# Patient Record
Sex: FEMALE | Race: WHITE | URBAN - METROPOLITAN AREA
[De-identification: names, ages, dates, MRNs, and addresses within clinical notes are randomized per-mention and may not be internally consistent; named-entity substitution may affect disease eponyms.]

---

## 2019-10-12 ENCOUNTER — INPATIENT (INPATIENT)
Facility: HOSPITAL | Age: 84
LOS: 6 days | Discharge: HOME HEALTH | End: 2019-10-19
Attending: INTERNAL MEDICINE | Admitting: INTERNAL MEDICINE
Payer: MEDICARE

## 2019-10-12 VITALS
OXYGEN SATURATION: 94 % | DIASTOLIC BLOOD PRESSURE: 75 MMHG | SYSTOLIC BLOOD PRESSURE: 148 MMHG | TEMPERATURE: 99 F | RESPIRATION RATE: 17 BRPM | HEART RATE: 93 BPM

## 2019-10-12 DIAGNOSIS — Z90.710 ACQUIRED ABSENCE OF BOTH CERVIX AND UTERUS: Chronic | ICD-10-CM

## 2019-10-12 LAB
ALBUMIN SERPL ELPH-MCNC: 3.4 G/DL — LOW (ref 3.5–5.2)
ALP SERPL-CCNC: 83 U/L — SIGNIFICANT CHANGE UP (ref 30–115)
ALT FLD-CCNC: 9 U/L — SIGNIFICANT CHANGE UP (ref 0–41)
ANION GAP SERPL CALC-SCNC: 13 MMOL/L — SIGNIFICANT CHANGE UP (ref 7–14)
AST SERPL-CCNC: 14 U/L — SIGNIFICANT CHANGE UP (ref 0–41)
BILIRUB SERPL-MCNC: 0.4 MG/DL — SIGNIFICANT CHANGE UP (ref 0.2–1.2)
BUN SERPL-MCNC: 24 MG/DL — HIGH (ref 10–20)
CALCIUM SERPL-MCNC: 8.8 MG/DL — SIGNIFICANT CHANGE UP (ref 8.5–10.1)
CHLORIDE SERPL-SCNC: 103 MMOL/L — SIGNIFICANT CHANGE UP (ref 98–110)
CO2 SERPL-SCNC: 25 MMOL/L — SIGNIFICANT CHANGE UP (ref 17–32)
CREAT SERPL-MCNC: 1 MG/DL — SIGNIFICANT CHANGE UP (ref 0.7–1.5)
GLUCOSE SERPL-MCNC: 115 MG/DL — HIGH (ref 70–99)
HCT VFR BLD CALC: 38.6 % — SIGNIFICANT CHANGE UP (ref 37–47)
HGB BLD-MCNC: 12.6 G/DL — SIGNIFICANT CHANGE UP (ref 12–16)
MCHC RBC-ENTMCNC: 29.9 PG — SIGNIFICANT CHANGE UP (ref 27–31)
MCHC RBC-ENTMCNC: 32.6 G/DL — SIGNIFICANT CHANGE UP (ref 32–37)
MCV RBC AUTO: 91.7 FL — SIGNIFICANT CHANGE UP (ref 81–99)
NRBC # BLD: 0 /100 WBCS — SIGNIFICANT CHANGE UP (ref 0–0)
NT-PROBNP SERPL-SCNC: 1651 PG/ML — HIGH (ref 0–300)
PLATELET # BLD AUTO: 187 K/UL — SIGNIFICANT CHANGE UP (ref 130–400)
POTASSIUM SERPL-MCNC: 3.3 MMOL/L — LOW (ref 3.5–5)
POTASSIUM SERPL-SCNC: 3.3 MMOL/L — LOW (ref 3.5–5)
PROT SERPL-MCNC: 5.8 G/DL — LOW (ref 6–8)
RBC # BLD: 4.21 M/UL — SIGNIFICANT CHANGE UP (ref 4.2–5.4)
RBC # FLD: 13.2 % — SIGNIFICANT CHANGE UP (ref 11.5–14.5)
SODIUM SERPL-SCNC: 141 MMOL/L — SIGNIFICANT CHANGE UP (ref 135–146)
TROPONIN T SERPL-MCNC: 0.05 NG/ML — CRITICAL HIGH
WBC # BLD: 14.78 K/UL — HIGH (ref 4.8–10.8)
WBC # FLD AUTO: 14.78 K/UL — HIGH (ref 4.8–10.8)

## 2019-10-12 PROCEDURE — 93010 ELECTROCARDIOGRAM REPORT: CPT

## 2019-10-12 PROCEDURE — 71045 X-RAY EXAM CHEST 1 VIEW: CPT | Mod: 26

## 2019-10-12 PROCEDURE — 99285 EMERGENCY DEPT VISIT HI MDM: CPT

## 2019-10-12 PROCEDURE — 70450 CT HEAD/BRAIN W/O DYE: CPT | Mod: 26

## 2019-10-12 RX ORDER — MORPHINE SULFATE 50 MG/1
4 CAPSULE, EXTENDED RELEASE ORAL ONCE
Refills: 0 | Status: DISCONTINUED | OUTPATIENT
Start: 2019-10-12 | End: 2019-10-12

## 2019-10-12 RX ORDER — PRAMIPEXOLE DIHYDROCHLORIDE 0.12 MG/1
0.5 TABLET ORAL THREE TIMES A DAY
Refills: 0 | Status: DISCONTINUED | OUTPATIENT
Start: 2019-10-12 | End: 2019-10-19

## 2019-10-12 RX ORDER — CHLORHEXIDINE GLUCONATE 213 G/1000ML
1 SOLUTION TOPICAL
Refills: 0 | Status: DISCONTINUED | OUTPATIENT
Start: 2019-10-12 | End: 2019-10-19

## 2019-10-12 RX ORDER — POTASSIUM CHLORIDE 20 MEQ
40 PACKET (EA) ORAL ONCE
Refills: 0 | Status: COMPLETED | OUTPATIENT
Start: 2019-10-12 | End: 2019-10-12

## 2019-10-12 RX ORDER — SODIUM CHLORIDE 9 MG/ML
1000 INJECTION, SOLUTION INTRAVENOUS
Refills: 0 | Status: DISCONTINUED | OUTPATIENT
Start: 2019-10-12 | End: 2019-10-12

## 2019-10-12 RX ORDER — MORPHINE SULFATE 50 MG/1
1 CAPSULE, EXTENDED RELEASE ORAL ONCE
Refills: 0 | Status: DISCONTINUED | OUTPATIENT
Start: 2019-10-12 | End: 2019-10-12

## 2019-10-12 RX ORDER — SODIUM CHLORIDE 9 MG/ML
1000 INJECTION, SOLUTION INTRAVENOUS
Refills: 0 | Status: DISCONTINUED | OUTPATIENT
Start: 2019-10-12 | End: 2019-10-13

## 2019-10-12 RX ORDER — ENOXAPARIN SODIUM 100 MG/ML
40 INJECTION SUBCUTANEOUS DAILY
Refills: 0 | Status: DISCONTINUED | OUTPATIENT
Start: 2019-10-12 | End: 2019-10-16

## 2019-10-12 RX ORDER — ATORVASTATIN CALCIUM 80 MG/1
20 TABLET, FILM COATED ORAL AT BEDTIME
Refills: 0 | Status: DISCONTINUED | OUTPATIENT
Start: 2019-10-12 | End: 2019-10-18

## 2019-10-12 RX ORDER — DILTIAZEM HCL 120 MG
360 CAPSULE, EXT RELEASE 24 HR ORAL DAILY
Refills: 0 | Status: DISCONTINUED | OUTPATIENT
Start: 2019-10-12 | End: 2019-10-19

## 2019-10-12 RX ADMIN — MORPHINE SULFATE 4 MILLIGRAM(S): 50 CAPSULE, EXTENDED RELEASE ORAL at 19:50

## 2019-10-12 RX ADMIN — MORPHINE SULFATE 1 MILLIGRAM(S): 50 CAPSULE, EXTENDED RELEASE ORAL at 23:45

## 2019-10-12 RX ADMIN — Medication 40 MILLIEQUIVALENT(S): at 20:21

## 2019-10-12 RX ADMIN — MORPHINE SULFATE 4 MILLIGRAM(S): 50 CAPSULE, EXTENDED RELEASE ORAL at 20:21

## 2019-10-12 NOTE — ED PROVIDER NOTE - CLINICAL SUMMARY MEDICAL DECISION MAKING FREE TEXT BOX
88yo F history of HTN DL RA Parkinsons presenting with syncopal episode today- per pt and family, she was on the toilet bowl, then syncopized. No trauma, head injury. Pt currently c/o generalized weakness, dysuria, worsening arthralgias, nausea, per pt, sx since Thurs. No fevers/chills, chest pain, shortness of breath, hematuria, flank pain, numbness/focal weakness. labs ekg imaging reviewed. will admit

## 2019-10-12 NOTE — ED PROVIDER NOTE - ATTENDING CONTRIBUTION TO CARE
88yo F history of HTN DL RA Parkinsons presenting with syncopal episode today- per pt and family, she was on the toilet bowl, then syncopized. No trauma, head injury. Pt currently c/o generalized weakness, dysuria, worsening arthralgias, nausea, per pt, sx since Thurs. Also c/o L eye blurry vision x2d. No fevers/chills, chest pain, shortness of breath, hematuria, flank pain, numbness/focal weakness   Constitutional:  Non toxic.   Eyes: PERRLA. Extraocular movements intact, no entrapment. Conjunctiva normal. visual acuity as above  ENT: No nasal discharge. Moist mucus membranes.  Neck: Supple, non tender, full range of motion.  CV: RRR no murmurs, rubs, or gallops. +S1S2.   Pulm: Clear to auscultation bilaterally. Normal work of breathing.  Abd: soft NT ND +BS.   Ext: Warm and well perfused x4, moving all extremities, no edema.   Psy: Cooperative, appropriate.   Skin: Warm, dry, no rash  Neuro: CN2-12 grossly intact no sensory or motor deficits throughout, no drift

## 2019-10-12 NOTE — H&P ADULT - ASSESSMENT
This is a 78 year old female with a significant PMhx of Parkinson's disease and RA who presented to the ED after a witnessed episode of syncope, associated with recent generalized weakness, dark urine, and N/V. She also complained of L-eye blurriness x2 days and headaches over the last few weeks.     Syncope; Witnessed  - Most likely related to dehydration and Parkinson's disease   - Patient received IVF in the ED; VS SNL  - Will monitor BP off of HCTZ for now  - Follow up orthostatics; Would change HCTZ for another anti-HTN medication  - Doubt seizure without report of post-ictal state expressed  - Elevated troponin at time of presentation; trending  - Murmur on auscultation; BNP elevated; Follow up TTE  - Follow up CTA Head and Neck given recent headaches and blurry vision  - Will consult surgery pending work up results    Elevated troponin and BNP   - Work up as above mentioned    Acute blurry vision; L-eye blurriness  - Follow up ESR  - Follow up CTA head and neck  - Will hold on steroids for now    Hx of Parkinson's Disease: Continue home medications  Hx of RA: Paint control  Hx of HTN and HLD: Hold HCTZ and continue Statin    Electrolyte Imbalances:   Hypokalemia; repletion in ED; monitor      GI ppx:                                   [X] Not indicated    DVT ppx:  [X] Heparin 5000mg SubQ    Fluids:   [X] PO    Activity:  [X] Increase as Tolerated     DISPO:  Patient to be discharged when condition(s) optimized.  [X] Home    CODE STATUS  [X] FULL This is a 78 year old female with a significant PMhx of Parkinson's disease and RA who presented to the ED after a witnessed episode of syncope, associated with recent generalized weakness, dark urine, and N/V. She also complained of L-eye blurriness x2 days and headaches over the last few weeks.     Syncope; Witnessed  - Most likely related to dehydration and Parkinson's disease   - Will start IVF for 12 hours; Reassess in AM  - Will monitor BP off of HCTZ for now  - Follow up orthostatics; Would change HCTZ for another anti-HTN medication  - Doubt seizure without report of post-ictal state expressed  - Elevated troponin at time of presentation; trending  - Murmur on auscultation; BNP elevated; Follow up TTE  - Leukocytosis; Follow up UA and Ucx given report of dark urine; Follow up CK  - Will consult surgery pending work up results    Elevated troponin and BNP   - Work up as above mentioned    Acute blurry vision; L-eye blurriness  - Follow up ESR  - Consider CTA Head and Neck if elevated ESR and symptoamtic    Hx of Parkinson's Disease: Continue home medications  Hx of RA: Paint control  Hx of HTN and HLD: Hold HCTZ and continue Statin    Electrolyte Imbalances:   Hypokalemia; repletion in ED; monitor      GI ppx:                                   [X] Not indicated    DVT ppx:  [X] Heparin 5000mg SubQ    Fluids:   [X] PO    Activity:  [X] Increase as Tolerated     DISPO:  Patient to be discharged when condition(s) optimized.  [X] Home    CODE STATUS  [X] FULL This is a 78 year old female with a significant PMhx of Parkinson's disease and RA who presented to the ED after a witnessed episode of syncope, associated with recent generalized weakness, dark urine, and N/V. She also complained of L-eye blurriness x2 days and headaches over the last few weeks.     Syncope; Witnessed  - Most likely related to dehydration and Parkinson's disease  - Doubt seizure without report of post-ictal state expressed  - CT Head with moderate microvascular changes and old lacunar infarcts; No acute pathology  - Will monitor BP off of HCTZ for now  - Follow up orthostatics; Would change HCTZ for another anti-HTN medication  - Will start IVF for 12 hours; Reassess in AM  - Elevated troponin at time of presentation; ?Demand (no CP); Trending  - Murmur on auscultation; BNP elevated; Follow up TTE  - Leukocytosis; Follow up UA and Ucx given report of dark urine; Follow up CK    Elevated troponin and BNP   - Work up as above mentioned    Acute blurry vision; L-eye blurriness  - Follow up ESR and CK  - Consider CTA Head and Neck if elevated ESR and symptoamtic    Hx of Parkinson's Disease: Continue home medications  Hx of RA: Paint control  Hx of HTN and HLD: Hold HCTZ and continue Statin    Electrolyte Imbalances:   Hypokalemia; repletion in ED; monitor      GI ppx:                                   [X] Not indicated    DVT ppx:  [X] Heparin 5000mg SubQ    Fluids:   [X] PO    Activity:  [X] Increase as Tolerated     DISPO:  Patient to be discharged when condition(s) optimized.  [X] Home    CODE STATUS  [X] FULL This is a 78 year old female with a significant PMhx of Parkinson's disease and RA who presented to the ED after a witnessed episode of syncope, associated with recent generalized weakness, dark urine, and N/V. She also complained of L-eye blurriness x2 days and headaches over the last few weeks.     Syncope; Witnessed  - Most likely related to dehydration and Parkinson's disease  - Doubt seizure without report of post-ictal state expressed  - CT Head with moderate microvascular changes and old lacunar infarcts; No acute pathology  - Will monitor BP off of HCTZ for now  - Follow up orthostatics; Would change HCTZ for another anti-HTN medication  - Will start IVF for 12 hours; Reassess in AM  - Elevated troponin at time of presentation; ?Demand (no CP); Trending  - Murmur on auscultation; BNP elevated; Follow up TTE  - Leukocytosis; Follow up UA and Ucx given report of dark urine; Follow up CK    Elevated troponin and BNP   - Work up as above mentioned    Acute blurry vision; L-eye blurriness  - Follow up ESR and CK  - Consider CTA Head and Neck if elevated ESR and symptomatic    Hx of Parkinson's Disease: Continue home medications  Hx of RA: Paint control  Hx of HTN and HLD: Hold HCTZ and continue Statin    Electrolyte Imbalances:   Hypokalemia; repletion in ED; monitor      GI ppx:                                   [X] Not indicated    DVT ppx:  [X] Heparin 5000mg SubQ    Fluids:   [X] PO    Activity:  [X] Increase as Tolerated     DISPO:  Patient to be discharged when condition(s) optimized.  [X] Home    CODE STATUS  [X] FULL This is a 78 year old female with a significant PMhx of Parkinson's disease and RA who presented to the ED after a witnessed episode of syncope, associated with recent generalized weakness, dark urine, and N/V. She also complained of L-eye blurriness x2 days and headaches over the last few weeks.     Syncope; Witnessed  - Most likely related to dehydration and Parkinson's disease  - Doubt seizure without report of post-ictal state expressed  - CT Head with moderate microvascular changes and old lacunar infarcts; No acute pathology  - Will monitor BP off of HCTZ for now  - Follow up orthostatics; Would change HCTZ for another anti-HTN medication  - Will start IVF for 12 hours; Reassess in AM  - Elevated troponin at time of presentation; ?Demand (no CP); Trending  - Murmur on auscultation; BNP elevated; Follow up TTE  - Leukocytosis; Follow up UA and Ucx given report of dark urine; Follow up CK    Elevated troponin and BNP   - Work up as above mentioned    Acute blurry vision; L-eye blurriness  - Follow up ESR and CK    Hx of Parkinson's Disease: Continue home medications  Hx of RA: Paint control  Hx of HTN and HLD: Hold HCTZ and continue Statin    Electrolyte Imbalances:   Hypokalemia; repletion in ED; monitor      GI ppx:                                   [X] Not indicated    DVT ppx:  [X] Heparin 5000mg SubQ    Fluids:   [X] PO    Activity:  [X] Increase as Tolerated     DISPO:  Patient to be discharged when condition(s) optimized.  [X] Home    CODE STATUS  [X] FULL This is a 78 year old female with a significant PMhx of Parkinson's disease and RA who presented to the ED after a witnessed episode of syncope, associated with recent generalized weakness, dark urine, and N/V. She also complained of L-eye blurriness x2 days and headaches over the last few weeks.     Syncope; Witnessed  - Most likely related to dehydration and Parkinson's disease  - Doubt seizure without report of post-ictal state expressed  - CT Head with moderate microvascular changes and old lacunar infarcts; No acute pathology  - Will monitor BP off of HCTZ for now  - Follow up orthostatics; Would change HCTZ for another anti-HTN medication  - Will start IVF for 12 hours; Reassess in AM  - Elevated troponin at time of presentation; ?Demand, CKD (no CP); Trending  - Murmur on auscultation; BNP elevated; Follow up TTE  - Leukocytosis; Follow up UA and Ucx given report of dark urine; Follow up CK    Elevated troponin and BNP   - Work up as above mentioned    Acute blurry vision; L-eye blurriness  - Follow up ESR and CK    Hx of Parkinson's Disease: Continue home medications  Hx of RA: Paint control  Hx of HTN and HLD: Hold HCTZ and continue Statin    Electrolyte Imbalances:   Hypokalemia; repletion in ED; monitor      GI ppx:                                   [X] Not indicated    DVT ppx:  [X] Heparin 5000mg SubQ    Fluids:   [X] PO    Activity:  [X] Increase as Tolerated     DISPO:  Patient to be discharged when condition(s) optimized.  [X] Home    CODE STATUS  [X] FULL

## 2019-10-12 NOTE — ED ADULT TRIAGE NOTE - CHIEF COMPLAINT QUOTE
"I feel very weak since Thursday." Family reports pt extremely weak, having dark colored urine, leg pain, and nausea.

## 2019-10-12 NOTE — ED PROVIDER NOTE - CARE PLAN
Principal Discharge DX:	Syncope, unspecified syncope type  Secondary Diagnosis:	Syncope  Secondary Diagnosis:	NSTEMI (non-ST elevated myocardial infarction)

## 2019-10-12 NOTE — H&P ADULT - NSHPSOCIALHISTORY_GEN_ALL_CORE
Lives at home with daughter  Walks with a can at baseline  Denied history of EtOH and illicit drug use  Denied history of smoking

## 2019-10-12 NOTE — H&P ADULT - NSICDXPASTMEDICALHX_GEN_ALL_CORE_FT
PAST MEDICAL HISTORY:  HTN (hypertension)     Hyperlipidemia     Parkinson disease     Rheumatoid arthritis

## 2019-10-12 NOTE — ED PROVIDER NOTE - OBJECTIVE STATEMENT
87F with pmh of Parkinson's, HTN, HLD, and RA presents with weakness and L hip pain with radiation to bilateral knee pain as is typical of 87F with pmh of Parkinson's, HTN, HLD, and RA presents with weakness, nausea and L hip pain with radiation to bilateral knee pain as is typical of her RA. Daughter notes that earlier today PT was urinating when she noticed that she had lost consciousness for less than a minute. PT denies prodromal sx, n/v/d, abbd pain, CP, SOB. PT also noting blurry vision in L eye. No loss of balance, slurring of speech, numbness or weakness.

## 2019-10-12 NOTE — ED PROVIDER NOTE - PROGRESS NOTE DETAILS
Bedside ultrasound negative for detachment or hemorrhage. EKG without STEMI. PT to be admitted for elevated cardiac enzymes and further evaluation.

## 2019-10-12 NOTE — ED ADULT NURSE NOTE - OBJECTIVE STATEMENT
pt biba from home after feeling weak since thursday. having lower back pain that radiates down both legs. denies trauma but has arthritis. yesterday had 1 episode of dark urine today it is back to normal. denies increase in fluid intake. had a 1 second syncopal episode as per daughter the other day.

## 2019-10-12 NOTE — H&P ADULT - NSHPLABSRESULTS_GEN_ALL_CORE
:  LAB RESULTS:                        12.6   14.78 )-----------( 187      ( 12 Oct 2019 19:12 )             38.6     141  |  103  |  24<H>  ----------------------------<  115<H>  3.3<L>   |  25  |  1.0    Ca    8.8         TPro  5.8<L>  /  Alb  3.4<L>  /  TBili  0.4  /  DBili  x   /  AST  14  /  ALT  9   /  AlkPhos  83  10-12    Troponin T, Serum: 0.05 ng/mL <HH> (10-12-19 @ 19:12)    MICROBIOLOGY: NTR    RADIOLOGY:  CT Head No Cont (10.12.19 @ 20:19)     IMPRESSION:   No CT evidence of acute intracranial pathology.   Moderate chronic microvascular ischemic changes and chronic lacunar infarcts as above.    ALLERGIES:  No Known Allergies    HOME MEDICATIONS:  atorvastatin 20 mg oral tablet: 1 tab(s) orally once a day (12 Oct 2019 18:49)  carbidopa-levodopa:  (12 Oct 2019 18:49)  celecoxib 200 mg oral capsule: 1 cap(s) orally 2 times a day (12 Oct 2019 18:49)  dilTIAZem 360 mg/24 hours oral capsule, extended release: 1 cap(s) orally once a day (12 Oct 2019 18:49)  hydroCHLOROthiazide 12.5 mg oral tablet: 1 tab(s) orally once a day (12 Oct 2019 18:49)  pramipexole 0.5 mg oral tablet: 1 tab(s) orally 3 times a day (12 Oct 2019 18:49)    =========================================================== :  LAB RESULTS:                        12.6   14.78 )-----------( 187      ( 12 Oct 2019 19:12 )             38.6     141  |  103  |  24<H>  ----------------------------<  115<H>  3.3<L>   |  25  |  1.0    Ca    8.8         TPro  5.8<L>  /  Alb  3.4<L>  /  TBili  0.4  /  DBili  x   /  AST  14  /  ALT  9   /  AlkPhos  83  10-12    Troponin T, Serum: 0.05 ng/mL <HH> (10-12-19 @ 19:12)    MICROBIOLOGY: NTR    RADIOLOGY:  CT Head No Cont (10.12.19 @ 20:19)     < from: 12 Lead ECG (10.12.19 @ 19:32) >    Normal sinus rhythm    < end of copied text >        IMPRESSION:   No CT evidence of acute intracranial pathology.   Moderate chronic microvascular ischemic changes and chronic lacunar infarcts as above.    ALLERGIES:  No Known Allergies    HOME MEDICATIONS:  atorvastatin 20 mg oral tablet: 1 tab(s) orally once a day (12 Oct 2019 18:49)  carbidopa-levodopa:  (12 Oct 2019 18:49)  celecoxib 200 mg oral capsule: 1 cap(s) orally 2 times a day (12 Oct 2019 18:49)  dilTIAZem 360 mg/24 hours oral capsule, extended release: 1 cap(s) orally once a day (12 Oct 2019 18:49)  hydroCHLOROthiazide 12.5 mg oral tablet: 1 tab(s) orally once a day (12 Oct 2019 18:49)  pramipexole 0.5 mg oral tablet: 1 tab(s) orally 3 times a day (12 Oct 2019 18:49)    ===========================================================

## 2019-10-12 NOTE — ED ADULT NURSE NOTE - NSIMPLEMENTINTERV_GEN_ALL_ED
Implemented All Fall with Harm Risk Interventions:  [place] to call system. Call bell, personal items and telephone within reach. Instruct patient to call for assistance. Room bathroom lighting operational. Non-slip footwear when patient is off stretcher. Physically safe environment: no spills, clutter or unnecessary equipment. Stretcher in lowest position, wheels locked, appropriate side rails in place. Provide visual cue, wrist band, yellow gown, etc. Monitor gait and stability. Monitor for mental status changes and reorient to person, place, and time. Review medications for side effects contributing to fall risk. Reinforce activity limits and safety measures with patient and family. Provide visual clues: red socks.

## 2019-10-12 NOTE — H&P ADULT - NSHPPHYSICALEXAM_GEN_ALL_CORE
DAILY PROGRESS NOTE  ===========================================================    Patient Information:  VITAL SIGNS: Last 24 Hours  T(F): 98.6 (12 Oct 2019 18:08), Max: 98.6 (12 Oct 2019 18:08)  HR: 86 (12 Oct 2019 20:30) (86 - 93)  BP: 153/70 (12 Oct 2019 20:30) (148/75 - 153/70)  RR: 20 (12 Oct 2019 20:30) (17 - 20)  SpO2: 95% (12 Oct 2019 20:30) (94% - 95%)    PHYSICAL EXAM:  GENERAL:   Awake, alert; NAD.  HEENT:  Head NC/AT; Conjunctivae pink, Sclera anicteric; Oral mucosa moist.  CARDIO:   Regular rate; Regular rhythm; S1 & S2.  RESP:   No rales or rhonchi appreciated.  GI:   Soft; NT/ND; BS; No guarding; No rebound tenderness.  EXT:   No edema in UE and LE.  NEURO:   PERRL.  SKIN:   Intact. DAILY PROGRESS NOTE  ===========================================================    Patient Information:  VITAL SIGNS: Last 24 Hours  T(F): 98.6 (12 Oct 2019 18:08), Max: 98.6 (12 Oct 2019 18:08)  HR: 86 (12 Oct 2019 20:30) (86 - 93)  BP: 153/70 (12 Oct 2019 20:30) (148/75 - 153/70)  RR: 20 (12 Oct 2019 20:30) (17 - 20)  SpO2: 95% (12 Oct 2019 20:30) (94% - 95%)    PHYSICAL EXAM:  GENERAL:   Awake, alert; Mild distress, worsened with movement.  HEENT:  Head NC/AT; Conjunctivae injected BL; Sclera anicteric; Oral mucosa moist.  CARDIO:   Regular rate; IRREGULAR rhythm; S1 & S2; Murmur.  RESP:   No rales or rhonchi appreciated; Good entry BL.  GI:   Soft; NT/ND; BS; No guarding; No rebound tenderness.  EXT:   No edema in LE; No rigidity in UE with moderate rigidity in LE; Resting hand tremors; No pain at neck or hips on palpation; Pain with flexion and extension of knees BL.  NEURO:   Oriented x3; Sluggish L-pupil reflex, CN otherwise grossly intact.   SKIN:   Intact. DAILY PROGRESS NOTE  ===========================================================    Patient Information:  VITAL SIGNS: Last 24 Hours  T(F): 98.6 (12 Oct 2019 18:08), Max: 98.6 (12 Oct 2019 18:08)  HR: 86 (12 Oct 2019 20:30) (86 - 93)  BP: 153/70 (12 Oct 2019 20:30) (148/75 - 153/70)  RR: 20 (12 Oct 2019 20:30) (17 - 20)  SpO2: 95% (12 Oct 2019 20:30) (94% - 95%)    PHYSICAL EXAM:  GENERAL:   Awake, alert; Mild distress, worsened with movement.  HEENT:  Head NC/AT; Conjunctivae injected BL; Sclera anicteric; Oral mucosa moist.  CARDIO:   Regular rate; IRREGULAR rhythm; S1 & S2; Murmur.  RESP:   No rales or rhonchi appreciated; Good entry BL.  GI:   Soft; NT/ND; BS; No guarding; No rebound tenderness.  EXT:   No edema in LE; No rigidity in UE with moderate rigidity in LE; Resting hand tremors; No pain at neck or hips on palpation; Pain with flexion and extension of knees BL.  NEURO:   Oriented x3; ?Sluggish L-pupil reflex (difficult to see as patient kept closing eye), CN otherwise grossly intact.   SKIN:   Intact.

## 2019-10-12 NOTE — ED PROVIDER NOTE - PHYSICAL EXAMINATION
CONSTITUTIONAL: Well-developed; well-nourished; in no acute distress.   SKIN: warm, dry  HEAD: Normocephalic; atraumatic.  EYES: PERRL, EOMI, no conjunctival erythema. 20/70 on the L, 20/40 R  ENT: No nasal discharge; airway clear.  NECK: Supple; non tender.  CARD: S1, S2 normal; no murmurs, gallops, or rubs. Regular rate and rhythm.   RESP: No wheezes, rales or rhonchi.  ABD: soft ntnd  EXT: Normal ROM.  No clubbing, cyanosis or edema.   LYMPH: No acute cervical adenopathy.  NEURO: Alert, oriented, grossly unremarkable  PSYCH: Cooperative, appropriate.

## 2019-10-12 NOTE — H&P ADULT - HISTORY OF PRESENT ILLNESS
This is a 78 year old female with a significant PMhx of Parkinson's disease and RA who presented to the ED after a witnessed episode of syncope. As per the daughter, the patient was attempting to urinate when she noticed the patient had lost consciousness. She was able to arouse the patient after a few seconds without any report of post-ictal state; the patient does not remember the event. The patient has also been experience generalized weakness, dark colored urine, BL LE (thigh pain), and nausea/vomiting (single bilious episode) 2 days prior to presentation. She also mentions acute development of left eye blurriness that is constant over past two days, and has been experiencing intermittent, sharp, occipital/neck headaches for the last few weeks.    ROS was negative for fever, CP, palpitations, SOB, abdominal pain, diarrhea, constipation.

## 2019-10-12 NOTE — H&P ADULT - ATTENDING COMMENTS
patient seen and examined , agree with pgy 3 assesment and plan except as indicated above,   GEN Lying in no acute distress  HEENT Pupils equal and reactive to light and accommodationSupple Neck  PULM Clear to auscultation bilaterally  CV s1s2   GI + bowel sounds nontnender  EXT no cyanosis or edema  PSYCH awake alert and oriented x 3  INTEG No Lesions  NEURO VILLAR  #Syncopal episode   with persistent tropnemia , cardio consult , continue to trend with ck and ckmb  #Hypokalemia resolved  #CKD 2   #Moderate tricuspid regurgitation.    PT   Progress Note Handoff    Pending:    Family discussion: patient is of sound mind and verbalizes understanding to plan of care, daughter at bedside also  agrees to plan of care    Disposition: Home___ patient seen and examined , agree with pgy 3 assesment and plan except as indicated above,   GEN Lying in no acute distress  HEENT Pupils equal and reactive to light and accommodationSupple Neck  PULM Clear to auscultation bilaterally  CV s1s2   GI + bowel sounds nontnender  EXT no cyanosis or edema  PSYCH awake alert and oriented x 3  INTEG No Lesions  NEURO VILLAR  #Syncopal episode with positive ua  with persistent tropnemia , cardio consult , continue to trend with ck and ckmb, start cefepime  #Hypokalemia resolved  #CKD 2   #Moderate tricuspid regurgitation.    PT   Progress Note Handoff    Pending:    Family discussion: patient is of sound mind and verbalizes understanding to plan of care, daughter at bedside also  agrees to plan of care    Disposition: Home___ patient seen and examined , agree with pgy 3 assesment and plan except as indicated above,   GEN Lying in no acute distress  HEENT Pupils equal and reactive to light and accommodationSupple Neck  PULM Clear to auscultation bilaterally  CV s1s2   GI + bowel sounds nontnender  EXT no cyanosis or edema  PSYCH awake alert and oriented x 3  INTEG No Lesions  NEURO VILLAR  #Syncopal episode with positive ua -> suspicion for uti?  with persistent tropnemia , cardio consult , continue to trend with ck and ckmb, start cefepime, awaiting cultures  #Hypokalemia resolved  #CKD 2   #Moderate tricuspid regurgitation.    PT   Progress Note Handoff    Pending:    Family discussion: patient is of sound mind and verbalizes understanding to plan of care, daughter at bedside also  agrees to plan of care    Disposition: Home___

## 2019-10-13 LAB
ANION GAP SERPL CALC-SCNC: 14 MMOL/L — SIGNIFICANT CHANGE UP (ref 7–14)
APPEARANCE UR: ABNORMAL
BACTERIA # UR AUTO: ABNORMAL
BASOPHILS # BLD AUTO: 0.03 K/UL — SIGNIFICANT CHANGE UP (ref 0–0.2)
BASOPHILS NFR BLD AUTO: 0.2 % — SIGNIFICANT CHANGE UP (ref 0–1)
BILIRUB UR-MCNC: NEGATIVE — SIGNIFICANT CHANGE UP
BUN SERPL-MCNC: 19 MG/DL — SIGNIFICANT CHANGE UP (ref 10–20)
CALCIUM SERPL-MCNC: 8.7 MG/DL — SIGNIFICANT CHANGE UP (ref 8.5–10.1)
CHLORIDE SERPL-SCNC: 105 MMOL/L — SIGNIFICANT CHANGE UP (ref 98–110)
CK MB CFR SERPL CALC: 3.7 NG/ML — SIGNIFICANT CHANGE UP (ref 0.6–6.3)
CK SERPL-CCNC: 81 U/L — SIGNIFICANT CHANGE UP (ref 0–225)
CO2 SERPL-SCNC: 24 MMOL/L — SIGNIFICANT CHANGE UP (ref 17–32)
COLOR SPEC: YELLOW — SIGNIFICANT CHANGE UP
CREAT SERPL-MCNC: 0.8 MG/DL — SIGNIFICANT CHANGE UP (ref 0.7–1.5)
DIFF PNL FLD: ABNORMAL
EOSINOPHIL # BLD AUTO: 0.03 K/UL — SIGNIFICANT CHANGE UP (ref 0–0.7)
EOSINOPHIL NFR BLD AUTO: 0.2 % — SIGNIFICANT CHANGE UP (ref 0–8)
EPI CELLS # UR: 1 /HPF — SIGNIFICANT CHANGE UP (ref 0–5)
ERYTHROCYTE [SEDIMENTATION RATE] IN BLOOD: 106 MM/HR — HIGH (ref 0–20)
GLUCOSE SERPL-MCNC: 106 MG/DL — HIGH (ref 70–99)
GLUCOSE UR QL: NEGATIVE — SIGNIFICANT CHANGE UP
HCT VFR BLD CALC: 37.4 % — SIGNIFICANT CHANGE UP (ref 37–47)
HGB BLD-MCNC: 12.2 G/DL — SIGNIFICANT CHANGE UP (ref 12–16)
HYALINE CASTS # UR AUTO: 4 /LPF — SIGNIFICANT CHANGE UP (ref 0–7)
IMM GRANULOCYTES NFR BLD AUTO: 1 % — HIGH (ref 0.1–0.3)
KETONES UR-MCNC: SIGNIFICANT CHANGE UP
LACTATE SERPL-SCNC: 0.8 MMOL/L — SIGNIFICANT CHANGE UP (ref 0.5–2.2)
LEUKOCYTE ESTERASE UR-ACNC: ABNORMAL
LYMPHOCYTES # BLD AUTO: 0.63 K/UL — LOW (ref 1.2–3.4)
LYMPHOCYTES # BLD AUTO: 4.8 % — LOW (ref 20.5–51.1)
MAGNESIUM SERPL-MCNC: 1.9 MG/DL — SIGNIFICANT CHANGE UP (ref 1.8–2.4)
MCHC RBC-ENTMCNC: 30 PG — SIGNIFICANT CHANGE UP (ref 27–31)
MCHC RBC-ENTMCNC: 32.6 G/DL — SIGNIFICANT CHANGE UP (ref 32–37)
MCV RBC AUTO: 92.1 FL — SIGNIFICANT CHANGE UP (ref 81–99)
MONOCYTES # BLD AUTO: 1.06 K/UL — HIGH (ref 0.1–0.6)
MONOCYTES NFR BLD AUTO: 8 % — SIGNIFICANT CHANGE UP (ref 1.7–9.3)
NEUTROPHILS # BLD AUTO: 11.33 K/UL — HIGH (ref 1.4–6.5)
NEUTROPHILS NFR BLD AUTO: 85.8 % — HIGH (ref 42.2–75.2)
NITRITE UR-MCNC: NEGATIVE — SIGNIFICANT CHANGE UP
NRBC # BLD: 0 /100 WBCS — SIGNIFICANT CHANGE UP (ref 0–0)
PH UR: 8 — SIGNIFICANT CHANGE UP (ref 5–8)
PHOSPHATE SERPL-MCNC: 2.5 MG/DL — SIGNIFICANT CHANGE UP (ref 2.1–4.9)
PLATELET # BLD AUTO: 188 K/UL — SIGNIFICANT CHANGE UP (ref 130–400)
POTASSIUM SERPL-MCNC: 3.9 MMOL/L — SIGNIFICANT CHANGE UP (ref 3.5–5)
POTASSIUM SERPL-SCNC: 3.9 MMOL/L — SIGNIFICANT CHANGE UP (ref 3.5–5)
PROT UR-MCNC: ABNORMAL
RBC # BLD: 4.06 M/UL — LOW (ref 4.2–5.4)
RBC # FLD: 13.3 % — SIGNIFICANT CHANGE UP (ref 11.5–14.5)
RBC CASTS # UR COMP ASSIST: 5 /HPF — HIGH (ref 0–4)
SODIUM SERPL-SCNC: 143 MMOL/L — SIGNIFICANT CHANGE UP (ref 135–146)
SP GR SPEC: 1.02 — SIGNIFICANT CHANGE UP (ref 1.01–1.02)
TROPONIN T SERPL-MCNC: 0.06 NG/ML — CRITICAL HIGH
TROPONIN T SERPL-MCNC: 0.1 NG/ML — CRITICAL HIGH
UROBILINOGEN FLD QL: SIGNIFICANT CHANGE UP
WBC # BLD: 13.21 K/UL — HIGH (ref 4.8–10.8)
WBC # FLD AUTO: 13.21 K/UL — HIGH (ref 4.8–10.8)
WBC UR QL: 77 /HPF — HIGH (ref 0–5)

## 2019-10-13 PROCEDURE — 99223 1ST HOSP IP/OBS HIGH 75: CPT

## 2019-10-13 PROCEDURE — 93306 TTE W/DOPPLER COMPLETE: CPT | Mod: 26

## 2019-10-13 PROCEDURE — 93010 ELECTROCARDIOGRAM REPORT: CPT

## 2019-10-13 RX ORDER — CEFTRIAXONE 500 MG/1
1000 INJECTION, POWDER, FOR SOLUTION INTRAMUSCULAR; INTRAVENOUS EVERY 24 HOURS
Refills: 0 | Status: DISCONTINUED | OUTPATIENT
Start: 2019-10-13 | End: 2019-10-17

## 2019-10-13 RX ORDER — PANTOPRAZOLE SODIUM 20 MG/1
40 TABLET, DELAYED RELEASE ORAL
Refills: 0 | Status: DISCONTINUED | OUTPATIENT
Start: 2019-10-13 | End: 2019-10-19

## 2019-10-13 RX ORDER — CARBIDOPA AND LEVODOPA 25; 100 MG/1; MG/1
1 TABLET ORAL
Refills: 0 | Status: DISCONTINUED | OUTPATIENT
Start: 2019-10-13 | End: 2019-10-19

## 2019-10-13 RX ORDER — MORPHINE SULFATE 50 MG/1
2 CAPSULE, EXTENDED RELEASE ORAL ONCE
Refills: 0 | Status: DISCONTINUED | OUTPATIENT
Start: 2019-10-13 | End: 2019-10-13

## 2019-10-13 RX ORDER — INFLUENZA VIRUS VACCINE 15; 15; 15; 15 UG/.5ML; UG/.5ML; UG/.5ML; UG/.5ML
0.5 SUSPENSION INTRAMUSCULAR ONCE
Refills: 0 | Status: DISCONTINUED | OUTPATIENT
Start: 2019-10-13 | End: 2019-10-19

## 2019-10-13 RX ORDER — ACETAMINOPHEN 500 MG
650 TABLET ORAL EVERY 6 HOURS
Refills: 0 | Status: DISCONTINUED | OUTPATIENT
Start: 2019-10-13 | End: 2019-10-19

## 2019-10-13 RX ADMIN — ENOXAPARIN SODIUM 40 MILLIGRAM(S): 100 INJECTION SUBCUTANEOUS at 12:24

## 2019-10-13 RX ADMIN — SODIUM CHLORIDE 60 MILLILITER(S): 9 INJECTION, SOLUTION INTRAVENOUS at 02:48

## 2019-10-13 RX ADMIN — CEFTRIAXONE 100 MILLIGRAM(S): 500 INJECTION, POWDER, FOR SOLUTION INTRAMUSCULAR; INTRAVENOUS at 13:14

## 2019-10-13 RX ADMIN — CARBIDOPA AND LEVODOPA 1 TABLET(S): 25; 100 TABLET ORAL at 12:24

## 2019-10-13 RX ADMIN — Medication 360 MILLIGRAM(S): at 05:42

## 2019-10-13 RX ADMIN — CARBIDOPA AND LEVODOPA 1 TABLET(S): 25; 100 TABLET ORAL at 05:42

## 2019-10-13 RX ADMIN — CARBIDOPA AND LEVODOPA 1 TABLET(S): 25; 100 TABLET ORAL at 16:30

## 2019-10-13 RX ADMIN — PRAMIPEXOLE DIHYDROCHLORIDE 0.5 MILLIGRAM(S): 0.12 TABLET ORAL at 13:14

## 2019-10-13 RX ADMIN — MORPHINE SULFATE 2 MILLIGRAM(S): 50 CAPSULE, EXTENDED RELEASE ORAL at 02:48

## 2019-10-13 RX ADMIN — PRAMIPEXOLE DIHYDROCHLORIDE 0.5 MILLIGRAM(S): 0.12 TABLET ORAL at 05:42

## 2019-10-13 RX ADMIN — CARBIDOPA AND LEVODOPA 1 TABLET(S): 25; 100 TABLET ORAL at 21:36

## 2019-10-13 RX ADMIN — PRAMIPEXOLE DIHYDROCHLORIDE 0.5 MILLIGRAM(S): 0.12 TABLET ORAL at 21:36

## 2019-10-13 RX ADMIN — ATORVASTATIN CALCIUM 20 MILLIGRAM(S): 80 TABLET, FILM COATED ORAL at 21:36

## 2019-10-13 RX ADMIN — Medication 100 MILLIGRAM(S): at 18:10

## 2019-10-13 NOTE — CONSULT NOTE ADULT - SUBJECTIVE AND OBJECTIVE BOX
HPI:  This is a 78 year old female with a significant PMhx of Parkinson's disease and RA who presented to the ED after a witnessed episode of syncope. As per the daughter, the patient was attempting to urinate when she noticed the patient had lost consciousness. She was able to arouse the patient after a few seconds without any report of post-ictal state; the patient does not remember the event. The patient has also been experience generalized weakness, dark colored urine, BL LE (thigh pain), and nausea/vomiting (single bilious episode) 2 days prior to presentation. She also mentions acute development of left eye blurriness that is constant over past two days with headaches located in the occipital region. CTH failed to reveal acute intracranial pathology.     Tests:  < from: CT Head No Cont (10.12.19 @ 20:19) >    IMPRESSION:     No CT evidence of acute intracranial pathology.     Moderate chronic microvascular ischemic changes and chronic lacunar   infarcts as above.    Medications:  atorvastatin 20 milliGRAM(s) Oral at bedtime  carbidopa/levodopa CR 50/200 1 Tablet(s) Oral <User Schedule>  cefTRIAXone   IVPB 1000 milliGRAM(s) IV Intermittent every 24 hours  chlorhexidine 4% Liquid 1 Application(s) Topical <User Schedule>  diltiazem    milliGRAM(s) Oral daily  enoxaparin Injectable 40 milliGRAM(s) SubCutaneous daily  influenza   Vaccine 0.5 milliLiter(s) IntraMuscular once  methylPREDNISolone sodium succinate IVPB 500 milliGRAM(s) IV Intermittent daily  pramipexole 0.5 milliGRAM(s) Oral three times a day    Vitals  T(F): 98.1 (10-13-19 @ 15:57), Max: 100 (10-12-19 @ 23:54)  HR: 87 (10-13-19 @ 15:57) (86 - 93)  BP: 151/70 (10-13-19 @ 15:57) (148/75 - 155/64)  RR: 18 (10-13-19 @ 15:57) (17 - 20)  SpO2: 97% (10-13-19 @ 15:57) (93% - 97%)                        12.2   13.21 )-----------( 188      ( 13 Oct 2019 05:51 )             37.4     10-13    143  |  105  |  19  ----------------------------<  106<H>  3.9   |  24  |  0.8    Ca    8.7      13 Oct 2019 05:51  Phos  2.5     10-13  Mg     1.9     10-13    TPro  5.8<L>  /  Alb  3.4<L>  /  TBili  0.4  /  DBili  x   /  AST  14  /  ALT  9   /  AlkPhos  83  10-12      Urinalysis Basic - ( 13 Oct 2019 00:25 )    Color: Yellow / Appearance: Slightly Turbid / S.016 / pH: x  Gluc: x / Ketone: Trace  / Bili: Negative / Urobili: <2 mg/dL   Blood: x / Protein: 30 mg/dL / Nitrite: Negative   Leuk Esterase: Large / RBC: 5 /HPF / WBC 77 /HPF   Sq Epi: x / Non Sq Epi: 1 /HPF / Bacteria: Many    Sedimentation Rate, Erythrocyte: 106 mm/Hr (10.. @ 23:33)      LIVER FUNCTIONS - ( 12 Oct 2019 19:12 )  Alb: 3.4 g/dL / Pro: 5.8 g/dL / ALK PHOS: 83 U/L / ALT: 9 U/L / AST: 14 U/L / GGT: x             Neuro Exam:  Awake alert oriented to self place time.  PEARLA EOMI no gaze no visual deficit.  No temporal or orbital tenderness.  Bilateral temporal pulses present.   No aphasia no dysarthria, no facial palsy.  No drift of bilateral upper and lower extremities.  No ataxia of upper or lower extremities.   No sensory deficit.  No neglect or inattention.  NIHSS:0  Impression:  This is a 78 year old female with a significant PMhx of Parkinson's disease and RA who presented to the ED after a witnessed episode of syncope. As per the daughter, the patient was on the toilet when the patient lost consciousness. Episode lasted a few seconds. She also mentions acute development of left eye blurriness that is constant over past two days with headaches located in the occipital region. CTH failed to reveal acute intracranial pathology. Episode less likely due to epileptiform etiology and more likely orthostatic.  may be due to RA and without temporal tenderness, loss of pulse and transient blurriness temporal arteritis is on the differential diagnosis but less likely.      Suggestions:  Orthostatic blood pressures.   Fall precaution.   Avoid dehydration, hypotension and hypovolemia.     Continue steroids.   Can get vascular surgery on board for possible temporal artery biopsy.   Discussed with Dr. Doss.     Serafin Montano NP  [id number] HPI:  This is a 78 year old female with a significant PMhx of Parkinson's disease and RA who presented to the ED after a witnessed episode of syncope. As per the daughter, the patient was attempting to urinate when she noticed the patient had lost consciousness. She was able to arouse the patient after a few seconds without any report of post-ictal state; the patient does not remember the event. The patient has also been experience generalized weakness, dark colored urine, BL LE (thigh pain), and nausea/vomiting (single bilious episode) 2 days prior to presentation. She also mentions acute development of left eye blurriness that is constant over past two days with headaches located in the occipital region. CTH failed to reveal acute intracranial pathology.     Tests:  < from: CT Head No Cont (10.12.19 @ 20:19) >    IMPRESSION:     No CT evidence of acute intracranial pathology.     Moderate chronic microvascular ischemic changes and chronic lacunar   infarcts as above.    Medications:  atorvastatin 20 milliGRAM(s) Oral at bedtime  carbidopa/levodopa CR 50/200 1 Tablet(s) Oral <User Schedule>  cefTRIAXone   IVPB 1000 milliGRAM(s) IV Intermittent every 24 hours  chlorhexidine 4% Liquid 1 Application(s) Topical <User Schedule>  diltiazem    milliGRAM(s) Oral daily  enoxaparin Injectable 40 milliGRAM(s) SubCutaneous daily  influenza   Vaccine 0.5 milliLiter(s) IntraMuscular once  methylPREDNISolone sodium succinate IVPB 500 milliGRAM(s) IV Intermittent daily  pramipexole 0.5 milliGRAM(s) Oral three times a day    Vitals  T(F): 98.1 (10-13-19 @ 15:57), Max: 100 (10-12-19 @ 23:54)  HR: 87 (10-13-19 @ 15:57) (86 - 93)  BP: 151/70 (10-13-19 @ 15:57) (148/75 - 155/64)  RR: 18 (10-13-19 @ 15:57) (17 - 20)  SpO2: 97% (10-13-19 @ 15:57) (93% - 97%)                        12.2   13.21 )-----------( 188      ( 13 Oct 2019 05:51 )             37.4     10-13    143  |  105  |  19  ----------------------------<  106<H>  3.9   |  24  |  0.8    Ca    8.7      13 Oct 2019 05:51  Phos  2.5     10-13  Mg     1.9     10-13    TPro  5.8<L>  /  Alb  3.4<L>  /  TBili  0.4  /  DBili  x   /  AST  14  /  ALT  9   /  AlkPhos  83  10-12      Urinalysis Basic - ( 13 Oct 2019 00:25 )    Color: Yellow / Appearance: Slightly Turbid / S.016 / pH: x  Gluc: x / Ketone: Trace  / Bili: Negative / Urobili: <2 mg/dL   Blood: x / Protein: 30 mg/dL / Nitrite: Negative   Leuk Esterase: Large / RBC: 5 /HPF / WBC 77 /HPF   Sq Epi: x / Non Sq Epi: 1 /HPF / Bacteria: Many    Sedimentation Rate, Erythrocyte: 106 mm/Hr (10.. @ 23:33)      LIVER FUNCTIONS - ( 12 Oct 2019 19:12 )  Alb: 3.4 g/dL / Pro: 5.8 g/dL / ALK PHOS: 83 U/L / ALT: 9 U/L / AST: 14 U/L / GGT: x             Neuro Exam:  Awake alert oriented to self place time.  PEARLA EOMI no gaze no visual deficit.  No temporal or orbital tenderness.  Bilateral temporal pulses present.   No aphasia no dysarthria, no facial palsy.  No drift of bilateral upper and lower extremities.  No ataxia of upper or lower extremities.   No sensory deficit.  No neglect or inattention.  NIHSS:0  Impression:  This is a 78 year old female with a significant PMhx of Parkinson's disease and RA who presented to the ED after a witnessed episode of syncope. As per the daughter, the patient was on the toilet when the patient lost consciousness. Episode lasted a few seconds. She also mentions acute development of left eye blurriness that is constant over past two days with headaches located in the occipital region. CTH failed to reveal acute intracranial pathology. Episode less likely due to epileptiform etiology and more likely orthostatic.  may be due to RA.  Patient without temporal tenderness or loss of temporal pulse. However due to her age and transient blurry vision, temporal arteritis is on the differential diagnosis.      Suggestions:  Orthostatic blood pressures.   Fall precaution.   Avoid dehydration, hypotension and hypovolemia.     Continue steroids.   Vascular surgery for possible temporal artery biopsy.   Discussed with Dr. Doss.     Serafin Montano NP  [phone]

## 2019-10-13 NOTE — PROGRESS NOTE ADULT - ASSESSMENT
JON GARCÍA 87y Female  MRN#: [id number]   CODE STATUS: FULL      SUBJECTIVE  Patient is a 87y old Female who presents with a chief complaint of Syncope; Left eye blurriness (12 Oct 2019 22:10)    Currently admitted to medicine with the primary diagnosis of     Today is hospital day 1d, and this morning she is laying in bed comfortably and reports no overnight events.   Denies chest pain, shortness of breath, nausea, vomiting or changes in bowel habits.   has no complaints today.     OBJECTIVE  PAST MEDICAL & SURGICAL HISTORY  Hyperlipidemia  Rheumatoid arthritis  HTN (hypertension)  Parkinson disease  History of hysterectomy    ALLERGIES:  No Known Allergies    HOME MEDICATIONS:  Home Medications:  atorvastatin 20 mg oral tablet: 1 tab(s) orally once a day (12 Oct 2019 18:49)  carbidopa-levodopa:  (12 Oct 2019 18:49)  celecoxib 200 mg oral capsule: 1 cap(s) orally 2 times a day (12 Oct 2019 18:49)  dilTIAZem 360 mg/24 hours oral capsule, extended release: 1 cap(s) orally once a day (12 Oct 2019 18:49)  hydroCHLOROthiazide 12.5 mg oral tablet: 1 tab(s) orally once a day (12 Oct 2019 18:49)  pramipexole 0.5 mg oral tablet: 1 tab(s) orally 3 times a day (12 Oct 2019 18:49)    MEDICATIONS:  STANDING MEDICATIONS  atorvastatin 20 milliGRAM(s) Oral at bedtime  carbidopa/levodopa CR 50/200 1 Tablet(s) Oral <User Schedule>  chlorhexidine 4% Liquid 1 Application(s) Topical <User Schedule>  diltiazem    milliGRAM(s) Oral daily  enoxaparin Injectable 40 milliGRAM(s) SubCutaneous daily  lactated ringers. 1000 milliLiter(s) (60 mL/Hr) IV Continuous <Continuous>  pramipexole 0.5 milliGRAM(s) Oral three times a day    PRN MEDICATIONS      VITAL SIGNS: Last 24 Hours  T(C): 37.5 (13 Oct 2019 07:36), Max: 37.8 (12 Oct 2019 23:54)  T(F): 99.5 (13 Oct 2019 07:36), Max: 100 (12 Oct 2019 23:54)  HR: 86 (13 Oct 2019 07:36) (86 - 93)  BP: 155/64 (13 Oct 2019 07:36) (148/75 - 155/64)  BP(mean): --  RR: 18 (13 Oct 2019 07:36) (17 - 20)  SpO2: 95% (13 Oct 2019 07:36) (93% - 95%)    LABS:                        12.2   13.21 )-----------( 188      ( 13 Oct 2019 05:51 )             37.4     10    141  |  103  |  24<H>  ----------------------------<  115<H>  3.3<L>   |  25  |  1.0    Ca    8.8      12 Oct 2019 19:12    TPro  5.8<L>  /  Alb  3.4<L>  /  TBili  0.4  /  DBili  x   /  AST  14  /  ALT  9   /  AlkPhos  83  10-12      Urinalysis Basic - ( 13 Oct 2019 00:25 )    Color: Yellow / Appearance: Slightly Turbid / S.016 / pH: x  Gluc: x / Ketone: Trace  / Bili: Negative / Urobili: <2 mg/dL   Blood: x / Protein: 30 mg/dL / Nitrite: Negative   Leuk Esterase: Large / RBC: 5 /HPF / WBC 77 /HPF   Sq Epi: x / Non Sq Epi: 1 /HPF / Bacteria: Many        Troponin T, Serum: 0.06 ng/mL <HH> (10-13-19 @ 00:40)  Lactate, Blood: 0.8 mmol/L (10-13-19 @ 00:40)  Creatine Kinase, Serum: 81 U/L (10-12-19 @ 23:45)  Sedimentation Rate, Erythrocyte: 106 mm/Hr <H> (10-12-19 @ 23:33)  Troponin T, Serum: 0.05 ng/mL <HH> (10-12-19 @ 19:12)    CARDIAC MARKERS ( 13 Oct 2019 00:40 )  x     / 0.06 ng/mL / x     / x     / x      CARDIAC MARKERS ( 12 Oct 2019 23:45 )  x     / x     / 81 U/L / x     / x      CARDIAC MARKERS ( 12 Oct 2019 19:12 )  x     / 0.05 ng/mL / x     / x     / x            RADIOLOGY:          ECHO:      PHYSICAL EXAM:    GENERAL: NAD, well-developed, AAOx3  HEENT:  Atraumatic, Normocephalic. EOMI, PERRLA, conjunctiva and sclera clear, No JVD  PULMONARY: Clear to auscultation bilaterally; No wheeze  CARDIOVASCULAR: Regular rate and rhythm; No murmurs, rubs, or gallops  GASTROINTESTINAL: Soft, Nontender, Nondistended; Bowel sounds present  MUSCULOSKELETAL:  2+ Peripheral Pulses, No clubbing, cyanosis, or edema  NEUROLOGY: non-focal  SKIN: No rashes or lesions    ASSESSMENT & PLAN      Diet, DASH/TLC:   Sodium & Cholesterol Restricted (10-12-19 @ 23:46) JON GARCÍA 87y Female  MRN#: [id number]   CODE STATUS: FULL      SUBJECTIVE  Patient is a 87y old Female who presents with a chief complaint of Syncope; Left eye blurriness (12 Oct 2019 22:10)    Currently admitted to medicine with the primary diagnosis of Syncope.     Today is hospital day 1d, and this morning she is laying in bed comfortably and reports no overnight events.   Denies chest pain, shortness of breath, nausea, vomiting or changes in bowel habits.   He has no complaints today.     OBJECTIVE  PAST MEDICAL & SURGICAL HISTORY  Hyperlipidemia  Rheumatoid arthritis  HTN (hypertension)  Parkinson disease  History of hysterectomy    ALLERGIES:  No Known Allergies    HOME MEDICATIONS:  Home Medications:  atorvastatin 20 mg oral tablet: 1 tab(s) orally once a day (12 Oct 2019 18:49)  carbidopa-levodopa:  (12 Oct 2019 18:49)  celecoxib 200 mg oral capsule: 1 cap(s) orally 2 times a day (12 Oct 2019 18:49)  dilTIAZem 360 mg/24 hours oral capsule, extended release: 1 cap(s) orally once a day (12 Oct 2019 18:49)  hydroCHLOROthiazide 12.5 mg oral tablet: 1 tab(s) orally once a day (12 Oct 2019 18:49)  pramipexole 0.5 mg oral tablet: 1 tab(s) orally 3 times a day (12 Oct 2019 18:49)    MEDICATIONS:  STANDING MEDICATIONS  atorvastatin 20 milliGRAM(s) Oral at bedtime  carbidopa/levodopa CR 50/200 1 Tablet(s) Oral <User Schedule>  chlorhexidine 4% Liquid 1 Application(s) Topical <User Schedule>  diltiazem    milliGRAM(s) Oral daily  enoxaparin Injectable 40 milliGRAM(s) SubCutaneous daily  lactated ringers. 1000 milliLiter(s) (60 mL/Hr) IV Continuous <Continuous>  pramipexole 0.5 milliGRAM(s) Oral three times a day    PRN MEDICATIONS      VITAL SIGNS: Last 24 Hours  T(C): 37.5 (13 Oct 2019 07:36), Max: 37.8 (12 Oct 2019 23:54)  T(F): 99.5 (13 Oct 2019 07:36), Max: 100 (12 Oct 2019 23:54)  HR: 86 (13 Oct 2019 07:36) (86 - 93)  BP: 155/64 (13 Oct 2019 07:36) (148/75 - 155/64)  BP(mean): --  RR: 18 (13 Oct 2019 07:36) (17 - 20)  SpO2: 95% (13 Oct 2019 07:36) (93% - 95%)    LABS:                        12.2   13.21 )-----------( 188      ( 13 Oct 2019 05:51 )             37.4     10    141  |  103  |  24<H>  ----------------------------<  115<H>  3.3<L>   |  25  |  1.0    Ca    8.8      12 Oct 2019 19:12    TPro  5.8<L>  /  Alb  3.4<L>  /  TBili  0.4  /  DBili  x   /  AST  14  /  ALT  9   /  AlkPhos  83  10-12      Urinalysis Basic - ( 13 Oct 2019 00:25 )    Color: Yellow / Appearance: Slightly Turbid / S.016 / pH: x  Gluc: x / Ketone: Trace  / Bili: Negative / Urobili: <2 mg/dL   Blood: x / Protein: 30 mg/dL / Nitrite: Negative   Leuk Esterase: Large / RBC: 5 /HPF / WBC 77 /HPF   Sq Epi: x / Non Sq Epi: 1 /HPF / Bacteria: Many    Troponin T, Serum: 0.06 ng/mL <HH> (10-13-19 @ 00:40)  Lactate, Blood: 0.8 mmol/L (10-13-19 @ 00:40)  Creatine Kinase, Serum: 81 U/L (10-12-19 @ 23:45)  Sedimentation Rate, Erythrocyte: 106 mm/Hr <H> (10-12-19 @ 23:33)  Troponin T, Serum: 0.05 ng/mL <HH> (10-12-19 @ 19:12)    CARDIAC MARKERS ( 13 Oct 2019 00:40 )  x     / 0.06 ng/mL / x     / x     / x      CARDIAC MARKERS ( 12 Oct 2019 23:45 )  x     / x     / 81 U/L / x     / x      CARDIAC MARKERS ( 12 Oct 2019 19:12 )  x     / 0.05 ng/mL / x     / x     / x        RADIOLOGY:    No new imaging     ECHO:    No new echo     PHYSICAL EXAM:    GENERAL: NAD, well-developed, AAOx3  HEENT:  Atraumatic, Normocephalic. EOMI, PERRLA, conjunctiva and sclera clear, No JVD  PULMONARY: Clear to auscultation bilaterally; No wheeze  CARDIOVASCULAR: irregular rate and rhythm; murmurs, rubs, or gallops  GASTROINTESTINAL: Soft, Nontender, Nondistended; Bowel sounds present  MUSCULOSKELETAL:  2+ Peripheral Pulses, No clubbing, cyanosis, No edema in LE; No rigidity in UE with moderate rigidity in LE; Resting hand tremors; No pain at neck or hips on palpation; Pain with flexion and extension of knees BL.  NEUROLOGY: non-focal, ?Sluggish L-pupil reflex (difficult to see as patient kept closing eye)  SKIN: No rashes or lesions    ASSESSMENT & PLAN  78 year old female with a significant PMhx of Parkinson's disease and RA who presented to the ED after a witnessed episode of syncope, associated with recent generalized weakness, dark urine, and N/V. She also complained of L-eye blurriness x2 days and headaches over the last few weeks.     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; No acute pathology  - Will monitor BP off of HCTZ for now  - Check orthostatics  - Will start IVF for 12 hours; Reassess in AM  - Elevated troponin at time of presentation; ?Demand, CKD (no CP); Trending  - Murmur on auscultation; BNP elevated; Follow up TTE    Elevated troponin and BNP   - Work up as above mentioned    Acute blurry vision; L-eye blurriness  - Follow up ESR and CK    Hx of Parkinson's Disease: Continue home medications  Hx of RA: Paint control  Hx of HTN and HLD: Hold HCTZ and continue Statin    Electrolyte Imbalances:   Hypokalemia; repletion in ED; monitor      GI ppx:                                   [X] Not indicated    DVT ppx:  [X] Heparin 5000mg SubQ    Fluids:   [X] PO    Activity: Increase as Tolerated     DISPO:  Patient to be discharged when condition(s) optimized.  [X] Home    CODE STATUS  [X] FULL       Diet, DASH/TLC:   Sodium & Cholesterol Restricted (10-12-19 @ 23:46) JON GARCÍA 87y Female  MRN#: [id number]   CODE STATUS: FULL      SUBJECTIVE  Patient is a 87y old Female who presents with a chief complaint of Syncope; Left eye blurriness (12 Oct 2019 22:10)    Currently admitted to medicine with the primary diagnosis of Syncope.     Today is hospital day 1d, and this morning she is laying in bed comfortably and reports no overnight events.   Denies chest pain, shortness of breath, nausea, vomiting or changes in bowel habits.   He has no complaints today.     OBJECTIVE  PAST MEDICAL & SURGICAL HISTORY  Hyperlipidemia  Rheumatoid arthritis  HTN (hypertension)  Parkinson disease  History of hysterectomy    ALLERGIES:  No Known Allergies    HOME MEDICATIONS:  Home Medications:  atorvastatin 20 mg oral tablet: 1 tab(s) orally once a day (12 Oct 2019 18:49)  carbidopa-levodopa:  (12 Oct 2019 18:49)  celecoxib 200 mg oral capsule: 1 cap(s) orally 2 times a day (12 Oct 2019 18:49)  dilTIAZem 360 mg/24 hours oral capsule, extended release: 1 cap(s) orally once a day (12 Oct 2019 18:49)  hydroCHLOROthiazide 12.5 mg oral tablet: 1 tab(s) orally once a day (12 Oct 2019 18:49)  pramipexole 0.5 mg oral tablet: 1 tab(s) orally 3 times a day (12 Oct 2019 18:49)    MEDICATIONS:  STANDING MEDICATIONS  atorvastatin 20 milliGRAM(s) Oral at bedtime  carbidopa/levodopa CR 50/200 1 Tablet(s) Oral <User Schedule>  chlorhexidine 4% Liquid 1 Application(s) Topical <User Schedule>  diltiazem    milliGRAM(s) Oral daily  enoxaparin Injectable 40 milliGRAM(s) SubCutaneous daily  lactated ringers. 1000 milliLiter(s) (60 mL/Hr) IV Continuous <Continuous>  pramipexole 0.5 milliGRAM(s) Oral three times a day    PRN MEDICATIONS      VITAL SIGNS: Last 24 Hours  T(C): 37.5 (13 Oct 2019 07:36), Max: 37.8 (12 Oct 2019 23:54)  T(F): 99.5 (13 Oct 2019 07:36), Max: 100 (12 Oct 2019 23:54)  HR: 86 (13 Oct 2019 07:36) (86 - 93)  BP: 155/64 (13 Oct 2019 07:36) (148/75 - 155/64)  BP(mean): --  RR: 18 (13 Oct 2019 07:36) (17 - 20)  SpO2: 95% (13 Oct 2019 07:36) (93% - 95%)    LABS:                        12.2   13.21 )-----------( 188      ( 13 Oct 2019 05:51 )             37.4     10    141  |  103  |  24<H>  ----------------------------<  115<H>  3.3<L>   |  25  |  1.0    Ca    8.8      12 Oct 2019 19:12    TPro  5.8<L>  /  Alb  3.4<L>  /  TBili  0.4  /  DBili  x   /  AST  14  /  ALT  9   /  AlkPhos  83  10-12      Urinalysis Basic - ( 13 Oct 2019 00:25 )    Color: Yellow / Appearance: Slightly Turbid / S.016 / pH: x  Gluc: x / Ketone: Trace  / Bili: Negative / Urobili: <2 mg/dL   Blood: x / Protein: 30 mg/dL / Nitrite: Negative   Leuk Esterase: Large / RBC: 5 /HPF / WBC 77 /HPF   Sq Epi: x / Non Sq Epi: 1 /HPF / Bacteria: Many    Troponin T, Serum: 0.06 ng/mL <HH> (10-13-19 @ 00:40)  Lactate, Blood: 0.8 mmol/L (10-13-19 @ 00:40)  Creatine Kinase, Serum: 81 U/L (10-12-19 @ 23:45)  Sedimentation Rate, Erythrocyte: 106 mm/Hr <H> (10-12-19 @ 23:33)  Troponin T, Serum: 0.05 ng/mL <HH> (10-12-19 @ 19:12)    CARDIAC MARKERS ( 13 Oct 2019 00:40 )  x     / 0.06 ng/mL / x     / x     / x      CARDIAC MARKERS ( 12 Oct 2019 23:45 )  x     / x     / 81 U/L / x     / x      CARDIAC MARKERS ( 12 Oct 2019 19:12 )  x     / 0.05 ng/mL / x     / x     / x        RADIOLOGY:    No new imaging     ECHO:    No new echo     PHYSICAL EXAM:    GENERAL: NAD, well-developed, AAOx3  HEENT:  Atraumatic, Normocephalic. EOMI, PERRLA, conjunctiva and sclera clear, No JVD  PULMONARY: Clear to auscultation bilaterally; No wheeze  CARDIOVASCULAR: Regular rate and rhythm; murmurs, rubs, or gallops  GASTROINTESTINAL: Soft, Nontender, Nondistended; Bowel sounds present  MUSCULOSKELETAL:  2+ Peripheral Pulses, No clubbing, cyanosis, No edema in LE; No rigidity in UE with moderate rigidity in LE; Resting hand tremors; No pain at neck or hips on palpation; Pain with flexion and extension of knees BL.  NEUROLOGY: non-focal, ?Sluggish L-pupil reflex (difficult to see as patient kept closing eye)  SKIN: No rashes or lesions    ASSESSMENT & PLAN  78 year old female with a significant PMhx of Parkinson's disease and RA who presented to the ED after a witnessed episode of syncope, associated with recent generalized weakness, dark urine, and N/V. She also complained of L-eye blurriness x2 days and headaches over the last few weeks.     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; No acute pathology  - Will monitor BP off of HCTZ for now  - Check orthostatics  - s/p IVF for 8 hours, will check volume status     #) Elevated troponin and BNP  - No chest pain, likely elevated in setting of CKD  - trend troponin   - Will check echo     #) Acute blurry vision; L-eye blurriness  - ESR elevated, CK wnl   - Optho consult     #) Hx of Parkinson's Disease  -Continue home medications    #) Hx of RA   -Pain control    #) Hx of HTN and HLD  -Holding HCTZ, continue Statin        GI ppx:Not indicated    DVT ppx: Heparin 5000mg SubQ    Diet, DASH/TLC:   Sodium & Cholesterol Restricted (10-12-19 @ 23:46)      Activity: Increase as Tolerated     DISPO:  Patient to be discharged when condition(s) optimized.    CODE STATUS  [X] FULL JON GARCÍA 87y Female  MRN#: [id number]   CODE STATUS: FULL      SUBJECTIVE  Patient is a 87y old Female who presents with a chief complaint of Syncope; Left eye blurriness (12 Oct 2019 22:10)    Currently admitted to medicine with the primary diagnosis of Syncope.     Today is hospital day 1d, and this morning she is laying in bed comfortably and reports no overnight events.   She denies chest pain, shortness of breath, nausea, vomiting or changes in bowel habits.   She  has no complaints today and says that her vison loss has resolved.      OBJECTIVE  PAST MEDICAL & SURGICAL HISTORY  Hyperlipidemia  Rheumatoid arthritis  HTN (hypertension)  Parkinson disease  History of hysterectomy    ALLERGIES:  No Known Allergies    HOME MEDICATIONS:  Home Medications:  atorvastatin 20 mg oral tablet: 1 tab(s) orally once a day (12 Oct 2019 18:49)  carbidopa-levodopa:  (12 Oct 2019 18:49)  celecoxib 200 mg oral capsule: 1 cap(s) orally 2 times a day (12 Oct 2019 18:49)  dilTIAZem 360 mg/24 hours oral capsule, extended release: 1 cap(s) orally once a day (12 Oct 2019 18:49)  hydroCHLOROthiazide 12.5 mg oral tablet: 1 tab(s) orally once a day (12 Oct 2019 18:49)  pramipexole 0.5 mg oral tablet: 1 tab(s) orally 3 times a day (12 Oct 2019 18:49)    MEDICATIONS:  STANDING MEDICATIONS  atorvastatin 20 milliGRAM(s) Oral at bedtime  carbidopa/levodopa CR 50/200 1 Tablet(s) Oral <User Schedule>  chlorhexidine 4% Liquid 1 Application(s) Topical <User Schedule>  diltiazem    milliGRAM(s) Oral daily  enoxaparin Injectable 40 milliGRAM(s) SubCutaneous daily  lactated ringers. 1000 milliLiter(s) (60 mL/Hr) IV Continuous <Continuous>  pramipexole 0.5 milliGRAM(s) Oral three times a day    PRN MEDICATIONS      VITAL SIGNS: Last 24 Hours  T(C): 37.5 (13 Oct 2019 07:36), Max: 37.8 (12 Oct 2019 23:54)  T(F): 99.5 (13 Oct 2019 07:36), Max: 100 (12 Oct 2019 23:54)  HR: 86 (13 Oct 2019 07:36) (86 - 93)  BP: 155/64 (13 Oct 2019 07:36) (148/75 - 155/64)  BP(mean): --  RR: 18 (13 Oct 2019 07:36) (17 - 20)  SpO2: 95% (13 Oct 2019 07:36) (93% - 95%)    LABS:                        12.2   13.21 )-----------( 188      ( 13 Oct 2019 05:51 )             37.4     10    141  |  103  |  24<H>  ----------------------------<  115<H>  3.3<L>   |  25  |  1.0    Ca    8.8      12 Oct 2019 19:12    TPro  5.8<L>  /  Alb  3.4<L>  /  TBili  0.4  /  DBili  x   /  AST  14  /  ALT  9   /  AlkPhos  83  10-12      Urinalysis Basic - ( 13 Oct 2019 00:25 )    Color: Yellow / Appearance: Slightly Turbid / S.016 / pH: x  Gluc: x / Ketone: Trace  / Bili: Negative / Urobili: <2 mg/dL   Blood: x / Protein: 30 mg/dL / Nitrite: Negative   Leuk Esterase: Large / RBC: 5 /HPF / WBC 77 /HPF   Sq Epi: x / Non Sq Epi: 1 /HPF / Bacteria: Many    Troponin T, Serum: 0.06 ng/mL <HH> (10-13-19 @ 00:40)  Lactate, Blood: 0.8 mmol/L (10-13-19 @ 00:40)  Creatine Kinase, Serum: 81 U/L (10-12-19 @ 23:45)  Sedimentation Rate, Erythrocyte: 106 mm/Hr <H> (10-12-19 @ 23:33)  Troponin T, Serum: 0.05 ng/mL <HH> (10-12-19 @ 19:12)    CARDIAC MARKERS ( 13 Oct 2019 00:40 )  x     / 0.06 ng/mL / x     / x     / x      CARDIAC MARKERS ( 12 Oct 2019 23:45 )  x     / x     / 81 U/L / x     / x      CARDIAC MARKERS ( 12 Oct 2019 19:12 )  x     / 0.05 ng/mL / x     / x     / x        RADIOLOGY:    No new imaging     ECHO:    No new echo     PHYSICAL EXAM:    GENERAL: NAD, well-developed, AAOx3  HEENT:  Atraumatic, Normocephalic. EOMI, PERRLA, conjunctiva and sclera clear, No JVD  PULMONARY: Clear to auscultation bilaterally; No wheeze  CARDIOVASCULAR: Regular rate and rhythm; murmurs, rubs, or gallops  GASTROINTESTINAL: Soft, Nontender, Nondistended; Bowel sounds present  MUSCULOSKELETAL:  2+ Peripheral Pulses, No clubbing, cyanosis, No edema in LE; No rigidity in UE with moderate rigidity in LE; Resting hand tremors; No pain at neck or hips on palpation; Pain with flexion and extension of knees BL.  NEUROLOGY: non-focal, ?Sluggish L-pupil reflex (difficult to see as patient kept closing eye)  SKIN: No rashes or lesions    ASSESSMENT & PLAN  78 year old female with a significant PMhx of Parkinson's disease and RA who presented to the ED after a witnessed episode of syncope, associated with recent generalized weakness, dark urine, and N/V. She also complained of L-eye blurriness x2 days and headaches over the last few weeks.     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; No acute pathology  - Will monitor BP off of HCTZ for now  - Check orthostatics  - s/p IVF for 8 hours, will check volume status   -For patients with threatened or established visual loss at presentation: methylprednisolone 500 to 1000 mg intravenous daily, for three days    #) Elevated troponin and BNP  - No chest pain, likely elevated in setting of CKD  - trend troponin   - Will check echo     #) Acute blurry vision; L-eye blurriness  - ESR elevated, CK wnl   - Optho consult     #) Hx of Parkinson's Disease  -Continue home medications    #) Hx of RA   -Pain control    #) Hx of HTN and HLD  -Holding HCTZ, continue Statin        GI ppx:Not indicated    DVT ppx: Heparin 5000mg SubQ    Diet, DASH/TLC:   Sodium & Cholesterol Restricted (10-12-19 @ 23:46)      Activity: Increase as Tolerated     DISPO:  Patient to be discharged when condition(s) optimized.    CODE STATUS  [X] FULL JON GARCÍA 87y Female  MRN#: [id number]   CODE STATUS: FULL      SUBJECTIVE  Patient is a 87y old Female who presents with a chief complaint of Syncope; Left eye blurriness (12 Oct 2019 22:10)    Currently admitted to medicine with the primary diagnosis of Syncope.     Today is hospital day 1d, and this morning she is laying in bed comfortably and reports no overnight events.   She denies chest pain, shortness of breath, nausea, vomiting or changes in bowel habits.   She  has no complaints today and says that her vison loss has resolved.      OBJECTIVE  PAST MEDICAL & SURGICAL HISTORY  Hyperlipidemia  Rheumatoid arthritis  HTN (hypertension)  Parkinson disease  History of hysterectomy    ALLERGIES:  No Known Allergies    HOME MEDICATIONS:  Home Medications:  atorvastatin 20 mg oral tablet: 1 tab(s) orally once a day (12 Oct 2019 18:49)  carbidopa-levodopa:  (12 Oct 2019 18:49)  celecoxib 200 mg oral capsule: 1 cap(s) orally 2 times a day (12 Oct 2019 18:49)  dilTIAZem 360 mg/24 hours oral capsule, extended release: 1 cap(s) orally once a day (12 Oct 2019 18:49)  hydroCHLOROthiazide 12.5 mg oral tablet: 1 tab(s) orally once a day (12 Oct 2019 18:49)  pramipexole 0.5 mg oral tablet: 1 tab(s) orally 3 times a day (12 Oct 2019 18:49)    MEDICATIONS:  STANDING MEDICATIONS  atorvastatin 20 milliGRAM(s) Oral at bedtime  carbidopa/levodopa CR 50/200 1 Tablet(s) Oral <User Schedule>  chlorhexidine 4% Liquid 1 Application(s) Topical <User Schedule>  diltiazem    milliGRAM(s) Oral daily  enoxaparin Injectable 40 milliGRAM(s) SubCutaneous daily  lactated ringers. 1000 milliLiter(s) (60 mL/Hr) IV Continuous <Continuous>  pramipexole 0.5 milliGRAM(s) Oral three times a day    PRN MEDICATIONS      VITAL SIGNS: Last 24 Hours  T(C): 37.5 (13 Oct 2019 07:36), Max: 37.8 (12 Oct 2019 23:54)  T(F): 99.5 (13 Oct 2019 07:36), Max: 100 (12 Oct 2019 23:54)  HR: 86 (13 Oct 2019 07:36) (86 - 93)  BP: 155/64 (13 Oct 2019 07:36) (148/75 - 155/64)  BP(mean): --  RR: 18 (13 Oct 2019 07:36) (17 - 20)  SpO2: 95% (13 Oct 2019 07:36) (93% - 95%)    LABS:                        12.2   13.21 )-----------( 188      ( 13 Oct 2019 05:51 )             37.4     10    141  |  103  |  24<H>  ----------------------------<  115<H>  3.3<L>   |  25  |  1.0    Ca    8.8      12 Oct 2019 19:12    TPro  5.8<L>  /  Alb  3.4<L>  /  TBili  0.4  /  DBili  x   /  AST  14  /  ALT  9   /  AlkPhos  83  10-12      Urinalysis Basic - ( 13 Oct 2019 00:25 )    Color: Yellow / Appearance: Slightly Turbid / S.016 / pH: x  Gluc: x / Ketone: Trace  / Bili: Negative / Urobili: <2 mg/dL   Blood: x / Protein: 30 mg/dL / Nitrite: Negative   Leuk Esterase: Large / RBC: 5 /HPF / WBC 77 /HPF   Sq Epi: x / Non Sq Epi: 1 /HPF / Bacteria: Many    Troponin T, Serum: 0.06 ng/mL <HH> (10-13-19 @ 00:40)  Lactate, Blood: 0.8 mmol/L (10-13-19 @ 00:40)  Creatine Kinase, Serum: 81 U/L (10-12-19 @ 23:45)  Sedimentation Rate, Erythrocyte: 106 mm/Hr <H> (10-12-19 @ 23:33)  Troponin T, Serum: 0.05 ng/mL <HH> (10-12-19 @ 19:12)    CARDIAC MARKERS ( 13 Oct 2019 00:40 )  x     / 0.06 ng/mL / x     / x     / x      CARDIAC MARKERS ( 12 Oct 2019 23:45 )  x     / x     / 81 U/L / x     / x      CARDIAC MARKERS ( 12 Oct 2019 19:12 )  x     / 0.05 ng/mL / x     / x     / x        RADIOLOGY:    No new imaging     ECHO:    No new echo     PHYSICAL EXAM:    GENERAL: NAD, well-developed, AAOx3  HEENT:  Atraumatic, Normocephalic. EOMI, PERRLA, conjunctiva and sclera clear, No JVD  PULMONARY: Clear to auscultation bilaterally; No wheeze  CARDIOVASCULAR: Regular rate and rhythm; murmurs, rubs, or gallops  GASTROINTESTINAL: Soft, Nontender, Nondistended; Bowel sounds present  MUSCULOSKELETAL:  2+ Peripheral Pulses, No clubbing, cyanosis, No edema in LE; No rigidity in UE with moderate rigidity in LE; Resting hand tremors; No pain at neck or hips on palpation; Pain with flexion and extension of knees BL.  NEUROLOGY: non-focal, ?Sluggish L-pupil reflex (difficult to see as patient kept closing eye)  SKIN: No rashes or lesions    ASSESSMENT & PLAN  78 year old female with a significant PMhx of Parkinson's disease and RA who presented to the ED after a witnessed episode of syncope, associated with recent generalized weakness, dark urine, and N/V. She also complained of L-eye blurriness x2 days and headaches over the last few weeks.     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; No acute pathology  - Will monitor BP off of HCTZ for now  - Check orthostatics  - s/p IVF for 8 hours, will check volume status       #) Elevated troponin and BNP  - No chest pain, likely elevated in setting of CKD  - trend troponin   - Will check echo     #) Acute blurry vision; L-eye blurriness  -Possible Temporal arteritis, ESR >100, transient vision loss  - ESR elevated, CK wnl   - Optho consult, Neuro consult  -For patients with threatened or established visual loss at presentation: methylprednisolone 500 to 1000 mg intravenous daily, for three days is recommended by UTD, will start 500 mg for now    #) Hx of Parkinson's Disease  -Continue home medications    #) Hx of RA   -Pain control    #) Hx of HTN and HLD  -Holding HCTZ, continue Statin    GI ppx:Not indicated    DVT ppx: Heparin 5000mg SubQ    Diet, DASH/TLC:   Sodium & Cholesterol Restricted (10-12-19 @ 23:46)      Activity: Increase as Tolerated     DISPO:  Patient to be discharged when condition(s) optimized.    CODE STATUS  [X] FULL JON GARCÍA 87y Female  MRN#: [id number]   CODE STATUS: FULL      SUBJECTIVE  Patient is a 87y old Female who presents with a chief complaint of Syncope; Left eye blurriness (12 Oct 2019 22:10)    Currently admitted to medicine with the primary diagnosis of Syncope.     Today is hospital day 1d, and this morning she is laying in bed comfortably and reports no overnight events.   She denies chest pain, shortness of breath, nausea, vomiting or changes in bowel habits.   She  has no complaints today and says that her vison loss has resolved.      OBJECTIVE  PAST MEDICAL & SURGICAL HISTORY  Hyperlipidemia  Rheumatoid arthritis  HTN (hypertension)  Parkinson disease  History of hysterectomy    ALLERGIES:  No Known Allergies    HOME MEDICATIONS:  Home Medications:  atorvastatin 20 mg oral tablet: 1 tab(s) orally once a day (12 Oct 2019 18:49)  carbidopa-levodopa:  (12 Oct 2019 18:49)  celecoxib 200 mg oral capsule: 1 cap(s) orally 2 times a day (12 Oct 2019 18:49)  dilTIAZem 360 mg/24 hours oral capsule, extended release: 1 cap(s) orally once a day (12 Oct 2019 18:49)  hydroCHLOROthiazide 12.5 mg oral tablet: 1 tab(s) orally once a day (12 Oct 2019 18:49)  pramipexole 0.5 mg oral tablet: 1 tab(s) orally 3 times a day (12 Oct 2019 18:49)    MEDICATIONS:  STANDING MEDICATIONS  atorvastatin 20 milliGRAM(s) Oral at bedtime  carbidopa/levodopa CR 50/200 1 Tablet(s) Oral <User Schedule>  chlorhexidine 4% Liquid 1 Application(s) Topical <User Schedule>  diltiazem    milliGRAM(s) Oral daily  enoxaparin Injectable 40 milliGRAM(s) SubCutaneous daily  lactated ringers. 1000 milliLiter(s) (60 mL/Hr) IV Continuous <Continuous>  pramipexole 0.5 milliGRAM(s) Oral three times a day    PRN MEDICATIONS      VITAL SIGNS: Last 24 Hours  T(C): 37.5 (13 Oct 2019 07:36), Max: 37.8 (12 Oct 2019 23:54)  T(F): 99.5 (13 Oct 2019 07:36), Max: 100 (12 Oct 2019 23:54)  HR: 86 (13 Oct 2019 07:36) (86 - 93)  BP: 155/64 (13 Oct 2019 07:36) (148/75 - 155/64)  BP(mean): --  RR: 18 (13 Oct 2019 07:36) (17 - 20)  SpO2: 95% (13 Oct 2019 07:36) (93% - 95%)    LABS:                        12.2   13.21 )-----------( 188      ( 13 Oct 2019 05:51 )             37.4     10    141  |  103  |  24<H>  ----------------------------<  115<H>  3.3<L>   |  25  |  1.0    Ca    8.8      12 Oct 2019 19:12    TPro  5.8<L>  /  Alb  3.4<L>  /  TBili  0.4  /  DBili  x   /  AST  14  /  ALT  9   /  AlkPhos  83  10-12      Urinalysis Basic - ( 13 Oct 2019 00:25 )    Color: Yellow / Appearance: Slightly Turbid / S.016 / pH: x  Gluc: x / Ketone: Trace  / Bili: Negative / Urobili: <2 mg/dL   Blood: x / Protein: 30 mg/dL / Nitrite: Negative   Leuk Esterase: Large / RBC: 5 /HPF / WBC 77 /HPF   Sq Epi: x / Non Sq Epi: 1 /HPF / Bacteria: Many    Troponin T, Serum: 0.06 ng/mL <HH> (10-13-19 @ 00:40)  Lactate, Blood: 0.8 mmol/L (10-13-19 @ 00:40)  Creatine Kinase, Serum: 81 U/L (10-12-19 @ 23:45)  Sedimentation Rate, Erythrocyte: 106 mm/Hr <H> (10-12-19 @ 23:33)  Troponin T, Serum: 0.05 ng/mL <HH> (10-12-19 @ 19:12)    CARDIAC MARKERS ( 13 Oct 2019 00:40 )  x     / 0.06 ng/mL / x     / x     / x      CARDIAC MARKERS ( 12 Oct 2019 23:45 )  x     / x     / 81 U/L / x     / x      CARDIAC MARKERS ( 12 Oct 2019 19:12 )  x     / 0.05 ng/mL / x     / x     / x        RADIOLOGY:    No new imaging     ECHO:    No new echo     PHYSICAL EXAM:    GENERAL: NAD, well-developed, AAOx3  HEENT:  Atraumatic, Normocephalic. EOMI, PERRLA, conjunctiva and sclera clear, No JVD  PULMONARY: Clear to auscultation bilaterally; No wheeze  CARDIOVASCULAR: Regular rate and rhythm; murmurs, rubs, or gallops  GASTROINTESTINAL: Soft, Nontender, Nondistended; Bowel sounds present  MUSCULOSKELETAL:  2+ Peripheral Pulses, No clubbing, cyanosis, No edema in LE; No rigidity in UE with moderate rigidity in LE; Resting hand tremors; No pain at neck or hips on palpation; Pain with flexion and extension of knees BL.  NEUROLOGY: non-focal, ?Sluggish L-pupil reflex (difficult to see as patient kept closing eye)  SKIN: No rashes or lesions    ASSESSMENT & PLAN  78 year old female with a significant PMhx of Parkinson's disease and RA who presented to the ED after a witnessed episode of syncope, associated with recent generalized weakness, dark urine, and N/V. She also complained of L-eye blurriness x2 days and headaches over the last few weeks.     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; No acute pathology  - Will monitor BP off of HCTZ for now  - Check orthostatics  - s/p IVF for 8 hours, will check volume status     #) Acute Cystitis  -Positive U/A, Urinary Frequency, likely cause of Syncope  -Culture sent, will start Rocephin       #) Elevated troponin and BNP  - No chest pain, likely elevated in setting of CKD  - trend troponin   - Will check echo     #) Acute blurry vision; L-eye blurriness  -Possible Temporal arteritis, ESR >100, transient vision loss  - ESR elevated, CK wnl   - Optho consult, Neuro consult  -For patients with threatened or established visual loss at presentation: methylprednisolone 500 to 1000 mg intravenous daily, for three days is recommended by UTD, will start 500 mg for now    #) Hx of Parkinson's Disease  -Continue home medications    #) Hx of RA   -Pain control    #) Hx of HTN and HLD  -Holding HCTZ, continue Statin    GI ppx:Not indicated    DVT ppx: Heparin 5000mg SubQ    Diet, DASH/TLC:   Sodium & Cholesterol Restricted (10-12-19 @ 23:46)      Activity: Increase as Tolerated     DISPO:  Patient to be discharged when condition(s) optimized.    CODE STATUS  [X] FULL

## 2019-10-14 DIAGNOSIS — R70.0 ELEVATED ERYTHROCYTE SEDIMENTATION RATE: ICD-10-CM

## 2019-10-14 DIAGNOSIS — R93.1 ABNORMAL FINDINGS ON DIAGNOSTIC IMAGING OF HEART AND CORONARY CIRCULATION: ICD-10-CM

## 2019-10-14 DIAGNOSIS — H53.9 UNSPECIFIED VISUAL DISTURBANCE: ICD-10-CM

## 2019-10-14 LAB
ALBUMIN SERPL ELPH-MCNC: 3.1 G/DL — LOW (ref 3.5–5.2)
ALP SERPL-CCNC: 81 U/L — SIGNIFICANT CHANGE UP (ref 30–115)
ALT FLD-CCNC: <5 U/L — SIGNIFICANT CHANGE UP (ref 0–41)
ANION GAP SERPL CALC-SCNC: 14 MMOL/L — SIGNIFICANT CHANGE UP (ref 7–14)
AST SERPL-CCNC: 12 U/L — SIGNIFICANT CHANGE UP (ref 0–41)
BASOPHILS # BLD AUTO: 0.03 K/UL — SIGNIFICANT CHANGE UP (ref 0–0.2)
BASOPHILS NFR BLD AUTO: 0.3 % — SIGNIFICANT CHANGE UP (ref 0–1)
BILIRUB SERPL-MCNC: 0.3 MG/DL — SIGNIFICANT CHANGE UP (ref 0.2–1.2)
BUN SERPL-MCNC: 19 MG/DL — SIGNIFICANT CHANGE UP (ref 10–20)
CALCIUM SERPL-MCNC: 8.5 MG/DL — SIGNIFICANT CHANGE UP (ref 8.5–10.1)
CHLORIDE SERPL-SCNC: 106 MMOL/L — SIGNIFICANT CHANGE UP (ref 98–110)
CK SERPL-CCNC: 54 U/L — SIGNIFICANT CHANGE UP (ref 0–225)
CO2 SERPL-SCNC: 22 MMOL/L — SIGNIFICANT CHANGE UP (ref 17–32)
CREAT SERPL-MCNC: 0.8 MG/DL — SIGNIFICANT CHANGE UP (ref 0.7–1.5)
CULTURE RESULTS: SIGNIFICANT CHANGE UP
EOSINOPHIL # BLD AUTO: 0 K/UL — SIGNIFICANT CHANGE UP (ref 0–0.7)
EOSINOPHIL NFR BLD AUTO: 0 % — SIGNIFICANT CHANGE UP (ref 0–8)
GLUCOSE BLDC GLUCOMTR-MCNC: 113 MG/DL — HIGH (ref 70–99)
GLUCOSE SERPL-MCNC: 173 MG/DL — HIGH (ref 70–99)
HCT VFR BLD CALC: 36 % — LOW (ref 37–47)
HGB BLD-MCNC: 11.9 G/DL — LOW (ref 12–16)
IMM GRANULOCYTES NFR BLD AUTO: 1.6 % — HIGH (ref 0.1–0.3)
LYMPHOCYTES # BLD AUTO: 0.53 K/UL — LOW (ref 1.2–3.4)
LYMPHOCYTES # BLD AUTO: 5.1 % — LOW (ref 20.5–51.1)
MCHC RBC-ENTMCNC: 30.1 PG — SIGNIFICANT CHANGE UP (ref 27–31)
MCHC RBC-ENTMCNC: 33.1 G/DL — SIGNIFICANT CHANGE UP (ref 32–37)
MCV RBC AUTO: 90.9 FL — SIGNIFICANT CHANGE UP (ref 81–99)
MONOCYTES # BLD AUTO: 0.31 K/UL — SIGNIFICANT CHANGE UP (ref 0.1–0.6)
MONOCYTES NFR BLD AUTO: 3 % — SIGNIFICANT CHANGE UP (ref 1.7–9.3)
NEUTROPHILS # BLD AUTO: 9.38 K/UL — HIGH (ref 1.4–6.5)
NEUTROPHILS NFR BLD AUTO: 90 % — HIGH (ref 42.2–75.2)
NRBC # BLD: 0 /100 WBCS — SIGNIFICANT CHANGE UP (ref 0–0)
PLATELET # BLD AUTO: 178 K/UL — SIGNIFICANT CHANGE UP (ref 130–400)
POTASSIUM SERPL-MCNC: 4 MMOL/L — SIGNIFICANT CHANGE UP (ref 3.5–5)
POTASSIUM SERPL-SCNC: 4 MMOL/L — SIGNIFICANT CHANGE UP (ref 3.5–5)
PROT SERPL-MCNC: 5.2 G/DL — LOW (ref 6–8)
RBC # BLD: 3.96 M/UL — LOW (ref 4.2–5.4)
RBC # FLD: 13.1 % — SIGNIFICANT CHANGE UP (ref 11.5–14.5)
SODIUM SERPL-SCNC: 142 MMOL/L — SIGNIFICANT CHANGE UP (ref 135–146)
SPECIMEN SOURCE: SIGNIFICANT CHANGE UP
TROPONIN T SERPL-MCNC: 0.07 NG/ML — CRITICAL HIGH
WBC # BLD: 10.42 K/UL — SIGNIFICANT CHANGE UP (ref 4.8–10.8)
WBC # FLD AUTO: 10.42 K/UL — SIGNIFICANT CHANGE UP (ref 4.8–10.8)

## 2019-10-14 PROCEDURE — 99233 SBSQ HOSP IP/OBS HIGH 50: CPT

## 2019-10-14 PROCEDURE — 99221 1ST HOSP IP/OBS SF/LOW 40: CPT

## 2019-10-14 RX ADMIN — ENOXAPARIN SODIUM 40 MILLIGRAM(S): 100 INJECTION SUBCUTANEOUS at 11:50

## 2019-10-14 RX ADMIN — Medication 360 MILLIGRAM(S): at 06:38

## 2019-10-14 RX ADMIN — CARBIDOPA AND LEVODOPA 1 TABLET(S): 25; 100 TABLET ORAL at 06:39

## 2019-10-14 RX ADMIN — CARBIDOPA AND LEVODOPA 1 TABLET(S): 25; 100 TABLET ORAL at 18:27

## 2019-10-14 RX ADMIN — PRAMIPEXOLE DIHYDROCHLORIDE 0.5 MILLIGRAM(S): 0.12 TABLET ORAL at 22:07

## 2019-10-14 RX ADMIN — CARBIDOPA AND LEVODOPA 1 TABLET(S): 25; 100 TABLET ORAL at 11:49

## 2019-10-14 RX ADMIN — PANTOPRAZOLE SODIUM 40 MILLIGRAM(S): 20 TABLET, DELAYED RELEASE ORAL at 06:38

## 2019-10-14 RX ADMIN — PRAMIPEXOLE DIHYDROCHLORIDE 0.5 MILLIGRAM(S): 0.12 TABLET ORAL at 06:38

## 2019-10-14 RX ADMIN — PRAMIPEXOLE DIHYDROCHLORIDE 0.5 MILLIGRAM(S): 0.12 TABLET ORAL at 15:40

## 2019-10-14 RX ADMIN — CEFTRIAXONE 100 MILLIGRAM(S): 500 INJECTION, POWDER, FOR SOLUTION INTRAMUSCULAR; INTRAVENOUS at 18:28

## 2019-10-14 RX ADMIN — Medication 650 MILLIGRAM(S): at 02:05

## 2019-10-14 RX ADMIN — ATORVASTATIN CALCIUM 20 MILLIGRAM(S): 80 TABLET, FILM COATED ORAL at 22:07

## 2019-10-14 RX ADMIN — Medication 650 MILLIGRAM(S): at 06:27

## 2019-10-14 RX ADMIN — Medication 100 MILLIGRAM(S): at 06:38

## 2019-10-14 RX ADMIN — CARBIDOPA AND LEVODOPA 1 TABLET(S): 25; 100 TABLET ORAL at 22:07

## 2019-10-14 NOTE — CONSULT NOTE ADULT - SUBJECTIVE AND OBJECTIVE BOX
JON MALDONADOJER  [id number]  Female  87y  HPI:  This is a 78 year old female with a significant PMhx of Parkinson's disease and RA who presented to the ED after a witnessed episode of syncope. As per the daughter, the patient was attempting to urinate when she noticed the patient had lost consciousness. She was able to arouse the patient after a few seconds without any report of post-ictal state; the patient does not remember the event. The patient has also been experience generalized weakness, dark colored urine, BL LE (thigh pain), and nausea/vomiting (single bilious episode) 2 days prior to presentation. She also mentions acute development of left eye blurriness that is constant over past two days, and has been experiencing intermittent, sharp, occipital/neck headaches for the last few weeks.    ROS was negative for fever, CP, palpitations, SOB, abdominal pain, diarrhea, constipation. (12 Oct 2019 22:10)    PAST MEDICAL & SURGICAL HISTORY:  Hyperlipidemia  Rheumatoid arthritis  HTN (hypertension)  Parkinson disease  History of hysterectomy    FAMILY HISTORY:  No pertinent family history in first degree relatives    MEDICATIONS  (STANDING):  atorvastatin 20 milliGRAM(s) Oral at bedtime  carbidopa/levodopa CR 50/200 1 Tablet(s) Oral <User Schedule>  cefTRIAXone   IVPB 1000 milliGRAM(s) IV Intermittent every 24 hours  chlorhexidine 4% Liquid 1 Application(s) Topical <User Schedule>  diltiazem    milliGRAM(s) Oral daily  enoxaparin Injectable 40 milliGRAM(s) SubCutaneous daily  influenza   Vaccine 0.5 milliLiter(s) IntraMuscular once  methylPREDNISolone sodium succinate IVPB 500 milliGRAM(s) IV Intermittent daily  pantoprazole    Tablet 40 milliGRAM(s) Oral before breakfast  pramipexole 0.5 milliGRAM(s) Oral three times a day    Home Medications:  atorvastatin 20 mg oral tablet: 1 tab(s) orally once a day (12 Oct 2019 18:49)  carbidopa-levodopa:  (12 Oct 2019 18:49)  celecoxib 200 mg oral capsule: 1 cap(s) orally 2 times a day (12 Oct 2019 18:49)  dilTIAZem 360 mg/24 hours oral capsule, extended release: 1 cap(s) orally once a day (12 Oct 2019 18:49)  hydroCHLOROthiazide 12.5 mg oral tablet: 1 tab(s) orally once a day (12 Oct 2019 18:49)  pramipexole 0.5 mg oral tablet: 1 tab(s) orally 3 times a day (12 Oct 2019 18:49)      MEDICATIONS  (PRN):  acetaminophen   Tablet .. 650 milliGRAM(s) Oral every 6 hours PRN Mild Pain (1 - 3)    Allergies    No Known Allergies    Intolerances      PHYSICAL EXAM:      Constitutional:    Eyes:    ENMT:    Neck:    Breasts:    Back:    Respiratory:    Cardiovascular:    Gastrointestinal:    Genitourinary:    Rectal:    Extremities:    Vascular:    Neurological:    Skin:    Lymph Nodes:    Musculoskeletal:    Psychiatric:        CBC Full  -  ( 14 Oct 2019 05:05 )  WBC Count : 10.42 K/uL  RBC Count : 3.96 M/uL  Hemoglobin : 11.9 g/dL  Hematocrit : 36.0 %  Platelet Count - Automated : 178 K/uL  Mean Cell Volume : 90.9 fL  Mean Cell Hemoglobin : 30.1 pg  Mean Cell Hemoglobin Concentration : 33.1 g/dL  Auto Neutrophil # : 9.38 K/uL  Auto Lymphocyte # : 0.53 K/uL  Auto Monocyte # : 0.31 K/uL  Auto Eosinophil # : 0.00 K/uL  Auto Basophil # : 0.03 K/uL  Auto Neutrophil % : 90.0 %  Auto Lymphocyte % : 5.1 %  Auto Monocyte % : 3.0 %  Auto Eosinophil % : 0.0 %  Auto Basophil % : 0.3 %    LIVER FUNCTIONS - ( 14 Oct 2019 05:05 )  Alb: 3.1 g/dL / Pro: 5.2 g/dL / ALK PHOS: 81 U/L / ALT: <5 U/L / AST: 12 U/L / GGT: x           10-14    142  |  106  |  19  ----------------------------<  173<H>  4.0   |  22  |  0.8    Ca    8.5      14 Oct 2019 05:05  Phos  2.5     10-13  Mg     1.9     10-13    TPro  5.2<L>  /  Alb  3.1<L>  /  TBili  0.3  /  DBili  x   /  AST  12  /  ALT  <5  /  AlkPhos  81  10-14 JON MALDONADOJER  [id number]  Female  87y  HPI:  This is a 78 year old female with a significant PMhx of Parkinson's disease and RA who presented to the ED after a witnessed episode of syncope. As per the daughter, the patient was attempting to urinate when she noticed the patient had lost consciousness. She was able to arouse the patient after a few seconds without any report of post-ictal state; the patient does not remember the event. The patient has also been experience generalized weakness, dark colored urine, BL LE (thigh pain), and nausea/vomiting (single bilious episode) 2 days prior to presentation. She also mentions acute development of left eye blurriness that is constant over past two days, and has been experiencing intermittent, sharp, occipital/neck headaches for the last few weeks.    ROS was negative for fever, CP, palpitations, SOB, abdominal pain, diarrhea, constipation. (12 Oct 2019 22:10)    PAST MEDICAL & SURGICAL HISTORY:  Hyperlipidemia  Rheumatoid arthritis  HTN (hypertension)  Parkinson disease  History of hysterectomy    FAMILY HISTORY:  No pertinent family history in first degree relatives    MEDICATIONS  (STANDING):  atorvastatin 20 milliGRAM(s) Oral at bedtime  carbidopa/levodopa CR 50/200 1 Tablet(s) Oral <User Schedule>  cefTRIAXone   IVPB 1000 milliGRAM(s) IV Intermittent every 24 hours  chlorhexidine 4% Liquid 1 Application(s) Topical <User Schedule>  diltiazem    milliGRAM(s) Oral daily  enoxaparin Injectable 40 milliGRAM(s) SubCutaneous daily  influenza   Vaccine 0.5 milliLiter(s) IntraMuscular once  methylPREDNISolone sodium succinate IVPB 500 milliGRAM(s) IV Intermittent daily  pantoprazole    Tablet 40 milliGRAM(s) Oral before breakfast  pramipexole 0.5 milliGRAM(s) Oral three times a day    Home Medications:  atorvastatin 20 mg oral tablet: 1 tab(s) orally once a day (12 Oct 2019 18:49)  carbidopa-levodopa:  (12 Oct 2019 18:49)  celecoxib 200 mg oral capsule: 1 cap(s) orally 2 times a day (12 Oct 2019 18:49)  dilTIAZem 360 mg/24 hours oral capsule, extended release: 1 cap(s) orally once a day (12 Oct 2019 18:49)  hydroCHLOROthiazide 12.5 mg oral tablet: 1 tab(s) orally once a day (12 Oct 2019 18:49)  pramipexole 0.5 mg oral tablet: 1 tab(s) orally 3 times a day (12 Oct 2019 18:49)      MEDICATIONS  (PRN):  acetaminophen   Tablet .. 650 milliGRAM(s) Oral every 6 hours PRN Mild Pain (1 - 3)    Allergies    No Known Allergies    Intolerances      PHYSICAL EXAM:      Constitutional:Has resting tremors more on lower ext    Eyes: pERLAA, no temporal tenderness    ENMT: no facial asymmtery    Respiratory: b/l clear    Cardiovascular:Regular rate and rhythm, no murmur    Gastrointestinal: soft non tender    Extremities: no edema      Neurological: AO x 2, non focal          CBC Full  -  ( 14 Oct 2019 05:05 )  WBC Count : 10.42 K/uL  RBC Count : 3.96 M/uL  Hemoglobin : 11.9 g/dL  Hematocrit : 36.0 %  Platelet Count - Automated : 178 K/uL  Mean Cell Volume : 90.9 fL  Mean Cell Hemoglobin : 30.1 pg  Mean Cell Hemoglobin Concentration : 33.1 g/dL  Auto Neutrophil # : 9.38 K/uL  Auto Lymphocyte # : 0.53 K/uL  Auto Monocyte # : 0.31 K/uL  Auto Eosinophil # : 0.00 K/uL  Auto Basophil # : 0.03 K/uL  Auto Neutrophil % : 90.0 %  Auto Lymphocyte % : 5.1 %  Auto Monocyte % : 3.0 %  Auto Eosinophil % : 0.0 %  Auto Basophil % : 0.3 %    LIVER FUNCTIONS - ( 14 Oct 2019 05:05 )  Alb: 3.1 g/dL / Pro: 5.2 g/dL / ALK PHOS: 81 U/L / ALT: <5 U/L / AST: 12 U/L / GGT: x           10-14    142  |  106  |  19  ----------------------------<  173<H>  4.0   |  22  |  0.8    Ca    8.5      14 Oct 2019 05:05  Phos  2.5     10-13  Mg     1.9     10-13    TPro  5.2<L>  /  Alb  3.1<L>  /  TBili  0.3  /  DBili  x   /  AST  12  /  ALT  <5  /  AlkPhos  81  10-14    < from: Transthoracic Echocardiogram (10.13.19 @ 09:46) >  . Normal global left ventricular systolic function.   2. LV Ejection Fraction by Sparks's Method with a biplane EF of 74 %.   3. Normal left ventricular internal cavity size.   4. Normal right atrial size.   5. There is no evidence of pericardial effusion.   6. Mild mitral valve regurgitation.   7. Mild thickening and calcification of the anterior and posterior   mitral valve leaflets.   8. Echodensity noted on PMVL atrial aspect which is suggestive of a   valvular vegetation.   9. Moderate tricuspid regurgitation.    < end of copied text >

## 2019-10-14 NOTE — PROGRESS NOTE ADULT - ASSESSMENT
This is a 78 year old female with a significant PMhx of Parkinson's disease and RA who presented to the ED after a witnessed episode of syncope. She also mentions acute development of left eye blurriness that is constant over past two days, and has been experiencing intermittent, sharp, occipital/neck headaches for the last few weeks.      1.	Syncope.   2.	Blurred vision & intermittent occipital HA  3.	Parkinson's disease  4.	RA         PLAN:    ·	Tele for 24 hrs  ·	Orthostatics negative  ·	Syncope could be due to Sinemet  ·	ECHO reviewed. EF 74%. Thickening of the MV leaflets. Echodensity on PMVL atrial aspect  ·	Cardiology eval for possible vegetations.   ·	Pt has elevate ESR, blurred vision and temporal HA. Will R/O GCA.   ·	Vascular eval for temporal artery biopsy  ·	Opthalmology eval  ·	Rheumatology eval noted. Recommended to cont IV Solumedrol today and switch to Prednisone 60 mg po daily from tomorrow. Agreed with vascular eval for temporal artery biopsy This is a 78 year old female with a significant PMhx of Parkinson's disease and RA who presented to the ED after a witnessed episode of syncope. She also mentions acute development of left eye blurriness that is constant over past two days, and has been experiencing intermittent, sharp, occipital/neck headaches for the last few weeks.      1.	Syncope.   2.	Blurred vision & intermittent occipital HA  3.	Parkinson's disease  4.	Rt. Paratracheal opacity  5.	UTI  6.	RA         PLAN:    ·	Tele for 24 hrs. No events on tele so far  ·	Orthostatics negative  ·	Syncope could be vasovagal vs due to Sinemet  ·	ECHO reviewed. EF 74%. Thickening of the MV leaflets. Echodensity on PMVL atrial aspect  ·	Cardiology eval for possible vegetations.   ·	Pt has elevate ESR, blurred vision and temporal HA. Will R/O GCA.   ·	Vascular eval for temporal artery biopsy  ·	Opthalmology eval  ·	Rheumatology eval noted. Recommended to cont IV Solumedrol until tomorrow and then switch to Prednisone 60 mg po daily from 10/16. Agreed with vascular eval for temporal artery biopsy  ·	CT chest NC for Rt. Paratracheal opacity  ·	Cont IV Rocephin. Repeat urine cxs as the first sample was contaminated.   ·	Blood cxs  ·	Plan of care D/W the daughters on bedside.

## 2019-10-14 NOTE — PROGRESS NOTE ADULT - SUBJECTIVE AND OBJECTIVE BOX
*This is a Preliminary Note and will be edited*  SUBJECTIVE:    Patient is a 87y old Female who presents with a chief complaint of Syncope; Left eye blurriness (14 Oct 2019 14:30)   PMH**  Currently admitted to medicine with the primary diagnosis of Syncope, unspecified syncope type     Today is hospital day 2d. This morning she is resting comfortably in bed and reports no new issues or overnight events. Patient complains of increased leg pain from her Hips to her knees. Suspects its a RA flare-up. Patient normally ambulates with a cane but indicate trouble lifting her leg.     Pt. denies Chest pain, headache, dizziness, SOB, eye pain, blurry vision, neck pain, abd pain. Patient claims general leg pain from hip down to the knees.     Pt. is urinating and stooling appropriately.    PAST MEDICAL & SURGICAL HISTORY    Patient is a 86 y/o female with a PMHX of parkinson's and RA. Presented to the ED after her daughter witnessed an episode of syncope while she was using the bathroom. She was aroused after a few seconds, patient denies any memory of the event. During last week, patient admits to one incident of brown colored urine. On a weekly basis she would have some head and neck pain that lasts for about an hour. She also mentioned left eye blurriness in H and P but in the AM patients currently denies eye blurriness, headache or head/neck pain. Patient denies any cardiac history of chest pain or palpitations     Hyperlipidemia  Rheumatoid arthritis  HTN (hypertension)  Parkinson disease  History of hysterectomy    SOCIAL HISTORY:  Negative for smoking/alcohol/drug use.     ALLERGIES:  No Known Allergies    MEDICATIONS:  STANDING MEDICATIONS  acetaminophen   Tablet .. 650 milliGRAM(s) Oral every 6 hours PRN Mild Pain (1 - 3)  atorvastatin 20 milliGRAM(s) Oral at bedtime  carbidopa/levodopa CR 50/200 1 Tablet(s) Oral <User Schedule>  cefTRIAXone   IVPB 1000 milliGRAM(s) IV Intermittent every 24 hours  chlorhexidine 4% Liquid 1 Application(s) Topical <User Schedule>  diltiazem    milliGRAM(s) Oral daily  enoxaparin Injectable 40 milliGRAM(s) SubCutaneous daily  influenza   Vaccine 0.5 milliLiter(s) IntraMuscular once  methylPREDNISolone sodium succinate IVPB 500 milliGRAM(s) IV Intermittent daily  pantoprazole    Tablet 40 milliGRAM(s) Oral before breakfast  pramipexole 0.5 milliGRAM(s) Oral three times a day    PRN MEDICATIONS  acetaminophen   Tablet .. 650 milliGRAM(s) Oral every 6 hours PRN    VITALS:   T(F): 96.4  HR: 84 (67 - 111)  BP: 133/63 (133/63 - 179/81)  RR: 18 (18 - 18)  SpO2: 100% (97% - 100%)    PHYSICAL EXAM:    GENERAL: NAD, well-developed - able to converse in bed   HEENT:  Atraumatic, Normocephalic. conjunctiva and sclera clear,   PULMONARY: Clear to auscultation bilaterally; No wheeze  CARDIOVASCULAR: Regular rate and rhythm; murmurs, rubs, or gallops  GASTROINTESTINAL: Soft, Nontender, Nondistended; Bowel sounds present  MUSCULOSKELETAL:  2+ Peripheral Pulses, No clubbing, cyanosis, No edema in LE; Resting hand tremors; No pain at neck or hips on palpation; Pain with flexion and extension of knees BL.  NEUROLOGY: AAOx3  SKIN: No rashes or lesions      LABS:                        11.9   10.42 )-----------( 178      ( 14 Oct 2019 05:05 )             36.0     10-14    142  |  106  |  19  ----------------------------<  173<H>  4.0   |  22  |  0.8    Ca    8.5      14 Oct 2019 05:05  Phos  2.5     10-13  Mg     1.9     10-13    TPro  5.2<L>  /  Alb  3.1<L>  /  TBili  0.3  /  DBili  x   /  AST  12  /  ALT  <5  /  AlkPhos  81  10-14      Urinalysis Basic - ( 13 Oct 2019 00:25 )    Color: Yellow / Appearance: Slightly Turbid / S.016 / pH: x  Gluc: x / Ketone: Trace  / Bili: Negative / Urobili: <2 mg/dL   Blood: x / Protein: 30 mg/dL / Nitrite: Negative   Leuk Esterase: Large / RBC: 5 /HPF / WBC 77 /HPF   Sq Epi: x / Non Sq Epi: 1 /HPF / Bacteria: Many        Troponin T, Serum: 0.07 ng/mL <HH> (10-14-19 @ 05:05)  Creatine Kinase, Serum: 54 U/L (10-14-19 @ 05:05)  Troponin T, Serum: 0.10 ng/mL <HH> (10-13-19 @ 22:06)      Culture - Urine (collected 13 Oct 2019 03:00)  Source: .Urine None  Final Report (14 Oct 2019 07:44):    >=3 organisms. Probable collection contamination.      CARDIAC MARKERS ( 14 Oct 2019 05:05 )  x     / 0.07 ng/mL / 54 U/L / x     / x      CARDIAC MARKERS ( 13 Oct 2019 22:06 )  x     / 0.10 ng/mL / x     / x     / x      CARDIAC MARKERS ( 13 Oct 2019 12:30 )  x     / x     / x     / x     / 3.7 ng/mL  CARDIAC MARKERS ( 13 Oct 2019 11:20 )  x     / 0.06 ng/mL / x     / x     / x      CARDIAC MARKERS ( 13 Oct 2019 05:51 )  x     / 0.06 ng/mL / x     / x     / x      CARDIAC MARKERS ( 13 Oct 2019 00:40 )  x     / 0.06 ng/mL / x     / x     / x      CARDIAC MARKERS ( 12 Oct 2019 23:45 )  x     / x     / 81 U/L / x     / x      CARDIAC MARKERS ( 12 Oct 2019 19:12 )  x     / 0.05 ng/mL / x     / x     / x          Troponin T, Serum: 0.07 ng/mL (10-14-19 @ 05:05)  Troponin T, Serum: 0.10 ng/mL (10-13-19 @ 22:06)  Troponin T, Serum: 0.06 ng/mL (10-13-19 @ 11:20)  Troponin T, Serum: 0.06 ng/mL (10-13-19 @ 05:51)  Troponin T, Serum: 0.06 ng/mL (10-13-19 @ 00:40)  Troponin T, Serum: 0.05 ng/mL (10-12-19 @ 19:12)  Serum Pro-Brain Natriuretic Peptide: 1651 pg/mL (10-12-19 @ 19:12)      RADIOLOGY:  < from: Xray Chest 1 View-PORTABLE IMMEDIATE (10.12.19 @ 20:27) >  EXAM:  XR CHEST PORTABLE IMMED 1V            PROCEDURE DATE:  10/12/2019            INTERPRETATION:  Clinical History / Reason for exam: Cardiac workup    Comparison : Chest radiograph 2016    Technique/Positioning: Frontal view of the chest    Findings:    Cardiac/mediastinum/hilum: New prominence of the right paratracheal   region. CT chest is recommended for further evaluation..    Lung parenchyma/Pleura: Increased reticular opacities which may be seen   with pulmonary venous congestion.There is a small right pleural effusion.    Skeleton/soft tissues: Stable    Impression:      Increased reticular opacities which may be seen with pulmonary venous   congestion. Small right pleural effusion.    New prominence of the right paratracheal region. PA/lateral or CT chest   is recommended for further evaluation      FREDERICK YBARRA M.D., ATTENDING RADIOLOGIST  This document has been electronically signed. Oct 13 2019  4:10PM    < end of copied text >      < from: CT Head No Cont (10.12.19 @ 20:19) >  EXAM:  CT BRAIN            PROCEDURE DATE:  10/12/2019            INTERPRETATION:  CLINICAL HISTORY / REASON FOR EXAM: Vision changes.    TECHNIQUE: Multiple axial CT images of the head were obtained from the   base of the skull to the vertex without the administration of IV   contrast. Sagittal and coronal reformats were submitted.    COMPARISON: CT head without contrast from 5/3/2011.      FINDINGS:    Enlargement of the ventricles and cortical sulci, consistent with   parenchymal volume lossmost prominently within the occipital lobes.    There is no evidence of acute intracranial hemorrhage, mass effect or   midline shift.    Gray-white differentiation is maintained. There are patchy subcortical   white matter hypodensities consistent with moderate chronic microvascular   ischemic changes.    Small round hypodensities are noted in the bilateral basal ganglia   consistent with lacunar infarcts.    The bones are intact. Mastoid air cells are well aerated bilaterally. The   visualized portions of the paranasal sinuses are unremarkable.    Beam hardening artifact is noted overlying the brain stem and posterior   fossa which is inherent to CT in this location.      IMPRESSION:     No CT evidence of acute intracranial pathology.     Moderate chronic microvascular ischemic changes and chronic lacunar   infarcts as above.      WHITNEY TRAVIS M.D., RESIDENT RADIOLOGIST  This document has been electronically signed.  PADMINI ROMAN M.D., ATTENDING RADIOLOGIST  This document has beenelectronically signed. Oct 12 2019  8:35PM    < end of copied text >    ECHO:     < from: Transthoracic Echocardiogram (10.13.19 @ 09:46) >  EXAM:  2-D ECHO (TTE) COMPLETE        PROCEDURE DATE:  10/13/2019      INTERPRETATION:  REPORT:    TRANSTHORACIC ECHOCARDIOGRAM REPORT         Patient Name:   JON GARCÍA Accession #: [id number]  Medical Rec #:  [id number]     Height:      64.0 in 162.6 cm  YOB: 1932    Weight:      130.0 lb 58.97 kg  Patient Age:    87 years     BSA:         1.63 m²  Patient Gender: F            BP:          155/70 mmHg       Date of Exam:        10/13/2019 9:46:04 AM  Referring Physician: [id number] FLORESITA VILLALBA  Sonographer:         Jonna Gifford  Reading Physician:   Serafin Loco M.D.    Procedure:   2D Echo/Doppler/Color Doppler Complete.  Indications: R55 - Syncope and Collapse  Diagnosis:   Syncope and collapse - R55         Summary:   1. Normal global left ventricular systolic function.   2. LV Ejection Fraction by Sparks's Method with a biplane EF of 74 %.   3. Normal left ventricular internal cavity size.   4. Normal right atrial size.   5. There is no evidence of pericardial effusion.   6. Mild mitral valve regurgitation.   7. Mild thickening and calcification of the anterior and posterior   mitral valve leaflets.   8. Echodensity noted on PMVL atrial aspect which is suggestive of a   valvular vegetation.   9. Moderate tricuspid regurgitation.    PHYSICIAN INTERPRETATION:  Left Ventricle: The left ventricular internal cavity size is normal. Left   ventricular wall thickness is normal. Global LV systolic function was   normal.  Right Ventricle: The right ventricular size isnormal. RV systolic   function is normal.  Left Atrium: Normal left atrial size.  Right Atrium: Normal right atrial size.  Pericardium: There is no evidence of pericardial effusion.  Mitral Valve: Mild thickening and calcification of the anterior and  posterior mitral valve leaflets. Mild mitral valve regurgitation is seen.   Echodensity noted on PMVL atrial aspect which is suggestive of a valvular   vegetation.  Tricuspid Valve: The tricuspid valve is normal in structure. Moderate   tricuspid regurgitation is visualized.  Aortic Valve: The aortic valve is trileaflet. No evidence of aortic   stenosis. Aortic valve thickening with normal leaflet opening. No   evidence of aortic valve regurgitation is seen.  Pulmonic Valve: The pulmonic valve is thickened with good excursion.   Trace pulmonic valve regurgitation.  Aorta: Aortic root measured at Sinus of Valsalva is normal.  Venous: The inferior vena cava is normal.       2D AND M-MODE MEASUREMENTS (normal ranges within parentheses):  Left                Normal   Aorta/Left             Normal  Ventricle:                     Atrium:  IVSd (2D):  0.83 cm  (0.7-1.1) AoV Cusp       1.68  (1.5-2.6)  LVPWd (2D): 0.79 cm  (0.7-1.1) Separation:     cm  LVIDd (2D): 4.75 cm  (3.4-5.7) Left Atrium    3.29  (1.9-4.0)  LVIDs (2D): 2.44 cm            (Mmode):        cm  LV FS (2D):  48.6 %   (>25%)   LA Volume      37.2  Relative      0.33    (<0.42)  Index         ml/m²  Wall                           Right  Thickness                      Ventricle:                                 RVd (2D):      3.08 cm    SPECTRAL DOPPLER ANALYSIS:  LV DIASTOLIC FUNCTION:  MV Peak E: 1.34 m/s Decel Time: 245 msec  MV Peak A: 1.06 m/s  E/A Ratio: 1.27    Aortic Valve:  AoV VMax:    2.02 m/s  AoV Area, Vmax: 1.96 cm² Vmax Indx: 1.20 cm²/m²  AoV VTI:     0.41 m    AoV Area, VTI:  2.00 cm² VTI Indx:  1.23 cm²/m²  AoV Pk Grad: 16.3 mmHg  AoV Mn Grad: 8.4 mmHg    LVOT Vmax: 1.51 m/s  LVOT VTI:  0.31 m  LVOT Diam: 1.82 cm    Mitral Valve:  MV P1/2 Time: 70.94 msec  MV Area, PHT: 3.10 cm²    Tricuspid Valve and PA/RV Systolic Pressure: TR Max Velocity: 3.13 m/s RA   Pressure:  RVSP/PASP: 41.3 mmHg    Pulmonic Valve:  PV Max Velocity: 1.34 m/s PV Max P.2 mmHg PV Mean PG:       [id number] Serafin Loco M.D., Electronically signed on 10/13/2019 at   2:40:34 PM              *** Final ***    < end of copied text >    Hospital Summary:     Patient arrived to the ED with cc of syncope, with associated symptoms of weakness, intermittent headaches, blurry vision, dark urine within the last few weeks. To rule to ACS, Chest X-ray, orthostatics, TTE and EKG was done, test were negative for ACS event. TTE showed PMVL vegetation, cardiology will need to be consulted. CT head was negative and did not show any acute intracranial pathology. Possible temporal arteritis is being ruled out due to increased ESR, transient vision loss, intermittent headaches. Consulted Neurology and Rheumatology, per their recommendation, follow up with Vascular surgery for temporal artery biopsy. 500 mg methylprednisolone IV was started as a prophylaxis. Urinary analysis was sent and came back positive for bacteria, Rocephin was started, patient is asymptomatic.  Continue home medication for Parkinson and RA.

## 2019-10-14 NOTE — PROGRESS NOTE ADULT - SUBJECTIVE AND OBJECTIVE BOX
JON GARCÍA 87y Female  MRN#: [id number]   CODE STATUS: FULL      SUBJECTIVE  Patient is a 87y old Female who presents with a chief complaint of Syncope; Left eye blurriness (14 Oct 2019 14:30)    Currently admitted to medicine with the primary diagnosis of Syncope    Today is hospital day 2d, and this morning she is laying in bed comfortably and reports no overnight events.   Denies chest pain, shortness of breath, nausea, vomiting or changes in bowel habits. He has no complaints today.       OBJECTIVE  PAST MEDICAL & SURGICAL HISTORY  Hyperlipidemia  Rheumatoid arthritis  HTN (hypertension)  Parkinson disease  History of hysterectomy    ALLERGIES:  No Known Allergies    HOME MEDICATIONS:  Home Medications:  atorvastatin 20 mg oral tablet: 1 tab(s) orally once a day (12 Oct 2019 18:49)  carbidopa-levodopa:  (12 Oct 2019 18:49)  celecoxib 200 mg oral capsule: 1 cap(s) orally 2 times a day (12 Oct 2019 18:49)  dilTIAZem 360 mg/24 hours oral capsule, extended release: 1 cap(s) orally once a day (12 Oct 2019 18:49)  hydroCHLOROthiazide 12.5 mg oral tablet: 1 tab(s) orally once a day (12 Oct 2019 18:49)  pramipexole 0.5 mg oral tablet: 1 tab(s) orally 3 times a day (12 Oct 2019 18:49)    MEDICATIONS:  STANDING MEDICATIONS  atorvastatin 20 milliGRAM(s) Oral at bedtime  carbidopa/levodopa CR 50/200 1 Tablet(s) Oral <User Schedule>  cefTRIAXone   IVPB 1000 milliGRAM(s) IV Intermittent every 24 hours  chlorhexidine 4% Liquid 1 Application(s) Topical <User Schedule>  diltiazem    milliGRAM(s) Oral daily  enoxaparin Injectable 40 milliGRAM(s) SubCutaneous daily  influenza   Vaccine 0.5 milliLiter(s) IntraMuscular once  methylPREDNISolone sodium succinate IVPB 500 milliGRAM(s) IV Intermittent daily  pantoprazole    Tablet 40 milliGRAM(s) Oral before breakfast  pramipexole 0.5 milliGRAM(s) Oral three times a day    PRN MEDICATIONS  acetaminophen   Tablet .. 650 milliGRAM(s) Oral every 6 hours PRN      VITAL SIGNS: Last 24 Hours  T(C): 35.8 (14 Oct 2019 14:39), Max: 38.2 (13 Oct 2019 20:10)  T(F): 96.4 (14 Oct 2019 14:39), Max: 100.7 (13 Oct 2019 20:10)  HR: 84 (14 Oct 2019 14:39) (67 - 111)  BP: 133/63 (14 Oct 2019 14:39) (133/63 - 179/81)  RR: 18 (14 Oct 2019 14:39) (18 - 18)  SpO2: 100% (13 Oct 2019 17:00) (100% - 100%)    LABS:                        11.9   10.42 )-----------( 178      ( 14 Oct 2019 05:05 )             36.0     1014    142  |  106  |  19  ----------------------------<  173<H>  4.0   |  22  |  0.8    Ca    8.5      14 Oct 2019 05:05  Phos  2.5     10-13  Mg     1.9     10-13    TPro  5.2<L>  /  Alb  3.1<L>  /  TBili  0.3  /  DBili  x   /  AST  12  /  ALT  <5  /  AlkPhos  81  10-14      Urinalysis Basic - ( 13 Oct 2019 00:25 )    Color: Yellow / Appearance: Slightly Turbid / S.016 / pH: x  Gluc: x / Ketone: Trace  / Bili: Negative / Urobili: <2 mg/dL   Blood: x / Protein: 30 mg/dL / Nitrite: Negative   Leuk Esterase: Large / RBC: 5 /HPF / WBC 77 /HPF   Sq Epi: x / Non Sq Epi: 1 /HPF / Bacteria: Many    Troponin T, Serum: 0.07 ng/mL <HH> (10-14-19 @ 05:05)  Creatine Kinase, Serum: 54 U/L (10-14-19 @ 05:05)  Troponin T, Serum: 0.10 ng/mL <HH> (10-13-19 @ 22:06)    CARDIAC MARKERS ( 14 Oct 2019 05:05 )  x     / 0.07 ng/mL / 54 U/L / x     / x      CARDIAC MARKERS ( 13 Oct 2019 22:06 )  x     / 0.10 ng/mL / x     / x     / x      CARDIAC MARKERS ( 13 Oct 2019 12:30 )  x     / x     / x     / x     / 3.7 ng/mL  CARDIAC MARKERS ( 13 Oct 2019 11:20 )  x     / 0.06 ng/mL / x     / x     / x      CARDIAC MARKERS ( 13 Oct 2019 05:51 )  x     / 0.06 ng/mL / x     / x     / x      CARDIAC MARKERS ( 13 Oct 2019 00:40 )  x     / 0.06 ng/mL / x     / x     / x      CARDIAC MARKERS ( 12 Oct 2019 23:45 )  x     / x     / 81 U/L / x     / x      CARDIAC MARKERS ( 12 Oct 2019 19:12 )  x     / 0.05 ng/mL / x     / x     / x            Culture - Urine (collected 13 Oct 2019 03:00)  Source: .Urine None  Final Report (14 Oct 2019 07:44):    >=3 organisms. Probable collection contamination.      RADIOLOGY:  Xray Chest 1 View-PORTABLE IMMEDIATE (10.12.19 @ 20:27)   Increased reticular opacities which may be seen with pulmonary venous   congestion. Small right pleural effusion.    New prominence of the right paratracheal region. PA/lateral or CT chest   is recommended for further evaluation    CT Head No Cont (10.12.19 @ 20:19)   No CT evidence of acute intracranial pathology.     Moderate chronic microvascular ischemic changes and chronic lacunar   infarcts as above.    ECHO:  Transthoracic Echocardiogram:    EXAM:  2-D ECHO (TTE) COMPLETE        PROCEDURE DATE:  10/13/2019      INTERPRETATION:  REPORT:    TRANSTHORACIC ECHOCARDIOGRAM REPORT    Patient Name:   JON GARCÍA Accession #: [id number]  Medical Rec #:  [id number]     Height:      64.0 in 162.6 cm  YOB: 1932    Weight:      130.0 lb 58.97 kg  Patient Age:    87 years     BSA:         1.63 m²  Patient Gender: F            BP:          155/70 mmHg       Date of Exam:        10/13/2019 9:46:04 AM  Referring Physician: [id number] FLORESITA VILLALBA  Sonographer:         Jonna Gifford  Reading Physician:   Kb Liz M.D.    Procedure:   2D Echo/Doppler/Color Doppler Complete.  Indications: R55 - Syncope and Collapse  Diagnosis:   Syncope and collapse - R55         Summary:   1. Normal global left ventricular systolic function.   2. LV Ejection Fraction by Sparks's Method with a biplane EF of 74 %.   3. Normal left ventricular internal cavity size.   4. Normal right atrial size.   5. There is no evidence of pericardial effusion.   6. Mild mitral valve regurgitation.   7. Mild thickening and calcification of the anterior and posterior   mitral valve leaflets.   8. Echodensity noted on PMVL atrial aspect which is suggestive of a   valvular vegetation.   9. Moderate tricuspid regurgitation.    PHYSICIAN INTERPRETATION:  Left Ventricle: The left ventricular internal cavity size is normal. Left   ventricular wall thickness is normal. Global LV systolic function was   normal.  Right Ventricle: The right ventricular size isnormal. RV systolic   function is normal.  Left Atrium: Normal left atrial size.  Right Atrium: Normal right atrial size.  Pericardium: There is no evidence of pericardial effusion.  Mitral Valve: Mild thickening and calcification of the anterior and  posterior mitral valve leaflets. Mild mitral valve regurgitation is seen.   Echodensity noted on PMVL atrial aspect which is suggestive of a valvular   vegetation.  Tricuspid Valve: The tricuspid valve is normal in structure. Moderate   tricuspid regurgitation is visualized.  Aortic Valve: The aortic valve is trileaflet. No evidence of aortic   stenosis. Aortic valve thickening with normal leaflet opening. No   evidence of aortic valve regurgitation is seen.  Pulmonic Valve: The pulmonic valve is thickened with good excursion.   Trace pulmonic valve regurgitation.  Aorta: Aortic root measured at Sinus of Valsalva is normal.  Venous: The inferior vena cava is normal.       2D AND M-MODE MEASUREMENTS (normal ranges within parentheses):  Left                Normal   Aorta/Left             Normal  Ventricle:                     Atrium:  IVSd (2D):  0.83 cm  (0.7-1.1) AoV Cusp       1.68  (1.5-2.6)  LVPWd (2D): 0.79 cm  (0.7-1.1) Separation:     cm  LVIDd (2D): 4.75 cm  (3.4-5.7) Left Atrium    3.29  (1.9-4.0)  LVIDs (2D): 2.44 cm            (Mmode):        cm  LV FS (2D):  48.6 %   (>25%)   LA Volume      37.2  Relative      0.33    (<0.42)  Index         ml/m²  Wall                           Right  Thickness                      Ventricle:     RVd (2D):      3.08 cm    SPECTRAL DOPPLER ANALYSIS:  LV DIASTOLIC FUNCTION:  MV Peak E: 1.34 m/s Decel Time: 245 msec  MV Peak A: 1.06 m/s  E/A Ratio: 1.27    Aortic Valve:  AoV VMax:    2.02 m/s  AoV Area, Vmax: 1.96 cm² Vmax Indx: 1.20 cm²/m²  AoV VTI:     0.41 m    AoV Area, VTI:  2.00 cm² VTI Indx:  1.23 cm²/m²  AoV Pk Grad: 16.3 mmHg  AoV Mn Grad: 8.4 mmHg    LVOT Vmax: 1.51 m/s  LVOT VTI:  0.31 m  LVOT Diam: 1.82 cm    Mitral Valve:  MV P1/2 Time: 70.94 msec  MV Area, PHT: 3.10 cm²    Tricuspid Valve and PA/RV Systolic Pressure: TR Max Velocity: 3.13 m/s RA   Pressure:  RVSP/PASP: 41.3 mmHg    Pulmonic Valve:  PV Max Velocity: 1.34 m/s PV Max P.2 mmHg PV Mean PG:       [id number] Kb Liz M.D., Electronically signed on 10/13/2019 at   2:40:34 PM     *** Final ***  KB LIZ MD  This document has been electronically signed. Oct 13 2019  9:46AM(10-13-19 @ 09:46)      PHYSICAL EXAM:    GENERAL: NAD, well-developed, AAOx3  HEENT:  B/L cataract + Atraumatic, Normocephalic. EOMI, PERRLA, conjunctiva and sclera clear, No JVD  PULMONARY: Clear to auscultation bilaterally; No wheeze  CARDIOVASCULAR: Regular rate and rhythm; No murmurs, rubs, or gallops  GASTROINTESTINAL: Soft, Nontender, Nondistended; Bowel sounds present  MUSCULOSKELETAL: edema B/l LLE+  2+ Peripheral Pulses, No clubbing, cyanosis  NEUROLOGY: non-focal  SKIN: No rashes or lesions    ASSESSMENT & PLAN  78 year old female with a significant PMhx of Parkinson's disease and RA who presented to the ED after a witnessed episode of syncope, associated with recent generalized weakness, dark urine, and N/V. She also complained of L-eye blurriness x2 days and headaches over the last few weeks.     #) Syncope likely vasvagal vs autonomic instability in Parkinson's disease vs medication induced vs dehydration vs cystitis/pyelonephritis,   - EKG wnl, unlikely cardiogenic,  Doubt seizure without report of post-ictal state expressed  - CT Head with moderate microvascular changes and old lacunar infarcts; No acute pathology  - Will monitor BP off of HCTZ for now  - Orthostatics x1 negative  -CXR from 10/12    #) Acute blurry vision; B/L eyes  -Possible Temporal arteritis, ESR >100, transient vision loss  - No jaw claudication or scalp tenderness. No fevers, unintentional weight loss.  - ESR elevated (likely inflammatory vs malignancy vs infectious , CK wnl   -Completed solumedrol 500 mg IV x 2 - typically would use 1g daily x 3 for ? GCA with vision involvement, would complete pulse steroids tomorrow with 1g dosing followed by Prednisone 60 mg daily.  -Vasc Sx consult pending  - Optho consult, Neuro consult -pending  -CRP pending    # Acute Cystitis  -Positive U/A, Urinary Frequency, likely cause of Syncope  -Culture sent, will start Rocephin   -continue Rocephin  -repeat UA and cultures sent  -Blood cx pending    # New prominence of the right paratracheal region on CXR with right sided pleural effusion with increased reticular opacities  -ordered CT chest no contrast  -Quantiferon Tb test ordered as per family's request    # Elevated troponin and BNP  - No chest pain  - trend troponin   - ECHO from 10/13 shows LVEF of 73%, with vegetataion on PMVL   -cardio consult pending  -BLood cx pending    # Hx of Parkinson's Disease  -Continue home medications    #) Hx of RA  --Not on DMARDS   -c/o knee pain likely OA   -Anti CCP, RA factor ordered for completebess as suggested by rheum      #) Hx of HTN and HLD  -Holding HCTZ, continue Statin    GI ppx:Not indicated    DVT ppx: Heparin 5000mg SubQ    Diet, DASH/TLC:   Sodium & Cholesterol Restricted   Activity: Increase as Tolerated     DISPO:  Patient to be discharged when condition(s) optimized.    CODE STATUS  [X] FULL     Pending: Cardio, CT chest no contrast, anti CCP Ab, RA factor, Blood Cx

## 2019-10-14 NOTE — PROGRESS NOTE ADULT - ASSESSMENT
78 year old female with a significant PMhx of Parkinson's disease and RA who presented to the ED after a witnessed episode of syncope, associated with recent generalized weakness, dark urine, and N/V. She also complained of L-eye blurriness x2 days and headaches over the last few weeks.     #) Syncope   - Possibly dehydration, vasovagal autonomic instability in Parkinson's disease  - EKG wnl, unlikely cardiogenic,  Doubt seizure without report of post-ictal state expressed  - CT Head with moderate microvascular changes and old lacunar infarcts; No acute pathology  - Will monitor BP off of HCTZ for now  - Orthostatics 10/13 - Neg   - Check volume status     #) Acute blurry vision; L-eye blurriness  -Possible Temporal arteritis, ESR >100, transient vision loss  - ESR elevated- 106,   - Optho consult, - FU  - Neuro consult  - Rheumatology Consult   - Follow Up with Vascular Surgery for Temporal artery biopsy   - C/W 500mg methylprednisolone 3 days     #) Elevated troponin and BNP  - No chest pain, likely elevated in setting of CKD   - Chest X-ray - Increased reticular opacities which may be seen with pulmonary venous   congestion. Small right pleural effusion. New prominence of the right paratracheal region. PA/lateral or CT chest is recommended for further evaluation   - Trend troponin .07<.1<.06<.06, BNP - 1651   - TTE Negative for systolic and diastolic dysfunction. LVEF -74%   	- Positive for echodensity in the PMVL atrial aspect - Potential valvular vegetation   - FU with cardiology for MV vegetation     #) Acute Cystitis - Stable   -Positive U/A, Urinary Frequency    -Culture sent, will start Rocephin 7 days 1000mg     #) Hx of Parkinson's Disease - Stable   -Continue home medications    #) Hx of RA   -Pain control  - Follow CRP, CCP     #) Hx of HTN and HLD  -Holding HCTZ, continue Statin    GI ppx:Not indicated    DVT ppx: Heparin 5000mg SubQ    Diet, DASH/TLC:   Sodium & Cholesterol Restricted (10-12-19 @ 23:46)      Activity: Increase as Tolerated     DISPO:  Patient to be discharged when condition(s) optimized.    CODE STATUS  [X] FULL

## 2019-10-14 NOTE — CONSULT NOTE ADULT - ASSESSMENT
This is a 78 year old female with a significant PMhx of Parkinson's disease and RA who presented to the ED after a witnessed episode of syncope. This is a 78 year old female with a significant PMhx of Parkinson's disease and RA who presented to the ED after a witnessed episode of syncope, blurriness for 1 day.    #Syncope likely vasovagal  # Blurry vision  # Elevated     No pain or tenderness on the temporal region  Mentions she had blurry vision on the left eye for 1 day prior to presentation, since this AM has b/l blurred vision  On day 2 of pulse prednisone  Would get vascular to rule out GCA      WILL DISCUSS WITH ATTENDING This is a 78 year old female with a significant PMhx of Parkinson's disease and RA who presented to the ED after a witnessed episode of syncope, blurriness for 1 day.    #Syncope likely vasovagal  # Blurry vision  # Elevated     No pain or tenderness on the temporal region  Mentions she had blurry vision on the left eye for 1 day prior to presentation, since this AM has b/l blurred vision  Although she would be atypical presentation for GCA, would still rule out,  get ophthalmology consult, would get vascular surgery for biopsy  Continue with pulse steroid   Please get CRP, CCP (history of RA, not treated currently)  Work up for mitral valve vegetation

## 2019-10-14 NOTE — PROGRESS NOTE ADULT - SUBJECTIVE AND OBJECTIVE BOX
JON GARCÍA  87y Female    CHIEF COMPLAINT:    Patient is a 87y old  Female who presents with a chief complaint of Syncope; Left eye blurriness (14 Oct 2019 16:11)      INTERVAL HPI/OVERNIGHT EVENTS:    Patient seen and examined.    ROS: All other systems are negative.    Vital Signs:    T(F): 96.4 (10-14-19 @ 14:39), Max: 100.7 (10-13-19 @ 20:10)  HR: 84 (10-14-19 @ 14:39) (67 - 111)  BP: 133/63 (10-14-19 @ 14:39) (133/63 - 179/81)  RR: 18 (10-14-19 @ 14:39) (18 - 18)  SpO2: 100% (10-13-19 @ 17:00) (100% - 100%)  I&O's Summary    13 Oct 2019 07:01  -  14 Oct 2019 07:00  --------------------------------------------------------  IN: 0 mL / OUT: 400 mL / NET: -400 mL    14 Oct 2019 07:01  -  14 Oct 2019 16:32  --------------------------------------------------------  IN: 750 mL / OUT: 50 mL / NET: 700 mL      Daily Height in cm: 162.56 (13 Oct 2019 20:10)    Daily   CAPILLARY BLOOD GLUCOSE      POCT Blood Glucose.: 113 mg/dL (13 Oct 2019 16:54)      PHYSICAL EXAM:    GENERAL:  NAD  SKIN: No rashes or lesions  HENT: Atrumatic. Normocephalic. PERRL. Moist membranes.  NECK: Supple, No JVD. No lymphadenopathy.  PULMONARY: CTA B/L. No wheezing. No rales  CVS: Normal S1, S2. Rate and Rythm are regular. No murmurs.  ABDOMEN/GI: Soft, Nontender, Nondistended; BS present  EXTREMITIES: Peripheral pulses intact. No edema B/L LE.  NEUROLOGIC:  No motor or sensory deficit.  PSYCH: Alert & oriented x 3    Consultant(s) Notes Reviewed:  [x ] YES  [ ] NO  Care Discussed with Consultants/Other Providers [ x] YES  [ ] NO    EKG reviewed  Telemetry reviewed    LABS:                        11.9   10.42 )-----------( 178      ( 14 Oct 2019 05:05 )             36.0     10-14    142  |  106  |  19  ----------------------------<  173<H>  4.0   |  22  |  0.8    Ca    8.5      14 Oct 2019 05:05  Phos  2.5     10-13  Mg     1.9     10-13    TPro  5.2<L>  /  Alb  3.1<L>  /  TBili  0.3  /  DBili  x   /  AST  12  /  ALT  <5  /  AlkPhos  81  10-14      Serum Pro-Brain Natriuretic Peptide: 1651 pg/mL (10-12-19 @ 19:12)    Trop 0.07, CKMB --, CK 54, 10-14-19 @ 05:05  Trop 0.10, CKMB --, CK --, 10-13-19 @ 22:06  Trop --, CKMB 3.7, CK --, 10-13-19 @ 12:30  Trop 0.06, CKMB --, CK --, 10-13-19 @ 11:20  Trop 0.06, CKMB --, CK --, 10-13-19 @ 05:51  Trop 0.06, CKMB --, CK --, 10-13-19 @ 00:40  Trop --, CKMB --, CK 81, 10-12-19 @ 23:45  Trop 0.05, CKMB --, CK --, 10-12-19 @ 19:12      Culture - Urine (collected 13 Oct 2019 03:00)  Source: .Urine None  Final Report (14 Oct 2019 07:44):    >=3 organisms. Probable collection contamination.        RADIOLOGY & ADDITIONAL TESTS:    < from: Transthoracic Echocardiogram (10.13.19 @ 09:46) >    Summary:   1. Normal global left ventricular systolic function.   2. LV Ejection Fraction by Sparks's Method with a biplane EF of 74 %.   3. Normal left ventricular internal cavity size.   4. Normal right atrial size.   5. There is no evidence of pericardial effusion.   6. Mild mitral valve regurgitation.   7. Mild thickening and calcification of the anterior and posterior   mitral valve leaflets.   8. Echodensity noted on PMVL atrial aspect which is suggestive of a   valvular vegetation.   9. Moderate tricuspid regurgitation.    < end of copied text >    Imaging or report Personally Reviewed:  [ ] YES  [ ] NO    Medications:  Standing  atorvastatin 20 milliGRAM(s) Oral at bedtime  carbidopa/levodopa CR 50/200 1 Tablet(s) Oral <User Schedule>  cefTRIAXone   IVPB 1000 milliGRAM(s) IV Intermittent every 24 hours  chlorhexidine 4% Liquid 1 Application(s) Topical <User Schedule>  diltiazem    milliGRAM(s) Oral daily  enoxaparin Injectable 40 milliGRAM(s) SubCutaneous daily  influenza   Vaccine 0.5 milliLiter(s) IntraMuscular once  methylPREDNISolone sodium succinate IVPB 500 milliGRAM(s) IV Intermittent daily  pantoprazole    Tablet 40 milliGRAM(s) Oral before breakfast  pramipexole 0.5 milliGRAM(s) Oral three times a day    PRN Meds  acetaminophen   Tablet .. 650 milliGRAM(s) Oral every 6 hours PRN      Case discussed with resident    Care discussed with pt/family JON GARCÍA  87y Female    CHIEF COMPLAINT:    Patient is a 87y old  Female who presents with a chief complaint of Syncope; Left eye blurriness (14 Oct 2019 16:11)      INTERVAL HPI/OVERNIGHT EVENTS:    Patient seen and examined. Today she C/O blurred vision from both eyes. Running low grade fever. No cough or sob. Denies burning micturition    ROS: All other systems are negative.    Vital Signs:    T(F): 96.4 (10-14-19 @ 14:39), Max: 100.7 (10-13-19 @ 20:10)  HR: 84 (10-14-19 @ 14:39) (67 - 111)  BP: 133/63 (10-14-19 @ 14:39) (133/63 - 179/81)  RR: 18 (10-14-19 @ 14:39) (18 - 18)  SpO2: 100% (10-13-19 @ 17:00) (100% - 100%)  I&O's Summary    13 Oct 2019 07:01  -  14 Oct 2019 07:00  --------------------------------------------------------  IN: 0 mL / OUT: 400 mL / NET: -400 mL    14 Oct 2019 07:01  -  14 Oct 2019 16:32  --------------------------------------------------------  IN: 750 mL / OUT: 50 mL / NET: 700 mL      Daily Height in cm: 162.56 (13 Oct 2019 20:10)    Daily   CAPILLARY BLOOD GLUCOSE      POCT Blood Glucose.: 113 mg/dL (13 Oct 2019 16:54)      PHYSICAL EXAM:    GENERAL:  NAD  SKIN: No rashes or lesions  HENT: Atraumatic Normocephalic. PERRL. Moist membranes.  NECK: Supple, No JVD. No lymphadenopathy.  PULMONARY: CTA B/L. No wheezing. No rales  CVS: Normal S1, S2. Rate and Rhythm are regular. No murmurs.  ABDOMEN/GI: Soft, Nontender, Nondistended; BS present  EXTREMITIES: Peripheral pulses intact. No edema B/L LE.  NEUROLOGIC:  No motor or sensory deficit.  PSYCH: Alert & oriented x 3    Consultant(s) Notes Reviewed:  [x ] YES  [ ] NO  Care Discussed with Consultants/Other Providers [ x] YES  [ ] NO    EKG reviewed  Telemetry reviewed    LABS:                        11.9   10.42 )-----------( 178      ( 14 Oct 2019 05:05 )             36.0     10-14    142  |  106  |  19  ----------------------------<  173<H>  4.0   |  22  |  0.8    Ca    8.5      14 Oct 2019 05:05  Phos  2.5     10-13  Mg     1.9     10-13    TPro  5.2<L>  /  Alb  3.1<L>  /  TBili  0.3  /  DBili  x   /  AST  12  /  ALT  <5  /  AlkPhos  81  10-14      Serum Pro-Brain Natriuretic Peptide: 1651 pg/mL (10-12-19 @ 19:12)    Trop 0.07, CKMB --, CK 54, 10-14-19 @ 05:05  Trop 0.10, CKMB --, CK --, 10-13-19 @ 22:06  Trop --, CKMB 3.7, CK --, 10-13-19 @ 12:30  Trop 0.06, CKMB --, CK --, 10-13-19 @ 11:20  Trop 0.06, CKMB --, CK --, 10-13-19 @ 05:51  Trop 0.06, CKMB --, CK --, 10-13-19 @ 00:40  Trop --, CKMB --, CK 81, 10-12-19 @ 23:45  Trop 0.05, CKMB --, CK --, 10-12-19 @ 19:12      Culture - Urine (collected 13 Oct 2019 03:00)  Source: .Urine None  Final Report (14 Oct 2019 07:44):    >=3 organisms. Probable collection contamination.        RADIOLOGY & ADDITIONAL TESTS:    < from: Transthoracic Echocardiogram (10.13.19 @ 09:46) >    Summary:   1. Normal global left ventricular systolic function.   2. LV Ejection Fraction by Sparks's Method with a biplane EF of 74 %.   3. Normal left ventricular internal cavity size.   4. Normal right atrial size.   5. There is no evidence of pericardial effusion.   6. Mild mitral valve regurgitation.   7. Mild thickening and calcification of the anterior and posterior   mitral valve leaflets.   8. Echodensity noted on PMVL atrial aspect which is suggestive of a   valvular vegetation.   9. Moderate tricuspid regurgitation.    < end of copied text >  < from: Xray Chest 1 View-PORTABLE IMMEDIATE (10.12.19 @ 20:27) >    Impression:      Increased reticular opacities which may be seen with pulmonary venous   congestion. Small right pleural effusion.    New prominence of the right paratracheal region. PA/lateral or CT chest   is recommended for further evaluation    < end of copied text >    Imaging or report Personally Reviewed:  [ ] YES  [ ] NO    Medications:  Standing  atorvastatin 20 milliGRAM(s) Oral at bedtime  carbidopa/levodopa CR 50/200 1 Tablet(s) Oral <User Schedule>  cefTRIAXone   IVPB 1000 milliGRAM(s) IV Intermittent every 24 hours  chlorhexidine 4% Liquid 1 Application(s) Topical <User Schedule>  diltiazem    milliGRAM(s) Oral daily  enoxaparin Injectable 40 milliGRAM(s) SubCutaneous daily  influenza   Vaccine 0.5 milliLiter(s) IntraMuscular once  methylPREDNISolone sodium succinate IVPB 500 milliGRAM(s) IV Intermittent daily  pantoprazole    Tablet 40 milliGRAM(s) Oral before breakfast  pramipexole 0.5 milliGRAM(s) Oral three times a day    PRN Meds  acetaminophen   Tablet .. 650 milliGRAM(s) Oral every 6 hours PRN      Case discussed with resident    Care discussed with pt/family

## 2019-10-15 LAB
ALBUMIN SERPL ELPH-MCNC: 3.1 G/DL — LOW (ref 3.5–5.2)
ALP SERPL-CCNC: 79 U/L — SIGNIFICANT CHANGE UP (ref 30–115)
ALT FLD-CCNC: <5 U/L — SIGNIFICANT CHANGE UP (ref 0–41)
ANION GAP SERPL CALC-SCNC: 14 MMOL/L — SIGNIFICANT CHANGE UP (ref 7–14)
APTT BLD: 27.1 SEC — SIGNIFICANT CHANGE UP (ref 27–39.2)
AST SERPL-CCNC: 10 U/L — SIGNIFICANT CHANGE UP (ref 0–41)
BASOPHILS # BLD AUTO: 0.05 K/UL — SIGNIFICANT CHANGE UP (ref 0–0.2)
BASOPHILS NFR BLD AUTO: 0.4 % — SIGNIFICANT CHANGE UP (ref 0–1)
BILIRUB SERPL-MCNC: 0.2 MG/DL — SIGNIFICANT CHANGE UP (ref 0.2–1.2)
BUN SERPL-MCNC: 35 MG/DL — HIGH (ref 10–20)
CALCIUM SERPL-MCNC: 8.6 MG/DL — SIGNIFICANT CHANGE UP (ref 8.5–10.1)
CHLORIDE SERPL-SCNC: 105 MMOL/L — SIGNIFICANT CHANGE UP (ref 98–110)
CO2 SERPL-SCNC: 22 MMOL/L — SIGNIFICANT CHANGE UP (ref 17–32)
CREAT SERPL-MCNC: 1 MG/DL — SIGNIFICANT CHANGE UP (ref 0.7–1.5)
CRP SERPL-MCNC: 10.68 MG/DL — HIGH (ref 0–0.4)
EOSINOPHIL # BLD AUTO: 0 K/UL — SIGNIFICANT CHANGE UP (ref 0–0.7)
EOSINOPHIL NFR BLD AUTO: 0 % — SIGNIFICANT CHANGE UP (ref 0–8)
ERYTHROCYTE [SEDIMENTATION RATE] IN BLOOD: 63 MM/HR — HIGH (ref 0–20)
GLUCOSE SERPL-MCNC: 180 MG/DL — HIGH (ref 70–99)
HCT VFR BLD CALC: 35.3 % — LOW (ref 37–47)
HGB BLD-MCNC: 11.7 G/DL — LOW (ref 12–16)
IMM GRANULOCYTES NFR BLD AUTO: 4.2 % — HIGH (ref 0.1–0.3)
LYMPHOCYTES # BLD AUTO: 0.63 K/UL — LOW (ref 1.2–3.4)
LYMPHOCYTES # BLD AUTO: 4.8 % — LOW (ref 20.5–51.1)
MAGNESIUM SERPL-MCNC: 2.1 MG/DL — SIGNIFICANT CHANGE UP (ref 1.8–2.4)
MCHC RBC-ENTMCNC: 29.7 PG — SIGNIFICANT CHANGE UP (ref 27–31)
MCHC RBC-ENTMCNC: 33.1 G/DL — SIGNIFICANT CHANGE UP (ref 32–37)
MCV RBC AUTO: 89.6 FL — SIGNIFICANT CHANGE UP (ref 81–99)
MONOCYTES # BLD AUTO: 0.72 K/UL — HIGH (ref 0.1–0.6)
MONOCYTES NFR BLD AUTO: 5.5 % — SIGNIFICANT CHANGE UP (ref 1.7–9.3)
NEUTROPHILS # BLD AUTO: 11.21 K/UL — HIGH (ref 1.4–6.5)
NEUTROPHILS NFR BLD AUTO: 85.1 % — HIGH (ref 42.2–75.2)
NRBC # BLD: 0 /100 WBCS — SIGNIFICANT CHANGE UP (ref 0–0)
PLATELET # BLD AUTO: 229 K/UL — SIGNIFICANT CHANGE UP (ref 130–400)
POTASSIUM SERPL-MCNC: 4.2 MMOL/L — SIGNIFICANT CHANGE UP (ref 3.5–5)
POTASSIUM SERPL-SCNC: 4.2 MMOL/L — SIGNIFICANT CHANGE UP (ref 3.5–5)
PROT SERPL-MCNC: 5.4 G/DL — LOW (ref 6–8)
RBC # BLD: 3.94 M/UL — LOW (ref 4.2–5.4)
RBC # FLD: 13.2 % — SIGNIFICANT CHANGE UP (ref 11.5–14.5)
RHEUMATOID FACT SERPL-ACNC: 13 IU/ML — SIGNIFICANT CHANGE UP (ref 0–13)
SODIUM SERPL-SCNC: 141 MMOL/L — SIGNIFICANT CHANGE UP (ref 135–146)
TROPONIN T SERPL-MCNC: 0.06 NG/ML — CRITICAL HIGH
WBC # BLD: 13.16 K/UL — HIGH (ref 4.8–10.8)
WBC # FLD AUTO: 13.16 K/UL — HIGH (ref 4.8–10.8)

## 2019-10-15 PROCEDURE — 71250 CT THORAX DX C-: CPT | Mod: 26

## 2019-10-15 PROCEDURE — 99233 SBSQ HOSP IP/OBS HIGH 50: CPT

## 2019-10-15 PROCEDURE — 99222 1ST HOSP IP/OBS MODERATE 55: CPT

## 2019-10-15 PROCEDURE — 93880 EXTRACRANIAL BILAT STUDY: CPT | Mod: 26

## 2019-10-15 RX ORDER — HEPARIN SODIUM 5000 [USP'U]/ML
1100 INJECTION INTRAVENOUS; SUBCUTANEOUS
Qty: 25000 | Refills: 0 | Status: DISCONTINUED | OUTPATIENT
Start: 2019-10-15 | End: 2019-10-16

## 2019-10-15 RX ADMIN — HEPARIN SODIUM 13 UNIT(S)/HR: 5000 INJECTION INTRAVENOUS; SUBCUTANEOUS at 18:58

## 2019-10-15 RX ADMIN — Medication 650 MILLIGRAM(S): at 22:03

## 2019-10-15 RX ADMIN — HEPARIN SODIUM 11 UNIT(S)/HR: 5000 INJECTION INTRAVENOUS; SUBCUTANEOUS at 14:01

## 2019-10-15 RX ADMIN — Medication 100 MILLIGRAM(S): at 06:34

## 2019-10-15 RX ADMIN — CARBIDOPA AND LEVODOPA 1 TABLET(S): 25; 100 TABLET ORAL at 12:22

## 2019-10-15 RX ADMIN — ENOXAPARIN SODIUM 40 MILLIGRAM(S): 100 INJECTION SUBCUTANEOUS at 12:22

## 2019-10-15 RX ADMIN — PRAMIPEXOLE DIHYDROCHLORIDE 0.5 MILLIGRAM(S): 0.12 TABLET ORAL at 22:05

## 2019-10-15 RX ADMIN — CARBIDOPA AND LEVODOPA 1 TABLET(S): 25; 100 TABLET ORAL at 22:05

## 2019-10-15 RX ADMIN — CARBIDOPA AND LEVODOPA 1 TABLET(S): 25; 100 TABLET ORAL at 17:07

## 2019-10-15 RX ADMIN — Medication 360 MILLIGRAM(S): at 06:33

## 2019-10-15 RX ADMIN — Medication 650 MILLIGRAM(S): at 22:48

## 2019-10-15 RX ADMIN — PRAMIPEXOLE DIHYDROCHLORIDE 0.5 MILLIGRAM(S): 0.12 TABLET ORAL at 13:02

## 2019-10-15 RX ADMIN — PRAMIPEXOLE DIHYDROCHLORIDE 0.5 MILLIGRAM(S): 0.12 TABLET ORAL at 06:33

## 2019-10-15 RX ADMIN — CHLORHEXIDINE GLUCONATE 1 APPLICATION(S): 213 SOLUTION TOPICAL at 06:33

## 2019-10-15 RX ADMIN — CARBIDOPA AND LEVODOPA 1 TABLET(S): 25; 100 TABLET ORAL at 06:33

## 2019-10-15 RX ADMIN — CEFTRIAXONE 100 MILLIGRAM(S): 500 INJECTION, POWDER, FOR SOLUTION INTRAMUSCULAR; INTRAVENOUS at 13:01

## 2019-10-15 RX ADMIN — ATORVASTATIN CALCIUM 20 MILLIGRAM(S): 80 TABLET, FILM COATED ORAL at 22:05

## 2019-10-15 RX ADMIN — PANTOPRAZOLE SODIUM 40 MILLIGRAM(S): 20 TABLET, DELAYED RELEASE ORAL at 06:34

## 2019-10-15 NOTE — PROGRESS NOTE ADULT - SUBJECTIVE AND OBJECTIVE BOX
JON GARCÍA 87y Female  MRN#: [id number]   CODE STATUS: FULL      SUBJECTIVE  Patient is a 87y old Female who presents with a chief complaint of Syncope; Left eye blurriness (15 Oct 2019 11:15)    Currently admitted to medicine with the primary diagnosis of     Today is hospital day 3d,   OVERNIGHT EVENTS:  This morning she is laying in bed comfortably .   Denies chest pain, shortness of breath, abdoimal pain, nausea, vomiting or changes in bowel habits.   has no complaints today.     Urinating ans stooling appropriately.    Present Today:           Jacques Catheter (x)No/ ()Yes?   Indication:             Central Line (x)No/ ()Yes?   Indication:          IV Fluids (x)No/ ()Yes? Type:  Rate:  Indication:    OBJECTIVE  PAST MEDICAL & SURGICAL HISTORY  Hyperlipidemia  Rheumatoid arthritis  HTN (hypertension)  Parkinson disease  History of hysterectomy    ALLERGIES:  No Known Allergies    HOME MEDICATIONS:  Home Medications:  atorvastatin 20 mg oral tablet: 1 tab(s) orally once a day (12 Oct 2019 18:49)  carbidopa-levodopa:  (12 Oct 2019 18:49)  celecoxib 200 mg oral capsule: 1 cap(s) orally 2 times a day (12 Oct 2019 18:49)  dilTIAZem 360 mg/24 hours oral capsule, extended release: 1 cap(s) orally once a day (12 Oct 2019 18:49)  hydroCHLOROthiazide 12.5 mg oral tablet: 1 tab(s) orally once a day (12 Oct 2019 18:49)  pramipexole 0.5 mg oral tablet: 1 tab(s) orally 3 times a day (12 Oct 2019 18:49)    MEDICATIONS:  STANDING MEDICATIONS  atorvastatin 20 milliGRAM(s) Oral at bedtime  carbidopa/levodopa CR 50/200 1 Tablet(s) Oral <User Schedule>  cefTRIAXone   IVPB 1000 milliGRAM(s) IV Intermittent every 24 hours  chlorhexidine 4% Liquid 1 Application(s) Topical <User Schedule>  diltiazem    milliGRAM(s) Oral daily  enoxaparin Injectable 40 milliGRAM(s) SubCutaneous daily  heparin  Infusion 1100 Unit(s)/Hr (11 mL/Hr) IV Continuous <Continuous>  influenza   Vaccine 0.5 milliLiter(s) IntraMuscular once  pantoprazole    Tablet 40 milliGRAM(s) Oral before breakfast  pramipexole 0.5 milliGRAM(s) Oral three times a day    PRN MEDICATIONS  acetaminophen   Tablet .. 650 milliGRAM(s) Oral every 6 hours PRN      VITAL SIGNS: Last 24 Hours  T(C): 36.3 (15 Oct 2019 04:35), Max: 36.5 (14 Oct 2019 21:34)  T(F): 97.4 (15 Oct 2019 04:35), Max: 97.7 (14 Oct 2019 21:34)  HR: 67 (15 Oct 2019 04:35) (67 - 84)  BP: 124/67 (15 Oct 2019 04:35) (124/67 - 143/65)  BP(mean): --  RR: 18 (15 Oct 2019 04:35) (18 - 18)  SpO2: --    LABS:                        11.7   13.16 )-----------( 229      ( 15 Oct 2019 05:08 )             35.3     10-15    141  |  105  |  35<H>  ----------------------------<  180<H>  4.2   |  22  |  1.0    Ca    8.6      15 Oct 2019 05:08  Mg     2.1     10-15    TPro  5.4<L>  /  Alb  3.1<L>  /  TBili  0.2  /  DBili  x   /  AST  10  /  ALT  <5  /  AlkPhos  79  10-15          Sedimentation Rate, Erythrocyte: 63 mm/Hr <H> (10-15-19 @ 05:08)  Troponin T, Serum: 0.06 ng/mL <HH> (10-14-19 @ 22:28)    CARDIAC MARKERS ( 14 Oct 2019 22:28 )  x     / 0.06 ng/mL / x     / x     / x      CARDIAC MARKERS ( 14 Oct 2019 05:05 )  x     / 0.07 ng/mL / 54 U/L / x     / x      CARDIAC MARKERS ( 13 Oct 2019 22:06 )  x     / 0.10 ng/mL / x     / x     / x            Culture - Urine (collected 13 Oct 2019 03:00)  Source: .Urine None  Final Report (14 Oct 2019 07:44):    >=3 organisms. Probable collection contamination.      RADIOLOGY:  < from: CT Chest No Cont (10.15.19 @ 12:49) >  Enlarged nodular left lobe of thyroid gland with associated deviation of   trachea to the right, accounting for the  right paratracheal density on   x-ray.      No suspicious mediastinal adenopathy or pulmonary nodules..  2 mm solid nodule, right lower lobe (4/141).    No endobronchial obstruction, mass.     Bilateral small pleural effusions with adjacent lower lobe atelectasis.     < end of copied text >          ECHO:  Transthoracic Echocardiogram:    EXAM:  2-D ECHO (TTE) COMPLETE        PROCEDURE DATE:  10/13/2019      INTERPRETATION:  REPORT:    TRANSTHORACIC ECHOCARDIOGRAM REPORT    Patient Name:   JON GARCÍA Accession #: [id number]  Medical Rec #:  [id number]     Height:      64.0 in 162.6 cm  YOB: 1932    Weight:      130.0 lb 58.97 kg  Patient Age:    87 years     BSA:         1.63 m²  Patient Gender: F            BP:          155/70 mmHg       Date of Exam:        10/13/2019 9:46:04 AM  Referring Physician: [id number] FLORESITA VILLALBA  Sonographer:         Jonna Gifford  Reading Physician:   Kb Liz M.D.    Procedure:   2D Echo/Doppler/Color Doppler Complete.  Indications: R55 - Syncope and Collapse  Diagnosis:   Syncope and collapse - R55         Summary:   1. Normal global left ventricular systolic function.   2. LV Ejection Fraction by Sparks's Method with a biplane EF of 74 %.   3. Normal left ventricular internal cavity size.   4. Normal right atrial size.   5. There is no evidence of pericardial effusion.   6. Mild mitral valve regurgitation.   7. Mild thickening and calcification of the anterior and posterior   mitral valve leaflets.   8. Echodensity noted on PMVL atrial aspect which is suggestive of a   valvular vegetation.   9. Moderate tricuspid regurgitation.    PHYSICIAN INTERPRETATION:  Left Ventricle: The left ventricular internal cavity size is normal. Left   ventricular wall thickness is normal. Global LV systolic function was   normal.  Right Ventricle: The right ventricular size isnormal. RV systolic   function is normal.  Left Atrium: Normal left atrial size.  Right Atrium: Normal right atrial size.  Pericardium: There is no evidence of pericardial effusion.  Mitral Valve: Mild thickening and calcification of the anterior and  posterior mitral valve leaflets. Mild mitral valve regurgitation is seen.   Echodensity noted on PMVL atrial aspect which is suggestive of a valvular   vegetation.  Tricuspid Valve: The tricuspid valve is normal in structure. Moderate   tricuspid regurgitation is visualized.  Aortic Valve: The aortic valve is trileaflet. No evidence of aortic   stenosis. Aortic valve thickening with normal leaflet opening. No   evidence of aortic valve regurgitation is seen.  Pulmonic Valve: The pulmonic valve is thickened with good excursion.   Trace pulmonic valve regurgitation.  Aorta: Aortic root measured at Sinus of Valsalva is normal.  Venous: The inferior vena cava is normal.       2D AND M-MODE MEASUREMENTS (normal ranges within parentheses):  Left                Normal   Aorta/Left             Normal  Ventricle:                     Atrium:  IVSd (2D):  0.83 cm  (0.7-1.1) AoV Cusp       1.68  (1.5-2.6)  LVPWd (2D): 0.79 cm  (0.7-1.1) Separation:     cm  LVIDd (2D): 4.75 cm  (3.4-5.7) Left Atrium    3.29  (1.9-4.0)  LVIDs (2D): 2.44 cm            (Mmode):        cm  LV FS (2D):  48.6 %   (>25%)   LA Volume      37.2  Relative      0.33    (<0.42)  Index         ml/m²  Wall                           Right  Thickness                      Ventricle:                                 RVd (2D):      3.08 cm    SPECTRAL DOPPLER ANALYSIS:  LV DIASTOLIC FUNCTION:  MV Peak E: 1.34 m/s Decel Time: 245 msec  MV Peak A: 1.06 m/s  E/A Ratio: 1.27    Aortic Valve:  AoV VMax:    2.02 m/s  AoV Area, Vmax: 1.96 cm² Vmax Indx: 1.20 cm²/m²  AoV VTI:     0.41 m    AoV Area, VTI:  2.00 cm² VTI Indx:  1.23 cm²/m²  AoV Pk Grad: 16.3 mmHg  AoV Mn Grad: 8.4 mmHg    LVOT Vmax: 1.51 m/s  LVOT VTI:  0.31 m  LVOT Diam: 1.82 cm    Mitral Valve:  MV P1/2 Time: 70.94 msec  MV Area, PHT: 3.10 cm²    Tricuspid Valve and PA/RV Systolic Pressure: TR Max Velocity: 3.13 m/s RA   Pressure:  RVSP/PASP: 41.3 mmHg    Pulmonic Valve:  PV Max Velocity: 1.34 m/s PV Max P.2 mmHg PV Mean PG:       [id number] Kb Liz M.D., Electronically signed on 10/13/2019 at   2:40:34 PM   *** Final ***  KB LIZ MD  This document has been electronically signed. Oct 13 2019  9:46AM(10-13-19 @ 09:46)      PHYSICAL EXAM:    GENERAL: NAD, well-developed, AAOx3  HEENT:  Atraumatic, Normocephalic. EOMI, PERRLA, conjunctiva and sclera clear, No JVD  PULMONARY: Clear to auscultation bilaterally; No wheeze  CARDIOVASCULAR: Regular rate and rhythm; No murmurs, rubs, or gallops  GASTROINTESTINAL: Soft, Nontender, Nondistended; Bowel sounds present  MUSCULOSKELETAL:  2+ Peripheral Pulses, No clubbing, cyanosis, or edema  NEUROLOGY: non-focal  SKIN: No rashes or lesions    ASSESSMENT & PLAN    PT/REHAB   ACTIVITY:  DVT PPX:  GI PPX:  DIET:  CODE:  DIsposition:  Pending: JON GARCÍA 87y Female  MRN#: [id number]   CODE STATUS: FULL      SUBJECTIVE  Patient is a 87y old Female who presents with a chief complaint of Syncope; Left eye blurriness (15 Oct 2019 11:15)    Currently admitted to medicine with the primary diagnosis of     Today is hospital day 3d,   OVERNIGHT EVENTS: no overnight events  This morning she is laying in bed comfortably  and complains blurring in both eyes , it clears sometimes but blurred vision most of the time.   Denies chest pain, shortness of breath, abdominal pain, nausea, vomiting or changes in bowel habits.   Urinating ans stooling appropriately.      OBJECTIVE  PAST MEDICAL & SURGICAL HISTORY  Hyperlipidemia  Rheumatoid arthritis  HTN (hypertension)  Parkinson disease  History of hysterectomy    ALLERGIES:  No Known Allergies    HOME MEDICATIONS:  Home Medications:  atorvastatin 20 mg oral tablet: 1 tab(s) orally once a day (12 Oct 2019 18:49)  carbidopa-levodopa:  (12 Oct 2019 18:49)  celecoxib 200 mg oral capsule: 1 cap(s) orally 2 times a day (12 Oct 2019 18:49)  dilTIAZem 360 mg/24 hours oral capsule, extended release: 1 cap(s) orally once a day (12 Oct 2019 18:49)  hydroCHLOROthiazide 12.5 mg oral tablet: 1 tab(s) orally once a day (12 Oct 2019 18:49)  pramipexole 0.5 mg oral tablet: 1 tab(s) orally 3 times a day (12 Oct 2019 18:49)    MEDICATIONS:  STANDING MEDICATIONS  atorvastatin 20 milliGRAM(s) Oral at bedtime  carbidopa/levodopa CR 50/200 1 Tablet(s) Oral <User Schedule>  cefTRIAXone   IVPB 1000 milliGRAM(s) IV Intermittent every 24 hours  chlorhexidine 4% Liquid 1 Application(s) Topical <User Schedule>  diltiazem    milliGRAM(s) Oral daily  enoxaparin Injectable 40 milliGRAM(s) SubCutaneous daily  heparin  Infusion 1100 Unit(s)/Hr (11 mL/Hr) IV Continuous <Continuous>  influenza   Vaccine 0.5 milliLiter(s) IntraMuscular once  pantoprazole    Tablet 40 milliGRAM(s) Oral before breakfast  pramipexole 0.5 milliGRAM(s) Oral three times a day    PRN MEDICATIONS  acetaminophen   Tablet .. 650 milliGRAM(s) Oral every 6 hours PRN      VITAL SIGNS: Last 24 Hours  T(C): 36.3 (15 Oct 2019 04:35), Max: 36.5 (14 Oct 2019 21:34)  T(F): 97.4 (15 Oct 2019 04:35), Max: 97.7 (14 Oct 2019 21:34)  HR: 67 (15 Oct 2019 04:35) (67 - 84)  BP: 124/67 (15 Oct 2019 04:35) (124/67 - 143/65)  RR: 18 (15 Oct 2019 04:35) (18 - 18)    LABS:                        11.7   13.16 )-----------( 229      ( 15 Oct 2019 05:08 )             35.3     10-15    141  |  105  |  35<H>  ----------------------------<  180<H>  4.2   |  22  |  1.0    Ca    8.6      15 Oct 2019 05:08  Mg     2.1     10-15    TPro  5.4<L>  /  Alb  3.1<L>  /  TBili  0.2  /  DBili  x   /  AST  10  /  ALT  <5  /  AlkPhos  79  10-15          Sedimentation Rate, Erythrocyte: 63 mm/Hr <H> (10-15-19 @ 05:08)  Troponin T, Serum: 0.06 ng/mL <HH> (10-14-19 @ 22:28)    CARDIAC MARKERS ( 14 Oct 2019 22:28 )  x     / 0.06 ng/mL / x     / x     / x      CARDIAC MARKERS ( 14 Oct 2019 05:05 )  x     / 0.07 ng/mL / 54 U/L / x     / x      CARDIAC MARKERS ( 13 Oct 2019 22:06 )  x     / 0.10 ng/mL / x     / x     / x        Culture - Urine (collected 13 Oct 2019 03:00)  Source: .Urine None  Final Report (14 Oct 2019 07:44):    >=3 organisms. Probable collection contamination.      RADIOLOGY:  < from: CT Chest No Cont (10.15.19 @ 12:49) >  Enlarged nodular left lobe of thyroid gland with associated deviation of   trachea to the right, accounting for the  right paratracheal density on   x-ray.      No suspicious mediastinal adenopathy or pulmonary nodules..  2 mm solid nodule, right lower lobe (4/141).    No endobronchial obstruction, mass.     Bilateral small pleural effusions with adjacent lower lobe atelectasis.     ECHO:  Transthoracic Echocardiogram:    EXAM:  2-D ECHO (TTE) COMPLETE      PROCEDURE DATE:  10/13/2019      INTERPRETATION:  REPORT:    TRANSTHORACIC ECHOCARDIOGRAM REPORT    Patient Name:   JON GARCÍA Accession #: [id number]  Medical Rec #:  [id number]     Height:      64.0 in 162.6 cm  YOB: 1932    Weight:      130.0 lb 58.97 kg  Patient Age:    87 years     BSA:         1.63 m²  Patient Gender: F            BP:          155/70 mmHg       Date of Exam:        10/13/2019 9:46:04 AM  Referring Physician: [id number] FLORESITA VILLALBA  Sonographer:         Jonna Gifford  Reading Physician:   Kb Liz M.D.    Procedure:   2D Echo/Doppler/Color Doppler Complete.  Indications: R55 - Syncope and Collapse  Diagnosis:   Syncope and collapse - R55         Summary:   1. Normal global left ventricular systolic function.   2. LV Ejection Fraction by Sparks's Method with a biplane EF of 74 %.   3. Normal left ventricular internal cavity size.   4. Normal right atrial size.   5. There is no evidence of pericardial effusion.   6. Mild mitral valve regurgitation.   7. Mild thickening and calcification of the anterior and posterior   mitral valve leaflets.   8. Echodensity noted on PMVL atrial aspect which is suggestive of a   valvular vegetation.   9. Moderate tricuspid regurgitation.    PHYSICIAN INTERPRETATION:  Left Ventricle: The left ventricular internal cavity size is normal. Left   ventricular wall thickness is normal. Global LV systolic function was   normal.  Right Ventricle: The right ventricular size isnormal. RV systolic   function is normal.  Left Atrium: Normal left atrial size.  Right Atrium: Normal right atrial size.  Pericardium: There is no evidence of pericardial effusion.  Mitral Valve: Mild thickening and calcification of the anterior and  posterior mitral valve leaflets. Mild mitral valve regurgitation is seen.   Echodensity noted on PMVL atrial aspect which is suggestive of a valvular   vegetation.  Tricuspid Valve: The tricuspid valve is normal in structure. Moderate   tricuspid regurgitation is visualized.  Aortic Valve: The aortic valve is trileaflet. No evidence of aortic   stenosis. Aortic valve thickening with normal leaflet opening. No   evidence of aortic valve regurgitation is seen.  Pulmonic Valve: The pulmonic valve is thickened with good excursion.   Trace pulmonic valve regurgitation.  Aorta: Aortic root measured at Sinus of Valsalva is normal.  Venous: The inferior vena cava is normal.       2D AND M-MODE MEASUREMENTS (normal ranges within parentheses):  Left                Normal   Aorta/Left             Normal  Ventricle:                     Atrium:  IVSd (2D):  0.83 cm  (0.7-1.1) AoV Cusp       1.68  (1.5-2.6)  LVPWd (2D): 0.79 cm  (0.7-1.1) Separation:     cm  LVIDd (2D): 4.75 cm  (3.4-5.7) Left Atrium    3.29  (1.9-4.0)  LVIDs (2D): 2.44 cm            (Mmode):        cm  LV FS (2D):  48.6 %   (>25%)   LA Volume      37.2  Relative      0.33    (<0.42)  Index         ml/m²  Wall                           Right  Thickness                      Ventricle:                                 RVd (2D):      3.08 cm    SPECTRAL DOPPLER ANALYSIS:  LV DIASTOLIC FUNCTION:  MV Peak E: 1.34 m/s Decel Time: 245 msec  MV Peak A: 1.06 m/s  E/A Ratio: 1.27    Aortic Valve:  AoV VMax:    2.02 m/s  AoV Area, Vmax: 1.96 cm² Vmax Indx: 1.20 cm²/m²  AoV VTI:     0.41 m    AoV Area, VTI:  2.00 cm² VTI Indx:  1.23 cm²/m²  AoV Pk Grad: 16.3 mmHg  AoV Mn Grad: 8.4 mmHg    LVOT Vmax: 1.51 m/s  LVOT VTI:  0.31 m  LVOT Diam: 1.82 cm    Mitral Valve:  MV P1/2 Time: 70.94 msec  MV Area, PHT: 3.10 cm²    Tricuspid Valve and PA/RV Systolic Pressure: TR Max Velocity: 3.13 m/s RA   Pressure:  RVSP/PASP: 41.3 mmHg    Pulmonic Valve:  PV Max Velocity: 1.34 m/s PV Max P.2 mmHg PV Mean PG:       [id number] Kb Liz M.D., Electronically signed on 10/13/2019 at   2:40:34 PM   *** Final ***  KB LIZ MD  This document has been electronically signed. Oct 13 2019  9:46AM(10-13-19 @ 09:46)      PHYSICAL EXAM:    GENERAL: NAD, well-developed, AAOx3  HEENT:  B/L cataract + Atraumatic, Normocephalic. EOMI, PERRLA, conjunctiva and sclera clear, No JVD  PULMONARY: Clear to auscultation bilaterally; No wheeze  CARDIOVASCULAR: Regular rate and rhythm; No murmurs, rubs, or gallops  GASTROINTESTINAL: Soft, Nontender, Nondistended; Bowel sounds present  MUSCULOSKELETAL: edema B/l LLE+  2+ Peripheral Pulses, No clubbing, cyanosis  NEUROLOGY: non-focal  SKIN: No rashes or lesions    ASSESSMENT & PLAN  78 year old female with a significant PMhx of Parkinson's disease and RA who presented to the ED after a witnessed episode of syncope, associated with recent generalized weakness, dark urine, and N/V. She also complained of L-eye blurriness x2 days and headaches over the last few weeks.     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 No fevers, unintentional weight loss.  - ESR elevated (likely inflammatory vs malignancy vs infectious , CK wnl   -Completed solumedrol 500 mg IV x 2 - typically would use 1g daily x 3 for ? GCA with vision involvement, would complete pulse steroids tomorrow with 1g dosing followed by Prednisone 60 mg daily.  -Vasc Sx consult pending  - Optho consult, Neuro consult -pending  -CRP pending    # Acute Cystitis  -Positive U/A, Urinary Frequency, likely cause of Syncope  -Culture sent, will start Rocephin   -continue Rocephin  -repeat UA and cultures sent  -Blood cx pending    # New prominence of the right paratracheal region on CXR with right sided pleural effusion with increased reticular opacities  -ordered CT chest no contrast  -Quantiferon Tb test ordered as per family's request    # Elevated troponin and BNP  - No chest pain  - trend troponin   - ECHO from 10/13 shows LVEF of 73%, with vegetataion on PMVL   -cardio consult pending  -BLood cx pending    # Hx of Parkinson's Disease  -Continue home medications    # Hx of RA  --Not on DMARDS   -c/o knee pain likely OA   -Anti CCP, RA factor ordered for completebess as suggested by rheum      #) Hx of HTN and HLD  -Holding HCTZ, continue Statin    GI ppx:Not indicated    DVT ppx: Heparin 5000mg SubQ    Diet, DASH/TLC:   Sodium & Cholesterol Restricted   Activity: Increase as Tolerated     DISPO:  Patient to be discharged when condition(s) optimized.    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   Denies chest pain, shortness of breath, abdominal pain, nausea, vomiting or changes in bowel habits.   Urinating ans stooling appropriately.      OBJECTIVE  PAST MEDICAL & SURGICAL HISTORY  Hyperlipidemia  Rheumatoid arthritis  HTN (hypertension)  Parkinson disease  History of hysterectomy    ALLERGIES:  No Known Allergies    HOME MEDICATIONS:  Home Medications:  atorvastatin 20 mg oral tablet: 1 tab(s) orally once a day (12 Oct 2019 18:49)  carbidopa-levodopa:  (12 Oct 2019 18:49)  celecoxib 200 mg oral capsule: 1 cap(s) orally 2 times a day (12 Oct 2019 18:49)  dilTIAZem 360 mg/24 hours oral capsule, extended release: 1 cap(s) orally once a day (12 Oct 2019 18:49)  hydroCHLOROthiazide 12.5 mg oral tablet: 1 tab(s) orally once a day (12 Oct 2019 18:49)  pramipexole 0.5 mg oral tablet: 1 tab(s) orally 3 times a day (12 Oct 2019 18:49)    MEDICATIONS:  STANDING MEDICATIONS  atorvastatin 20 milliGRAM(s) Oral at bedtime  carbidopa/levodopa CR 50/200 1 Tablet(s) Oral <User Schedule>  cefTRIAXone   IVPB 1000 milliGRAM(s) IV Intermittent every 24 hours  chlorhexidine 4% Liquid 1 Application(s) Topical <User Schedule>  diltiazem    milliGRAM(s) Oral daily  enoxaparin Injectable 40 milliGRAM(s) SubCutaneous daily  heparin  Infusion 1100 Unit(s)/Hr (11 mL/Hr) IV Continuous <Continuous>  influenza   Vaccine 0.5 milliLiter(s) IntraMuscular once  pantoprazole    Tablet 40 milliGRAM(s) Oral before breakfast  pramipexole 0.5 milliGRAM(s) Oral three times a day    PRN MEDICATIONS  acetaminophen   Tablet .. 650 milliGRAM(s) Oral every 6 hours PRN      VITAL SIGNS: Last 24 Hours  T(C): 36.3 (15 Oct 2019 04:35), Max: 36.5 (14 Oct 2019 21:34)  T(F): 97.4 (15 Oct 2019 04:35), Max: 97.7 (14 Oct 2019 21:34)  HR: 67 (15 Oct 2019 04:35) (67 - 84)  BP: 124/67 (15 Oct 2019 04:35) (124/67 - 143/65)  RR: 18 (15 Oct 2019 04:35) (18 - 18)    LABS:                        11.7   13.16 )-----------( 229      ( 15 Oct 2019 05:08 )             35.3     10-15    141  |  105  |  35<H>  ----------------------------<  180<H>  4.2   |  22  |  1.0    Ca    8.6      15 Oct 2019 05:08  Mg     2.1     10-15    TPro  5.4<L>  /  Alb  3.1<L>  /  TBili  0.2  /  DBili  x   /  AST  10  /  ALT  <5  /  AlkPhos  79  10-15      Sedimentation Rate, Erythrocyte: 63 mm/Hr <H> (10-15-19 @ 05:08)  Troponin T, Serum: 0.06 ng/mL <HH> (10-14-19 @ 22:28)    CARDIAC MARKERS ( 14 Oct 2019 22:28 )  x     / 0.06 ng/mL / x     / x     / x      CARDIAC MARKERS ( 14 Oct 2019 05:05 )  x     / 0.07 ng/mL / 54 U/L / x     / x      CARDIAC MARKERS ( 13 Oct 2019 22:06 )  x     / 0.10 ng/mL / x     / x     / x        Culture - Urine (collected 13 Oct 2019 03:00)  Source: .Urine None  Final Report (14 Oct 2019 07:44):    >=3 organisms. Probable collection contamination.  RADIOLOGY:  CT Chest No Cont (10.15.19 @ 12:49)   Enlarged nodular left lobe of thyroid gland with associated deviation of   trachea to the right, accounting for the  right paratracheal density on   x-ray.    No suspicious mediastinal adenopathy or pulmonary nodules..  2 mm solid nodule, right lower lobe (4/141).    No endobronchial obstruction, mass.     Bilateral small pleural effusions with adjacent lower lobe atelectasis.     ECHO:  Transthoracic Echocardiogram:    EXAM:  2-D ECHO (TTE) COMPLETE      PROCEDURE DATE:  10/13/2019      INTERPRETATION:  REPORT:    TRANSTHORACIC ECHOCARDIOGRAM REPORT    Patient Name:   JON GARCÍA Accession #: [id number]  Medical Rec #:  [id number]     Height:      64.0 in 162.6 cm  YOB: 1932    Weight:      130.0 lb 58.97 kg  Patient Age:    87 years     BSA:         1.63 m²  Patient Gender: F            BP:          155/70 mmHg       Date of Exam:        10/13/2019 9:46:04 AM  Referring Physician: [id number] FLORESITA VILLALBA  Sonographer:         Jonna Gifford  Reading Physician:   Kb Liz M.D.    Procedure:   2D Echo/Doppler/Color Doppler Complete.  Indications: R55 - Syncope and Collapse  Diagnosis:   Syncope and collapse - R55    Summary:   1. Normal global left ventricular systolic function.   2. LV Ejection Fraction by Sparks's Method with a biplane EF of 74 %.   3. Normal left ventricular internal cavity size.   4. Normal right atrial size.   5. There is no evidence of pericardial effusion.   6. Mild mitral valve regurgitation.   7. Mild thickening and calcification of the anterior and posterior   mitral valve leaflets.   8. Echodensity noted on PMVL atrial aspect which is suggestive of a   valvular vegetation.   9. Moderate tricuspid regurgitation.    PHYSICIAN INTERPRETATION:  Left Ventricle: The left ventricular internal cavity size is normal. Left   ventricular wall thickness is normal. Global LV systolic function was   normal.  Right Ventricle: The right ventricular size isnormal. RV systolic   function is normal.  Left Atrium: Normal left atrial size.  Right Atrium: Normal right atrial size.  Pericardium: There is no evidence of pericardial effusion.  Mitral Valve: Mild thickening and calcification of the anterior and  posterior mitral valve leaflets. Mild mitral valve regurgitation is seen.   Echodensity noted on PMVL atrial aspect which is suggestive of a valvular   vegetation.  Tricuspid Valve: The tricuspid valve is normal in structure. Moderate   tricuspid regurgitation is visualized.  Aortic Valve: The aortic valve is trileaflet. No evidence of aortic   stenosis. Aortic valve thickening with normal leaflet opening. No   evidence of aortic valve regurgitation is seen.  Pulmonic Valve: The pulmonic valve is thickened with good excursion.   Trace pulmonic valve regurgitation.  Aorta: Aortic root measured at Sinus of Valsalva is normal.  Venous: The inferior vena cava is normal.       2D AND M-MODE MEASUREMENTS (normal ranges within parentheses):  Left                Normal   Aorta/Left             Normal  Ventricle:                     Atrium:  IVSd (2D):  0.83 cm  (0.7-1.1) AoV Cusp       1.68  (1.5-2.6)  LVPWd (2D): 0.79 cm  (0.7-1.1) Separation:     cm  LVIDd (2D): 4.75 cm  (3.4-5.7) Left Atrium    3.29  (1.9-4.0)  LVIDs (2D): 2.44 cm            (Mmode):        cm  LV FS (2D):  48.6 %   (>25%)   LA Volume      37.2  Relative      0.33    (<0.42)  Index         ml/m²  Wall                           Right  Thickness   Ventricle:  RVd (2D):      3.08 cm    SPECTRAL DOPPLER ANALYSIS:  LV DIASTOLIC FUNCTION:  MV Peak E: 1.34 m/s Decel Time: 245 msec  MV Peak A: 1.06 m/s  E/A Ratio: 1.27    Aortic Valve:  AoV VMax:    2.02 m/s  AoV Area, Vmax: 1.96 cm² Vmax Indx: 1.20 cm²/m²  AoV VTI:     0.41 m    AoV Area, VTI:  2.00 cm² VTI Indx:  1.23 cm²/m²  AoV Pk Grad: 16.3 mmHg  AoV Mn Grad: 8.4 mmHg    LVOT Vmax: 1.51 m/s  LVOT VTI:  0.31 m  LVOT Diam: 1.82 cm    Mitral Valve:  MV P1/2 Time: 70.94 msec  MV Area, PHT: 3.10 cm²    Tricuspid Valve and PA/RV Systolic Pressure: TR Max Velocity: 3.13 m/s RA   Pressure:  RVSP/PASP: 41.3 mmHg    Pulmonic Valve:  PV Max Velocity: 1.34 m/s PV Max P.2 mmHg PV Mean PG:       [id number] Kb Liz M.D., Electronically signed on 10/13/2019 at   2:40:34 PM   *** Final ***  KB LIZ MD  This document has been electronically signed. Oct 13 2019  9:46AM(10-13-19 @ 09:46)      PHYSICAL EXAM:    GENERAL: NAD, well-developed, AAOx3  HEENT:  B/L cataract + Atraumatic, Normocephalic. EOMI, PERRLA, conjunctiva and sclera clear, No JVD  PULMONARY: Clear to auscultation bilaterally; No wheeze  CARDIOVASCULAR: Regular rate and rhythm; No murmurs, rubs, or gallops  GASTROINTESTINAL: Soft, Nontender, Nondistended; Bowel sounds present  MUSCULOSKELETAL: edema B/l LLE+  2+ Peripheral Pulses, No clubbing, cyanosis  NEUROLOGY: non-focal  SKIN: No rashes or lesions    ASSESSMENT & PLAN  78 year old female with a significant PMhx of Parkinson's disease and RA who presented to the ED after a witnessed episode of syncope, associated with recent generalized weakness, dark urine, and N/V. She also complained of L-eye blurriness x2 days and headaches over the last few weeks.     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 Suspect potential source of emboli from vegetation along the mitral valve.   -Possible Temporal arteritis, ESR >100, transient vision loss  - No jaw claudication or scalp tenderness. No fevers, unintentional weight loss.  - ESR elevated (likely inflammatory vs malignancy vs infectious , CK wnl   -R/o GCA completed 3 doses of pulse steroids(500mg x2 and 1000mg x1)  -Started on heparin drip 1100 unit, Follow 4pm , 11 30 pm PTT, am  -CRP pending    # Acute Cystitis  -symptomatic, UA WBC 13.16, UCX >3 org  -continue Rocephin (day 3)  -repeat UA and cultures sent  -Blood cx pending    # New prominence of the right paratracheal region on CXR with right sided pleural effusion with increased reticular opacities  -CT chest no contrast shows enlarged left lobe of thyroid , no suspicion of malignancy  -Follow am thyroid profile  -Quantiferon Tb test ordered as per family's request    # Vegetation on PMVL on ECHO  - No chest pain  - ECHO from 10/13 shows LVEF of 73%, with vegetation on PMVL   -cardio consult pending for a possible TTE  -BLood cx pending    # Hx of Parkinson's Disease  -Continue home medications    # Hx of RA  --Not on DMARDS   -c/o knee pain likely OA   -Anti CCP, RA factor ordered for completeness as suggested by rheum      #) Hx of HTN and HLD  -Holding HCTZ, continue Statin    GI ppx: Not indicated    DVT ppx: Heparin drip    Diet, DASH/TLC:   Sodium & Cholesterol Restricted   Activity: Increase as Tolerated     DISPO:  Patient to be discharged when condition(s) optimized.    CODE STATUS  [X] FULL     Pending: Cardio, thyroid profile , anti CCP Ab, RA factor, Blood Cx

## 2019-10-15 NOTE — CONSULT NOTE ADULT - SUBJECTIVE AND OBJECTIVE BOX
HPI:  This is a 78 year old female with a significant PMhx of Parkinson's disease and RA who presented to the ED after a witnessed episode of syncope. As per the daughter, the patient was attempting to urinate when she noticed the patient had lost consciousness. She was able to arouse the patient after a few seconds without any report of post-ictal state; the patient does not remember the event. The patient has also been experience generalized weakness, dark colored urine, BL LE (thigh pain), and nausea/vomiting (single bilious episode) 2 days prior to presentation. She also mentions acute development of left eye blurriness that is constant over past two days, and has been experiencing intermittent, sharp, occipital/neck headaches for the last few weeks.        PAST MEDICAL & SURGICAL HISTORY  Hyperlipidemia  Rheumatoid arthritis  HTN (hypertension)  Parkinson disease  History of hysterectomy      FAMILY HISTORY:  FAMILY HISTORY:  No pertinent family history in first degree relatives      SOCIAL HISTORY:  [-]smoker  [-]Alcohol  [-]Drug    ALLERGIES:  No Known Allergies      MEDICATIONS:  MEDICATIONS  (STANDING):  atorvastatin 20 milliGRAM(s) Oral at bedtime  carbidopa/levodopa CR 50/200 1 Tablet(s) Oral <User Schedule>  cefTRIAXone   IVPB 1000 milliGRAM(s) IV Intermittent every 24 hours  chlorhexidine 4% Liquid 1 Application(s) Topical <User Schedule>  diltiazem    milliGRAM(s) Oral daily  enoxaparin Injectable 40 milliGRAM(s) SubCutaneous daily  heparin  Infusion 1100 Unit(s)/Hr (13 mL/Hr) IV Continuous <Continuous>  influenza   Vaccine 0.5 milliLiter(s) IntraMuscular once  pantoprazole    Tablet 40 milliGRAM(s) Oral before breakfast  pramipexole 0.5 milliGRAM(s) Oral three times a day    MEDICATIONS  (PRN):  acetaminophen   Tablet .. 650 milliGRAM(s) Oral every 6 hours PRN Mild Pain (1 - 3)      HOME MEDICATIONS:  Home Medications:  atorvastatin 20 mg oral tablet: 1 tab(s) orally once a day (12 Oct 2019 18:49)  carbidopa-levodopa:  (12 Oct 2019 18:49)  celecoxib 200 mg oral capsule: 1 cap(s) orally 2 times a day (12 Oct 2019 18:49)  dilTIAZem 360 mg/24 hours oral capsule, extended release: 1 cap(s) orally once a day (12 Oct 2019 18:49)  hydroCHLOROthiazide 12.5 mg oral tablet: 1 tab(s) orally once a day (12 Oct 2019 18:49)  pramipexole 0.5 mg oral tablet: 1 tab(s) orally 3 times a day (12 Oct 2019 18:49)      VITALS:   T(F): 96.3 (10-15 @ 14:54), Max: 100.7 (10-13 @ 20:10)  HR: 84 (10-15 @ 14:54) (67 - 111)  BP: 130/77 (10-15 @ 14:54) (124/67 - 179/81)  BP(mean): --  RR: 18 (10-15 @ 14:54) (17 - 20)  SpO2: 100% (10-13 @ 17:00) (93% - 100%)    I&O's Summary    14 Oct 2019 07:01  -  15 Oct 2019 07:00  --------------------------------------------------------  IN: 750 mL / OUT: 250 mL / NET: 500 mL        REVIEW OF SYSTEMS:  CONSTITUTIONAL: No weakness, fevers or chills  EYES:see HPI  ENT: No vertigo or throat pain   NECK: No pain or stiffness  RESPIRATORY: No cough, wheezing, hemoptysis; No shortness of breath  CARDIOVASCULAR: No chest pain or palpitations  GASTROINTESTINAL: No abdominal or epigastric pain. No nausea, vomiting, or hematemesis; No diarrhea or constipation. No melena or hematochezia.  GENITOURINARY: No dysuria, frequency or hematuria  NEUROLOGICAL: No numbness or weakness  SKIN: No itching     PHYSICAL EXAM:  NEURO: patient is awake , alert and oriented  GEN: Not in acute distress  NECK: no thyroid enlargement, no JVD  LUNGS: Clear to auscultation bilaterally   CARDIOVASCULAR: S1/S2 present, RRR , no murmurs   ABD: Soft, non-tender, non-distended, +BS  EXT: No PAULO    LABS:                        11.7   13.16 )-----------( 229      ( 15 Oct 2019 05:08 )             35.3     10-15    141  |  105  |  35<H>  ----------------------------<  180<H>  4.2   |  22  |  1.0    Ca    8.6      15 Oct 2019 05:08  Mg     2.1     10-15    TPro  5.4<L>  /  Alb  3.1<L>  /  TBili  0.2  /  DBili  x   /  AST  10  /  ALT  <5  /  AlkPhos  79  10-15    PTT - ( 15 Oct 2019 16:00 )  PTT:27.1 sec  Sedimentation Rate, Erythrocyte: 63 mm/Hr <H> (10-15-19 @ 05:08)  Troponin T, Serum: 0.06 ng/mL <HH> (10-14-19 @ 22:28)    CARDIAC MARKERS ( 14 Oct 2019 22:28 )  x     / 0.06 ng/mL / x     / x     / x      CARDIAC MARKERS ( 14 Oct 2019 05:05 )  x     / 0.07 ng/mL / 54 U/L / x     / x      CARDIAC MARKERS ( 13 Oct 2019 22:06 )  x     / 0.10 ng/mL / x     / x     / x            RADIOLOGY:  -CXR: < from: CT Chest No Cont (10.15.19 @ 12:49) >    Enlarged nodular left lobe of thyroid gland with associated deviation of   trachea to the right, accounting for the  right paratracheal density on   x-ray.      No suspicious mediastinal adenopathy or pulmonary nodules..  2 mm solid nodule, right lower lobe (4/141).    No endobronchial obstruction, mass.     Bilateral small pleural effusions with adjacent lower lobe atelectasis.     < end of copied text >    -TTE: < from: Transthoracic Echocardiogram (10.13.19 @ 09:46) >   1. Normal global left ventricular systolic function.   2. LV Ejection Fraction by Sparks's Method with a biplane EF of 74 %.   3. Normal left ventricular internal cavity size.   4. Normal right atrial size.   5. There is no evidence of pericardial effusion.   6. Mild mitral valve regurgitation.   7. Mild thickening and calcification of the anterior and posterior   mitral valve leaflets.   8. Echodensity noted on PMVL atrial aspect which is suggestive of a   valvular vegetation.   9. Moderate tricuspid regurgitation.    < end of copied text >      ECG:  < from: 12 Lead ECG (10.13.19 @ 10:57) >  Diagnosis Line Sinus rhythm with Premature atrial complexes  Otherwise normal ECG    < end of copied text > HPI:  This is a 78 year old female with a significant PMhx of Parkinson's disease and RA who presented to the ED after a witnessed episode of syncope. As per the daughter, the patient was attempting to urinate when she noticed the patient had lost consciousness. She was able to arouse the patient after a few seconds without any report of post-ictal state; the patient does not remember the event. The patient has also been experience generalized weakness, dark colored urine, BL LE (thigh pain), and nausea/vomiting (single bilious episode) 2 days prior to presentation. She also mentions acute development of left eye blurriness that is constant over past two days, and has been experiencing intermittent, sharp, occipital/neck headaches for the last few weeks.        PAST MEDICAL & SURGICAL HISTORY  Hyperlipidemia  Rheumatoid arthritis  HTN (hypertension)  Parkinson disease  History of hysterectomy      FAMILY HISTORY:  FAMILY HISTORY:  No pertinent family history in first degree relatives      SOCIAL HISTORY:  [-]smoker  [-]Alcohol  [-]Drug    ALLERGIES:  No Known Allergies      MEDICATIONS:  MEDICATIONS  (STANDING):  atorvastatin 20 milliGRAM(s) Oral at bedtime  carbidopa/levodopa CR 50/200 1 Tablet(s) Oral <User Schedule>  cefTRIAXone   IVPB 1000 milliGRAM(s) IV Intermittent every 24 hours  chlorhexidine 4% Liquid 1 Application(s) Topical <User Schedule>  diltiazem    milliGRAM(s) Oral daily  enoxaparin Injectable 40 milliGRAM(s) SubCutaneous daily  heparin  Infusion 1100 Unit(s)/Hr (13 mL/Hr) IV Continuous <Continuous>  influenza   Vaccine 0.5 milliLiter(s) IntraMuscular once  pantoprazole    Tablet 40 milliGRAM(s) Oral before breakfast  pramipexole 0.5 milliGRAM(s) Oral three times a day    MEDICATIONS  (PRN):  acetaminophen   Tablet .. 650 milliGRAM(s) Oral every 6 hours PRN Mild Pain (1 - 3)      HOME MEDICATIONS:  Home Medications:  atorvastatin 20 mg oral tablet: 1 tab(s) orally once a day (12 Oct 2019 18:49)  carbidopa-levodopa:  (12 Oct 2019 18:49)  celecoxib 200 mg oral capsule: 1 cap(s) orally 2 times a day (12 Oct 2019 18:49)  dilTIAZem 360 mg/24 hours oral capsule, extended release: 1 cap(s) orally once a day (12 Oct 2019 18:49)  hydroCHLOROthiazide 12.5 mg oral tablet: 1 tab(s) orally once a day (12 Oct 2019 18:49)  pramipexole 0.5 mg oral tablet: 1 tab(s) orally 3 times a day (12 Oct 2019 18:49)      VITALS:   T(F): 96.3 (10-15 @ 14:54), Max: 100.7 (10-13 @ 20:10)  HR: 84 (10-15 @ 14:54) (67 - 111)  BP: 130/77 (10-15 @ 14:54) (124/67 - 179/81)  BP(mean): --  RR: 18 (10-15 @ 14:54) (17 - 20)  SpO2: 100% (10-13 @ 17:00) (93% - 100%)    I&O's Summary    14 Oct 2019 07:01  -  15 Oct 2019 07:00  --------------------------------------------------------  IN: 750 mL / OUT: 250 mL / NET: 500 mL        REVIEW OF SYSTEMS:  CONSTITUTIONAL: No weakness, fevers or chills  EYES:see HPI  ENT: No vertigo or throat pain   NECK: No pain or stiffness  RESPIRATORY: No cough, wheezing, hemoptysis; No shortness of breath  CARDIOVASCULAR: No chest pain or palpitations  GASTROINTESTINAL: No abdominal or epigastric pain. No nausea, vomiting, or hematemesis; No diarrhea or constipation. No melena or hematochezia.  GENITOURINARY: No dysuria, frequency or hematuria  NEUROLOGICAL: No numbness or weakness  SKIN: No itching     PHYSICAL EXAM:  NEURO: patient is awake , alert and oriented  GEN: Not in acute distress  NECK: no thyroid enlargement, no JVD  LUNGS: Clear to auscultation bilaterally   CARDIOVASCULAR: S1/S2 present, RRR , no murmurs   ABD: Soft, non-tender, non-distended, +BS  EXT: No PAULO    LABS:                        11.7   13.16 )-----------( 229      ( 15 Oct 2019 05:08 )             35.3     10-15    141  |  105  |  35<H>  ----------------------------<  180<H>  4.2   |  22  |  1.0    Ca    8.6      15 Oct 2019 05:08  Mg     2.1     10-15    TPro  5.4<L>  /  Alb  3.1<L>  /  TBili  0.2  /  DBili  x   /  AST  10  /  ALT  <5  /  AlkPhos  79  10-15    PTT - ( 15 Oct 2019 16:00 )  PTT:27.1 sec  Sedimentation Rate, Erythrocyte: 63 mm/Hr <H> (10-15-19 @ 05:08)  Troponin T, Serum: 0.06 ng/mL <HH> (10-14-19 @ 22:28)    CARDIAC MARKERS ( 14 Oct 2019 22:28 )  x     / 0.06 ng/mL / x     / x     / x      CARDIAC MARKERS ( 14 Oct 2019 05:05 )  x     / 0.07 ng/mL / 54 U/L / x     / x      CARDIAC MARKERS ( 13 Oct 2019 22:06 )  x     / 0.10 ng/mL / x     / x     / x            RADIOLOGY:  -CXR: < from: CT Chest No Cont (10.15.19 @ 12:49) >    Enlarged nodular left lobe of thyroid gland with associated deviation of   trachea to the right, accounting for the  right paratracheal density on   x-ray.      No suspicious mediastinal adenopathy or pulmonary nodules..  2 mm solid nodule, right lower lobe (4/141).    No endobronchial obstruction, mass.     Bilateral small pleural effusions with adjacent lower lobe atelectasis.     < end of copied text >    -TTE: < from: Transthoracic Echocardiogram (10.13.19 @ 09:46) >   1. Normal global left ventricular systolic function.   2. LV Ejection Fraction by Sparks's Method with a biplane EF of 74 %.   3. Normal left ventricular internal cavity size.   4. Normal right atrial size.   5. There is no evidence of pericardial effusion.   6. Mild mitral valve regurgitation.   7. Mild thickening and calcification of the anterior and posterior   mitral valve leaflets.   8. Echodensity noted on PMVL atrial aspect which is suggestive of a   valvular vegetation.   9. Moderate tricuspid regurgitation.    < end of copied text >    < from: CT Head No Cont (10.12.19 @ 20:19) >  No CT evidence of acute intracranial pathology.     Moderate chronic microvascular ischemic changes and chronic lacunar   infarcts as above.    < end of copied text >    ECG:  < from: 12 Lead ECG (10.13.19 @ 10:57) >  Diagnosis Line Sinus rhythm with Premature atrial complexes  Otherwise normal ECG    < end of copied text >

## 2019-10-15 NOTE — CONSULT NOTE ADULT - ATTENDING COMMENTS
Patient examined on 10/15/19 and has severe vision loss in left eye.  She can see motion only, no colors, no individual fingers.  Fundoscopic exam shows flame hemorrhage and decreased blood flow in retinal vessels.    Suspect vascular occlusive event however uncertain whether this is from temporal arteritis (yet to be established) or from ischemic optic neuropathy.    Recommend temporal artery biopsy within 1 week, continue steroids for now.    Antiplatelet therapy and high intensity statin tx to lower risk for stroke or other occlusive events.
agree
This is a 78 year old female with a significant PMhx of Parkinson's disease and RA who presented to the ED after a witnessed episode of syncope as well as acute vision blurring OS. Found to have elevated ESR >100 without prior for comparison. Reports vision blurring OS occurred for one day, was improved briefly yesterday but then returned with additional blurred symptoms OD. Has intermittent occipital headaches without change in quality. No jaw claudication or scalp tenderness. No fevers, unintentional weight loss. Past hx ? RA seen in the remote past by rheumatologist, however not on DMARDs for many years and primarily complains of knee pain with use.     Acute OS>OD vision change  Longstanding intermittent occipital headaches  Elevated ESR  Syncope   MV vegetation on TTE    Completed solumedrol 500 mg IV x 2 - typically would use 1g daily x 3 for ? GCA with vision involvement, would complete pulse steroids tomorrow with 1g dosing followed by Prednisone 60 mg daily. Agree with ophthalmology evaluation. Agree with vascular surgery evaluation (would want ophthalmology evaluation 1st before committing to TA bx). Get CRP with AM labs. Pt does not have updated malignancy screenings at all - it's unclear if her isolated elevated ESR is 2/2 inflammatory, malignancy, infectious process. No fevers but did have + leukocytosis at presentation (which has resolved), BCx are ordered in the setting of MV vegetation finding. Cardiology consult pending for likely JOI evaluation. Low suspicion for RA, suspect OA by exam and hx rather than untreated RA. May collect RF/CCP for completeness. Will follow.

## 2019-10-15 NOTE — CONSULT NOTE ADULT - ASSESSMENT
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    RECOMMENDATIONS  - obtain another bcx with next blood draw  - 2 blood cultures sent after started on ABX  - Today is D3 of ceftriaxone , ideal would be to obtain bcx off abx, however pt with symptomatic uti  - JOI   - Ruling out GCA    Spectra [id number]

## 2019-10-15 NOTE — CONSULT NOTE ADULT - ASSESSMENT
This is a 78 year old female with a significant PMhx of Parkinson's disease and RA who presented to the ED after a witnessed episode of syncope.  pt was also complaining of left eye blindness . cardiology called for + MV vegation on 2d echo.    Syncope : likely vasovagal  Left eye blindness : seen by ophto L: + central retinal artery occlusion , and multiple emboli around the arcade of left eye  possible vegatation on MV : + echodensity on PMVL on atrial aspect of the valve                                         pt has no stigmata of IE ( no fever , chills ,risk factor for IE)                                           r/o Non bacterial thrombotic endocarditis ( NBTE) in the setting of RA , elevated ESR ??  mild elevated trop : stable , 0.07 , no evidence of ACS , ECG normal Sinus with no acute ischemic changes                                  ? could be related to embolic events    recommendation  pt needs JOI , keep NPO after midnight  check 2 sets of blood Cx from 2 different site  rhum evaluation This is a 78 year old female with a significant PMhx of Parkinson's disease and RA who presented to the ED after a witnessed episode of syncope.  pt was also complaining of left eye blindness . cardiology called for + MV vegation on 2d echo.    impression:  -Syncope : likely vasovagal  -Left eye blindness : seen by ophto L: + central retinal artery occlusion , and multiple emboli around the arcade of left eye  -possible vegetation on MV : + echodensity on PMVL on atrial aspect of the valve                                         pt has no stigmata of IE ( no fever , chills ,risk factor for IE)                                           r/o Non bacterial thrombotic endocarditis ( NBTE) in the setting of RA , elevated ESR ??  -mild elevated trop : stable , 0.07 , no evidence of ACS , ECG normal Sinus with no acute ischemic changes                                  ? could be related to embolic events  -possible GCA : rhum on board , pt is on prednisone  -UTI on IV AB    recommendation  pt needs JOI for further evaluation of PMVL vegetation , keep NPO after midnight  check 2 sets of blood Cx from 2 differents site  consider Start AC given the possibility of NBTE , and the risk embolic events reccurence  will discuss with attending

## 2019-10-15 NOTE — PROGRESS NOTE ADULT - ASSESSMENT
This is a 78 year old female with a significant PMhx of Parkinson's disease and RA who presented to the ED after a witnessed episode of syncope. She also mentions acute development of left eye blurriness that is constant over past two days, and has been experiencing intermittent, sharp, occipital/neck headaches for the last few weeks.      1.	Syncope.   2.	Blurred vision & intermittent occipital HA  3.	Parkinson's disease  4.	Rt. Paratracheal opacity  5.	UTI  6.	RA         PLAN:    ·	No events on tele so far  ·	Orthostatics negative  ·	Syncope could be vasovagal vs due to Sinemet  ·	ECHO reviewed. EF 74%. Thickening of the MV leaflets. Echodensity on PMVL atrial aspect  ·	Cardiology eval for possible vegetations.   ·	Pt has elevate ESR, blurred vision and temporal HA. Will R/O GCA.   ·	Vascular eval for temporal artery biopsy  ·	Opthalmology eval  ·	Rheumatology eval noted. Recommended to cont IV Solumedrol today and then switch to Prednisone 60 mg po daily from 10/16. Vascular eval for temporal artery biopsy  ·	CT chest NC for Rt. Paratracheal opacity  ·	Cont IV Rocephin. Repeat urine cxs as the first sample was contaminated.   ·	Blood cxs  ·	Plan of care D/W the daughters on bedside. This is a 78 year old female with a significant PMhx of Parkinson's disease and RA who presented to the ED after a witnessed episode of syncope. She also mentions acute development of left eye blurriness that is constant over past two days, and has been experiencing intermittent, sharp, occipital/neck headaches for the last few weeks.      1.	Syncope.   2.	Blurred vision & intermittent occipital HA  3.	Parkinson's disease  4.	Rt. Paratracheal opacity  5.	UTI  6.	RA         PLAN:    ·	No events on tele so far  ·	Orthostatics negative  ·	Syncope could be vasovagal vs due to Sinemet  ·	ECHO reviewed. EF 74%. Thickening of the MV leaflets. Echodensity on PMVL atrial aspect  ·	Cardiology eval for possible vegetations.   ·	Pt has elevate ESR, blurred vision and temporal HA. Will R/O GCA.   ·	Vascular eval for temporal artery biopsy  ·	Opthalmology eval  ·	Rheumatology eval noted. Recommended to cont IV Solumedrol today and then switch to Prednisone 60 mg po daily from 10/16. Vascular eval for temporal artery biopsy  ·	CT chest NC for Rt. Paratracheal opacity  ·	Cont IV Rocephin. Repeat urine cxs as the first sample was contaminated.   ·	Blood cxs  ·	Plan of care D/W the daughters on bedside.       ADDENDUM:    ·	Opthalmology eval noted. Pt has Lt eye central retinal artery occulusion and also hypo/hyperpigmented areas in Rt eye. Likely throwing emboli from vegetations. Started her on heparin and cardiology consult for JOI. Will also order CD. Plan of care was D/W the daughter.

## 2019-10-15 NOTE — CONSULT NOTE ADULT - SUBJECTIVE AND OBJECTIVE BOX
JON GARCÍA  87y, Female  Allergy: No Known Allergies      CHIEF COMPLAINT: Syncope; Left eye blurriness (15 Oct 2019 13:44)      HPI:  This is a 78 year old female with a significant PMhx of Parkinson's disease and RA who presented to the ED after a witnessed episode of syncope. As per the daughter, the patient was attempting to urinate when she noticed the patient had lost consciousness. She was able to arouse the patient after a few seconds without any report of post-ictal state; the patient does not remember the event. The patient has also been experience generalized weakness, dark colored urine, BL LE (thigh pain), and nausea/vomiting (single bilious episode) 2 days prior to presentation. She also mentions acute development of left eye blurriness that is constant over past two days, and has been experiencing intermittent, sharp, occipital/neck headaches for the last few weeks.    ROS was negative for fever, CP, palpitations, SOB, abdominal pain, diarrhea, constipation. (12 Oct 2019 22:10)    INFECTIOUS DISEASE HISTORY:  Pt denies any fever, chills, nightsweats. Denies any dental work or tooth pain. Denies hx IVDU or prior bacteremia.     PAST MEDICAL & SURGICAL HISTORY:  Hyperlipidemia  Rheumatoid arthritis  HTN (hypertension)  Parkinson disease  History of hysterectomy      FAMILY HISTORY  No pertinent family history in first degree relatives      SOCIAL HISTORY    Social History (marital status, living situation, occupation, tobacco use, alcohol and drug use, and sexual history): Lives at home with daughter  	Walks with a can at baseline  	Denied history of EtOH and illicit drug use  Denied history of smoking    ROS  General: Denies rigors, nightsweats  HEENT: as noted above   CV: Denies CP, palpitations  PULM: Denies SOB, wheezing  GI: Denies hematemesis, hematochezia, melena  : Denies discharge, hematuria  MSK: Denies arthralgias, myalgias  SKIN: Denies rash, lesions  NEURO: Denies paresthesias, weakness  PSYCH: Denies depression, anxiety    VITALS:  T(F): 97.4, Max: 97.7 (10-14-19 @ 21:34)  HR: 67  BP: 124/67  RR: 18Vital Signs Last 24 Hrs  T(C): 36.3 (15 Oct 2019 04:35), Max: 36.5 (14 Oct 2019 21:34)  T(F): 97.4 (15 Oct 2019 04:35), Max: 97.7 (14 Oct 2019 21:34)  HR: 67 (15 Oct 2019 04:35) (67 - 84)  BP: 124/67 (15 Oct 2019 04:35) (124/67 - 143/65)  BP(mean): --  RR: 18 (15 Oct 2019 04:35) (18 - 18)  SpO2: --    PHYSICAL EXAM:  Gen: Elderly F NAD, resting in bed  HEENT: Normocephalic, atraumatic  Neck: supple, no lymphadenopathy  CV: Regular rate & regular rhythm no murmur  Lungs: decreased BS at bases, no fremitus  Abdomen: Soft, BS present  Ext: Warm, well perfused, no stigmata of IE  Neuro: non focal, awake, tremor  Skin: no rash, no erythema  Lines: no phlebitis    TESTS & MEASUREMENTS:                        11.7   13.16 )-----------( 229      ( 15 Oct 2019 05:08 )             35.3     10-15    141  |  105  |  35<H>  ----------------------------<  180<H>  4.2   |  22  |  1.0    Ca    8.6      15 Oct 2019 05:08  Mg     2.1     10-15    TPro  5.4<L>  /  Alb  3.1<L>  /  TBili  0.2  /  DBili  x   /  AST  10  /  ALT  <5  /  AlkPhos  79  10-15    eGFR if Non African American: 51 mL/min/1.73M2 (10-15-19 @ 05:08)  eGFR if : 59 mL/min/1.73M2 (10-15-19 @ 05:08)    LIVER FUNCTIONS - ( 15 Oct 2019 05:08 )  Alb: 3.1 g/dL / Pro: 5.4 g/dL / ALK PHOS: 79 U/L / ALT: <5 U/L / AST: 10 U/L / GGT: x               Culture - Urine (collected 10-13-19 @ 03:00)  Source: .Urine None  Final Report (10-14-19 @ 07:44):    >=3 organisms. Probable collection contamination.        Lactate, Blood: 0.8 mmol/L (10-13-19 @ 00:40)      INFECTIOUS DISEASES TESTING      RADIOLOGY & ADDITIONAL TESTS:  I have personally reviewed the last Chest xray  CXR      CT  CT Chest No Cont:   EXAM:  CT CHEST            PROCEDURE DATE:  10/15/2019            INTERPRETATION:  Reason for Exam:  Right paratracheal opacity on x-ray.  CT of the chest was performed from the thoracic inlet to the level of the   adrenal glands without contrast injection. Coronal and sagittal images   have been submitted.    Comparison: Chest x-ray-10/12/2019. No prior thoracic CT scans    Findings:     Tubes/Lines: None    Lungs, Pleura, and Airways: No endobronchial obstruction, mass. Bilateral   small pleural effusions with adjacent lower lobe atelectasis. No   bronchiectasis or honeycombing.    Pulmonary nodules: No suspicious pulmonary nodules.    2 mm solid nodule, right lower lobe (4/141).  Multiple punctate calcified granulomata.    Mediastinum/Lymph Nodes:Enlarged nodular left lobe of thyroid gland with   associated deviation of trachea to the right accounting for the apparent   right paratracheal density on x-ray.    No adenopathy.    Heart/Great Vessels: No pericardial effusions. Great vessels are normal   in caliber. Coronary artery, mitral annular and aortic calcifications are   present.    Abdomen: Nonobstructing 3 x 2 mm calculus, left kidney (4/213)    Bones and soft tissues: Stable degenerative changes. No suspicious   osseous lesions.    IMPRESSION:    Enlarged nodular left lobe of thyroid gland with associated deviation of   trachea to the right, accounting for the  right paratracheal density on   x-ray.      No suspicious mediastinal adenopathy or pulmonary nodules..  2 mm solid nodule, right lower lobe (4/141).    No endobronchial obstruction, mass.     Bilateral small pleural effusions with adjacent lower lobe atelectasis.                     RUDDY CORNEJO M.D., ATTENDING RADIOLOGIST  This document has been electronically signed. Oct 15 2019  1:29PM             (10-15-19 @ 12:49)      CARDIOLOGY TESTING  12 Lead ECG:   Ventricular Rate 85 BPM    Atrial Rate 85 BPM    P-R Interval 158 ms    QRS Duration 88 ms    Q-T Interval 372 ms    QTC Calculation(Bezet) 442 ms    P Axis 63 degrees    R Axis 22 degrees    T Axis 23 degrees    Diagnosis Line Sinus rhythm with Premature atrial complexes  Otherwise normal ECG    Confirmed by KB LIZ MD ([id number]) on 10/13/2019 3:55:39 PM (10-13-19 @ 10:57)  Transthoracic Echocardiogram:    EXAM:  2-D ECHO (TTE) COMPLETE        PROCEDURE DATE:  10/13/2019      INTERPRETATION:  REPORT:    TRANSTHORACIC ECHOCARDIOGRAM REPORT         Patient Name:   JON GARCÍA Accession #: [id number]  Medical Rec #:  [id number]     Height:      64.0 in 162.6 cm  YOB: 1932    Weight:      130.0 lb 58.97 kg  Patient Age:    87 years     BSA:         1.63 m²  Patient Gender: F            BP:          155/70 mmHg       Date of Exam:        10/13/2019 9:46:04 AM  Referring Physician: [id number] FLORESITA VILLALBA  Sonographer:         Jonna Gifford  Reading Physician:   Kb Liz M.D.    Procedure:   2D Echo/Doppler/Color Doppler Complete.  Indications: R55 - Syncope and Collapse  Diagnosis:   Syncope and collapse - R55         Summary:   1. Normal global left ventricular systolic function.   2. LV Ejection Fraction by Sparks's Method with a biplane EF of 74 %.   3. Normal left ventricular internal cavity size.   4. Normal right atrial size.   5. There is no evidence of pericardial effusion.   6. Mild mitral valve regurgitation.   7. Mild thickening and calcification of the anterior and posterior   mitral valve leaflets.   8. Echodensity noted on PMVL atrial aspect which is suggestive of a   valvular vegetation.   9. Moderate tricuspid regurgitation.    PHYSICIAN INTERPRETATION:  Left Ventricle: The left ventricular internal cavity size is normal. Left   ventricular wall thickness is normal. Global LV systolic function was   normal.  Right Ventricle: The right ventricular size isnormal. RV systolic   function is normal.  Left Atrium: Normal left atrial size.  Right Atrium: Normal right atrial size.  Pericardium: There is no evidence of pericardial effusion.  Mitral Valve: Mild thickening and calcification of the anterior and  posterior mitral valve leaflets. Mild mitral valve regurgitation is seen.   Echodensity noted on PMVL atrial aspect which is suggestive of a valvular   vegetation.  Tricuspid Valve: The tricuspid valve is normal in structure. Moderate   tricuspid regurgitation is visualized.  Aortic Valve: The aortic valve is trileaflet. No evidence of aortic   stenosis. Aortic valve thickening with normal leaflet opening. No   evidence of aortic valve regurgitation is seen.  Pulmonic Valve: The pulmonic valve is thickened with good excursion.   Trace pulmonic valve regurgitation.  Aorta: Aortic root measured at Sinus of Valsalva is normal.  Venous: The inferior vena cava is normal.       2D AND M-MODE MEASUREMENTS (normal ranges within parentheses):  Left                Normal   Aorta/Left             Normal  Ventricle:                     Atrium:  IVSd (2D):  0.83 cm  (0.7-1.1) AoV Cusp       1.68  (1.5-2.6)  LVPWd (2D): 0.79 cm  (0.7-1.1) Separation:     cm  LVIDd (2D): 4.75 cm  (3.4-5.7) Left Atrium    3.29  (1.9-4.0)  LVIDs (2D): 2.44 cm            (Mmode):        cm  LV FS (2D):  48.6 %   (>25%)   LA Volume      37.2  Relative      0.33    (<0.42)  Index         ml/m²  Wall                           Right  Thickness                      Ventricle:                                 RVd (2D):      3.08 cm    SPECTRAL DOPPLER ANALYSIS:  LV DIASTOLIC FUNCTION:  MV Peak E: 1.34 m/s Decel Time: 245 msec  MV Peak A: 1.06 m/s  E/A Ratio: 1.27    Aortic Valve:  AoV VMax:    2.02 m/s  AoV Area, Vmax: 1.96 cm² Vmax Indx: 1.20 cm²/m²  AoV VTI:     0.41 m    AoV Area, VTI:  2.00 cm² VTI Indx:  1.23 cm²/m²  AoV Pk Grad: 16.3 mmHg  AoV Mn Grad: 8.4 mmHg    LVOT Vmax: 1.51 m/s  LVOT VTI:  0.31 m  LVOT Diam: 1.82 cm    Mitral Valve:  MV P1/2 Time: 70.94 msec  MV Area, PHT: 3.10 cm²    Tricuspid Valve and PA/RV Systolic Pressure: TR Max Velocity: 3.13 m/s RA   Pressure:  RVSP/PASP: 41.3 mmHg    Pulmonic Valve:  PV Max Velocity: 1.34 m/s PV Max P.2 mmHg PV Mean PG:       [id number] Kb Liz M.D., Electronically signed on 10/13/2019 at   2:40:34 PM              *** Final ***                    KB LIZ MD  This document has been electronically signed. Oct 13 2019  9:46AM             (10-13-19 @ 09:46)      MEDICATIONS  atorvastatin 20  carbidopa/levodopa CR 50/200 1  cefTRIAXone   IVPB 1000  chlorhexidine 4% Liquid 1  diltiazem     enoxaparin Injectable 40  heparin  Infusion 1100  influenza   Vaccine 0.5  pantoprazole    Tablet 40  pramipexole 0.5      ANTIBIOTICS:  cefTRIAXone   IVPB 1000 milliGRAM(s) IV Intermittent every 24 hours      ALLERGIES:  No Known Allergies

## 2019-10-15 NOTE — CONSULT NOTE ADULT - ASSESSMENT
87y old Female with history of RA and Parkinson disease who was admitted for a syncopal episodes. Patient was noted to have decreased vision in the left eye x 3 days with elevated ESR of 106. Ophthalmology was consulted regarding concern for GCA. 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   History:   PMH: RA, HTN and parkinson disease   POH: denies   Gtt: denies   Meds: reviewed per EMR   FH: denies any family history of glaucoma or blindness   Exam   DVasc: 20/70 phni OD, CF OS   Pupil: round and reactive OD, + APD OS   Color plate: 12/12 OD   EOM: smooth, full, painless   IOP: 15/15   Slit lamp exam:   L/L/A: wnl OU   C/S: white and quiet OU   K: SPK OD> OS   AC: deep and quiet OU   Iris: round and reactive OU, no neovascularization OU   Lens: 2-3+ NS OU   Dilated fundus exam:   OD:   Vitreous: clear   Mac: flat   C/D: 0.3 s/p   Vessels: normal contour   Periphery: no holes/hemes or tears, multiple areas of hypopigmentation/hyperpigmentation area superior temporally   OS:  Vitreous: clear   Mac: flat   C/D: pale disc, heme superior nasally, focal area of preserved retina temporally with ciliary blood vessels supplies   Vessels: multiple emboli along the arcades with occlusion of the central retinal arteries   Periphery: no holes/hemes or tears, multiple areas of hypopigmentation/hyperpigmentation area superior temporally   FA 10/15/2019: poor perfusion of central retinal artery with preserved ciliary blood vessels   ESR: 106   Echo    1. Normal global left ventricular systolic function.   2. LV Ejection Fraction by Sparks's Method with a biplane EF of 74 %.   3. Normal left ventricular internal cavity size.   4. Normal right atrial size.   5. There is no evidence of pericardial effusion.   6. Mild mitral valve regurgitation.   7. Mild thickening and calcification of the anterior and posterior   mitral valve leaflets.   8.  Echodensity noted on PMVL atrial aspect which is suggestive of a   valvular vegetation.  9. Moderate tricuspid regurgitation.      Assessment and Plan:   1. 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 Suspect potential source of emboli from vegetation along the mitral valve.    -- Recommend further work up including carotid artery ultrasound, JOI and cardiac work up  -- Recommend anticoagulation therapy per primary team   -- Does not suspect Giant cell arteritis as the cause of vision.  -- Please have patient return to the eye clinic in 1 month for repeat FA of the right eye and monitor for neovascularization of the left eye   2. ? Elschnig spots OD   -- Focal area of hypo/hyperpigmentation superior temporally   -- Repeat FA on the right eye in 1 month     Bo Flores, PGY-2   Seen with Dr. Quintanilla     Please page with any questions 87y old Female with history of RA and Parkinson disease who was admitted for a syncopal episodes. Patient was noted to have decreased vision in the left eye x 3 days with elevated ESR of 106. Ophthalmology was consulted regarding concern for GCA. 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   History:   PMH: RA, HTN and parkinson disease   POH: denies   Gtt: denies   Meds: reviewed per EMR   FH: denies any family history of glaucoma or blindness   Exam   DVasc: 20/70 phni OD, CF OS   Pupil: round and reactive OD, + APD OS   Color plate: 12/12 OD   EOM: smooth, full, painless   IOP: 15/15   Slit lamp exam:   L/L/A: wnl OU   C/S: white and quiet OU   K: SPK OD> OS   AC: deep and quiet OU   Iris: round and reactive OU, no neovascularization OU   Lens: 2-3+ NS OU   Dilated fundus exam:   OD:   Vitreous: clear   Mac: flat   C/D: 0.3 s/p   Vessels: normal contour   Periphery: no holes/hemes or tears, multiple areas of hypopigmentation/hyperpigmentation area superior temporally   OS:  Vitreous: clear   Mac: flat   C/D: pale disc, heme superior nasally, focal area of preserved retina temporally with ciliary blood vessels supplies   Vessels: multiple emboli along the arcades with occlusion of the central retinal arteries   Periphery: no holes/hemes or tears, pale retina    FA 10/15/2019: poor perfusion of central retinal artery with preserved ciliary blood vessels     Labs:  ESR: 106   Echo    1. Normal global left ventricular systolic function.   2. LV Ejection Fraction by Sparks's Method with a biplane EF of 74 %.   3. Normal left ventricular internal cavity size.   4. Normal right atrial size.   5. There is no evidence of pericardial effusion.   6. Mild mitral valve regurgitation.   7. Mild thickening and calcification of the anterior and posterior   mitral valve leaflets.   8.  Echodensity noted on PMVL atrial aspect which is suggestive of a   valvular vegetation.  9. Moderate tricuspid regurgitation.      Assessment and Plan:   1. 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 Suspect potential source of emboli from vegetation along the mitral valve.    -- Recommend further work up including carotid artery ultrasound, JOI and cardiac work up  -- Recommend anticoagulation therapy per primary team   -- Does not suspect Giant cell arteritis as the cause of vision.  -- Please have patient return to the eye clinic in 1 month for repeat FA of the right eye and monitor for neovascularization of the left eye   2. ? Elschnig spots OD   -- Focal area of hypo/hyperpigmentation superior temporally   -- Repeat FA on the right eye in 1 month     Bo Flores, PGY-2   Seen with Dr. Quintanilla     Please page with any questions

## 2019-10-15 NOTE — PROGRESS NOTE ADULT - SUBJECTIVE AND OBJECTIVE BOX
*This is a Preliminary Note and will be edited*  SUBJECTIVE:    Patient is a 87y old Female who presents with a chief complaint of Syncope; Left eye blurriness (15 Oct 2019 13:48)   PMH**    Currently admitted to medicine with the primary diagnosis of Syncope, unspecified syncope type     Today is hospital day 3d. This morning she is resting comfortably in bed and reports no new  issues or overnight events. Patient continues to complain of bilateral leg pain.      Pt. denies HA, Eye blurriness, SOB, CP, or Abd Pain.   Pt. is urinating and stooling appropriately.     PAST MEDICAL & SURGICAL HISTORY  Hyperlipidemia  Rheumatoid arthritis  HTN (hypertension)  Parkinson disease  History of hysterectomy    SOCIAL HISTORY:  Negative for smoking/alcohol/drug use.     ALLERGIES:  No Known Allergies    MEDICATIONS:  STANDING MEDICATIONS  acetaminophen   Tablet .. 650 milliGRAM(s) Oral every 6 hours PRN Mild Pain (1 - 3)  atorvastatin 20 milliGRAM(s) Oral at bedtime  carbidopa/levodopa CR 50/200 1 Tablet(s) Oral <User Schedule>  cefTRIAXone   IVPB 1000 milliGRAM(s) IV Intermittent every 24 hours  chlorhexidine 4% Liquid 1 Application(s) Topical <User Schedule>  diltiazem    milliGRAM(s) Oral daily  enoxaparin Injectable 40 milliGRAM(s) SubCutaneous daily  heparin  Infusion 1100 Unit(s)/Hr (11 mL/Hr) IV Continuous <Continuous>  influenza   Vaccine 0.5 milliLiter(s) IntraMuscular once  pantoprazole    Tablet 40 milliGRAM(s) Oral before breakfast  pramipexole 0.5 milliGRAM(s) Oral three times a day    PRN MEDICATIONS  acetaminophen   Tablet .. 650 milliGRAM(s) Oral every 6 hours PRN    VITALS:   T(F): 97.4  HR: 67 (67 - 84)  BP: 124/67 (124/67 - 143/65)  RR: 18 (18 - 18)  SpO2: --    LABS:                        11.7   13.16 )-----------( 229      ( 15 Oct 2019 05:08 )             35.3     10-15    141  |  105  |  35<H>  ----------------------------<  180<H>  4.2   |  22  |  1.0    Ca    8.6      15 Oct 2019 05:08  Mg     2.1     10-15    TPro  5.4<L>  /  Alb  3.1<L>  /  TBili  0.2  /  DBili  x   /  AST  10  /  ALT  <5  /  AlkPhos  79  10-15          Sedimentation Rate, Erythrocyte: 63 mm/Hr <H> (10-15-19 @ 05:08)  Troponin T, Serum: 0.06 ng/mL <HH> (10-14-19 @ 22:28)      Culture - Urine (collected 13 Oct 2019 03:00)  Source: .Urine None  Final Report (14 Oct 2019 07:44):    >=3 organisms. Probable collection contamination.      CARDIAC MARKERS ( 14 Oct 2019 22:28 )  x     / 0.06 ng/mL / x     / x     / x      CARDIAC MARKERS ( 14 Oct 2019 05:05 )  x     / 0.07 ng/mL / 54 U/L / x     / x      CARDIAC MARKERS ( 13 Oct 2019 22:06 )  x     / 0.10 ng/mL / x     / x     / x          Troponin T, Serum: 0.06 ng/mL (10-14-19 @ 22:28)  Troponin T, Serum: 0.07 ng/mL (10-14-19 @ 05:05)  Troponin T, Serum: 0.10 ng/mL (10-13-19 @ 22:06)  Troponin T, Serum: 0.06 ng/mL (10-13-19 @ 11:20)  Troponin T, Serum: 0.06 ng/mL (10-13-19 @ 05:51)  Troponin T, Serum: 0.06 ng/mL (10-13-19 @ 00:40)  Troponin T, Serum: 0.05 ng/mL (10-12-19 @ 19:12)  Serum Pro-Brain Natriuretic Peptide: 1651 pg/mL (10-12-19 @ 19:12)      RADIOLOGY:        Xray Chest 1 View-PORTABLE IMMEDIATE (10.12.19 @ 20:27)   Increased reticular opacities which may be seen with pulmonary venous   congestion. Small right pleural effusion.    New prominence of the right paratracheal region. PA/lateral or CT chest   is recommended for further evaluation    CT Head No Cont (10.12.19 @ 20:19)   No CT evidence of acute intracranial pathology.     Moderate chronic microvascular ischemic changes and chronic lacunar   infarcts as above.    ECHO:  Transthoracic Echocardiogram:    EXAM:  2-D ECHO (TTE) COMPLETE        PROCEDURE DATE:  10/13/2019      INTERPRETATION:  REPORT:    TRANSTHORACIC ECHOCARDIOGRAM REPORT    Patient Name:   JON GARCÍA Accession #: [id number]  Medical Rec #:  [id number]     Height:      64.0 in 162.6 cm  YOB: 1932    Weight:      130.0 lb 58.97 kg  Patient Age:    87 years     BSA:         1.63 m²  Patient Gender: F            BP:          155/70 mmHg       Date of Exam:        10/13/2019 9:46:04 AM  Referring Physician: [id number] FLORESITA VILLALBA  Sonographer:         Jonna Gifford  Reading Physician:   Kb Liz M.D.    Procedure:   2D Echo/Doppler/Color Doppler Complete.  Indications: R55 - Syncope and Collapse  Diagnosis:   Syncope and collapse - R55         Summary:   1. Normal global left ventricular systolic function.   2. LV Ejection Fraction by Sparks's Method with a biplane EF of 74 %.   3. Normal left ventricular internal cavity size.   4. Normal right atrial size.   5. There is no evidence of pericardial effusion.   6. Mild mitral valve regurgitation.   7. Mild thickening and calcification of the anterior and posterior   mitral valve leaflets.   8. Echodensity noted on PMVL atrial aspect which is suggestive of a   valvular vegetation.   9. Moderate tricuspid regurgitation.    PHYSICIAN INTERPRETATION:  Left Ventricle: The left ventricular internal cavity size is normal. Left   ventricular wall thickness is normal. Global LV systolic function was   normal.  Right Ventricle: The right ventricular size isnormal. RV systolic   function is normal.  Left Atrium: Normal left atrial size.  Right Atrium: Normal right atrial size.  Pericardium: There is no evidence of pericardial effusion.  Mitral Valve: Mild thickening and calcification of the anterior and  posterior mitral valve leaflets. Mild mitral valve regurgitation is seen.   Echodensity noted on PMVL atrial aspect which is suggestive of a valvular   vegetation.  Tricuspid Valve: The tricuspid valve is normal in structure. Moderate   tricuspid regurgitation is visualized.  Aortic Valve: The aortic valve is trileaflet. No evidence of aortic   stenosis. Aortic valve thickening with normal leaflet opening. No   evidence of aortic valve regurgitation is seen.  Pulmonic Valve: The pulmonic valve is thickened with good excursion.   Trace pulmonic valve regurgitation.  Aorta: Aortic root measured at Sinus of Valsalva is normal.  Venous: The inferior vena cava is normal.       2D AND M-MODE MEASUREMENTS (normal ranges within parentheses):  Left                Normal   Aorta/Left             Normal  Ventricle:                     Atrium:  IVSd (2D):  0.83 cm  (0.7-1.1) AoV Cusp       1.68  (1.5-2.6)  LVPWd (2D): 0.79 cm  (0.7-1.1) Separation:     cm  LVIDd (2D): 4.75 cm  (3.4-5.7) Left Atrium    3.29  (1.9-4.0)  LVIDs (2D): 2.44 cm            (Mmode):        cm  LV FS (2D):  48.6 %   (>25%)   LA Volume      37.2  Relative      0.33    (<0.42)  Index         ml/m²  Wall                           Right  Thickness                      Ventricle:     RVd (2D):      3.08 cm    SPECTRAL DOPPLER ANALYSIS:  LV DIASTOLIC FUNCTION:  MV Peak E: 1.34 m/s Decel Time: 245 msec  MV Peak A: 1.06 m/s  E/A Ratio: 1.27    Aortic Valve:  AoV VMax:    2.02 m/s  AoV Area, Vmax: 1.96 cm² Vmax Indx: 1.20 cm²/m²  AoV VTI:     0.41 m    AoV Area, VTI:  2.00 cm² VTI Indx:  1.23 cm²/m²  AoV Pk Grad: 16.3 mmHg  AoV Mn Grad: 8.4 mmHg    LVOT Vmax: 1.51 m/s  LVOT VTI:  0.31 m  LVOT Diam: 1.82 cm    Mitral Valve:  MV P1/2 Time: 70.94 msec  MV Area, PHT: 3.10 cm²    Tricuspid Valve and PA/RV Systolic Pressure: TR Max Velocity: 3.13 m/s RA   Pressure:  RVSP/PASP: 41.3 mmHg    Pulmonic Valve:  PV Max Velocity: 1.34 m/s PV Max P.2 mmHg PV Mean PG:       [id number] Kb Liz M.D., Electronically signed on 10/13/2019 at   2:40:34 PM     *** Final ***  KB LIZ MD  This document has been electronically signed. Oct 13 2019  9:46AM(10-13-19 @ 09:46)    < from: CT Chest No Cont (10.15.19 @ 12:49) >  EXAM:  CT CHEST            PROCEDURE DATE:  10/15/2019            INTERPRETATION:  Reason for Exam:  Right paratracheal opacity on x-ray.  CT of the chest was performed from the thoracic inlet to the level of the   adrenal glands without contrast injection. Coronal and sagittal images   have been submitted.    Comparison: Chest x-ray-10/12/2019. No prior thoracic CT scans    Findings:     Tubes/Lines: None    Lungs, Pleura, and Airways: No endobronchial obstruction, mass. Bilateral   small pleural effusions with adjacent lower lobe atelectasis. No   bronchiectasis or honeycombing.    Pulmonary nodules: No suspicious pulmonary nodules.    2 mm solid nodule, right lower lobe (4/141).  Multiple punctate calcified granulomata.    Mediastinum/Lymph Nodes:Enlarged nodular left lobe of thyroid gland with   associated deviation of trachea to the right accounting for the apparent   right paratracheal density on x-ray.    No adenopathy.    Heart/Great Vessels: No pericardial effusions. Great vessels are normal   in caliber. Coronary artery, mitral annular and aortic calcifications are   present.    Abdomen: Nonobstructing 3 x 2 mm calculus, left kidney (4/213)    Bones and soft tissues: Stable degenerative changes. No suspicious   osseous lesions.    IMPRESSION:    Enlarged nodular left lobe of thyroid gland with associated deviation of   trachea to the right, accounting for the  right paratracheal density on   x-ray.      No suspicious mediastinal adenopathy or pulmonary nodules..  2 mm solid nodule, right lower lobe (4/141).    No endobronchial obstruction, mass.     Bilateral small pleural effusions with adjacent lower lobe atelectasis.                     RUDDY CORNEJO M.D., ATTENDING RADIOLOGIST  This document has been electronically signed. Oct 15 2019  1:29PM    < end of copied text >    PHYSICAL EXAM:  GEN: In no acute distress. Pt. is awake in bed able to have a conversation.  HEENT: B/L cararact, atraumatic normocephalic, conjuntiva and sclera clear   SKIN: No Rashes/ Lesions. Bruising seen in the right upper extremity    LUNGS: CTABL, Symmetrical inspiration, no increased work of breathing/Wheezing   HEART: +S1,S2, RRR, No murmurs, Rubs, Gallops   ABD: Bowel Sounds Present, Soft, non tender, non distended, no guarding, no rebound.   EXT: 2+ peripheral Pulses, no clubbing, no cyanosis, no Edema.   NEURO: AAOX3. No focal deficits.

## 2019-10-15 NOTE — PROGRESS NOTE ADULT - SUBJECTIVE AND OBJECTIVE BOX
JON GARCÍA  87y Female    CHIEF COMPLAINT:    Patient is a 87y old  Female who presents with a chief complaint of Syncope; Left eye blurriness (14 Oct 2019 16:31)      INTERVAL HPI/OVERNIGHT EVENTS:    Patient seen and examined. Complains that she has blurred vision. No HA. No palpitations.     ROS: All other systems are negative.    Vital Signs:    T(F): 97.4 (10-15-19 @ 04:35), Max: 97.7 (10-14-19 @ 21:34)  HR: 67 (10-15-19 @ 04:35) (67 - 84)  BP: 124/67 (10-15-19 @ 04:35) (124/67 - 143/65)  RR: 18 (10-15-19 @ 04:35) (18 - 18)  SpO2: --  I&O's Summary    14 Oct 2019 07:01  -  15 Oct 2019 07:00  --------------------------------------------------------  IN: 750 mL / OUT: 250 mL / NET: 500 mL      Daily     Daily   CAPILLARY BLOOD GLUCOSE          PHYSICAL EXAM:    GENERAL:  NAD  SKIN: No rashes or lesions  HENNT: Atraumatic Normocephalic. PERRL. Moist membranes. B/L cataract.   NECK: Supple, No JVD. No lymphadenopathy.  PULMONARY: CTA B/L. No wheezing. No rales  CVS: Normal S1, S2. Rate and Rhythm are regular. No murmurs.  ABDOMEN/GI: Soft, Nontender, Nondistended; BS present  EXTREMITIES: Peripheral pulses intact. No edema B/L LE.  NEUROLOGIC:  No motor or sensory deficit.  PSYCH: Alert & oriented x 3    Consultant(s) Notes Reviewed:  [x ] YES  [ ] NO  Care Discussed with Consultants/Other Providers [ x] YES  [ ] NO    EKG reviewed  Telemetry reviewed    LABS:                        11.7   13.16 )-----------( 229      ( 15 Oct 2019 05:08 )             35.3     10-15    141  |  105  |  35<H>  ----------------------------<  180<H>  4.2   |  22  |  1.0    Ca    8.6      15 Oct 2019 05:08  Mg     2.1     10-15    TPro  5.4<L>  /  Alb  3.1<L>  /  TBili  0.2  /  DBili  x   /  AST  10  /  ALT  <5  /  AlkPhos  79  10-15      Serum Pro-Brain Natriuretic Peptide: 1651 pg/mL (10-12-19 @ 19:12)    Trop 0.06, CKMB --, CK --, 10-14-19 @ 22:28  Trop 0.07, CKMB --, CK 54, 10-14-19 @ 05:05  Trop 0.10, CKMB --, CK --, 10-13-19 @ 22:06  Trop --, CKMB 3.7, CK --, 10-13-19 @ 12:30  Trop 0.06, CKMB --, CK --, 10-13-19 @ 11:20  Trop 0.06, CKMB --, CK --, 10-13-19 @ 05:51  Trop 0.06, CKMB --, CK --, 10-13-19 @ 00:40  Trop --, CKMB --, CK 81, 10-12-19 @ 23:45  Trop 0.05, CKMB --, CK --, 10-12-19 @ 19:12      Culture - Urine (collected 13 Oct 2019 03:00)  Source: .Urine None  Final Report (14 Oct 2019 07:44):    >=3 organisms. Probable collection contamination.        RADIOLOGY & ADDITIONAL TESTS:      Imaging or report Personally Reviewed:  [ ] YES  [ ] NO    Medications:  Standing  atorvastatin 20 milliGRAM(s) Oral at bedtime  carbidopa/levodopa CR 50/200 1 Tablet(s) Oral <User Schedule>  cefTRIAXone   IVPB 1000 milliGRAM(s) IV Intermittent every 24 hours  chlorhexidine 4% Liquid 1 Application(s) Topical <User Schedule>  diltiazem    milliGRAM(s) Oral daily  enoxaparin Injectable 40 milliGRAM(s) SubCutaneous daily  influenza   Vaccine 0.5 milliLiter(s) IntraMuscular once  pantoprazole    Tablet 40 milliGRAM(s) Oral before breakfast  pramipexole 0.5 milliGRAM(s) Oral three times a day    PRN Meds  acetaminophen   Tablet .. 650 milliGRAM(s) Oral every 6 hours PRN      Case discussed with resident    Care discussed with pt/family

## 2019-10-15 NOTE — PROGRESS NOTE ADULT - ASSESSMENT
78 year old female with a significant PMhx of Parkinson's disease and RA who presented to the ED after a witnessed episode of syncope, associated with recent generalized weakness, dark urine, and N/V. She also complained of L-eye blurriness x2 days and headaches over the last few weeks.     #) Syncope likely vasvagal vs autonomic instability in Parkinson's disease vs medication induced vs dehydration vs cystitis/pyelonephritis,   - EKG wnl, unlikely cardiogenic,  Doubt seizure without report of post-ictal state expressed  - CT Head with moderate microvascular changes and old lacunar infarcts; No acute pathology  - Will monitor BP off of HCTZ for now  - Orthostatics x1 negative  - CXR from 10/12    #) Acute blurry vision; B/L eyes  - Possible Temporal arteritis, ESR >100, transient vision loss  - No jaw claudication or scalp tenderness. No fevers, unintentional weight loss.  - ESR elevated (likely inflammatory vs malignancy vs infectious , CK wnl   - Completed solumedrol 500 mg IV x 2   - 1000mg IV x1 for day 3  - Per Optho - Emboli along arcades of the left eye - Recommend - Carotid artery ultrasound, JOI and Cardiac workup. Follow up with eye clinic in 1 month for a repeat FA of the right eye and monitor for neovascularization of left eye.   - Per ID - Obtain another BCS and JOI  - Vasc Sx consult pending  - Neuro consult -pending  - CRP pending    # Acute Cystitis  -Positive U/A, Urinary Frequency, likely cause of Syncope  -Culture sent, will start Rocephin   -continue Rocephin  -repeat UA and cultures sent  -Blood cx pending    # New prominence of the right paratracheal region on CXR with right sided pleural effusion with increased reticular opacities  -Ordered CT chest no contrast - Enlarged nodular left lobe of thyroid gland with associated deviation of trachea to the right, accounting for the  right paratracheal density on x-ray.  -Quantiferon Tb test ordered as per family's request    # Elevated troponin and BNP  - No chest pain  - trend troponin   - ECHO from 10/13 shows LVEF of 73%, with vegetataion on PMVL   -cardio consult pending  -BLood cx pending    # Hx of Parkinson's Disease  -Continue home medications    #) Hx of RA  --Not on DMARDS   -c/o knee pain likely OA   -Anti CCP, RA factor ordered for completeness as suggested by rheum      #) Hx of HTN and HLD  -Holding HCTZ, continue Statin    GI ppx: Not indicated    DVT ppx: Heparin 5000mg SubQ    Diet, DASH/TLC:   Sodium & Cholesterol Restricted   Activity: Increase as Tolerated     DISPO:  Patient to be discharged when condition(s) optimized.    CODE STATUS  [X] FULL     Pending: Cardio, anti CCP Ab, RA factor, Blood Cx 78 year old female with a significant PMhx of Parkinson's disease and RA who presented to the ED after a witnessed episode of syncope, associated with recent generalized weakness, dark urine, and N/V. She also complained of L-eye blurriness x2 days and headaches over the last few weeks.     #) Syncope likely vasvagal vs autonomic instability in Parkinson's disease vs medication induced vs dehydration vs cystitis/pyelonephritis,   - EKG wnl, unlikely cardiogenic,  Doubt seizure without report of post-ictal state expressed  - CT Head with moderate microvascular changes and old lacunar infarcts; No acute pathology  - Will monitor BP off of HCTZ for now  - Orthostatics x1 negative  - CXR from 10/12    #) Acute blurry vision; B/L eyes  - Opto ruled out Temporal arteritis, ESR >100 (nonspecific), transient vision loss  - No jaw claudication or scalp tenderness. No fevers, unintentional weight loss.  - Completed solumedrol 500 mg IV x 2   - 1000mg IV x1 for day 3  - D/C Steroids   - Per Optho - Emboli along arcades of the left eye - Recommend - Carotid artery ultrasound, JOI and Cardiac workup. Follow up with eye clinic in 1 month for a repeat FA of the right eye and monitor for neovascularization of left eye.   - Per ID - Obtain another BCS and JOI  - Vasc Sx consult pending  - Neuro consult -pending  - CRP pending    # Acute Cystitis  -Positive U/A, Urinary Frequency, likely cause of Syncope  -Culture sent, will start Rocephin   -Continue Rocephin  -Repeat UA and cultures sent  -Blood cx pending    # New prominence of the right paratracheal region on CXR with right sided pleural effusion with increased reticular opacities  -CT chest no contrast - Enlarged nodular left lobe of thyroid gland with associated deviation of trachea to the right, accounting for the  right paratracheal density on x-ray.  - QuantiFeron Tb test ordered as per family's request    # Elevated troponin and BNP  - No chest pain  - trend troponin   - ECHO from 10/13 shows LVEF of 73%, with vegetation on PMVL   -cardio consult pending  -Blood cx pending    # Hx of Parkinson's Disease  -Continue home medications    #) Hx of RA  --Not on DMARDS   -c/o knee pain likely OA   -Anti CCP, RA factor ordered for completeness as suggested by rheum      #) Hx of HTN and HLD  -Holding HCTZ, continue Statin    GI ppx: Not indicated    DVT ppx: Heparin 5000mg SubQ    Diet, DASH/TLC:   Sodium & Cholesterol Restricted   Activity: Increase as Tolerated     DISPO:  Patient to be discharged when condition(s) optimized.    CODE STATUS  [X] FULL     Pending: Cardio, anti CCP Ab, RA factor, Blood Cx, JOI

## 2019-10-16 LAB
ACE SERPL-CCNC: 28 U/L — SIGNIFICANT CHANGE UP (ref 14–82)
ALBUMIN SERPL ELPH-MCNC: 3.2 G/DL — LOW (ref 3.5–5.2)
ALP SERPL-CCNC: 73 U/L — SIGNIFICANT CHANGE UP (ref 30–115)
ALT FLD-CCNC: <5 U/L — SIGNIFICANT CHANGE UP (ref 0–41)
ANION GAP SERPL CALC-SCNC: 16 MMOL/L — HIGH (ref 7–14)
APTT BLD: 26.2 SEC — LOW (ref 27–39.2)
APTT BLD: 29.6 SEC — SIGNIFICANT CHANGE UP (ref 27–39.2)
AST SERPL-CCNC: 10 U/L — SIGNIFICANT CHANGE UP (ref 0–41)
BASOPHILS # BLD AUTO: 0.03 K/UL — SIGNIFICANT CHANGE UP (ref 0–0.2)
BASOPHILS NFR BLD AUTO: 0.3 % — SIGNIFICANT CHANGE UP (ref 0–1)
BILIRUB SERPL-MCNC: 0.2 MG/DL — SIGNIFICANT CHANGE UP (ref 0.2–1.2)
BUN SERPL-MCNC: 37 MG/DL — HIGH (ref 10–20)
CALCIUM SERPL-MCNC: 8.4 MG/DL — LOW (ref 8.5–10.1)
CCP IGG SERPL-ACNC: <8 UNITS — SIGNIFICANT CHANGE UP (ref 0–19)
CHLORIDE SERPL-SCNC: 101 MMOL/L — SIGNIFICANT CHANGE UP (ref 98–110)
CO2 SERPL-SCNC: 22 MMOL/L — SIGNIFICANT CHANGE UP (ref 17–32)
CREAT SERPL-MCNC: 1 MG/DL — SIGNIFICANT CHANGE UP (ref 0.7–1.5)
EOSINOPHIL # BLD AUTO: 0 K/UL — SIGNIFICANT CHANGE UP (ref 0–0.7)
EOSINOPHIL NFR BLD AUTO: 0 % — SIGNIFICANT CHANGE UP (ref 0–8)
GLUCOSE SERPL-MCNC: 162 MG/DL — HIGH (ref 70–99)
HCT VFR BLD CALC: 36.3 % — LOW (ref 37–47)
HGB BLD-MCNC: 12.1 G/DL — SIGNIFICANT CHANGE UP (ref 12–16)
IMM GRANULOCYTES NFR BLD AUTO: 9.1 % — HIGH (ref 0.1–0.3)
LYMPHOCYTES # BLD AUTO: 0.61 K/UL — LOW (ref 1.2–3.4)
LYMPHOCYTES # BLD AUTO: 6.4 % — LOW (ref 20.5–51.1)
MAGNESIUM SERPL-MCNC: 2.2 MG/DL — SIGNIFICANT CHANGE UP (ref 1.8–2.4)
MCHC RBC-ENTMCNC: 30 PG — SIGNIFICANT CHANGE UP (ref 27–31)
MCHC RBC-ENTMCNC: 33.3 G/DL — SIGNIFICANT CHANGE UP (ref 32–37)
MCV RBC AUTO: 90.1 FL — SIGNIFICANT CHANGE UP (ref 81–99)
MONOCYTES # BLD AUTO: 0.41 K/UL — SIGNIFICANT CHANGE UP (ref 0.1–0.6)
MONOCYTES NFR BLD AUTO: 4.3 % — SIGNIFICANT CHANGE UP (ref 1.7–9.3)
NEUTROPHILS # BLD AUTO: 7.64 K/UL — HIGH (ref 1.4–6.5)
NEUTROPHILS NFR BLD AUTO: 79.9 % — HIGH (ref 42.2–75.2)
NRBC # BLD: 0 /100 WBCS — SIGNIFICANT CHANGE UP (ref 0–0)
PLATELET # BLD AUTO: 269 K/UL — SIGNIFICANT CHANGE UP (ref 130–400)
POTASSIUM SERPL-MCNC: 4.5 MMOL/L — SIGNIFICANT CHANGE UP (ref 3.5–5)
POTASSIUM SERPL-SCNC: 4.5 MMOL/L — SIGNIFICANT CHANGE UP (ref 3.5–5)
PROT SERPL-MCNC: 5.6 G/DL — LOW (ref 6–8)
RBC # BLD: 4.03 M/UL — LOW (ref 4.2–5.4)
RBC # FLD: 13.3 % — SIGNIFICANT CHANGE UP (ref 11.5–14.5)
RF+CCP IGG SER-IMP: NEGATIVE — SIGNIFICANT CHANGE UP
SODIUM SERPL-SCNC: 139 MMOL/L — SIGNIFICANT CHANGE UP (ref 135–146)
T3 SERPL-MCNC: 37 NG/DL — LOW (ref 80–200)
T4 AB SER-ACNC: 5.5 UG/DL — SIGNIFICANT CHANGE UP (ref 4.6–12)
TSH SERPL-MCNC: 0.24 UIU/ML — LOW (ref 0.27–4.2)
WBC # BLD: 9.56 K/UL — SIGNIFICANT CHANGE UP (ref 4.8–10.8)
WBC # FLD AUTO: 9.56 K/UL — SIGNIFICANT CHANGE UP (ref 4.8–10.8)

## 2019-10-16 PROCEDURE — 99233 SBSQ HOSP IP/OBS HIGH 50: CPT

## 2019-10-16 RX ORDER — HEPARIN SODIUM 5000 [USP'U]/ML
1700 INJECTION INTRAVENOUS; SUBCUTANEOUS
Qty: 25000 | Refills: 0 | Status: DISCONTINUED | OUTPATIENT
Start: 2019-10-16 | End: 2019-10-17

## 2019-10-16 RX ORDER — HEPARIN SODIUM 5000 [USP'U]/ML
1500 INJECTION INTRAVENOUS; SUBCUTANEOUS
Qty: 25000 | Refills: 0 | Status: DISCONTINUED | OUTPATIENT
Start: 2019-10-16 | End: 2019-10-16

## 2019-10-16 RX ADMIN — CEFTRIAXONE 100 MILLIGRAM(S): 500 INJECTION, POWDER, FOR SOLUTION INTRAMUSCULAR; INTRAVENOUS at 12:14

## 2019-10-16 RX ADMIN — Medication 360 MILLIGRAM(S): at 06:37

## 2019-10-16 RX ADMIN — HEPARIN SODIUM 17 UNIT(S)/HR: 5000 INJECTION INTRAVENOUS; SUBCUTANEOUS at 07:50

## 2019-10-16 RX ADMIN — PRAMIPEXOLE DIHYDROCHLORIDE 0.5 MILLIGRAM(S): 0.12 TABLET ORAL at 06:36

## 2019-10-16 RX ADMIN — CHLORHEXIDINE GLUCONATE 1 APPLICATION(S): 213 SOLUTION TOPICAL at 06:37

## 2019-10-16 RX ADMIN — PRAMIPEXOLE DIHYDROCHLORIDE 0.5 MILLIGRAM(S): 0.12 TABLET ORAL at 13:14

## 2019-10-16 RX ADMIN — PANTOPRAZOLE SODIUM 40 MILLIGRAM(S): 20 TABLET, DELAYED RELEASE ORAL at 06:37

## 2019-10-16 RX ADMIN — CARBIDOPA AND LEVODOPA 1 TABLET(S): 25; 100 TABLET ORAL at 12:14

## 2019-10-16 RX ADMIN — CARBIDOPA AND LEVODOPA 1 TABLET(S): 25; 100 TABLET ORAL at 06:36

## 2019-10-16 RX ADMIN — PRAMIPEXOLE DIHYDROCHLORIDE 0.5 MILLIGRAM(S): 0.12 TABLET ORAL at 21:36

## 2019-10-16 RX ADMIN — ATORVASTATIN CALCIUM 20 MILLIGRAM(S): 80 TABLET, FILM COATED ORAL at 21:36

## 2019-10-16 RX ADMIN — CARBIDOPA AND LEVODOPA 1 TABLET(S): 25; 100 TABLET ORAL at 21:36

## 2019-10-16 NOTE — PRE-ANESTHESIA EVALUATION ADULT - NSANTHOSAYNRD_GEN_A_CORE
No. BONILLA screening performed.  STOP BANG Legend: 0-2 = LOW Risk; 3-4 = INTERMEDIATE Risk; 5-8 = HIGH Risk

## 2019-10-16 NOTE — PROGRESS NOTE ADULT - SUBJECTIVE AND OBJECTIVE BOX
JON GARCÍA 87y Female  MRN#: [id number]   CODE STATUS: FULL      SUBJECTIVE  Patient is a 87y old Female who presents with a chief complaint of Syncope; Left eye blurriness (16 Oct 2019 08:41)      Today is hospital day 4d,   OVERNIGHT EVENTS: none  This morning she is laying in bed comfortably and she is scheduled for JOI today .   Denies chest pain, shortness of breath, abdominal pain, nausea, vomiting or changes in bowel habits.  She has no complaints today.     Urinating and stooling appropriately.    Present Today:           Jacques Catheter (x)No/ ()Yes?            Central Line (x)No/ ()Yes?             IV Fluids (x)No/ ()Yes?     OBJECTIVE  PAST MEDICAL & SURGICAL HISTORY  Hyperlipidemia  Rheumatoid arthritis  HTN (hypertension)  Parkinson disease  History of hysterectomy    ALLERGIES:  No Known Allergies    HOME MEDICATIONS:  Home Medications:  atorvastatin 20 mg oral tablet: 1 tab(s) orally once a day (12 Oct 2019 18:49)  carbidopa-levodopa:  (12 Oct 2019 18:49)  celecoxib 200 mg oral capsule: 1 cap(s) orally 2 times a day (12 Oct 2019 18:49)  dilTIAZem 360 mg/24 hours oral capsule, extended release: 1 cap(s) orally once a day (12 Oct 2019 18:49)  hydroCHLOROthiazide 12.5 mg oral tablet: 1 tab(s) orally once a day (12 Oct 2019 18:49)  pramipexole 0.5 mg oral tablet: 1 tab(s) orally 3 times a day (12 Oct 2019 18:49)    MEDICATIONS:  STANDING MEDICATIONS  atorvastatin 20 milliGRAM(s) Oral at bedtime  carbidopa/levodopa CR 50/200 1 Tablet(s) Oral <User Schedule>  cefTRIAXone   IVPB 1000 milliGRAM(s) IV Intermittent every 24 hours  chlorhexidine 4% Liquid 1 Application(s) Topical <User Schedule>  diltiazem    milliGRAM(s) Oral daily  heparin  Infusion 1700 Unit(s)/Hr (17 mL/Hr) IV Continuous <Continuous>  influenza   Vaccine 0.5 milliLiter(s) IntraMuscular once  pantoprazole    Tablet 40 milliGRAM(s) Oral before breakfast  pramipexole 0.5 milliGRAM(s) Oral three times a day    PRN MEDICATIONS  acetaminophen   Tablet .. 650 milliGRAM(s) Oral every 6 hours PRN      VITAL SIGNS: Last 24 Hours  T(C): 36.6 (16 Oct 2019 06:03), Max: 36.6 (16 Oct 2019 06:03)  T(F): 97.9 (16 Oct 2019 06:03), Max: 97.9 (16 Oct 2019 06:03)  HR: 74 (16 Oct 2019 06:03) (74 - 84)  BP: 155/70 (16 Oct 2019 06:03) (130/77 - 155/70)  RR: 18 (16 Oct 2019 06:03) (18 - 18)      LABS:                        12.1   9.56  )-----------( 269      ( 16 Oct 2019 05:14 )             36.3     10-16    139  |  101  |  37<H>  ----------------------------<  162<H>  4.5   |  22  |  1.0    Ca    8.4<L>      16 Oct 2019 05:14  Mg     2.2     10-16    TPro  5.6<L>  /  Alb  3.2<L>  /  TBili  0.2  /  DBili  x   /  AST  10  /  ALT  <5  /  AlkPhos  73  10-16    PTT - ( 16 Oct 2019 05:14 )  PTT:26.2 sec    CARDIAC MARKERS ( 14 Oct 2019 22:28 )  x     / 0.06 ng/mL / x     / x     / x        Culture - Blood (collected 14 Oct 2019 17:26)  Source: .Blood None  Preliminary Report (16 Oct 2019 04:01):    No growth to date.      RADIOLOGY:    VA Duplex Carotid, Bilat (10.15.19 @ 13:56)   20-39% stenosis of the right internal carotid artery.  20-39% stenosis of the left internal carotid artery.          ECHO:  Transthoracic Echocardiogram:    EXAM:  2-D ECHO (TTE) COMPLETE        PROCEDURE DATE:  10/13/2019      INTERPRETATION:  REPORT:    TRANSTHORACIC ECHOCARDIOGRAM REPORT      Patient Name:   JON GARCÍA Accession #: [id number]  Medical Rec #:  [id number]     Height:      64.0 in 162.6 cm  YOB: 1932    Weight:      130.0 lb 58.97 kg  Patient Age:    87 years     BSA:         1.63 m²  Patient Gender: F            BP:          155/70 mmHg       Date of Exam:        10/13/2019 9:46:04 AM  Referring Physician: [id number] VILLALBA  Sonographer:         Jonna Gifford  Reading Physician:   Kb Liz M.D.    Procedure:   2D Echo/Doppler/Color Doppler Complete.  Indications: R55 - Syncope and Collapse  Diagnosis:   Syncope and collapse - R55      Summary:   1. Normal global left ventricular systolic function.   2. LV Ejection Fraction by Sparks's Method with a biplane EF of 74 %.   3. Normal left ventricular internal cavity size.   4. Normal right atrial size.   5. There is no evidence of pericardial effusion.   6. Mild mitral valve regurgitation.   7. Mild thickening and calcification of the anterior and posterior   mitral valve leaflets.   8. Echodensity noted on PMVL atrial aspect which is suggestive of a   valvular vegetation.   9. Moderate tricuspid regurgitation.    PHYSICIAN INTERPRETATION:  Left Ventricle: The left ventricular internal cavity size is normal. Left   ventricular wall thickness is normal. Global LV systolic function was   normal.  Right Ventricle: The right ventricular size isnormal. RV systolic   function is normal.  Left Atrium: Normal left atrial size.  Right Atrium: Normal right atrial size.  Pericardium: There is no evidence of pericardial effusion.  Mitral Valve: Mild thickening and calcification of the anterior and  posterior mitral valve leaflets. Mild mitral valve regurgitation is seen.   Echodensity noted on PMVL atrial aspect which is suggestive of a valvular   vegetation.  Tricuspid Valve: The tricuspid valve is normal in structure. Moderate   tricuspid regurgitation is visualized.  Aortic Valve: The aortic valve is trileaflet. No evidence of aortic   stenosis. Aortic valve thickening with normal leaflet opening. No   evidence of aortic valve regurgitation is seen.  Pulmonic Valve: The pulmonic valve is thickened with good excursion.   Trace pulmonic valve regurgitation.  Aorta: Aortic root measured at Sinus of Valsalva is normal.  Venous: The inferior vena cava is normal.       2D AND M-MODE MEASUREMENTS (normal ranges within parentheses):  Left                Normal   Aorta/Left             Normal  Ventricle:                     Atrium:  IVSd (2D):  0.83 cm  (0.7-1.1) AoV Cusp       1.68  (1.5-2.6)  LVPWd (2D): 0.79 cm  (0.7-1.1) Separation:     cm  LVIDd (2D): 4.75 cm  (3.4-5.7) Left Atrium    3.29  (1.9-4.0)  LVIDs (2D): 2.44 cm            (Mmode):        cm  LV FS (2D):  48.6 %   (>25%)   LA Volume      37.2  Relative      0.33    (<0.42)  Index         ml/m²  Wall                           Right  Thickness                      Ventricle:   RVd (2D):      3.08 cm    SPECTRAL DOPPLER ANALYSIS:  LV DIASTOLIC FUNCTION:  MV Peak E: 1.34 m/s Decel Time: 245 msec  MV Peak A: 1.06 m/s  E/A Ratio: 1.27    Aortic Valve:  AoV VMax:    2.02 m/s  AoV Area, Vmax: 1.96 cm² Vmax Indx: 1.20 cm²/m²  AoV VTI:     0.41 m    AoV Area, VTI:  2.00 cm² VTI Indx:  1.23 cm²/m²  AoV Pk Grad: 16.3 mmHg  AoV Mn Grad: 8.4 mmHg    LVOT Vmax: 1.51 m/s  LVOT VTI:  0.31 m  LVOT Diam: 1.82 cm    Mitral Valve:  MV P1/2 Time: 70.94 msec  MV Area, PHT: 3.10 cm²    Tricuspid Valve and PA/RV Systolic Pressure: TR Max Velocity: 3.13 m/s RA   Pressure:  RVSP/PASP: 41.3 mmHg    Pulmonic Valve:  PV Max Velocity: 1.34 m/s PV Max P.2 mmHg PV Mean PG:       [id number] Kb Liz M.D., Electronically signed on 10/13/2019 at   2:40:34 PM     *** Final ***  KB LIZ MD  This document has been electronically signed. Oct 13 2019  9:46AM   (10-13-19 @ 09:46)      PHYSICAL EXAM:    GENERAL: NAD, well-developed, AAOx3  HEENT:  Atraumatic, Normocephalic. EOMI, PERRLA, conjunctiva and sclera clear, No JVD  PULMONARY: Clear to auscultation bilaterally; No wheeze  CARDIOVASCULAR: Regular rate and rhythm; No murmurs, rubs, or gallops  GASTROINTESTINAL: Soft, Nontender, Nondistended; Bowel sounds present  MUSCULOSKELETAL:  2+ Peripheral Pulses, No clubbing, cyanosis, or edema  NEUROLOGY: non-focal  SKIN: No rashes or lesions    ASSESSMENT & PLAN    78 year old female with a significant PMhx of Parkinson's disease and RA who presented to the ED after a witnessed episode of syncope, associated with recent generalized weakness, dark urine, and N/V. She also complained of L-eye blurriness x2 days and headaches over the last few weeks.     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 Suspect potential source of emboli from vegetation along the mitral valve.   - No jaw claudication or scalp tenderness. No fevers, unintentional weight loss.  - ESR elevated (likely inflammatory vs malignancy vs infectious , CK wnl   -R/o GCA completed 3 doses of pulse steroids(500mg x2 and 1000mg x1) as per rheum   -Started on heparin drip 1700 units  -CRP 10    # Acute Cystitis  -symptomatic, UA WBC 13.16, UCX >3 org  -continue Rocephin (day 4) last day tomorrow as per ID  -repeat UA and cultures sent  -Blood cx pending    # New prominence of the right paratracheal region on CXR with right sided pleural effusion with increased reticular opacities  -CT chest no contrast shows enlarged left lobe of thyroid , no suspicion of malignancy  -Follow am thyroid profile  -Quantiferon Tb test ordered as per family's request    # Vegetation on PMVL on ECHO  - No chest pain  - ECHO from 10/13 shows LVEF of 73%, with vegetation on PMVL   -cardio consult pending for a possible TTE  -BLood cx pending    # Hx of Parkinson's Disease  -Continue home medications    # Hx of RA  -Not on DMARDS   -c/o knee pain likely OA   -Anti CCP ab pending, RA factor normal ordered for completeness as suggested by rheum  -Outpt rheum follow up      #) Hx of HTN and HLD  -Holding HCTZ, continue Statin    GI ppx: Not indicated    DVT ppx: Heparin drip    Diet, DASH/TLC:   Sodium & Cholesterol Restricted   Activity: Increase as Tolerated     DISPO:  Patient to be discharged when condition(s) optimized.    CODE STATUS  [X] FULL     Pending: Cardio, thyroid profile , anti CCP Ab,r, Blood Cx

## 2019-10-16 NOTE — PROGRESS NOTE ADULT - ASSESSMENT
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    RECOMMENDATIONS  - obtain another bcx with next blood draw  - 2 blood cultures sent after started on ABX, so far NG  - Today is D4 of ceftriaxone , ideal would be to obtain bcx off abx, however pt with symptomatic uti,. d/c tomorrow to complete 5 days  - JOI   - Ruling out GCA    Spectra [id number]

## 2019-10-16 NOTE — PROGRESS NOTE ADULT - ATTENDING COMMENTS
The patient was NOT assessed in person today - she was off the floor when attempted to evaluate x 2 (at JOI). This visit will not be associated with charge. This is a 78 year old female with a significant PMhx of Parkinson's disease and OA who presented to the ED after a witnessed episode of syncope as well as acute vision blurring OS. Found to have elevated ESR >100 without prior for comparison.     CRAO OS with multiple emboli seen along the arcades of the L eye  Longstanding intermittent occipital headaches, unchanged in quality  Elevated ESR/CRP  Syncope   MV vegetation on TTE    Agree with d/c of steroids given clear findings by ophthalmology of CRAO OS with multiple emboli seen along arcades of L eye. Very much appreciate their assistance. Workup for embolic process pending. Pt currently at JOI to further characterize MV echodensity. Pt does not have updated malignancy screenings at all - it's unclear if her elevated inflammatory markers may be related to malignancy vs infection vs inflammatory processes, however findings by eye exam not c/w GCA given active evidence for emboli. May screen APLS (LAC, B2GP, ACL ab), but low suspicion. No fevers but did have + leukocytosis at presentation (which has resolved), BCx are ordered in the setting of MV vegetation finding. OA by exam and hx, RF/CCP neg collected for completeness given ? hx RA. Will follow along peripherally. The patient was NOT assessed in person today - she was off the floor when attempted to evaluate x 2 (at JOI). This visit will not be associated with charge. This is a 78 year old female with a significant PMhx of Parkinson's disease and OA who presented to the ED after a witnessed episode of syncope as well as acute vision blurring OS. Found to have elevated ESR >100 without prior for comparison.     CRAO OS with multiple emboli seen along the arcades of the L eye  Longstanding intermittent occipital headaches, unchanged in quality  Elevated ESR/CRP  Syncope   MV vegetation on TTE    Agree with d/c of steroids given clear findings by ophthalmology of CRAO OS with multiple emboli seen along arcades of L eye. Very much appreciate their assistance. Workup for embolic process pending. Pt currently at JOI to further characterize MV echodensity. Pt does not have updated malignancy screenings at all - it's unclear if her elevated inflammatory markers may be related to malignancy vs infection vs inflammatory processes, however findings by eye exam not c/w GCA given active evidence for emboli. May screen APLS (B2GP, ACL ab - pt on heparin gtt unclear to screen LAC with accuracy), but low suspicion. No fevers but did have + leukocytosis at presentation (which has resolved), BCx are ordered in the setting of MV vegetation finding. OA by exam and hx, RF/CCP neg collected for completeness given ? hx RA. Will follow along peripherally.

## 2019-10-16 NOTE — CHART NOTE - NSCHARTNOTEFT_GEN_A_CORE
POST OPERATIVE PROCEDURAL DOCUMENTATION    PRE-OP DIAGNOSIS: Rule out cardioembolic source of CVA    POST-OP DIAGNOSIS: Mass noted on posterior leaflets of mitral valve    PROCEDURE: Transesophageal echocardiogram    Primary Physician: Dr. Farris  Assistant: Valentina Bales     ANESTHESIA TYPE  [  ] General Anesthesia  [ x ] Conscious Sedation  [  ] Local/Regional    CONDITION  [  ] Critical  [  ] Serious  [x] Fair  [  ] Good    SPECIMENS REMOVED (IF APPLICABLE): N/A    IMPLANTS (IF APPLICABLE): None    ESTIMATED BLOOD LOSS: None    COMPLICATIONS: None      FINDINGS:    After risks and benefits of procedures were explained, informed consent was obtained and placed in chart. The JOI probe was passed into the esophagus without difficulty.  Transesophageal and transgastric images were obtained.  The JOI probe was removed without difficulty and examined.  There was no evidence for bleeding.  The patient tolerated the procedure well without any immediate JOI-related complications.      Preliminary Findings:  LA and RA: Normal size.  CHILO: Left atrial appendage was clear of clot but spontaneous contrast seen in CHILO.  LV: LVEF was estimated at 55-65%  MV: No evidence for MR, No evidence for MS. Pedunculated mass noted on the posterior leaflets of mitral valve.  AV: No evidence for AI, no evidence for AS.   TV: Mild tricuspid regurgitation.  IAS: Intact IAS. No PFO. NO R-> L shunt seen with injection of agitated contrast saline.    There was mild, non-mobile atheroma seen in the thoracic aorta.     DIAGNOSIS/IMPRESSION: Mass noted on posterior leaflets of mitral valve    PLAN OF CARE:  [x] Continue anticoagulation.  [x] Follow up blood cultures.  [x] Return to inpatient bed when stable and awake. POST OPERATIVE PROCEDURAL DOCUMENTATION    PRE-OP DIAGNOSIS: Rule out cardioembolic source of CVA, density noted on PMVL on 2D Echo     POST-OP DIAGNOSIS:  Solid density noted on posterior leaflets of mitral valve. Mild-moderate MR. Mild TR.    PROCEDURE: Transesophageal echocardiogram    Primary Physician: Dr. Farris  Assistant: Valentina Bales     ANESTHESIA TYPE  [  ] General Anesthesia  [ x ] Conscious Sedation  [  ] Local/Regional    CONDITION  [  ] Critical  [  ] Serious  [x] Fair  [  ] Good    SPECIMENS REMOVED (IF APPLICABLE): N/A    IMPLANTS (IF APPLICABLE): None    ESTIMATED BLOOD LOSS: None    COMPLICATIONS: None      FINDINGS:    After risks and benefits of procedures were explained, informed consent was obtained and placed in chart. The JOI probe was passed into the esophagus without difficulty.  Transesophageal and transgastric images were obtained.  The JOI probe was removed without difficulty and examined.  There was no evidence for bleeding.  The patient tolerated the procedure well without any immediate JOI-related complications.      Preliminary Findings:  LA and RA: Normal size.  CHILO: Left atrial appendage was clear of clot or no spontaneous contrast seen. Normal functioning of CHILO.  LV: LVEF was estimated at 55-65%  RV: Normal RV.  MV: Mildly sclerotic mitral valve with thickening of both leaflets. Mild-moderate MR, No evidence for MS. Sessile dense, solid, partly calcified density attached to the base and body of posterior leaflets (P2, P3 and base) of mitral valve with mobile density protruding to left atrium suggestive of organized calcified chronic vegetation most likely. Alternately organized calcified thrombus. There was no abscess or filling defect or regurgitation through the leaflets.  AV: Mild sclerotic AV. No evidence for AI, no evidence for AS.   TV: Mild tricuspid regurgitation.  IAS: Intact IAS. No PFO. NO R-> L shunt seen with injection of agitated contrast saline.    There was extensive calcified atheroma seen in the ascending and descending aorta.     DIAGNOSIS/IMPRESSION:  Solid density noted on posterior leaflets of mitral valve. Mild-moderate MR. Mild TR.    PLAN OF CARE:  [x] Continue anticoagulation.  [x] Follow up blood cultures.  [x] Return to inpatient bed when stable and awake.

## 2019-10-16 NOTE — PROGRESS NOTE ADULT - SUBJECTIVE AND OBJECTIVE BOX
JON GARCÍA  [id number]  Female  87y  HPI:  This is a 78 year old female with a significant PMhx of Parkinson's disease and RA who presented to the ED after a witnessed episode of syncope as well as acute vision blurring OS. Found to have elevated ESR >100 without prior for comparison.     Interim events: TTE with MV echodensity. Completing JOI when attempted to evaluate (not in room), but discussed with patient's daughter. Ophthalmology appreciated CRAO with evidence of emboli seen. Pt without new symptoms. OS vision unchanged per daughter.     PAST MEDICAL & SURGICAL HISTORY:  Hyperlipidemia  Rheumatoid arthritis  HTN (hypertension)  Parkinson disease  History of hysterectomy    FAMILY HISTORY:  No pertinent family history in first degree relatives    MEDICATIONS  (STANDING):  atorvastatin 20 milliGRAM(s) Oral at bedtime  carbidopa/levodopa CR 50/200 1 Tablet(s) Oral <User Schedule>  cefTRIAXone   IVPB 1000 milliGRAM(s) IV Intermittent every 24 hours  chlorhexidine 4% Liquid 1 Application(s) Topical <User Schedule>  diltiazem    milliGRAM(s) Oral daily  enoxaparin Injectable 40 milliGRAM(s) SubCutaneous daily  influenza   Vaccine 0.5 milliLiter(s) IntraMuscular once  methylPREDNISolone sodium succinate IVPB 500 milliGRAM(s) IV Intermittent daily  pantoprazole    Tablet 40 milliGRAM(s) Oral before breakfast  pramipexole 0.5 milliGRAM(s) Oral three times a day    Home Medications:  atorvastatin 20 mg oral tablet: 1 tab(s) orally once a day (12 Oct 2019 18:49)  carbidopa-levodopa:  (12 Oct 2019 18:49)  celecoxib 200 mg oral capsule: 1 cap(s) orally 2 times a day (12 Oct 2019 18:49)  dilTIAZem 360 mg/24 hours oral capsule, extended release: 1 cap(s) orally once a day (12 Oct 2019 18:49)  hydroCHLOROthiazide 12.5 mg oral tablet: 1 tab(s) orally once a day (12 Oct 2019 18:49)  pramipexole 0.5 mg oral tablet: 1 tab(s) orally 3 times a day (12 Oct 2019 18:49)      MEDICATIONS  (PRN):  acetaminophen   Tablet .. 650 milliGRAM(s) Oral every 6 hours PRN Mild Pain (1 - 3)    Allergies  No Known Allergies  Intolerances      PHYSICAL EXAM:  Pt NOT assessed - out of room at time of evaluation.     CBC Full  -  ( 14 Oct 2019 05:05 )  WBC Count : 10.42 K/uL  RBC Count : 3.96 M/uL  Hemoglobin : 11.9 g/dL  Hematocrit : 36.0 %  Platelet Count - Automated : 178 K/uL  Mean Cell Volume : 90.9 fL  Mean Cell Hemoglobin : 30.1 pg  Mean Cell Hemoglobin Concentration : 33.1 g/dL  Auto Neutrophil # : 9.38 K/uL  Auto Lymphocyte # : 0.53 K/uL  Auto Monocyte # : 0.31 K/uL  Auto Eosinophil # : 0.00 K/uL  Auto Basophil # : 0.03 K/uL  Auto Neutrophil % : 90.0 %  Auto Lymphocyte % : 5.1 %  Auto Monocyte % : 3.0 %  Auto Eosinophil % : 0.0 %  Auto Basophil % : 0.3 %    LIVER FUNCTIONS - ( 14 Oct 2019 05:05 )  Alb: 3.1 g/dL / Pro: 5.2 g/dL / ALK PHOS: 81 U/L / ALT: <5 U/L / AST: 12 U/L / GGT: x           10-14    142  |  106  |  19  ----------------------------<  173<H>  4.0   |  22  |  0.8    Ca    8.5      14 Oct 2019 05:05  Phos  2.5     10-13  Mg     1.9     10-13    TPro  5.2<L>  /  Alb  3.1<L>  /  TBili  0.3  /  DBili  x   /  AST  12  /  ALT  <5  /  AlkPhos  81  10-14    < from: Transthoracic Echocardiogram (10.13.19 @ 09:46) >  . Normal global left ventricular systolic function.   2. LV Ejection Fraction by Sparks's Method with a biplane EF of 74 %.   3. Normal left ventricular internal cavity size.   4. Normal right atrial size.   5. There is no evidence of pericardial effusion.   6. Mild mitral valve regurgitation.   7. Mild thickening and calcification of the anterior and posterior   mitral valve leaflets.   8. Echodensity noted on PMVL atrial aspect which is suggestive of a   valvular vegetation.   9. Moderate tricuspid regurgitation.    < end of copied text >

## 2019-10-16 NOTE — PROGRESS NOTE ADULT - SUBJECTIVE AND OBJECTIVE BOX
JOELJERJON  87y, Female  Allergy: No Known Allergies      CHIEF COMPLAINT: Syncope; Left eye blurriness (15 Oct 2019 20:46)      INTERVAL EVENTS/HPI  - No acute events overnight  - ucx >3 orgs  - BCX NG  - T(F): , Max: 97.9 (10-16-19 @ 06:03)  - Denies any worsening symptoms  - Tolerating medication  - WBC Count: 9.56 (10-16-19 @ 05:14) <--WBC Count: 13.16 (10-15-19 @ 05:08)      ROS  General: Denies rigors, nightsweats  HEENT: Denies headache, rhinorrhea, sore throat, eye pain  CV: Denies CP, palpitations  PULM: Denies SOB, wheezing  GI: Denies hematemesis, hematochezia, melena  : Denies discharge, hematuria  MSK: Denies arthralgias, myalgias  SKIN: Denies rash, lesions  NEURO: Denies paresthesias, weakness  PSYCH: Denies depression, anxiety    VITALS:  T(F): 97.9, Max: 97.9 (10-16-19 @ 06:03)  HR: 74  BP: 155/70  RR: 18Vital Signs Last 24 Hrs  T(C): 36.6 (16 Oct 2019 06:03), Max: 36.6 (16 Oct 2019 06:03)  T(F): 97.9 (16 Oct 2019 06:03), Max: 97.9 (16 Oct 2019 06:03)  HR: 74 (16 Oct 2019 06:03) (74 - 84)  BP: 155/70 (16 Oct 2019 06:03) (130/77 - 155/70)  BP(mean): --  RR: 18 (16 Oct 2019 06:03) (18 - 18)  SpO2: --    PHYSICAL EXAM:  Gen: Elderly F NAD, resting in bed  HEENT: Normocephalic, atraumatic  Neck: supple, no lymphadenopathy  CV: Regular rate & regular rhythm no murmur  Lungs: decreased BS at bases, no fremitus  Abdomen: Soft, BS present  Ext: Warm, well perfused, no stigmata of IE  Neuro: non focal, awake, tremor  Skin: no rash, no erythema  Lines: no phlebitis      FH: Non-contributory  Social Hx: Non-contributory    TESTS & MEASUREMENTS:                        12.1   9.56  )-----------( 269      ( 16 Oct 2019 05:14 )             36.3     10-16    139  |  101  |  37<H>  ----------------------------<  162<H>  4.5   |  22  |  1.0    Ca    8.4<L>      16 Oct 2019 05:14  Mg     2.2     10-16    TPro  5.6<L>  /  Alb  3.2<L>  /  TBili  0.2  /  DBili  x   /  AST  10  /  ALT  <5  /  AlkPhos  73  10-16    eGFR if Non African American: 51 mL/min/1.73M2 (10-16-19 @ 05:14)  eGFR if : 59 mL/min/1.73M2 (10-16-19 @ 05:14)    LIVER FUNCTIONS - ( 16 Oct 2019 05:14 )  Alb: 3.2 g/dL / Pro: 5.6 g/dL / ALK PHOS: 73 U/L / ALT: <5 U/L / AST: 10 U/L / GGT: x               Culture - Blood (collected 10-14-19 @ 17:26)  Source: .Blood None  Preliminary Report (10-16-19 @ 04:01):    No growth to date.    Culture - Urine (collected 10-13-19 @ 03:00)  Source: .Urine None  Final Report (10-14-19 @ 07:44):    >=3 organisms. Probable collection contamination.        Lactate, Blood: 0.8 mmol/L (10-13-19 @ 00:40)      INFECTIOUS DISEASES TESTING      RADIOLOGY & ADDITIONAL TESTS:  I have personally reviewed the last Chest xray  CXR      CT  CT Chest No Cont:   EXAM:  CT CHEST            PROCEDURE DATE:  10/15/2019            INTERPRETATION:  Reason for Exam:  Right paratracheal opacity on x-ray.  CT of the chest was performed from the thoracic inlet to the level of the   adrenal glands without contrast injection. Coronal and sagittal images   have been submitted.    Comparison: Chest x-ray-10/12/2019. No prior thoracic CT scans    Findings:     Tubes/Lines: None    Lungs, Pleura, and Airways: No endobronchial obstruction, mass. Bilateral   small pleural effusions with adjacent lower lobe atelectasis. No   bronchiectasis or honeycombing.    Pulmonary nodules: No suspicious pulmonary nodules.    2 mm solid nodule, right lower lobe (4/141).  Multiple punctate calcified granulomata.    Mediastinum/Lymph Nodes:Enlarged nodular left lobe of thyroid gland with   associated deviation of trachea to the right accounting for the apparent   right paratracheal density on x-ray.    No adenopathy.    Heart/Great Vessels: No pericardial effusions. Great vessels are normal   in caliber. Coronary artery, mitral annular and aortic calcifications are   present.    Abdomen: Nonobstructing 3 x 2 mm calculus, left kidney (4/213)    Bones and soft tissues: Stable degenerative changes. No suspicious   osseous lesions.    IMPRESSION:    Enlarged nodular left lobe of thyroid gland with associated deviation of   trachea to the right, accounting for the  right paratracheal density on   x-ray.      No suspicious mediastinal adenopathy or pulmonary nodules..  2 mm solid nodule, right lower lobe (4/141).    No endobronchial obstruction, mass.     Bilateral small pleural effusions with adjacent lower lobe atelectasis.                     RUDDY CORNEJO M.D., ATTENDING RADIOLOGIST  This document has been electronically signed. Oct 15 2019  1:29PM             (10-15-19 @ 12:49)      CARDIOLOGY TESTING  12 Lead ECG:   Ventricular Rate 85 BPM    Atrial Rate 85 BPM    P-R Interval 158 ms    QRS Duration 88 ms    Q-T Interval 372 ms    QTC Calculation(Bezet) 442 ms    P Axis 63 degrees    R Axis 22 degrees    T Axis 23 degrees    Diagnosis Line Sinus rhythm with Premature atrial complexes  Otherwise normal ECG    Confirmed by KB LIZ MD ([id number]) on 10/13/2019 3:55:39 PM (10-13-19 @ 10:57)  Transthoracic Echocardiogram:    EXAM:  2-D ECHO (TTE) COMPLETE        PROCEDURE DATE:  10/13/2019      INTERPRETATION:  REPORT:    TRANSTHORACIC ECHOCARDIOGRAM REPORT         Patient Name:   JON GARCÍA Accession #: [id number]  Medical Rec #:  [id number]     Height:      64.0 in 162.6 cm  YOB: 1932    Weight:      130.0 lb 58.97 kg  Patient Age:    87 years     BSA:         1.63 m²  Patient Gender: F            BP:          155/70 mmHg       Date of Exam:        10/13/2019 9:46:04 AM  Referring Physician: [id number] FLORESITA VILLALBA  Sonographer:         Jonna Gifford  Reading Physician:   Kb Liz M.D.    Procedure:   2D Echo/Doppler/Color Doppler Complete.  Indications: R55 - Syncope and Collapse  Diagnosis:   Syncope and collapse - R55         Summary:   1. Normal global left ventricular systolic function.   2. LV Ejection Fraction by Sparks's Method with a biplane EF of 74 %.   3. Normal left ventricular internal cavity size.   4. Normal right atrial size.   5. There is no evidence of pericardial effusion.   6. Mild mitral valve regurgitation.   7. Mild thickening and calcification of the anterior and posterior   mitral valve leaflets.   8. Echodensity noted on PMVL atrial aspect which is suggestive of a   valvular vegetation.   9. Moderate tricuspid regurgitation.    PHYSICIAN INTERPRETATION:  Left Ventricle: The left ventricular internal cavity size is normal. Left   ventricular wall thickness is normal. Global LV systolic function was   normal.  Right Ventricle: The right ventricular size isnormal. RV systolic   function is normal.  Left Atrium: Normal left atrial size.  Right Atrium: Normal right atrial size.  Pericardium: There is no evidence of pericardial effusion.  Mitral Valve: Mild thickening and calcification of the anterior and  posterior mitral valve leaflets. Mild mitral valve regurgitation is seen.   Echodensity noted on PMVL atrial aspect which is suggestive of a valvular   vegetation.  Tricuspid Valve: The tricuspid valve is normal in structure. Moderate   tricuspid regurgitation is visualized.  Aortic Valve: The aortic valve is trileaflet. No evidence of aortic   stenosis. Aortic valve thickening with normal leaflet opening. No   evidence of aortic valve regurgitation is seen.  Pulmonic Valve: The pulmonic valve is thickened with good excursion.   Trace pulmonic valve regurgitation.  Aorta: Aortic root measured at Sinus of Valsalva is normal.  Venous: The inferior vena cava is normal.       2D AND M-MODE MEASUREMENTS (normal ranges within parentheses):  Left                Normal   Aorta/Left             Normal  Ventricle:                     Atrium:  IVSd (2D):  0.83 cm  (0.7-1.1) AoV Cusp       1.68  (1.5-2.6)  LVPWd (2D): 0.79 cm  (0.7-1.1) Separation:     cm  LVIDd (2D): 4.75 cm  (3.4-5.7) Left Atrium    3.29  (1.9-4.0)  LVIDs (2D): 2.44 cm            (Mmode):        cm  LV FS (2D):  48.6 %   (>25%)   LA Volume      37.2  Relative      0.33    (<0.42)  Index         ml/m²  Wall                           Right  Thickness                      Ventricle:                                 RVd (2D):      3.08 cm    SPECTRAL DOPPLER ANALYSIS:  LV DIASTOLIC FUNCTION:  MV Peak E: 1.34 m/s Decel Time: 245 msec  MV Peak A: 1.06 m/s  E/A Ratio: 1.27    Aortic Valve:  AoV VMax:    2.02 m/s  AoV Area, Vmax: 1.96 cm² Vmax Indx: 1.20 cm²/m²  AoV VTI:     0.41 m    AoV Area, VTI:  2.00 cm² VTI Indx:  1.23 cm²/m²  AoV Pk Grad: 16.3 mmHg  AoV Mn Grad: 8.4 mmHg    LVOT Vmax: 1.51 m/s  LVOT VTI:  0.31 m  LVOT Diam: 1.82 cm    Mitral Valve:  MV P1/2 Time: 70.94 msec  MV Area, PHT: 3.10 cm²    Tricuspid Valve and PA/RV Systolic Pressure: TR Max Velocity: 3.13 m/s RA   Pressure:  RVSP/PASP: 41.3 mmHg    Pulmonic Valve:  PV Max Velocity: 1.34 m/s PV Max P.2 mmHg PV Mean PG:       [id number] Kb Liz M.D., Electronically signed on 10/13/2019 at   2:40:34 PM              *** Final ***                    KB LIZ MD  This document has been electronically signed. Oct 13 2019  9:46AM             (10-13-19 @ 09:46)      MEDICATIONS  atorvastatin 20  carbidopa/levodopa CR 50/200 1  cefTRIAXone   IVPB 1000  chlorhexidine 4% Liquid 1  diltiazem     heparin  Infusion 1700  influenza   Vaccine 0.5  pantoprazole    Tablet 40  pramipexole 0.5      ANTIBIOTICS:  cefTRIAXone   IVPB 1000 milliGRAM(s) IV Intermittent every 24 hours      All available historical records have been reviewed

## 2019-10-16 NOTE — PROGRESS NOTE ADULT - SUBJECTIVE AND OBJECTIVE BOX
JON GARCÍA  87y Female    CHIEF COMPLAINT:    Patient is a 87y old  Female who presents with a chief complaint of Syncope; Left eye blurriness (16 Oct 2019 08:41)      INTERVAL HPI/OVERNIGHT EVENTS:    Patient seen and examined. Complains that she is still having blurred vision from both eyes, Lt > Rt. No IGNACIO. No fever. Scheduled for JOI today.     ROS: All other systems are negative.    Vital Signs:    T(F): 97.9 (10-16-19 @ 06:03), Max: 97.9 (10-16-19 @ 06:03)  HR: 74 (10-16-19 @ 06:03) (74 - 84)  BP: 155/70 (10-16-19 @ 06:03) (130/77 - 155/70)  RR: 18 (10-16-19 @ 06:03) (18 - 18)  SpO2: --  I&O's Summary    15 Oct 2019 07:01  -  16 Oct 2019 07:00  --------------------------------------------------------  IN: 0 mL / OUT: 200 mL / NET: -200 mL      Daily     Daily   CAPILLARY BLOOD GLUCOSE          PHYSICAL EXAM:    GENERAL:  NAD  SKIN: No rashes or lesions  HENT: Atraumatic Normocephalic. PERRL. Moist membranes.  NECK: Supple, No JVD. No lymphadenopathy.  PULMONARY: CTA B/L. No wheezing. No rales  CVS: Normal S1, S2. Rate and Rhythm are regular. No murmurs.  ABDOMEN/GI: Soft, Nontender, Nondistended; BS present  EXTREMITIES: Peripheral pulses intact. No edema B/L LE.  NEUROLOGIC:  No motor or sensory deficit.  PSYCH: Alert & oriented x 3    Consultant(s) Notes Reviewed:  [x ] YES  [ ] NO  Care Discussed with Consultants/Other Providers [ x] YES  [ ] NO    EKG reviewed  Telemetry reviewed    LABS:                        12.1   9.56  )-----------( 269      ( 16 Oct 2019 05:14 )             36.3     10-16    139  |  101  |  37<H>  ----------------------------<  162<H>  4.5   |  22  |  1.0    Ca    8.4<L>      16 Oct 2019 05:14  Mg     2.2     10-16    TPro  5.6<L>  /  Alb  3.2<L>  /  TBili  0.2  /  DBili  x   /  AST  10  /  ALT  <5  /  AlkPhos  73  10-16    PTT - ( 16 Oct 2019 05:14 )  PTT:26.2 sec  Serum Pro-Brain Natriuretic Peptide: 1651 pg/mL (10-12-19 @ 19:12)    Trop 0.06, CKMB --, CK --, 10-14-19 @ 22:28  Trop 0.07, CKMB --, CK 54, 10-14-19 @ 05:05  Trop 0.10, CKMB --, CK --, 10-13-19 @ 22:06  Trop --, CKMB 3.7, CK --, 10-13-19 @ 12:30  Trop 0.06, CKMB --, CK --, 10-13-19 @ 11:20      Culture - Blood (collected 14 Oct 2019 17:26)  Source: .Blood None  Preliminary Report (16 Oct 2019 04:01):    No growth to date.        RADIOLOGY & ADDITIONAL TESTS:    < from: VA Duplex Carotid, Bilat (10.15.19 @ 13:56) >    Impression:    20-39% stenosis of the right internal carotid artery.  20-39% stenosis of the left internal carotid artery.    < end of copied text >  < from: CT Chest No Cont (10.15.19 @ 12:49) >    IMPRESSION:    Enlarged nodular left lobe of thyroid gland with associated deviation of   trachea to the right, accounting for the  right paratracheal density on   x-ray.      No suspicious mediastinal adenopathy or pulmonary nodules..  2 mm solid nodule, right lower lobe (4/141).    < end of copied text >    Imaging or report Personally Reviewed:  [ ] YES  [ ] NO    Medications:  Standing  atorvastatin 20 milliGRAM(s) Oral at bedtime  carbidopa/levodopa CR 50/200 1 Tablet(s) Oral <User Schedule>  cefTRIAXone   IVPB 1000 milliGRAM(s) IV Intermittent every 24 hours  chlorhexidine 4% Liquid 1 Application(s) Topical <User Schedule>  diltiazem    milliGRAM(s) Oral daily  heparin  Infusion 1700 Unit(s)/Hr IV Continuous <Continuous>  influenza   Vaccine 0.5 milliLiter(s) IntraMuscular once  pantoprazole    Tablet 40 milliGRAM(s) Oral before breakfast  pramipexole 0.5 milliGRAM(s) Oral three times a day    PRN Meds  acetaminophen   Tablet .. 650 milliGRAM(s) Oral every 6 hours PRN      Case discussed with resident    Care discussed with pt/family

## 2019-10-16 NOTE — PROGRESS NOTE ADULT - ASSESSMENT
This is a 78 year old female with a significant PMhx of Parkinson's disease and RA who presented to the ED after a witnessed episode of syncope. She also mentions acute development of left eye blurriness that is constant over past two days, and has been experiencing intermittent, sharp, occipital/neck headaches for the last few weeks.      1.	Syncope.   2.	Lt. Central retinal artery occlusion/Blurred vision  3.	Vegetations on MV  4.	Parkinson's disease  5.	Lt. Paratracheal opacity due to enlarged Thyroid.   6.	UTI  7.	RA         PLAN:    ·	Scheduled for JOI today  ·	Blood cx x 1 is negative.    ·	Orthostatics negative  ·	CD is unremarkable  ·	Syncope could be vasovagal vs due to Sinemet. Called for neuro F/U  ·	ECHO reviewed. EF 74%. Thickening of the MV leaflets. Echodensity on PMVL atrial aspect   ·	S/P Opthalmology eval. Has Lt eye central retinal artery occulusion and also hypo/hyperpigmented areas in Rt eye likely due to emboli  ·	Doubt GCA. Will D/C steroids.   ·	CT chest reviewed. Lt paratracheal opacity is due to enlarged thyroid.   ·	Cont IV Rocephin. Repeat urine cxs as the first sample was contaminated.   ·	Plan of care D/W the daughters on bedside.

## 2019-10-17 LAB
ANION GAP SERPL CALC-SCNC: 18 MMOL/L — HIGH (ref 7–14)
APTT BLD: >200 SEC — CRITICAL HIGH (ref 27–39.2)
APTT BLD: >200 SEC — CRITICAL HIGH (ref 27–39.2)
BASOPHILS # BLD AUTO: 0 K/UL — SIGNIFICANT CHANGE UP (ref 0–0.2)
BASOPHILS NFR BLD AUTO: 0 % — SIGNIFICANT CHANGE UP (ref 0–1)
BUN SERPL-MCNC: 32 MG/DL — HIGH (ref 10–20)
CALCIUM SERPL-MCNC: 8.3 MG/DL — LOW (ref 8.5–10.1)
CHLORIDE SERPL-SCNC: 104 MMOL/L — SIGNIFICANT CHANGE UP (ref 98–110)
CO2 SERPL-SCNC: 19 MMOL/L — SIGNIFICANT CHANGE UP (ref 17–32)
CREAT SERPL-MCNC: 0.9 MG/DL — SIGNIFICANT CHANGE UP (ref 0.7–1.5)
EOSINOPHIL # BLD AUTO: 0 K/UL — SIGNIFICANT CHANGE UP (ref 0–0.7)
EOSINOPHIL NFR BLD AUTO: 0 % — SIGNIFICANT CHANGE UP (ref 0–8)
GAMMA INTERFERON BACKGROUND BLD IA-ACNC: 0.01 IU/ML — SIGNIFICANT CHANGE UP
GIANT PLATELETS BLD QL SMEAR: PRESENT — SIGNIFICANT CHANGE UP
GLUCOSE SERPL-MCNC: 140 MG/DL — HIGH (ref 70–99)
HCT VFR BLD CALC: 36.7 % — LOW (ref 37–47)
HGB BLD-MCNC: 12.3 G/DL — SIGNIFICANT CHANGE UP (ref 12–16)
LYMPHOCYTES # BLD AUTO: 0.53 K/UL — LOW (ref 1.2–3.4)
LYMPHOCYTES # BLD AUTO: 4.3 % — LOW (ref 20.5–51.1)
M TB IFN-G BLD-IMP: ABNORMAL
M TB IFN-G CD4+ BCKGRND COR BLD-ACNC: 0 IU/ML — SIGNIFICANT CHANGE UP
M TB IFN-G CD4+CD8+ BCKGRND COR BLD-ACNC: 0 IU/ML — SIGNIFICANT CHANGE UP
MAGNESIUM SERPL-MCNC: 2.3 MG/DL — SIGNIFICANT CHANGE UP (ref 1.8–2.4)
MANUAL SMEAR VERIFICATION: SIGNIFICANT CHANGE UP
MCHC RBC-ENTMCNC: 30.1 PG — SIGNIFICANT CHANGE UP (ref 27–31)
MCHC RBC-ENTMCNC: 33.5 G/DL — SIGNIFICANT CHANGE UP (ref 32–37)
MCV RBC AUTO: 90 FL — SIGNIFICANT CHANGE UP (ref 81–99)
METAMYELOCYTES # FLD: 0.9 % — HIGH (ref 0–0)
MONOCYTES # BLD AUTO: 1.19 K/UL — HIGH (ref 0.1–0.6)
MONOCYTES NFR BLD AUTO: 9.6 % — HIGH (ref 1.7–9.3)
MYELOCYTES NFR BLD: 2.6 % — HIGH (ref 0–0)
NEUTROPHILS # BLD AUTO: 9.89 K/UL — HIGH (ref 1.4–6.5)
NEUTROPHILS NFR BLD AUTO: 80 % — HIGH (ref 42.2–75.2)
NRBC # BLD: 2 /100 — HIGH (ref 0–0)
NRBC # BLD: SIGNIFICANT CHANGE UP /100 WBCS (ref 0–0)
PLAT MORPH BLD: NORMAL — SIGNIFICANT CHANGE UP
PLATELET # BLD AUTO: 300 K/UL — SIGNIFICANT CHANGE UP (ref 130–400)
POLYCHROMASIA BLD QL SMEAR: SLIGHT — SIGNIFICANT CHANGE UP
POTASSIUM SERPL-MCNC: 4.1 MMOL/L — SIGNIFICANT CHANGE UP (ref 3.5–5)
POTASSIUM SERPL-SCNC: 4.1 MMOL/L — SIGNIFICANT CHANGE UP (ref 3.5–5)
QUANT TB PLUS MITOGEN MINUS NIL: 0.16 IU/ML — SIGNIFICANT CHANGE UP
RBC # BLD: 4.08 M/UL — LOW (ref 4.2–5.4)
RBC # FLD: 13.2 % — SIGNIFICANT CHANGE UP (ref 11.5–14.5)
RBC BLD AUTO: NORMAL — SIGNIFICANT CHANGE UP
SODIUM SERPL-SCNC: 141 MMOL/L — SIGNIFICANT CHANGE UP (ref 135–146)
VARIANT LYMPHS # BLD: 2.6 % — SIGNIFICANT CHANGE UP (ref 0–5)
WBC # BLD: 12.36 K/UL — HIGH (ref 4.8–10.8)
WBC # FLD AUTO: 12.36 K/UL — HIGH (ref 4.8–10.8)

## 2019-10-17 PROCEDURE — 70498 CT ANGIOGRAPHY NECK: CPT | Mod: 26

## 2019-10-17 PROCEDURE — 70496 CT ANGIOGRAPHY HEAD: CPT | Mod: 26

## 2019-10-17 PROCEDURE — 99233 SBSQ HOSP IP/OBS HIGH 50: CPT

## 2019-10-17 RX ORDER — HEPARIN SODIUM 5000 [USP'U]/ML
1500 INJECTION INTRAVENOUS; SUBCUTANEOUS
Qty: 25000 | Refills: 0 | Status: DISCONTINUED | OUTPATIENT
Start: 2019-10-17 | End: 2019-10-17

## 2019-10-17 RX ORDER — HEPARIN SODIUM 5000 [USP'U]/ML
1300 INJECTION INTRAVENOUS; SUBCUTANEOUS
Qty: 25000 | Refills: 0 | Status: DISCONTINUED | OUTPATIENT
Start: 2019-10-17 | End: 2019-10-18

## 2019-10-17 RX ADMIN — ATORVASTATIN CALCIUM 20 MILLIGRAM(S): 80 TABLET, FILM COATED ORAL at 22:24

## 2019-10-17 RX ADMIN — PRAMIPEXOLE DIHYDROCHLORIDE 0.5 MILLIGRAM(S): 0.12 TABLET ORAL at 13:25

## 2019-10-17 RX ADMIN — PRAMIPEXOLE DIHYDROCHLORIDE 0.5 MILLIGRAM(S): 0.12 TABLET ORAL at 22:40

## 2019-10-17 RX ADMIN — Medication 360 MILLIGRAM(S): at 05:45

## 2019-10-17 RX ADMIN — PRAMIPEXOLE DIHYDROCHLORIDE 0.5 MILLIGRAM(S): 0.12 TABLET ORAL at 05:45

## 2019-10-17 RX ADMIN — CEFTRIAXONE 100 MILLIGRAM(S): 500 INJECTION, POWDER, FOR SOLUTION INTRAMUSCULAR; INTRAVENOUS at 13:25

## 2019-10-17 RX ADMIN — CARBIDOPA AND LEVODOPA 1 TABLET(S): 25; 100 TABLET ORAL at 05:45

## 2019-10-17 RX ADMIN — CARBIDOPA AND LEVODOPA 1 TABLET(S): 25; 100 TABLET ORAL at 17:47

## 2019-10-17 RX ADMIN — HEPARIN SODIUM 13 UNIT(S)/HR: 5000 INJECTION INTRAVENOUS; SUBCUTANEOUS at 21:34

## 2019-10-17 RX ADMIN — PANTOPRAZOLE SODIUM 40 MILLIGRAM(S): 20 TABLET, DELAYED RELEASE ORAL at 05:46

## 2019-10-17 RX ADMIN — CARBIDOPA AND LEVODOPA 1 TABLET(S): 25; 100 TABLET ORAL at 22:24

## 2019-10-17 RX ADMIN — CARBIDOPA AND LEVODOPA 1 TABLET(S): 25; 100 TABLET ORAL at 12:55

## 2019-10-17 NOTE — PROGRESS NOTE ADULT - SUBJECTIVE AND OBJECTIVE BOX
JON GARCÍA  87y Female    CHIEF COMPLAINT:    Patient is a 87y old  Female who presents with a chief complaint of Syncope; Left eye blurriness (17 Oct 2019 11:47)      INTERVAL HPI/OVERNIGHT EVENTS:    Patient seen and examined. Complains that she still had blurred vision in both eyes, L>R. No headache. No dizziness.     ROS: All other systems are negative.    Vital Signs:    T(F): 97.3 (10-17-19 @ 05:17), Max: 97.3 (10-17-19 @ 05:17)  HR: 64 (10-17-19 @ 05:17) (64 - 84)  BP: 156/70 (10-17-19 @ 05:17) (153/72 - 156/70)  RR: 20 (10-17-19 @ 05:17) (20 - 20)  SpO2: --  I&O's Summary    16 Oct 2019 07:01  -  17 Oct 2019 07:00  --------------------------------------------------------  IN: 0 mL / OUT: 1225 mL / NET: -1225 mL      Daily Height in cm: 162.56 (16 Oct 2019 16:27)    Daily   CAPILLARY BLOOD GLUCOSE          PHYSICAL EXAM:    GENERAL:  NAD  SKIN: No rashes or lesions  HENT: Atraumatic Normocephalic. PERRL. Moist membranes.  NECK: Supple, No JVD. No lymphadenopathy.  PULMONARY: CTA B/L. No wheezing. No rales  CVS: Normal S1, S2. Rate and Rhythm are regular. No murmurs.  ABDOMEN/GI: Soft, Nontender, Nondistended; BS present  EXTREMITIES: Peripheral pulses intact. No edema B/L LE.  NEUROLOGIC:  No motor or sensory deficit.  PSYCH: Alert & oriented x 3    Consultant(s) Notes Reviewed:  [x ] YES  [ ] NO  Care Discussed with Consultants/Other Providers [ x] YES  [ ] NO    EKG reviewed  Telemetry reviewed    LABS:                        12.3   12.36 )-----------( 300      ( 17 Oct 2019 05:55 )             36.7     10-17    141  |  104  |  32<H>  ----------------------------<  140<H>  4.1   |  19  |  0.9    Ca    8.3<L>      17 Oct 2019 05:55  Mg     2.3     10-17    TPro  5.6<L>  /  Alb  3.2<L>  /  TBili  0.2  /  DBili  x   /  AST  10  /  ALT  <5  /  AlkPhos  73  10-16    PTT - ( 17 Oct 2019 05:55 )  PTT:>200.0 sec  Serum Pro-Brain Natriuretic Peptide: 1651 pg/mL (10-12-19 @ 19:12)    Trop 0.06, CKMB --, CK --, 10-14-19 @ 22:28      Culture - Blood (collected 15 Oct 2019 05:08)  Source: .Blood None  Preliminary Report (16 Oct 2019 13:01):    No growth to date.    Culture - Blood (collected 14 Oct 2019 17:26)  Source: .Blood None  Preliminary Report (16 Oct 2019 04:01):    No growth to date.        RADIOLOGY & ADDITIONAL TESTS:    Preliminary Findings:  LA and RA: Normal size.  CHILO: Left atrial appendage was clear of clot or no spontaneous contrast seen. Normal functioning of CHILO.  LV: LVEF was estimated at 55-65%  RV: Normal RV.  MV: Mildly sclerotic mitral valve with thickening of both leaflets. Mild-moderate MR, No evidence for MS. Sessile dense, solid, partly calcified density attached to the base and body of posterior leaflets (P2, P3 and base) of mitral valve with mobile density protruding to left atrium suggestive of organized calcified chronic vegetation most likely. Alternately organized calcified thrombus. There was no abscess or filling defect or regurgitation through the leaflets.  AV: Mild sclerotic AV. No evidence for AI, no evidence for AS.   TV: Mild tricuspid regurgitation.  IAS: Intact IAS. No PFO. NO R-> L shunt seen with injection of agitated contrast saline.    There was extensive calcified atheroma seen in the ascending and descending aorta.     DIAGNOSIS/IMPRESSION:  Solid density noted on posterior leaflets of mitral valve. Mild-moderate MR. Mild TR.  Imaging or report Personally Reviewed:  [ ] YES  [ ] NO    Medications:  Standing  atorvastatin 20 milliGRAM(s) Oral at bedtime  carbidopa/levodopa CR 50/200 1 Tablet(s) Oral <User Schedule>  cefTRIAXone   IVPB 1000 milliGRAM(s) IV Intermittent every 24 hours  chlorhexidine 4% Liquid 1 Application(s) Topical <User Schedule>  diltiazem    milliGRAM(s) Oral daily  influenza   Vaccine 0.5 milliLiter(s) IntraMuscular once  pantoprazole    Tablet 40 milliGRAM(s) Oral before breakfast  pramipexole 0.5 milliGRAM(s) Oral three times a day    PRN Meds  acetaminophen   Tablet .. 650 milliGRAM(s) Oral every 6 hours PRN      Case discussed with resident    Care discussed with pt/family

## 2019-10-17 NOTE — PROGRESS NOTE ADULT - ASSESSMENT
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     JOI with "Sessile dense, solid, partly calcified density attached to the base and body of posterior leaflets (P2, P3 and base) of mitral valve with mobile density protruding to left atrium suggestive of organized calcified chronic vegetation most likely."    TTE "Echodensity noted on PMVL atrial aspect which is suggestive of a valvular vegetation."    Sedimentation Rate, Erythrocyte: 106 mm/Hr (10.12.19 @ 23:33)    Doubt infective endocarditis as no fever, chills, stigmata     BCX NG   #Acute cystitis, symptomatic, UA WBC 77, UCX >3 org  #Bilateral small pleural effusions with adjacent lower lobe atelectasis  #Enlarged nodular left lobe of thyroid gland with associated deviation of trachea to the right  #Multiple punctate calcified granulomata.    RECOMMENDATIONS  - HOLD ABX (s/p 5 days for acute cystitis)  - Check AM bcx for the next few days  - Will discuss with Cards, may need to treat empirically for IE    Spectra [id number]

## 2019-10-17 NOTE — PROGRESS NOTE ADULT - ASSESSMENT
This is a 78 year old female with a significant PMhx of Parkinson's disease and RA who presented to the ED after a witnessed episode of syncope. She also mentions acute development of left eye blurriness that is constant over past two days, and has been experiencing intermittent, sharp, occipital/neck headaches for the last few weeks.      1.	Syncope.   2.	Lt. Central retinal artery occlusion/ B/L blurred vision L>R  3.	Vegetations on MV. Ch organized & calcified  4.	Parkinson's disease  5.	Lt. Paratracheal opacity due to enlarged Thyroid.   6.	UTI  7.	RA         PLAN:    ·	S/P JOI yesterday. Ch. Calcified vegetation on MV and calcified atheroma in the ascending and descending aorta. Unlikely source of emboli. No obvious carotid, cardiac or aortic source of emboli. Unlikely benefit of A/C. Cont heparin one more day.   ·	Will do CTA head & neck to find any source of emboli  ·	Blood cx x 1 is negative.    ·	Orthostatics negative  ·	CD is unremarkable  ·	Syncope could be vasovagal vs due to Sinemet.   ·	ECHO reviewed. EF 74%. Thickening of the MV leaflets. Echodensity on PMVL atrial aspect   ·	S/P Opthalmology eval. Has Lt eye central retinal artery occulusion and also hypo/hyperpigmented areas in Rt eye likely due to emboli  ·	Doubt GCA. Will D/C steroids.   ·	CT chest reviewed. Lt paratracheal opacity is due to enlarged thyroid.   ·	Cont IV Rocephin. Repeat urine cxs as the first sample was contaminated.   ·	Plan of care D/W the daughters on bedside.

## 2019-10-17 NOTE — PROGRESS NOTE ADULT - SUBJECTIVE AND OBJECTIVE BOX
JON GARCÍA 87y Female  MRN#: [id number]   CODE STATUS: FULL      SUBJECTIVE  Patient is a 87y old Female who presents with a chief complaint of Syncope; Left eye blurriness (16 Oct 2019 15:19)    Currently admitted to medicine with the primary diagnosis of     Today is hospital day 5d,   OVERNIGHT EVENTS: none  This morning she is laying in bed comfortably . She had her JOI yesterday  Denies chest pain, shortness of breath, abdominal pain, nausea, vomiting or changes in bowel habits.     Urinating ans stooling appropriately.    Present Today:           Jacques Catheter (x)No/ ()Yes?   Indication:             Central Line (x)No/ ()Yes?   Indication:          IV Fluids (x)No/ ()Yes? Type:  Rate:  Indication:    OBJECTIVE  PAST MEDICAL & SURGICAL HISTORY  Hyperlipidemia  Rheumatoid arthritis  HTN (hypertension)  Parkinson disease  History of hysterectomy    ALLERGIES:  No Known Allergies    HOME MEDICATIONS:  Home Medications:  atorvastatin 20 mg oral tablet: 1 tab(s) orally once a day (12 Oct 2019 18:49)  carbidopa-levodopa:  (12 Oct 2019 18:49)  celecoxib 200 mg oral capsule: 1 cap(s) orally 2 times a day (12 Oct 2019 18:49)  dilTIAZem 360 mg/24 hours oral capsule, extended release: 1 cap(s) orally once a day (12 Oct 2019 18:49)  hydroCHLOROthiazide 12.5 mg oral tablet: 1 tab(s) orally once a day (12 Oct 2019 18:49)  pramipexole 0.5 mg oral tablet: 1 tab(s) orally 3 times a day (12 Oct 2019 18:49)    MEDICATIONS:  STANDING MEDICATIONS  atorvastatin 20 milliGRAM(s) Oral at bedtime  carbidopa/levodopa CR 50/200 1 Tablet(s) Oral <User Schedule>  cefTRIAXone   IVPB 1000 milliGRAM(s) IV Intermittent every 24 hours  chlorhexidine 4% Liquid 1 Application(s) Topical <User Schedule>  diltiazem    milliGRAM(s) Oral daily  heparin  Infusion 1700 Unit(s)/Hr (17 mL/Hr) IV Continuous <Continuous>  influenza   Vaccine 0.5 milliLiter(s) IntraMuscular once  pantoprazole    Tablet 40 milliGRAM(s) Oral before breakfast  pramipexole 0.5 milliGRAM(s) Oral three times a day    PRN MEDICATIONS  acetaminophen   Tablet .. 650 milliGRAM(s) Oral every 6 hours PRN      VITAL SIGNS: Last 24 Hours  T(C): 36.3 (17 Oct 2019 05:17), Max: 36.3 (17 Oct 2019 05:17)  T(F): 97.3 (17 Oct 2019 05:17), Max: 97.3 (17 Oct 2019 05:17)  HR: 64 (17 Oct 2019 05:17) (64 - 84)  BP: 156/70 (17 Oct 2019 05:17) (153/72 - 156/70)  RR: 20 (17 Oct 2019 05:17) (20 - 20)    LABS:                        12.3   12.36 )-----------( 300      ( 17 Oct 2019 05:55 )             36.7     10-17    141  |  104  |  32<H>  ----------------------------<  140<H>  4.1   |  19  |  0.9    Ca    8.3<L>      17 Oct 2019 05:55  Mg     2.3     10-17    TPro  5.6<L>  /  Alb  3.2<L>  /  TBili  0.2  /  DBili  x   /  AST  10  /  ALT  <5  /  AlkPhos  73  10-16    PTT - ( 17 Oct 2019 05:55 )  PTT:>200.0 sec    Culture - Blood (collected 15 Oct 2019 05:08)  Source: .Blood None  Preliminary Report (16 Oct 2019 13:01):    No growth to date.    Culture - Blood (collected 14 Oct 2019 17:26)  Source: .Blood None  Preliminary Report (16 Oct 2019 04:01):    No growth to date.      RADIOLOGY:  VA Duplex Carotid, Bilat (10.15.19 @ 13:56)     20-39% stenosis of the right internal carotid artery.  20-39% stenosis of the left internal carotid artery.    CT Chest No Cont (10.15.19 @ 12:49)   Enlarged nodular left lobe of thyroid gland with associated deviation of   trachea to the right, accounting for the  right paratracheal density on   x-ray.      No suspicious mediastinal adenopathy or pulmonary nodules..  2 mm solid nodule, right lower lobe (4/141).    No endobronchial obstruction, mass.     Bilateral small pleural effusions with adjacent lower lobe atelectasis      ECHO:  Transthoracic Echocardiogram:    EXAM:  2-D ECHO (TTE) COMPLETE        PROCEDURE DATE:  10/13/2019      INTERPRETATION:  REPORT:    TRANSTHORACIC ECHOCARDIOGRAM REPORT    Patient Name:   JON GARCÍA Accession #: [id number]  Medical Rec #:  [id number]     Height:      64.0 in 162.6 cm  YOB: 1932    Weight:      130.0 lb 58.97 kg  Patient Age:    87 years     BSA:         1.63 m²  Patient Gender: F            BP:          155/70 mmHg    Date of Exam:        10/13/2019 9:46:04 AM  Referring Physician: [id number] FLORESITA VILLALBA  Sonographer:         Jonna Gifford  Reading Physician:   Kb Liz M.D.    Procedure:   2D Echo/Doppler/Color Doppler Complete.  Indications: R55 - Syncope and Collapse  Diagnosis:   Syncope and collapse - R55    Summary:   1. Normal global left ventricular systolic function.   2. LV Ejection Fraction by Sparks's Method with a biplane EF of 74 %.   3. Normal left ventricular internal cavity size.   4. Normal right atrial size.   5. There is no evidence of pericardial effusion.   6. Mild mitral valve regurgitation.   7. Mild thickening and calcification of the anterior and posterior   mitral valve leaflets.   8. Echodensity noted on PMVL atrial aspect which is suggestive of a   valvular vegetation.   9. Moderate tricuspid regurgitation.    PHYSICIAN INTERPRETATION:  Left Ventricle: The left ventricular internal cavity size is normal. Left   ventricular wall thickness is normal. Global LV systolic function was   normal.  Right Ventricle: The right ventricular size isnormal. RV systolic   function is normal.  Left Atrium: Normal left atrial size.  Right Atrium: Normal right atrial size.  Pericardium: There is no evidence of pericardial effusion.  Mitral Valve: Mild thickening and calcification of the anterior and  posterior mitral valve leaflets. Mild mitral valve regurgitation is seen.   Echodensity noted on PMVL atrial aspect which is suggestive of a valvular   vegetation.  Tricuspid Valve: The tricuspid valve is normal in structure. Moderate   tricuspid regurgitation is visualized.  Aortic Valve: The aortic valve is trileaflet. No evidence of aortic   stenosis. Aortic valve thickening with normal leaflet opening. No   evidence of aortic valve regurgitation is seen.  Pulmonic Valve: The pulmonic valve is thickened with good excursion.   Trace pulmonic valve regurgitation.  Aorta: Aortic root measured at Sinus of Valsalva is normal.  Venous: The inferior vena cava is normal.       2D AND M-MODE MEASUREMENTS (normal ranges within parentheses):  Left                Normal   Aorta/Left             Normal  Ventricle:                     Atrium:  IVSd (2D):  0.83 cm  (0.7-1.1) AoV Cusp       1.68  (1.5-2.6)  LVPWd (2D): 0.79 cm  (0.7-1.1) Separation:     cm  LVIDd (2D): 4.75 cm  (3.4-5.7) Left Atrium    3.29  (1.9-4.0)  LVIDs (2D): 2.44 cm            (Mmode):        cm  LV FS (2D):  48.6 %   (>25%)   LA Volume      37.2  Relative      0.33    (<0.42)  Index         ml/m²  Wall                           Right  Thickness                      Ventricle:   RVd (2D):      3.08 cm    SPECTRAL DOPPLER ANALYSIS:  LV DIASTOLIC FUNCTION:  MV Peak E: 1.34 m/s Decel Time: 245 msec  MV Peak A: 1.06 m/s  E/A Ratio: 1.27    Aortic Valve:  AoV VMax:    2.02 m/s  AoV Area, Vmax: 1.96 cm² Vmax Indx: 1.20 cm²/m²  AoV VTI:     0.41 m    AoV Area, VTI:  2.00 cm² VTI Indx:  1.23 cm²/m²  AoV Pk Grad: 16.3 mmHg  AoV Mn Grad: 8.4 mmHg    LVOT Vmax: 1.51 m/s  LVOT VTI:  0.31 m  LVOT Diam: 1.82 cm    Mitral Valve:  MV P1/2 Time: 70.94 msec  MV Area, PHT: 3.10 cm²    Tricuspid Valve and PA/RV Systolic Pressure: TR Max Velocity: 3.13 m/s RA   Pressure:  RVSP/PASP: 41.3 mmHg    Pulmonic Valve:  PV Max Velocity: 1.34 m/s PV Max P.2 mmHg PV Mean PG:       [id number] Kb Liz M.D., Electronically signed on 10/13/2019 at   2:40:34 PM   *** Final ***    KB LIZ MD  This document has been electronically signed. Oct 13 2019  9:46AM (10-13-19 @ 09:46)      PHYSICAL EXAM:  GENERAL: NAD, well-developed, AAOx3  HEENT:  B/L cataract + Atraumatic, Normocephalic. EOMI, PERRLA, conjunctiva and sclera clear, No JVD  PULMONARY: Clear to auscultation bilaterally; No wheeze  CARDIOVASCULAR: Regular rate and rhythm; No murmurs, rubs, or gallops  GASTROINTESTINAL: Soft, Nontender, Nondistended; Bowel sounds present  MUSCULOSKELETAL: edema B/l LLE+  2+ Peripheral Pulses, No clubbing, cyanosis  NEUROLOGY: non-focal  SKIN: No rashes or lesions    ASSESSMENT & PLAN  78 year old female with a significant PMhx of Parkinson's disease and RA who presented to the ED after a witnessed episode of syncope, associated with recent generalized weakness, dark urine, and N/V. She also complained of L-eye blurriness x2 days and headaches over the last few weeks.     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 Suspect potential source of emboli from vegetation along the mitral valve.   - No jaw claudication or scalp tenderness. No fevers, unintentional weight loss.  - ESR elevated (likely inflammatory vs malignancy vs infectious , CK wnl   -R/o GCA completed 3 doses of pulse steroids(500mg x2 and 1000mg x1) as per rheum   -Started on heparin drip 1700 units  -CRP 10    # Acute Cystitis  -symptomatic, UA WBC 13.16, UCX >3 org  -continue Rocephin (day 4) last day tomorrow as per ID  -repeat UA and cultures sent  -Blood cx pending    # New prominence of the right paratracheal region on CXR with right sided pleural effusion with increased reticular opacities  -CT chest no contrast shows enlarged left lobe of thyroid , no suspicion of malignancy  -Follow am thyroid profile  -Quantiferon Tb test ordered as per family's request    # Vegetation on PMVL on ECHO  - No chest pain  - ECHO from 10/13 shows LVEF of 73%, with vegetation on PMVL   -cardio consult pending for a possible TTE  -BLood cx pending    # Hx of Parkinson's Disease  -Continue home medications    # Hx of RA  -Not on DMARDS   -c/o knee pain likely OA   -Anti CCP ab pending, RA factor normal ordered for completeness as suggested by rheum  -Outpt rheum follow up      #) Hx of HTN and HLD  -Holding HCTZ, continue Statin    GI ppx: Not indicated    DVT ppx: Heparin drip    Diet, DASH/TLC:   Sodium & Cholesterol Restricted   Activity: Increase as Tolerated     DISPO:  Patient to be discharged when condition(s) optimized.    CODE STATUS  [X] FULL     Pending: Cardio, thyroid profile , anti CCP Ab,r, Blood Cx JON GARCÍA 87y Female  MRN#: [id number]   CODE STATUS: FULL      SUBJECTIVE  Patient is a 87y old Female who presents with a chief complaint of Syncope; Left eye blurriness (16 Oct 2019 15:19)    Currently admitted to medicine with the primary diagnosis of     Today is hospital day 5d,   OVERNIGHT EVENTS: none  This morning she is laying in bed comfortably . She had her JOI yesterday  Denies chest pain, shortness of breath, abdominal pain, nausea, vomiting or changes in bowel habits.     Urinating ans stooling appropriately.    Present Today:           Jacques Catheter (x)No/ ()Yes?   Indication:             Central Line (x)No/ ()Yes?   Indication:          IV Fluids (x)No/ ()Yes? Type:  Rate:  Indication:    OBJECTIVE  PAST MEDICAL & SURGICAL HISTORY  Hyperlipidemia  Rheumatoid arthritis  HTN (hypertension)  Parkinson disease  History of hysterectomy    ALLERGIES:  No Known Allergies    HOME MEDICATIONS:  Home Medications:  atorvastatin 20 mg oral tablet: 1 tab(s) orally once a day (12 Oct 2019 18:49)  carbidopa-levodopa:  (12 Oct 2019 18:49)  celecoxib 200 mg oral capsule: 1 cap(s) orally 2 times a day (12 Oct 2019 18:49)  dilTIAZem 360 mg/24 hours oral capsule, extended release: 1 cap(s) orally once a day (12 Oct 2019 18:49)  hydroCHLOROthiazide 12.5 mg oral tablet: 1 tab(s) orally once a day (12 Oct 2019 18:49)  pramipexole 0.5 mg oral tablet: 1 tab(s) orally 3 times a day (12 Oct 2019 18:49)    MEDICATIONS:  STANDING MEDICATIONS  atorvastatin 20 milliGRAM(s) Oral at bedtime  carbidopa/levodopa CR 50/200 1 Tablet(s) Oral <User Schedule>  cefTRIAXone   IVPB 1000 milliGRAM(s) IV Intermittent every 24 hours  chlorhexidine 4% Liquid 1 Application(s) Topical <User Schedule>  diltiazem    milliGRAM(s) Oral daily  heparin  Infusion 1700 Unit(s)/Hr (17 mL/Hr) IV Continuous <Continuous>  influenza   Vaccine 0.5 milliLiter(s) IntraMuscular once  pantoprazole    Tablet 40 milliGRAM(s) Oral before breakfast  pramipexole 0.5 milliGRAM(s) Oral three times a day    PRN MEDICATIONS  acetaminophen   Tablet .. 650 milliGRAM(s) Oral every 6 hours PRN      VITAL SIGNS: Last 24 Hours  T(C): 36.3 (17 Oct 2019 05:17), Max: 36.3 (17 Oct 2019 05:17)  T(F): 97.3 (17 Oct 2019 05:17), Max: 97.3 (17 Oct 2019 05:17)  HR: 64 (17 Oct 2019 05:17) (64 - 84)  BP: 156/70 (17 Oct 2019 05:17) (153/72 - 156/70)  RR: 20 (17 Oct 2019 05:17) (20 - 20)    LABS:                        12.3   12.36 )-----------( 300      ( 17 Oct 2019 05:55 )             36.7     10-17    141  |  104  |  32<H>  ----------------------------<  140<H>  4.1   |  19  |  0.9    Ca    8.3<L>      17 Oct 2019 05:55  Mg     2.3     10-17    TPro  5.6<L>  /  Alb  3.2<L>  /  TBili  0.2  /  DBili  x   /  AST  10  /  ALT  <5  /  AlkPhos  73  10-16    PTT - ( 17 Oct 2019 05:55 )  PTT:>200.0 sec    Culture - Blood (collected 15 Oct 2019 05:08)  Source: .Blood None  Preliminary Report (16 Oct 2019 13:01):    No growth to date.    Culture - Blood (collected 14 Oct 2019 17:26)  Source: .Blood None  Preliminary Report (16 Oct 2019 04:01):    No growth to date.      RADIOLOGY:  VA Duplex Carotid, Bilat (10.15.19 @ 13:56)     20-39% stenosis of the right internal carotid artery.  20-39% stenosis of the left internal carotid artery.    CT Chest No Cont (10.15.19 @ 12:49)   Enlarged nodular left lobe of thyroid gland with associated deviation of   trachea to the right, accounting for the  right paratracheal density on   x-ray.      No suspicious mediastinal adenopathy or pulmonary nodules..  2 mm solid nodule, right lower lobe (4/141).    No endobronchial obstruction, mass.     Bilateral small pleural effusions with adjacent lower lobe atelectasis      ECHO:  Transthoracic Echocardiogram:    EXAM:  2-D ECHO (TTE) COMPLETE        PROCEDURE DATE:  10/13/2019      INTERPRETATION:  REPORT:    TRANSTHORACIC ECHOCARDIOGRAM REPORT    Patient Name:   JON GARCÍA Accession #: [id number]  Medical Rec #:  [id number]     Height:      64.0 in 162.6 cm  YOB: 1932    Weight:      130.0 lb 58.97 kg  Patient Age:    87 years     BSA:         1.63 m²  Patient Gender: F            BP:          155/70 mmHg    Date of Exam:        10/13/2019 9:46:04 AM  Referring Physician: [id number] FLORESITA VILLALBA  Sonographer:         Jonna Gifford  Reading Physician:   Kb Liz M.D.    Procedure:   2D Echo/Doppler/Color Doppler Complete.  Indications: R55 - Syncope and Collapse  Diagnosis:   Syncope and collapse - R55    Summary:   1. Normal global left ventricular systolic function.   2. LV Ejection Fraction by Sparks's Method with a biplane EF of 74 %.   3. Normal left ventricular internal cavity size.   4. Normal right atrial size.   5. There is no evidence of pericardial effusion.   6. Mild mitral valve regurgitation.   7. Mild thickening and calcification of the anterior and posterior   mitral valve leaflets.   8. Echodensity noted on PMVL atrial aspect which is suggestive of a   valvular vegetation.   9. Moderate tricuspid regurgitation.    PHYSICIAN INTERPRETATION:  Left Ventricle: The left ventricular internal cavity size is normal. Left   ventricular wall thickness is normal. Global LV systolic function was   normal.  Right Ventricle: The right ventricular size isnormal. RV systolic   function is normal.  Left Atrium: Normal left atrial size.  Right Atrium: Normal right atrial size.  Pericardium: There is no evidence of pericardial effusion.  Mitral Valve: Mild thickening and calcification of the anterior and  posterior mitral valve leaflets. Mild mitral valve regurgitation is seen.   Echodensity noted on PMVL atrial aspect which is suggestive of a valvular   vegetation.  Tricuspid Valve: The tricuspid valve is normal in structure. Moderate   tricuspid regurgitation is visualized.  Aortic Valve: The aortic valve is trileaflet. No evidence of aortic   stenosis. Aortic valve thickening with normal leaflet opening. No   evidence of aortic valve regurgitation is seen.  Pulmonic Valve: The pulmonic valve is thickened with good excursion.   Trace pulmonic valve regurgitation.  Aorta: Aortic root measured at Sinus of Valsalva is normal.  Venous: The inferior vena cava is normal.       2D AND M-MODE MEASUREMENTS (normal ranges within parentheses):  Left                Normal   Aorta/Left             Normal  Ventricle:                     Atrium:  IVSd (2D):  0.83 cm  (0.7-1.1) AoV Cusp       1.68  (1.5-2.6)  LVPWd (2D): 0.79 cm  (0.7-1.1) Separation:     cm  LVIDd (2D): 4.75 cm  (3.4-5.7) Left Atrium    3.29  (1.9-4.0)  LVIDs (2D): 2.44 cm            (Mmode):        cm  LV FS (2D):  48.6 %   (>25%)   LA Volume      37.2  Relative      0.33    (<0.42)  Index         ml/m²  Wall                           Right  Thickness                      Ventricle:   RVd (2D):      3.08 cm    SPECTRAL DOPPLER ANALYSIS:  LV DIASTOLIC FUNCTION:  MV Peak E: 1.34 m/s Decel Time: 245 msec  MV Peak A: 1.06 m/s  E/A Ratio: 1.27    Aortic Valve:  AoV VMax:    2.02 m/s  AoV Area, Vmax: 1.96 cm² Vmax Indx: 1.20 cm²/m²  AoV VTI:     0.41 m    AoV Area, VTI:  2.00 cm² VTI Indx:  1.23 cm²/m²  AoV Pk Grad: 16.3 mmHg  AoV Mn Grad: 8.4 mmHg    LVOT Vmax: 1.51 m/s  LVOT VTI:  0.31 m  LVOT Diam: 1.82 cm    Mitral Valve:  MV P1/2 Time: 70.94 msec  MV Area, PHT: 3.10 cm²    Tricuspid Valve and PA/RV Systolic Pressure: TR Max Velocity: 3.13 m/s RA   Pressure:  RVSP/PASP: 41.3 mmHg    Pulmonic Valve:  PV Max Velocity: 1.34 m/s PV Max P.2 mmHg PV Mean PG:    [id number] Kb Liz M.D., Electronically signed on 10/13/2019 at   2:40:34 PM   *** Final ***  KB LIZ MD  This document has been electronically signed. Oct 13 2019  9:46AM (10-13-19 @ 09:46)    PHYSICAL EXAM:  GENERAL: NAD, well-developed, AAOx3  HEENT:  B/L cataract + Atraumatic, Normocephalic. EOMI, PERRLA, conjunctiva and sclera clear, No JVD  PULMONARY: Clear to auscultation bilaterally; No wheeze  CARDIOVASCULAR: Regular rate and rhythm; No murmurs, rubs, or gallops  GASTROINTESTINAL: Soft, Nontender, Nondistended; Bowel sounds present  MUSCULOSKELETAL: edema B/l LLE+  2+ Peripheral Pulses, No clubbing, cyanosis  NEUROLOGY: non-focal  SKIN: No rashes or lesions    ASSESSMENT & PLAN  78 year old female with a significant PMhx of Parkinson's disease and RA who presented to the ED after a witnessed episode of syncope, associated with recent generalized weakness, dark urine, and N/V. She also complained of L-eye blurriness x2 days and headaches over the last few weeks.     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 Suspect potential source of emboli from vegetation along the mitral valve.   - No jaw claudication or scalp tenderness. No fevers, unintentional weight loss  ruled out GCA; discontinued pulse steroids(3 doses of pulse steroids(500mg x2 and 1000mg x1)  as per rheum  - ESR elevated (likely inflammatory vs malignancy vs infectious , CK wnl ; CRP 10  -Started on heparin drip 1700 units, fu PTT @ 4pm and 11 30 pm    #) New vegetation on Posterior MV leaflet on TTE  -She had her JOI on 10/16/2019 showing: Sessile dense, solid, partly calcified density attached to the base and body of posterior leaflets (P2, P3 and base) of mitral valve with mobile density protruding to left atrium suggestive of organized calcified chronic vegetation most likely. Alternately organized calcified thrombus. There was no abscess or filling defect or regurgitation through the leaflets. extensive calcified atheroma seen in the ascending and descending aorta, Mild-moderate MR. Mild TR.      # Acute Cystitis  -symptomatic, UA WBC 13.16, UCX >3 org  -continue Rocephin (day 4) last day tomorrow as per ID  -repeat UA and cultures sent  -Blood cx pending    # New prominence of the right paratracheal region on CXR with right sided pleural effusion with increased reticular opacities  -CT chest no contrast shows enlarged left lobe of thyroid , no suspicion of malignancy  -Follow am thyroid profile  -Quantiferon Tb test ordered as per family's request    # Vegetation on PMVL on ECHO  - No chest pain  - ECHO from 10/13 shows LVEF of 73%, with vegetation on PMVL   -cardio consult pending for a possible TTE  -BLood cx pending    # Hx of Parkinson's Disease  -Continue home medications    # Hx of RA  -Not on DMARDS   -c/o knee pain likely OA   -Anti CCP ab pending, RA factor normal ordered for completeness as suggested by rheum  -Outpt rheum follow up      #) Hx of HTN and HLD  -Holding HCTZ, continue Statin    GI ppx: Not indicated    DVT ppx: Heparin drip    Diet, DASH/TLC:   Sodium & Cholesterol Restricted   Activity: Increase as Tolerated     DISPO:  Patient to be discharged when condition(s) optimized.    CODE STATUS  [X] FULL     Pending: Cardio, thyroid profile , anti CCP Ab,r, Blood Cx JON GARCÍA 87y Female  MRN#: [id number]   CODE STATUS: FULL      SUBJECTIVE  Patient is a 87y old Female who presents with a chief complaint of Syncope; Left eye blurriness (16 Oct 2019 15:19)    Currently admitted to medicine with the primary diagnosis of Central Retinal Artery Occlusion     Today is hospital day 5d,   OVERNIGHT EVENTS: none  This morning she is laying in bed comfortably . She had her JOI yesterday  Denies chest pain, shortness of breath, abdominal pain, nausea, vomiting or changes in bowel habits.     Urinating ans stooling appropriately.    Present Today:           Jacques Catheter (x)No/ ()Yes?   Indication:             Central Line (x)No/ ()Yes?   Indication:          IV Fluids (x)No/ ()Yes? Type:  Rate:  Indication:    OBJECTIVE  PAST MEDICAL & SURGICAL HISTORY  Hyperlipidemia  Rheumatoid arthritis  HTN (hypertension)  Parkinson disease  History of hysterectomy    ALLERGIES:  No Known Allergies    HOME MEDICATIONS:  Home Medications:  atorvastatin 20 mg oral tablet: 1 tab(s) orally once a day (12 Oct 2019 18:49)  carbidopa-levodopa:  (12 Oct 2019 18:49)  celecoxib 200 mg oral capsule: 1 cap(s) orally 2 times a day (12 Oct 2019 18:49)  dilTIAZem 360 mg/24 hours oral capsule, extended release: 1 cap(s) orally once a day (12 Oct 2019 18:49)  hydroCHLOROthiazide 12.5 mg oral tablet: 1 tab(s) orally once a day (12 Oct 2019 18:49)  pramipexole 0.5 mg oral tablet: 1 tab(s) orally 3 times a day (12 Oct 2019 18:49)    MEDICATIONS:  STANDING MEDICATIONS  atorvastatin 20 milliGRAM(s) Oral at bedtime  carbidopa/levodopa CR 50/200 1 Tablet(s) Oral <User Schedule>  cefTRIAXone   IVPB 1000 milliGRAM(s) IV Intermittent every 24 hours  chlorhexidine 4% Liquid 1 Application(s) Topical <User Schedule>  diltiazem    milliGRAM(s) Oral daily  heparin  Infusion 1700 Unit(s)/Hr (17 mL/Hr) IV Continuous <Continuous>  influenza   Vaccine 0.5 milliLiter(s) IntraMuscular once  pantoprazole    Tablet 40 milliGRAM(s) Oral before breakfast  pramipexole 0.5 milliGRAM(s) Oral three times a day    PRN MEDICATIONS  acetaminophen   Tablet .. 650 milliGRAM(s) Oral every 6 hours PRN      VITAL SIGNS: Last 24 Hours  T(C): 36.3 (17 Oct 2019 05:17), Max: 36.3 (17 Oct 2019 05:17)  T(F): 97.3 (17 Oct 2019 05:17), Max: 97.3 (17 Oct 2019 05:17)  HR: 64 (17 Oct 2019 05:17) (64 - 84)  BP: 156/70 (17 Oct 2019 05:17) (153/72 - 156/70)  RR: 20 (17 Oct 2019 05:17) (20 - 20)    LABS:                        12.3   12.36 )-----------( 300      ( 17 Oct 2019 05:55 )             36.7     10-17    141  |  104  |  32<H>  ----------------------------<  140<H>  4.1   |  19  |  0.9    Ca    8.3<L>      17 Oct 2019 05:55  Mg     2.3     10-17    TPro  5.6<L>  /  Alb  3.2<L>  /  TBili  0.2  /  DBili  x   /  AST  10  /  ALT  <5  /  AlkPhos  73  10-16    PTT - ( 17 Oct 2019 05:55 )  PTT:>200.0 sec    Culture - Blood (collected 15 Oct 2019 05:08)  Source: .Blood None  Preliminary Report (16 Oct 2019 13:01):    No growth to date.    Culture - Blood (collected 14 Oct 2019 17:26)  Source: .Blood None  Preliminary Report (16 Oct 2019 04:01):    No growth to date.      RADIOLOGY:  VA Duplex Carotid, Bilat (10.15.19 @ 13:56)     20-39% stenosis of the right internal carotid artery.  20-39% stenosis of the left internal carotid artery.    CT Chest No Cont (10.15.19 @ 12:49)   Enlarged nodular left lobe of thyroid gland with associated deviation of   trachea to the right, accounting for the  right paratracheal density on   x-ray.      No suspicious mediastinal adenopathy or pulmonary nodules..  2 mm solid nodule, right lower lobe (4/141).    No endobronchial obstruction, mass.     Bilateral small pleural effusions with adjacent lower lobe atelectasis      ECHO:  Transthoracic Echocardiogram:    EXAM:  2-D ECHO (TTE) COMPLETE      PROCEDURE DATE:  10/13/2019      INTERPRETATION:  REPORT:    TRANSTHORACIC ECHOCARDIOGRAM REPORT    Patient Name:   JON GARCÍA Accession #: [id number]  Medical Rec #:  [id number]     Height:      64.0 in 162.6 cm  YOB: 1932    Weight:      130.0 lb 58.97 kg  Patient Age:    87 years     BSA:         1.63 m²  Patient Gender: F            BP:          155/70 mmHg    Date of Exam:        10/13/2019 9:46:04 AM  Referring Physician: [id number] FLORESITA VILLALBA  Sonographer:         Jonna Gifford  Reading Physician:   Kb Liz M.D.    Procedure:   2D Echo/Doppler/Color Doppler Complete.  Indications: R55 - Syncope and Collapse  Diagnosis:   Syncope and collapse - R55    Summary:   1. Normal global left ventricular systolic function.   2. LV Ejection Fraction by Sparks's Method with a biplane EF of 74 %.   3. Normal left ventricular internal cavity size.   4. Normal right atrial size.   5. There is no evidence of pericardial effusion.   6. Mild mitral valve regurgitation.   7. Mild thickening and calcification of the anterior and posterior   mitral valve leaflets.   8. Echodensity noted on PMVL atrial aspect which is suggestive of a   valvular vegetation.   9. Moderate tricuspid regurgitation.    PHYSICIAN INTERPRETATION:  Left Ventricle: The left ventricular internal cavity size is normal. Left   ventricular wall thickness is normal. Global LV systolic function was   normal.  Right Ventricle: The right ventricular size isnormal. RV systolic   function is normal.  Left Atrium: Normal left atrial size.  Right Atrium: Normal right atrial size.  Pericardium: There is no evidence of pericardial effusion.  Mitral Valve: Mild thickening and calcification of the anterior and  posterior mitral valve leaflets. Mild mitral valve regurgitation is seen.   Echodensity noted on PMVL atrial aspect which is suggestive of a valvular   vegetation.  Tricuspid Valve: The tricuspid valve is normal in structure. Moderate   tricuspid regurgitation is visualized.  Aortic Valve: The aortic valve is trileaflet. No evidence of aortic   stenosis. Aortic valve thickening with normal leaflet opening. No   evidence of aortic valve regurgitation is seen.  Pulmonic Valve: The pulmonic valve is thickened with good excursion.   Trace pulmonic valve regurgitation.  Aorta: Aortic root measured at Sinus of Valsalva is normal.  Venous: The inferior vena cava is normal.       2D AND M-MODE MEASUREMENTS (normal ranges within parentheses):  Left                Normal   Aorta/Left             Normal  Ventricle:                     Atrium:  IVSd (2D):  0.83 cm  (0.7-1.1) AoV Cusp       1.68  (1.5-2.6)  LVPWd (2D): 0.79 cm  (0.7-1.1) Separation:     cm  LVIDd (2D): 4.75 cm  (3.4-5.7) Left Atrium    3.29  (1.9-4.0)  LVIDs (2D): 2.44 cm            (Mmode):        cm  LV FS (2D):  48.6 %   (>25%)   LA Volume      37.2  Relative      0.33    (<0.42)  Index         ml/m²  Wall   Right  Thickness Ventricle:   RVd (2D):      3.08 cm    SPECTRAL DOPPLER ANALYSIS:  LV DIASTOLIC FUNCTION:  MV Peak E: 1.34 m/s Decel Time: 245 msec  MV Peak A: 1.06 m/s  E/A Ratio: 1.27    Aortic Valve:  AoV VMax:    2.02 m/s  AoV Area, Vmax: 1.96 cm² Vmax Indx: 1.20 cm²/m²  AoV VTI:     0.41 m    AoV Area, VTI:  2.00 cm² VTI Indx:  1.23 cm²/m²  AoV Pk Grad: 16.3 mmHg  AoV Mn Grad: 8.4 mmHg    LVOT Vmax: 1.51 m/s  LVOT VTI:  0.31 m  LVOT Diam: 1.82 cm    Mitral Valve:  MV P1/2 Time: 70.94 msec  MV Area, PHT: 3.10 cm²    Tricuspid Valve and PA/RV Systolic Pressure: TR Max Velocity: 3.13 m/s RA   Pressure:  RVSP/PASP: 41.3 mmHg    Pulmonic Valve:  PV Max Velocity: 1.34 m/s PV Max P.2 mmHg PV Mean PG:    [id number] Kb Liz M.D., Electronically signed on 10/13/2019 at   2:40:34 PM   *** Final ***  KB LIZ MD  This document has been electronically signed. Oct 13 2019  9:46AM (10-13-19 @ 09:46)    PHYSICAL EXAM:  GENERAL: NAD, well-developed, AAOx3  HEENT:  B/L cataract + Atraumatic, Normocephalic. EOMI, PERRLA, conjunctiva and sclera clear, No JVD  PULMONARY: Clear to auscultation bilaterally; No wheeze  CARDIOVASCULAR: Regular rate and rhythm; No murmurs, rubs, or gallops  GASTROINTESTINAL: Soft, Nontender, Nondistended; Bowel sounds present  MUSCULOSKELETAL: edema B/l LLE+  2+ Peripheral Pulses, No clubbing, cyanosis  NEUROLOGY: non-focal  SKIN: No rashes or lesions    ASSESSMENT & PLAN  78 year old female with a significant PMhx of Parkinson's disease and RA who presented to the ED after a witnessed episode of syncope, associated with recent generalized weakness, dark urine, and N/V. She also complained of L-eye blurriness x2 days and headaches over the last few weeks.     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 Suspect potential source of emboli from vegetation along the mitral valve.   - No jaw claudication or scalp tenderness. No fevers, unintentional weight loss  ruled out GCA; discontinued pulse steroids(3 doses of pulse steroids(500mg x2 and 1000mg x1)  as per rheum  - ESR elevated (likely inflammatory vs malignancy vs infectious , CK wnl ; CRP 10  -Started on heparin drip 1700 units, fu PTT @ 4pm and 11 30 pm    #) New vegetation on Posterior MV leaflet on TTE  - No chest pain, afebrile  -She had her JOI on 10/16/2019 showing: Sessile dense, solid, partly calcified density attached to the base and body of posterior leaflets (P2, P3 and base) of mitral valve with mobile density protruding to left atrium suggestive of organized calcified chronic vegetation most likely. Alternately organized calcified thrombus. There was no abscess or filling defect or regurgitation through the leaflets. extensive calcified atheroma seen in the ascending and descending aorta, Mild-moderate MR. Mild TR.  -Blood Cx x2 NG  -Neuro recommended MRI brain no contrast , MR Angio head and neck w and w/o     # Acute Cystitis  -symptomatic, UA WBC 13.16, UCX >3 org  -continue Rocephin (day 4) last day tomorrow as per ID  -repeat UA and cultures sent  -Blood cx pending    # New prominence of the right paratracheal region on CXR with right sided pleural effusion with increased reticular opacities  -CT chest no contrast shows enlarged left lobe of thyroid , no suspicion of malignancy  -TSH:0.04, T4 normal  -Quantiferon Tb test :indeterminate    # Hx of Parkinson's Disease  -Continue home medications    # Hx of RA  -Not on DMARDS   -c/o knee pain likely OA   -Anti CCP ab negative, RA factor normal ordered for completeness as suggested by rheum  -Outpt rheum follow up    #) Hx of HTN and HLD  -Holding HCTZ, continue Statin    GI ppx: Not indicated    DVT ppx: Heparin drip    Diet, DASH/TLC:   Sodium & Cholesterol Restricted   Activity: Increase as Tolerated     DISPO:  Patient to be discharged when condition(s) optimized.    CODE STATUS  [X] FULL     Pending: MRI brain, MRA head and neck w w/o contrast JON GARCÍA 87y Female  MRN#: [id number]   CODE STATUS: FULL      SUBJECTIVE  Patient is a 87y old Female who presents with a chief complaint of Syncope; Left eye blurriness (16 Oct 2019 15:19)    Currently admitted to medicine with the primary diagnosis of Central Retinal Artery Occlusion     Today is hospital day 5d,   OVERNIGHT EVENTS: none  This morning she is laying in bed comfortably . She had her JOI yesterday  Denies chest pain, shortness of breath, abdominal pain, nausea, vomiting or changes in bowel habits.     Urinating ans stooling appropriately.    Present Today:           Jacques Catheter (x)No/ ()Yes?   Indication:             Central Line (x)No/ ()Yes?   Indication:          IV Fluids (x)No/ ()Yes? Type:  Rate:  Indication:    OBJECTIVE  PAST MEDICAL & SURGICAL HISTORY  Hyperlipidemia  Rheumatoid arthritis  HTN (hypertension)  Parkinson disease  History of hysterectomy    ALLERGIES:  No Known Allergies    HOME MEDICATIONS:  Home Medications:  atorvastatin 20 mg oral tablet: 1 tab(s) orally once a day (12 Oct 2019 18:49)  carbidopa-levodopa:  (12 Oct 2019 18:49)  celecoxib 200 mg oral capsule: 1 cap(s) orally 2 times a day (12 Oct 2019 18:49)  dilTIAZem 360 mg/24 hours oral capsule, extended release: 1 cap(s) orally once a day (12 Oct 2019 18:49)  hydroCHLOROthiazide 12.5 mg oral tablet: 1 tab(s) orally once a day (12 Oct 2019 18:49)  pramipexole 0.5 mg oral tablet: 1 tab(s) orally 3 times a day (12 Oct 2019 18:49)    MEDICATIONS:  STANDING MEDICATIONS  atorvastatin 20 milliGRAM(s) Oral at bedtime  carbidopa/levodopa CR 50/200 1 Tablet(s) Oral <User Schedule>  cefTRIAXone   IVPB 1000 milliGRAM(s) IV Intermittent every 24 hours  chlorhexidine 4% Liquid 1 Application(s) Topical <User Schedule>  diltiazem    milliGRAM(s) Oral daily  heparin  Infusion 1700 Unit(s)/Hr (17 mL/Hr) IV Continuous <Continuous>  influenza   Vaccine 0.5 milliLiter(s) IntraMuscular once  pantoprazole    Tablet 40 milliGRAM(s) Oral before breakfast  pramipexole 0.5 milliGRAM(s) Oral three times a day    PRN MEDICATIONS  acetaminophen   Tablet .. 650 milliGRAM(s) Oral every 6 hours PRN      VITAL SIGNS: Last 24 Hours  T(C): 36.3 (17 Oct 2019 05:17), Max: 36.3 (17 Oct 2019 05:17)  T(F): 97.3 (17 Oct 2019 05:17), Max: 97.3 (17 Oct 2019 05:17)  HR: 64 (17 Oct 2019 05:17) (64 - 84)  BP: 156/70 (17 Oct 2019 05:17) (153/72 - 156/70)  RR: 20 (17 Oct 2019 05:17) (20 - 20)    LABS:                        12.3   12.36 )-----------( 300      ( 17 Oct 2019 05:55 )             36.7     10-17    141  |  104  |  32<H>  ----------------------------<  140<H>  4.1   |  19  |  0.9    Ca    8.3<L>      17 Oct 2019 05:55  Mg     2.3     10-17    TPro  5.6<L>  /  Alb  3.2<L>  /  TBili  0.2  /  DBili  x   /  AST  10  /  ALT  <5  /  AlkPhos  73  10-16    PTT - ( 17 Oct 2019 05:55 )  PTT:>200.0 sec    Culture - Blood (collected 15 Oct 2019 05:08)  Source: .Blood None  Preliminary Report (16 Oct 2019 13:01):    No growth to date.    Culture - Blood (collected 14 Oct 2019 17:26)  Source: .Blood None  Preliminary Report (16 Oct 2019 04:01):    No growth to date.      RADIOLOGY:  VA Duplex Carotid, Bilat (10.15.19 @ 13:56)     20-39% stenosis of the right internal carotid artery.  20-39% stenosis of the left internal carotid artery.    CT Chest No Cont (10.15.19 @ 12:49)   Enlarged nodular left lobe of thyroid gland with associated deviation of   trachea to the right, accounting for the  right paratracheal density on   x-ray.      No suspicious mediastinal adenopathy or pulmonary nodules..  2 mm solid nodule, right lower lobe (4/141).    No endobronchial obstruction, mass.     Bilateral small pleural effusions with adjacent lower lobe atelectasis      ECHO:  Transthoracic Echocardiogram:    EXAM:  2-D ECHO (TTE) COMPLETE      PROCEDURE DATE:  10/13/2019      INTERPRETATION:  REPORT:    TRANSTHORACIC ECHOCARDIOGRAM REPORT    Patient Name:   JON GARCÍA Accession #: [id number]  Medical Rec #:  [id number]     Height:      64.0 in 162.6 cm  YOB: 1932    Weight:      130.0 lb 58.97 kg  Patient Age:    87 years     BSA:         1.63 m²  Patient Gender: F            BP:          155/70 mmHg    Date of Exam:        10/13/2019 9:46:04 AM  Referring Physician: [id number] FLORESITA VILALLBA  Sonographer:         Jonna Gifford  Reading Physician:   Kb Liz M.D.    Procedure:   2D Echo/Doppler/Color Doppler Complete.  Indications: R55 - Syncope and Collapse  Diagnosis:   Syncope and collapse - R55    Summary:   1. Normal global left ventricular systolic function.   2. LV Ejection Fraction by Sparks's Method with a biplane EF of 74 %.   3. Normal left ventricular internal cavity size.   4. Normal right atrial size.   5. There is no evidence of pericardial effusion.   6. Mild mitral valve regurgitation.   7. Mild thickening and calcification of the anterior and posterior   mitral valve leaflets.   8. Echodensity noted on PMVL atrial aspect which is suggestive of a   valvular vegetation.   9. Moderate tricuspid regurgitation.    PHYSICIAN INTERPRETATION:  Left Ventricle: The left ventricular internal cavity size is normal. Left   ventricular wall thickness is normal. Global LV systolic function was   normal.  Right Ventricle: The right ventricular size isnormal. RV systolic   function is normal.  Left Atrium: Normal left atrial size.  Right Atrium: Normal right atrial size.  Pericardium: There is no evidence of pericardial effusion.  Mitral Valve: Mild thickening and calcification of the anterior and  posterior mitral valve leaflets. Mild mitral valve regurgitation is seen.   Echodensity noted on PMVL atrial aspect which is suggestive of a valvular   vegetation.  Tricuspid Valve: The tricuspid valve is normal in structure. Moderate   tricuspid regurgitation is visualized.  Aortic Valve: The aortic valve is trileaflet. No evidence of aortic   stenosis. Aortic valve thickening with normal leaflet opening. No   evidence of aortic valve regurgitation is seen.  Pulmonic Valve: The pulmonic valve is thickened with good excursion.   Trace pulmonic valve regurgitation.  Aorta: Aortic root measured at Sinus of Valsalva is normal.  Venous: The inferior vena cava is normal.       2D AND M-MODE MEASUREMENTS (normal ranges within parentheses):  Left   Normal   Aorta/Left             Normal  Ventricle: Atrium:  IVSd (2D):  0.83 cm  (0.7-1.1) AoV Cusp       1.68  (1.5-2.6)  LVPWd (2D): 0.79 cm  (0.7-1.1) Separation:     cm  LVIDd (2D): 4.75 cm  (3.4-5.7) Left Atrium    3.29  (1.9-4.0)  LVIDs (2D): 2.44 cm            (Mmode):        cm  LV FS (2D):  48.6 %   (>25%)   LA Volume      37.2  Relative      0.33    (<0.42)  Index         ml/m²  Wall   Right  Thickness Ventricle:   RVd (2D):      3.08 cm    SPECTRAL DOPPLER ANALYSIS:  LV DIASTOLIC FUNCTION:  MV Peak E: 1.34 m/s Decel Time: 245 msec  MV Peak A: 1.06 m/s  E/A Ratio: 1.27    Aortic Valve:  AoV VMax:    2.02 m/s  AoV Area, Vmax: 1.96 cm² Vmax Indx: 1.20 cm²/m²  AoV VTI:     0.41 m    AoV Area, VTI:  2.00 cm² VTI Indx:  1.23 cm²/m²  AoV Pk Grad: 16.3 mmHg  AoV Mn Grad: 8.4 mmHg    LVOT Vmax: 1.51 m/s  LVOT VTI:  0.31 m  LVOT Diam: 1.82 cm    Mitral Valve:  MV P1/2 Time: 70.94 msec  MV Area, PHT: 3.10 cm²    Tricuspid Valve and PA/RV Systolic Pressure: TR Max Velocity: 3.13 m/s RA   Pressure:  RVSP/PASP: 41.3 mmHg    Pulmonic Valve:  PV Max Velocity: 1.34 m/s PV Max P.2 mmHg PV Mean PG:    [id number] Kb Liz M.D., Electronically signed on 10/13/2019 at   2:40:34 PM   *** Final ***  KB LIZ MD  This document has been electronically signed. Oct 13 2019  9:46AM (10-13-19 @ 09:46)    PHYSICAL EXAM:  GENERAL: NAD, well-developed, AAOx3  HEENT:  B/L cataract + Atraumatic, Normocephalic. EOMI, PERRLA, conjunctiva and sclera clear, No JVD  PULMONARY: Clear to auscultation bilaterally; No wheeze  CARDIOVASCULAR: Regular rate and rhythm; No murmurs, rubs, or gallops  GASTROINTESTINAL: Soft, Nontender, Nondistended; Bowel sounds present  MUSCULOSKELETAL: edema B/l LLE+  2+ Peripheral Pulses, No clubbing, cyanosis  NEUROLOGY: non-focal  SKIN: No rashes or lesions    ASSESSMENT & PLAN  78 year old female with a significant PMhx of Parkinson's disease and RA who presented to the ED after a witnessed episode of syncope, associated with recent generalized weakness, dark urine, and N/V. She also complained of L-eye blurriness x2 days and headaches over the last few weeks.     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 Suspect potential source of emboli from vegetation along the mitral valve.   - No jaw claudication or scalp tenderness. No fevers, unintentional weight loss  ruled out GCA; discontinued pulse steroids(3 doses of pulse steroids(500mg x2 and 1000mg x1)  as per rheum  -ESR elevated (likely inflammatory vs malignancy vs infectious , CK wnl ; CRP 10  -Continue heparin drip 1500 units, fu PTT @ 4pm and 11 30 pm for 1 more day    #) New vegetation on Posterior MV leaflet on TTE  - No chest pain, afebrile  -She had her JOI on 10/16/2019 showing: Sessile dense, solid, partly calcified density attached to the base and body of posterior leaflets (P2, P3 and base) of mitral valve with mobile density protruding to left atrium suggestive of organized calcified chronic vegetation most likely. Alternately organized calcified thrombus. There was no abscess or filling defect or regurgitation through the leaflets. extensive calcified atheroma seen in the ascending and descending aorta, Mild-moderate MR. Mild TR.  -Blood Cx x2 NG  -Repeat Blood Cx daily am for 3 days as per ID  -Neuro recommended CT Angio Head and neck     # Acute Cystitis  -UA WBC 13.16, UCX >3 org  -continue Rocephin (day 5) last day today   -completed the course     # New prominence of the right paratracheal region on CXR with right sided pleural effusion with increased reticular opacities  -CT chest no contrast shows enlarged left lobe of thyroid , no suspicion of malignancy  -TSH:0.04, T4 normal  -Quantiferon Tb test : indeterminate    # Hx of Parkinson's Disease  -Continue home medications    # Hx of RA  -Not on DMARDS   -c/o knee pain likely OA   -Anti CCP ab negative, RA factor normal ordered for completeness as suggested by rheum  -Outpt rheum follow up    #) Hx of HTN and HLD  -Holding HCTZ, continue Statin    GI ppx: Not indicated    DVT ppx: Heparin drip    Diet, DASH/TLC:   Sodium & Cholesterol Restricted   Activity: Increase as Tolerated     DISPO:  Patient to be discharged when condition(s) optimized.    CODE STATUS  [X] FULL     Pending: CT Angio Head and Neck

## 2019-10-17 NOTE — PROGRESS NOTE ADULT - SUBJECTIVE AND OBJECTIVE BOX
JOELJERJON  87y, Female  Allergy: No Known Allergies      CHIEF COMPLAINT: Syncope; Left eye blurriness (17 Oct 2019 12:35)      INTERVAL EVENTS/HPI  - No acute events overnight, JOI with "Sessile dense, solid, partly calcified density attached to the base and body of posterior leaflets (P2, P3 and base) of mitral valve with mobile density protruding to left atrium suggestive of organized calcified chronic vegetation most likely."  - T(F): , Max: 97.3 (10-17-19 @ 05:17)  - Denies any worsening symptoms  - Tolerating medication  - WBC Count: 12.36 (10-17-19 @ 05:55)    ROS  General: Denies rigors, nightsweats  HEENT: Denies headache, rhinorrhea, sore throat, eye pain  CV: Denies CP, palpitations  PULM: Denies SOB, wheezing  GI: Denies hematemesis, hematochezia, melena  : Denies discharge, hematuria  MSK: Denies arthralgias, myalgias  SKIN: Denies rash, lesions  NEURO: Denies paresthesias, weakness  PSYCH: Denies depression, anxiety    VITALS:  T(F): 96.7, Max: 97.3 (10-17-19 @ 05:17)  HR: 73  BP: 126/68  RR: 18Vital Signs Last 24 Hrs  T(C): 35.9 (17 Oct 2019 12:45), Max: 36.3 (17 Oct 2019 05:17)  T(F): 96.7 (17 Oct 2019 12:45), Max: 97.3 (17 Oct 2019 05:17)  HR: 73 (17 Oct 2019 12:45) (64 - 84)  BP: 126/68 (17 Oct 2019 12:45) (126/68 - 156/70)  BP(mean): --  RR: 18 (17 Oct 2019 12:45) (18 - 20)  SpO2: --    PHYSICAL EXAM:  Gen: Elderly F NAD, resting in bed  HEENT: Normocephalic, atraumatic  Neck: supple, no lymphadenopathy  CV: Regular rate & regular rhythm no murmur  Lungs: decreased BS at bases, no fremitus  Abdomen: Soft, BS present  Ext: Warm, well perfused, no stigmata of IE  Neuro: non focal, awake, tremor  Skin: no rash, no erythema  Lines: no phlebitis      FH: Non-contributory  Social Hx: Non-contributory    TESTS & MEASUREMENTS:                        12.3   12.36 )-----------( 300      ( 17 Oct 2019 05:55 )             36.7     10-17    141  |  104  |  32<H>  ----------------------------<  140<H>  4.1   |  19  |  0.9    Ca    8.3<L>      17 Oct 2019 05:55  Mg     2.3     10-17    TPro  5.6<L>  /  Alb  3.2<L>  /  TBili  0.2  /  DBili  x   /  AST  10  /  ALT  <5  /  AlkPhos  73  10-16    eGFR if Non African American: 57 mL/min/1.73M2 (10-17-19 @ 05:55)  eGFR if African American: 67 mL/min/1.73M2 (10-17-19 @ 05:55)    LIVER FUNCTIONS - ( 16 Oct 2019 05:14 )  Alb: 3.2 g/dL / Pro: 5.6 g/dL / ALK PHOS: 73 U/L / ALT: <5 U/L / AST: 10 U/L / GGT: x               Culture - Blood (collected 10-15-19 @ 05:08)  Source: .Blood None  Preliminary Report (10-16-19 @ 13:01):    No growth to date.    Culture - Blood (collected 10-14-19 @ 17:26)  Source: .Blood None  Preliminary Report (10-16-19 @ 04:01):    No growth to date.    Culture - Urine (collected 10-13-19 @ 03:00)  Source: .Urine None  Final Report (10-14-19 @ 07:44):    >=3 organisms. Probable collection contamination.        Lactate, Blood: 0.8 mmol/L (10-13-19 @ 00:40)      INFECTIOUS DISEASES TESTING      RADIOLOGY & ADDITIONAL TESTS:  I have personally reviewed the last Chest xray  CXR      CT  CT Chest No Cont:   EXAM:  CT CHEST            PROCEDURE DATE:  10/15/2019            INTERPRETATION:  Reason for Exam:  Right paratracheal opacity on x-ray.  CT of the chest was performed from the thoracic inlet to the level of the   adrenal glands without contrast injection. Coronal and sagittal images   have been submitted.    Comparison: Chest x-ray-10/12/2019. No prior thoracic CT scans    Findings:     Tubes/Lines: None    Lungs, Pleura, and Airways: No endobronchial obstruction, mass. Bilateral   small pleural effusions with adjacent lower lobe atelectasis. No   bronchiectasis or honeycombing.    Pulmonary nodules: No suspicious pulmonary nodules.    2 mm solid nodule, right lower lobe (4/141).  Multiple punctate calcified granulomata.    Mediastinum/Lymph Nodes:Enlarged nodular left lobe of thyroid gland with   associated deviation of trachea to the right accounting for the apparent   right paratracheal density on x-ray.    No adenopathy.    Heart/Great Vessels: No pericardial effusions. Great vessels are normal   in caliber. Coronary artery, mitral annular and aortic calcifications are   present.    Abdomen: Nonobstructing 3 x 2 mm calculus, left kidney (4/213)    Bones and soft tissues: Stable degenerative changes. No suspicious   osseous lesions.    IMPRESSION:    Enlarged nodular left lobe of thyroid gland with associated deviation of   trachea to the right, accounting for the  right paratracheal density on   x-ray.      No suspicious mediastinal adenopathy or pulmonary nodules..  2 mm solid nodule, right lower lobe (4/141).    No endobronchial obstruction, mass.     Bilateral small pleural effusions with adjacent lower lobe atelectasis.                     RUDDY CORNEJO M.D., ATTENDING RADIOLOGIST  This document has been electronically signed. Oct 15 2019  1:29PM             (10-15-19 @ 12:49)      CARDIOLOGY TESTING  12 Lead ECG:   Ventricular Rate 85 BPM    Atrial Rate 85 BPM    P-R Interval 158 ms    QRS Duration 88 ms    Q-T Interval 372 ms    QTC Calculation(Bezet) 442 ms    P Axis 63 degrees    R Axis 22 degrees    T Axis 23 degrees    Diagnosis Line Sinus rhythm with Premature atrial complexes  Otherwise normal ECG    Confirmed by KB LIZ MD ([id number]) on 10/13/2019 3:55:39 PM (10-13-19 @ 10:57)  Transthoracic Echocardiogram:    EXAM:  2-D ECHO (TTE) COMPLETE        PROCEDURE DATE:  10/13/2019      INTERPRETATION:  REPORT:    TRANSTHORACIC ECHOCARDIOGRAM REPORT         Patient Name:   JON GARCÍA Accession #: [id number]  Medical Rec #:  [id number]     Height:      64.0 in 162.6 cm  YOB: 1932    Weight:      130.0 lb 58.97 kg  Patient Age:    87 years     BSA:         1.63 m²  Patient Gender: F            BP:          155/70 mmHg       Date of Exam:        10/13/2019 9:46:04 AM  Referring Physician: [id number] FLORESITA VILLALBA  Sonographer:         Jonna Gifford  Reading Physician:   Kb Liz M.D.    Procedure:   2D Echo/Doppler/Color Doppler Complete.  Indications: R55 - Syncope and Collapse  Diagnosis:   Syncope and collapse - R55         Summary:   1. Normal global left ventricular systolic function.   2. LV Ejection Fraction by Sparks's Method with a biplane EF of 74 %.   3. Normal left ventricular internal cavity size.   4. Normal right atrial size.   5. There is no evidence of pericardial effusion.   6. Mild mitral valve regurgitation.   7. Mild thickening and calcification of the anterior and posterior   mitral valve leaflets.   8. Echodensity noted on PMVL atrial aspect which is suggestive of a   valvular vegetation.   9. Moderate tricuspid regurgitation.    PHYSICIAN INTERPRETATION:  Left Ventricle: The left ventricular internal cavity size is normal. Left   ventricular wall thickness is normal. Global LV systolic function was   normal.  Right Ventricle: The right ventricular size isnormal. RV systolic   function is normal.  Left Atrium: Normal left atrial size.  Right Atrium: Normal right atrial size.  Pericardium: There is no evidence of pericardial effusion.  Mitral Valve: Mild thickening and calcification of the anterior and  posterior mitral valve leaflets. Mild mitral valve regurgitation is seen.   Echodensity noted on PMVL atrial aspect which is suggestive of a valvular   vegetation.  Tricuspid Valve: The tricuspid valve is normal in structure. Moderate   tricuspid regurgitation is visualized.  Aortic Valve: The aortic valve is trileaflet. No evidence of aortic   stenosis. Aortic valve thickening with normal leaflet opening. No   evidence of aortic valve regurgitation is seen.  Pulmonic Valve: The pulmonic valve is thickened with good excursion.   Trace pulmonic valve regurgitation.  Aorta: Aortic root measured at Sinus of Valsalva is normal.  Venous: The inferior vena cava is normal.       2D AND M-MODE MEASUREMENTS (normal ranges within parentheses):  Left                Normal   Aorta/Left             Normal  Ventricle:                     Atrium:  IVSd (2D):  0.83 cm  (0.7-1.1) AoV Cusp       1.68  (1.5-2.6)  LVPWd (2D): 0.79 cm  (0.7-1.1) Separation:     cm  LVIDd (2D): 4.75 cm  (3.4-5.7) Left Atrium    3.29  (1.9-4.0)  LVIDs (2D): 2.44 cm            (Mmode):        cm  LV FS (2D):  48.6 %   (>25%)   LA Volume      37.2  Relative      0.33    (<0.42)  Index         ml/m²  Wall                           Right  Thickness                      Ventricle:                                 RVd (2D):      3.08 cm    SPECTRAL DOPPLER ANALYSIS:  LV DIASTOLIC FUNCTION:  MV Peak E: 1.34 m/s Decel Time: 245 msec  MV Peak A: 1.06 m/s  E/A Ratio: 1.27    Aortic Valve:  AoV VMax:    2.02 m/s  AoV Area, Vmax: 1.96 cm² Vmax Indx: 1.20 cm²/m²  AoV VTI:     0.41 m    AoV Area, VTI:  2.00 cm² VTI Indx:  1.23 cm²/m²  AoV Pk Grad: 16.3 mmHg  AoV Mn Grad: 8.4 mmHg    LVOT Vmax: 1.51 m/s  LVOT VTI:  0.31 m  LVOT Diam: 1.82 cm    Mitral Valve:  MV P1/2 Time: 70.94 msec  MV Area, PHT: 3.10 cm²    Tricuspid Valve and PA/RV Systolic Pressure: TR Max Velocity: 3.13 m/s RA   Pressure:  RVSP/PASP: 41.3 mmHg    Pulmonic Valve:  PV Max Velocity: 1.34 m/s PV Max P.2 mmHg PV Mean PG:       [id number] Kb Liz M.D., Electronically signed on 10/13/2019 at   2:40:34 PM              *** Final ***                    KB LIZ MD  This document has been electronically signed. Oct 13 2019  9:46AM             (10-13-19 @ 09:46)      MEDICATIONS  atorvastatin 20  carbidopa/levodopa CR 50/200 1  chlorhexidine 4% Liquid 1  diltiazem     heparin  Infusion 1500  influenza   Vaccine 0.5  pantoprazole    Tablet 40  pramipexole 0.5      ANTIBIOTICS:      All available historical records have been reviewed

## 2019-10-18 ENCOUNTER — TRANSCRIPTION ENCOUNTER (OUTPATIENT)
Age: 84
End: 2019-10-18

## 2019-10-18 LAB
ANION GAP SERPL CALC-SCNC: 17 MMOL/L — HIGH (ref 7–14)
APTT BLD: 188.6 SEC — CRITICAL HIGH (ref 27–39.2)
APTT BLD: 58.1 SEC — HIGH (ref 27–39.2)
APTT BLD: >200 SEC — CRITICAL HIGH (ref 27–39.2)
BASOPHILS # BLD AUTO: 0.11 K/UL — SIGNIFICANT CHANGE UP (ref 0–0.2)
BASOPHILS NFR BLD AUTO: 0.7 % — SIGNIFICANT CHANGE UP (ref 0–1)
BUN SERPL-MCNC: 27 MG/DL — HIGH (ref 10–20)
CALCIUM SERPL-MCNC: 8 MG/DL — LOW (ref 8.5–10.1)
CHLORIDE SERPL-SCNC: 103 MMOL/L — SIGNIFICANT CHANGE UP (ref 98–110)
CO2 SERPL-SCNC: 20 MMOL/L — SIGNIFICANT CHANGE UP (ref 17–32)
CREAT SERPL-MCNC: 0.7 MG/DL — SIGNIFICANT CHANGE UP (ref 0.7–1.5)
CULTURE RESULTS: SIGNIFICANT CHANGE UP
EOSINOPHIL # BLD AUTO: 0.01 K/UL — SIGNIFICANT CHANGE UP (ref 0–0.7)
EOSINOPHIL NFR BLD AUTO: 0.1 % — SIGNIFICANT CHANGE UP (ref 0–8)
GLUCOSE SERPL-MCNC: 96 MG/DL — SIGNIFICANT CHANGE UP (ref 70–99)
HCT VFR BLD CALC: 33.5 % — LOW (ref 37–47)
HGB BLD-MCNC: 11.1 G/DL — LOW (ref 12–16)
IMM GRANULOCYTES NFR BLD AUTO: 13.5 % — HIGH (ref 0.1–0.3)
LYMPHOCYTES # BLD AUTO: 1.1 K/UL — LOW (ref 1.2–3.4)
LYMPHOCYTES # BLD AUTO: 7.3 % — LOW (ref 20.5–51.1)
MAGNESIUM SERPL-MCNC: 2.2 MG/DL — SIGNIFICANT CHANGE UP (ref 1.8–2.4)
MCHC RBC-ENTMCNC: 29.8 PG — SIGNIFICANT CHANGE UP (ref 27–31)
MCHC RBC-ENTMCNC: 33.1 G/DL — SIGNIFICANT CHANGE UP (ref 32–37)
MCV RBC AUTO: 89.8 FL — SIGNIFICANT CHANGE UP (ref 81–99)
MONOCYTES # BLD AUTO: 1.06 K/UL — HIGH (ref 0.1–0.6)
MONOCYTES NFR BLD AUTO: 7 % — SIGNIFICANT CHANGE UP (ref 1.7–9.3)
NEUTROPHILS # BLD AUTO: 10.81 K/UL — HIGH (ref 1.4–6.5)
NEUTROPHILS NFR BLD AUTO: 71.4 % — SIGNIFICANT CHANGE UP (ref 42.2–75.2)
NRBC # BLD: 0 /100 WBCS — SIGNIFICANT CHANGE UP (ref 0–0)
PLATELET # BLD AUTO: 294 K/UL — SIGNIFICANT CHANGE UP (ref 130–400)
POTASSIUM SERPL-MCNC: 4.1 MMOL/L — SIGNIFICANT CHANGE UP (ref 3.5–5)
POTASSIUM SERPL-SCNC: 4.1 MMOL/L — SIGNIFICANT CHANGE UP (ref 3.5–5)
RBC # BLD: 3.73 M/UL — LOW (ref 4.2–5.4)
RBC # FLD: 13.2 % — SIGNIFICANT CHANGE UP (ref 11.5–14.5)
SODIUM SERPL-SCNC: 140 MMOL/L — SIGNIFICANT CHANGE UP (ref 135–146)
SPECIMEN SOURCE: SIGNIFICANT CHANGE UP
WBC # BLD: 15.13 K/UL — HIGH (ref 4.8–10.8)
WBC # FLD AUTO: 15.13 K/UL — HIGH (ref 4.8–10.8)

## 2019-10-18 PROCEDURE — 99233 SBSQ HOSP IP/OBS HIGH 50: CPT

## 2019-10-18 PROCEDURE — 99222 1ST HOSP IP/OBS MODERATE 55: CPT

## 2019-10-18 RX ORDER — HEPARIN SODIUM 5000 [USP'U]/ML
1100 INJECTION INTRAVENOUS; SUBCUTANEOUS
Qty: 25000 | Refills: 0 | Status: DISCONTINUED | OUTPATIENT
Start: 2019-10-18 | End: 2019-10-18

## 2019-10-18 RX ORDER — CLOPIDOGREL BISULFATE 75 MG/1
75 TABLET, FILM COATED ORAL DAILY
Refills: 0 | Status: DISCONTINUED | OUTPATIENT
Start: 2019-10-18 | End: 2019-10-19

## 2019-10-18 RX ORDER — ENOXAPARIN SODIUM 100 MG/ML
40 INJECTION SUBCUTANEOUS DAILY
Refills: 0 | Status: DISCONTINUED | OUTPATIENT
Start: 2019-10-18 | End: 2019-10-19

## 2019-10-18 RX ORDER — ATORVASTATIN CALCIUM 80 MG/1
80 TABLET, FILM COATED ORAL AT BEDTIME
Refills: 0 | Status: DISCONTINUED | OUTPATIENT
Start: 2019-10-18 | End: 2019-10-19

## 2019-10-18 RX ADMIN — ATORVASTATIN CALCIUM 80 MILLIGRAM(S): 80 TABLET, FILM COATED ORAL at 22:45

## 2019-10-18 RX ADMIN — PANTOPRAZOLE SODIUM 40 MILLIGRAM(S): 20 TABLET, DELAYED RELEASE ORAL at 06:16

## 2019-10-18 RX ADMIN — Medication 650 MILLIGRAM(S): at 21:10

## 2019-10-18 RX ADMIN — CARBIDOPA AND LEVODOPA 1 TABLET(S): 25; 100 TABLET ORAL at 12:24

## 2019-10-18 RX ADMIN — PRAMIPEXOLE DIHYDROCHLORIDE 0.5 MILLIGRAM(S): 0.12 TABLET ORAL at 14:00

## 2019-10-18 RX ADMIN — CARBIDOPA AND LEVODOPA 1 TABLET(S): 25; 100 TABLET ORAL at 22:45

## 2019-10-18 RX ADMIN — Medication 360 MILLIGRAM(S): at 05:35

## 2019-10-18 RX ADMIN — PRAMIPEXOLE DIHYDROCHLORIDE 0.5 MILLIGRAM(S): 0.12 TABLET ORAL at 23:00

## 2019-10-18 RX ADMIN — CLOPIDOGREL BISULFATE 75 MILLIGRAM(S): 75 TABLET, FILM COATED ORAL at 12:24

## 2019-10-18 RX ADMIN — CARBIDOPA AND LEVODOPA 1 TABLET(S): 25; 100 TABLET ORAL at 18:17

## 2019-10-18 RX ADMIN — Medication 650 MILLIGRAM(S): at 20:15

## 2019-10-18 RX ADMIN — CARBIDOPA AND LEVODOPA 1 TABLET(S): 25; 100 TABLET ORAL at 05:29

## 2019-10-18 RX ADMIN — CHLORHEXIDINE GLUCONATE 1 APPLICATION(S): 213 SOLUTION TOPICAL at 05:35

## 2019-10-18 RX ADMIN — Medication 650 MILLIGRAM(S): at 08:12

## 2019-10-18 RX ADMIN — PRAMIPEXOLE DIHYDROCHLORIDE 0.5 MILLIGRAM(S): 0.12 TABLET ORAL at 05:39

## 2019-10-18 RX ADMIN — HEPARIN SODIUM 11 UNIT(S)/HR: 5000 INJECTION INTRAVENOUS; SUBCUTANEOUS at 05:24

## 2019-10-18 NOTE — PROGRESS NOTE ADULT - SUBJECTIVE AND OBJECTIVE BOX
JOELJERJON  87y, Female  Allergy: No Known Allergies      CHIEF COMPLAINT: Syncope; Left eye blurriness (17 Oct 2019 14:36)      INTERVAL EVENTS/HPI  - No acute events overnight  - T(F): , Max: 97.5 (10-17-19 @ 20:41)  - continues to have eye symptoms  - Tolerating medication  - WBC Count: 15.13 (10-18-19 @ 04:53)<--WBC Count: 12.36 (10-17-19 @ 05:55)       ROS  General: Denies rigors, nightsweats  HEENT: Denies headache, rhinorrhea, sore throat, eye pain  CV: Denies CP, palpitations  PULM: Denies SOB, wheezing  GI: Denies hematemesis, hematochezia, melena  : Denies discharge, hematuria  MSK: Denies arthralgias, myalgias  SKIN: Denies rash, lesions  NEURO: Denies paresthesias, weakness  PSYCH: Denies depression, anxiety    VITALS:  T(F): 97.3, Max: 97.5 (10-17-19 @ 20:41)  HR: 59  BP: 161/71  RR: 18Vital Signs Last 24 Hrs  T(C): 36.3 (18 Oct 2019 05:00), Max: 36.4 (17 Oct 2019 20:41)  T(F): 97.3 (18 Oct 2019 05:00), Max: 97.5 (17 Oct 2019 20:41)  HR: 59 (18 Oct 2019 05:00) (59 - 73)  BP: 161/71 (18 Oct 2019 05:00) (126/68 - 161/71)  BP(mean): --  RR: 18 (18 Oct 2019 05:00) (18 - 18)  SpO2: --    PHYSICAL EXAM:  Gen: Elderly F NAD, resting in bed  HEENT: Normocephalic, atraumatic  Neck: supple, no lymphadenopathy  CV: Regular rate & regular rhythm no murmur  Lungs: decreased BS at bases, no fremitus  Abdomen: Soft, BS present  Ext: Warm, well perfused, no stigmata of IE  Neuro: non focal, awake, tremor  Skin: no rash, no erythema  Lines: no phlebitis    FH: Non-contributory  Social Hx: Non-contributory    TESTS & MEASUREMENTS:                        11.1   15.13 )-----------( 294      ( 18 Oct 2019 04:53 )             33.5     10-18    140  |  103  |  27<H>  ----------------------------<  96  4.1   |  20  |  0.7    Ca    8.0<L>      18 Oct 2019 04:53  Mg     2.2     10-18      eGFR if Non African American: 78 mL/min/1.73M2 (10-18-19 @ 04:53)  eGFR if African American: 90 mL/min/1.73M2 (10-18-19 @ 04:53)          Culture - Blood (collected 10-15-19 @ 05:08)  Source: .Blood None  Preliminary Report (10-16-19 @ 13:01):    No growth to date.    Culture - Blood (collected 10-14-19 @ 17:26)  Source: .Blood None  Preliminary Report (10-16-19 @ 04:01):    No growth to date.    Culture - Urine (collected 10-13-19 @ 03:00)  Source: .Urine None  Final Report (10-14-19 @ 07:44):    >=3 organisms. Probable collection contamination.            INFECTIOUS DISEASES TESTING      RADIOLOGY & ADDITIONAL TESTS:  I have personally reviewed the last Chest xray  CXR      CT  CT Chest No Cont:   EXAM:  CT CHEST            PROCEDURE DATE:  10/15/2019            INTERPRETATION:  Reason for Exam:  Right paratracheal opacity on x-ray.  CT of the chest was performed from the thoracic inlet to the level of the   adrenal glands without contrast injection. Coronal and sagittal images   have been submitted.    Comparison: Chest x-ray-10/12/2019. No prior thoracic CT scans    Findings:     Tubes/Lines: None    Lungs, Pleura, and Airways: No endobronchial obstruction, mass. Bilateral   small pleural effusions with adjacent lower lobe atelectasis. No   bronchiectasis or honeycombing.    Pulmonary nodules: No suspicious pulmonary nodules.    2 mm solid nodule, right lower lobe (4/141).  Multiple punctate calcified granulomata.    Mediastinum/Lymph Nodes:Enlarged nodular left lobe of thyroid gland with   associated deviation of trachea to the right accounting for the apparent   right paratracheal density on x-ray.    No adenopathy.    Heart/Great Vessels: No pericardial effusions. Great vessels are normal   in caliber. Coronary artery, mitral annular and aortic calcifications are   present.    Abdomen: Nonobstructing 3 x 2 mm calculus, left kidney (/213)    Bones and soft tissues: Stable degenerative changes. No suspicious   osseous lesions.    IMPRESSION:    Enlarged nodular left lobe of thyroid gland with associated deviation of   trachea to the right, accounting for the  right paratracheal density on   x-ray.      No suspicious mediastinal adenopathy or pulmonary nodules..  2 mm solid nodule, right lower lobe (4/141).    No endobronchial obstruction, mass.     Bilateral small pleural effusions with adjacent lower lobe atelectasis.                     RUDDY CORNEJO M.D., ATTENDING RADIOLOGIST  This document has been electronically signed. Oct 15 2019  1:29PM             (10-15-19 @ 12:49)      CARDIOLOGY TESTING  12 Lead ECG:   Ventricular Rate 85 BPM    Atrial Rate 85 BPM    P-R Interval 158 ms    QRS Duration 88 ms    Q-T Interval 372 ms    QTC Calculation(Bezet) 442 ms    P Axis 63 degrees    R Axis 22 degrees    T Axis 23 degrees    Diagnosis Line Sinus rhythm with Premature atrial complexes  Otherwise normal ECG    Confirmed by KB LIZ MD ([id number]) on 10/13/2019 3:55:39 PM (10-13-19 @ 10:57)  Transthoracic Echocardiogram:    EXAM:  2-D ECHO (TTE) COMPLETE        PROCEDURE DATE:  10/13/2019      INTERPRETATION:  REPORT:    TRANSTHORACIC ECHOCARDIOGRAM REPORT         Patient Name:   JON GARCÍA Accession #: [id number]  Medical Rec #:  [id number]     Height:      64.0 in 162.6 cm  YOB: 1932    Weight:      130.0 lb 58.97 kg  Patient Age:    87 years     BSA:         1.63 m²  Patient Gender: F            BP:          155/70 mmHg       Date of Exam:        10/13/2019 9:46:04 AM  Referring Physician: [id number] FLORESITA VILLALBA  Sonographer:         Jonna Gifford  Reading Physician:   Kb Liz M.D.    Procedure:   2D Echo/Doppler/Color Doppler Complete.  Indications: R55 - Syncope and Collapse  Diagnosis:   Syncope and collapse - R55         Summary:   1. Normal global left ventricular systolic function.   2. LV Ejection Fraction by Sparks's Method with a biplane EF of 74 %.   3. Normal left ventricular internal cavity size.   4. Normal right atrial size.   5. There is no evidence of pericardial effusion.   6. Mild mitral valve regurgitation.   7. Mild thickening and calcification of the anterior and posterior   mitral valve leaflets.   8. Echodensity noted on PMVL atrial aspect which is suggestive of a   valvular vegetation.   9. Moderate tricuspid regurgitation.    PHYSICIAN INTERPRETATION:  Left Ventricle: The left ventricular internal cavity size is normal. Left   ventricular wall thickness is normal. Global LV systolic function was   normal.  Right Ventricle: The right ventricular size isnormal. RV systolic   function is normal.  Left Atrium: Normal left atrial size.  Right Atrium: Normal right atrial size.  Pericardium: There is no evidence of pericardial effusion.  Mitral Valve: Mild thickening and calcification of the anterior and  posterior mitral valve leaflets. Mild mitral valve regurgitation is seen.   Echodensity noted on PMVL atrial aspect which is suggestive of a valvular   vegetation.  Tricuspid Valve: The tricuspid valve is normal in structure. Moderate   tricuspid regurgitation is visualized.  Aortic Valve: The aortic valve is trileaflet. No evidence of aortic   stenosis. Aortic valve thickening with normal leaflet opening. No   evidence of aortic valve regurgitation is seen.  Pulmonic Valve: The pulmonic valve is thickened with good excursion.   Trace pulmonic valve regurgitation.  Aorta: Aortic root measured at Sinus of Valsalva is normal.  Venous: The inferior vena cava is normal.       2D AND M-MODE MEASUREMENTS (normal ranges within parentheses):  Left                Normal   Aorta/Left             Normal  Ventricle:                     Atrium:  IVSd (2D):  0.83 cm  (0.7-1.1) AoV Cusp       1.68  (1.5-2.6)  LVPWd (2D): 0.79 cm  (0.7-1.1) Separation:     cm  LVIDd (2D): 4.75 cm  (3.4-5.7) Left Atrium    3.29  (1.9-4.0)  LVIDs (2D): 2.44 cm            (Mmode):        cm  LV FS (2D):  48.6 %   (>25%)   LA Volume      37.2  Relative      0.33    (<0.42)  Index         ml/m²  Wall                           Right  Thickness                      Ventricle:                                 RVd (2D):      3.08 cm    SPECTRAL DOPPLER ANALYSIS:  LV DIASTOLIC FUNCTION:  MV Peak E: 1.34 m/s Decel Time: 245 msec  MV Peak A: 1.06 m/s  E/A Ratio: 1.27    Aortic Valve:  AoV VMax:    2.02 m/s  AoV Area, Vmax: 1.96 cm² Vmax Indx: 1.20 cm²/m²  AoV VTI:     0.41 m    AoV Area, VTI:  2.00 cm² VTI Indx:  1.23 cm²/m²  AoV Pk Grad: 16.3 mmHg  AoV Mn Grad: 8.4 mmHg    LVOT Vmax: 1.51 m/s  LVOT VTI:  0.31 m  LVOT Diam: 1.82 cm    Mitral Valve:  MV P1/2 Time: 70.94 msec  MV Area, PHT: 3.10 cm²    Tricuspid Valve and PA/RV Systolic Pressure: TR Max Velocity: 3.13 m/s RA   Pressure:  RVSP/PASP: 41.3 mmHg    Pulmonic Valve:  PV Max Velocity: 1.34 m/s PV Max P.2 mmHg PV Mean PG:       [id number] Kb Liz M.D., Electronically signed on 10/13/2019 at   2:40:34 PM              *** Final ***                    KB LIZ MD  This document has been electronically signed. Oct 13 2019  9:46AM             (10-13-19 @ 09:46)      MEDICATIONS  atorvastatin 20  carbidopa/levodopa CR 50/200 1  chlorhexidine 4% Liquid 1  diltiazem     influenza   Vaccine 0.5  pantoprazole    Tablet 40  pramipexole 0.5      ANTIBIOTICS:      All available historical records have been reviewed

## 2019-10-18 NOTE — CONSULT NOTE ADULT - CONSULT REQUESTED DATE/TIME
13-Oct-2019
14-Oct-2019 14:31
15-Oct-2019 09:00
18-Oct-2019 20:01
13-Oct-2019 16:58
15-Oct-2019 20:46

## 2019-10-18 NOTE — PROGRESS NOTE ADULT - SUBJECTIVE AND OBJECTIVE BOX
JON GARCÍA  87y Female    CHIEF COMPLAINT:    Patient is a 87y old  Female who presents with a chief complaint of Syncope; Left eye blurriness (18 Oct 2019 11:27)      INTERVAL HPI/OVERNIGHT EVENTS:    Patient seen and examined. Continues to have blurred vision. No improvement. No fever. No dizziness.     ROS: All other systems are negative.    Vital Signs:    T(F): 96.4 (10-18-19 @ 13:23), Max: 97.5 (10-17-19 @ 20:41)  HR: 76 (10-18-19 @ 13:23) (59 - 76)  BP: 132/60 (10-18-19 @ 13:23) (132/60 - 161/71)  RR: 20 (10-18-19 @ 13:23) (18 - 20)  SpO2: --  I&O's Summary    17 Oct 2019 07:01  -  18 Oct 2019 07:00  --------------------------------------------------------  IN: 936 mL / OUT: 0 mL / NET: 936 mL    18 Oct 2019 07:01  -  18 Oct 2019 14:27  --------------------------------------------------------  IN: 630 mL / OUT: 0 mL / NET: 630 mL      Daily Height in cm: 162.5 (18 Oct 2019 13:06)    Daily   CAPILLARY BLOOD GLUCOSE          PHYSICAL EXAM:    GENERAL:  NAD  SKIN: No rashes or lesions  HENT: Atraumatic Normocephalic. PERRL. Moist membranes.  NECK: Supple, No JVD. No lymphadenopathy.  PULMONARY: CTA B/L. No wheezing. No rales  CVS: Normal S1, S2. Rate and Rhythm are regular. No murmurs.  ABDOMEN/GI: Soft, Nontender, Nondistended; BS present  EXTREMITIES: Peripheral pulses intact. No edema B/L LE.  NEUROLOGIC:  No motor or sensory deficit.  PSYCH: Alert & oriented x 3    Consultant(s) Notes Reviewed:  [x ] YES  [ ] NO  Care Discussed with Consultants/Other Providers [ x] YES  [ ] NO    EKG reviewed  Telemetry reviewed    LABS:                        11.1   15.13 )-----------( 294      ( 18 Oct 2019 04:53 )             33.5     10-18    140  |  103  |  27<H>  ----------------------------<  96  4.1   |  20  |  0.7    Ca    8.0<L>      18 Oct 2019 04:53  Mg     2.2     10-18      PTT - ( 18 Oct 2019 11:51 )  PTT:58.1 sec  Serum Pro-Brain Natriuretic Peptide: 1651 pg/mL (10-12-19 @ 19:12)        Culture - Blood (collected 17 Oct 2019 05:55)  Source: .Blood None  Preliminary Report (18 Oct 2019 14:00):    No growth to date.    Culture - Urine (collected 17 Oct 2019 05:00)  Source: .Urine Clean Catch (Midstream)  Final Report (18 Oct 2019 10:01):    <10,000 CFU/mL Normal Urogenital Nimo        RADIOLOGY & ADDITIONAL TESTS:      Imaging or report Personally Reviewed:  [ ] YES  [ ] NO    Medications:  Standing  atorvastatin 80 milliGRAM(s) Oral at bedtime  carbidopa/levodopa CR 50/200 1 Tablet(s) Oral <User Schedule>  chlorhexidine 4% Liquid 1 Application(s) Topical <User Schedule>  clopidogrel Tablet 75 milliGRAM(s) Oral daily  diltiazem    milliGRAM(s) Oral daily  influenza   Vaccine 0.5 milliLiter(s) IntraMuscular once  pantoprazole    Tablet 40 milliGRAM(s) Oral before breakfast  pramipexole 0.5 milliGRAM(s) Oral three times a day    PRN Meds  acetaminophen   Tablet .. 650 milliGRAM(s) Oral every 6 hours PRN      Case discussed with resident    Care discussed with pt/family

## 2019-10-18 NOTE — PROGRESS NOTE ADULT - SUBJECTIVE AND OBJECTIVE BOX
Surgeon: Dr. Jacobs    Consult requesting by: Dr. Farris     HISTORY OF PRESENT ILLNESS:  This is a 78 year old female with a significant PMhx of Parkinson's disease and RA who presented to the ED after a witnessed episode of syncope. As per the daughter, the patient was attempting to urinate when she noticed the patient had lost consciousness. She was able to arouse the patient after a few seconds without any report of post-ictal state; the patient does not remember the event. The patient has also been experience generalized weakness, dark colored urine, BL LE (thigh pain), and nausea/vomiting (single bilious episode) 2 days prior to presentation. She also mentions acute development of left eye blurriness that is constant over past two days, and has been experiencing intermittent, sharp, occipital/neck headaches for the last few weeks.    ROS was negative for fever, CP, palpitations, SOB, abdominal pain, diarrhea, constipation. (12 Oct 2019 22:10)    PAST MEDICAL & SURGICAL HISTORY:  Hyperlipidemia  Rheumatoid arthritis  HTN (hypertension)  Parkinson disease  History of hysterectomy      MEDICATIONS  (STANDING):  atorvastatin 80 milliGRAM(s) Oral at bedtime  carbidopa/levodopa CR 50/200 1 Tablet(s) Oral <User Schedule>  chlorhexidine 4% Liquid 1 Application(s) Topical <User Schedule>  clopidogrel Tablet 75 milliGRAM(s) Oral daily  diltiazem    milliGRAM(s) Oral daily  enoxaparin Injectable 40 milliGRAM(s) SubCutaneous daily  influenza   Vaccine 0.5 milliLiter(s) IntraMuscular once  pantoprazole    Tablet 40 milliGRAM(s) Oral before breakfast  pramipexole 0.5 milliGRAM(s) Oral three times a day    MEDICATIONS  (PRN):  acetaminophen   Tablet .. 650 milliGRAM(s) Oral every 6 hours PRN Mild Pain (1 - 3)    Home Medications:  atorvastatin 20 mg oral tablet: 1 tab(s) orally once a day (12 Oct 2019 18:49)  carbidopa-levodopa:  (12 Oct 2019 18:49)  celecoxib 200 mg oral capsule: 1 cap(s) orally 2 times a day (12 Oct 2019 18:49)  dilTIAZem 360 mg/24 hours oral capsule, extended release: 1 cap(s) orally once a day (12 Oct 2019 18:49)  hydroCHLOROthiazide 12.5 mg oral tablet: 1 tab(s) orally once a day (12 Oct 2019 18:49)  pramipexole 0.5 mg oral tablet: 1 tab(s) orally 3 times a day (12 Oct 2019 18:49)      Allergies    No Known Allergies    Intolerances      Review of Systems  CONSTITUTIONAL:  Fevers[ ] chills[ ] sweats[ ] fatigue[ ] weight loss[ ] weight gain [ ]                                     NEGATIVE [X ]   NEURO:  paresthesias[ ] seizures [ ]  syncope [ x]  confusion [ ]                                                                                   EYES: glasses[ ]  blurry vision[ ]  discharge[ ] pain[ ] glaucoma [ ]                                                                          NEGATIVE[X ]   ENMT:  difficulty hearing [ ]  vertigo[ ]  dysphagia[ ] epistaxis[ ] recent dental work [ ]                                    NEGATIVE[ X]   CV:  chest pain[ ] palpitations[ ] ANDERSON [ ] diaphoresis [ ]                                                                                           NEGATIVE[ X]   RESPIRATORY:  wheezing[ ] SOB[ ] cough [ ] sputum[ ] hemoptysis[ ]                                                                  NEGATIVE[x ]   GI:  nausea[ ]  vomiting [ ]  diarrhea[ ] constipation [ ] melena [ ]                                                                         NEGATIVE[ X]   : hematuria[ ]  dysuria[ x] urgency[x ] incontinence[ ]                                                                                             MUSKULOSKELETAL:  arthritis[ ]  joint swelling [ ] muscle weakness [ ] Hx vein stripping [ ]                             NEGATIVE[X ]   SKIN/BREAST:  rash[ ] itching [ ]  hair loss[ ] masses[ ]                                                                                              NEGATIVE[ X]   PSYCH:  dementia [ ] depression [ ] anxiety[ ]                                                                                                               NEGATIVE[X ]   HEME/LYMPH:  bruises easily[ ] enlarged lymph nodes[ ] tender lymph nodes[ ]                                               NEGATIVE[ X]   ENDOCRINE:  cold intolerance[ ] heat intolerance[ ] polydipsia[ ]                                                                          NEGATIVE[ X]     PHYSICAL EXAM  Vital Signs Last 24 Hrs  T(C): 35.8 (18 Oct 2019 13:23), Max: 36.4 (17 Oct 2019 20:41)  T(F): 96.4 (18 Oct 2019 13:23), Max: 97.5 (17 Oct 2019 20:41)  HR: 76 (18 Oct 2019 13:23) (59 - 76)  BP: 132/60 (18 Oct 2019 13:23) (132/60 - 161/71)  RR: 20 (18 Oct 2019 13:23) (18 - 20)    CONSTITUTIONAL:  WNL[ ]   Neuro: WNL [ ] Normal exam oriented to person/place & time with no focal motor or sensory  deficits. Other                     Eyes:    WNL [ ] Normal exam of conjunctiva & lids, pupils equally reactive. Other     ENT:     WNL [ ] Normal exam of nasal/oral mucosa with absence of cyanosis. Other  Neck:   WNL [ ] Normal exam of jugular veins, trachea & thyroid. Other  Chest:  WNL [ ] Normal lung exam with good air movement absence of wheezes, rales, or rhonchi: Other                                                                                CV:  Auscultation: normal [ ] S3[ ] S4[ ] Irregular [ ] Rub[ ] Clicks[ ]    Murmurs none:[ ]systolic [ ]  diastolic [ ] holosystolic [ ]  Carotids: No Bruits[ ] Other                 Abdominal Aorta: normal [ ] nonpalpable[ ]Other                                                                                      GI:           WNL[ ] Normal exam of abdomen, liver & spleen with no noted masses or tenderness. Other                                                                                                        Extremities: WNL[ ] Normal no evidence of cyanosis or deformity Edema: none[ ]trace[ ]1+[ ]2+[ ]3+[ ]4+[ ]  Lower Extremity Pulses: Right[ ] Left[ ]Varicosities[ ]  SKIN :WNL[ ] Normal exam to inspection & palation. Other:                                                          LABS:                        11.1   15.13 )-----------( 294      ( 18 Oct 2019 04:53 )             33.5     10-18    140  |  103  |  27<H>  ----------------------------<  96  4.1   |  20  |  0.7    Ca    8.0<L>      18 Oct 2019 04:53  Mg     2.2     10-18      PTT - ( 18 Oct 2019 11:51 )  PTT:58.1 sec    JOI: PRE-OP DIAGNOSIS: Rule out cardioembolic source of CVA, density noted on PMVL on 2D Echo     POST-OP DIAGNOSIS:  Solid density noted on posterior leaflets of mitral valve. Mild-moderate MR. Mild TR.    PROCEDURE: Transesophageal echocardiogram    FINDINGS:    After risks and benefits of procedures were explained, informed consent was obtained and placed in chart. The JOI probe was passed into the esophagus without difficulty.  Transesophageal and transgastric images were obtained.  The JOI probe was removed without difficulty and examined.  There was no evidence for bleeding.  The patient tolerated the procedure well without any immediate JOI-related complications.      Preliminary Findings:  LA and RA: Normal size.  CHILO: Left atrial appendage was clear of clot or no spontaneous contrast seen. Normal functioning of CHILO.  LV: LVEF was estimated at 55-65%  RV: Normal RV.  MV: Mildly sclerotic mitral valve with thickening of both leaflets. Mild-moderate MR, No evidence for MS. Sessile dense, solid, partly calcified density attached to the base and body of posterior leaflets (P2, P3 and base) of mitral valve with mobile density protruding to left atrium suggestive of organized calcified chronic vegetation most likely. Alternately organized calcified thrombus. There was no abscess or filling defect or regurgitation through the leaflets.  AV: Mild sclerotic AV. No evidence for AI, no evidence for AS.   TV: Mild tricuspid regurgitation.  IAS: Intact IAS. No PFO. NO R-> L shunt seen with injection of agitated contrast saline.    There was extensive calcified atheroma seen in the ascending and descending aorta.     DIAGNOSIS/IMPRESSION:  Solid density noted on posterior leaflets of mitral valve. Mild-moderate MR. Mild TR.    Impression:  CAD [ ]  Valvular  disease [ x]   Aortic Disease [ ]   JOHN: Yes[ ] No [ ]   CKD Stage I [ ] , Stage II [ ] , Stage III [ ], Stage IV [ ]   Anemia: Yes [ ], No [ ]  Diabetes :Yes [ ], No [ ]  Acute MI: Yes [ ], No [ ]   Heart Failure: Yes [ ] , No [ ] HFpEF [ ], HFrEF [ ]      Assessment/ Plan: 78 year old female with a significant PMhx of Parkinson's disease and RA who presented to the ED for syncope now with mitral valve vegetation with possible septic emboli being worked up for CVA.    -Cases and plan discussed with CT surgeon Dr. Jacobs.   -Initial STS risk assessed and discussed with patient. Evaluation by full heart team pending.   -Attending note to follow.

## 2019-10-18 NOTE — DIETITIAN INITIAL EVALUATION ADULT. - FEEDING SKILL
Ortho Preop Note / Seen this AM. Doing well. Comfortable and asleep in bed. Guardian present.     Patient is a 10y old  Male who presents with a chief complaint of L elbow pain (20 Aug 2017 23:10)    Diagnosis: L elbow lat condyle fx  Procedure: L elbow ORIF  Surgeon: Dr Fry                          12.4   10.2  )-----------( 197      ( 20 Aug 2017 22:20 )             38.1     08-20    137  |  100  |  14  ----------------------------<  103<H>  4.5   |  26  |  0.50    Ca    9.5      20 Aug 2017 22:20      PT/INR - ( 20 Aug 2017 22:20 )   PT: 13.0 sec;   INR: 1.17          PTT - ( 20 Aug 2017 22:20 )  PTT:32.1 sec    No need for clearance for pediatric patient.   [ ] Type & Screen  [x ] CBC  [x ] BMP  [x ] PT/PTT/INR  [ ] Urinalysis  [ ] Chest X-ray  [ ] EKG  [ ] NPO/IVF  [x ] Consent  [ ] Clearance  [x ] Added on to OR Schedule  [ ] Anti-coagulation held  [ ] MRSA/MSSA Nasal Screen     Assessment & Plan:  10yMale with above  -For OR 8/21 (today) independent

## 2019-10-18 NOTE — PROGRESS NOTE ADULT - ASSESSMENT
blurry vision possibly due to emboli, tia, cva  vegetation of the MV most likely chronic    suggest a ct surgey consult to assess for possible MV replacement.

## 2019-10-18 NOTE — DIETITIAN INITIAL EVALUATION ADULT. - OTHER INFO
P/w: Syncope; Left eye blurriness. Multiple emboli along the arcades of the left eye. Suspect potential source of emboli from vegetation along the mitral valve. Eye clinic follow up. ) New vegetation on Posterior MV leaflet on TTE: ID and neuro following.  Syncope likely vasovagal vs autonomic instability in Parkinson's disease vs medication induced (Sinemet)-resolved. Parkinson's disease: home meds.

## 2019-10-18 NOTE — DIETITIAN INITIAL EVALUATION ADULT. - RD TO REMAIN AVAILABLE
RD to monitor energy intake, body composition, NFPF. Pt to continue to consume >75% PO over the next 7 days./yes

## 2019-10-18 NOTE — PROGRESS NOTE ADULT - SUBJECTIVE AND OBJECTIVE BOX
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   Denies chest pain, shortness of breath, abdoimal pain, nausea, vomiting or changes in bowel habits. She has no complaints today.     Urinating and stooling appropriately.    Present Today:           Jacques Catheter (x)No/ ()Yes?            Central Line (x)No/ ()Yes?             IV Fluids (x)No/ ()Yes?     OBJECTIVE  PAST MEDICAL & SURGICAL HISTORY  Hyperlipidemia  Rheumatoid arthritis  HTN (hypertension)  Parkinson disease  History of hysterectomy    ALLERGIES:  No Known Allergies    HOME MEDICATIONS:  Home Medications:  atorvastatin 20 mg oral tablet: 1 tab(s) orally once a day (12 Oct 2019 18:49)  carbidopa-levodopa:  (12 Oct 2019 18:49)  celecoxib 200 mg oral capsule: 1 cap(s) orally 2 times a day (12 Oct 2019 18:49)  dilTIAZem 360 mg/24 hours oral capsule, extended release: 1 cap(s) orally once a day (12 Oct 2019 18:49)  hydroCHLOROthiazide 12.5 mg oral tablet: 1 tab(s) orally once a day (12 Oct 2019 18:49)  pramipexole 0.5 mg oral tablet: 1 tab(s) orally 3 times a day (12 Oct 2019 18:49)    MEDICATIONS:  STANDING MEDICATIONS  atorvastatin 80 milliGRAM(s) Oral at bedtime  carbidopa/levodopa CR 50/200 1 Tablet(s) Oral <User Schedule>  chlorhexidine 4% Liquid 1 Application(s) Topical <User Schedule>  clopidogrel Tablet 75 milliGRAM(s) Oral daily  diltiazem    milliGRAM(s) Oral daily  influenza   Vaccine 0.5 milliLiter(s) IntraMuscular once  pantoprazole    Tablet 40 milliGRAM(s) Oral before breakfast  pramipexole 0.5 milliGRAM(s) Oral three times a day    PRN MEDICATIONS  acetaminophen   Tablet .. 650 milliGRAM(s) Oral every 6 hours PRN      VITAL SIGNS: Last 24 Hours  T(C): 36.3 (18 Oct 2019 05:00), Max: 36.4 (17 Oct 2019 20:41)  T(F): 97.3 (18 Oct 2019 05:00), Max: 97.5 (17 Oct 2019 20:41)  HR: 59 (18 Oct 2019 05:00) (59 - 73)  BP: 161/71 (18 Oct 2019 05:00) (126/68 - 161/71)  RR: 18 (18 Oct 2019 05:00) (18 - 18)      LABS:                        11.1   15.13 )-----------( 294      ( 18 Oct 2019 04:53 )             33.5     10    140  |  103  |  27<H>  ----------------------------<  96  4.1   |  20  |  0.7    Ca    8.0<L>      18 Oct 2019 04:53  Mg     2.2     1018      PTT - ( 18 Oct 2019 04:53 )  PTT:188.6 sec      Culture - Urine (collected 17 Oct 2019 05:00)  Source: .Urine Clean Catch (Midstream)  Final Report (18 Oct 2019 10:01):    <10,000 CFU/mL Normal Urogenital Nimo      RADIOLOGY:  < from: CT Angio Neck w/ IV Cont (10.17.19 @ 18:52) >  No CTA evidence of major vascular stenoses or occlusions.    Partially visualized bilateral pleural effusions.    Multinodular left thyroid gland measuring up to 5 cm. Consider   nonemergent/outpatient thyroid ultrasound follow-up as clinically   indicated.     CT Angio Head w/ IV Cont (10.17.19 @ 18:51) >    No CTA evidence of major vascular stenoses or occlusions.    Partially visualized bilateral pleural effusions.    Multinodular left thyroid gland measuring up to 5 cm. Consider   nonemergent/outpatient thyroid ultrasound follow-up as clinically   indicated.     VA Duplex Carotid, Bilat (10.15.19 @ 13:56) >    20-39% stenosis of the right internal carotid artery.  20-39% stenosis of the left internal carotid artery.    ECHO:  Transthoracic Echocardiogram:    EXAM:  2-D ECHO (TTE) COMPLETE      PROCEDURE DATE:  10/13/2019      INTERPRETATION:  REPORT:    TRANSTHORACIC ECHOCARDIOGRAM REPORT      Patient Name:   JON GARCÍA Accession #: [id number]  Medical Rec #:  [id number]     Height:      64.0 in 162.6 cm  YOB: 1932    Weight:      130.0 lb 58.97 kg  Patient Age:    87 years     BSA:         1.63 m²  Patient Gender: F            BP:          155/70 mmHg       Date of Exam:        10/13/2019 9:46:04 AM  Referring Physician: [id number] FLORESITA VILLALBA  Sonographer:         Jonna Gifford  Reading Physician:   Kb Liz M.D.    Procedure:   2D Echo/Doppler/Color Doppler Complete.  Indications: R55 - Syncope and Collapse  Diagnosis:   Syncope and collapse - R55    Summary:   1. Normal global left ventricular systolic function.   2. LV Ejection Fraction by Sparks's Method with a biplane EF of 74 %.   3. Normal left ventricular internal cavity size.   4. Normal right atrial size.   5. There is no evidence of pericardial effusion.   6. Mild mitral valve regurgitation.   7. Mild thickening and calcification of the anterior and posterior   mitral valve leaflets.   8. Echodensity noted on PMVL atrial aspect which is suggestive of a   valvular vegetation.   9. Moderate tricuspid regurgitation.    PHYSICIAN INTERPRETATION:  Left Ventricle: The left ventricular internal cavity size is normal. Left   ventricular wall thickness is normal. Global LV systolic function was   normal.  Right Ventricle: The right ventricular size isnormal. RV systolic   function is normal.  Left Atrium: Normal left atrial size.  Right Atrium: Normal right atrial size.  Pericardium: There is no evidence of pericardial effusion.  Mitral Valve: Mild thickening and calcification of the anterior and  posterior mitral valve leaflets. Mild mitral valve regurgitation is seen.   Echodensity noted on PMVL atrial aspect which is suggestive of a valvular   vegetation.  Tricuspid Valve: The tricuspid valve is normal in structure. Moderate   tricuspid regurgitation is visualized.  Aortic Valve: The aortic valve is trileaflet. No evidence of aortic   stenosis. Aortic valve thickening with normal leaflet opening. No   evidence of aortic valve regurgitation is seen.  Pulmonic Valve: The pulmonic valve is thickened with good excursion.   Trace pulmonic valve regurgitation.  Aorta: Aortic root measured at Sinus of Valsalva is normal.  Venous: The inferior vena cava is normal.       2D AND M-MODE MEASUREMENTS (normal ranges within parentheses):  Left                Normal   Aorta/Left             Normal  Ventricle:                     Atrium:  IVSd (2D):  0.83 cm  (0.7-1.1) AoV Cusp       1.68  (1.5-2.6)  LVPWd (2D): 0.79 cm  (0.7-1.1) Separation:     cm  LVIDd (2D): 4.75 cm  (3.4-5.7) Left Atrium    3.29  (1.9-4.0)  LVIDs (2D): 2.44 cm            (Mmode):        cm  LV FS (2D):  48.6 %   (>25%)   LA Volume      37.2  Relative      0.33    (<0.42)  Index         ml/m²  Wall                           Right  Thickness                      Ventricle:                                 RVd (2D):      3.08 cm    SPECTRAL DOPPLER ANALYSIS:  LV DIASTOLIC FUNCTION:  MV Peak E: 1.34 m/s Decel Time: 245 msec  MV Peak A: 1.06 m/s  E/A Ratio: 1.27    Aortic Valve:  AoV VMax:    2.02 m/s  AoV Area, Vmax: 1.96 cm² Vmax Indx: 1.20 cm²/m²  AoV VTI:     0.41 m    AoV Area, VTI:  2.00 cm² VTI Indx:  1.23 cm²/m²  AoV Pk Grad: 16.3 mmHg  AoV Mn Grad: 8.4 mmHg    LVOT Vmax: 1.51 m/s  LVOT VTI:  0.31 m  LVOT Diam: 1.82 cm    Mitral Valve:  MV P1/2 Time: 70.94 msec  MV Area, PHT: 3.10 cm²    Tricuspid Valve and PA/RV Systolic Pressure: TR Max Velocity: 3.13 m/s RA   Pressure:  RVSP/PASP: 41.3 mmHg    Pulmonic Valve:  PV Max Velocity: 1.34 m/s PV Max P.2 mmHg PV Mean PG:       [id number] Kb Liz M.D., Electronically signed on 10/13/2019 at   2:40:34 PM   *** Final ***    KB LIZ MD  This document has been electronically signed. Oct 13 2019  9:46AM (10-13-19 @ 09:46)      PHYSICAL EXAM:    GENERAL: NAD, well-developed, AAOx3  HEENT:  Visual acuity B/L cataract + Atraumatic, Normocephalic. conjunctiva and sclera clear, No JVD  PULMONARY: Clear to auscultation bilaterally; No wheeze  CARDIOVASCULAR: Regular rate and rhythm; No murmurs, rubs, or gallops  GASTROINTESTINAL: Soft, Nontender, Nondistended; Bowel sounds present  MUSCULOSKELETAL: edema B/l LLE+  2+ Peripheral Pulses, No clubbing, cyanosis  NEUROLOGY: non-focal  SKIN: No rashes or lesions    ASSESSMENT & PLAN  78 year old female with a significant PMhx of Parkinson's disease and RA who presented to the ED after a witnessed episode of syncope, associated with recent generalized weakness, dark urine, and N/V. She also complained of L-eye blurriness x2 days and headaches over the last few weeks.       #) Acute blurry vision secondary to Central Retinal artery occlusion of LEFT EYE and Elschnig spots in right eye  -Exam was significant for multiple emboli along the arcades of the left eye. Suspect potential source of emboli from vegetation along the mitral valve.   - No jaw claudication or scalp tenderness. No fevers, unintentional weight loss  ruled out GCA; discontinued pulse steroids(3 doses of pulse steroids(500mg x2 and 1000mg x1)  as per rheum  -ESR elevated (likely inflammatory vs malignancy vs infectious , CK wnl ; CRP 10  -Discontinued Heparin today  -CTA head and neck negative on 10/17  -carotid dupplex on 10/15 with 20-39%  stenosis,  -Started on plavix 75mg and high dose statin 80mg HS  -eye clinic follow up in 1 month for repeat FA of the right eye and monitor for neovascularization of the left eye       #) New vegetation on Posterior MV leaflet on TTE  - No chest pain, afebrile  -She had her JOI on 10/16/2019 showing: Sessile dense, solid, partly calcified density attached to the base and body of posterior leaflets (P2, P3 and base) of mitral valve with mobile density protruding to left atrium suggestive of organized calcified chronic vegetation most likely. Alternately organized calcified thrombus. There was no abscess or filling defect or regurgitation through the leaflets. extensive calcified atheroma seen in the ascending and descending aorta, Mild-moderate MR. Mild TR.  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; No acute pathology  - Will monitor BP off of HCTZ for now  - Orthostatics x1 negative      # Acute Cystitis-resolved  -UA WBC 13.16, UCX >3 org  -continue Rocephin (day 5) last day today   -completed the course     # Mutlinodular left thyroid gland measuring upto 5cm  -CT chest no contrast shows enlarged left lobe of thyroid , no suspicion of malignancy  -TSH:0.04, T4 normal  -Outpatient follow up   -Quantiferon Tb test : indeterminate    # Hx of Parkinson's Disease  -Continue home medications    # Hx of RA  -Not on DMARDS   -c/o knee pain likely OA   -Anti CCP ab negative, RA factor normal ordered for completeness as suggested by rheum  -Outpt rheum follow up as outpatient    #) Hx of HTN and HLD  -Holding HCTZ  -Statin 80mg HS    GI ppx: Not indicated    DVT ppx: sequentials    Diet, DASH/TLC:   Sodium & Cholesterol Restricted   Activity: Increase as Tolerated     DISPO:  Patient to be discharged when condition(s) optimized.    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   Denies chest pain, shortness of breath, abdoimal pain, nausea, vomiting or changes in bowel habits. She has no complaints today.     Urinating and stooling appropriately.    Present Today:           Jacques Catheter (x)No/ ()Yes?            Central Line (x)No/ ()Yes?             IV Fluids (x)No/ ()Yes?     OBJECTIVE  PAST MEDICAL & SURGICAL HISTORY  Hyperlipidemia  Rheumatoid arthritis  HTN (hypertension)  Parkinson disease  History of hysterectomy    ALLERGIES:  No Known Allergies    HOME MEDICATIONS:  Home Medications:  atorvastatin 20 mg oral tablet: 1 tab(s) orally once a day (12 Oct 2019 18:49)  carbidopa-levodopa:  (12 Oct 2019 18:49)  celecoxib 200 mg oral capsule: 1 cap(s) orally 2 times a day (12 Oct 2019 18:49)  dilTIAZem 360 mg/24 hours oral capsule, extended release: 1 cap(s) orally once a day (12 Oct 2019 18:49)  hydroCHLOROthiazide 12.5 mg oral tablet: 1 tab(s) orally once a day (12 Oct 2019 18:49)  pramipexole 0.5 mg oral tablet: 1 tab(s) orally 3 times a day (12 Oct 2019 18:49)    MEDICATIONS:  STANDING MEDICATIONS  atorvastatin 80 milliGRAM(s) Oral at bedtime  carbidopa/levodopa CR 50/200 1 Tablet(s) Oral <User Schedule>  chlorhexidine 4% Liquid 1 Application(s) Topical <User Schedule>  clopidogrel Tablet 75 milliGRAM(s) Oral daily  diltiazem    milliGRAM(s) Oral daily  influenza   Vaccine 0.5 milliLiter(s) IntraMuscular once  pantoprazole    Tablet 40 milliGRAM(s) Oral before breakfast  pramipexole 0.5 milliGRAM(s) Oral three times a day    PRN MEDICATIONS  acetaminophen   Tablet .. 650 milliGRAM(s) Oral every 6 hours PRN      VITAL SIGNS: Last 24 Hours  T(C): 36.3 (18 Oct 2019 05:00), Max: 36.4 (17 Oct 2019 20:41)  T(F): 97.3 (18 Oct 2019 05:00), Max: 97.5 (17 Oct 2019 20:41)  HR: 59 (18 Oct 2019 05:00) (59 - 73)  BP: 161/71 (18 Oct 2019 05:00) (126/68 - 161/71)  RR: 18 (18 Oct 2019 05:00) (18 - 18)      LABS:                        11.1   15.13 )-----------( 294      ( 18 Oct 2019 04:53 )             33.5     10    140  |  103  |  27<H>  ----------------------------<  96  4.1   |  20  |  0.7    Ca    8.0<L>      18 Oct 2019 04:53  Mg     2.2     1018      PTT - ( 18 Oct 2019 04:53 )  PTT:188.6 sec      Culture - Urine (collected 17 Oct 2019 05:00)  Source: .Urine Clean Catch (Midstream)  Final Report (18 Oct 2019 10:01):    <10,000 CFU/mL Normal Urogenital Nimo      RADIOLOGY:  < from: CT Angio Neck w/ IV Cont (10.17.19 @ 18:52) >  No CTA evidence of major vascular stenoses or occlusions.    Partially visualized bilateral pleural effusions.    Multinodular left thyroid gland measuring up to 5 cm. Consider   nonemergent/outpatient thyroid ultrasound follow-up as clinically   indicated.     CT Angio Head w/ IV Cont (10.17.19 @ 18:51) >    No CTA evidence of major vascular stenoses or occlusions.    Partially visualized bilateral pleural effusions.    Multinodular left thyroid gland measuring up to 5 cm. Consider   nonemergent/outpatient thyroid ultrasound follow-up as clinically   indicated.     VA Duplex Carotid, Bilat (10.15.19 @ 13:56) >    20-39% stenosis of the right internal carotid artery.  20-39% stenosis of the left internal carotid artery.    ECHO:  Transthoracic Echocardiogram:    EXAM:  2-D ECHO (TTE) COMPLETE      PROCEDURE DATE:  10/13/2019      INTERPRETATION:  REPORT:    TRANSTHORACIC ECHOCARDIOGRAM REPORT      Patient Name:   JON GARCÍA Accession #: [id number]  Medical Rec #:  [id number]     Height:      64.0 in 162.6 cm  YOB: 1932    Weight:      130.0 lb 58.97 kg  Patient Age:    87 years     BSA:         1.63 m²  Patient Gender: F            BP:          155/70 mmHg       Date of Exam:        10/13/2019 9:46:04 AM  Referring Physician: [id number] FLORESITA VILLALBA  Sonographer:         Jonna Gifford  Reading Physician:   Kb Liz M.D.    Procedure:   2D Echo/Doppler/Color Doppler Complete.  Indications: R55 - Syncope and Collapse  Diagnosis:   Syncope and collapse - R55    Summary:   1. Normal global left ventricular systolic function.   2. LV Ejection Fraction by Sparks's Method with a biplane EF of 74 %.   3. Normal left ventricular internal cavity size.   4. Normal right atrial size.   5. There is no evidence of pericardial effusion.   6. Mild mitral valve regurgitation.   7. Mild thickening and calcification of the anterior and posterior   mitral valve leaflets.   8. Echodensity noted on PMVL atrial aspect which is suggestive of a   valvular vegetation.   9. Moderate tricuspid regurgitation.    PHYSICIAN INTERPRETATION:  Left Ventricle: The left ventricular internal cavity size is normal. Left   ventricular wall thickness is normal. Global LV systolic function was   normal.  Right Ventricle: The right ventricular size isnormal. RV systolic   function is normal.  Left Atrium: Normal left atrial size.  Right Atrium: Normal right atrial size.  Pericardium: There is no evidence of pericardial effusion.  Mitral Valve: Mild thickening and calcification of the anterior and  posterior mitral valve leaflets. Mild mitral valve regurgitation is seen.   Echodensity noted on PMVL atrial aspect which is suggestive of a valvular   vegetation.  Tricuspid Valve: The tricuspid valve is normal in structure. Moderate   tricuspid regurgitation is visualized.  Aortic Valve: The aortic valve is trileaflet. No evidence of aortic   stenosis. Aortic valve thickening with normal leaflet opening. No   evidence of aortic valve regurgitation is seen.  Pulmonic Valve: The pulmonic valve is thickened with good excursion.   Trace pulmonic valve regurgitation.  Aorta: Aortic root measured at Sinus of Valsalva is normal.  Venous: The inferior vena cava is normal.       2D AND M-MODE MEASUREMENTS (normal ranges within parentheses):  Left                Normal   Aorta/Left             Normal  Ventricle:                     Atrium:  IVSd (2D):  0.83 cm  (0.7-1.1) AoV Cusp       1.68  (1.5-2.6)  LVPWd (2D): 0.79 cm  (0.7-1.1) Separation:     cm  LVIDd (2D): 4.75 cm  (3.4-5.7) Left Atrium    3.29  (1.9-4.0)  LVIDs (2D): 2.44 cm            (Mmode):        cm  LV FS (2D):  48.6 %   (>25%)   LA Volume      37.2  Relative      0.33    (<0.42)  Index         ml/m²  Wall                           Right  Thickness                      Ventricle:                                 RVd (2D):      3.08 cm    SPECTRAL DOPPLER ANALYSIS:  LV DIASTOLIC FUNCTION:  MV Peak E: 1.34 m/s Decel Time: 245 msec  MV Peak A: 1.06 m/s  E/A Ratio: 1.27    Aortic Valve:  AoV VMax:    2.02 m/s  AoV Area, Vmax: 1.96 cm² Vmax Indx: 1.20 cm²/m²  AoV VTI:     0.41 m    AoV Area, VTI:  2.00 cm² VTI Indx:  1.23 cm²/m²  AoV Pk Grad: 16.3 mmHg  AoV Mn Grad: 8.4 mmHg    LVOT Vmax: 1.51 m/s  LVOT VTI:  0.31 m  LVOT Diam: 1.82 cm    Mitral Valve:  MV P1/2 Time: 70.94 msec  MV Area, PHT: 3.10 cm²    Tricuspid Valve and PA/RV Systolic Pressure: TR Max Velocity: 3.13 m/s RA   Pressure:  RVSP/PASP: 41.3 mmHg    Pulmonic Valve:  PV Max Velocity: 1.34 m/s PV Max P.2 mmHg PV Mean PG:       [id number] Kb Liz M.D., Electronically signed on 10/13/2019 at   2:40:34 PM   *** Final ***    KB LIZ MD  This document has been electronically signed. Oct 13 2019  9:46AM (10-13-19 @ 09:46)      PHYSICAL EXAM:    GENERAL: NAD, well-developed, AAOx3  HEENT:  Visual acuity B/L cataract + Atraumatic, Normocephalic. conjunctiva and sclera clear, No JVD  PULMONARY: Clear to auscultation bilaterally; No wheeze  CARDIOVASCULAR: Regular rate and rhythm; No murmurs, rubs, or gallops  GASTROINTESTINAL: Soft, Nontender, Nondistended; Bowel sounds present  MUSCULOSKELETAL: edema B/l LLE+  2+ Peripheral Pulses, No clubbing, cyanosis  NEUROLOGY: non-focal  SKIN: No rashes or lesions    ASSESSMENT & PLAN  78 year old female with a significant PMhx of Parkinson's disease and RA who presented to the ED after a witnessed episode of syncope, associated with recent generalized weakness, dark urine, and N/V. She also complained of L-eye blurriness x2 days and headaches over the last few weeks.       #) Acute blurry vision secondary to Central Retinal artery occlusion of LEFT EYE and Elschnig spots in right eye  -Exam was significant for multiple emboli along the arcades of the left eye. Suspect potential source of emboli from vegetation along the mitral valve.   - No jaw claudication or scalp tenderness. No fevers, unintentional weight loss  ruled out GCA; discontinued pulse steroids(3 doses of pulse steroids(500mg x2 and 1000mg x1)  as per rheum  -ESR elevated (likely inflammatory vs malignancy vs infectious , CK wnl ; CRP 10  -Discontinued Heparin today  -CTA head and neck negative on 10/17  -carotid dupplex on 10/15 with 20-39%  stenosis,  -Started on plavix 75mg and high dose statin 80mg HS  -eye clinic follow up in 1 month for repeat FA of the right eye and monitor for neovascularization of the left eye       #) New vegetation on Posterior MV leaflet on TTE  - No chest pain, afebrile  -She had her JOI on 10/16/2019 showing: Sessile dense, solid, partly calcified density attached to the base and body of posterior leaflets (P2, P3 and base) of mitral valve with mobile density protruding to left atrium suggestive of organized calcified chronic vegetation most likely. Alternately organized calcified thrombus. There was no abscess or filling defect or regurgitation through the leaflets. extensive calcified atheroma seen in the ascending and descending aorta, Mild-moderate MR. Mild TR.  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; No acute pathology  - Will monitor BP off of HCTZ for now  - Orthostatics x1 negative    # Acute Cystitis-resolved  -UA WBC 13.16, UCX >3 org  -continue Rocephin (day 5) last day today   -completed the course     # Mutlinodular left thyroid gland measuring upto 5cm  -CT chest no contrast shows enlarged left lobe of thyroid , no suspicion of malignancy  -TSH:0.04, T4 normal  -Outpatient follow up   -Quantiferon Tb test : indeterminate    # Hx of Parkinson's Disease  -Continue home medications    # Hx of RA  -Not on DMARDS   -c/o knee pain likely OA   -Anti CCP ab negative, RA factor normal ordered for completeness as suggested by rheum  -Outpt rheum follow up as outpatient    #) Hx of HTN and HLD  -Holding HCTZ  -Statin 80mg HS    GI ppx: Not indicated    DVT ppx: sequentials    Diet, DASH/TLC:   Sodium & Cholesterol Restricted   Activity: Increase as Tolerated     DISPO:  Patient to be discharged when condition(s) optimized.    CODE STATUS  [X] FULL     Pending: cardio and ID

## 2019-10-18 NOTE — CONSULT NOTE ADULT - CONSULT REASON
Elevated ESR
Mitral valve regurg
Possible GCA
r/o endocarditis
Syncope; Left eye blurriness
vegetation on mitral valve

## 2019-10-18 NOTE — DIETITIAN INITIAL EVALUATION ADULT. - DIET TYPE
Pt. reports good appetite PTP, regular meals prepared by daughter. Regular diet PTP. Discussed DASH/TLC principles with pt.  No supplement use. NKFA. Stable weight trends. No cultural/M[employer] dietary restrictions.

## 2019-10-18 NOTE — PROGRESS NOTE ADULT - ASSESSMENT
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     JOI with "Sessile dense, solid, partly calcified density attached to the base and body of posterior leaflets (P2, P3 and base) of mitral valve with mobile density protruding to left atrium suggestive of organized calcified chronic vegetation most likely."    TTE "Echodensity noted on PMVL atrial aspect which is suggestive of a valvular vegetation."    Sedimentation Rate, Erythrocyte: 106 mm/Hr (10.12.19 @ 23:33)    CRP 10    Doubt infective endocarditis as no fever, chills, stigmata     BCX NG   #Acute cystitis, symptomatic, UA WBC 77, UCX >3 org  #Bilateral small pleural effusions with adjacent lower lobe atelectasis  #Enlarged nodular left lobe of thyroid gland with associated deviation of trachea to the right  #Multiple punctate calcified granulomata.    RECOMMENDATIONS  - HOLD ABX (s/p 5 days for acute cystitis)   - Check AM bcx for the next few days, 10/14 NG, 10/15 NG  - Will discuss with Cards, may need to treat empirically for IE (midline x ceftriaxone 2g q24h IV x 4 weeks)    Spectra [id number]

## 2019-10-18 NOTE — CONSULT NOTE ADULT - REASON FOR ADMISSION
Syncope; Left eye blurriness

## 2019-10-18 NOTE — DISCHARGE NOTE NURSING/CASE MANAGEMENT/SOCIAL WORK - PATIENT PORTAL LINK FT
You can access the FollowMyHealth Patient Portal offered by [facility] by registering at the following website: [email or web address]. By joining [facility]’s FollowMyHealth portal, you will also be able to view your health information using other applications (apps) compatible with our system.

## 2019-10-18 NOTE — PROGRESS NOTE ADULT - SUBJECTIVE AND OBJECTIVE BOX
Subjective:  feels well, no complaint, still vision is blurry, no motor deficit, no fever, no chills no cardiac complaint  ID suggested 4 weeks antibiotic therapy  JOI suggested a calcified organized high likely vegetation on the mitral valve with mobile edge, as possible culprit for embolization    MEDICATIONS  (STANDING):  atorvastatin 80 milliGRAM(s) Oral at bedtime  carbidopa/levodopa CR 50/200 1 Tablet(s) Oral <User Schedule>  chlorhexidine 4% Liquid 1 Application(s) Topical <User Schedule>  clopidogrel Tablet 75 milliGRAM(s) Oral daily  diltiazem    milliGRAM(s) Oral daily  enoxaparin Injectable 40 milliGRAM(s) SubCutaneous daily  influenza   Vaccine 0.5 milliLiter(s) IntraMuscular once  pantoprazole    Tablet 40 milliGRAM(s) Oral before breakfast  pramipexole 0.5 milliGRAM(s) Oral three times a day    MEDICATIONS  (PRN):  acetaminophen   Tablet .. 650 milliGRAM(s) Oral every 6 hours PRN Mild Pain (1 - 3)            Vital Signs Last 24 Hrs  T(C): 35.8 (18 Oct 2019 13:23), Max: 36.4 (17 Oct 2019 20:41)  T(F): 96.4 (18 Oct 2019 13:23), Max: 97.5 (17 Oct 2019 20:41)  HR: 76 (18 Oct 2019 13:23) (59 - 76)  BP: 132/60 (18 Oct 2019 13:23) (132/60 - 161/71)  BP(mean): --  RR: 20 (18 Oct 2019 13:23) (18 - 20)  SpO2: --             REVIEW OF SYSTEMS:  CONSTITUTIONAL: no fever, no chills, no diaphoresis  CARDIOLOGY: no chest pain, no SOB, no palpitation, no diaphoresis  RESPIRATORY: no dyspnea, no wheeze,   NEUROLOGICAL: no dizziness, headache, focal deficits except blurry vision.  GI: no abdominal pain, no dyspepsia, no nausea, no vomiting, no diarrhea.    HEENT: no congestion, no nasal bleeding  SKIN: no ecchymosis, no petechia             PHYSICAL EXAM:  · CONSTITUTIONAL: Looks stable  . NECK: Supple, no JVD, no bruit on either carotid side   · RESPIRATORY: Normal air entry to lung base, no wheeze, no crackle, no wet rales  · CARDIOVASCULAR: S1, A2, P2, no murmur, no click, regular rate,  · EXTREMITIES: No cyanosis, no clubbing, no edema  · VASCULAR: Pulses are regular, equal, bilateral in upper and lower extremities  	  TELEMETRY: NSR    ECG: < from: 12 Lead ECG (10.13.19 @ 10:57) >   Sinus rhythm with Premature atrial complexes  Otherwise normal ECG      < end of copied text >      TTE: < from: JOI w/Probe Placement (10.16.19 @ 17:28) >   1. Left ventricular ejection fraction, by visual estimation, is60 to   65%.   2. Mild thickening and calcification of the anterior and posterior   mitral valve leaflets.   3. Mild to moderate mitral valve regurgitation.   4. There is a 13 x 8 mm Sessile, dense, solid, partly calcified density   attached to the base and body of posterior leaflets (P2,P3 and base) of   mitral valve, along with mobile pieces protruding mainly to the left   atrium and small segment to the left ventricle, suggestive of organized   calcified most likely a chronic vegetation, or less likely an organized   calcified thrombus. There was no abscess, filling defect or regurgitation   through the leaflets or the mass.   5. Sclerotic aortic valve with normal opening.   6. Color flow doppler and intravenous injection of agitated saline   demonstrates the presence of an intact intra atrial septum.    < end of copied text >      LABS:                        11.1   15.13 )-----------( 294      ( 18 Oct 2019 04:53 )             33.5     10-18    140  |  103  |  27<H>  ----------------------------<  96  4.1   |  20  |  0.7    Ca    8.0<L>      18 Oct 2019 04:53  Mg     2.2     10-18          PTT - ( 18 Oct 2019 11:51 )  PTT:58.1 sec    I&O's Summary    17 Oct 2019 07:01  -  18 Oct 2019 07:00  --------------------------------------------------------  IN: 936 mL / OUT: 0 mL / NET: 936 mL    18 Oct 2019 07:01  -  18 Oct 2019 14:56  --------------------------------------------------------  IN: 630 mL / OUT: 0 mL / NET: 630 mL      BNP  RADIOLOGY & ADDITIONAL STUDIES: < from: Xray Chest 1 View-PORTABLE IMMEDIATE (10.12.19 @ 20:27) >  Increased reticular opacities which may be seen with pulmonary venous   congestion. Small right pleural effusion.    < end of copied text >      IMPRESSION AND PLAN:

## 2019-10-18 NOTE — PROGRESS NOTE ADULT - ASSESSMENT
This is a 78 year old female with a significant PMhx of Parkinson's disease and RA who presented to the ED after a witnessed episode of syncope. She also mentions acute development of left eye blurriness that is constant over past two days, and has been experiencing intermittent, sharp, occipital/neck headaches for the last few weeks.      1.	Syncope unspecified  2.	Lt. Central retinal artery occlusion/ B/L blurred vision L>R  3.	Vegetations on MV. 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	Parkinson's disease  5.	Lt. Paratracheal opacity due to enlarged Thyroid.   6.	UTI  7.	RA         PLAN:    ·	S/P JOI. Ch. Calcified vegetations on MV likely source of septic emboli, and calcified atheroma in the ascending and descending aorta. No obvious carotid, cardiac or aortic source of emboli. Unlikely benefit of A/C. Will D/C heparin  ·	CTA head & neck unremarkable.  ·	Blood cx x 1 is negative.    ·	Orthostatics negative  ·	CD is unremarkable  ·	Syncope could be vasovagal vs due to Sinemet.   ·	ECHO reviewed. EF 74%. Thickening of the MV leaflets. Echodensity on PMVL atrial aspect   ·	S/P Opthalmology eval. Has Lt eye central retinal artery occulusion and also hypo/hyperpigmented areas in Rt eye likely due to emboli  ·	Doubt GCA. Will D/C steroids.   ·	CT chest reviewed. Lt paratracheal opacity is due to enlarged thyroid.   ·	Cont IV Rocephin. Repeat urine cxs as the first sample was contaminated.   ·	Plan of care D/W the daughters on bedside.

## 2019-10-19 ENCOUNTER — TRANSCRIPTION ENCOUNTER (OUTPATIENT)
Age: 84
End: 2019-10-19

## 2019-10-19 VITALS
SYSTOLIC BLOOD PRESSURE: 137 MMHG | TEMPERATURE: 96 F | HEART RATE: 77 BPM | DIASTOLIC BLOOD PRESSURE: 77 MMHG | RESPIRATION RATE: 20 BRPM

## 2019-10-19 LAB
ANION GAP SERPL CALC-SCNC: 10 MMOL/L — SIGNIFICANT CHANGE UP (ref 7–14)
B2 GLYCOPROT1 AB SER QL: NEGATIVE — SIGNIFICANT CHANGE UP
BASOPHILS # BLD AUTO: 0.02 K/UL — SIGNIFICANT CHANGE UP (ref 0–0.2)
BASOPHILS NFR BLD AUTO: 0.1 % — SIGNIFICANT CHANGE UP (ref 0–1)
BUN SERPL-MCNC: 22 MG/DL — HIGH (ref 10–20)
CALCIUM SERPL-MCNC: 7.9 MG/DL — LOW (ref 8.5–10.1)
CARDIOLIPIN AB SER-ACNC: NEGATIVE — SIGNIFICANT CHANGE UP
CHLORIDE SERPL-SCNC: 103 MMOL/L — SIGNIFICANT CHANGE UP (ref 98–110)
CO2 SERPL-SCNC: 25 MMOL/L — SIGNIFICANT CHANGE UP (ref 17–32)
CREAT SERPL-MCNC: 0.7 MG/DL — SIGNIFICANT CHANGE UP (ref 0.7–1.5)
EOSINOPHIL # BLD AUTO: 0.12 K/UL — SIGNIFICANT CHANGE UP (ref 0–0.7)
EOSINOPHIL NFR BLD AUTO: 0.8 % — SIGNIFICANT CHANGE UP (ref 0–8)
GLUCOSE SERPL-MCNC: 86 MG/DL — SIGNIFICANT CHANGE UP (ref 70–99)
HCT VFR BLD CALC: 34.6 % — LOW (ref 37–47)
HGB BLD-MCNC: 11.3 G/DL — LOW (ref 12–16)
IMM GRANULOCYTES NFR BLD AUTO: 14.6 % — HIGH (ref 0.1–0.3)
LYMPHOCYTES # BLD AUTO: 1.25 K/UL — SIGNIFICANT CHANGE UP (ref 1.2–3.4)
LYMPHOCYTES # BLD AUTO: 8.2 % — LOW (ref 20.5–51.1)
MAGNESIUM SERPL-MCNC: 2.1 MG/DL — SIGNIFICANT CHANGE UP (ref 1.8–2.4)
MCHC RBC-ENTMCNC: 29.8 PG — SIGNIFICANT CHANGE UP (ref 27–31)
MCHC RBC-ENTMCNC: 32.7 G/DL — SIGNIFICANT CHANGE UP (ref 32–37)
MCV RBC AUTO: 91.3 FL — SIGNIFICANT CHANGE UP (ref 81–99)
MONOCYTES # BLD AUTO: 0.93 K/UL — HIGH (ref 0.1–0.6)
MONOCYTES NFR BLD AUTO: 6.1 % — SIGNIFICANT CHANGE UP (ref 1.7–9.3)
NEUTROPHILS # BLD AUTO: 10.77 K/UL — HIGH (ref 1.4–6.5)
NEUTROPHILS NFR BLD AUTO: 70.2 % — SIGNIFICANT CHANGE UP (ref 42.2–75.2)
NRBC # BLD: 0 /100 WBCS — SIGNIFICANT CHANGE UP (ref 0–0)
PLATELET # BLD AUTO: 308 K/UL — SIGNIFICANT CHANGE UP (ref 130–400)
POTASSIUM SERPL-MCNC: 4.7 MMOL/L — SIGNIFICANT CHANGE UP (ref 3.5–5)
POTASSIUM SERPL-SCNC: 4.7 MMOL/L — SIGNIFICANT CHANGE UP (ref 3.5–5)
RBC # BLD: 3.79 M/UL — LOW (ref 4.2–5.4)
RBC # FLD: 13.2 % — SIGNIFICANT CHANGE UP (ref 11.5–14.5)
SODIUM SERPL-SCNC: 138 MMOL/L — SIGNIFICANT CHANGE UP (ref 135–146)
WBC # BLD: 15.32 K/UL — HIGH (ref 4.8–10.8)
WBC # FLD AUTO: 15.32 K/UL — HIGH (ref 4.8–10.8)

## 2019-10-19 PROCEDURE — 99231 SBSQ HOSP IP/OBS SF/LOW 25: CPT

## 2019-10-19 PROCEDURE — 99239 HOSP IP/OBS DSCHRG MGMT >30: CPT

## 2019-10-19 RX ORDER — CEFTRIAXONE 500 MG/1
2 INJECTION, POWDER, FOR SOLUTION INTRAMUSCULAR; INTRAVENOUS
Qty: 54 | Refills: 0
Start: 2019-10-19 | End: 2019-11-14

## 2019-10-19 RX ORDER — CLOPIDOGREL BISULFATE 75 MG/1
1 TABLET, FILM COATED ORAL
Qty: 30 | Refills: 3
Start: 2019-10-19 | End: 2020-02-15

## 2019-10-19 RX ORDER — CLOPIDOGREL BISULFATE 75 MG/1
1 TABLET, FILM COATED ORAL
Qty: 0 | Refills: 3 | DISCHARGE
Start: 2019-10-19 | End: 2020-02-15

## 2019-10-19 RX ORDER — ATORVASTATIN CALCIUM 80 MG/1
1 TABLET, FILM COATED ORAL
Qty: 30 | Refills: 3
Start: 2019-10-19 | End: 2020-02-15

## 2019-10-19 RX ORDER — PRAMIPEXOLE DIHYDROCHLORIDE 0.12 MG/1
1 TABLET ORAL
Qty: 0 | Refills: 0 | DISCHARGE

## 2019-10-19 RX ORDER — CARBIDOPA AND LEVODOPA 25; 100 MG/1; MG/1
1 TABLET ORAL
Qty: 30 | Refills: 3
Start: 2019-10-19 | End: 2020-02-15

## 2019-10-19 RX ORDER — CEFTRIAXONE 500 MG/1
2000 INJECTION, POWDER, FOR SOLUTION INTRAMUSCULAR; INTRAVENOUS EVERY 24 HOURS
Refills: 0 | Status: COMPLETED | OUTPATIENT
Start: 2019-10-19 | End: 2019-10-19

## 2019-10-19 RX ORDER — ATORVASTATIN CALCIUM 80 MG/1
1 TABLET, FILM COATED ORAL
Qty: 0 | Refills: 0 | DISCHARGE

## 2019-10-19 RX ADMIN — CEFTRIAXONE 100 MILLIGRAM(S): 500 INJECTION, POWDER, FOR SOLUTION INTRAMUSCULAR; INTRAVENOUS at 13:37

## 2019-10-19 RX ADMIN — CARBIDOPA AND LEVODOPA 1 TABLET(S): 25; 100 TABLET ORAL at 12:11

## 2019-10-19 RX ADMIN — PRAMIPEXOLE DIHYDROCHLORIDE 0.5 MILLIGRAM(S): 0.12 TABLET ORAL at 06:15

## 2019-10-19 RX ADMIN — PRAMIPEXOLE DIHYDROCHLORIDE 0.5 MILLIGRAM(S): 0.12 TABLET ORAL at 14:30

## 2019-10-19 RX ADMIN — CHLORHEXIDINE GLUCONATE 1 APPLICATION(S): 213 SOLUTION TOPICAL at 06:15

## 2019-10-19 RX ADMIN — CARBIDOPA AND LEVODOPA 1 TABLET(S): 25; 100 TABLET ORAL at 06:15

## 2019-10-19 RX ADMIN — PANTOPRAZOLE SODIUM 40 MILLIGRAM(S): 20 TABLET, DELAYED RELEASE ORAL at 06:16

## 2019-10-19 RX ADMIN — ENOXAPARIN SODIUM 40 MILLIGRAM(S): 100 INJECTION SUBCUTANEOUS at 12:11

## 2019-10-19 RX ADMIN — Medication 360 MILLIGRAM(S): at 06:16

## 2019-10-19 RX ADMIN — CLOPIDOGREL BISULFATE 75 MILLIGRAM(S): 75 TABLET, FILM COATED ORAL at 12:11

## 2019-10-19 NOTE — DISCHARGE NOTE PROVIDER - HOSPITAL COURSE
78 year old female with a significant PMhx of Parkinson's disease and RA who presented to the ED after a witnessed episode of syncope. She also mentions acute development of left eye blurriness that is constant over past two days, and has been experiencing intermittent, sharp, occipital/neck headaches for the last few weeks.        Patient was assessed by opthalmology and found Lt. Central retinal artery occlusion/ B/L blurred vision L>R. Echo showed Vegetations on MV. 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 Midline was placed and patient is being discharged on Ceftriaxone 1 gm for 4 weeks. 78 year old female with a significant PMhx of Parkinson's disease and RA who presented to the ED after a witnessed episode of syncope. She also mentions acute development of left eye blurriness that is constant over past two days, and has been experiencing intermittent, sharp, occipital/neck headaches for the last few weeks.        Patient was assessed by opthalmology and found Lt. Central retinal artery occlusion/ B/L blurred vision L>R. Echo showed Vegetations on MV. 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 Midline was placed and patient is being discharged on Ceftriaxone 1 gm for 4 weeks.        Pt was seen by CT surgeon Dr. Jacobs. He recommended to reevaluate him in one week after medical treatment

## 2019-10-19 NOTE — PROGRESS NOTE ADULT - SUBJECTIVE AND OBJECTIVE BOX
CTS Attending    Case reviewed and case discussed with CT team  Patient needs adequate medical therapy for now  Will follow and will discuss next week regarding the response to medical therapy

## 2019-10-19 NOTE — PROCEDURE NOTE - NSPROCDETAILS_GEN_ALL_CORE
sterile technique, catheter placed/ultrasound guidance/supine position/sterile dressing applied/location identified, draped/prepped, sterile technique used

## 2019-10-19 NOTE — PROGRESS NOTE ADULT - SUBJECTIVE AND OBJECTIVE BOX
JON GARCÍA  87y Female    CHIEF COMPLAINT:    Patient is a 87y old  Female who presents with a chief complaint of Syncope; Left eye blurriness (19 Oct 2019 06:04)      INTERVAL HPI/OVERNIGHT EVENTS:    Patient seen and examined. Complains that there is no change in her blurred vision. No headache. No dizziness.     ROS: All other systems are negative.    Vital Signs:    T(F): 96.9 (10-19-19 @ 05:20), Max: 98 (10-18-19 @ 21:15)  HR: 73 (10-19-19 @ 05:20) (71 - 76)  BP: 156/70 (10-19-19 @ 05:20) (132/60 - 156/70)  RR: 18 (10-19-19 @ 05:20) (18 - 20)  SpO2: --  I&O's Summary    18 Oct 2019 07:01  -  19 Oct 2019 07:00  --------------------------------------------------------  IN: 641 mL / OUT: 0 mL / NET: 641 mL    19 Oct 2019 07:01  -  19 Oct 2019 11:44  --------------------------------------------------------  IN: 300 mL / OUT: 250 mL / NET: 50 mL      Daily Height in cm: 162.5 (18 Oct 2019 13:06)    Daily   CAPILLARY BLOOD GLUCOSE          PHYSICAL EXAM:    GENERAL:  NAD  SKIN: No rashes or lesions  HENT: Atraumatic Normocephalic. PERRL. Moist membranes.  NECK: Supple, No JVD. No lymphadenopathy.  PULMONARY: CTA B/L. No wheezing. No rales  CVS: Normal S1, S2. Rate and Rhythm are regular. No murmurs.  ABDOMEN/GI: Soft, Nontender, Nondistended; BS present  EXTREMITIES: Peripheral pulses intact. No edema B/L LE.  NEUROLOGIC:  No motor or sensory deficit.  PSYCH: Alert & oriented x 3    Consultant(s) Notes Reviewed:  [x ] YES  [ ] NO  Care Discussed with Consultants/Other Providers [ x] YES  [ ] NO    EKG reviewed  Telemetry reviewed    LABS:                        11.3   15.32 )-----------( 308      ( 19 Oct 2019 05:14 )             34.6     10-19    138  |  103  |  22<H>  ----------------------------<  86  4.7   |  25  |  0.7    Ca    7.9<L>      19 Oct 2019 05:14  Mg     2.1     10-19      PTT - ( 18 Oct 2019 11:51 )  PTT:58.1 sec  Serum Pro-Brain Natriuretic Peptide: 1651 pg/mL (10-12-19 @ 19:12)        Culture - Blood (collected 17 Oct 2019 05:55)  Source: .Blood None  Preliminary Report (18 Oct 2019 14:00):    No growth to date.    Culture - Urine (collected 17 Oct 2019 05:00)  Source: .Urine Clean Catch (Midstream)  Final Report (18 Oct 2019 10:01):    <10,000 CFU/mL Normal Urogenital Nimo        RADIOLOGY & ADDITIONAL TESTS:      Imaging or report Personally Reviewed:  [ ] YES  [ ] NO    Medications:  Standing  atorvastatin 80 milliGRAM(s) Oral at bedtime  carbidopa/levodopa CR 50/200 1 Tablet(s) Oral <User Schedule>  chlorhexidine 4% Liquid 1 Application(s) Topical <User Schedule>  clopidogrel Tablet 75 milliGRAM(s) Oral daily  diltiazem    milliGRAM(s) Oral daily  enoxaparin Injectable 40 milliGRAM(s) SubCutaneous daily  influenza   Vaccine 0.5 milliLiter(s) IntraMuscular once  pantoprazole    Tablet 40 milliGRAM(s) Oral before breakfast  pramipexole 0.5 milliGRAM(s) Oral three times a day    PRN Meds  acetaminophen   Tablet .. 650 milliGRAM(s) Oral every 6 hours PRN      Case discussed with resident    Care discussed with pt/family

## 2019-10-19 NOTE — PROGRESS NOTE ADULT - ASSESSMENT
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     JOI with "Sessile dense, solid, partly calcified density attached to the base and body of posterior leaflets (P2, P3 and base) of mitral valve with mobile density protruding to left atrium suggestive of organized calcified chronic vegetation most likely."    TTE "Echodensity noted on PMVL atrial aspect which is suggestive of a valvular vegetation."    Sedimentation Rate, Erythrocyte: 106 mm/Hr (10.12.19 @ 23:33)    CRP 10    Doubt infective endocarditis as no fever, chills, stigmata     BCX NG   #Acute cystitis, symptomatic, UA WBC 77, UCX >3 org  #Bilateral small pleural effusions with adjacent lower lobe atelectasis  #Enlarged nodular left lobe of thyroid gland with associated deviation of trachea to the right  #Multiple punctate calcified granulomata.    RECOMMENDATIONS  - HOLD ABX (s/p 5 days for acute cystitis)   - Check AM bcx for the next few days, 10/14 NG, 10/15, 17 NG  - Will discuss with Cards, may need to treat empirically for IE (midline x ceftriaxone 2g q24h IV x 4 weeks)

## 2019-10-19 NOTE — PROGRESS NOTE ADULT - REASON FOR ADMISSION
Syncope; Left eye blurriness

## 2019-10-19 NOTE — DISCHARGE NOTE PROVIDER - CARE PROVIDER_API CALL
Denis Farris)  Cardiology; Internal Medicine; Nuclear Cardiology  [address], [address]  [place] [address]  Phone: [phone]  Fax: [phone]  Follow Up Time: 1 month    Jarod Quintanilla)  Ophthalmology  [address]  [place] [address]  Phone: [phone]  Fax: [phone]  Follow Up Time: 1 month    Gucci Kamara)  Pediatrics Medicine  [address] [place] [address]  Phone: [phone]  Fax: [phone]  Follow Up Time: 1 week Denis Farris)  Cardiology; Internal Medicine; Nuclear Cardiology  [address], [address]  [place] [address]  Phone: [phone]  Fax: [phone]  Follow Up Time: 1 month    Jarod Quintanilla)  Ophthalmology  [address]  [place] [address]  Phone: [phone]  Fax: [phone]  Follow Up Time: 1 month    Gucci Kamara)  Pediatrics Medicine  [address] [place] [address]  Phone: [phone]  Fax: [phone]  Follow Up Time: 1 week    Rick Jacobs)  Surgery; Thoracic and Cardiac Surgery  [address], [address]  [place] [address]  Phone: [phone]  Fax: [phone]  Follow Up Time:

## 2019-10-19 NOTE — DISCHARGE NOTE PROVIDER - NSDCCPCAREPLAN_GEN_ALL_CORE_FT
PRINCIPAL DISCHARGE DIAGNOSIS  Diagnosis: Suspected endocarditis  Assessment and Plan of Treatment: ceftriaxone 1 gm for 4 weeks   plavix      SECONDARY DISCHARGE DIAGNOSES  Diagnosis: H/O central retinal artery occlusion  Assessment and Plan of Treatment: medical management    Diagnosis: Hypertension  Assessment and Plan of Treatment: Medical management

## 2019-10-19 NOTE — PROGRESS NOTE ADULT - PROVIDER SPECIALTY LIST ADULT
CT Surgery
CT Surgery
Cardiology
Hospitalist
Infectious Disease
Internal Medicine
Rheumatology
Hospitalist
Infectious Disease
Hospitalist

## 2019-10-19 NOTE — PROGRESS NOTE ADULT - SUBJECTIVE AND OBJECTIVE BOX
JOELJERJON  87y, Female    All available historical data reviewed    OVERNIGHT EVENTS:  no fevers      VITALS:  T(F): 96.9, Max: 98 (10-18-19 @ 21:15)  HR: 73  BP: 156/70  RR: 18Vital Signs Last 24 Hrs  T(C): 36.1 (19 Oct 2019 05:20), Max: 36.7 (18 Oct 2019 21:15)  T(F): 96.9 (19 Oct 2019 05:20), Max: 98 (18 Oct 2019 21:15)  HR: 73 (19 Oct 2019 05:20) (71 - 76)  BP: 156/70 (19 Oct 2019 05:20) (132/60 - 156/70)  BP(mean): --  RR: 18 (19 Oct 2019 05:20) (18 - 20)  SpO2: --    TESTS & MEASUREMENTS:                        11.1   15.13 )-----------( 294      ( 18 Oct 2019 04:53 )             33.5     10-18    140  |  103  |  27<H>  ----------------------------<  96  4.1   |  20  |  0.7    Ca    8.0<L>      18 Oct 2019 04:53  Mg     2.2     10-18          Culture - Blood (collected 10-17-19 @ 05:55)  Source: .Blood None  Preliminary Report (10-18-19 @ 14:00):    No growth to date.    Culture - Urine (collected 10-17-19 @ 05:00)  Source: .Urine Clean Catch (Midstream)  Final Report (10-18-19 @ 10:01):    <10,000 CFU/mL Normal Urogenital Nimo    Culture - Blood (collected 10-15-19 @ 05:08)  Source: .Blood None  Preliminary Report (10-16-19 @ 13:01):    No growth to date.    Culture - Blood (collected 10-14-19 @ 17:26)  Source: .Blood None  Preliminary Report (10-16-19 @ 04:01):    No growth to date.    Culture - Urine (collected 10-13-19 @ 03:00)  Source: .Urine None  Final Report (10-14-19 @ 07:44):    >=3 organisms. Probable collection contamination.            RADIOLOGY & ADDITIONAL TESTS:  Personal review of radiological diagnostics performed  Echo and EKG results noted when applicable.     ANTIBIOTICS:

## 2019-10-19 NOTE — DISCHARGE NOTE PROVIDER - PROVIDER TOKENS
PROVIDER:[TOKEN:[[id number]],FOLLOWUP:[1 month]],PROVIDER:[TOKEN:[[id number]],FOLLOWUP:[1 month]],PROVIDER:[TOKEN:[[id number]],FOLLOWUP:[1 week]] PROVIDER:[TOKEN:[[id number]],FOLLOWUP:[1 month]],PROVIDER:[TOKEN:[[id number]],FOLLOWUP:[1 month]],PROVIDER:[TOKEN:[[id number]],FOLLOWUP:[1 week]],PROVIDER:[TOKEN:[[id number]]]

## 2019-10-19 NOTE — PROGRESS NOTE ADULT - ASSESSMENT
This is a 78 year old female with a significant PMhx of Parkinson's disease and RA who presented to the ED after a witnessed episode of syncope. She also mentions acute development of left eye blurriness that is constant over past two days, and has been experiencing intermittent, sharp, occipital/neck headaches for the last few weeks.      1.	Syncope unspecified  2.	Lt. Central retinal artery occlusion/ B/L blurred vision L>R  3.	Vegetations on MV. 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	Parkinson's disease  5.	Lt. Paratracheal opacity due to enlarged Thyroid.   6.	UTI  7.	RA         PLAN:    ·	Care D/W the cardiology and ID yesterday. Cardiology recommended CTS eval. ID recommended 4 weeks of Abx for possible infective endocarditis.   ·	S/P JOI. Ch. Calcified vegetations on MV likely source of septic emboli, and calcified atheroma in the ascending and descending aorta. No obvious carotid, or aortic source of emboli. Unlikely benefit of A/C.   ·	CTA head & neck unremarkable.  ·	Blood cx x 1 is negative.    ·	Orthostatics negative  ·	CD is unremarkable  ·	Syncope could be vasovagal vs due to Sinemet.   ·	ECHO reviewed. EF 74%. Thickening of the MV leaflets. Echodensity on PMVL atrial aspect   ·	S/P Opthalmology eval. Has Lt eye central retinal artery occulusion and also hypo/hyperpigmented areas in Rt eye likely due to emboli  ·	CT chest reviewed. Lt paratracheal opacity is due to enlarged thyroid.   ·	Plan of care D/W the daughters on bedside.   ·	Possible D/C today after seen by CTS

## 2019-10-20 LAB
CULTURE RESULTS: SIGNIFICANT CHANGE UP
CULTURE RESULTS: SIGNIFICANT CHANGE UP
SPECIMEN SOURCE: SIGNIFICANT CHANGE UP
SPECIMEN SOURCE: SIGNIFICANT CHANGE UP

## 2019-10-21 ENCOUNTER — INPATIENT (INPATIENT)
Facility: HOSPITAL | Age: 84
LOS: 0 days | Discharge: HOME HEALTH | End: 2019-10-22
Attending: INTERNAL MEDICINE | Admitting: INTERNAL MEDICINE
Payer: MEDICARE

## 2019-10-21 VITALS
OXYGEN SATURATION: 98 % | SYSTOLIC BLOOD PRESSURE: 152 MMHG | TEMPERATURE: 96 F | RESPIRATION RATE: 16 BRPM | DIASTOLIC BLOOD PRESSURE: 78 MMHG | HEART RATE: 83 BPM

## 2019-10-21 DIAGNOSIS — Z90.710 ACQUIRED ABSENCE OF BOTH CERVIX AND UTERUS: Chronic | ICD-10-CM

## 2019-10-21 PROBLEM — E78.5 HYPERLIPIDEMIA, UNSPECIFIED: Chronic | Status: ACTIVE | Noted: 2019-10-12

## 2019-10-21 PROBLEM — I10 ESSENTIAL (PRIMARY) HYPERTENSION: Chronic | Status: ACTIVE | Noted: 2019-10-12

## 2019-10-21 PROBLEM — G20 PARKINSON'S DISEASE: Chronic | Status: ACTIVE | Noted: 2019-10-12

## 2019-10-21 PROBLEM — M06.9 RHEUMATOID ARTHRITIS, UNSPECIFIED: Chronic | Status: ACTIVE | Noted: 2019-10-12

## 2019-10-21 LAB
ALBUMIN SERPL ELPH-MCNC: 3.2 G/DL — LOW (ref 3.5–5.2)
ALP SERPL-CCNC: 76 U/L — SIGNIFICANT CHANGE UP (ref 30–115)
ALT FLD-CCNC: 8 U/L — SIGNIFICANT CHANGE UP (ref 0–41)
ANION GAP SERPL CALC-SCNC: 12 MMOL/L — SIGNIFICANT CHANGE UP (ref 7–14)
APTT BLD: 26.9 SEC — LOW (ref 27–39.2)
AST SERPL-CCNC: 34 U/L — SIGNIFICANT CHANGE UP (ref 0–41)
BASOPHILS # BLD AUTO: 0.06 K/UL — SIGNIFICANT CHANGE UP (ref 0–0.2)
BASOPHILS NFR BLD AUTO: 0.5 % — SIGNIFICANT CHANGE UP (ref 0–1)
BILIRUB SERPL-MCNC: 0.4 MG/DL — SIGNIFICANT CHANGE UP (ref 0.2–1.2)
BUN SERPL-MCNC: 17 MG/DL — SIGNIFICANT CHANGE UP (ref 10–20)
CALCIUM SERPL-MCNC: 8.6 MG/DL — SIGNIFICANT CHANGE UP (ref 8.5–10.1)
CHLORIDE SERPL-SCNC: 102 MMOL/L — SIGNIFICANT CHANGE UP (ref 98–110)
CO2 SERPL-SCNC: 26 MMOL/L — SIGNIFICANT CHANGE UP (ref 17–32)
CREAT SERPL-MCNC: 0.7 MG/DL — SIGNIFICANT CHANGE UP (ref 0.7–1.5)
EOSINOPHIL # BLD AUTO: 0.18 K/UL — SIGNIFICANT CHANGE UP (ref 0–0.7)
EOSINOPHIL NFR BLD AUTO: 1.5 % — SIGNIFICANT CHANGE UP (ref 0–8)
GLUCOSE SERPL-MCNC: 92 MG/DL — SIGNIFICANT CHANGE UP (ref 70–99)
HCT VFR BLD CALC: 34.4 % — LOW (ref 37–47)
HGB BLD-MCNC: 11.1 G/DL — LOW (ref 12–16)
IMM GRANULOCYTES NFR BLD AUTO: 7.8 % — HIGH (ref 0.1–0.3)
INR BLD: 1.02 RATIO — SIGNIFICANT CHANGE UP (ref 0.65–1.3)
LACTATE SERPL-SCNC: 0.9 MMOL/L — SIGNIFICANT CHANGE UP (ref 0.5–2.2)
LYMPHOCYTES # BLD AUTO: 1.2 K/UL — SIGNIFICANT CHANGE UP (ref 1.2–3.4)
LYMPHOCYTES # BLD AUTO: 10.2 % — LOW (ref 20.5–51.1)
MCHC RBC-ENTMCNC: 29.9 PG — SIGNIFICANT CHANGE UP (ref 27–31)
MCHC RBC-ENTMCNC: 32.3 G/DL — SIGNIFICANT CHANGE UP (ref 32–37)
MCV RBC AUTO: 92.7 FL — SIGNIFICANT CHANGE UP (ref 81–99)
MONOCYTES # BLD AUTO: 1.13 K/UL — HIGH (ref 0.1–0.6)
MONOCYTES NFR BLD AUTO: 9.6 % — HIGH (ref 1.7–9.3)
MYELOCYTES NFR BLD: 2 % — HIGH (ref 0–0)
NEUTROPHILS # BLD AUTO: 8.32 K/UL — HIGH (ref 1.4–6.5)
NEUTROPHILS NFR BLD AUTO: 70.4 % — SIGNIFICANT CHANGE UP (ref 42.2–75.2)
NRBC # BLD: 0 /100 WBCS — SIGNIFICANT CHANGE UP (ref 0–0)
NRBC # BLD: 0 /100 — SIGNIFICANT CHANGE UP (ref 0–0)
PLAT MORPH BLD: NORMAL — SIGNIFICANT CHANGE UP
PLATELET # BLD AUTO: 391 K/UL — SIGNIFICANT CHANGE UP (ref 130–400)
POTASSIUM SERPL-MCNC: 3.9 MMOL/L — SIGNIFICANT CHANGE UP (ref 3.5–5)
POTASSIUM SERPL-SCNC: 3.9 MMOL/L — SIGNIFICANT CHANGE UP (ref 3.5–5)
PROT SERPL-MCNC: 5.6 G/DL — LOW (ref 6–8)
PROTHROM AB SERPL-ACNC: 11.7 SEC — SIGNIFICANT CHANGE UP (ref 9.95–12.87)
RBC # BLD: 3.71 M/UL — LOW (ref 4.2–5.4)
RBC # FLD: 13.7 % — SIGNIFICANT CHANGE UP (ref 11.5–14.5)
RBC BLD AUTO: NORMAL — SIGNIFICANT CHANGE UP
SODIUM SERPL-SCNC: 140 MMOL/L — SIGNIFICANT CHANGE UP (ref 135–146)
WBC # BLD: 11.81 K/UL — HIGH (ref 4.8–10.8)
WBC # FLD AUTO: 11.81 K/UL — HIGH (ref 4.8–10.8)

## 2019-10-21 PROCEDURE — 99285 EMERGENCY DEPT VISIT HI MDM: CPT

## 2019-10-21 PROCEDURE — 73090 X-RAY EXAM OF FOREARM: CPT | Mod: 26,RT

## 2019-10-21 PROCEDURE — 99221 1ST HOSP IP/OBS SF/LOW 40: CPT | Mod: AI

## 2019-10-21 PROCEDURE — 73070 X-RAY EXAM OF ELBOW: CPT | Mod: 26,RT

## 2019-10-21 PROCEDURE — 93971 EXTREMITY STUDY: CPT | Mod: 26,RT

## 2019-10-21 RX ORDER — PRAMIPEXOLE DIHYDROCHLORIDE 0.12 MG/1
0.5 TABLET ORAL THREE TIMES A DAY
Refills: 0 | Status: DISCONTINUED | OUTPATIENT
Start: 2019-10-21 | End: 2019-10-22

## 2019-10-21 RX ORDER — DILTIAZEM HCL 120 MG
1 CAPSULE, EXT RELEASE 24 HR ORAL
Qty: 0 | Refills: 0 | DISCHARGE

## 2019-10-21 RX ORDER — CARBIDOPA, LEVODOPA, AND ENTACAPONE 50; 200; 200 MG/1; MG/1; MG/1
1 TABLET, FILM COATED ORAL DAILY
Refills: 0 | Status: DISCONTINUED | OUTPATIENT
Start: 2019-10-21 | End: 2019-10-21

## 2019-10-21 RX ORDER — ACETAMINOPHEN 500 MG
650 TABLET ORAL EVERY 6 HOURS
Refills: 0 | Status: DISCONTINUED | OUTPATIENT
Start: 2019-10-21 | End: 2019-10-22

## 2019-10-21 RX ORDER — INFLUENZA VIRUS VACCINE 15; 15; 15; 15 UG/.5ML; UG/.5ML; UG/.5ML; UG/.5ML
0.5 SUSPENSION INTRAMUSCULAR ONCE
Refills: 0 | Status: DISCONTINUED | OUTPATIENT
Start: 2019-10-21 | End: 2019-10-22

## 2019-10-21 RX ORDER — CARBIDOPA AND LEVODOPA 25; 100 MG/1; MG/1
1 TABLET ORAL
Refills: 0 | Status: DISCONTINUED | OUTPATIENT
Start: 2019-10-21 | End: 2019-10-22

## 2019-10-21 RX ORDER — DILTIAZEM HCL 120 MG
300 CAPSULE, EXT RELEASE 24 HR ORAL DAILY
Refills: 0 | Status: DISCONTINUED | OUTPATIENT
Start: 2019-10-21 | End: 2019-10-22

## 2019-10-21 RX ORDER — ATORVASTATIN CALCIUM 80 MG/1
20 TABLET, FILM COATED ORAL AT BEDTIME
Refills: 0 | Status: DISCONTINUED | OUTPATIENT
Start: 2019-10-21 | End: 2019-10-22

## 2019-10-21 RX ORDER — VANCOMYCIN HCL 1 G
1000 VIAL (EA) INTRAVENOUS ONCE
Refills: 0 | Status: COMPLETED | OUTPATIENT
Start: 2019-10-21 | End: 2019-10-21

## 2019-10-21 RX ORDER — CHLORHEXIDINE GLUCONATE 213 G/1000ML
1 SOLUTION TOPICAL
Refills: 0 | Status: DISCONTINUED | OUTPATIENT
Start: 2019-10-21 | End: 2019-10-22

## 2019-10-21 RX ORDER — PANTOPRAZOLE SODIUM 20 MG/1
40 TABLET, DELAYED RELEASE ORAL
Refills: 0 | Status: DISCONTINUED | OUTPATIENT
Start: 2019-10-21 | End: 2019-10-22

## 2019-10-21 RX ORDER — VANCOMYCIN HCL 1 G
1000 VIAL (EA) INTRAVENOUS EVERY 12 HOURS
Refills: 0 | Status: DISCONTINUED | OUTPATIENT
Start: 2019-10-22 | End: 2019-10-22

## 2019-10-21 RX ORDER — CEFTRIAXONE 500 MG/1
1000 INJECTION, POWDER, FOR SOLUTION INTRAMUSCULAR; INTRAVENOUS EVERY 24 HOURS
Refills: 0 | Status: DISCONTINUED | OUTPATIENT
Start: 2019-10-22 | End: 2019-10-22

## 2019-10-21 RX ADMIN — PRAMIPEXOLE DIHYDROCHLORIDE 0.5 MILLIGRAM(S): 0.12 TABLET ORAL at 23:47

## 2019-10-21 RX ADMIN — CEFTRIAXONE 100 MILLIGRAM(S): 500 INJECTION, POWDER, FOR SOLUTION INTRAMUSCULAR; INTRAVENOUS at 23:47

## 2019-10-21 RX ADMIN — CARBIDOPA AND LEVODOPA 1 TABLET(S): 25; 100 TABLET ORAL at 23:47

## 2019-10-21 RX ADMIN — Medication 250 MILLIGRAM(S): at 16:19

## 2019-10-21 RX ADMIN — ATORVASTATIN CALCIUM 20 MILLIGRAM(S): 80 TABLET, FILM COATED ORAL at 23:47

## 2019-10-21 NOTE — ED PROVIDER NOTE - CLINICAL SUMMARY MEDICAL DECISION MAKING FREE TEXT BOX
pt here with right arm swelling, warmth, tenderness.  no trauma.  pt was just dc from hospital yesterday.  pt went home with LEFT midline and has been getting ceftriaxone for concern for vegetations on heart valve.    DVT study of RUE negative.  xrays unremarkable. no crepitus, no bleeding.  concern for cellulitis.  pt given IV vanco.  pt is already on ceftriaxone.  pt admitted for iv abx, further management and evaluation.  as per ptfamily, symptoms developed today.  pt had small bruise yesterday but today symptoms significantly worse

## 2019-10-21 NOTE — ED PROVIDER NOTE - OBJECTIVE STATEMENT
86 yo female, pmh of htn, hld, RA, presents to ed for right arm swelling. Recent admission, dc with left midline for valve regurg. Right arms swelling started today, no radiation, mild, aching. Denies falls, chilsl, cp, sob, nvd, ha, back pain, neck pain.

## 2019-10-21 NOTE — H&P ADULT - NSHPLABSRESULTS_GEN_ALL_CORE
11.1   11.81 )-----------( 391      ( 21 Oct 2019 17:59 )             34.4     10-21    140  |  102  |  17  ----------------------------<  92  3.9   |  26  |  0.7    Ca    8.6      21 Oct 2019 17:59    TPro  5.6<L>  /  Alb  3.2<L>  /  TBili  0.4  /  DBili  x   /  AST  34  /  ALT  8   /  AlkPhos  76  10-21            PT/INR - ( 21 Oct 2019 17:59 )   PT: 11.70 sec;   INR: 1.02 ratio         PTT - ( 21 Oct 2019 17:59 )  PTT:26.9 sec  Lactate Trend  10-21 @ 17:59 Lactate:0.9         CAPILLARY BLOOD GLUCOSE        Culture Results:   No growth to date. (10-17 @ 05:55)  Culture Results:   <10,000 CFU/mL Normal Urogenital Nimo (10-17 @ 05:00)  Culture Results:   No growth at 5 days. (10-15 @ 05:08)  Culture Results:   No growth at 5 days. (10-14 @ 17:26)  Culture Results:   >=3 organisms. Probable collection contamination. (10-13 @ 03:00)

## 2019-10-21 NOTE — H&P ADULT - PROBLEM SELECTOR PLAN 1
Location to RUE though not definitive (to me) For now continue Rocephin in use at home and Vancomycin started by ER

## 2019-10-21 NOTE — H&P ADULT - HISTORY OF PRESENT ILLNESS
[age]yo female recently discharged from hospital with IV in place to left arm in order to receive IV Rocephin at home presents to ER due to RUE swelling

## 2019-10-21 NOTE — ED PROVIDER NOTE - NS ED ROS FT
Constitutional: (-) fever, (-) chills  Eyes: (-) visual changes  ENT: (-) nasal congestions  Cardiovascular: (-) chest pain, (-) syncope  Respiratory: (-) cough, (-) shortness of breath, (-) dyspnea,   Gastrointestinal: (-) vomiting, (-) diarrhea, (-)nausea,  Musculoskeletal: (-) neck pain, (-) back pain, (-) joint pain,  Integumentary: (-) rash, (+) edema, (+) bruises  Neurological: (-) headache, (-) loc, (-) dizziness, (-) tingling, (-)numbness,  Peripheral Vascular: (-) leg swelling  :  (-)dysuria,  (-) hematuria  Allergic/Immunologic: (-) pruritus

## 2019-10-21 NOTE — ED ADULT NURSE NOTE - NSIMPLEMENTINTERV_GEN_ALL_ED
Implemented All Fall with Harm Risk Interventions:  [place] to call system. Call bell, personal items and telephone within reach. Instruct patient to call for assistance. Room bathroom lighting operational. Non-slip footwear when patient is off stretcher. Physically safe environment: no spills, clutter or unnecessary equipment. Stretcher in lowest position, wheels locked, appropriate side rails in place. Provide visual cue, wrist band, yellow gown, etc. Monitor gait and stability. Monitor for mental status changes and reorient to person, place, and time. Review medications for side effects contributing to fall risk. Reinforce activity limits and safety measures with patient and family. Provide visual clues: red socks.

## 2019-10-21 NOTE — ED PROVIDER NOTE - CADM POA CENTRAL LINE
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 She has a complicated urologic surgical history (summarized below). Patient is not wheelchair bound. Last visit with us was 8/28/2017. Previously, she had complained of some stable urinary incontinence associated with spasms and urgency. Going through 1-2 ppd. Had tried Vesicare and Myrbetriq in the past without much benefit. She was then started on Detrol LA 4 mg daily and at follow up appointment in 8/2017 reported some improvement. Was still having mild urgency and incontinence but overall it was improving.     She returns for annual follow up today and reports that she is currently 25 weeks pregnant. Stopped Detrol when she found out she was pregnant. As a result, her urinary frequency and urgency are worse. She now has medium volume incontinence 5-7 times per day. Treated for a UTI recently with Macrobid; symptoms now resolved.     Previous Bladder Surgeries:  Previous Bladder Augmentation: colon in 2012. Revisions include: none   Catheterizable stoma: appendiceal Mitrofanoff in 2012. Revisions include: takedown due to dehiscence.   Anti-incontinence procedures: bladder neck sling (autologous fascia) in 2012  Botox injections: 8/2016.      Pertinent Medications:  Current Anticholinergics: None  Current Prophylactic antibiotics: None  Intravesical gentamycin:  None  Intravesical oxybutinin: None     Catheterization History:  The patient catheterizes per native urethra with a 12F straight catheter q3-5 hours. Catheterization is performed by  self. The patient uses a new catheter each time. She does not irrigate the bladder.      Incontinence History:  She  does leak between voids/caths. She does experience urgency of urination. She does not experience stress urinary incontinence with the following activities: coughing/sneezing. She wears pads and uses 3-4/day.     If patient has an AUS or Sling then:  Leak management: pads  Sphincter Complications: NA     Urinary Tract Infection History:  Treated with antibiotics for positive culture and non-specific symptoms: 1 times in the last year  Treated with antibiotics for positive culture plus symptoms of UTI: 1 times in the last year     Bowel Movement History:  The patient has a bowel movement q2 days. Bowel regimen includes Miralax and senna          Past Medical History:     Past Medical History:   Diagnosis Date     Anemia      Chronic infection     MRSA     Constipation, chronic      Incontinence of urine      Lipoma of spinal cord      Migraine      neurogenic bladder      Spinal dysraphism (H)             Past Surgical History:     Past Surgical History:   Procedure Laterality Date     BLADDER AUGMENTATION  1/5/2012    Procedure:BLADDER AUGMENTATION; Surgeon:TRINA MOORE; Location:[facility] OR     CYSTOSCOPY, INTRAVESICAL INJECTION N/A 8/19/2016    Procedure: CYSTOSCOPY, INTRAVESICAL INJECTION;  Surgeon: Trina Moore MD;  Location: [facility]     LAMINECTOMY LUMBAR TWO LEVELS       LAPAROTOMY EXPLORATORY  1/11/2012    Procedure:LAPAROTOMY EXPLORATORY; Exploratory Laparotomy with Takedown of Mitrofanoff, Ventral Hernia Repair with Strattice Mesh implantation ; Surgeon:TRINA OMORE; Location:[facility] OR     MITROFANOFF PROCEDURE (APPENDIX CONDUIT)  1/5/2012    Procedure:MITROFANOFF PROCEDURE (APPENDIX CONDUIT); Bladder Augmentation with Right Colon, Appendix Conduit- Mitrofanoff, Insertion of Pubovaginal Sling using Autologous Rectus Fascia; Surgeon:TRINA MOORE; Location:[facility] OR     tumor resection and cord detethering              Allergies:     Allergies   Allergen Reactions     Naproxen Hives and  "Nausea and Vomiting     Bactrim [Sulfamethoxazole W/Trimethoprim] Itching     Gabapentin Itching and Nausea     Vicodin [Hydrocodone-Acetaminophen] Itching            Medications:     Current Outpatient Prescriptions   Medication Sig     mirabegron (MYRBETRIQ) 25 MG 24 hr tablet Take 1 tablet (25 mg) by mouth daily (Patient not taking: Reported on 6/18/2018)     polyethylene glycol (MIRALAX) powder Take 17 g by mouth daily (Patient not taking: Reported on 6/18/2018)     ranitidine (ZANTAC) 150 MG tablet Take 1 tablet by mouth 2 times daily for 10 days.     sennosides (SENOKOT) 8.6 MG tablet Take 1 tablet by mouth 2 times daily as needed for constipation. (Patient not taking: Reported on 6/18/2018)     sodium chloride (OCEAN) 0.65 % nasal spray Spray 1 spray in nostril every hour as needed for congestion. (Patient not taking: Reported on 6/18/2018)     solifenacin (VESICARE) 10 MG tablet Take 1 tablet (10 mg) by mouth daily (Patient not taking: Reported on 6/18/2018)     tolterodine (DETROL LA) 4 MG 24 hr capsule Take 1 capsule (4 mg) by mouth daily (Patient not taking: Reported on 6/18/2018)     No current facility-administered medications for this visit.              Review of Systems:    ROS: 10 point ROS neg other than the symptoms noted above in the HPI and PMH.          Physical Exam:   /81 (BP Location: Right arm, Patient Position: Sitting, Cuff Size: Adult Regular)  Pulse 115  Ht 1.575 m (5' 2\")  Wt 70.6 kg (155 lb 11.2 oz)  SpO2 95%  BMI 28.48 kg/m2  General: age-appropriate appearing female in NAD sitting in an exam chair.  Respiratory: no increased respiratory effort  Motor: Normal in both upper and lower limbs          Data:    Imaging:  Renal/Bladder Ultrasound (Date = 6/18/2018):  IMPRESSION:  1. No hydronephrosis.  2. No significant change in asymmetric, atrophic right kidney with  upper pole predominant parenchymal scarring.  3. The left kidney is normal. No hydronephrosis.  4. Bladder was " not well visualized, secondary to nondistended state.    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 Now 25 weeks pregnant with her first pregnancy. Given complicated urologic surgical history (previous colon bladder augment with appendiceal Mitrofanoff s/p takedown due to dehiscence, autologous fascial bladder neck sling), patient's OB/GYN physician requests involvement of surgeon who performed bladder procedures (Dr. Moore). [place] [facility] [place]   -Will schedule office visit with Dr. Moore to further discuss plans for delivery and whether any specific recommendations are needed given prior  procedures.   -Remain off Detrol and other anticholinergic medications as these are generally not considered safe during pregnancy (Detrol is pregnancy category C).  -Continue current CIC regimen.    20 minutes were spent with the patient, >50% in counseling and coordination of care.      Jerilyn Greene PA-C   June 18, 2018            No

## 2019-10-21 NOTE — H&P ADULT - EXTREMITIES COMMENTS
IV in place to left arm (slight swelling), moderate swelling and ecchymotic areas to right upper extremity

## 2019-10-21 NOTE — ED PROVIDER NOTE - PHYSICAL EXAMINATION
Physical Exam    Vital Signs: I have reviewed the initial vital signs.  Constitutional: well-nourished, appears stated age, no acute distress  Eyes: Conjunctiva pink, Sclera clear  Cardiovascular: S1 and S2, regular rate, regular rhythm, well-perfused extremities, radial pulses equal and 2+  Respiratory: unlabored respiratory effort, clear to auscultation bilaterally no wheezing, rales and rhonchi  Gastrointestinal: soft, non-tender abdomen, no pulsatile mass, normal bowl sounds  Musculoskeletal: supple neck, no lower extremity edema, no midline tenderness  Integumentary: ecchymosis and cellulitis to right distal to medial lateral aspect   Neurologic: awake, alert, cranial nerves II-XII grossly intact, extremities’ motor and sensory functions grossly intact

## 2019-10-21 NOTE — ED PROVIDER NOTE - ATTENDING CONTRIBUTION TO CARE
pt here with right arm swelling and bruising and pain.  pt was recently admitted.  pt had midline placed in left arm for home IV abx for valve vegetations.  no fevers, no chills.  pt was dc home yesterday and developed the right arm swelling and bruising today.  no fevers, no injury.  awake, alert.  RUE: bruising, warmth and tenderness to right arm, rom of elbow intact, no open skin, no bleeding, no crepitus.  2+ radial pulse.  no evidence of abscess.    bruising and swelling is from distal humerus area to the hand.    motor/sensation intact.    p:  iv abx, dvt study, xrays, admission

## 2019-10-22 ENCOUNTER — TRANSCRIPTION ENCOUNTER (OUTPATIENT)
Age: 84
End: 2019-10-22

## 2019-10-22 VITALS
RESPIRATION RATE: 16 BRPM | TEMPERATURE: 98 F | SYSTOLIC BLOOD PRESSURE: 142 MMHG | DIASTOLIC BLOOD PRESSURE: 78 MMHG | HEART RATE: 67 BPM

## 2019-10-22 DIAGNOSIS — I05.8 OTHER RHEUMATIC MITRAL VALVE DISEASES: ICD-10-CM

## 2019-10-22 DIAGNOSIS — L03.90 CELLULITIS, UNSPECIFIED: ICD-10-CM

## 2019-10-22 LAB
ALBUMIN SERPL ELPH-MCNC: 2.7 G/DL — LOW (ref 3.5–5.2)
ALP SERPL-CCNC: 63 U/L — SIGNIFICANT CHANGE UP (ref 30–115)
ALT FLD-CCNC: <5 U/L — SIGNIFICANT CHANGE UP (ref 0–41)
ANION GAP SERPL CALC-SCNC: 12 MMOL/L — SIGNIFICANT CHANGE UP (ref 7–14)
AST SERPL-CCNC: 23 U/L — SIGNIFICANT CHANGE UP (ref 0–41)
BILIRUB SERPL-MCNC: 0.5 MG/DL — SIGNIFICANT CHANGE UP (ref 0.2–1.2)
BUN SERPL-MCNC: 14 MG/DL — SIGNIFICANT CHANGE UP (ref 10–20)
CALCIUM SERPL-MCNC: 8.1 MG/DL — LOW (ref 8.5–10.1)
CHLORIDE SERPL-SCNC: 100 MMOL/L — SIGNIFICANT CHANGE UP (ref 98–110)
CO2 SERPL-SCNC: 25 MMOL/L — SIGNIFICANT CHANGE UP (ref 17–32)
CREAT SERPL-MCNC: 0.6 MG/DL — LOW (ref 0.7–1.5)
CULTURE RESULTS: SIGNIFICANT CHANGE UP
GLUCOSE SERPL-MCNC: 122 MG/DL — HIGH (ref 70–99)
HCT VFR BLD CALC: 29.3 % — LOW (ref 37–47)
HGB BLD-MCNC: 9.6 G/DL — LOW (ref 12–16)
MCHC RBC-ENTMCNC: 30.1 PG — SIGNIFICANT CHANGE UP (ref 27–31)
MCHC RBC-ENTMCNC: 32.8 G/DL — SIGNIFICANT CHANGE UP (ref 32–37)
MCV RBC AUTO: 91.8 FL — SIGNIFICANT CHANGE UP (ref 81–99)
NRBC # BLD: 0 /100 WBCS — SIGNIFICANT CHANGE UP (ref 0–0)
PLATELET # BLD AUTO: 367 K/UL — SIGNIFICANT CHANGE UP (ref 130–400)
POTASSIUM SERPL-MCNC: 4 MMOL/L — SIGNIFICANT CHANGE UP (ref 3.5–5)
POTASSIUM SERPL-SCNC: 4 MMOL/L — SIGNIFICANT CHANGE UP (ref 3.5–5)
PROT SERPL-MCNC: 4.7 G/DL — LOW (ref 6–8)
RBC # BLD: 3.19 M/UL — LOW (ref 4.2–5.4)
RBC # FLD: 13.8 % — SIGNIFICANT CHANGE UP (ref 11.5–14.5)
SODIUM SERPL-SCNC: 137 MMOL/L — SIGNIFICANT CHANGE UP (ref 135–146)
SPECIMEN SOURCE: SIGNIFICANT CHANGE UP
WBC # BLD: 12.64 K/UL — HIGH (ref 4.8–10.8)
WBC # FLD AUTO: 12.64 K/UL — HIGH (ref 4.8–10.8)

## 2019-10-22 PROCEDURE — 99239 HOSP IP/OBS DSCHRG MGMT >30: CPT

## 2019-10-22 RX ORDER — DILTIAZEM HCL 120 MG
1 CAPSULE, EXT RELEASE 24 HR ORAL
Qty: 0 | Refills: 0 | DISCHARGE
Start: 2019-10-22

## 2019-10-22 RX ORDER — ATORVASTATIN CALCIUM 80 MG/1
1 TABLET, FILM COATED ORAL
Qty: 0 | Refills: 0 | DISCHARGE
Start: 2019-10-22

## 2019-10-22 RX ORDER — ATORVASTATIN CALCIUM 80 MG/1
0 TABLET, FILM COATED ORAL
Qty: 30 | Refills: 0 | DISCHARGE

## 2019-10-22 RX ORDER — PRAMIPEXOLE DIHYDROCHLORIDE 0.12 MG/1
1 TABLET ORAL
Qty: 0 | Refills: 0 | DISCHARGE
Start: 2019-10-22

## 2019-10-22 RX ORDER — DILTIAZEM HCL 120 MG
0 CAPSULE, EXT RELEASE 24 HR ORAL
Qty: 30 | Refills: 0 | DISCHARGE

## 2019-10-22 RX ORDER — PRAMIPEXOLE DIHYDROCHLORIDE 0.12 MG/1
2 TABLET ORAL
Qty: 0 | Refills: 0 | DISCHARGE
Start: 2019-10-22

## 2019-10-22 RX ORDER — CARBIDOPA AND LEVODOPA 25; 100 MG/1; MG/1
1 TABLET ORAL
Qty: 0 | Refills: 0 | DISCHARGE
Start: 2019-10-22

## 2019-10-22 RX ORDER — CARBIDOPA, LEVODOPA, AND ENTACAPONE 50; 200; 200 MG/1; MG/1; MG/1
0 TABLET, FILM COATED ORAL
Qty: 360 | Refills: 0 | DISCHARGE

## 2019-10-22 RX ORDER — CEFTRIAXONE 500 MG/1
2000 INJECTION, POWDER, FOR SOLUTION INTRAMUSCULAR; INTRAVENOUS EVERY 24 HOURS
Refills: 0 | Status: DISCONTINUED | OUTPATIENT
Start: 2019-10-22 | End: 2019-10-22

## 2019-10-22 RX ORDER — CELECOXIB 200 MG/1
1 CAPSULE ORAL
Qty: 0 | Refills: 0 | DISCHARGE

## 2019-10-22 RX ORDER — ALPRAZOLAM 0.25 MG
0 TABLET ORAL
Qty: 60 | Refills: 0 | DISCHARGE

## 2019-10-22 RX ORDER — CEFTRIAXONE 500 MG/1
2 INJECTION, POWDER, FOR SOLUTION INTRAMUSCULAR; INTRAVENOUS EVERY 24 HOURS
Refills: 0 | Status: DISCONTINUED | OUTPATIENT
Start: 2019-10-22 | End: 2019-10-22

## 2019-10-22 RX ORDER — CARBIDOPA AND LEVODOPA 25; 100 MG/1; MG/1
0 TABLET ORAL
Qty: 0 | Refills: 0 | DISCHARGE

## 2019-10-22 RX ADMIN — CARBIDOPA AND LEVODOPA 1 TABLET(S): 25; 100 TABLET ORAL at 15:38

## 2019-10-22 RX ADMIN — PRAMIPEXOLE DIHYDROCHLORIDE 0.5 MILLIGRAM(S): 0.12 TABLET ORAL at 14:11

## 2019-10-22 RX ADMIN — Medication 250 MILLIGRAM(S): at 05:40

## 2019-10-22 RX ADMIN — CARBIDOPA AND LEVODOPA 1 TABLET(S): 25; 100 TABLET ORAL at 05:42

## 2019-10-22 RX ADMIN — PRAMIPEXOLE DIHYDROCHLORIDE 0.5 MILLIGRAM(S): 0.12 TABLET ORAL at 05:42

## 2019-10-22 RX ADMIN — PANTOPRAZOLE SODIUM 40 MILLIGRAM(S): 20 TABLET, DELAYED RELEASE ORAL at 05:47

## 2019-10-22 RX ADMIN — CHLORHEXIDINE GLUCONATE 1 APPLICATION(S): 213 SOLUTION TOPICAL at 05:45

## 2019-10-22 RX ADMIN — Medication 650 MILLIGRAM(S): at 14:20

## 2019-10-22 RX ADMIN — CEFTRIAXONE 100 MILLIGRAM(S): 500 INJECTION, POWDER, FOR SOLUTION INTRAMUSCULAR; INTRAVENOUS at 10:44

## 2019-10-22 RX ADMIN — Medication 300 MILLIGRAM(S): at 05:42

## 2019-10-22 RX ADMIN — CARBIDOPA AND LEVODOPA 1 TABLET(S): 25; 100 TABLET ORAL at 12:25

## 2019-10-22 NOTE — DISCHARGE NOTE PROVIDER - CARE PROVIDER_API CALL
Lauri Ritter)  Infectious Disease; Internal Medicine  [address] [place] [address]  Phone: [phone]  Fax: [phone]  Follow Up Time:     Dr. Asad PALACIOS,   Phone: (   )    -  Fax: (   )    -  Follow Up Time:

## 2019-10-22 NOTE — CONSULT NOTE ADULT - SUBJECTIVE AND OBJECTIVE BOX
JON GARCÍA  87y, Female  Allergy: No Known Allergies      CHIEF COMPLAINT:     HPI:  88yo female recently discharged from hospital with IV in place to left arm in order to receive IV Rocephin at home presents to ER due to RUE swelling (21 Oct 2019 20:20)    FAMILY HISTORY:  No pertinent family history in first degree relatives    PAST MEDICAL & SURGICAL HISTORY:  Hyperlipidemia  Rheumatoid arthritis  HTN (hypertension)  Parkinson disease  History of hysterectomy    FSH - not relevant   Substance Use (  ) never used  (  ) IVDU (  ) Other:  Tobacco Usage:  (   ) never smoked   (   ) former smoker   (   ) current smoker   Alcohol Usage: (   ) social  (   ) daily use (   ) denies  Sexual History:       ROS  10 system review - neg     VITALS:  T(F): 99.2, Max: 99.9 (10-21-19 @ 20:44)  HR: 80  BP: 135/63  RR: 16Vital Signs Last 24 Hrs  T(C): 37.3 (22 Oct 2019 05:12), Max: 37.7 (21 Oct 2019 20:44)  T(F): 99.2 (22 Oct 2019 05:12), Max: 99.9 (21 Oct 2019 20:44)  HR: 80 (22 Oct 2019 05:12) (80 - 84)  BP: 135/63 (22 Oct 2019 05:12) (135/63 - 160/62)  BP(mean): --  RR: 16 (22 Oct 2019 05:12) (16 - 17)  SpO2: 99% (21 Oct 2019 19:21) (98% - 99%)    PHYSICAL EXAM:  Gen: NAD, resting in bed  HEENT: Normocephalic, atraumatic  Neck: supple, no lymphadenopathy  CV: s1 s 2 +   Lungs: decreased   Abdomen: Soft, BS present  Ext: Warm, well perfused. RUE ecchymoses ++ NO cellulitis !  Neuro: non focal, awake  Skin: no rash, no erythema    TESTS & MEASUREMENTS:                        9.6    12.64 )-----------( 367      ( 22 Oct 2019 07:19 )             29.3     10-22    137  |  100  |  14  ----------------------------<  122<H>  4.0   |  25  |  0.6<L>    Ca    8.1<L>      22 Oct 2019 07:19    TPro  4.7<L>  /  Alb  2.7<L>  /  TBili  0.5  /  DBili  x   /  AST  23  /  ALT  <5  /  AlkPhos  63  10-22    eGFR if Non African American: 82 mL/min/1.73M2 (10-22-19 @ 07:19)  eGFR if African American: 95 mL/min/1.73M2 (10-22-19 @ 07:19)  eGFR if Non African American: 78 mL/min/1.73M2 (10-21-19 @ 17:59)  eGFR if African American: 90 mL/min/1.73M2 (10-21-19 @ 17:59)    LIVER FUNCTIONS - ( 22 Oct 2019 07:19 )  Alb: 2.7 g/dL / Pro: 4.7 g/dL / ALK PHOS: 63 U/L / ALT: <5 U/L / AST: 23 U/L / GGT: x               Culture - Blood (collected 10-17-19 @ 05:55)  Source: .Blood None  Preliminary Report (10-18-19 @ 14:00):    No growth to date.    Culture - Urine (collected 10-17-19 @ 05:00)  Source: .Urine Clean Catch (Midstream)  Final Report (10-18-19 @ 10:01):    <10,000 CFU/mL Normal Urogenital Nimo        Lactate, Blood: 0.9 mmol/L (10-21-19 @ 17:59)      INFECTIOUS DISEASES TESTING      RADIOLOGY & ADDITIONAL TESTS:    CARDIOLOGY TESTING  12 Lead ECG:   Ventricular Rate 85 BPM    Atrial Rate 85 BPM    P-R Interval 158 ms    QRS Duration 88 ms    Q-T Interval 372 ms    QTC Calculation(Bezet) 442 ms    P Axis 63 degrees    R Axis 22 degrees    T Axis 23 degrees    Diagnosis Line Sinus rhythm with Premature atrial complexes  Otherwise normal ECG    Confirmed by KB LIZ MD ([id number]) on 10/13/2019 3:55:39 PM (10-13-19 @ 10:57)  Transthoracic Echocardiogram:    EXAM:  2-D ECHO (TTE) COMPLETE        PROCEDURE DATE:  10/13/2019      INTERPRETATION:  REPORT:    TRANSTHORACIC ECHOCARDIOGRAM REPORT         Patient Name:   JON GARCÍA Accession #: [id number]  Medical Rec #:  [id number]     Height:      64.0 in 162.6 cm  YOB: 1932    Weight:      130.0 lb 58.97 kg  Patient Age:    87 years     BSA:         1.63 m²  Patient Gender: F            BP:          155/70 mmHg       Date of Exam:        10/13/2019 9:46:04 AM  Referring Physician: [id number] FLORESITA VILLALBA  Sonographer:         Jonna Gifford  Reading Physician:   Kb Liz M.D.    Procedure:   2D Echo/Doppler/Color Doppler Complete.  Indications: R55 - Syncope and Collapse  Diagnosis:   Syncope and collapse - R55         Summary:   1. Normal global left ventricular systolic function.   2. LV Ejection Fraction by Sparks's Method with a biplane EF of 74 %.   3. Normal left ventricular internal cavity size.   4. Normal right atrial size.   5. There is no evidence of pericardial effusion.   6. Mild mitral valve regurgitation.   7. Mild thickening and calcification of the anterior and posterior   mitral valve leaflets.   8. Echodensity noted on PMVL atrial aspect which is suggestive of a   valvular vegetation.   9. Moderate tricuspid regurgitation.    PHYSICIAN INTERPRETATION:  Left Ventricle: The left ventricular internal cavity size is normal. Left   ventricular wall thickness is normal. Global LV systolic function was   normal.  Right Ventricle: The right ventricular size isnormal. RV systolic   function is normal.  Left Atrium: Normal left atrial size.  Right Atrium: Normal right atrial size.  Pericardium: There is no evidence of pericardial effusion.  Mitral Valve: Mild thickening and calcification of the anterior and  posterior mitral valve leaflets. Mild mitral valve regurgitation is seen.   Echodensity noted on PMVL atrial aspect which is suggestive of a valvular   vegetation.  Tricuspid Valve: The tricuspid valve is normal in structure. Moderate   tricuspid regurgitation is visualized.  Aortic Valve: The aortic valve is trileaflet. No evidence of aortic   stenosis. Aortic valve thickening with normal leaflet opening. No   evidence of aortic valve regurgitation is seen.  Pulmonic Valve: The pulmonic valve is thickened with good excursion.   Trace pulmonic valve regurgitation.  Aorta: Aortic root measured at Sinus of Valsalva is normal.  Venous: The inferior vena cava is normal.       2D AND M-MODE MEASUREMENTS (normal ranges within parentheses):  Left                Normal   Aorta/Left             Normal  Ventricle:                     Atrium:  IVSd (2D):  0.83 cm  (0.7-1.1) AoV Cusp       1.68  (1.5-2.6)  LVPWd (2D): 0.79 cm  (0.7-1.1) Separation:     cm  LVIDd (2D): 4.75 cm  (3.4-5.7) Left Atrium    3.29  (1.9-4.0)  LVIDs (2D): 2.44 cm            (Mmode):        cm  LV FS (2D):  48.6 %   (>25%)   LA Volume      37.2  Relative      0.33    (<0.42)  Index         ml/m²  Wall                           Right  Thickness                      Ventricle:                                 RVd (2D):      3.08 cm    SPECTRAL DOPPLER ANALYSIS:  LV DIASTOLIC FUNCTION:  MV Peak E: 1.34 m/s Decel Time: 245 msec  MV Peak A: 1.06 m/s  E/A Ratio: 1.27    Aortic Valve:  AoV VMax:    2.02 m/s  AoV Area, Vmax: 1.96 cm² Vmax Indx: 1.20 cm²/m²  AoV VTI:     0.41 m    AoV Area, VTI:  2.00 cm² VTI Indx:  1.23 cm²/m²  AoV Pk Grad: 16.3 mmHg  AoV Mn Grad: 8.4 mmHg    LVOT Vmax: 1.51 m/s  LVOT VTI:  0.31 m  LVOT Diam: 1.82 cm    Mitral Valve:  MV P1/2 Time: 70.94 msec  MV Area, PHT: 3.10 cm²    Tricuspid Valve and PA/RV Systolic Pressure: TR Max Velocity: 3.13 m/s RA   Pressure:  RVSP/PASP: 41.3 mmHg    Pulmonic Valve:  PV Max Velocity: 1.34 m/s PV Max P.2 mmHg PV Mean PG:       [id number] Kb Liz M.D., Electronically signed on 10/13/2019 at   2:40:34 PM              *** Final ***                    KB LIZ MD  This document has been electronically signed. Oct 13 2019  9:46AM             (10-13-19 @ 09:46)      MEDICATIONS  atorvastatin 20  carbidopa/levodopa CR 50/200 1  cefTRIAXone   IVPB 2000  chlorhexidine 4% Liquid 1  diltiazem     influenza   Vaccine 0.5  pantoprazole    Tablet 40  pramipexole 0.5  vancomycin  IVPB 1000      ANTIBIOTICS:  cefTRIAXone   IVPB 2000 milliGRAM(s) IV Intermittent every 24 hours  vancomycin  IVPB 1000 milliGRAM(s) IV Intermittent every 12 hours

## 2019-10-22 NOTE — DISCHARGE NOTE PROVIDER - PROVIDER TOKENS
PROVIDER:[TOKEN:[[id number]]],FREE:[LAST:[Dr. Kamara PMD],PHONE:[(   )    -],FAX:[(   )    -]]

## 2019-10-22 NOTE — PROGRESS NOTE ADULT - ASSESSMENT
patient came in with UE swelling    1) UE swelling   -Cellulitis Ruled out  -negative DVT UE    2) Endocarditis Mitral Valve  -ID consult noted  -complete course of IV Rocephin 2 G once daily for 4 weeks     3) Hypertension  -home meds    4) Parkinson's dz  -home meds    5) RA  -pain control prn and outpatient Rheumatology follow up     6) Suspected CKD stage II  -needs outpatient kidney monitoring and nephrology follow up for proper diagnosis    # Progress Note Handoff  PENDING as follows  consults: ID noted   Test: outpatient labs with Dr. Ritter as needed   Family discussion: discussed with patient and comprehends her medical care   Disposition: home with home antibiotics today     Attending Physician Dr. Lorenza Maloney # [id number]     d/c papers done by me and spent over 35 mins d/c papers

## 2019-10-22 NOTE — GOALS OF CARE CONVERSATION - ADVANCED CARE PLANNING - CONVERSATION DETAILS
Patient's overall medical condition discussed with her. She needs to finish 4 weeks of IV Rocephin for Endocarditis Mitral Valve at home (arranged).  Updates from this admission given to her.      She remain FULL CODE

## 2019-10-22 NOTE — CONSULT NOTE ADULT - PROBLEM SELECTOR RECOMMENDATION 9
DOES NOT have cellulitis  has bruising of R arm   DC home to complete 4 weeks of IV rocephin 2 g Q24 + follow up with ID Dr Ritter out pt   recall if needed

## 2019-10-22 NOTE — DISCHARGE NOTE PROVIDER - HOSPITAL COURSE
patient brought in for UE swelling r/o dvt and cellulitis; pt recently discharged on IV Rocephin 2g for 4 weeks for Endocarditis Mitral valve\    -seen by ID and no sign of infection and cleared for home discharge    -pt seen and examined on the day of discharge; discussed with home care nurse; stable for home discharge (negative DVT)     -stable for home d/c; and pt is agreeable        pt needs to follow up with Dr. Ritter on the outside and finish course of 4 weeks IV Rocephin        spent over 35 mins d/c planning

## 2019-10-22 NOTE — DISCHARGE NOTE PROVIDER - NSDCCPCAREPLAN_GEN_ALL_CORE_FT
PRINCIPAL DISCHARGE DIAGNOSIS  Diagnosis: Endocarditis  Assessment and Plan of Treatment: finish course of IV Rocephin 2 grams once daily for 4 weeks and follow up with Dr. Ritter/ID

## 2019-10-22 NOTE — DISCHARGE NOTE NURSING/CASE MANAGEMENT/SOCIAL WORK - PATIENT PORTAL LINK FT
You can access the FollowMyHealth Patient Portal offered by [facility] by registering at the following website: [email or web address]. By joining [facility]’s FollowMyHealth portal, you will also be able to view your health information using other applications (apps) compatible with our system.

## 2019-10-22 NOTE — PROGRESS NOTE ADULT - SUBJECTIVE AND OBJECTIVE BOX
JON GARCÍA  87y  Female      Patient is a 87y old  Female who presents with a chief complaint of r/o cellulitis UE (22 Oct 2019 12:35)      INTERVAL HPI/OVERNIGHT EVENTS:  pt seen and examined at bedside this morning  -ID consult noted; no evidence of cellulitis and negative DVT UE  -patient is stable for home d/c; cleared by ID for home d/c   -spoke to home care nurse Cassidy; will clarify home antibiotics infusion availability and nurse's visit tomorrow (pt already received dose of Rocephin today)  -d/c papers done by me     REVIEW OF SYSTEMS:  -no complaints     -Vital Signs Last 24 Hrs  T(C): 37.3 (22 Oct 2019 05:12), Max: 37.7 (21 Oct 2019 20:44)  T(F): 99.2 (22 Oct 2019 05:12), Max: 99.9 (21 Oct 2019 20:44)  HR: 80 (22 Oct 2019 05:12) (80 - 84)  BP: 135/63 (22 Oct 2019 05:12) (135/63 - 160/62)  RR: 16 (22 Oct 2019 05:12) (16 - 17)  SpO2: 99% (21 Oct 2019 19:21) (98% - 99%)    PHYSICAL EXAM:  GENERAL: NAD, well-groomed, well-developed; speaking in full sentences   HEAD:  Atraumatic, Normocephalic  EYES: EOMI, PERRLA, conjunctiva and sclera clear  NERVOUS SYSTEM:  Alert & Oriented X 3  CHEST/LUNG: Clear to percussion bilaterally; No rales, rhonchi, wheezing, or rubs  CV/HEART: Regular rate and rhythm; No murmurs, rubs, or gallops  GI/ABDOMEN: Soft, Nontender, Nondistended; Bowel sounds present  EXTREMITIES:  2+ Peripheral Pulses, No clubbing, cyanosis, or edema  SKIN: ecchymosis UE b/l; left arm IV access  LAB:                        9.6    12.64 )-----------( 367      ( 22 Oct 2019 07:19 )             29.3     10-22    137  |  100  |  14  ----------------------------<  122<H>  4.0   |  25  |  0.6<L>    Ca    8.1<L>      22 Oct 2019 07:19    TPro  4.7<L>  /  Alb  2.7<L>  /  TBili  0.5  /  DBili  x   /  AST  23  /  ALT  <5  /  AlkPhos  63     Daily Height in cm: 162.56 (21 Oct 2019 20:44)    Daily   CAPILLARY BLOOD GLUCOSE    LIVER FUNCTIONS - ( 22 Oct 2019 07:19 )  Alb: 2.7 g/dL / Pro: 4.7 g/dL / ALK PHOS: 63 U/L / ALT: <5 U/L / AST: 23 U/L / GGT: x             RADIOLOGY:    Imaging Personally Reviewed:  [ Y ] YES  [ ] NO    HEALTH ISSUES - PROBLEM Dx:  Endocarditis of mitral valve: Endocarditis of mitral valve  Cellulitis: Cellulitis    MEDS:  acetaminophen   Tablet .. 650 milliGRAM(s) Oral every 6 hours PRN  atorvastatin 20 milliGRAM(s) Oral at bedtime  carbidopa/levodopa CR 50/200 1 Tablet(s) Oral <User Schedule>  cefTRIAXone   IVPB 2000 milliGRAM(s) IV Intermittent every 24 hours  chlorhexidine 4% Liquid 1 Application(s) Topical <User Schedule>  diltiazem    milliGRAM(s) Oral daily  influenza   Vaccine 0.5 milliLiter(s) IntraMuscular once  pantoprazole    Tablet 40 milliGRAM(s) Oral before breakfast  pramipexole 0.5 milliGRAM(s) Oral three times a day

## 2019-10-23 LAB
MANUAL DIF COMMENT BLD-IMP: SIGNIFICANT CHANGE UP

## 2019-10-28 DIAGNOSIS — J90 PLEURAL EFFUSION, NOT ELSEWHERE CLASSIFIED: ICD-10-CM

## 2019-10-28 DIAGNOSIS — E86.0 DEHYDRATION: ICD-10-CM

## 2019-10-28 DIAGNOSIS — Z79.02 LONG TERM (CURRENT) USE OF ANTITHROMBOTICS/ANTIPLATELETS: ICD-10-CM

## 2019-10-28 DIAGNOSIS — Z90.710 ACQUIRED ABSENCE OF BOTH CERVIX AND UTERUS: ICD-10-CM

## 2019-10-28 DIAGNOSIS — R53.1 WEAKNESS: ICD-10-CM

## 2019-10-28 DIAGNOSIS — E87.6 HYPOKALEMIA: ICD-10-CM

## 2019-10-28 DIAGNOSIS — D72.829 ELEVATED WHITE BLOOD CELL COUNT, UNSPECIFIED: ICD-10-CM

## 2019-10-28 DIAGNOSIS — E78.5 HYPERLIPIDEMIA, UNSPECIFIED: ICD-10-CM

## 2019-10-28 DIAGNOSIS — R79.89 OTHER SPECIFIED ABNORMAL FINDINGS OF BLOOD CHEMISTRY: ICD-10-CM

## 2019-10-28 DIAGNOSIS — M19.90 UNSPECIFIED OSTEOARTHRITIS, UNSPECIFIED SITE: ICD-10-CM

## 2019-10-28 DIAGNOSIS — R94.31 ABNORMAL ELECTROCARDIOGRAM [ECG] [EKG]: ICD-10-CM

## 2019-10-28 DIAGNOSIS — G20 PARKINSON'S DISEASE: ICD-10-CM

## 2019-10-28 DIAGNOSIS — N18.2 CHRONIC KIDNEY DISEASE, STAGE 2 (MILD): ICD-10-CM

## 2019-10-28 DIAGNOSIS — R55 SYNCOPE AND COLLAPSE: ICD-10-CM

## 2019-10-28 DIAGNOSIS — I76 SEPTIC ARTERIAL EMBOLISM: ICD-10-CM

## 2019-10-28 DIAGNOSIS — J98.11 ATELECTASIS: ICD-10-CM

## 2019-10-28 DIAGNOSIS — I05.9 RHEUMATIC MITRAL VALVE DISEASE, UNSPECIFIED: ICD-10-CM

## 2019-10-28 DIAGNOSIS — Z79.899 OTHER LONG TERM (CURRENT) DRUG THERAPY: ICD-10-CM

## 2019-10-28 DIAGNOSIS — M06.9 RHEUMATOID ARTHRITIS, UNSPECIFIED: ICD-10-CM

## 2019-10-28 DIAGNOSIS — I33.0 ACUTE AND SUBACUTE INFECTIVE ENDOCARDITIS: ICD-10-CM

## 2019-10-28 DIAGNOSIS — I12.9 HYPERTENSIVE CHRONIC KIDNEY DISEASE WITH STAGE 1 THROUGH STAGE 4 CHRONIC KIDNEY DISEASE, OR UNSPECIFIED CHRONIC KIDNEY DISEASE: ICD-10-CM

## 2019-10-28 DIAGNOSIS — E04.9 NONTOXIC GOITER, UNSPECIFIED: ICD-10-CM

## 2019-10-28 DIAGNOSIS — J98.4 OTHER DISORDERS OF LUNG: ICD-10-CM

## 2019-10-28 DIAGNOSIS — M31.6 OTHER GIANT CELL ARTERITIS: ICD-10-CM

## 2019-10-28 DIAGNOSIS — I36.1 NONRHEUMATIC TRICUSPID (VALVE) INSUFFICIENCY: ICD-10-CM

## 2019-10-28 DIAGNOSIS — N30.00 ACUTE CYSTITIS WITHOUT HEMATURIA: ICD-10-CM

## 2019-10-28 DIAGNOSIS — H34.12 CENTRAL RETINAL ARTERY OCCLUSION, LEFT EYE: ICD-10-CM

## 2019-10-28 DIAGNOSIS — N12 TUBULO-INTERSTITIAL NEPHRITIS, NOT SPECIFIED AS ACUTE OR CHRONIC: ICD-10-CM

## 2019-12-04 ENCOUNTER — OUTPATIENT (OUTPATIENT)
Dept: OUTPATIENT SERVICES | Facility: HOSPITAL | Age: 84
LOS: 1 days | Discharge: HOME | End: 2019-12-04

## 2019-12-04 DIAGNOSIS — Z90.710 ACQUIRED ABSENCE OF BOTH CERVIX AND UTERUS: Chronic | ICD-10-CM

## 2019-12-05 DIAGNOSIS — B97.89 OTHER VIRAL AGENTS AS THE CAUSE OF DISEASES CLASSIFIED ELSEWHERE: ICD-10-CM

## 2020-06-03 ENCOUNTER — INPATIENT (INPATIENT)
Facility: HOSPITAL | Age: 85
LOS: 0 days | Discharge: HOME | End: 2020-06-04
Attending: INTERNAL MEDICINE | Admitting: INTERNAL MEDICINE
Payer: MEDICARE

## 2020-06-03 VITALS
RESPIRATION RATE: 19 BRPM | WEIGHT: 134.92 LBS | DIASTOLIC BLOOD PRESSURE: 81 MMHG | HEART RATE: 82 BPM | OXYGEN SATURATION: 97 % | SYSTOLIC BLOOD PRESSURE: 199 MMHG | TEMPERATURE: 97 F

## 2020-06-03 DIAGNOSIS — E87.6 HYPOKALEMIA: ICD-10-CM

## 2020-06-03 DIAGNOSIS — Z90.710 ACQUIRED ABSENCE OF BOTH CERVIX AND UTERUS: Chronic | ICD-10-CM

## 2020-06-03 DIAGNOSIS — M06.9 RHEUMATOID ARTHRITIS, UNSPECIFIED: ICD-10-CM

## 2020-06-03 DIAGNOSIS — N13.30 UNSPECIFIED HYDRONEPHROSIS: ICD-10-CM

## 2020-06-03 DIAGNOSIS — N12 TUBULO-INTERSTITIAL NEPHRITIS, NOT SPECIFIED AS ACUTE OR CHRONIC: ICD-10-CM

## 2020-06-03 DIAGNOSIS — I10 ESSENTIAL (PRIMARY) HYPERTENSION: ICD-10-CM

## 2020-06-03 DIAGNOSIS — E78.5 HYPERLIPIDEMIA, UNSPECIFIED: ICD-10-CM

## 2020-06-03 DIAGNOSIS — G20 PARKINSON'S DISEASE: ICD-10-CM

## 2020-06-03 LAB
ALBUMIN SERPL ELPH-MCNC: 4.2 G/DL — SIGNIFICANT CHANGE UP (ref 3.5–5.2)
ALP SERPL-CCNC: 94 U/L — SIGNIFICANT CHANGE UP (ref 30–115)
ALT FLD-CCNC: <5 U/L — SIGNIFICANT CHANGE UP (ref 0–41)
ANION GAP SERPL CALC-SCNC: 10 MMOL/L — SIGNIFICANT CHANGE UP (ref 7–14)
APPEARANCE UR: ABNORMAL
AST SERPL-CCNC: 11 U/L — SIGNIFICANT CHANGE UP (ref 0–41)
BACTERIA # UR AUTO: ABNORMAL
BASOPHILS # BLD AUTO: 0.04 K/UL — SIGNIFICANT CHANGE UP (ref 0–0.2)
BASOPHILS NFR BLD AUTO: 0.4 % — SIGNIFICANT CHANGE UP (ref 0–1)
BILIRUB SERPL-MCNC: 0.7 MG/DL — SIGNIFICANT CHANGE UP (ref 0.2–1.2)
BILIRUB UR-MCNC: NEGATIVE — SIGNIFICANT CHANGE UP
BUN SERPL-MCNC: 26 MG/DL — HIGH (ref 10–20)
CALCIUM SERPL-MCNC: 9.5 MG/DL — SIGNIFICANT CHANGE UP (ref 8.5–10.1)
CHLORIDE SERPL-SCNC: 101 MMOL/L — SIGNIFICANT CHANGE UP (ref 98–110)
CO2 SERPL-SCNC: 27 MMOL/L — SIGNIFICANT CHANGE UP (ref 17–32)
COLOR SPEC: YELLOW — SIGNIFICANT CHANGE UP
CREAT SERPL-MCNC: 1 MG/DL — SIGNIFICANT CHANGE UP (ref 0.7–1.5)
DIFF PNL FLD: ABNORMAL
EOSINOPHIL # BLD AUTO: 0.14 K/UL — SIGNIFICANT CHANGE UP (ref 0–0.7)
EOSINOPHIL NFR BLD AUTO: 1.4 % — SIGNIFICANT CHANGE UP (ref 0–8)
EPI CELLS # UR: ABNORMAL /HPF
GLUCOSE SERPL-MCNC: 113 MG/DL — HIGH (ref 70–99)
GLUCOSE UR QL: NEGATIVE MG/DL — SIGNIFICANT CHANGE UP
HCT VFR BLD CALC: 42 % — SIGNIFICANT CHANGE UP (ref 37–47)
HGB BLD-MCNC: 13.8 G/DL — SIGNIFICANT CHANGE UP (ref 12–16)
IMM GRANULOCYTES NFR BLD AUTO: 0.4 % — HIGH (ref 0.1–0.3)
KETONES UR-MCNC: NEGATIVE — SIGNIFICANT CHANGE UP
LACTATE SERPL-SCNC: 1 MMOL/L — SIGNIFICANT CHANGE UP (ref 0.7–2)
LEUKOCYTE ESTERASE UR-ACNC: ABNORMAL
LIDOCAIN IGE QN: 12 U/L — SIGNIFICANT CHANGE UP (ref 7–60)
LYMPHOCYTES # BLD AUTO: 1.43 K/UL — SIGNIFICANT CHANGE UP (ref 1.2–3.4)
LYMPHOCYTES # BLD AUTO: 14 % — LOW (ref 20.5–51.1)
MCHC RBC-ENTMCNC: 30 PG — SIGNIFICANT CHANGE UP (ref 27–31)
MCHC RBC-ENTMCNC: 32.9 G/DL — SIGNIFICANT CHANGE UP (ref 32–37)
MCV RBC AUTO: 91.3 FL — SIGNIFICANT CHANGE UP (ref 81–99)
MONOCYTES # BLD AUTO: 0.7 K/UL — HIGH (ref 0.1–0.6)
MONOCYTES NFR BLD AUTO: 6.8 % — SIGNIFICANT CHANGE UP (ref 1.7–9.3)
NEUTROPHILS # BLD AUTO: 7.87 K/UL — HIGH (ref 1.4–6.5)
NEUTROPHILS NFR BLD AUTO: 77 % — HIGH (ref 42.2–75.2)
NITRITE UR-MCNC: POSITIVE
NRBC # BLD: 0 /100 WBCS — SIGNIFICANT CHANGE UP (ref 0–0)
PH UR: 8.5 — SIGNIFICANT CHANGE UP (ref 5–8)
PLATELET # BLD AUTO: 260 K/UL — SIGNIFICANT CHANGE UP (ref 130–400)
POTASSIUM SERPL-MCNC: 3.3 MMOL/L — LOW (ref 3.5–5)
POTASSIUM SERPL-SCNC: 3.3 MMOL/L — LOW (ref 3.5–5)
PROT SERPL-MCNC: 6.6 G/DL — SIGNIFICANT CHANGE UP (ref 6–8)
PROT UR-MCNC: 30 MG/DL
RBC # BLD: 4.6 M/UL — SIGNIFICANT CHANGE UP (ref 4.2–5.4)
RBC # FLD: 12.8 % — SIGNIFICANT CHANGE UP (ref 11.5–14.5)
RBC CASTS # UR COMP ASSIST: ABNORMAL /HPF
SARS-COV-2 RNA SPEC QL NAA+PROBE: SIGNIFICANT CHANGE UP
SODIUM SERPL-SCNC: 138 MMOL/L — SIGNIFICANT CHANGE UP (ref 135–146)
SP GR SPEC: 1.02 — SIGNIFICANT CHANGE UP (ref 1.01–1.03)
TROPONIN T SERPL-MCNC: <0.01 NG/ML — SIGNIFICANT CHANGE UP
UROBILINOGEN FLD QL: 0.2 MG/DL — SIGNIFICANT CHANGE UP (ref 0.2–0.2)
WBC # BLD: 10.22 K/UL — SIGNIFICANT CHANGE UP (ref 4.8–10.8)
WBC # FLD AUTO: 10.22 K/UL — SIGNIFICANT CHANGE UP (ref 4.8–10.8)
WBC UR QL: ABNORMAL /HPF

## 2020-06-03 PROCEDURE — 99285 EMERGENCY DEPT VISIT HI MDM: CPT

## 2020-06-03 PROCEDURE — 74177 CT ABD & PELVIS W/CONTRAST: CPT | Mod: 26

## 2020-06-03 PROCEDURE — 99223 1ST HOSP IP/OBS HIGH 75: CPT

## 2020-06-03 PROCEDURE — 70450 CT HEAD/BRAIN W/O DYE: CPT | Mod: 26

## 2020-06-03 RX ORDER — CEFTRIAXONE 500 MG/1
1000 INJECTION, POWDER, FOR SOLUTION INTRAMUSCULAR; INTRAVENOUS EVERY 24 HOURS
Refills: 0 | Status: DISCONTINUED | OUTPATIENT
Start: 2020-06-04 | End: 2020-06-04

## 2020-06-03 RX ORDER — ONDANSETRON 8 MG/1
4 TABLET, FILM COATED ORAL EVERY 6 HOURS
Refills: 0 | Status: DISCONTINUED | OUTPATIENT
Start: 2020-06-03 | End: 2020-06-04

## 2020-06-03 RX ORDER — ONDANSETRON 8 MG/1
4 TABLET, FILM COATED ORAL ONCE
Refills: 0 | Status: COMPLETED | OUTPATIENT
Start: 2020-06-03 | End: 2020-06-03

## 2020-06-03 RX ORDER — DILTIAZEM HCL 120 MG
360 CAPSULE, EXT RELEASE 24 HR ORAL DAILY
Refills: 0 | Status: DISCONTINUED | OUTPATIENT
Start: 2020-06-03 | End: 2020-06-04

## 2020-06-03 RX ORDER — FAMOTIDINE 10 MG/ML
20 INJECTION INTRAVENOUS ONCE
Refills: 0 | Status: COMPLETED | OUTPATIENT
Start: 2020-06-03 | End: 2020-06-03

## 2020-06-03 RX ORDER — ATORVASTATIN CALCIUM 80 MG/1
80 TABLET, FILM COATED ORAL DAILY
Refills: 0 | Status: DISCONTINUED | OUTPATIENT
Start: 2020-06-03 | End: 2020-06-04

## 2020-06-03 RX ORDER — CARBIDOPA AND LEVODOPA 25; 100 MG/1; MG/1
2 TABLET ORAL
Refills: 0 | Status: DISCONTINUED | OUTPATIENT
Start: 2020-06-03 | End: 2020-06-04

## 2020-06-03 RX ORDER — CEFTRIAXONE 500 MG/1
1000 INJECTION, POWDER, FOR SOLUTION INTRAMUSCULAR; INTRAVENOUS ONCE
Refills: 0 | Status: COMPLETED | OUTPATIENT
Start: 2020-06-03 | End: 2020-06-03

## 2020-06-03 RX ORDER — HYDROCHLOROTHIAZIDE 25 MG
12.5 TABLET ORAL DAILY
Refills: 0 | Status: DISCONTINUED | OUTPATIENT
Start: 2020-06-03 | End: 2020-06-04

## 2020-06-03 RX ORDER — SODIUM CHLORIDE 9 MG/ML
1000 INJECTION INTRAMUSCULAR; INTRAVENOUS; SUBCUTANEOUS ONCE
Refills: 0 | Status: COMPLETED | OUTPATIENT
Start: 2020-06-03 | End: 2020-06-03

## 2020-06-03 RX ORDER — ENTACAPONE 200 MG/1
200 TABLET, FILM COATED ORAL
Refills: 0 | Status: DISCONTINUED | OUTPATIENT
Start: 2020-06-03 | End: 2020-06-04

## 2020-06-03 RX ORDER — PRAMIPEXOLE DIHYDROCHLORIDE 0.12 MG/1
0.5 TABLET ORAL THREE TIMES A DAY
Refills: 0 | Status: DISCONTINUED | OUTPATIENT
Start: 2020-06-03 | End: 2020-06-04

## 2020-06-03 RX ORDER — CARBIDOPA, LEVODOPA, AND ENTACAPONE 50; 200; 200 MG/1; MG/1; MG/1
1 TABLET, FILM COATED ORAL
Refills: 0 | Status: DISCONTINUED | OUTPATIENT
Start: 2020-06-03 | End: 2020-06-03

## 2020-06-03 RX ORDER — CARBIDOPA AND LEVODOPA 25; 100 MG/1; MG/1
1 TABLET ORAL
Refills: 0 | Status: DISCONTINUED | OUTPATIENT
Start: 2020-06-03 | End: 2020-06-03

## 2020-06-03 RX ORDER — HEPARIN SODIUM 5000 [USP'U]/ML
5000 INJECTION INTRAVENOUS; SUBCUTANEOUS
Refills: 0 | Status: DISCONTINUED | OUTPATIENT
Start: 2020-06-03 | End: 2020-06-03

## 2020-06-03 RX ORDER — SENNA PLUS 8.6 MG/1
1 TABLET ORAL DAILY
Refills: 0 | Status: DISCONTINUED | OUTPATIENT
Start: 2020-06-03 | End: 2020-06-04

## 2020-06-03 RX ORDER — ACETAMINOPHEN 500 MG
650 TABLET ORAL EVERY 4 HOURS
Refills: 0 | Status: DISCONTINUED | OUTPATIENT
Start: 2020-06-03 | End: 2020-06-04

## 2020-06-03 RX ORDER — TEMAZEPAM 15 MG/1
15 CAPSULE ORAL AT BEDTIME
Refills: 0 | Status: DISCONTINUED | OUTPATIENT
Start: 2020-06-03 | End: 2020-06-04

## 2020-06-03 RX ORDER — METOCLOPRAMIDE HCL 10 MG
10 TABLET ORAL ONCE
Refills: 0 | Status: COMPLETED | OUTPATIENT
Start: 2020-06-03 | End: 2020-06-03

## 2020-06-03 RX ORDER — CLOPIDOGREL BISULFATE 75 MG/1
75 TABLET, FILM COATED ORAL DAILY
Refills: 0 | Status: DISCONTINUED | OUTPATIENT
Start: 2020-06-03 | End: 2020-06-04

## 2020-06-03 RX ORDER — POTASSIUM CHLORIDE 20 MEQ
20 PACKET (EA) ORAL
Refills: 0 | Status: COMPLETED | OUTPATIENT
Start: 2020-06-03 | End: 2020-06-03

## 2020-06-03 RX ADMIN — Medication 12.5 MILLIGRAM(S): at 12:27

## 2020-06-03 RX ADMIN — FAMOTIDINE 100 MILLIGRAM(S): 10 INJECTION INTRAVENOUS at 03:54

## 2020-06-03 RX ADMIN — TEMAZEPAM 15 MILLIGRAM(S): 15 CAPSULE ORAL at 22:37

## 2020-06-03 RX ADMIN — CARBIDOPA AND LEVODOPA 2 TABLET(S): 25; 100 TABLET ORAL at 18:04

## 2020-06-03 RX ADMIN — PRAMIPEXOLE DIHYDROCHLORIDE 0.5 MILLIGRAM(S): 0.12 TABLET ORAL at 13:00

## 2020-06-03 RX ADMIN — ONDANSETRON 4 MILLIGRAM(S): 8 TABLET, FILM COATED ORAL at 03:57

## 2020-06-03 RX ADMIN — CARBIDOPA AND LEVODOPA 2 TABLET(S): 25; 100 TABLET ORAL at 12:26

## 2020-06-03 RX ADMIN — CLOPIDOGREL BISULFATE 75 MILLIGRAM(S): 75 TABLET, FILM COATED ORAL at 12:26

## 2020-06-03 RX ADMIN — ENTACAPONE 200 MILLIGRAM(S): 200 TABLET, FILM COATED ORAL at 18:04

## 2020-06-03 RX ADMIN — ENTACAPONE 200 MILLIGRAM(S): 200 TABLET, FILM COATED ORAL at 21:05

## 2020-06-03 RX ADMIN — SODIUM CHLORIDE 1000 MILLILITER(S): 9 INJECTION INTRAMUSCULAR; INTRAVENOUS; SUBCUTANEOUS at 03:57

## 2020-06-03 RX ADMIN — Medication 20 MILLIEQUIVALENT(S): at 12:28

## 2020-06-03 RX ADMIN — Medication 360 MILLIGRAM(S): at 12:27

## 2020-06-03 RX ADMIN — ENTACAPONE 200 MILLIGRAM(S): 200 TABLET, FILM COATED ORAL at 12:27

## 2020-06-03 RX ADMIN — Medication 20 MILLIEQUIVALENT(S): at 18:04

## 2020-06-03 RX ADMIN — CARBIDOPA AND LEVODOPA 2 TABLET(S): 25; 100 TABLET ORAL at 21:06

## 2020-06-03 RX ADMIN — CEFTRIAXONE 100 MILLIGRAM(S): 500 INJECTION, POWDER, FOR SOLUTION INTRAMUSCULAR; INTRAVENOUS at 05:01

## 2020-06-03 RX ADMIN — Medication 104 MILLIGRAM(S): at 06:04

## 2020-06-03 RX ADMIN — PRAMIPEXOLE DIHYDROCHLORIDE 0.5 MILLIGRAM(S): 0.12 TABLET ORAL at 21:05

## 2020-06-03 RX ADMIN — ATORVASTATIN CALCIUM 80 MILLIGRAM(S): 80 TABLET, FILM COATED ORAL at 12:27

## 2020-06-03 RX ADMIN — SENNA PLUS 1 TABLET(S): 8.6 TABLET ORAL at 18:45

## 2020-06-03 NOTE — ED PROVIDER NOTE - OBJECTIVE STATEMENT
88 yo female hx of HTN/HLD/RA/ Parkinson BIBA from home c/o nausea x 3 days. reported similar nausea off/on x 1 month. also reported urinary frequency and dysuria over the past week so her daughter had given her OTC Azo for her urinary symptoms. denies fever/chill/HA/dizziness/chest pain/sob/coughing/abd pain/vomiting/diarrhea and constipation.

## 2020-06-03 NOTE — H&P ADULT - PROBLEM SELECTOR PLAN 6
possible secondary to passed stone or uti, r/o stricture, need f/u imaging as outpt  f/u urology outpt

## 2020-06-03 NOTE — H&P ADULT - NSHPPHYSICALEXAM_GEN_ALL_CORE
Vital Signs Last 24 Hrs  T(F): 97.2 (03 Jun 2020 03:35), Max: 97.2 (03 Jun 2020 03:35)  HR: 82 (03 Jun 2020 03:35) (82 - 82)  BP: 199/81 (03 Jun 2020 03:35) (199/81 - 199/81)  RR: 19 (03 Jun 2020 03:35) (19 - 19)  SpO2: 97% (03 Jun 2020 03:35) (97% - 97%)    PHYSICAL EXAM:      Constitutional: A&Ox4  Respiratory: cta b/l  Cardiovascular: s1 s2 rrr  Gastrointestinal: soft nt  nd + bs no rebound or guarding  Genitourinary: no cva tenderness  Extremities: normal rom, no edema, calf tenderness  Neurological:no focal deficits  Skin: no rash

## 2020-06-03 NOTE — H&P ADULT - NSHPREVIEWOFSYSTEMS_GEN_ALL_CORE
General:	no fever, weight loss,  chills  Skin: no rash, ulcers  Ophthalmologic: no visual changes  ENMT:	no sore throat  Respiratory and Thorax: no cough, wheeze,  sob  Cardiovascular:	no chest pain, palpitations, dizziness  Gastrointestinal:	+ nausea, vomiting, abd pain  Genitourinary:	no dysuria, hematuria  Musculoskeletal:	no joint pains  Neurological:	 no speech disturbance, focal weakness, numbness  Psychiatric:	no depression, anxiety, psychosis  Hematology/Lymphatics:	no anemia  Endocrine:	no polyuria, polydipsia

## 2020-06-03 NOTE — H&P ADULT - PROBLEM SELECTOR PROBLEM 6
Rheumatoid arthritis, involving unspecified site, unspecified rheumatoid factor presence Hydronephrosis, unspecified hydronephrosis type

## 2020-06-03 NOTE — ED PROVIDER NOTE - CLINICAL SUMMARY MEDICAL DECISION MAKING FREE TEXT BOX
Patient with CT evidence of pyelo, explains persistent sx. Given age, comorbidities, will need admission for IV abx, received ceftriaxone in ED.

## 2020-06-03 NOTE — ED PROVIDER NOTE - PHYSICAL EXAMINATION
CONSTITUTIONAL: Well-appearing; well-nourished; in no apparent distress.   EYES: PERRL; EOM intact.   ENT: normal nose; no rhinorrhea; normal pharynx with no tonsillar hypertrophy.   NECK: Supple; non-tender; no cervical lymphadenopathy. No JVD.   CARDIOVASCULAR: Normal S1, S2; no murmurs, rubs, or gallops.   RESPIRATORY: Normal chest excursion with respiration; breath sounds clear and equal bilaterally; no wheezes, rhonchi, or rales.  GI/: + diffuse mild tenderness. Normal bowel sounds; non-distended;  no palpable organomegaly. no CVAT.   MS: No evidence of trauma or deformity to extremities.   SKIN: Normal for age and race; warm; dry; good turgor; no apparent lesions or exudate.   NEURO/PSYCH: A & O x 4; grossly unremarkable.

## 2020-06-03 NOTE — H&P ADULT - HISTORY OF PRESENT ILLNESS
86 yo female hx of HTN/HLD/RA/ Parkinson BIBA from home c/o nausea x 3 days. reported similar nausea off/on x 1 month. also reported urinary frequency and dysuria over the past week so her daughter had given her OTC Azo for her urinary symptoms. denies fever/chill/HA/dizziness/chest pain/sob/coughing/abd pain/vomiting/diarrhea and constipation. Patient is a 88yo female with a pm hx of HTN, HLD, RA, Parkinson's BIBA from home with c/o nausea x 3 days, describes as moderate, intermittent, and associated with urinary frequency. Patient denies associated fever, chills, abd pain, vomiting, hematuria, or constipation.

## 2020-06-03 NOTE — H&P ADULT - NSHPSOCIALHISTORY_GEN_ALL_CORE
denies alcohol, tobacco, or drug use denies alcohol, tobacco, or drug use  lives with daughter  ambulates with a walker

## 2020-06-03 NOTE — ED PROVIDER NOTE - NS ED ROS FT
Constitutional: no fever, chills, no recent weight loss, change in appetite or malaise  Eyes: no redness/discharge/pain/vision changes  ENT: no rhinorrhea/ear pain/sore throat  Cardiac: No chest pain, SOB or edema.  Respiratory: No cough or respiratory distress  GI: + nausea No vomiting, diarrhea or abdominal pain.  : see HPI  MS: no pain to back or extremities, no loss of ROM, no weakness  Neuro: No headache or weakness. No LOC.  Skin: No skin rash.  Endocrine: No history of thyroid disease or diabetes.

## 2020-06-03 NOTE — ED PROVIDER NOTE - NS_EDPROVIDERDISPOUSERTYPE_ED_A_ED
h/o recurrent syncope, s/p ILR implant.  Mid sternal ILR site :C/D/I.  ILR interrogated: battery status good.  -No stored data to review.  -tachy parameter adjusted to >171bpm  -margarita parameter adjusted to <40 bpm  -D/W pt/ED provider.
Attending Attestation (For Attendings USE Only)...

## 2020-06-03 NOTE — ED PROVIDER NOTE - ATTENDING CONTRIBUTION TO CARE
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 No vomiting. Does report decreased appetite but has been able to tolerate PO. No constipation, diarrhea, fever, chills, abdominal pain. Her daughter is treating her with azo as she has previously had many UTIs, often presenting with nausea and she thought this might help.    VS notable for elevated BP. Patient looks well, in no distress, has RRR, clear lungs, has mild diffuse abdominal tenderness, mostly in suprapubic area, no rebound or guarding.    Suspect UTI, will get labs, UA and reassess, if pain/nausea does not improve with fluids, antiemitics, may need imagining.

## 2020-06-03 NOTE — H&P ADULT - ATTENDING COMMENTS
Patient seen and examined independently  Agree with medical PA's H&P except where edited by me    86yo F admitted for pyelonephritis.     #Pyelonephritis/mild hydro  -on IV rocephin and IVF  -follow up cultures  -repeat cbc in am  -repeat sono in am to evaluate hydro    #Hypokalemia  -repleted po   -repeat level in am    #Dyslipidemia  -on statin     #parkinson's disease  -continue sinemet and pramipexole  -oob to chair and ambulate daily (pt uses a walker)    HTN  -stable on current tx

## 2020-06-03 NOTE — ED PROVIDER NOTE - PROGRESS NOTE DETAILS
UA largely positive for infection, culture pending, labs otherwise normal, however patient still uncomfortable, nauseous -- therefore head and abdominal CT done -- head CT without acute findings, abdomen/pelvis read pending.

## 2020-06-03 NOTE — H&P ADULT - NSHPLABSRESULTS_GEN_ALL_CORE
13.8   10.22 )-----------( 260      ( 2020 03:45 )             42.0       06-    138  |  101  |  26<H>  ----------------------------<  113<H>  3.3<L>   |  27  |  1.0    Ca    9.5      2020 03:45    TPro  6.6  /  Alb  4.2  /  TBili  0.7  /  DBili  x   /  AST  11  /  ALT  <5  /  AlkPhos  94  06-03              Urinalysis Basic - ( 2020 04:10 )    Color: Yellow / Appearance: Slightly Cloudy / S.020 / pH: x  Gluc: x / Ketone: Negative  / Bili: Negative / Urobili: 0.2 mg/dL   Blood: x / Protein: 30 mg/dL / Nitrite: Positive   Leuk Esterase: Large / RBC: 3-5 /HPF / WBC 10-25 /HPF   Sq Epi: x / Non Sq Epi: Occasional /HPF / Bacteria: Moderate      Lactate Trend   @ 03:45 Lactate:1.0       CARDIAC MARKERS ( 2020 03:45 )  x     / <0.01 ng/mL / x     / x     / x          < from: CT Abdomen and Pelvis w/ IV Cont (20 @ 05:51) >    IMPRESSION:       Right moderate hydroureteronephrosis with no evidence of obstructive calculus or obvious lesion. Nevertheless, there is a change in caliber in the distal ureter. Differential diagnosis includes a recently passed calculus, infectious process as well as nonradiopaque calculus. Stricture is not excluded.    Striation of the right kidney and fat stranding seen around the urinary bladder. Findings may be seen with an ascending infection causing pyelonephritis. Correlate with urinalysis.    Punctate nonobstructing calculus in the right interpolar region.    Findings may reflect evidence of constipation and delayed transition within small ed    < from: CT Head No Cont (20 @ 05:47) >        Impression:      Suboptimal exam.    No CT evidence of acute intracranial pathology.    Densities noted in the bilateral external auditory canals, cerumen.

## 2020-06-04 ENCOUNTER — TRANSCRIPTION ENCOUNTER (OUTPATIENT)
Age: 85
End: 2020-06-04

## 2020-06-04 VITALS — HEART RATE: 82 BPM | RESPIRATION RATE: 16 BRPM | SYSTOLIC BLOOD PRESSURE: 138 MMHG | DIASTOLIC BLOOD PRESSURE: 67 MMHG

## 2020-06-04 LAB
ANION GAP SERPL CALC-SCNC: 9 MMOL/L — SIGNIFICANT CHANGE UP (ref 7–14)
BASOPHILS # BLD AUTO: 0.06 K/UL — SIGNIFICANT CHANGE UP (ref 0–0.2)
BASOPHILS NFR BLD AUTO: 0.9 % — SIGNIFICANT CHANGE UP (ref 0–1)
BUN SERPL-MCNC: 18 MG/DL — SIGNIFICANT CHANGE UP (ref 10–20)
CALCIUM SERPL-MCNC: 8.7 MG/DL — SIGNIFICANT CHANGE UP (ref 8.5–10.1)
CHLORIDE SERPL-SCNC: 104 MMOL/L — SIGNIFICANT CHANGE UP (ref 98–110)
CO2 SERPL-SCNC: 28 MMOL/L — SIGNIFICANT CHANGE UP (ref 17–32)
CREAT SERPL-MCNC: 0.9 MG/DL — SIGNIFICANT CHANGE UP (ref 0.7–1.5)
EOSINOPHIL # BLD AUTO: 0.23 K/UL — SIGNIFICANT CHANGE UP (ref 0–0.7)
EOSINOPHIL NFR BLD AUTO: 3.5 % — SIGNIFICANT CHANGE UP (ref 0–8)
GLUCOSE SERPL-MCNC: 89 MG/DL — SIGNIFICANT CHANGE UP (ref 70–99)
HCT VFR BLD CALC: 39.6 % — SIGNIFICANT CHANGE UP (ref 37–47)
HGB BLD-MCNC: 12.8 G/DL — SIGNIFICANT CHANGE UP (ref 12–16)
IMM GRANULOCYTES NFR BLD AUTO: 0.6 % — HIGH (ref 0.1–0.3)
LYMPHOCYTES # BLD AUTO: 1.06 K/UL — LOW (ref 1.2–3.4)
LYMPHOCYTES # BLD AUTO: 16 % — LOW (ref 20.5–51.1)
MCHC RBC-ENTMCNC: 29.8 PG — SIGNIFICANT CHANGE UP (ref 27–31)
MCHC RBC-ENTMCNC: 32.3 G/DL — SIGNIFICANT CHANGE UP (ref 32–37)
MCV RBC AUTO: 92.1 FL — SIGNIFICANT CHANGE UP (ref 81–99)
MONOCYTES # BLD AUTO: 0.63 K/UL — HIGH (ref 0.1–0.6)
MONOCYTES NFR BLD AUTO: 9.5 % — HIGH (ref 1.7–9.3)
NEUTROPHILS # BLD AUTO: 4.62 K/UL — SIGNIFICANT CHANGE UP (ref 1.4–6.5)
NEUTROPHILS NFR BLD AUTO: 69.5 % — SIGNIFICANT CHANGE UP (ref 42.2–75.2)
NRBC # BLD: 0 /100 WBCS — SIGNIFICANT CHANGE UP (ref 0–0)
PLATELET # BLD AUTO: 261 K/UL — SIGNIFICANT CHANGE UP (ref 130–400)
POTASSIUM SERPL-MCNC: 3.5 MMOL/L — SIGNIFICANT CHANGE UP (ref 3.5–5)
POTASSIUM SERPL-SCNC: 3.5 MMOL/L — SIGNIFICANT CHANGE UP (ref 3.5–5)
RBC # BLD: 4.3 M/UL — SIGNIFICANT CHANGE UP (ref 4.2–5.4)
RBC # FLD: 12.9 % — SIGNIFICANT CHANGE UP (ref 11.5–14.5)
SODIUM SERPL-SCNC: 141 MMOL/L — SIGNIFICANT CHANGE UP (ref 135–146)
WBC # BLD: 6.64 K/UL — SIGNIFICANT CHANGE UP (ref 4.8–10.8)
WBC # FLD AUTO: 6.64 K/UL — SIGNIFICANT CHANGE UP (ref 4.8–10.8)

## 2020-06-04 PROCEDURE — 76775 US EXAM ABDO BACK WALL LIM: CPT | Mod: 26

## 2020-06-04 PROCEDURE — 99239 HOSP IP/OBS DSCHRG MGMT >30: CPT

## 2020-06-04 RX ORDER — L.ACIDOPH/B.ANIMALIS/B.LONGUM 15B CELL
1 CAPSULE ORAL
Qty: 14 | Refills: 0
Start: 2020-06-04 | End: 2020-06-10

## 2020-06-04 RX ORDER — ONDANSETRON 8 MG/1
1 TABLET, FILM COATED ORAL
Qty: 21 | Refills: 0
Start: 2020-06-04 | End: 2020-06-10

## 2020-06-04 RX ADMIN — Medication 12.5 MILLIGRAM(S): at 05:27

## 2020-06-04 RX ADMIN — CEFTRIAXONE 100 MILLIGRAM(S): 500 INJECTION, POWDER, FOR SOLUTION INTRAMUSCULAR; INTRAVENOUS at 05:26

## 2020-06-04 RX ADMIN — CARBIDOPA AND LEVODOPA 2 TABLET(S): 25; 100 TABLET ORAL at 05:27

## 2020-06-04 RX ADMIN — SENNA PLUS 1 TABLET(S): 8.6 TABLET ORAL at 11:25

## 2020-06-04 RX ADMIN — ATORVASTATIN CALCIUM 80 MILLIGRAM(S): 80 TABLET, FILM COATED ORAL at 11:25

## 2020-06-04 RX ADMIN — Medication 360 MILLIGRAM(S): at 05:27

## 2020-06-04 RX ADMIN — PRAMIPEXOLE DIHYDROCHLORIDE 0.5 MILLIGRAM(S): 0.12 TABLET ORAL at 13:20

## 2020-06-04 RX ADMIN — CARBIDOPA AND LEVODOPA 2 TABLET(S): 25; 100 TABLET ORAL at 11:25

## 2020-06-04 RX ADMIN — PRAMIPEXOLE DIHYDROCHLORIDE 0.5 MILLIGRAM(S): 0.12 TABLET ORAL at 05:27

## 2020-06-04 RX ADMIN — ENTACAPONE 200 MILLIGRAM(S): 200 TABLET, FILM COATED ORAL at 11:25

## 2020-06-04 RX ADMIN — ENTACAPONE 200 MILLIGRAM(S): 200 TABLET, FILM COATED ORAL at 05:27

## 2020-06-04 RX ADMIN — CLOPIDOGREL BISULFATE 75 MILLIGRAM(S): 75 TABLET, FILM COATED ORAL at 11:25

## 2020-06-04 NOTE — DISCHARGE NOTE PROVIDER - NSDCCPCAREPLAN_GEN_ALL_CORE_FT
PRINCIPAL DISCHARGE DIAGNOSIS  Diagnosis: UTI (urinary tract infection)  Assessment and Plan of Treatment: Patient treated with IV antibiotics while admitted to the hospital  Patient will be sent home with a prescription oral antibiotics at her pharmacy  Patient should follow up with her primary care provier, Dr. Kamara, in 1 week.      SECONDARY DISCHARGE DIAGNOSES  Diagnosis: Hypokalemia  Assessment and Plan of Treatment: Repleated and resolved    Diagnosis: Nausea  Assessment and Plan of Treatment: PRINCIPAL DISCHARGE DIAGNOSIS  Diagnosis: UTI (urinary tract infection)  Assessment and Plan of Treatment: Patient treated with IV antibiotics while admitted to the hospital. Continue oral Antibiotics as prescribed. Follow up with her primary care provider, Dr. Kamara, in 1 week.      SECONDARY DISCHARGE DIAGNOSES  Diagnosis: Hydronephrosis, right  Assessment and Plan of Treatment: CT showed Moderate Right Hydronephroiss with no stones and obstruction. Follow up with Dr. Calloway out patient for further evaluation.

## 2020-06-04 NOTE — DISCHARGE NOTE PROVIDER - NSDCFUADDINST_GEN_ALL_CORE_FT
Patient should continue all of her normal home medications upon discharge.   A new prescription was sent to your pharmacy for antibiotics - take as prescribed

## 2020-06-04 NOTE — PROGRESS NOTE ADULT - ASSESSMENT
Patient is a 88yo female with a hx of HTN, HLD, RA, Parkinson's BIBA from home with c/o nausea x 3 days, describes as moderate, intermittent, and associated with urinary frequency & Dysuria. Reported similar symptoms off/on x 1 month. Denied fever/chill/HA/dizziness/chest pain/sob/coughing/abd pain/vomiting/diarrhea and constipation. CT done showed Right moderate hydroureteronephrosis with no evidence of obstructive calculus or obvious lesion & findings concerning for Pyelonephritis.      Assessment & Plan:    1. UTI:  Started on Rocephin.  Cultures pending. Discharge home on Vantin.  Follow up cultures.      2. Rt Hydronephrosis:  No obstruction seen on Imaging and normal renal function.  Out patient follow up.        3. Hypokalemia:  Replaced. Monitor levels.      4. Dyslipidemia: On Statin.      5. Parkinson's disease:  Continue Sinemet and pramipexole.  Ambulate with assistance. Fall precautions.      6. HTN: Continue home medications.        Prophylaxis: None  Code status: Full code    Progress Note Handoff:  Pending consults: None  Pending Tests: Cultures  Family/Patient discussion: Plan of care discussed with patient & Daughter.  Disposition: Home with Home care      Attending: Dr. Criselda Melendez. [facility] [phone].

## 2020-06-04 NOTE — DISCHARGE NOTE PROVIDER - CARE PROVIDERS DIRECT ADDRESSES
,DirectAddress_Unknown ,DirectAddress_Unknown,[email or web address]

## 2020-06-04 NOTE — PHYSICAL THERAPY INITIAL EVALUATION ADULT - IMPAIRMENTS FOUND, PT EVAL
aerobic capacity/endurance/gait, locomotion, and balance/ergonomics and body mechanics/muscle strength/posture

## 2020-06-04 NOTE — PHYSICAL THERAPY INITIAL EVALUATION ADULT - GENERAL OBSERVATIONS, REHAB EVAL
9:32 - 9:52. Chart reviewed. Patient available to be seen for physical therapy, confirmed with nurse. Patient encountered already out of bed in chair, +prima fit. C/o b/l knee pain, but would like to walk with PT now.

## 2020-06-04 NOTE — DISCHARGE NOTE PROVIDER - NSDCQMCOGNITION_NEU_ALL_CORE
Pt is A&O X3, presents w/ complaints of lower back pain radiating to supra pubic area. Pt reports that she had miscarriage in december, she is here today because her doctor told her she has "something in my tubes". Denies any vaginal bleeding, F/C
No difficulties

## 2020-06-04 NOTE — PROGRESS NOTE ADULT - SUBJECTIVE AND OBJECTIVE BOX
JON GARCÍA  87y  Female    Patient is a 87y old  Female who presents with a chief complaint of UTI (2020 10:16)      INTERVAL HPI/OVERNIGHT EVENTS:  No interval events.  Patient has no new complaints.  No fever, chills, back or abdominal pain. No nausea or vomiting.  Chronic joint pain & stiffness.        REVIEW OF SYSTEMS:  At least 10 systems were reviewed in ROS.   All systems reviewed are within normal limits except for that listed above.      VITALS:  T(F): 96.5 (20 @ 04:52), Max: 98.3 (20 @ 21:06)  HR: 66 (20 @ 04:52) (66 - 95)  BP: 136/87 (20 @ 04:52) (125/58 - 176/82)  RR: 16 (20 @ 04:52) (16 - 16)  SpO2: --      PHYSICAL EXAM:  GENERAL: NAD, well-developed  HEAD:  Atraumatic, Normocephalic  EYES: conjunctiva and sclera clear  ENMT: Moist mucous membranes  NECK: Supple, Normal thyroid  NERVOUS SYSTEM:  Alert & Oriented X3, Good concentration; Motor Strength 5/5 B/L upper and lower extremities  CHEST/LUNG: Clear to auscultation bilaterally; No rales, rhonchi, wheezing, or rubs  HEART: Regular rate and rhythm; No murmurs, rubs, or gallops  ABDOMEN: Soft, Nontender, Nondistended; Bowel sounds present  EXTREMITIES:  2+ Peripheral Pulses, No clubbing, cyanosis, or edema  LYMPH: No lymphadenopathy noted  SKIN: No rashes or lesions    Consultant(s) Notes Reviewed:  [x ] YES  [ ] NO  Care Discussed with Consultants/Other Providers [ x] YES  [ ] NO    LABS:                        12.8   6.64  )-----------( 261      ( 2020 06:10 )             39.6       06-04    141  |  104  |  18  ----------------------------<  89  3.5   |  28  |  0.9    Ca    8.7      2020 06:10    TPro  6.6  /  Alb  4.2  /  TBili  0.7  /  DBili  x   /  AST  11  /  ALT  <5  /  AlkPhos  94  06-03      Urinalysis Basic - ( 2020 04:10 )    Color: Yellow / Appearance: Slightly Cloudy / S.020 / pH: x  Gluc: x / Ketone: Negative  / Bili: Negative / Urobili: 0.2 mg/dL   Blood: x / Protein: 30 mg/dL / Nitrite: Positive   Leuk Esterase: Large / RBC: 3-5 /HPF / WBC 10-25 /HPF   Sq Epi: x / Non Sq Epi: Occasional /HPF / Bacteria: Moderate      MICROBIOLOGY: pending.  COVID-19 PCR: NotDetec (20 @ 04:50)      RADIOLOGY & ADDITIONAL TESTS:  CT Abdomen and Pelvis w/ IV Cont (20 @ 05:51)   Right moderate hydroureteronephrosis with no evidence of obstructive calculus or obvious lesion. Nevertheless, there is a change in caliber in the distal ureter. Differential diagnosis includes a recently passed calculus, infectious process as well as nonradiopaque calculus. Stricture is not excluded.    Striation of the right kidney and fat stranding seen around the urinary bladder. Findings may be seen with an ascending infection causing pyelonephritis. Correlate with urinalysis.    Punctate non obstructing calculus in the right interpolar region.    Findings may reflect evidence of constipation and delayed transition within small bowel.      CT Head No Cont (20 @ 05:47)   Suboptimal exam.    No CT evidence of acute intracranial pathology.    Densities noted in the bilateral external auditory canals, cerumen.      Imaging Personally Reviewed:  [x] YES  [ ] NO      MEDICATIONS  (STANDING):  atorvastatin 80 milliGRAM(s) Oral daily  carbidopa/levodopa  25/100 2 Tablet(s) Oral <User Schedule>  cefTRIAXone   IVPB 1000 milliGRAM(s) IV Intermittent every 24 hours  clopidogrel Tablet 75 milliGRAM(s) Oral daily  diltiazem    milliGRAM(s) Oral daily  entacapone 200 milliGRAM(s) Oral <User Schedule>  hydrochlorothiazide 12.5 milliGRAM(s) Oral daily  pramipexole 0.5 milliGRAM(s) Oral three times a day  senna 1 Tablet(s) Oral daily    MEDICATIONS  (PRN):  acetaminophen   Tablet .. 650 milliGRAM(s) Oral every 4 hours PRN Temp greater or equal to 38C (100.4F), Mild Pain (1 - 3)  ondansetron Injectable 4 milliGRAM(s) IV Push every 6 hours PRN Nausea  temazepam 15 milliGRAM(s) Oral at bedtime PRN Insomnia        Home Medications:  atorvastatin 80 mg oral tablet: 1 tab(s) orally once a day (2020 08:54)  carbidopa-levodopa 50 mg-200 mg oral tablet, extended release: 1 tab(s) orally 4 times a day (2020 08:54)  clopidogrel 75 mg oral tablet: 1 tab(s) orally once a day (at bedtime) (2020 08:54)  DilTIAZem Hydrochloride  mg/24 hours oral capsule, extended release: 1 cap(s) orally once a day (2020 08:54)  hydroCHLOROthiazide 12.5 mg oral tablet: 1 tab(s) orally once a day (2020 08:54)  pramipexole 0.5 mg oral tablet: 1 tab(s) orally 3 times a day (2020 08:54)        HEALTH ISSUES - PROBLEM Dx:  Hydronephrosis  Rheumatoid arthritis  Essential hypertension  Parkinson disease  Hyperlipidemia  Hypokalemia  Pyelonephritis

## 2020-06-04 NOTE — PHYSICAL THERAPY INITIAL EVALUATION ADULT - GAIT DEVIATIONS NOTED, PT EVAL
crouched posture, decreased heel strike / push off/decreased glenn/decreased step length/decreased weight-shifting ability/increased time in double stance

## 2020-06-04 NOTE — DISCHARGE NOTE PROVIDER - PROVIDER TOKENS
PROVIDER:[TOKEN:[[id number]],FOLLOWUP:[1 week]] PROVIDER:[TOKEN:[[id number]],FOLLOWUP:[1 week]],PROVIDER:[TOKEN:[[id number]],FOLLOWUP:[2 weeks]]

## 2020-06-04 NOTE — PHYSICAL THERAPY INITIAL EVALUATION ADULT - BED MOBILITY LIMITATIONS, REHAB EVAL
Did not occur this session. Patient encountered already out of bed in chair.  1-person assist for bed mobility, as per nursing staff.

## 2020-06-04 NOTE — DISCHARGE NOTE PROVIDER - HOSPITAL COURSE
To be completed by MD. Patient is a 86yo female with a hx of HTN, HLD, RA, Parkinson's BIBA from home with c/o nausea x 3 days, describes as moderate, intermittent, and associated with urinary frequency & Dysuria. Reported similar symptoms off/on x 1 month. Denied fever/chill/HA/dizziness/chest pain/sob/coughing/abd pain/vomiting/diarrhea and constipation. CT done showed Right moderate hydroureteronephrosis with no evidence of obstructive calculus or obvious lesion & findings concerning for Pyelonephritis.            Assessment & Plan:        1. UTI:    Started on Rocephin.    Cultures pending. Discharge home on Vantin. Follow up cultures.            2. Rt Hydronephrosis:    No obstruction seen on Imaging and normal renal function.    Out patient follow up with Dr. Calloway.            3. Hypokalemia: Replaced.             4. Dyslipidemia: On Statin.            5. Parkinson's disease:     Continued Sinemet and pramipexole.    Ambulate with assistance. Fall precautions.            6. HTN: Continued home medications.

## 2020-06-04 NOTE — DISCHARGE NOTE NURSING/CASE MANAGEMENT/SOCIAL WORK - PATIENT PORTAL LINK FT
You can access the FollowMyHealth Patient Portal offered by [facility] by registering at the following website: [email or web address]. By joining [employer]’s FollowMyHealth portal, you will also be able to view your health information using other applications (apps) compatible with our system.

## 2020-06-04 NOTE — DISCHARGE NOTE PROVIDER - CARE PROVIDER_API CALL
Gucci Kamara  PEDIATRICS  [address] [place] [id number]  Phone: [phone]  Fax: [phone]  Follow Up Time: 1 week Gucci Kamara  PEDIATRICS  [address] [place] [address]  Phone: [phone]  Fax: [phone]  Follow Up Time: 1 week    Franky Calloway  UROLOGY  [address] [place] [address] [address]  [place] [address]  Phone: [phone]  Fax: [phone]  Follow Up Time: 2 weeks

## 2020-06-05 RX ORDER — CEFPODOXIME PROXETIL 100 MG
1 TABLET ORAL
Qty: 16 | Refills: 0
Start: 2020-06-05 | End: 2020-06-12

## 2020-06-06 LAB
-  AMIKACIN: SIGNIFICANT CHANGE UP
-  AMPICILLIN/SULBACTAM: SIGNIFICANT CHANGE UP
-  AMPICILLIN: SIGNIFICANT CHANGE UP
-  AZTREONAM: SIGNIFICANT CHANGE UP
-  CEFAZOLIN: SIGNIFICANT CHANGE UP
-  CEFEPIME: SIGNIFICANT CHANGE UP
-  CEFOXITIN: SIGNIFICANT CHANGE UP
-  CEFTRIAXONE: SIGNIFICANT CHANGE UP
-  CIPROFLOXACIN: SIGNIFICANT CHANGE UP
-  GENTAMICIN: SIGNIFICANT CHANGE UP
-  LEVOFLOXACIN: SIGNIFICANT CHANGE UP
-  MEROPENEM: SIGNIFICANT CHANGE UP
-  NITROFURANTOIN: SIGNIFICANT CHANGE UP
-  PIPERACILLIN/TAZOBACTAM: SIGNIFICANT CHANGE UP
-  TOBRAMYCIN: SIGNIFICANT CHANGE UP
-  TRIMETHOPRIM/SULFAMETHOXAZOLE: SIGNIFICANT CHANGE UP
CULTURE RESULTS: SIGNIFICANT CHANGE UP
METHOD TYPE: SIGNIFICANT CHANGE UP
ORGANISM # SPEC MICROSCOPIC CNT: SIGNIFICANT CHANGE UP
ORGANISM # SPEC MICROSCOPIC CNT: SIGNIFICANT CHANGE UP
SPECIMEN SOURCE: SIGNIFICANT CHANGE UP

## 2020-06-08 DIAGNOSIS — G20 PARKINSON'S DISEASE: ICD-10-CM

## 2020-06-08 DIAGNOSIS — I10 ESSENTIAL (PRIMARY) HYPERTENSION: ICD-10-CM

## 2020-06-08 DIAGNOSIS — E78.5 HYPERLIPIDEMIA, UNSPECIFIED: ICD-10-CM

## 2020-06-08 DIAGNOSIS — M06.9 RHEUMATOID ARTHRITIS, UNSPECIFIED: ICD-10-CM

## 2020-06-08 DIAGNOSIS — E87.6 HYPOKALEMIA: ICD-10-CM

## 2020-06-08 DIAGNOSIS — Z90.710 ACQUIRED ABSENCE OF BOTH CERVIX AND UTERUS: ICD-10-CM

## 2020-06-08 DIAGNOSIS — N12 TUBULO-INTERSTITIAL NEPHRITIS, NOT SPECIFIED AS ACUTE OR CHRONIC: ICD-10-CM

## 2020-06-08 DIAGNOSIS — N13.6 PYONEPHROSIS: ICD-10-CM

## 2020-06-22 ENCOUNTER — INPATIENT (INPATIENT)
Facility: HOSPITAL | Age: 85
LOS: 2 days | Discharge: HOME HEALTH | End: 2020-06-25
Attending: HOSPITALIST | Admitting: HOSPITALIST
Payer: MEDICARE

## 2020-06-22 VITALS
HEIGHT: 64 IN | SYSTOLIC BLOOD PRESSURE: 171 MMHG | DIASTOLIC BLOOD PRESSURE: 76 MMHG | HEART RATE: 80 BPM | RESPIRATION RATE: 17 BRPM | TEMPERATURE: 98 F | OXYGEN SATURATION: 96 %

## 2020-06-22 DIAGNOSIS — Z90.710 ACQUIRED ABSENCE OF BOTH CERVIX AND UTERUS: Chronic | ICD-10-CM

## 2020-06-22 LAB
ALBUMIN SERPL ELPH-MCNC: 4.3 G/DL — SIGNIFICANT CHANGE UP (ref 3.5–5.2)
ALP SERPL-CCNC: 120 U/L — HIGH (ref 30–115)
ALT FLD-CCNC: <5 U/L — SIGNIFICANT CHANGE UP (ref 0–41)
ANION GAP SERPL CALC-SCNC: 9 MMOL/L — SIGNIFICANT CHANGE UP (ref 7–14)
APPEARANCE UR: CLEAR — SIGNIFICANT CHANGE UP
AST SERPL-CCNC: 13 U/L — SIGNIFICANT CHANGE UP (ref 0–41)
BACTERIA # UR AUTO: ABNORMAL /HPF
BASOPHILS # BLD AUTO: 0.05 K/UL — SIGNIFICANT CHANGE UP (ref 0–0.2)
BASOPHILS NFR BLD AUTO: 0.8 % — SIGNIFICANT CHANGE UP (ref 0–1)
BILIRUB SERPL-MCNC: 0.3 MG/DL — SIGNIFICANT CHANGE UP (ref 0.2–1.2)
BILIRUB UR-MCNC: NEGATIVE — SIGNIFICANT CHANGE UP
BUN SERPL-MCNC: 25 MG/DL — HIGH (ref 10–20)
CALCIUM SERPL-MCNC: 9.4 MG/DL — SIGNIFICANT CHANGE UP (ref 8.5–10.1)
CHLORIDE SERPL-SCNC: 105 MMOL/L — SIGNIFICANT CHANGE UP (ref 98–110)
CO2 SERPL-SCNC: 26 MMOL/L — SIGNIFICANT CHANGE UP (ref 17–32)
COLOR SPEC: YELLOW — SIGNIFICANT CHANGE UP
CREAT SERPL-MCNC: 1.1 MG/DL — SIGNIFICANT CHANGE UP (ref 0.7–1.5)
DIFF PNL FLD: NEGATIVE — SIGNIFICANT CHANGE UP
EOSINOPHIL # BLD AUTO: 0.02 K/UL — SIGNIFICANT CHANGE UP (ref 0–0.7)
EOSINOPHIL NFR BLD AUTO: 0.3 % — SIGNIFICANT CHANGE UP (ref 0–8)
EPI CELLS # UR: ABNORMAL /HPF
GLUCOSE SERPL-MCNC: 128 MG/DL — HIGH (ref 70–99)
GLUCOSE UR QL: NEGATIVE MG/DL — SIGNIFICANT CHANGE UP
HCT VFR BLD CALC: 40.8 % — SIGNIFICANT CHANGE UP (ref 37–47)
HGB BLD-MCNC: 13.1 G/DL — SIGNIFICANT CHANGE UP (ref 12–16)
IMM GRANULOCYTES NFR BLD AUTO: 0.5 % — HIGH (ref 0.1–0.3)
KETONES UR-MCNC: NEGATIVE — SIGNIFICANT CHANGE UP
LACTATE SERPL-SCNC: 1.5 MMOL/L — SIGNIFICANT CHANGE UP (ref 0.7–2)
LEUKOCYTE ESTERASE UR-ACNC: ABNORMAL
LYMPHOCYTES # BLD AUTO: 0.58 K/UL — LOW (ref 1.2–3.4)
LYMPHOCYTES # BLD AUTO: 9.1 % — LOW (ref 20.5–51.1)
MAGNESIUM SERPL-MCNC: 2 MG/DL — SIGNIFICANT CHANGE UP (ref 1.8–2.4)
MCHC RBC-ENTMCNC: 29.6 PG — SIGNIFICANT CHANGE UP (ref 27–31)
MCHC RBC-ENTMCNC: 32.1 G/DL — SIGNIFICANT CHANGE UP (ref 32–37)
MCV RBC AUTO: 92.1 FL — SIGNIFICANT CHANGE UP (ref 81–99)
MONOCYTES # BLD AUTO: 0.48 K/UL — SIGNIFICANT CHANGE UP (ref 0.1–0.6)
MONOCYTES NFR BLD AUTO: 7.5 % — SIGNIFICANT CHANGE UP (ref 1.7–9.3)
NEUTROPHILS # BLD AUTO: 5.23 K/UL — SIGNIFICANT CHANGE UP (ref 1.4–6.5)
NEUTROPHILS NFR BLD AUTO: 81.8 % — HIGH (ref 42.2–75.2)
NITRITE UR-MCNC: POSITIVE
NRBC # BLD: 0 /100 WBCS — SIGNIFICANT CHANGE UP (ref 0–0)
PH UR: 6 — SIGNIFICANT CHANGE UP (ref 5–8)
PLATELET # BLD AUTO: 292 K/UL — SIGNIFICANT CHANGE UP (ref 130–400)
POTASSIUM SERPL-MCNC: 4.2 MMOL/L — SIGNIFICANT CHANGE UP (ref 3.5–5)
POTASSIUM SERPL-SCNC: 4.2 MMOL/L — SIGNIFICANT CHANGE UP (ref 3.5–5)
PROT SERPL-MCNC: 6.6 G/DL — SIGNIFICANT CHANGE UP (ref 6–8)
PROT UR-MCNC: NEGATIVE MG/DL — SIGNIFICANT CHANGE UP
RBC # BLD: 4.43 M/UL — SIGNIFICANT CHANGE UP (ref 4.2–5.4)
RBC # FLD: 12.9 % — SIGNIFICANT CHANGE UP (ref 11.5–14.5)
SARS-COV-2 RNA SPEC QL NAA+PROBE: SIGNIFICANT CHANGE UP
SODIUM SERPL-SCNC: 140 MMOL/L — SIGNIFICANT CHANGE UP (ref 135–146)
SP GR SPEC: 1.02 — SIGNIFICANT CHANGE UP (ref 1.01–1.03)
TROPONIN T SERPL-MCNC: <0.01 NG/ML — SIGNIFICANT CHANGE UP
UROBILINOGEN FLD QL: 0.2 MG/DL — SIGNIFICANT CHANGE UP (ref 0.2–0.2)
WBC # BLD: 6.39 K/UL — SIGNIFICANT CHANGE UP (ref 4.8–10.8)
WBC # FLD AUTO: 6.39 K/UL — SIGNIFICANT CHANGE UP (ref 4.8–10.8)
WBC UR QL: ABNORMAL /HPF

## 2020-06-22 PROCEDURE — 71045 X-RAY EXAM CHEST 1 VIEW: CPT | Mod: 26

## 2020-06-22 PROCEDURE — 99285 EMERGENCY DEPT VISIT HI MDM: CPT

## 2020-06-22 PROCEDURE — 76770 US EXAM ABDO BACK WALL COMP: CPT | Mod: 26

## 2020-06-22 PROCEDURE — 99223 1ST HOSP IP/OBS HIGH 75: CPT

## 2020-06-22 PROCEDURE — 74177 CT ABD & PELVIS W/CONTRAST: CPT | Mod: 26

## 2020-06-22 RX ORDER — ACETAMINOPHEN 500 MG
650 TABLET ORAL EVERY 6 HOURS
Refills: 0 | Status: DISCONTINUED | OUTPATIENT
Start: 2020-06-22 | End: 2020-06-25

## 2020-06-22 RX ORDER — DILTIAZEM HCL 120 MG
360 CAPSULE, EXT RELEASE 24 HR ORAL DAILY
Refills: 0 | Status: DISCONTINUED | OUTPATIENT
Start: 2020-06-22 | End: 2020-06-25

## 2020-06-22 RX ORDER — ENOXAPARIN SODIUM 100 MG/ML
40 INJECTION SUBCUTANEOUS DAILY
Refills: 0 | Status: DISCONTINUED | OUTPATIENT
Start: 2020-06-22 | End: 2020-06-25

## 2020-06-22 RX ORDER — ONDANSETRON 8 MG/1
4 TABLET, FILM COATED ORAL EVERY 6 HOURS
Refills: 0 | Status: DISCONTINUED | OUTPATIENT
Start: 2020-06-22 | End: 2020-06-25

## 2020-06-22 RX ORDER — SODIUM CHLORIDE 9 MG/ML
1000 INJECTION INTRAMUSCULAR; INTRAVENOUS; SUBCUTANEOUS
Refills: 0 | Status: DISCONTINUED | OUTPATIENT
Start: 2020-06-22 | End: 2020-06-22

## 2020-06-22 RX ORDER — CARBIDOPA AND LEVODOPA 25; 100 MG/1; MG/1
1 TABLET ORAL
Refills: 0 | Status: COMPLETED | OUTPATIENT
Start: 2020-06-22 | End: 2020-06-22

## 2020-06-22 RX ORDER — ATORVASTATIN CALCIUM 80 MG/1
80 TABLET, FILM COATED ORAL AT BEDTIME
Refills: 0 | Status: DISCONTINUED | OUTPATIENT
Start: 2020-06-22 | End: 2020-06-25

## 2020-06-22 RX ORDER — ONDANSETRON 8 MG/1
4 TABLET, FILM COATED ORAL ONCE
Refills: 0 | Status: COMPLETED | OUTPATIENT
Start: 2020-06-22 | End: 2020-06-22

## 2020-06-22 RX ORDER — SODIUM CHLORIDE 9 MG/ML
1000 INJECTION INTRAMUSCULAR; INTRAVENOUS; SUBCUTANEOUS ONCE
Refills: 0 | Status: COMPLETED | OUTPATIENT
Start: 2020-06-22 | End: 2020-06-22

## 2020-06-22 RX ORDER — CEFTRIAXONE 500 MG/1
1000 INJECTION, POWDER, FOR SOLUTION INTRAMUSCULAR; INTRAVENOUS EVERY 24 HOURS
Refills: 0 | Status: DISCONTINUED | OUTPATIENT
Start: 2020-06-22 | End: 2020-06-24

## 2020-06-22 RX ORDER — LANOLIN ALCOHOL/MO/W.PET/CERES
5 CREAM (GRAM) TOPICAL ONCE
Refills: 0 | Status: COMPLETED | OUTPATIENT
Start: 2020-06-22 | End: 2020-06-22

## 2020-06-22 RX ORDER — CIPROFLOXACIN LACTATE 400MG/40ML
400 VIAL (ML) INTRAVENOUS ONCE
Refills: 0 | Status: COMPLETED | OUTPATIENT
Start: 2020-06-22 | End: 2020-06-22

## 2020-06-22 RX ORDER — PRAMIPEXOLE DIHYDROCHLORIDE 0.12 MG/1
0.5 TABLET ORAL THREE TIMES A DAY
Refills: 0 | Status: DISCONTINUED | OUTPATIENT
Start: 2020-06-22 | End: 2020-06-25

## 2020-06-22 RX ORDER — CLOPIDOGREL BISULFATE 75 MG/1
75 TABLET, FILM COATED ORAL AT BEDTIME
Refills: 0 | Status: DISCONTINUED | OUTPATIENT
Start: 2020-06-22 | End: 2020-06-25

## 2020-06-22 RX ADMIN — Medication 5 MILLIGRAM(S): at 03:59

## 2020-06-22 RX ADMIN — Medication 200 MILLIGRAM(S): at 02:25

## 2020-06-22 RX ADMIN — Medication 360 MILLIGRAM(S): at 06:46

## 2020-06-22 RX ADMIN — SODIUM CHLORIDE 1000 MILLILITER(S): 9 INJECTION INTRAMUSCULAR; INTRAVENOUS; SUBCUTANEOUS at 01:04

## 2020-06-22 RX ADMIN — PRAMIPEXOLE DIHYDROCHLORIDE 0.5 MILLIGRAM(S): 0.12 TABLET ORAL at 21:29

## 2020-06-22 RX ADMIN — CARBIDOPA AND LEVODOPA 1 TABLET(S): 25; 100 TABLET ORAL at 06:44

## 2020-06-22 RX ADMIN — ENOXAPARIN SODIUM 40 MILLIGRAM(S): 100 INJECTION SUBCUTANEOUS at 11:14

## 2020-06-22 RX ADMIN — PRAMIPEXOLE DIHYDROCHLORIDE 0.5 MILLIGRAM(S): 0.12 TABLET ORAL at 06:42

## 2020-06-22 RX ADMIN — PRAMIPEXOLE DIHYDROCHLORIDE 0.5 MILLIGRAM(S): 0.12 TABLET ORAL at 13:57

## 2020-06-22 RX ADMIN — CEFTRIAXONE 100 MILLIGRAM(S): 500 INJECTION, POWDER, FOR SOLUTION INTRAMUSCULAR; INTRAVENOUS at 13:57

## 2020-06-22 RX ADMIN — CARBIDOPA AND LEVODOPA 1 TABLET(S): 25; 100 TABLET ORAL at 17:48

## 2020-06-22 RX ADMIN — Medication 650 MILLIGRAM(S): at 19:54

## 2020-06-22 RX ADMIN — CLOPIDOGREL BISULFATE 75 MILLIGRAM(S): 75 TABLET, FILM COATED ORAL at 21:29

## 2020-06-22 RX ADMIN — ATORVASTATIN CALCIUM 80 MILLIGRAM(S): 80 TABLET, FILM COATED ORAL at 06:42

## 2020-06-22 RX ADMIN — ATORVASTATIN CALCIUM 80 MILLIGRAM(S): 80 TABLET, FILM COATED ORAL at 21:29

## 2020-06-22 RX ADMIN — CARBIDOPA AND LEVODOPA 1 TABLET(S): 25; 100 TABLET ORAL at 11:14

## 2020-06-22 RX ADMIN — SODIUM CHLORIDE 80 MILLILITER(S): 9 INJECTION INTRAMUSCULAR; INTRAVENOUS; SUBCUTANEOUS at 06:26

## 2020-06-22 RX ADMIN — CLOPIDOGREL BISULFATE 75 MILLIGRAM(S): 75 TABLET, FILM COATED ORAL at 06:42

## 2020-06-22 RX ADMIN — Medication 650 MILLIGRAM(S): at 11:30

## 2020-06-22 RX ADMIN — ONDANSETRON 4 MILLIGRAM(S): 8 TABLET, FILM COATED ORAL at 01:03

## 2020-06-22 NOTE — ED PROVIDER NOTE - CLINICAL SUMMARY MEDICAL DECISION MAKING FREE TEXT BOX
Labs unremarkable. UA +nitrites, WBC and bacteria. EKG NSR no acute changes. CXR negative. CT abdo +right hydro. Given IVF, Zofran and Rocephin. Will admit.

## 2020-06-22 NOTE — H&P ADULT - NSHPPHYSICALEXAM_GEN_ALL_CORE
PHYSICAL EXAM:    CONSTITUTIONAL: NAD,  saturating at >90% on RA.   ENMT: EOMI, PERRLA, neck supple, No JVD  RESPIRATORY: Clear to percussion bilaterally; No rales, rhonchi, wheezing, or rubs  CARDIOVASCULAR: Regular rate and rhythm; No murmurs, rubs, or gallops, negative edema  GASTROINTESTINAL: Soft, + suprapubic tenderness, Nondistended; Bowel sounds present  EXTREMITIES:  2+ Peripheral Pulses, No clubbing, cyanosis  PSYCH: Alert & Oriented X3, denies suicidal or homicidal ideation, denies auditory or visual hallucinations   NEURO: A&O X3, follows commands. Non focal neuro exam.   SKIN: No rashes or lesions

## 2020-06-22 NOTE — H&P ADULT - NSHPLABSRESULTS_GEN_ALL_CORE
13.1   6.39  )-----------( 292      ( 2020 00:40 )             40.8           140  |  105  |  25<H>  ----------------------------<  128<H>  4.2   |  26  |  1.1    Ca    9.4      2020 00:40  Mg     2.0         TPro  6.6  /  Alb  4.3  /  TBili  0.3  /  DBili  x   /  AST  13  /  ALT  <5  /  AlkPhos  120<H>                Urinalysis Basic - ( 2020 01:10 )    Color: Yellow / Appearance: Clear / S.020 / pH: x  Gluc: x / Ketone: Negative  / Bili: Negative / Urobili: 0.2 mg/dL   Blood: x / Protein: Negative mg/dL / Nitrite: Positive   Leuk Esterase: Small / RBC: x / WBC 26-50 /HPF   Sq Epi: x / Non Sq Epi: Moderate /HPF / Bacteria: TNTC /HPF            Lactate Trend   @ 00:40 Lactate:1.5       CARDIAC MARKERS ( 2020 00:40 )  x     / <0.01 ng/mL / x     / x     / x            CAPILLARY BLOOD GLUCOSE            Culture Results:   >100,000 CFU/ml Proteus mirabilis  <10,000 CFU/ml Normal Urogenital mame present ( @ 04:10)

## 2020-06-22 NOTE — PHARMACOTHERAPY INTERVENTION NOTE - COMMENTS
Spoke with Dr. Shelby [phone], patient's home regimen included carbidopa-levodopa CR 50mg/200mg four times daily. Despite traditional dosing as twice daily, dosing is within range of max (1600mg of levodopa component or 8 tablets/day). Dosing frequency not recommended less then 4 hours in between doses, so dosing frequency of 6AM, 12PM, 6PM, and 12AM was recommended and accepted

## 2020-06-22 NOTE — ED PROVIDER NOTE - OBJECTIVE STATEMENT
88 yo F with PMHx of HTN, HLD, RA, Parkinson's and recent admission for pyelonephritis presents to the ED c/o nausea. Symptoms started this afternoon and have been persisting. Pt has similar symptoms last time she came to hospital and was found to have pyelo. She denies taking medication to improve symptoms. She denies other complaints. Pt denies fever, chills, vomiting, abdominal pain, diarrhea, headache, dizziness, weakness, chest pain, SOB, back pain, LOC, trauma, urinary symptoms, cough, calf pain/swelling, recent travel, recent surgery.

## 2020-06-22 NOTE — CONSULT NOTE ADULT - ASSESSMENT
# UTI    would recommend:    1. Follow up Urine and Blood cultures  2. Monitor kidney function  3. Continue Ceftriaxone until work up is done    will follow the patient with you and make further recommendation based on the clinical course and Lab results  Thank you for the opportunity to participate in Ms. GARCÍA's care      Attending Attestation:    Spent more than 65 minutes on total encounter, more than 50 % of the visit was spent counseling and/or coordinating care by the Attending physician. Patient is a 87y old  Female with pmh of HTN, HLD, RA, Parkinson, who presented to the ER for evaluation of nausea.  She was recently discharged after treatment of  UTI with abx. Pt comes back with similar symptoms. In the ER, CT A/P showed mild hydronephrosis / dilation of the distal right ureter, possible nonradiopaque calculus or stricture. Urine analysis is positive. The last urine culture grew Proteus mirabilis sens to cipro/ ceftriaxone, hence she was given cipro in the ER.     # UTI    would recommend:    1. Follow up Urine and Blood cultures  2. Monitor kidney function  3. Continue Ceftriaxone until work up is done    d/w Patient     will follow the patient with you and make further recommendation based on the clinical course and Lab results  Thank you for the opportunity to participate in Ms. GARCÍA's care      Attending Attestation:    Spent more than 65 minutes on total encounter, more than 50 % of the visit was spent counseling and/or coordinating care by the Attending physician.

## 2020-06-22 NOTE — ED PROVIDER NOTE - ATTENDING CONTRIBUTION TO CARE
I personally evaluated the patient. I reviewed the Resident’s or Physician Assistant’s note (as assigned above), and agree with the findings and plan except as documented in my note.  Chart reviewed. H/O Parkinson's, RA, HTN, HLD, presents to ER with nausea for 1 day. No abdominal pain, fever or cough. Exam shows alert patient in no distress, HEENT NCAT, lungs clear, RR S1S2, abdomen soft NT +BS, no CCE.

## 2020-06-22 NOTE — ED PROVIDER NOTE - NS ED ROS FT
Review of Systems  Constitutional:  No fever, chills.  Eyes:  No visual changes, eye pain, or discharge.  ENMT:  No hearing changes, pain, or discharge. No nasal congestion, discharge, or bleeding. No throat pain, swelling, or difficulty swallowing.  Cardiac:  No chest pain, palpitations, syncope, or edema.  Respiratory:  No dyspnea, cough. No hemoptysis.  GI:  No vomiting, diarrhea, or abdominal pain. (+) nausea  :  No dysuria, hematuria, frequency, or burning.   MS:  No back pain.  Skin:  No skin rash, pruritis, jaundice, or lesions.  Neuro:  No headache, dizziness, loss of sensation, or focal weakness.  No change in mental status.

## 2020-06-22 NOTE — PROGRESS NOTE ADULT - ASSESSMENT
Patient is a 86 YO F with a pmh of HTN, HLD, RA, Parkinsons. Pt presented to the ER with a chief complaint of nausea since this afternoon. pt was recently discharged s/p treatment of a UTI with abx. Pt comes back with similar symptoms. In the ER, CT A/P showed mild hydronephrosis/dilation of the distal right ureter, possible nonradiopaque calculus or stricture. Urine analysis is positive. She denied any complaints; no fever, abdominal pain, diarrhea, dysuria, hematuria but nausea without vomiting.       Assessment & Plan:    1. UTI:  ID following. Continue Rocephin.  Follow up cultures.      2. Rt Hydronephrosis:  CT: Mild Hydronephrosis. No obstruction seen on Imaging and normal renal function.  US: No hydronephrosis.  Discussed with Urology for further evaluation.       3. Persistent Nausea:  Continue Zofran.  GI evaluation or further work up if persists.      4. Parkinson's disease:  Continue Sinemet and pramipexole.  Ambulate with assistance. Fall precautions.      5. HTN: Continue home medications.      6. Dyslipidemia: On Statin.        Prophylaxis: Lovenox  Code status: Full code    Progress Note Handoff:  Pending consults: None  Pending Tests: Cultures  Family/Patient discussion: Plan of care discussed with Patient.  Disposition: Home with HomeCare when stable.      Attending: Dr. Criselda Melendez. [address] [address]. Patient is a 88 YO F with a pmh of HTN, HLD, RA, Parkinsons. Pt presented to the ER with a chief complaint of nausea since this afternoon. pt was recently discharged s/p treatment of a UTI with abx. Pt comes back with similar symptoms. In the ER, CT A/P showed mild hydronephrosis/dilation of the distal right ureter, possible nonradiopaque calculus or stricture. Urine analysis is positive. She denied any complaints; no fever, abdominal pain, diarrhea, dysuria, hematuria but nausea without vomiting.       Assessment & Plan:    1. UTI:  ID following. Continue Rocephin.  Follow up cultures.      2. Rt Hydronephrosis:  CT: Mild Hydronephrosis. No obstruction seen on Imaging and normal renal function.  US: No hydronephrosis.  Discussed with Urology for further evaluation.       3. Persistent Nausea:  Continue Zofran.  GI evaluation or further work up if persists.      4. Parkinson's disease:  Continue Sinemet and pramipexole.  Ambulate with assistance. Fall precautions.      5. HTN: Continue home medications.      6. Dyslipidemia: On Statin.      7. Atelectasis:  OOB as tolerated Incentive spirometer.      Prophylaxis: Lovenox  Code status: Full code    Progress Note Handoff:  Pending consults: None  Pending Tests: Cultures  Family/Patient discussion: Plan of care discussed with Patient.  Disposition: Home with HomeCare when stable.      Attending: Dr. Criselda Melendez. [employer] [address].

## 2020-06-22 NOTE — PROGRESS NOTE ADULT - SUBJECTIVE AND OBJECTIVE BOX
JON GARCÍA  87y  Female    Patient is a 87y old  Female who presents with a chief complaint of Nausea (2020 12:09)      INTERVAL HPI/OVERNIGHT EVENTS:  No interval events.  Patient has no new complaints.  Persistent Nausea without vomiting.      REVIEW OF SYSTEMS:  At least 10 systems were reviewed in ROS.   All systems reviewed are within normal limits except for that listed above.      VITALS:  T(F): 97.3 (20 @ 13:34), Max: 97.8 (20 @ 00:17)  HR: 75 (20 @ 13:34) (75 - 86)  BP: 138/63 (20 @ 13:34) (138/63 - 178/66)  RR: 18 (20 @ 13:34) (17 - 18)  SpO2: 98% (20 @ 05:13) (96% - 98%)      PHYSICAL EXAM:  GENERAL: NAD, well-developed  HEAD:  Atraumatic, Normocephalic  EYES: conjunctiva and sclera clear  ENMT: Moist mucous membranes  NECK: Supple, Normal thyroid  NERVOUS SYSTEM:  Alert & Oriented X3, Motor Strength 5/5 B/L upper and lower extremities, Tremors.  CHEST/LUNG: Decreased AE at the bases bilaterally; No rales, rhonchi, wheezing, or rubs  HEART: Regular rate and rhythm; No murmurs, rubs, or gallops  ABDOMEN: Soft, Nontender, Nondistended; Bowel sounds present  EXTREMITIES:  2+ Peripheral Pulses, No clubbing, cyanosis, or edema  LYMPH: No lymphadenopathy noted  SKIN: No rashes or lesions    Consultant(s) Notes Reviewed:  [x ] YES  [ ] NO  Care Discussed with Consultants/Other Providers [ x] YES  [ ] NO    LABS:                        13.1   6.39  )-----------( 292      ( 2020 00:40 )             40.8           140  |  105  |  25<H>  ----------------------------<  128<H>  4.2   |  26  |  1.1    Ca    9.4      2020 00:40  Mg     2.0         TPro  6.6  /  Alb  4.3  /  TBili  0.3  /  DBili  x   /  AST  13  /  ALT  <5  /  AlkPhos  120<H>        Urinalysis Basic - ( 2020 01:10 )    Color: Yellow / Appearance: Clear / S.020 / pH: x  Gluc: x / Ketone: Negative  / Bili: Negative / Urobili: 0.2 mg/dL   Blood: x / Protein: Negative mg/dL / Nitrite: Positive   Leuk Esterase: Small / RBC: x / WBC 26-50 /HPF   Sq Epi: x / Non Sq Epi: Moderate /HPF / Bacteria: TNTC /HPF      MICROBIOLOGY: pending      RADIOLOGY & ADDITIONAL TESTS:  X-ray Chest 1 View- PORTABLE-Urgent (20 @ 00:55)   Left lower lobe atelectasis      CT Abdomen and Pelvis w/ IV Cont (20 @ 02:23)     Since 6/3/2020,    Persistent unchanged mild right hydronephrosis with a distal ureteral caliber change. Differential includes nonradiopaque calculus or stricture.      US Retroperitoneal Complete (20 @ 08:31)   No  tract stones are delineated.    No hydronephrosis on ultrasound.    Nonspecific nonvisualization of the right ureteral jet. The left ureteral jet is visualized.    Urinary bladder volume 91.5 cc.  Incidental right renal midpole 2.3 cm cyst.      Imaging Personally Reviewed:  [x] YES  [ ] NO      MEDICATIONS  (STANDING):  atorvastatin 80 milliGRAM(s) Oral at bedtime  carbidopa/levodopa CR 50/200 1 Tablet(s) Oral <User Schedule>  cefTRIAXone   IVPB 1000 milliGRAM(s) IV Intermittent every 24 hours  clopidogrel Tablet 75 milliGRAM(s) Oral at bedtime  diltiazem    milliGRAM(s) Oral daily  enoxaparin Injectable 40 milliGRAM(s) SubCutaneous daily  hydrochlorothiazide Oral Tab/Cap - Peds 12.5 milliGRAM(s) Oral daily  pramipexole 0.5 milliGRAM(s) Oral three times a day  sodium chloride 0.9%. 1000 milliLiter(s) (80 mL/Hr) IV Continuous <Continuous>    MEDICATIONS  (PRN):  acetaminophen   Tablet .. 650 milliGRAM(s) Oral every 6 hours PRN Temp greater or equal to 38C (100.4F), Mild Pain (1 - 3)  ondansetron Injectable 4 milliGRAM(s) IV Push every 6 hours PRN Nausea        Home Medications:  atorvastatin 80 mg oral tablet: 1 tab(s) orally once a day (2020 08:54)  carbidopa-levodopa 50 mg-200 mg oral tablet, extended release: 1 tab(s) orally 4 times a day (2020 08:54)  clopidogrel 75 mg oral tablet: 1 tab(s) orally once a day (at bedtime) (2020 08:54)  DilTIAZem Hydrochloride  mg/24 hours oral capsule, extended release: 1 cap(s) orally once a day (2020 08:54)  hydroCHLOROthiazide 12.5 mg oral tablet: 1 tab(s) orally once a day (2020 08:54)  pramipexole 0.5 mg oral tablet: 1 tab(s) orally 3 times a day (2020 08:54)      HEALTH ISSUES - PROBLEM Dx:  Recurrent UTI  Persistent Nausea  Hyperlipidemia  Rheumatoid arthritis  HTN (hypertension)  Parkinson disease  History of hysterectomy

## 2020-06-22 NOTE — CONSULT NOTE ADULT - SUBJECTIVE AND OBJECTIVE BOX
Patient is a 87y old  Female who presents with a chief complaint of Urinary tract infection (2020 04:18)          REVIEW OF SYSTEMS: Total of twelve systems have been reviewed with patient and found to be negative unless mentioned in HPI        PAST MEDICAL & SURGICAL HISTORY:  Hyperlipidemia  Rheumatoid arthritis  HTN (hypertension)  Parkinson disease  History of hysterectomy        SOCIAL HISTORY  Alcohol: Does not drink  Tobacco: Does not smoke  Illicit substance use: None      FAMILY HISTORY: Non contributory to the present illness        ALLERGIES: No Known Allergies      Vital Signs Last 24 Hrs  T(C): 35.7 (2020 05:13), Max: 36.6 (2020 00:17)  T(F): 96.2 (2020 05:13), Max: 97.8 (2020 00:17)  HR: 80 (2020 05:13) (80 - 86)  BP: 161/66 (2020 05:13) (161/66 - 178/66)  BP(mean): --  RR: 18 (2020 05:13) (17 - 18)  SpO2: 98% (2020 05:13) (96% - 98%)      PHYSICAL EXAM:  GENERAL: Not in distress   CHEST/LUNG:  Aire ntry bilaterally  HEART: s1 and s2 present  ABDOMEN:  Nontender and  Nondistended  EXTREMITIES: No pedal  edema  CNS: Awake and Alert      LABS:                        13.1   6.39  )-----------( 292      ( 2020 00:40 )             40.8             140  |  105  |  25<H>  ----------------------------<  128<H>  4.2   |  26  |  1.1    Ca    9.4      2020 00:40  Mg     2.0         TPro  6.6  /  Alb  4.3  /  TBili  0.3  /  DBili  x   /  AST  13  /  ALT  <5  /  AlkPhos  120<H>  22        Urinalysis Basic - ( 2020 01:10 )  Color: Yellow / Appearance: Clear / S.020 / pH: x  Gluc: x / Ketone: Negative  / Bili: Negative / Urobili: 0.2 mg/dL   Blood: x / Protein: Negative mg/dL / Nitrite: Positive   Leuk Esterase: Small / RBC: x / WBC 26-50 /HPF   Sq Epi: x / Non Sq Epi: Moderate /HPF / Bacteria: TNTC /HPF        MEDICATIONS  (STANDING):  atorvastatin 80 milliGRAM(s) Oral at bedtime  carbidopa/levodopa CR 50/200 1 Tablet(s) Oral <User Schedule>  cefTRIAXone   IVPB 1000 milliGRAM(s) IV Intermittent every 24 hours  clopidogrel Tablet 75 milliGRAM(s) Oral at bedtime  diltiazem    milliGRAM(s) Oral daily  enoxaparin Injectable 40 milliGRAM(s) SubCutaneous daily  hydrochlorothiazide Oral Tab/Cap - Peds 12.5 milliGRAM(s) Oral daily  pramipexole 0.5 milliGRAM(s) Oral three times a day  sodium chloride 0.9%. 1000 milliLiter(s) (80 mL/Hr) IV Continuous <Continuous>    MEDICATIONS  (PRN):  acetaminophen   Tablet .. 650 milliGRAM(s) Oral every 6 hours PRN Temp greater or equal to 38C (100.4F), Mild Pain (1 - 3)  ondansetron Injectable 4 milliGRAM(s) IV Push every 6 hours PRN Nausea          RADIOLOGY & ADDITIONAL TESTS:    < from: US Retroperitoneal Complete (20 @ 08:31) >    No  tract stones are delineated.    No hydronephrosis on ultrasound.    Nonspecific nonvisualization of the right ureteral jet. The left ureteral jet is visualized.    Urinary bladder volume 91.5 cc.    Incidental right renal midpole 2.3 cm cyst.      < end of copied text > Patient is a 87y old  Female with pmh of HTN, HLD, RA, Parkinson, who presented to the ER for evaluation of nausea.  She was recently discharged after treatment of  UTI with abx. Pt comes back with similar symptoms. In the ER, CT A/P showed mild hydronephrosis / dilation of the distal right ureter, possible nonradiopaque calculus or stricture. Urine analysis is positive. The last urine culture grew Proteus mirabilis sens to cipro/ ceftriaxone, hence she was given cipro in the ER.         REVIEW OF SYSTEMS: Total of twelve systems have been reviewed with patient and found to be negative unless mentioned in HPI        PAST MEDICAL & SURGICAL HISTORY:  Hyperlipidemia  Rheumatoid arthritis  HTN (hypertension)  Parkinson disease  History of hysterectomy        SOCIAL HISTORY  Alcohol: Does not drink  Tobacco: Does not smoke  Illicit substance use: None      FAMILY HISTORY: Non contributory to the present illness        ALLERGIES: No Known Allergies        Vital Signs Last 24 Hrs  T(C): 35.7 (2020 05:13), Max: 36.6 (2020 00:17)  T(F): 96.2 (2020 05:13), Max: 97.8 (2020 00:17)  HR: 80 (2020 05:13) (80 - 86)  BP: 161/66 (2020 05:13) (161/66 - 178/66)  BP(mean): --  RR: 18 (2020 05:13) (17 - 18)  SpO2: 98% (2020 05:13) (96% - 98%)        PHYSICAL EXAM:  GENERAL: Not in distress   CHEST/LUNG:  Not using accessory muscles  HEART: s1 and s2 present  ABDOMEN:  Nontender and  Nondistended  EXTREMITIES: No pedal  edema  CNS: Awake and Alert        LABS:                        13.1   6.39  )-----------( 292      ( 2020 00:40 )             40.8         -    140  |  105  |  25<H>  ----------------------------<  128<H>  4.2   |  26  |  1.1    Ca    9.4      2020 00:40  Mg     2.0     06-    TPro  6.6  /  Alb  4.3  /  TBili  0.3  /  DBili  x   /  AST  13  /  ALT  <5  /  AlkPhos  120<H>  06-22        Urinalysis Basic - ( 2020 01:10 )  Color: Yellow / Appearance: Clear / S.020 / pH: x  Gluc: x / Ketone: Negative  / Bili: Negative / Urobili: 0.2 mg/dL   Blood: x / Protein: Negative mg/dL / Nitrite: Positive   Leuk Esterase: Small / RBC: x / WBC 26-50 /HPF   Sq Epi: x / Non Sq Epi: Moderate /HPF / Bacteria: TNTC /HPF        MEDICATIONS  (STANDING):  atorvastatin 80 milliGRAM(s) Oral at bedtime  carbidopa/levodopa CR 50/200 1 Tablet(s) Oral <User Schedule>  cefTRIAXone   IVPB 1000 milliGRAM(s) IV Intermittent every 24 hours  clopidogrel Tablet 75 milliGRAM(s) Oral at bedtime  diltiazem    milliGRAM(s) Oral daily  enoxaparin Injectable 40 milliGRAM(s) SubCutaneous daily  hydrochlorothiazide Oral Tab/Cap - Peds 12.5 milliGRAM(s) Oral daily  pramipexole 0.5 milliGRAM(s) Oral three times a day  sodium chloride 0.9%. 1000 milliLiter(s) (80 mL/Hr) IV Continuous <Continuous>    MEDICATIONS  (PRN):  acetaminophen   Tablet .. 650 milliGRAM(s) Oral every 6 hours PRN Temp greater or equal to 38C (100.4F), Mild Pain (1 - 3)  ondansetron Injectable 4 milliGRAM(s) IV Push every 6 hours PRN Nausea          RADIOLOGY & ADDITIONAL TESTS:    < from: US Retroperitoneal Complete (20 @ 08:31) >    No  tract stones are delineated.    No hydronephrosis on ultrasound.    Nonspecific nonvisualization of the right ureteral jet. The left ureteral jet is visualized.    Urinary bladder volume 91.5 cc.    Incidental right renal midpole 2.3 cm cyst.      < end of copied text >

## 2020-06-22 NOTE — H&P ADULT - HISTORY OF PRESENT ILLNESS
Pt is a 86 YO F with a pmh of HTN, HLD, RA, Parkinsons. Pt presented to the ER with a chief complaint of nausea since this afternoon. pt was recently discharged s/p treatment of a UTI with abx. Pt comes back with similar symptoms. In the ER, CT A/P showed mild hydronephrosis / dilation of the distal right ureter, possible nonradiopaque calculus or stricture. Urine analysis is positive. Pt was seen and examined at bedside, pt denies any present complaints including nausea, abdominal pain, diarrhea, pain when urinating. Pt's last urine culture grew Proteus mirabilis sens to cipro/ ceftriaxone, pt was given cipro in the ER.

## 2020-06-22 NOTE — ED PROVIDER NOTE - CARE PLAN
Principal Discharge DX:	UTI (urinary tract infection)  Secondary Diagnosis:	Hydronephrosis  Secondary Diagnosis:	Nausea

## 2020-06-22 NOTE — H&P ADULT - NSHPPOADEEPVENOUSTHROMB_GEN_A_CORE
[facility] Care Coordination Note    Encounter Summary:    RNCC contacted patient for follow up.  He reports that he is \"status quo\" for now.  He is hopeful that his blood count will \"stay were it is supposed to be\" but is doubtful that will happen.  Patient states that he has been feeling rather tired and fatigued as of late.  He is aware that these symptoms are related to the drop in his Hemoglobin.  Patient not aware of next home lab draw as ordered by Dr. Moreno.  RNCC to message Dr. Moreno's pool regarding plan of care as requested by patient.  Will follow up with patient as directed.  He remains on 3 liters of O2 at home without any reported changes from his baseline.     RN[facility] Assessments    See Care Coordination Smartform for complete baseline and current assessment.       Acute Pain Assessment         Chronic Pain Assessment    Chronic Pain:  No       RNCC Interventions    Collaboration/Communication:  Yes         Specialty care Team         Reinforce Care Plan   Interventions Narrative:    RNCC collaborated with Dr. Moreno's office about the plans for next home lab draw for patient.  Reinforced with patient to continue his current plan of care.         Acuity Assessment     Acuity Assessment:   Date Acuity Level   5/2/19 2              no

## 2020-06-22 NOTE — H&P ADULT - ASSESSMENT
Pt is an 86 YO F who will be admitted for the treatment and workup of cystitis / possible radio-opaque renal stone. Pt will be treated with IVF, discontinue cipro and start ceftriaxone pending C&S of urine, ID consult and urology consult pending. Zofran for nausea. For her parkinsonism / HTN - stable, continue home medications.       DVT ppx: lovenox Pt is an 88 YO F who will be admitted for the treatment and workup of pyelonephritis / possible radio-opaque renal stone. Pt will be treated with IVF, discontinue cipro and start ceftriaxone pending C&S of urine, ID consult and urology consult pending. Zofran for nausea. For her parkinsonism / HTN - stable, continue home medications.       DVT ppx: lovenox Pt is an 86 YO F who will be admitted for the treatment and workup of pyelonephritis / possible radio-opaque renal stone. Pt will be treated with IVF, discontinue cipro and start ceftriaxone pending C&S of urine, ID consult and urology consult pending. Zofran for nausea. f/u renal ultrasound. For her parkinsonism / HTN / HLD - stable, continue home medications.       DVT ppx: lovenox

## 2020-06-22 NOTE — ED PROVIDER NOTE - PHYSICAL EXAMINATION
VITAL SIGNS: I have reviewed nursing notes and confirm.  CONSTITUTIONAL: Elderly female laying on stretcher; in no acute distress.  SKIN: Skin exam is warm and dry, no acute rash.  HEAD: Normocephalic; atraumatic.  EYES: Conjunctiva and sclera clear.  ENT: No nasal discharge; airway clear.  CARD: S1, S2 normal; no murmurs, gallops, or rubs. Regular rate and rhythm.  RESP: No wheezes, rales or rhonchi. Speaking in full sentences.   ABD: Normal bowel sounds; soft; non-distended; non-tender; No rebound or guarding. No CVA tenderness.  EXT: Normal ROM. No clubbing, cyanosis or edema.  NEURO: Alert, oriented. Grossly unremarkable. No focal deficits.

## 2020-06-23 LAB
ALBUMIN SERPL ELPH-MCNC: 3.7 G/DL — SIGNIFICANT CHANGE UP (ref 3.5–5.2)
ALP SERPL-CCNC: 92 U/L — SIGNIFICANT CHANGE UP (ref 30–115)
ALT FLD-CCNC: <5 U/L — SIGNIFICANT CHANGE UP (ref 0–41)
ANION GAP SERPL CALC-SCNC: 9 MMOL/L — SIGNIFICANT CHANGE UP (ref 7–14)
AST SERPL-CCNC: 13 U/L — SIGNIFICANT CHANGE UP (ref 0–41)
BILIRUB SERPL-MCNC: 0.4 MG/DL — SIGNIFICANT CHANGE UP (ref 0.2–1.2)
BUN SERPL-MCNC: 15 MG/DL — SIGNIFICANT CHANGE UP (ref 10–20)
CALCIUM SERPL-MCNC: 8.8 MG/DL — SIGNIFICANT CHANGE UP (ref 8.5–10.1)
CHLORIDE SERPL-SCNC: 106 MMOL/L — SIGNIFICANT CHANGE UP (ref 98–110)
CO2 SERPL-SCNC: 27 MMOL/L — SIGNIFICANT CHANGE UP (ref 17–32)
CREAT SERPL-MCNC: 0.8 MG/DL — SIGNIFICANT CHANGE UP (ref 0.7–1.5)
GLUCOSE SERPL-MCNC: 97 MG/DL — SIGNIFICANT CHANGE UP (ref 70–99)
HCT VFR BLD CALC: 38.7 % — SIGNIFICANT CHANGE UP (ref 37–47)
HGB BLD-MCNC: 12.7 G/DL — SIGNIFICANT CHANGE UP (ref 12–16)
MCHC RBC-ENTMCNC: 29.6 PG — SIGNIFICANT CHANGE UP (ref 27–31)
MCHC RBC-ENTMCNC: 32.8 G/DL — SIGNIFICANT CHANGE UP (ref 32–37)
MCV RBC AUTO: 90.2 FL — SIGNIFICANT CHANGE UP (ref 81–99)
NRBC # BLD: 0 /100 WBCS — SIGNIFICANT CHANGE UP (ref 0–0)
PLATELET # BLD AUTO: 273 K/UL — SIGNIFICANT CHANGE UP (ref 130–400)
POTASSIUM SERPL-MCNC: 3.7 MMOL/L — SIGNIFICANT CHANGE UP (ref 3.5–5)
POTASSIUM SERPL-SCNC: 3.7 MMOL/L — SIGNIFICANT CHANGE UP (ref 3.5–5)
PROT SERPL-MCNC: 5.6 G/DL — LOW (ref 6–8)
RBC # BLD: 4.29 M/UL — SIGNIFICANT CHANGE UP (ref 4.2–5.4)
RBC # FLD: 13 % — SIGNIFICANT CHANGE UP (ref 11.5–14.5)
SODIUM SERPL-SCNC: 142 MMOL/L — SIGNIFICANT CHANGE UP (ref 135–146)
WBC # BLD: 4.17 K/UL — LOW (ref 4.8–10.8)
WBC # FLD AUTO: 4.17 K/UL — LOW (ref 4.8–10.8)

## 2020-06-23 PROCEDURE — 99233 SBSQ HOSP IP/OBS HIGH 50: CPT

## 2020-06-23 RX ORDER — CARBIDOPA AND LEVODOPA 25; 100 MG/1; MG/1
1 TABLET ORAL
Refills: 0 | Status: DISCONTINUED | OUTPATIENT
Start: 2020-06-23 | End: 2020-06-25

## 2020-06-23 RX ADMIN — Medication 360 MILLIGRAM(S): at 05:32

## 2020-06-23 RX ADMIN — CARBIDOPA AND LEVODOPA 1 TABLET(S): 25; 100 TABLET ORAL at 12:49

## 2020-06-23 RX ADMIN — PRAMIPEXOLE DIHYDROCHLORIDE 0.5 MILLIGRAM(S): 0.12 TABLET ORAL at 12:49

## 2020-06-23 RX ADMIN — CARBIDOPA AND LEVODOPA 1 TABLET(S): 25; 100 TABLET ORAL at 05:32

## 2020-06-23 RX ADMIN — ATORVASTATIN CALCIUM 80 MILLIGRAM(S): 80 TABLET, FILM COATED ORAL at 21:27

## 2020-06-23 RX ADMIN — CEFTRIAXONE 100 MILLIGRAM(S): 500 INJECTION, POWDER, FOR SOLUTION INTRAMUSCULAR; INTRAVENOUS at 12:49

## 2020-06-23 RX ADMIN — CLOPIDOGREL BISULFATE 75 MILLIGRAM(S): 75 TABLET, FILM COATED ORAL at 21:26

## 2020-06-23 RX ADMIN — PRAMIPEXOLE DIHYDROCHLORIDE 0.5 MILLIGRAM(S): 0.12 TABLET ORAL at 05:32

## 2020-06-23 RX ADMIN — CARBIDOPA AND LEVODOPA 1 TABLET(S): 25; 100 TABLET ORAL at 17:30

## 2020-06-23 RX ADMIN — ENOXAPARIN SODIUM 40 MILLIGRAM(S): 100 INJECTION SUBCUTANEOUS at 12:49

## 2020-06-23 RX ADMIN — PRAMIPEXOLE DIHYDROCHLORIDE 0.5 MILLIGRAM(S): 0.12 TABLET ORAL at 21:26

## 2020-06-23 NOTE — PROGRESS NOTE ADULT - ASSESSMENT
Patient is a 88 YO F with a pmh of HTN, HLD, RA, Parkinsons. Pt presented to the ER with a chief complaint of nausea since this afternoon. pt was recently discharged s/p treatment of a UTI with abx. Pt comes back with similar symptoms. In the ER, CT A/P showed mild hydronephrosis/dilation of the distal right ureter, possible nonradiopaque calculus or stricture. Urine analysis is positive. She denied any complaints; no fever, abdominal pain, diarrhea, dysuria, hematuria but nausea without vomiting.       Assessment & Plan:    1. UTI:  Previous culture grew rocephin earlier this month  ID following. Continue Rocephin.  Follow up cultures.      2. Rt Hydronephrosis:  CT: Mild Hydronephrosis. No obstruction seen on Imaging and normal renal function.  US: No hydronephrosis.  Discussed with Urology for further evaluation.       3. Persistent Nausea:  Continue Zofran.  GI evaluation or further work up if persists.      4. Parkinson's disease:  Continue Sinemet and pramipexole.  Ambulate with assistance. Fall precautions.  Pt no interested in SNF placement       5. HTN: Continue home medications.      6. Dyslipidemia: On Statin.      7. Atelectasis:  OOB as tolerated Incentive spirometer.      Prophylaxis: Lovenox  Code status: Full code    Progress Note Handoff:  Pending consults: None  Pending Tests: Cultures  Family/Patient discussion: Plan of care discussed with Patient and her daughter - in agreement   Disposition: Home with HomeCare when stable

## 2020-06-23 NOTE — PROGRESS NOTE ADULT - SUBJECTIVE AND OBJECTIVE BOX
JON GARCÍA  87y, Female  Allergy: No Known Allergies      CHIEF COMPLAINT: Urinary tract infection (2020 15:29)      INTERVAL EVENTS/HPI  - No acute events overnight  - T(F): , Max: 98.7 (20 @ 22:03)  - Denies any worsening symptoms  - Tolerating medication  -     ROS  General: Denies fevers, chills, nightsweats, weight loss  HEENT: Denies headache, rhinorrhea, sore throat, eye pain  CV: Denies CP, palpitations  PULM: Denies SOB, cough  GI: Denies abdominal pain, diarrhea  : Denies dysuria, hematuria  MSK: Denies arthralgias  SKIN: Denies rash   NEURO: Denies paresthesias, weakness  PSYCH: Denies depression    FH non-contributory   Social Hx non-contributory    VITALS:  T(F): 96.8, Max: 98.7 (20 @ 22:03)  HR: 65  BP: 165/72  RR: 16Vital Signs Last 24 Hrs  T(C): 36 (2020 05:21), Max: 37.1 (2020 22:03)  T(F): 96.8 (2020 05:21), Max: 98.7 (2020 22:03)  HR: 65 (2020 05:21) (65 - 75)  BP: 165/72 (2020 05:21) (126/56 - 165/72)  BP(mean): --  RR: 16 (2020 05:21) (16 - 18)  SpO2: --    PHYSICAL EXAM:  Gen: NAD, resting in bed  HEENT: Normocephalic, atraumatic  Neck: supple, no lymphadenopathy  CV: Regular rate & regular rhythm  Lungs: decreased BS at bases, no fremitus  Abdomen: Soft, BS present  Ext: Warm, well perfused  Neuro: non focal, awake  Skin: no rash, no erythema      TESTS & MEASUREMENTS:                        13.1   6.39  )-----------( 292      ( 2020 00:40 )             40.8         140  |  105  |  25<H>  ----------------------------<  128<H>  4.2   |  26  |  1.1    Ca    9.4      2020 00:40  Mg     2.0         TPro  6.6  /  Alb  4.3  /  TBili  0.3  /  DBili  x   /  AST  13  /  ALT  <5  /  AlkPhos  120<H>        LIVER FUNCTIONS - ( 2020 00:40 )  Alb: 4.3 g/dL / Pro: 6.6 g/dL / ALK PHOS: 120 U/L / ALT: <5 U/L / AST: 13 U/L / GGT: x           Urinalysis Basic - ( 2020 01:10 )    Color: Yellow / Appearance: Clear / S.020 / pH: x  Gluc: x / Ketone: Negative  / Bili: Negative / Urobili: 0.2 mg/dL   Blood: x / Protein: Negative mg/dL / Nitrite: Positive   Leuk Esterase: Small / RBC: x / WBC 26-50 /HPF   Sq Epi: x / Non Sq Epi: Moderate /HPF / Bacteria: TNTC /HPF          Lactate, Blood: 1.5 mmol/L (20 @ 00:40)      INFECTIOUS DISEASES TESTING      RADIOLOGY & ADDITIONAL TESTS:  I have personally reviewed the last Chest xray  CXR  Xray Chest 1 View- PORTABLE-Urgent:   EXAM:  XR CHEST PORTABLE URGENT 1V            PROCEDURE DATE:  2020            INTERPRETATION:  Clinical History / Reason for exam: Nausea    Comparison : Chest radiograph 10/12/2019.    Technique/Positioning: Frontal, portable.    Findings:    Support devices: None.    Cardiac/mediastinum/hilum: Heart size within normal limits. Aorta is calcified.    Lung parenchyma/Pleura: Left lower lobe atelectasis    Skeleton/soft tissues: Degenerative change    Impression:      Left lower lobe atelectasis                      FUENTES MICHEL M.D., ATTENDING RADIOLOGIST  This document has been electronically signed. 2020  8:53AM             (20 @ 00:55)      CT  CT Abdomen and Pelvis w/ IV Cont:   EXAM:  CT ABDOMEN AND PELVIS IC            PROCEDURE DATE:  2020            INTERPRETATION:  CLINICAL STATEMENT: Nausea, dysuria.      TECHNIQUE: Contiguous axial CT images were obtained from the lower chest to the pubic symphysis following administration of intravenous contrast.  Oral contrast was not administered.  Reformatted images in the coronal and sagittal planes were acquired.    COMPARISON: 6/3/2020      FINDINGS: Bibasilar dependent atelectasis. Coronary artery calcifications.    HEPATOBILIARY: Unremarkable.    SPLEEN: Unremarkable.    PANCREAS: Unremarkable.    ADRENAL GLANDS: Unremarkable.    KIDNEYS: Right renal cortical scarring. Symmetric pattern of renal enhancement. Redemonstrated mild right hydroureteronephrosis witha distal ureteral caliber change (4/240-250). No left hydronephrosis. Bilateral cysts and subcentimeter hypodensities too small to further characterize. Punctate nonobstructing right interpolar region renal calculi.    ABDOMINOPELVIC NODES: Unremarkable.    PELVIC ORGANS: Unremarkable.    PERITONEUM/MESENTERY/BOWEL: Small hiatal hernia with debris within the distal esophagus. No evidence for bowel obstruction, ascites, or pneumoperitoneum. Colonic diverticulosis without evidence for acute diverticulitis. Normal caliber appendix.    BONES/SOFT TISSUES: Degenerative changes of the spine and hips.    OTHER: Diffuse vascular calcifications.      IMPRESSION:     Since 6/3/2020,    Persistent unchanged mild right hydronephrosis with a distal ureteral caliber change. Differential includes nonradiopaque calculus or stricture.              GRACE WELCH M.D., RESIDENT RADIOLOGIST  This document has been electronically signed.  JESSICA HAMMOND M.D., ATTENDING RADIOLOGIST  This document has been electronically signed. 2020  3:25AM             (20 @ 02:23)      CARDIOLOGY TESTING  12 Lead ECG:   Ventricular Rate 84 BPM    Atrial Rate 84 BPM    P-R Interval 184 ms    QRS Duration 88 ms    Q-T Interval 382 ms    QTC Calculation(Bezet) 451 ms    P Axis 76 degrees    R Axis 60 degrees    T Axis 54 degrees    Diagnosis Line Normal sinus rhythm with sinus arrhythmia  Nonspecific ST abnormality  Abnormal ECG    Confirmed by JON ABERNATHY MD ([id number]) on 2020 12:24:23 PM (20 @ 01:09)      MEDICATIONS  atorvastatin 80  carbidopa/levodopa CR 50/200 1  cefTRIAXone   IVPB 1000  clopidogrel Tablet 75  diltiazem     enoxaparin Injectable 40  hydrochlorothiazide Oral Tab/Cap - Peds 12.5  pramipexole 0.5      ANTIBIOTICS:  cefTRIAXone   IVPB 1000 milliGRAM(s) IV Intermittent every 24 hours      All available historical data has been reviewed

## 2020-06-23 NOTE — PROGRESS NOTE ADULT - ASSESSMENT
87y old  Female with pmh of HTN, HLD, RA, Parkinson, who presented to the ER for evaluation of nausea.  She was recently discharged after treatment of  UTI with abx. Pt comes back with similar symptoms. In the ER, CT A/P showed mild hydronephrosis / dilation of the distal right ureter, possible nonradiopaque calculus or stricture. Urine analysis is positive. The last urine culture grew Proteus mirabilis sens to cipro/ ceftriaxone, hence she was given cipro in the ER.     1. UTI  CT with persistent unchanged mild right hydronephrosis with a distal ureteral caliber change. Differential includes nonradiopaque calculus or stricture.    2.  Bibasilar dependent atelectasis on CT    PLAN  Await Urine and Blood cultures  Continue Ceftriaxone for now

## 2020-06-23 NOTE — CONSULT NOTE ADULT - ASSESSMENT
IMPRESSION: Rehab of 87  y.o  f  rehab for  debility  pk  dis      PRECAUTIONS: [  ] Cardiac  [  ] Respiratory  [  ] Seizures [  ] Contact Isolation  [  ] Droplet Isolation  [ FALL ] Other    Weight Bearing Status:     RECOMMENDATION:    Out of Bed to Chair     DVT/Decubiti Prophylaxis    REHAB PLAN:     [  xx ] Bedside P/T 3-5 times a week   [   ]   Bedside O/T  2-3 times a week             [   ] No Rehab Therapy Indicated                   [   ]  Speech Therapy   Conditioning/ROM                                    ADL  Bed Mobility                                               Conditioning/ROM  Transfers                                                     Bed Mobility  Sitting /Standing Balance                         Transfers                                        Gait Training                                               Sitting/Standing Balance  Stair Training [   ]Applicable                    Home equipment Eval                                                                        Splinting  [   ] Only      GOALS:   ADL   [   ]   Independent                    Transfers  [   ] Independent                          Ambulation  [   ] Independent     [    ] With device                            [   ]  CG                                                         [   ]  CG                                                                  [   ] CG                            [    ] Min A                                                   [   ] Min A                                                              [   ] Min  A          DISCHARGE PLAN:   [   ]  Good candidate for Intensive Rehabilitation/Hospital based-4A [facility]                                             Will tolerate 3hrs Intensive Rehab Daily                                       [    ]  Short Term Rehab in Skilled Nursing Facility                                       [   xx ]  Home with Outpatient or VN services ptn refused  STR                                         [    ]  Possible Candidate for Intensive Hospital based Rehab

## 2020-06-23 NOTE — CONSULT NOTE ADULT - SUBJECTIVE AND OBJECTIVE BOX
HPI:  86 YO F with a pmh of HTN, HLD, RA, Parkinsons. Pt presented to the ER with a chief complaint of nausea since this afternoon. pt was recently discharged s/p treatment of a UTI with abx. Pt comes back with similar symptoms. In the ER, CT A/P showed mild hydronephrosis / dilation of the distal right ureter, possible nonradiopaque calculus or stricture. Urine analysis is positive. Pt was seen and examined at bedside, pt denies any present complaints including nausea, abdominal pain, diarrhea, pain when urinating. Pt's last urine culture grew Proteus mirabilis sens to cipro/ ceftriaxone, pt was given cipro in the ER.ptn  is  oob  to  chair  nad  f/u  command      PTN  REFERRED TO ACUTE  REHAB  FOR  EVAL AND  TX   PAST MEDICAL & SURGICAL HISTORY:  Hyperlipidemia  Rheumatoid arthritis  HTN (hypertension)  Parkinson disease  History of hysterectomy      Hospital Course:    TODAY'S SUBJECTIVE & REVIEW OF SYMPTOMS:     Constitutional WNL   Cardio WNL   Resp WNL   GI WNL  Heme WNL  Endo WNL  Skin WNL  MSK WNL  Neuro WNL  Cognitive WNL  Psych WNL      MEDICATIONS  (STANDING):  atorvastatin 80 milliGRAM(s) Oral at bedtime  carbidopa/levodopa CR 50/200 1 Tablet(s) Oral <User Schedule>  cefTRIAXone   IVPB 1000 milliGRAM(s) IV Intermittent every 24 hours  clopidogrel Tablet 75 milliGRAM(s) Oral at bedtime  diltiazem    milliGRAM(s) Oral daily  enoxaparin Injectable 40 milliGRAM(s) SubCutaneous daily  hydrochlorothiazide Oral Tab/Cap - Peds 12.5 milliGRAM(s) Oral daily  pramipexole 0.5 milliGRAM(s) Oral three times a day    MEDICATIONS  (PRN):  acetaminophen   Tablet .. 650 milliGRAM(s) Oral every 6 hours PRN Temp greater or equal to 38C (100.4F), Mild Pain (1 - 3)  ondansetron Injectable 4 milliGRAM(s) IV Push every 6 hours PRN Nausea      FAMILY HISTORY:  No pertinent family history in first degree relatives      Allergies    No Known Allergies    Intolerances        SOCIAL HISTORY:    [  ] Etoh  [  ] Smoking  [  ] Substance abuse     Home Environment:  [  ] Home Alone  [ x ] Lives with Family DTR  [  ] Home Health Aid    Dwelling:  [  ] Apartment  [ x ] Private House  [  ] Adult Home  [  ] Skilled Nursing Facility      [  ] Short Term  [  ] Long Term  [ x ] Stairs       Elevator [  ]    FUNCTIONAL STATUS PTA: (Check all that apply)  Ambulation: [  x ]Independent    [  ] Dependent     [  ] Non-Ambulatory  Assistive Device: [x  ] SA Cane  [  ]  Q Cane  [ x ] Walker  [  ]  Wheelchair  ADL : [ x ] Independent  [  ]  Dependent       Vital Signs Last 24 Hrs  T(C): 36 (2020 05:21), Max: 37.1 (2020 22:03)  T(F): 96.8 (2020 05:21), Max: 98.7 (2020 22:03)  HR: 65 (2020 05:21) (65 - 75)  BP: 165/72 (2020 05:21) (126/56 - 165/72)  BP(mean): --  RR: 16 (2020 05:21) (16 - 18)  SpO2: --      PHYSICAL EXAM: Alert & Oriented X 2  GENERAL: NAD, well-groomed, well-developed  HEAD:  Atraumatic, Normocephalic  EYES: EOMI, PERRLA, conjunctiva and sclera clear  NECK: Supple, No JVD, Normal thyroid  CHEST/LUNG: Clear to percussion bilaterally; No rales, rhonchi, wheezing, or rubs  HEART: Regular rate and rhythm; No murmurs, rubs, or gallops  ABDOMEN: Soft, Nontender, Nondistended; Bowel sounds present  EXTREMITIES:  2+ Peripheral Pulses, No clubbing, cyanosis, or edema    NERVOUS SYSTEM:  Cranial Nerves 2-12 intact [ x ] Abnormal  [  ]  ROM: WFL all extremities [ x ]  Abnormal [  ]  Motor Strength: WFL all extremities  [ x ]  Abnormal [  ]  Sensation: intact to light touch [ x ] Abnormal [  ]  Reflexes: Symmetric [  ]  Abnormal [x  ]    FUNCTIONAL STATUS:  Bed Mobility: Independent [  ]  Supervision [ x ]  Needs Assistance [  ]  N/A [  ]  Transfers: Independent [  ]  Supervision [x  ]  Needs Assistance [  ]  N/A [  ]   Ambulation: Independent [  ]  Supervision [x  ]  Needs Assistance [  ]  N/A [  ]  ADL: Independent [x  ] Requires Assistance [  ] N/A [  ]  SEE PT/OT IE NOTES    LABS:                        12.7   4.17  )-----------( 273      ( 2020 06:12 )             38.7     06-23    142  |  106  |  15  ----------------------------<  97  3.7   |  27  |  0.8    Ca    8.8      2020 06:12  Mg     2.0         TPro  5.6<L>  /  Alb  3.7  /  TBili  0.4  /  DBili  x   /  AST  13  /  ALT  <5  /  AlkPhos  92        Urinalysis Basic - ( 2020 01:10 )    Color: Yellow / Appearance: Clear / S.020 / pH: x  Gluc: x / Ketone: Negative  / Bili: Negative / Urobili: 0.2 mg/dL   Blood: x / Protein: Negative mg/dL / Nitrite: Positive   Leuk Esterase: Small / RBC: x / WBC 26-50 /HPF   Sq Epi: x / Non Sq Epi: Moderate /HPF / Bacteria: TNTC /HPF        RADIOLOGY & ADDITIONAL STUDIES:    Assesment:

## 2020-06-23 NOTE — PROGRESS NOTE ADULT - SUBJECTIVE AND OBJECTIVE BOX
JON GARCÍA  87y  Female    Patient is a 87y old  Female who presents with a chief complaint of Nausea (2020 12:09)      INTERVAL HPI/OVERNIGHT EVENTS:  Patient seen and examined this morning.  Lying comfortably in bed.  In nad. no complaints      REVIEW OF SYSTEMS:  At least 10 systems were reviewed in ROS.   All systems reviewed are within normal limits except for that listed above.      VITALS:  Vital Signs Last 24 Hrs  T(C): 36 (2020 05:21), Max: 37.1 (2020 22:03)  T(F): 96.8 (2020 05:21), Max: 98.7 (2020 22:03)  HR: 65 (2020 05:21) (65 - 75)  BP: 165/72 (2020 05:21) (126/56 - 165/72)  BP(mean): --  RR: 16 (2020 05:21) (16 - 18)  SpO2: --      PHYSICAL EXAM:  GENERAL: NAD, well-developed  HEAD:  Atraumatic, Normocephalic  EYES: conjunctiva and sclera clear  ENMT: Moist mucous membranes  NECK: Supple, Normal thyroid  NERVOUS SYSTEM:  Alert & Oriented X3, Motor Strength 5/5 B/L upper and lower extremities, Tremors.  CHEST/LUNG: Decreased AE at the bases bilaterally; No rales, rhonchi, wheezing, or rubs  HEART: Regular rate and rhythm; No murmurs, rubs, or gallops  ABDOMEN: Soft, Nontender, Nondistended; Bowel sounds present  EXTREMITIES:  2+ Peripheral Pulses, No clubbing, cyanosis, or edema  LYMPH: No lymphadenopathy noted  SKIN: No rashes or lesions    Consultant(s) Notes Reviewed:  [x ] YES  [ ] NO  Care Discussed with Consultants/Other Providers [ x] YES  [ ] NO    LABS:                            12.7   4.17  )-----------( 273      ( 2020 06:12 )             38.7       06-23    142  |  106  |  15  ----------------------------<  97  3.7   |  27  |  0.8    Ca    8.8      2020 06:12  Mg     2.0     06-22    TPro  5.6<L>  /  Alb  3.7  /  TBili  0.4  /  DBili  x   /  AST  13  /  ALT  <5  /  AlkPhos  92               Urinalysis Basic - ( 2020 01:10 )    Color: Yellow / Appearance: Clear / S.020 / pH: x  Gluc: x / Ketone: Negative  / Bili: Negative / Urobili: 0.2 mg/dL   Blood: x / Protein: Negative mg/dL / Nitrite: Positive   Leuk Esterase: Small / RBC: x / WBC 26-50 /HPF   Sq Epi: x / Non Sq Epi: Moderate /HPF / Bacteria: TNTC /HPF      MICROBIOLOGY: pending      RADIOLOGY & ADDITIONAL TESTS:  X-ray Chest 1 View- PORTABLE-Urgent (20 @ 00:55)   Left lower lobe atelectasis      CT Abdomen and Pelvis w/ IV Cont (20 @ 02:23)     Since 6/3/2020,    Persistent unchanged mild right hydronephrosis with a distal ureteral caliber change. Differential includes nonradiopaque calculus or stricture.      US Retroperitoneal Complete (20 @ 08:31)   No  tract stones are delineated.    No hydronephrosis on ultrasound.    Nonspecific nonvisualization of the right ureteral jet. The left ureteral jet is visualized.    Urinary bladder volume 91.5 cc.  Incidental right renal midpole 2.3 cm cyst.      Imaging Personally Reviewed:  [x] YES  [ ] NO      MEDICATIONS  (STANDING):  MEDICATIONS  (STANDING):  atorvastatin 80 milliGRAM(s) Oral at bedtime  carbidopa/levodopa CR 50/200 1 Tablet(s) Oral <User Schedule>  cefTRIAXone   IVPB 1000 milliGRAM(s) IV Intermittent every 24 hours  clopidogrel Tablet 75 milliGRAM(s) Oral at bedtime  diltiazem    milliGRAM(s) Oral daily  enoxaparin Injectable 40 milliGRAM(s) SubCutaneous daily  hydrochlorothiazide Oral Tab/Cap - Peds 12.5 milliGRAM(s) Oral daily  pramipexole 0.5 milliGRAM(s) Oral three times a day    MEDICATIONS  (PRN):  acetaminophen   Tablet .. 650 milliGRAM(s) Oral every 6 hours PRN Temp greater or equal to 38C (100.4F), Mild Pain (1 - 3)  ondansetron Injectable 4 milliGRAM(s) IV Push every 6 hours PRN Nausea          HEALTH ISSUES - PROBLEM Dx:  Recurrent UTI  Persistent Nausea  Hyperlipidemia  Rheumatoid arthritis  HTN (hypertension)  Parkinson disease  History of hysterectomy

## 2020-06-24 ENCOUNTER — TRANSCRIPTION ENCOUNTER (OUTPATIENT)
Age: 85
End: 2020-06-24

## 2020-06-24 PROCEDURE — 99233 SBSQ HOSP IP/OBS HIGH 50: CPT

## 2020-06-24 RX ORDER — MEROPENEM 1 G/30ML
500 INJECTION INTRAVENOUS EVERY 8 HOURS
Refills: 0 | Status: DISCONTINUED | OUTPATIENT
Start: 2020-06-24 | End: 2020-06-25

## 2020-06-24 RX ADMIN — ENOXAPARIN SODIUM 40 MILLIGRAM(S): 100 INJECTION SUBCUTANEOUS at 12:21

## 2020-06-24 RX ADMIN — CARBIDOPA AND LEVODOPA 1 TABLET(S): 25; 100 TABLET ORAL at 17:03

## 2020-06-24 RX ADMIN — ATORVASTATIN CALCIUM 80 MILLIGRAM(S): 80 TABLET, FILM COATED ORAL at 22:08

## 2020-06-24 RX ADMIN — CARBIDOPA AND LEVODOPA 1 TABLET(S): 25; 100 TABLET ORAL at 23:14

## 2020-06-24 RX ADMIN — PRAMIPEXOLE DIHYDROCHLORIDE 0.5 MILLIGRAM(S): 0.12 TABLET ORAL at 06:10

## 2020-06-24 RX ADMIN — PRAMIPEXOLE DIHYDROCHLORIDE 0.5 MILLIGRAM(S): 0.12 TABLET ORAL at 15:12

## 2020-06-24 RX ADMIN — PRAMIPEXOLE DIHYDROCHLORIDE 0.5 MILLIGRAM(S): 0.12 TABLET ORAL at 22:08

## 2020-06-24 RX ADMIN — CLOPIDOGREL BISULFATE 75 MILLIGRAM(S): 75 TABLET, FILM COATED ORAL at 22:08

## 2020-06-24 RX ADMIN — CARBIDOPA AND LEVODOPA 1 TABLET(S): 25; 100 TABLET ORAL at 12:20

## 2020-06-24 RX ADMIN — CARBIDOPA AND LEVODOPA 1 TABLET(S): 25; 100 TABLET ORAL at 00:18

## 2020-06-24 RX ADMIN — CARBIDOPA AND LEVODOPA 1 TABLET(S): 25; 100 TABLET ORAL at 06:10

## 2020-06-24 RX ADMIN — Medication 360 MILLIGRAM(S): at 06:10

## 2020-06-24 RX ADMIN — MEROPENEM 100 MILLIGRAM(S): 1 INJECTION INTRAVENOUS at 15:12

## 2020-06-24 RX ADMIN — MEROPENEM 100 MILLIGRAM(S): 1 INJECTION INTRAVENOUS at 22:09

## 2020-06-24 NOTE — PROGRESS NOTE ADULT - SUBJECTIVE AND OBJECTIVE BOX
JON GARCÍA  87y  Female    Patient is a 87y old  Female who presents with a chief complaint of Nausea (2020 12:09)      INTERVAL HPI/OVERNIGHT EVENTS:  Patient seen and examined this morning.  Sitting in the chair, no complaints.    Await ID follow up for home abx regimen      REVIEW OF SYSTEMS:  At least 10 systems were reviewed in ROS.   All systems reviewed are within normal limits except for that listed above.      VITALS:  Vital Signs Last 24 Hrs  T(C): 37.1 (2020 06:01), Max: 37.1 (2020 06:01)  T(F): 98.7 (2020 06:01), Max: 98.7 (2020 06:01)  HR: 66 (2020 06:01) (66 - 76)  BP: 148/67 (2020 06:01) (148/67 - 165/76)  BP(mean): --  RR: 18 (2020 06:01) (16 - 18)  SpO2: --    PHYSICAL EXAM:  GENERAL: NAD, well-developed  HEAD:  Atraumatic, Normocephalic  EYES: conjunctiva and sclera clear  ENMT: Moist mucous membranes  NECK: Supple, Normal thyroid  NERVOUS SYSTEM:  Alert & Oriented X3, Motor Strength 5/5 B/L upper and lower extremities, Tremors.  CHEST/LUNG: Decreased AE at the bases bilaterally; No rales, rhonchi, wheezing, or rubs  HEART: Regular rate and rhythm; No murmurs, rubs, or gallops  ABDOMEN: Soft, Nontender, Nondistended; Bowel sounds present  EXTREMITIES:  2+ Peripheral Pulses, No clubbing, cyanosis, or edema  LYMPH: No lymphadenopathy noted  SKIN: No rashes or lesions    Consultant(s) Notes Reviewed:  [x ] YES  [ ] NO  Care Discussed with Consultants/Other Providers [ x] YES  [ ] NO    LABS:                            12.7   4.17  )-----------( 273      ( 2020 06:12 )             38.7       06-    142  |  106  |  15  ----------------------------<  97  3.7   |  27  |  0.8    Ca    8.8      2020 06:12  Mg     2.0     06-22    TPro  5.6<L>  /  Alb  3.7  /  TBili  0.4  /  DBili  x   /  AST  13  /  ALT  <5  /  AlkPhos  92               Urinalysis Basic - ( 2020 01:10 )    Color: Yellow / Appearance: Clear / S.020 / pH: x  Gluc: x / Ketone: Negative  / Bili: Negative / Urobili: 0.2 mg/dL   Blood: x / Protein: Negative mg/dL / Nitrite: Positive   Leuk Esterase: Small / RBC: x / WBC 26-50 /HPF   Sq Epi: x / Non Sq Epi: Moderate /HPF / Bacteria: TNTC /HPF      MICROBIOLOGY: ESBL E.Coli      RADIOLOGY & ADDITIONAL TESTS:  X-ray Chest 1 View- PORTABLE-Urgent (20 @ 00:55)   Left lower lobe atelectasis      CT Abdomen and Pelvis w/ IV Cont (20 @ 02:23)     Since 6/3/2020,    Persistent unchanged mild right hydronephrosis with a distal ureteral caliber change. Differential includes nonradiopaque calculus or stricture.      US Retroperitoneal Complete (20 @ 08:31)   No  tract stones are delineated.    No hydronephrosis on ultrasound.    Nonspecific nonvisualization of the right ureteral jet. The left ureteral jet is visualized.    Urinary bladder volume 91.5 cc.  Incidental right renal midpole 2.3 cm cyst.      Imaging Personally Reviewed:  [x] YES  [ ] NO      MEDICATIONS  (STANDING):  atorvastatin 80 milliGRAM(s) Oral at bedtime  carbidopa/levodopa CR 50/200 1 Tablet(s) Oral <User Schedule>  cefTRIAXone   IVPB 1000 milliGRAM(s) IV Intermittent every 24 hours  clopidogrel Tablet 75 milliGRAM(s) Oral at bedtime  diltiazem    milliGRAM(s) Oral daily  enoxaparin Injectable 40 milliGRAM(s) SubCutaneous daily  hydrochlorothiazide Oral Tab/Cap - Peds 12.5 milliGRAM(s) Oral daily  pramipexole 0.5 milliGRAM(s) Oral three times a day    MEDICATIONS  (PRN):  acetaminophen   Tablet .. 650 milliGRAM(s) Oral every 6 hours PRN Temp greater or equal to 38C (100.4F), Mild Pain (1 - 3)  ondansetron Injectable 4 milliGRAM(s) IV Push every 6 hours PRN Nausea            HEALTH ISSUES - PROBLEM Dx:  Recurrent UTI  Persistent Nausea  Hyperlipidemia  Rheumatoid arthritis  HTN (hypertension)  Parkinson disease  History of hysterectomy

## 2020-06-24 NOTE — DISCHARGE NOTE PROVIDER - NSDCFUADDINST_GEN_ALL_CORE_FT
- Return to Emergency room if develop any persistent fever > 101, chills, tremors, persistent nausea/vomiting, severe pain not relieved by pain medication, inability to urinate, chest pains, difficulty breathing or any bleeding.  - Please follow up with your PCP in 1-2 weeks

## 2020-06-24 NOTE — DISCHARGE NOTE PROVIDER - NSDCCPCAREPLAN_GEN_ALL_CORE_FT
PRINCIPAL DISCHARGE DIAGNOSIS  Diagnosis: UTI (urinary tract infection)  Assessment and Plan of Treatment: - you were treated with IV Abx and your symptoms improved.  - please continue with antibiotic as directed  - Please follow up with your PCP in 1-2 weeks      SECONDARY DISCHARGE DIAGNOSES  Diagnosis: Nausea  Assessment and Plan of Treatment: - you were given Zifran and your symptoms improved    Diagnosis: Hydronephrosis  Assessment and Plan of Treatment: -no  intervention recommended at this time PRINCIPAL DISCHARGE DIAGNOSIS  Diagnosis: UTI (urinary tract infection)  Assessment and Plan of Treatment: - you were treated with IV Abx and your symptoms improved.  - please continue with antibiotic as directed  - Please follow up with your Primary in one week      SECONDARY DISCHARGE DIAGNOSES  Diagnosis: Hydronephrosis  Assessment and Plan of Treatment: -no  intervention recommended at this time; you may follow up with urology as an outpatient

## 2020-06-24 NOTE — PROGRESS NOTE ADULT - ASSESSMENT
Patient is a 86 YO F with a pmh of HTN, HLD, RA, Parkinsons. Pt presented to the ER with a chief complaint of nausea since this afternoon. pt was recently discharged s/p treatment of a UTI with abx. Pt comes back with similar symptoms. In the ER, CT A/P showed mild hydronephrosis/dilation of the distal right ureter, possible nonradiopaque calculus or stricture. Urine analysis is positive. She denied any complaints; no fever, abdominal pain, diarrhea, dysuria, hematuria but nausea without vomiting.       Assessment & Plan:    1. UTI:  Previous culture grew proteus earlier this month  ID follow up pending  urine culture - E. Coli ESBL - change abx to meropenem    2. Rt Hydronephrosis:  CT: Mild Hydronephrosis. No obstruction seen on Imaging and normal renal function.  US: No hydronephrosis.  Discussed with Urology for further evaluation.       3. Persistent Nausea:  Continue Zofran.  GI evaluation or further work up if persists.      4. Parkinson's disease:  Continue Sinemet and pramipexole.  Ambulate with assistance. Fall precautions.  Pt no interested in SNF placement       5. HTN: Continue home medications.      6. Dyslipidemia: On Statin.      7. Atelectasis:  OOB as tolerated Incentive spirometer.      Prophylaxis: Lovenox  Code status: Full code    Progress Note Handoff:  Pending consults: None  Pending Tests: Cultures  Family/Patient discussion: Plan of care discussed with Patient and her daughterVeronica - in agreement   Disposition: Home with HomeCare when stable

## 2020-06-24 NOTE — PROGRESS NOTE ADULT - ASSESSMENT
87y old  Female with pmh of HTN, HLD, RA, Parkinson, who presented to the ER for evaluation of nausea.  She was recently discharged after treatment of  UTI with abx. Pt comes back with similar symptoms. In the ER, CT A/P showed mild hydronephrosis / dilation of the distal right ureter, possible nonradiopaque calculus or stricture. Urine analysis is positive. The last urine culture grew Proteus mirabilis sens to cipro/ ceftriaxone, hence she was given cipro in the ER.     1. UTI  CT with persistent unchanged mild right hydronephrosis with a distal ureteral caliber change. Differential includes nonradiopaque calculus or stricture.    B/C x2 negative  Urine C&S   >100,000 CFU/ml Escherichia coli ESBL S Cipro & Bactrim    On meropenem  IVPB 500 milliGRAM(s) IV Intermittent every 8 hours    2.  Bibasilar dependent atelectasis on CT    PLAN  Continue Navneet; Ultimate switch to PO Bactrim or PO Cipro

## 2020-06-24 NOTE — PROGRESS NOTE ADULT - SUBJECTIVE AND OBJECTIVE BOX
JON GARCÍA  87y, Female  Allergy: No Known Allergies      CHIEF COMPLAINT: Urinary tract infection (24 Jun 2020 10:04)      INTERVAL EVENTS/HPI  - No acute events overnight  - T(F): , Max: 98.7 (06-24-20 @ 06:01)  - Denies any worsening symptoms  - Tolerating medication  -     ROS  General: Denies fevers, chills, nightsweats, weight loss  HEENT: Denies headache, rhinorrhea, sore throat, eye pain  CV: Denies CP, palpitations  PULM: Denies SOB, cough  GI: Denies abdominal pain, diarrhea  : Denies dysuria, hematuria  MSK: Denies arthralgias  SKIN: Denies rash   NEURO: Denies paresthesias, weakness  PSYCH: Denies depression    FH non-contributory   Social Hx non-contributory    VITALS:  T(F): 98.7, Max: 98.7 (06-24-20 @ 06:01)  HR: 66  BP: 148/67  RR: 18Vital Signs Last 24 Hrs  T(C): 37.1 (24 Jun 2020 06:01), Max: 37.1 (24 Jun 2020 06:01)  T(F): 98.7 (24 Jun 2020 06:01), Max: 98.7 (24 Jun 2020 06:01)  HR: 66 (24 Jun 2020 06:01) (66 - 76)  BP: 148/67 (24 Jun 2020 06:01) (148/67 - 165/76)  BP(mean): --  RR: 18 (24 Jun 2020 06:01) (16 - 18)  SpO2: --    PHYSICAL EXAM:  Gen: NAD, resting in bed  HEENT: Normocephalic, atraumatic  Neck: supple, no lymphadenopathy  CV: Regular rate & regular rhythm  Lungs: decreased BS at bases, no fremitus  Abdomen: Soft, BS present  Ext: Warm, well perfused  Neuro: non focal, awake  Skin: no rash, no erythema      TESTS & MEASUREMENTS:                        12.7   4.17  )-----------( 273      ( 23 Jun 2020 06:12 )             38.7     06-23    142  |  106  |  15  ----------------------------<  97  3.7   |  27  |  0.8    Ca    8.8      23 Jun 2020 06:12    TPro  5.6<L>  /  Alb  3.7  /  TBili  0.4  /  DBili  x   /  AST  13  /  ALT  <5  /  AlkPhos  92  06-23      LIVER FUNCTIONS - ( 23 Jun 2020 06:12 )  Alb: 3.7 g/dL / Pro: 5.6 g/dL / ALK PHOS: 92 U/L / ALT: <5 U/L / AST: 13 U/L / GGT: x               Culture - Urine (collected 06-22-20 @ 01:10)  Source: .Urine Clean Catch (Midstream)  Final Report (06-24-20 @ 08:20):    >100,000 CFU/ml Escherichia coli ESBL  Organism: Escherichia coli ESBL (06-24-20 @ 08:20)  Organism: Escherichia coli ESBL (06-24-20 @ 08:20)      -  Amikacin: S <=16      -  Ampicillin: R >16 These ampicillin results predict results for amoxicillin      -  Ampicillin/Sulbactam: R >16/8 Enterobacter, Citrobacter, and Serratia may develop resistance during prolonged therapy (3-4 days)      -  Aztreonam: R >16      -  Cefazolin: R >16 (MIC_CL_COM_ENTERIC_CEFAZU) For uncomplicated UTI with K. pneumoniae, E. coli, or P. mirablis: ADAM <=16 is sensitive and ADAM >=32 is resistant. This also predicts results for oral agents cefaclor, cefdinir, cefpodoxime, cefprozil, cefuroxime axetil, cephalexin and locarbef for uncomplicated UTI. Note that some isolates may be susceptible to these agents while testing resistant to cefazolin.      -  Cefepime: R 8      -  Cefoxitin: S <=8      -  Ceftriaxone: R >32 Enterobacter, Citrobacter, and Serratia may develop resistance during prolonged therapy      -  Ciprofloxacin: S <=1      -  Ertapenem: S <=1      -  Gentamicin: S <=4      -  Imipenem: S <=1      -  Levofloxacin: S <=2      -  Meropenem: S <=1      -  Nitrofurantoin: S <=32 Should not be used to treat pyelonephritis      -  Piperacillin/Tazobactam: R <=16      -  Tigecycline: S <=2      -  Tobramycin: S <=4      -  Trimethoprim/Sulfamethoxazole: S <=2/38      Method Type: ADAM    Culture - Blood (collected 06-22-20 @ 00:40)  Source: .Blood Blood  Preliminary Report (06-23-20 @ 17:01):    No growth to date.    Culture - Blood (collected 06-22-20 @ 00:40)  Source: .Blood Blood  Preliminary Report (06-23-20 @ 17:01):    No growth to date.        Lactate, Blood: 1.5 mmol/L (06-22-20 @ 00:40)      INFECTIOUS DISEASES TESTING      RADIOLOGY & ADDITIONAL TESTS:  I have personally reviewed the last Chest xray  CXR  Xray Chest 1 View- PORTABLE-Urgent:   EXAM:  XR CHEST PORTABLE URGENT 1V            PROCEDURE DATE:  06/22/2020            INTERPRETATION:  Clinical History / Reason for exam: Nausea    Comparison : Chest radiograph 10/12/2019.    Technique/Positioning: Frontal, portable.    Findings:    Support devices: None.    Cardiac/mediastinum/hilum: Heart size within normal limits. Aorta is calcified.    Lung parenchyma/Pleura: Left lower lobe atelectasis    Skeleton/soft tissues: Degenerative change    Impression:      Left lower lobe atelectasis                      FUENTES MICHEL M.D., ATTENDING RADIOLOGIST  This document has been electronically signed. Jun 22 2020  8:53AM             (06-22-20 @ 00:55)      CT  CT Abdomen and Pelvis w/ IV Cont:   EXAM:  CT ABDOMEN AND PELVIS IC            PROCEDURE DATE:  06/22/2020            INTERPRETATION:  CLINICAL STATEMENT: Nausea, dysuria.      TECHNIQUE: Contiguous axial CT images were obtained from the lower chest to the pubic symphysis following administration of intravenous contrast.  Oral contrast was not administered.  Reformatted images in the coronal and sagittal planes were acquired.    COMPARISON: 6/3/2020      FINDINGS: Bibasilar dependent atelectasis. Coronary artery calcifications.    HEPATOBILIARY: Unremarkable.    SPLEEN: Unremarkable.    PANCREAS: Unremarkable.    ADRENAL GLANDS: Unremarkable.    KIDNEYS: Right renal cortical scarring. Symmetric pattern of renal enhancement. Redemonstrated mild right hydroureteronephrosis witha distal ureteral caliber change (4/240-250). No left hydronephrosis. Bilateral cysts and subcentimeter hypodensities too small to further characterize. Punctate nonobstructing right interpolar region renal calculi.    ABDOMINOPELVIC NODES: Unremarkable.    PELVIC ORGANS: Unremarkable.    PERITONEUM/MESENTERY/BOWEL: Small hiatal hernia with debris within the distal esophagus. No evidence for bowel obstruction, ascites, or pneumoperitoneum. Colonic diverticulosis without evidence for acute diverticulitis. Normal caliber appendix.    BONES/SOFT TISSUES: Degenerative changes of the spine and hips.    OTHER: Diffuse vascular calcifications.      IMPRESSION:     Since 6/3/2020,    Persistent unchanged mild right hydronephrosis with a distal ureteral caliber change. Differential includes nonradiopaque calculus or stricture.              GRACE WELCH M.D., RESIDENT RADIOLOGIST  This document has been electronically signed.  JESSICA HAMMOND M.D., ATTENDING RADIOLOGIST  This document has been electronically signed. Jun 22 2020  3:25AM             (06-22-20 @ 02:23)      CARDIOLOGY TESTING  12 Lead ECG:   Ventricular Rate 84 BPM    Atrial Rate 84 BPM    P-R Interval 184 ms    QRS Duration 88 ms    Q-T Interval 382 ms    QTC Calculation(Bezet) 451 ms    P Axis 76 degrees    R Axis 60 degrees    T Axis 54 degrees    Diagnosis Line Normal sinus rhythm with sinus arrhythmia  Nonspecific ST abnormality  Abnormal ECG    Confirmed by JON ABERNATHY MD ([id number]) on 6/22/2020 12:24:23 PM (06-22-20 @ 01:09)      MEDICATIONS  atorvastatin 80  carbidopa/levodopa CR 50/200 1  clopidogrel Tablet 75  diltiazem     enoxaparin Injectable 40  hydrochlorothiazide Oral Tab/Cap - Peds 12.5  meropenem  IVPB 500  pramipexole 0.5      ANTIBIOTICS:  meropenem  IVPB 500 milliGRAM(s) IV Intermittent every 8 hours      All available historical data has been reviewed

## 2020-06-24 NOTE — DISCHARGE NOTE PROVIDER - HOSPITAL COURSE
Pt is a 88 YO F with a pmh of HTN, HLD, RA, Parkinsons. Pt presented to the ER with a chief complaint of nausea since this afternoon. pt was recently discharged s/p treatment of a UTI with abx. Pt comes back with similar symptoms. In the ER, CT A/P showed mild hydronephrosis / dilation of the distal right ureter, possible nonradiopaque calculus or stricture. Urine analysis is positive. Pt was seen and examined at bedside, pt denies any present complaints including nausea, abdominal pain, diarrhea, pain when urinating. Pt's last urine culture grew Proteus mirabilis sens to cipro/ ceftriaxone, pt was given cipro in the ER.     Patient was admitted for UTI. Pt was seen by ID who recommended to c/w Ceftriaxone until final urine cultures are back. Pt has been improving and doing well. Pt is medically cleared for discharge. Pt is a 88 YO F with a pmh of HTN, HLD, RA, Parkinsons. Pt presented to the ER with a chief complaint of nausea since this afternoon. pt was recently discharged s/p treatment of a UTI with abx. Pt comes back with similar symptoms. In the ER, CT A/P showed mild hydronephrosis / dilation of the distal right ureter, possible nonradiopaque calculus or stricture. Urine analysis is positive. Pt was seen and examined at bedside, pt denies any present complaints including nausea, abdominal pain, diarrhea, pain when urinating. Pt's last urine culture grew Proteus mirabilis sens to cipro/ ceftriaxone, pt was given cipro in the ER.     Patient was admitted for UTI. Found to have ESBL E.coli and abx changed to Meropenem. As per ID, may switch to oral bactrim and complete course.    Pt has been improving and doing well. Pt is medically cleared for discharge.         Patient seen and examined this morning. No complaints.     Vital Signs Last 24 Hrs    T(C): 36.3 (25 Jun 2020 05:41), Max: 36.3 (25 Jun 2020 05:41)    T(F): 97.3 (25 Jun 2020 05:41), Max: 97.3 (25 Jun 2020 05:41)    HR: 59 (25 Jun 2020 05:41) (59 - 69)    BP: 157/68 (25 Jun 2020 05:41) (143/64 - 157/68)    BP(mean): --    RR: 16 (25 Jun 2020 05:41) (16 - 16)    SpO2: --        PHYSICAL EXAM:    GENERAL: NAD, well-groomed, well-developed    HEAD:  Atraumatic, Normocephalic    EYES: EOMI, PERRLA, conjunctiva and sclera clear    NERVOUS SYSTEM:  Alert & Oriented X 4, Good concentration; Motor Strength 5/5 B/L upper and lower extremities; DTRs 2+ intact and symmetric    CHEST/LUNG: Clear to percussion bilaterally; No rales, rhonchi, wheezing, or rubs    HEART: Regular rate and rhythm; No murmurs, rubs, or gallops    ABDOMEN: Soft, Nontender, Nondistended; Bowel sounds present    EXTREMITIES:  2+ Peripheral Pulses, No clubbing, cyanosis, or edema    SKIN: No rashes or lesions        D/C home today    d/c planning took over 45 min    d/c papers done by me

## 2020-06-24 NOTE — DISCHARGE NOTE PROVIDER - CARE PROVIDER_API CALL
Gucci Kamara  PEDIATRICS  [address] [place] [id number]  Phone: [phone]  Fax: [phone]  Follow Up Time: 1 week

## 2020-06-24 NOTE — DISCHARGE NOTE PROVIDER - NSDCMRMEDTOKEN_GEN_ALL_CORE_FT
atorvastatin 80 mg oral tablet: 1 tab(s) orally once a day  bifidobacterium-lactobacillus oral capsule: 1 cap(s) orally 2 times a day   carbidopa-levodopa 50 mg-200 mg oral tablet, extended release: 1 tab(s) orally 4 times a day  clopidogrel 75 mg oral tablet: 1 tab(s) orally once a day (at bedtime)  DilTIAZem Hydrochloride  mg/24 hours oral capsule, extended release: 1 cap(s) orally once a day  hydroCHLOROthiazide 12.5 mg oral tablet: 1 tab(s) orally once a day  ondansetron 4 mg oral disintegrating strip: 1 each orally every 8 hours, As Needed -for nausea   pramipexole 0.5 mg oral tablet: 1 tab(s) orally 3 times a day atorvastatin 80 mg oral tablet: 1 tab(s) orally once a day  Bactrim  mg-160 mg oral tablet: 1 milligram(s) orally 2 times a day   bifidobacterium-lactobacillus oral capsule: 1 cap(s) orally 2 times a day   carbidopa-levodopa 50 mg-200 mg oral tablet, extended release: 1 tab(s) orally 4 times a day  clopidogrel 75 mg oral tablet: 1 tab(s) orally once a day (at bedtime)  DilTIAZem Hydrochloride  mg/24 hours oral capsule, extended release: 1 cap(s) orally once a day  hydroCHLOROthiazide 12.5 mg oral tablet: 1 tab(s) orally once a day  ondansetron 4 mg oral disintegrating strip: 1 each orally every 8 hours, As Needed -for nausea   pramipexole 0.5 mg oral tablet: 1 tab(s) orally 3 times a day

## 2020-06-25 ENCOUNTER — TRANSCRIPTION ENCOUNTER (OUTPATIENT)
Age: 85
End: 2020-06-25

## 2020-06-25 VITALS
RESPIRATION RATE: 16 BRPM | TEMPERATURE: 98 F | SYSTOLIC BLOOD PRESSURE: 131 MMHG | DIASTOLIC BLOOD PRESSURE: 55 MMHG | HEART RATE: 77 BPM

## 2020-06-25 PROCEDURE — 99239 HOSP IP/OBS DSCHRG MGMT >30: CPT

## 2020-06-25 RX ORDER — AZTREONAM 2 G
1 VIAL (EA) INJECTION
Qty: 20 | Refills: 0
Start: 2020-06-25 | End: 2020-07-04

## 2020-06-25 RX ADMIN — ENOXAPARIN SODIUM 40 MILLIGRAM(S): 100 INJECTION SUBCUTANEOUS at 12:07

## 2020-06-25 RX ADMIN — Medication 360 MILLIGRAM(S): at 05:28

## 2020-06-25 RX ADMIN — Medication 650 MILLIGRAM(S): at 10:23

## 2020-06-25 RX ADMIN — MEROPENEM 100 MILLIGRAM(S): 1 INJECTION INTRAVENOUS at 05:28

## 2020-06-25 RX ADMIN — PRAMIPEXOLE DIHYDROCHLORIDE 0.5 MILLIGRAM(S): 0.12 TABLET ORAL at 05:28

## 2020-06-25 RX ADMIN — CARBIDOPA AND LEVODOPA 1 TABLET(S): 25; 100 TABLET ORAL at 05:28

## 2020-06-25 RX ADMIN — CARBIDOPA AND LEVODOPA 1 TABLET(S): 25; 100 TABLET ORAL at 12:07

## 2020-06-25 NOTE — DISCHARGE NOTE NURSING/CASE MANAGEMENT/SOCIAL WORK - PATIENT PORTAL LINK FT
You can access the FollowMyHealth Patient Portal offered by [facility] by registering at the following website: [email or web address]. By joining [facility]’s FollowMyHealth portal, you will also be able to view your health information using other applications (apps) compatible with our system.

## 2020-06-25 NOTE — CHART NOTE - NSCHARTNOTEFT_GEN_A_CORE
Lab called about pt's final urine cultures growing E. Coli ESBL  Pt was treated with meropenem and switched to PO Bactrim as per ID.     Case d/w Dr. Magaña

## 2020-06-25 NOTE — PROGRESS NOTE ADULT - SUBJECTIVE AND OBJECTIVE BOX
Patient is seen and examined at the bed side, is afebrile.      REVIEW OF SYSTEMS: Total of twelve systems have been reviewed with patient and found to be negative       ALLERGIES: No Known Allergies      Vital Signs Last 24 Hrs  T(C): 36.3 (2020 05:41), Max: 36.3 (2020 05:41)  T(F): 97.3 (2020 05:41), Max: 97.3 (2020 05:41)  HR: 59 (2020 05:41) (59 - 69)  BP: 157/68 (2020 05:41) (143/64 - 157/68)  BP(mean): --  RR: 16 (2020 05:41) (16 - 16)  SpO2: --        PHYSICAL EXAM:  GENERAL: Not in distress   CHEST/LUNG:  Not using accessory muscles  HEART: s1 and s2 present  ABDOMEN:  Nontender and  Nondistended  EXTREMITIES: No pedal  edema  CNS: Awake and Alert        LABS: No new Labs                          13.1   6.39  )-----------( 292      ( 2020 00:40 )             40.8             140  |  105  |  25<H>  ----------------------------<  128<H>  4.2   |  26  |  1.1    Ca    9.4      2020 00:40  Mg     2.0         TPro  6.6  /  Alb  4.3  /  TBili  0.3  /  DBili  x   /  AST  13  /  ALT  <5  /  AlkPhos  120<H>          Urinalysis Basic - ( 2020 01:10 )  Color: Yellow / Appearance: Clear / S.020 / pH: x  Gluc: x / Ketone: Negative  / Bili: Negative / Urobili: 0.2 mg/dL   Blood: x / Protein: Negative mg/dL / Nitrite: Positive   Leuk Esterase: Small / RBC: x / WBC 26-50 /HPF   Sq Epi: x / Non Sq Epi: Moderate /HPF / Bacteria: TNTC /HPF        MEDICATIONS  (STANDING):      atorvastatin 80 milliGRAM(s) Oral at bedtime  carbidopa/levodopa CR 50/200 1 Tablet(s) Oral <User Schedule>  clopidogrel Tablet 75 milliGRAM(s) Oral at bedtime  diltiazem    milliGRAM(s) Oral daily  enoxaparin Injectable 40 milliGRAM(s) SubCutaneous daily  hydrochlorothiazide Oral Tab/Cap - Peds 12.5 milliGRAM(s) Oral daily  meropenem  IVPB 500 milliGRAM(s) IV Intermittent every 8 hours  pramipexole 0.5 milliGRAM(s) Oral three times a day          RADIOLOGY & ADDITIONAL TESTS:    < from: US Retroperitoneal Complete (20 @ 08:31) >    No  tract stones are delineated.    No hydronephrosis on ultrasound.    Nonspecific nonvisualization of the right ureteral jet. The left ureteral jet is visualized.    Urinary bladder volume 91.5 cc.    Incidental right renal midpole 2.3 cm cyst.      < end of copied text >        MICROBIOLOGY DATA:      COVID-19 PCR . (20 @ 03:00)    COVID-19 PCR: NotDetec      Culture - Urine (20 @ 01:10)    -  Nitrofurantoin: S <=32 Should not be used to treat pyelonephritis    -  Piperacillin/Tazobactam: R <=16    -  Tigecycline: S <=2    -  Tobramycin: S <=4    -  Trimethoprim/Sulfamethoxazole: S <=2/38    -  Amikacin: S <=16    -  Ampicillin: R >16 These ampicillin results predict results for amoxicillin    -  Ampicillin/Sulbactam: R >16/8 Enterobacter, Citrobacter, and Serratia may develop resistance during prolonged therapy (3-4 days)    -  Aztreonam: R >16    -  Cefazolin: R >16 (MIC_CL_COM_ENTERIC_CEFAZU) For uncomplicated UTI with K. pneumoniae, E. coli, or P. mirablis: ADAM <=16 is sensitive and ADAM >=32 is resistant. This also predicts results for oral agents cefaclor, cefdinir, cefpodoxime, cefprozil, cefuroxime axetil, cephalexin and locarbef for uncomplicated UTI. Note that some isolates may be susceptible to these agents while testing resistant to cefazolin.    -  Cefepime: R 8    -  Cefoxitin: S <=8    -  Ciprofloxacin: S <=1    -  Ertapenem: S <=1    -  Gentamicin: S <=4    -  Ceftriaxone: R >32 Enterobacter, Citrobacter, and Serratia may develop resistance during prolonged therapy    -  Imipenem: S <=1    -  Levofloxacin: S <=2    -  Meropenem: S <=1    Specimen Source: .Urine Clean Catch (Midstream)    Culture Results:   >100,000 CFU/ml Escherichia coli ESBL    Organism Identification: Escherichia coli ESBL    Organism: Escherichia coli ESBL    Method Type: ADAM Patient is seen and examined at the bed side, is afebrile.        REVIEW OF SYSTEMS: All other review systems are negative         ALLERGIES: No Known Allergies      Vital Signs Last 24 Hrs  T(C): 36.3 (2020 05:41), Max: 36.3 (2020 05:41)  T(F): 97.3 (2020 05:41), Max: 97.3 (2020 05:41)  HR: 59 (2020 05:41) (59 - 69)  BP: 157/68 (2020 05:41) (143/64 - 157/68)  BP(mean): --  RR: 16 (2020 05:41) (16 - 16)  SpO2: --        PHYSICAL EXAM:  GENERAL: Not in distress   CHEST/LUNG:  Not using accessory muscles  HEART: s1 and s2 present  ABDOMEN:  Nontender and  Nondistended  EXTREMITIES: No pedal  edema  CNS: Awake and Alert        LABS: No new Labs                          13.1   6.39  )-----------( 292      ( 2020 00:40 )             40.8         06    140  |  105  |  25<H>  ----------------------------<  128<H>  4.2   |  26  |  1.1    Ca    9.4      2020 00:40  Mg     2.0         TPro  6.6  /  Alb  4.3  /  TBili  0.3  /  DBili  x   /  AST  13  /  ALT  <5  /  AlkPhos  120<H>          Urinalysis Basic - ( 2020 01:10 )  Color: Yellow / Appearance: Clear / S.020 / pH: x  Gluc: x / Ketone: Negative  / Bili: Negative / Urobili: 0.2 mg/dL   Blood: x / Protein: Negative mg/dL / Nitrite: Positive   Leuk Esterase: Small / RBC: x / WBC 26-50 /HPF   Sq Epi: x / Non Sq Epi: Moderate /HPF / Bacteria: TNTC /HPF        MEDICATIONS  (STANDING):    atorvastatin 80 milliGRAM(s) Oral at bedtime  carbidopa/levodopa CR 50/200 1 Tablet(s) Oral <User Schedule>  clopidogrel Tablet 75 milliGRAM(s) Oral at bedtime  diltiazem    milliGRAM(s) Oral daily  enoxaparin Injectable 40 milliGRAM(s) SubCutaneous daily  hydrochlorothiazide Oral Tab/Cap - Peds 12.5 milliGRAM(s) Oral daily  meropenem  IVPB 500 milliGRAM(s) IV Intermittent every 8 hours  pramipexole 0.5 milliGRAM(s) Oral three times a day          RADIOLOGY & ADDITIONAL TESTS:    < from: US Retroperitoneal Complete (20 @ 08:31) >    No  tract stones are delineated.    No hydronephrosis on ultrasound.    Nonspecific nonvisualization of the right ureteral jet. The left ureteral jet is visualized.    Urinary bladder volume 91.5 cc.    Incidental right renal midpole 2.3 cm cyst.      < end of copied text >        MICROBIOLOGY DATA:      COVID-19 PCR . (20 @ 03:00)    COVID-19 PCR: NotDetec      Culture - Urine (20 @ 01:10)    -  Nitrofurantoin: S <=32 Should not be used to treat pyelonephritis    -  Piperacillin/Tazobactam: R <=16    -  Tigecycline: S <=2    -  Tobramycin: S <=4    -  Trimethoprim/Sulfamethoxazole: S <=2/38    -  Amikacin: S <=16    -  Ampicillin: R >16 These ampicillin results predict results for amoxicillin    -  Ampicillin/Sulbactam: R >16/8 Enterobacter, Citrobacter, and Serratia may develop resistance during prolonged therapy (3-4 days)    -  Aztreonam: R >16    -  Cefazolin: R >16 (MIC_CL_COM_ENTERIC_CEFAZU) For uncomplicated UTI with K. pneumoniae, E. coli, or P. mirablis: ADAM <=16 is sensitive and ADAM >=32 is resistant. This also predicts results for oral agents cefaclor, cefdinir, cefpodoxime, cefprozil, cefuroxime axetil, cephalexin and locarbef for uncomplicated UTI. Note that some isolates may be susceptible to these agents while testing resistant to cefazolin.    -  Cefepime: R 8    -  Cefoxitin: S <=8    -  Ciprofloxacin: S <=1    -  Ertapenem: S <=1    -  Gentamicin: S <=4    -  Ceftriaxone: R >32 Enterobacter, Citrobacter, and Serratia may develop resistance during prolonged therapy    -  Imipenem: S <=1    -  Levofloxacin: S <=2    -  Meropenem: S <=1    Specimen Source: .Urine Clean Catch (Midstream)    Culture Results:   >100,000 CFU/ml Escherichia coli ESBL    Organism Identification: Escherichia coli ESBL    Organism: Escherichia coli ESBL    Method Type: ADAM

## 2020-06-25 NOTE — PROGRESS NOTE ADULT - ASSESSMENT
Patient is a 87y old  Female with pmh of HTN, HLD, RA, Parkinson, who presented to the ER for evaluation of nausea.  She was recently discharged after treatment of  UTI with abx. Pt comes back with similar symptoms. In the ER, CT A/P showed mild hydronephrosis / dilation of the distal right ureter, possible nonradiopaque calculus or stricture. Urine analysis is positive. The last urine culture grew Proteus mirabilis sens to cipro/ ceftriaxone, hence she was given cipro in the ER.     # UTI - ESBL E.coli    would recommend:    1. Continue Meropenem inpatient and May change to oral Abx on discharge to complete the course  2. Monitor kidney function  3. OOB to chair    Attending Attestation:    Spent more than 45 minutes on total encounter, more than 50 % of the visit was spent counseling and/or coordinating care by the Attending physician. Patient is a 87y old  Female with pmh of HTN, HLD, RA, Parkinson, who presented to the ER for evaluation of nausea.  She was recently discharged after treatment of  UTI with abx. Pt comes back with similar symptoms. In the ER, CT A/P showed mild hydronephrosis / dilation of the distal right ureter, possible nonradiopaque calculus or stricture. Urine analysis is positive. The last urine culture grew Proteus mirabilis sens to cipro/ ceftriaxone, hence she was given cipro in the ER.     # UTI - ESBL E.coli    would recommend:    1. Continue Meropenem inpatient and May change to oral Cipro on discharge to continue until 6/29/20  2. Monitor kidney function  3. OOB to chair    Attending Attestation:    Spent more than 40 minutes on total encounter, more than 50 % of the visit was spent counseling and/or coordinating care by the Attending physician.

## 2020-06-30 DIAGNOSIS — B96.20 UNSPECIFIED ESCHERICHIA COLI [E. COLI] AS THE CAUSE OF DISEASES CLASSIFIED ELSEWHERE: ICD-10-CM

## 2020-06-30 DIAGNOSIS — E78.5 HYPERLIPIDEMIA, UNSPECIFIED: ICD-10-CM

## 2020-06-30 DIAGNOSIS — Z79.899 OTHER LONG TERM (CURRENT) DRUG THERAPY: ICD-10-CM

## 2020-06-30 DIAGNOSIS — G20 PARKINSON'S DISEASE: ICD-10-CM

## 2020-06-30 DIAGNOSIS — M06.9 RHEUMATOID ARTHRITIS, UNSPECIFIED: ICD-10-CM

## 2020-06-30 DIAGNOSIS — B96.4 PROTEUS (MIRABILIS) (MORGANII) AS THE CAUSE OF DISEASES CLASSIFIED ELSEWHERE: ICD-10-CM

## 2020-06-30 DIAGNOSIS — Z16.12 EXTENDED SPECTRUM BETA LACTAMASE (ESBL) RESISTANCE: ICD-10-CM

## 2020-06-30 DIAGNOSIS — N39.0 URINARY TRACT INFECTION, SITE NOT SPECIFIED: ICD-10-CM

## 2020-06-30 DIAGNOSIS — Z90.710 ACQUIRED ABSENCE OF BOTH CERVIX AND UTERUS: ICD-10-CM

## 2020-06-30 DIAGNOSIS — I10 ESSENTIAL (PRIMARY) HYPERTENSION: ICD-10-CM

## 2020-06-30 DIAGNOSIS — N13.30 UNSPECIFIED HYDRONEPHROSIS: ICD-10-CM

## 2020-06-30 DIAGNOSIS — R53.81 OTHER MALAISE: ICD-10-CM

## 2020-07-19 ENCOUNTER — INPATIENT (INPATIENT)
Facility: HOSPITAL | Age: 85
LOS: 0 days | Discharge: HOME | End: 2020-07-20
Attending: INTERNAL MEDICINE | Admitting: INTERNAL MEDICINE
Payer: MEDICARE

## 2020-07-19 VITALS
DIASTOLIC BLOOD PRESSURE: 81 MMHG | TEMPERATURE: 96 F | SYSTOLIC BLOOD PRESSURE: 179 MMHG | RESPIRATION RATE: 18 BRPM | HEART RATE: 74 BPM | OXYGEN SATURATION: 96 %

## 2020-07-19 DIAGNOSIS — Z90.710 ACQUIRED ABSENCE OF BOTH CERVIX AND UTERUS: Chronic | ICD-10-CM

## 2020-07-19 LAB
ALBUMIN SERPL ELPH-MCNC: 4.2 G/DL — SIGNIFICANT CHANGE UP (ref 3.5–5.2)
ALP SERPL-CCNC: 119 U/L — HIGH (ref 30–115)
ALT FLD-CCNC: <5 U/L — SIGNIFICANT CHANGE UP (ref 0–41)
ANION GAP SERPL CALC-SCNC: 12 MMOL/L — SIGNIFICANT CHANGE UP (ref 7–14)
APPEARANCE UR: CLEAR — SIGNIFICANT CHANGE UP
AST SERPL-CCNC: 14 U/L — SIGNIFICANT CHANGE UP (ref 0–41)
BASOPHILS # BLD AUTO: 0.05 K/UL — SIGNIFICANT CHANGE UP (ref 0–0.2)
BASOPHILS NFR BLD AUTO: 0.8 % — SIGNIFICANT CHANGE UP (ref 0–1)
BILIRUB SERPL-MCNC: 0.3 MG/DL — SIGNIFICANT CHANGE UP (ref 0.2–1.2)
BILIRUB UR-MCNC: NEGATIVE — SIGNIFICANT CHANGE UP
BUN SERPL-MCNC: 28 MG/DL — HIGH (ref 10–20)
CALCIUM SERPL-MCNC: 9.4 MG/DL — SIGNIFICANT CHANGE UP (ref 8.5–10.1)
CHLORIDE SERPL-SCNC: 104 MMOL/L — SIGNIFICANT CHANGE UP (ref 98–110)
CO2 SERPL-SCNC: 25 MMOL/L — SIGNIFICANT CHANGE UP (ref 17–32)
COLOR SPEC: YELLOW — SIGNIFICANT CHANGE UP
CREAT SERPL-MCNC: 1.2 MG/DL — SIGNIFICANT CHANGE UP (ref 0.7–1.5)
DIFF PNL FLD: NEGATIVE — SIGNIFICANT CHANGE UP
EOSINOPHIL # BLD AUTO: 0.07 K/UL — SIGNIFICANT CHANGE UP (ref 0–0.7)
EOSINOPHIL NFR BLD AUTO: 1.1 % — SIGNIFICANT CHANGE UP (ref 0–8)
GLUCOSE SERPL-MCNC: 113 MG/DL — HIGH (ref 70–99)
GLUCOSE UR QL: NEGATIVE MG/DL — SIGNIFICANT CHANGE UP
HCT VFR BLD CALC: 42.3 % — SIGNIFICANT CHANGE UP (ref 37–47)
HGB BLD-MCNC: 13.5 G/DL — SIGNIFICANT CHANGE UP (ref 12–16)
IMM GRANULOCYTES NFR BLD AUTO: 0.8 % — HIGH (ref 0.1–0.3)
KETONES UR-MCNC: NEGATIVE — SIGNIFICANT CHANGE UP
LACTATE SERPL-SCNC: 1 MMOL/L — SIGNIFICANT CHANGE UP (ref 0.7–2)
LEUKOCYTE ESTERASE UR-ACNC: NEGATIVE — SIGNIFICANT CHANGE UP
LIDOCAIN IGE QN: 24 U/L — SIGNIFICANT CHANGE UP (ref 7–60)
LYMPHOCYTES # BLD AUTO: 1.47 K/UL — SIGNIFICANT CHANGE UP (ref 1.2–3.4)
LYMPHOCYTES # BLD AUTO: 23.1 % — SIGNIFICANT CHANGE UP (ref 20.5–51.1)
MAGNESIUM SERPL-MCNC: 1.9 MG/DL — SIGNIFICANT CHANGE UP (ref 1.8–2.4)
MCHC RBC-ENTMCNC: 28.7 PG — SIGNIFICANT CHANGE UP (ref 27–31)
MCHC RBC-ENTMCNC: 31.9 G/DL — LOW (ref 32–37)
MCV RBC AUTO: 90 FL — SIGNIFICANT CHANGE UP (ref 81–99)
MONOCYTES # BLD AUTO: 0.49 K/UL — SIGNIFICANT CHANGE UP (ref 0.1–0.6)
MONOCYTES NFR BLD AUTO: 7.7 % — SIGNIFICANT CHANGE UP (ref 1.7–9.3)
NEUTROPHILS # BLD AUTO: 4.22 K/UL — SIGNIFICANT CHANGE UP (ref 1.4–6.5)
NEUTROPHILS NFR BLD AUTO: 66.5 % — SIGNIFICANT CHANGE UP (ref 42.2–75.2)
NITRITE UR-MCNC: NEGATIVE — SIGNIFICANT CHANGE UP
NRBC # BLD: 0 /100 WBCS — SIGNIFICANT CHANGE UP (ref 0–0)
PH UR: 6.5 — SIGNIFICANT CHANGE UP (ref 5–8)
PLATELET # BLD AUTO: 239 K/UL — SIGNIFICANT CHANGE UP (ref 130–400)
POTASSIUM SERPL-MCNC: 3.7 MMOL/L — SIGNIFICANT CHANGE UP (ref 3.5–5)
POTASSIUM SERPL-SCNC: 3.7 MMOL/L — SIGNIFICANT CHANGE UP (ref 3.5–5)
PROT SERPL-MCNC: 6.7 G/DL — SIGNIFICANT CHANGE UP (ref 6–8)
PROT UR-MCNC: NEGATIVE MG/DL — SIGNIFICANT CHANGE UP
RBC # BLD: 4.7 M/UL — SIGNIFICANT CHANGE UP (ref 4.2–5.4)
RBC # FLD: 12.7 % — SIGNIFICANT CHANGE UP (ref 11.5–14.5)
SODIUM SERPL-SCNC: 141 MMOL/L — SIGNIFICANT CHANGE UP (ref 135–146)
SP GR SPEC: 1.01 — SIGNIFICANT CHANGE UP (ref 1.01–1.03)
TROPONIN T SERPL-MCNC: <0.01 NG/ML — SIGNIFICANT CHANGE UP
UROBILINOGEN FLD QL: 0.2 MG/DL — SIGNIFICANT CHANGE UP (ref 0.2–0.2)
WBC # BLD: 6.35 K/UL — SIGNIFICANT CHANGE UP (ref 4.8–10.8)
WBC # FLD AUTO: 6.35 K/UL — SIGNIFICANT CHANGE UP (ref 4.8–10.8)

## 2020-07-19 PROCEDURE — 74177 CT ABD & PELVIS W/CONTRAST: CPT | Mod: 26

## 2020-07-19 PROCEDURE — 99285 EMERGENCY DEPT VISIT HI MDM: CPT

## 2020-07-19 PROCEDURE — 71045 X-RAY EXAM CHEST 1 VIEW: CPT | Mod: 26

## 2020-07-19 RX ORDER — ONDANSETRON 8 MG/1
4 TABLET, FILM COATED ORAL ONCE
Refills: 0 | Status: COMPLETED | OUTPATIENT
Start: 2020-07-19 | End: 2020-07-19

## 2020-07-19 RX ORDER — SODIUM CHLORIDE 9 MG/ML
500 INJECTION INTRAMUSCULAR; INTRAVENOUS; SUBCUTANEOUS ONCE
Refills: 0 | Status: COMPLETED | OUTPATIENT
Start: 2020-07-19 | End: 2020-07-19

## 2020-07-19 RX ADMIN — ONDANSETRON 4 MILLIGRAM(S): 8 TABLET, FILM COATED ORAL at 21:17

## 2020-07-19 RX ADMIN — ONDANSETRON 4 MILLIGRAM(S): 8 TABLET, FILM COATED ORAL at 21:41

## 2020-07-19 RX ADMIN — SODIUM CHLORIDE 500 MILLILITER(S): 9 INJECTION INTRAMUSCULAR; INTRAVENOUS; SUBCUTANEOUS at 21:17

## 2020-07-19 NOTE — ED PROVIDER NOTE - ATTENDING CONTRIBUTION TO CARE
I personally evaluated the patient. I reviewed the Resident’s or Physician Assistant’s note (as assigned above), and agree with the findings and plan except as documented in my note.  Chart reviewed. H/O Parkinson's, UTI, presents with nausea for 1 day. Denies abdominal pain or diarrhea. Denies chest pain or SOB. Last meal this AM, had tea and toast. Unable to tolerate PO. Exam shows alert patient, HEENT dry mucus membranes, lungs clear, RR S1S2, abdomen soft Nt +BS, no CCE, +resting tremors, neuro A&OX3 no deficits.

## 2020-07-19 NOTE — ED PROVIDER NOTE - CARE PLAN
Principal Discharge DX:	Dehydration Principal Discharge DX:	Dehydration  Secondary Diagnosis:	Nausea

## 2020-07-19 NOTE — ED PROVIDER NOTE - CLINICAL SUMMARY MEDICAL DECISION MAKING FREE TEXT BOX
Labs noted for BUN 28/Cr1.2, trop negative. UA negative. EKG no stemi. CXR negative. CT adbo no obstruction. Given IVF, Zofranx2, Reglan without improvement. Unable to tolerate PO. Will admit.

## 2020-07-19 NOTE — ED ADULT NURSE NOTE - NSIMPLEMENTINTERV_GEN_ALL_ED
Implemented All Fall with Harm Risk Interventions:  [place] to call system. Call bell, personal items and telephone within reach. Instruct patient to call for assistance. Room bathroom lighting operational. Non-slip footwear when patient is off stretcher. Physically safe environment: no spills, clutter or unnecessary equipment. Stretcher in lowest position, wheels locked, appropriate side rails in place. Provide visual cue, wrist band, yellow gown, etc. Monitor gait and stability. Monitor for mental status changes and reorient to person, place, and time. Review medications for side effects contributing to fall risk. Reinforce activity limits and safety measures with patient and family. Provide visual clues: red socks.

## 2020-07-20 ENCOUNTER — TRANSCRIPTION ENCOUNTER (OUTPATIENT)
Age: 85
End: 2020-07-20

## 2020-07-20 VITALS
HEART RATE: 67 BPM | TEMPERATURE: 98 F | SYSTOLIC BLOOD PRESSURE: 157 MMHG | RESPIRATION RATE: 18 BRPM | DIASTOLIC BLOOD PRESSURE: 67 MMHG

## 2020-07-20 LAB
ANION GAP SERPL CALC-SCNC: 11 MMOL/L — SIGNIFICANT CHANGE UP (ref 7–14)
BUN SERPL-MCNC: 15 MG/DL — SIGNIFICANT CHANGE UP (ref 10–20)
CALCIUM SERPL-MCNC: 9 MG/DL — SIGNIFICANT CHANGE UP (ref 8.5–10.1)
CHLORIDE SERPL-SCNC: 105 MMOL/L — SIGNIFICANT CHANGE UP (ref 98–110)
CO2 SERPL-SCNC: 25 MMOL/L — SIGNIFICANT CHANGE UP (ref 17–32)
CREAT SERPL-MCNC: 0.8 MG/DL — SIGNIFICANT CHANGE UP (ref 0.7–1.5)
GLUCOSE SERPL-MCNC: 155 MG/DL — HIGH (ref 70–99)
HCT VFR BLD CALC: 42.9 % — SIGNIFICANT CHANGE UP (ref 37–47)
HGB BLD-MCNC: 13.9 G/DL — SIGNIFICANT CHANGE UP (ref 12–16)
MCHC RBC-ENTMCNC: 29.1 PG — SIGNIFICANT CHANGE UP (ref 27–31)
MCHC RBC-ENTMCNC: 32.4 G/DL — SIGNIFICANT CHANGE UP (ref 32–37)
MCV RBC AUTO: 89.9 FL — SIGNIFICANT CHANGE UP (ref 81–99)
NRBC # BLD: 0 /100 WBCS — SIGNIFICANT CHANGE UP (ref 0–0)
PLATELET # BLD AUTO: 245 K/UL — SIGNIFICANT CHANGE UP (ref 130–400)
POTASSIUM SERPL-MCNC: 4 MMOL/L — SIGNIFICANT CHANGE UP (ref 3.5–5)
POTASSIUM SERPL-SCNC: 4 MMOL/L — SIGNIFICANT CHANGE UP (ref 3.5–5)
RBC # BLD: 4.77 M/UL — SIGNIFICANT CHANGE UP (ref 4.2–5.4)
RBC # FLD: 12.6 % — SIGNIFICANT CHANGE UP (ref 11.5–14.5)
SODIUM SERPL-SCNC: 141 MMOL/L — SIGNIFICANT CHANGE UP (ref 135–146)
WBC # BLD: 8.48 K/UL — SIGNIFICANT CHANGE UP (ref 4.8–10.8)
WBC # FLD AUTO: 8.48 K/UL — SIGNIFICANT CHANGE UP (ref 4.8–10.8)

## 2020-07-20 PROCEDURE — 99223 1ST HOSP IP/OBS HIGH 75: CPT

## 2020-07-20 RX ORDER — SODIUM CHLORIDE 9 MG/ML
2000 INJECTION INTRAMUSCULAR; INTRAVENOUS; SUBCUTANEOUS ONCE
Refills: 0 | Status: COMPLETED | OUTPATIENT
Start: 2020-07-20 | End: 2020-07-20

## 2020-07-20 RX ORDER — ATORVASTATIN CALCIUM 80 MG/1
80 TABLET, FILM COATED ORAL AT BEDTIME
Refills: 0 | Status: DISCONTINUED | OUTPATIENT
Start: 2020-07-20 | End: 2020-07-20

## 2020-07-20 RX ORDER — CLOPIDOGREL BISULFATE 75 MG/1
75 TABLET, FILM COATED ORAL DAILY
Refills: 0 | Status: DISCONTINUED | OUTPATIENT
Start: 2020-07-20 | End: 2020-07-20

## 2020-07-20 RX ORDER — DILTIAZEM HCL 120 MG
360 CAPSULE, EXT RELEASE 24 HR ORAL DAILY
Refills: 0 | Status: DISCONTINUED | OUTPATIENT
Start: 2020-07-20 | End: 2020-07-20

## 2020-07-20 RX ORDER — SUCRALFATE 1 G
1 TABLET ORAL
Qty: 60 | Refills: 0
Start: 2020-07-20 | End: 2020-08-18

## 2020-07-20 RX ORDER — METOCLOPRAMIDE HCL 10 MG
10 TABLET ORAL ONCE
Refills: 0 | Status: COMPLETED | OUTPATIENT
Start: 2020-07-20 | End: 2020-07-20

## 2020-07-20 RX ORDER — ONDANSETRON 8 MG/1
4 TABLET, FILM COATED ORAL EVERY 6 HOURS
Refills: 0 | Status: DISCONTINUED | OUTPATIENT
Start: 2020-07-20 | End: 2020-07-20

## 2020-07-20 RX ORDER — HEPARIN SODIUM 5000 [USP'U]/ML
5000 INJECTION INTRAVENOUS; SUBCUTANEOUS EVERY 8 HOURS
Refills: 0 | Status: DISCONTINUED | OUTPATIENT
Start: 2020-07-20 | End: 2020-07-20

## 2020-07-20 RX ORDER — SODIUM CHLORIDE 9 MG/ML
1000 INJECTION INTRAMUSCULAR; INTRAVENOUS; SUBCUTANEOUS
Refills: 0 | Status: DISCONTINUED | OUTPATIENT
Start: 2020-07-20 | End: 2020-07-20

## 2020-07-20 RX ORDER — PRAMIPEXOLE DIHYDROCHLORIDE 0.12 MG/1
1 TABLET ORAL THREE TIMES A DAY
Refills: 0 | Status: DISCONTINUED | OUTPATIENT
Start: 2020-07-20 | End: 2020-07-20

## 2020-07-20 RX ORDER — CARBIDOPA AND LEVODOPA 25; 100 MG/1; MG/1
1 TABLET ORAL
Refills: 0 | Status: DISCONTINUED | OUTPATIENT
Start: 2020-07-20 | End: 2020-07-20

## 2020-07-20 RX ADMIN — HEPARIN SODIUM 5000 UNIT(S): 5000 INJECTION INTRAVENOUS; SUBCUTANEOUS at 13:53

## 2020-07-20 RX ADMIN — SODIUM CHLORIDE 75 MILLILITER(S): 9 INJECTION INTRAMUSCULAR; INTRAVENOUS; SUBCUTANEOUS at 02:10

## 2020-07-20 RX ADMIN — Medication 360 MILLIGRAM(S): at 06:13

## 2020-07-20 RX ADMIN — SODIUM CHLORIDE 2000 MILLILITER(S): 9 INJECTION INTRAMUSCULAR; INTRAVENOUS; SUBCUTANEOUS at 00:14

## 2020-07-20 RX ADMIN — CARBIDOPA AND LEVODOPA 1 TABLET(S): 25; 100 TABLET ORAL at 08:27

## 2020-07-20 RX ADMIN — CLOPIDOGREL BISULFATE 75 MILLIGRAM(S): 75 TABLET, FILM COATED ORAL at 11:09

## 2020-07-20 RX ADMIN — PRAMIPEXOLE DIHYDROCHLORIDE 1 MILLIGRAM(S): 0.12 TABLET ORAL at 06:13

## 2020-07-20 RX ADMIN — Medication 0.1 MILLIGRAM(S): at 02:10

## 2020-07-20 RX ADMIN — PRAMIPEXOLE DIHYDROCHLORIDE 1 MILLIGRAM(S): 0.12 TABLET ORAL at 13:53

## 2020-07-20 RX ADMIN — CARBIDOPA AND LEVODOPA 1 TABLET(S): 25; 100 TABLET ORAL at 16:27

## 2020-07-20 RX ADMIN — Medication 10 MILLIGRAM(S): at 00:13

## 2020-07-20 RX ADMIN — HEPARIN SODIUM 5000 UNIT(S): 5000 INJECTION INTRAVENOUS; SUBCUTANEOUS at 06:13

## 2020-07-20 NOTE — H&P ADULT - ASSESSMENT
Pt is an 86 YO F who will be treated for nausea, possibly secondary to pre renal azotemia due to dehydration. Stop HCTZ, cont IVF, zofran PRN for nausea. Trend Renal function, start with liquid diet and advance diet as tolerated. For her parkinsonism / HTN / HLD cont home meds. Pt was encouraged to increase her oral intake to at least 12 cups of liquid a day at home.           DVT ppx: lvnx Pt is an 88 YO F who will be treated for nausea, possibly secondary to pre renal azotemia due to dehydration. Stop HCTZ, cont IVF, zofran PRN for nausea. Trend Renal function, start with liquid diet and advance diet as tolerated. For her parkinsonism / HTN / HLD cont home meds. Pt was encouraged to increase her oral intake to at least 12 cups of liquid a day at home.           DVT ppx: Heparin SQ

## 2020-07-20 NOTE — H&P ADULT - NSHPPHYSICALEXAM_GEN_ALL_CORE
PHYSICAL EXAM:    CONSTITUTIONAL: NAD, saturating at >90% on RA.   ENMT: EOMI, PERRLA,  neck supple, No JVD  RESPIRATORY: Clear to percussion bilaterally; No rales, rhonchi, wheezing, or rubs  CARDIOVASCULAR: Regular rate and rhythm; No murmurs, rubs, or gallops, negative edema  GASTROINTESTINAL: Soft, Nontender, Nondistended; Bowel sounds present  EXTREMITIES:  2+ Peripheral Pulses, No clubbing, cyanosis  PSYCH: Alert & Oriented X3, denies suicidal or homicidal ideation, denies auditory or visual hallucinations   NEURO: A&O X3, follows commands. Non focal neuro exam.   SKIN: No rashes or lesions

## 2020-07-20 NOTE — DISCHARGE NOTE NURSING/CASE MANAGEMENT/SOCIAL WORK - PATIENT PORTAL LINK FT
You can access the FollowMyHealth Patient Portal offered by [facility] by registering at the following website: [email or web address]. By joining [employer]’s FollowMyHealth portal, you will also be able to view your health information using other applications (apps) compatible with our system.

## 2020-07-20 NOTE — DISCHARGE NOTE PROVIDER - NSDCMRMEDTOKEN_GEN_ALL_CORE_FT
atorvastatin 80 mg oral tablet: 1 tab(s) orally once a day  carbidopa-levodopa 50 mg-200 mg oral tablet, extended release: 1 tab(s) orally 3 times a day  clopidogrel 75 mg oral tablet: 1 tab(s) orally once a day (at bedtime)  DilTIAZem Hydrochloride  mg/24 hours oral capsule, extended release: 1 cap(s) orally once a day  hydroCHLOROthiazide 12.5 mg oral tablet: 1 tab(s) orally once a day  ondansetron 4 mg oral disintegrating strip: 1 each orally every 8 hours, As Needed -for nausea   pramipexole 0.5 mg oral tablet: 2 tab(s) orally 3 times a day

## 2020-07-20 NOTE — DISCHARGE NOTE PROVIDER - NSDCCPCAREPLAN_GEN_ALL_CORE_FT
PRINCIPAL DISCHARGE DIAGNOSIS  Diagnosis: Dehydration  Assessment and Plan of Treatment: Maintain adequate hydration. Follow up with your PCP in 1 month      SECONDARY DISCHARGE DIAGNOSES  Diagnosis: Nausea  Assessment and Plan of Treatment:

## 2020-07-20 NOTE — H&P ADULT - HISTORY OF PRESENT ILLNESS
Pt is a 86 YO F with a pmh of HTN (hypertension), Hyperlipidemia, Parkinson disease, Rheumatoid arthritis. Pt presents to the ER with a chief complaint of nausea. Pt was given zofran 4mg x2 and reglan 10mg in the ER and pt continued to be severely nauseous. Workup in the ER showed that the pt is dehydrated with a BUN/Cr of 28/1.2. CT A/P showed: No evidence of acute intra-abdominal pathology.  Stable mild right hydronephrosis with the distal ureter caliber change.  Pt drinks one small bottle of water daily. Pt was seen and examined at bedside, pt denies any CP, SOB, palpitations, vomiting, diarrhea, fever or chills.

## 2020-07-20 NOTE — H&P ADULT - NSHPLABSRESULTS_GEN_ALL_CORE
13.5   6.35  )-----------( 239      ( 2020 20:47 )             42.3           141  |  104  |  28<H>  ----------------------------<  113<H>  3.7   |  25  |  1.2    Ca    9.4      2020 20:47  Mg     1.9         TPro  6.7  /  Alb  4.2  /  TBili  0.3  /  DBili  x   /  AST  14  /  ALT  <5  /  AlkPhos  119<H>                Urinalysis Basic - ( 2020 20:47 )    Color: Yellow / Appearance: Clear / S.015 / pH: x  Gluc: x / Ketone: Negative  / Bili: Negative / Urobili: 0.2 mg/dL   Blood: x / Protein: Negative mg/dL / Nitrite: Negative   Leuk Esterase: Negative / RBC: x / WBC x   Sq Epi: x / Non Sq Epi: x / Bacteria: x            Lactate Trend   @ 20:47 Lactate:1.0       CARDIAC MARKERS ( 2020 20:47 )  x     / <0.01 ng/mL / x     / x     / x            CAPILLARY BLOOD GLUCOSE

## 2020-07-21 LAB
SARS-COV-2 IGG SERPL QL IA: NEGATIVE — SIGNIFICANT CHANGE UP
SARS-COV-2 IGM SERPL IA-ACNC: 0.01 INDEX — SIGNIFICANT CHANGE UP
SARS-COV-2 RNA SPEC QL NAA+PROBE: SIGNIFICANT CHANGE UP

## 2020-07-23 ENCOUNTER — EMERGENCY (EMERGENCY)
Facility: HOSPITAL | Age: 85
LOS: 0 days | Discharge: HOME | End: 2020-07-24
Attending: EMERGENCY MEDICINE | Admitting: EMERGENCY MEDICINE
Payer: MEDICARE

## 2020-07-23 VITALS
OXYGEN SATURATION: 98 % | RESPIRATION RATE: 22 BRPM | DIASTOLIC BLOOD PRESSURE: 90 MMHG | TEMPERATURE: 98 F | HEART RATE: 77 BPM | SYSTOLIC BLOOD PRESSURE: 117 MMHG | HEIGHT: 64 IN | WEIGHT: 139.99 LBS

## 2020-07-23 VITALS
SYSTOLIC BLOOD PRESSURE: 193 MMHG | HEART RATE: 75 BPM | TEMPERATURE: 98 F | OXYGEN SATURATION: 98 % | DIASTOLIC BLOOD PRESSURE: 84 MMHG | RESPIRATION RATE: 20 BRPM

## 2020-07-23 DIAGNOSIS — E78.5 HYPERLIPIDEMIA, UNSPECIFIED: ICD-10-CM

## 2020-07-23 DIAGNOSIS — R11.0 NAUSEA: ICD-10-CM

## 2020-07-23 DIAGNOSIS — E86.0 DEHYDRATION: ICD-10-CM

## 2020-07-23 DIAGNOSIS — G20 PARKINSON'S DISEASE: ICD-10-CM

## 2020-07-23 DIAGNOSIS — M06.9 RHEUMATOID ARTHRITIS, UNSPECIFIED: ICD-10-CM

## 2020-07-23 DIAGNOSIS — I10 ESSENTIAL (PRIMARY) HYPERTENSION: ICD-10-CM

## 2020-07-23 DIAGNOSIS — Z79.899 OTHER LONG TERM (CURRENT) DRUG THERAPY: ICD-10-CM

## 2020-07-23 DIAGNOSIS — Z90.710 ACQUIRED ABSENCE OF BOTH CERVIX AND UTERUS: Chronic | ICD-10-CM

## 2020-07-23 DIAGNOSIS — Z87.440 PERSONAL HISTORY OF URINARY (TRACT) INFECTIONS: ICD-10-CM

## 2020-07-23 DIAGNOSIS — I49.3 VENTRICULAR PREMATURE DEPOLARIZATION: ICD-10-CM

## 2020-07-23 LAB
ALBUMIN SERPL ELPH-MCNC: 4.3 G/DL — SIGNIFICANT CHANGE UP (ref 3.5–5.2)
ALP SERPL-CCNC: 123 U/L — HIGH (ref 30–115)
ALT FLD-CCNC: <5 U/L — SIGNIFICANT CHANGE UP (ref 0–41)
ANION GAP SERPL CALC-SCNC: 9 MMOL/L — SIGNIFICANT CHANGE UP (ref 7–14)
APPEARANCE UR: CLEAR — SIGNIFICANT CHANGE UP
AST SERPL-CCNC: 15 U/L — SIGNIFICANT CHANGE UP (ref 0–41)
BACTERIA # UR AUTO: ABNORMAL
BASOPHILS # BLD AUTO: 0.05 K/UL — SIGNIFICANT CHANGE UP (ref 0–0.2)
BASOPHILS NFR BLD AUTO: 0.7 % — SIGNIFICANT CHANGE UP (ref 0–1)
BILIRUB SERPL-MCNC: 0.2 MG/DL — SIGNIFICANT CHANGE UP (ref 0.2–1.2)
BILIRUB UR-MCNC: NEGATIVE — SIGNIFICANT CHANGE UP
BUN SERPL-MCNC: 19 MG/DL — SIGNIFICANT CHANGE UP (ref 10–20)
CALCIUM SERPL-MCNC: 10.1 MG/DL — SIGNIFICANT CHANGE UP (ref 8.5–10.1)
CHLORIDE SERPL-SCNC: 105 MMOL/L — SIGNIFICANT CHANGE UP (ref 98–110)
CO2 SERPL-SCNC: 28 MMOL/L — SIGNIFICANT CHANGE UP (ref 17–32)
COD CRY URNS QL: NEGATIVE — SIGNIFICANT CHANGE UP
COLOR SPEC: YELLOW — SIGNIFICANT CHANGE UP
CREAT SERPL-MCNC: 1.1 MG/DL — SIGNIFICANT CHANGE UP (ref 0.7–1.5)
DIFF PNL FLD: NEGATIVE — SIGNIFICANT CHANGE UP
EOSINOPHIL # BLD AUTO: 0.13 K/UL — SIGNIFICANT CHANGE UP (ref 0–0.7)
EOSINOPHIL NFR BLD AUTO: 1.8 % — SIGNIFICANT CHANGE UP (ref 0–8)
EPI CELLS # UR: ABNORMAL /HPF
GLUCOSE SERPL-MCNC: 109 MG/DL — HIGH (ref 70–99)
GLUCOSE UR QL: NEGATIVE MG/DL — SIGNIFICANT CHANGE UP
GRAN CASTS # UR COMP ASSIST: NEGATIVE — SIGNIFICANT CHANGE UP
HCT VFR BLD CALC: 44.3 % — SIGNIFICANT CHANGE UP (ref 37–47)
HGB BLD-MCNC: 14.2 G/DL — SIGNIFICANT CHANGE UP (ref 12–16)
HYALINE CASTS # UR AUTO: NEGATIVE — SIGNIFICANT CHANGE UP
IMM GRANULOCYTES NFR BLD AUTO: 0.4 % — HIGH (ref 0.1–0.3)
KETONES UR-MCNC: NEGATIVE — SIGNIFICANT CHANGE UP
LEUKOCYTE ESTERASE UR-ACNC: ABNORMAL
LYMPHOCYTES # BLD AUTO: 1.23 K/UL — SIGNIFICANT CHANGE UP (ref 1.2–3.4)
LYMPHOCYTES # BLD AUTO: 17.1 % — LOW (ref 20.5–51.1)
MAGNESIUM SERPL-MCNC: 1.9 MG/DL — SIGNIFICANT CHANGE UP (ref 1.8–2.4)
MCHC RBC-ENTMCNC: 29 PG — SIGNIFICANT CHANGE UP (ref 27–31)
MCHC RBC-ENTMCNC: 32.1 G/DL — SIGNIFICANT CHANGE UP (ref 32–37)
MCV RBC AUTO: 90.4 FL — SIGNIFICANT CHANGE UP (ref 81–99)
MONOCYTES # BLD AUTO: 0.57 K/UL — SIGNIFICANT CHANGE UP (ref 0.1–0.6)
MONOCYTES NFR BLD AUTO: 7.9 % — SIGNIFICANT CHANGE UP (ref 1.7–9.3)
NEUTROPHILS # BLD AUTO: 5.19 K/UL — SIGNIFICANT CHANGE UP (ref 1.4–6.5)
NEUTROPHILS NFR BLD AUTO: 72.1 % — SIGNIFICANT CHANGE UP (ref 42.2–75.2)
NITRITE UR-MCNC: NEGATIVE — SIGNIFICANT CHANGE UP
NRBC # BLD: 0 /100 WBCS — SIGNIFICANT CHANGE UP (ref 0–0)
PH UR: 7 — SIGNIFICANT CHANGE UP (ref 5–8)
PLATELET # BLD AUTO: 248 K/UL — SIGNIFICANT CHANGE UP (ref 130–400)
POTASSIUM SERPL-MCNC: 4.3 MMOL/L — SIGNIFICANT CHANGE UP (ref 3.5–5)
POTASSIUM SERPL-SCNC: 4.3 MMOL/L — SIGNIFICANT CHANGE UP (ref 3.5–5)
PROT SERPL-MCNC: 6.8 G/DL — SIGNIFICANT CHANGE UP (ref 6–8)
PROT UR-MCNC: NEGATIVE MG/DL — SIGNIFICANT CHANGE UP
RBC # BLD: 4.9 M/UL — SIGNIFICANT CHANGE UP (ref 4.2–5.4)
RBC # FLD: 12.5 % — SIGNIFICANT CHANGE UP (ref 11.5–14.5)
RBC CASTS # UR COMP ASSIST: NEGATIVE — SIGNIFICANT CHANGE UP
SODIUM SERPL-SCNC: 142 MMOL/L — SIGNIFICANT CHANGE UP (ref 135–146)
SP GR SPEC: 1.01 — SIGNIFICANT CHANGE UP (ref 1.01–1.03)
TRI-PHOS CRY UR QL COMP ASSIST: NEGATIVE — SIGNIFICANT CHANGE UP
URATE CRY FLD QL MICRO: NEGATIVE — SIGNIFICANT CHANGE UP
UROBILINOGEN FLD QL: 0.2 MG/DL — SIGNIFICANT CHANGE UP (ref 0.2–0.2)
WBC # BLD: 7.2 K/UL — SIGNIFICANT CHANGE UP (ref 4.8–10.8)
WBC # FLD AUTO: 7.2 K/UL — SIGNIFICANT CHANGE UP (ref 4.8–10.8)
WBC UR QL: SIGNIFICANT CHANGE UP /HPF

## 2020-07-23 PROCEDURE — 99284 EMERGENCY DEPT VISIT MOD MDM: CPT

## 2020-07-23 RX ORDER — ONDANSETRON 8 MG/1
1 TABLET, FILM COATED ORAL
Qty: 21 | Refills: 0
Start: 2020-07-23 | End: 2020-07-29

## 2020-07-23 RX ORDER — SODIUM CHLORIDE 9 MG/ML
1000 INJECTION, SOLUTION INTRAVENOUS ONCE
Refills: 0 | Status: COMPLETED | OUTPATIENT
Start: 2020-07-23 | End: 2020-07-23

## 2020-07-23 RX ORDER — ONDANSETRON 8 MG/1
4 TABLET, FILM COATED ORAL ONCE
Refills: 0 | Status: COMPLETED | OUTPATIENT
Start: 2020-07-23 | End: 2020-07-23

## 2020-07-23 RX ADMIN — SODIUM CHLORIDE 1000 MILLILITER(S): 9 INJECTION, SOLUTION INTRAVENOUS at 23:10

## 2020-07-23 RX ADMIN — ONDANSETRON 104 MILLIGRAM(S): 8 TABLET, FILM COATED ORAL at 23:10

## 2020-07-23 NOTE — ED PROVIDER NOTE - PROGRESS NOTE DETAILS
jhonatan: patient reports improvement of nausea after zofran/fluids. tolerating po. GW: I personally evaluated the patient. I reviewed the Physician Assistant Fellow's note and agree with the findings and plan.

## 2020-07-23 NOTE — ED PROVIDER NOTE - ATTENDING CONTRIBUTION TO CARE
87yF p/w nausea since this afternoon - has had multiple ED visits for nausea, recently w/ UTIs.  Pt said she took zofran when she felt it today, but w/o relief.  Admits to eating well prior to sx, but has not been careful about staying well hydrated.  No fevers, abd pain or diarrhea.

## 2020-07-23 NOTE — ED PROVIDER NOTE - NS ED ROS FT
Review of Systems:  	•	CONSTITUTIONAL: no fever, no diaphoresis, no chills  	•	SKIN: no rash  	•	HEMATOLOGIC: no bleeding, no bruising  	•	ENT: no sore throat, no difficulty swallowing   	•	RESPIRATORY: no shortness of breath, no cough  	•	CARDIAC: no chest pain, no palpitations  	•	GI: +nausea. no abd pain, no vomiting, no diarrhea, no constipation  	•	GENITO-URINARY:  no dysuria; no hematuria, no increased urinary frequency  	•	MUSCULOSKELETAL: no joint paint, no swelling, no redness  	•	NEUROLOGIC: no weakness, no headache, no paresthesias, no LOC

## 2020-07-23 NOTE — ED PROVIDER NOTE - OBJECTIVE STATEMENT
87 year old female who presents with persistent nausea. No fever, chills, URI sx, abd pain, vomiting, diarrhea. Patient recently presented for same, was admitted for refractory symptoms +uculture, started on bactrim. Currently on Bactrim day 3.

## 2020-07-23 NOTE — ED PROVIDER NOTE - CLINICAL SUMMARY MEDICAL DECISION MAKING FREE TEXT BOX
87yF p/w nausea.  Labs reassuring.  Pt much improved w/ zofran and IV LR, now tolerating PO.  Abd benign.  Ok to dc with supportive care, o/p f/u, return precautions.

## 2020-07-23 NOTE — ED PROVIDER NOTE - NSFOLLOWUPINSTRUCTIONS_ED_ALL_ED_FT
Please follow up with your primary care doctor in 1-3 days  Please continue to take your Bactrim for your urinary tract infection  Please be aware of any new or worsening signs or symptoms that should prompt your return to the ER.      Acute Nausea:    WHAT YOU NEED TO KNOW:    Acute nausea and vomiting start suddenly, worsen quickly, and last a short time.    DISCHARGE INSTRUCTIONS:    Return to the emergency department if:     You see blood in your vomit or your bowel movements.      You have sudden, severe pain in your chest and upper abdomen after hard vomiting or retching.      You have swelling in your neck and chest.       You are dizzy, cold, and thirsty and your eyes and mouth are dry.      You are urinating very little or not at all.      You have muscle weakness, leg cramps, and trouble breathing.       Your heart is beating much faster than normal.       You continue to vomit for more than 48 hours.     Contact your healthcare provider if:     You have frequent dry heaves (vomiting but nothing comes out).      Your nausea and vomiting does not get better or go away after you use medicine.      You have questions or concerns about your condition or treatment.    Medicines: You may need any of the following:     Medicines may be given to calm your stomach and stop your vomiting. You may also need medicines to help you feel more relaxed or to stop nausea and vomiting caused by motion sickness.      Gastrointestinal stimulants are used to help empty your stomach and bowels. This may help decrease nausea and vomiting.      Take your medicine as directed. Contact your healthcare provider if you think your medicine is not helping or if you have side effects. Tell him or her if you are allergic to any medicine. Keep a list of the medicines, vitamins, and herbs you take. Include the amounts, and when and why you take them. Bring the list or the pill bottles to follow-up visits. Carry your medicine list with you in case of an emergency.    Prevent or manage acute nausea and vomiting:     Do not drink alcohol. Alcohol may upset or irritate your stomach. Too much alcohol can also cause acute nausea and vomiting.      Control stress. Headaches due to stress may cause nausea and vomiting. Find ways to relax and manage your stress. Get more rest and sleep.      Drink more liquids as directed. Vomiting can lead to dehydration. It is important to drink more liquids to help replace lost body fluids. Ask your healthcare provider how much liquid to drink each day and which liquids are best for you. Your provider may recommend that you drink an oral rehydration solution (ORS). ORS contains water, salts, and sugar that are needed to replace the lost body fluids. Ask what kind of ORS to use, how much to drink, and where to get it.      Eat smaller meals, more often. Eat small amounts of food every 2 to 3 hours, even if you are not hungry. Food in your stomach may decrease your nausea.      Talk to your healthcare provider before you take over-the-counter (OTC) medicines. These medicines can cause serious problems if you use certain other medicines, or you have a medical condition. You may have problems if you use too much or use them for longer than the label says. Follow directions on the label carefully.     Follow up with your healthcare provider as directed: Write down your questions so you remember to ask them during your follow-up visits.       © Copyright [employer] 2019 All illustrations and images included in CareNotes are the copyrighted property of [employer] or [employer].

## 2020-07-23 NOTE — ED PROVIDER NOTE - PHYSICAL EXAMINATION
CONSTITUTIONAL: Well-developed; well-nourished; in no acute distress, nontoxic appearing  SKIN: skin exam is warm and dry,  HEAD: Normocephalic; atraumatic.  EYES: conjunctiva and sclera clear.  ENT: MMM, no nasal congestion  NECK: Normal ROM   CARD: S1, S2 normal, no murmur  RESP: No wheezes, rales or rhonchi. Good air movement bilaterally  ABD: soft; non-distended; non-tender.   EXT: Normal ROM.   NEURO: awake, alert, following commands, oriented, grossly unremarkable. No Focal deficits. GCS 15.   PSYCH: Cooperative, appropriate.

## 2020-07-23 NOTE — ED PROVIDER NOTE - PATIENT PORTAL LINK FT
You can access the FollowMyHealth Patient Portal offered by [facility] by registering at the following website: [email or web address]. By joining [employer]’s FollowMyHealth portal, you will also be able to view your health information using other applications (apps) compatible with our system.

## 2020-07-24 RX ORDER — ONDANSETRON 8 MG/1
1 TABLET, FILM COATED ORAL
Qty: 12 | Refills: 0
Start: 2020-07-24

## 2020-09-10 NOTE — PHYSICAL THERAPY INITIAL EVALUATION ADULT - SITTING BALANCE: STATIC
normal balance

## 2020-10-24 ENCOUNTER — EMERGENCY (EMERGENCY)
Facility: HOSPITAL | Age: 85
LOS: 0 days | Discharge: HOME | End: 2020-10-25
Attending: EMERGENCY MEDICINE | Admitting: EMERGENCY MEDICINE
Payer: MEDICARE

## 2020-10-24 VITALS
DIASTOLIC BLOOD PRESSURE: 85 MMHG | HEIGHT: 64 IN | SYSTOLIC BLOOD PRESSURE: 196 MMHG | HEART RATE: 71 BPM | WEIGHT: 164.91 LBS | RESPIRATION RATE: 20 BRPM | OXYGEN SATURATION: 99 % | TEMPERATURE: 96 F

## 2020-10-24 DIAGNOSIS — I10 ESSENTIAL (PRIMARY) HYPERTENSION: ICD-10-CM

## 2020-10-24 DIAGNOSIS — R11.0 NAUSEA: ICD-10-CM

## 2020-10-24 DIAGNOSIS — R42 DIZZINESS AND GIDDINESS: ICD-10-CM

## 2020-10-24 DIAGNOSIS — R30.0 DYSURIA: ICD-10-CM

## 2020-10-24 DIAGNOSIS — Z90.710 ACQUIRED ABSENCE OF BOTH CERVIX AND UTERUS: Chronic | ICD-10-CM

## 2020-10-24 DIAGNOSIS — E78.5 HYPERLIPIDEMIA, UNSPECIFIED: ICD-10-CM

## 2020-10-24 DIAGNOSIS — Z79.899 OTHER LONG TERM (CURRENT) DRUG THERAPY: ICD-10-CM

## 2020-10-24 DIAGNOSIS — N39.0 URINARY TRACT INFECTION, SITE NOT SPECIFIED: ICD-10-CM

## 2020-10-24 PROCEDURE — 99285 EMERGENCY DEPT VISIT HI MDM: CPT

## 2020-10-24 PROCEDURE — 70450 CT HEAD/BRAIN W/O DYE: CPT | Mod: 26

## 2020-10-24 PROCEDURE — 71045 X-RAY EXAM CHEST 1 VIEW: CPT | Mod: 26

## 2020-10-24 RX ORDER — ONDANSETRON 8 MG/1
4 TABLET, FILM COATED ORAL ONCE
Refills: 0 | Status: COMPLETED | OUTPATIENT
Start: 2020-10-24 | End: 2020-10-24

## 2020-10-24 RX ORDER — SODIUM CHLORIDE 9 MG/ML
1000 INJECTION, SOLUTION INTRAVENOUS ONCE
Refills: 0 | Status: COMPLETED | OUTPATIENT
Start: 2020-10-24 | End: 2020-10-24

## 2020-10-24 RX ADMIN — ONDANSETRON 4 MILLIGRAM(S): 8 TABLET, FILM COATED ORAL at 23:53

## 2020-10-24 NOTE — ED PROVIDER NOTE - PHYSICAL EXAMINATION
Physical Exam    Vital Signs: I have reviewed the initial vital signs.  Constitutional: well-nourished, appears stated age, no acute distress  Eyes: Conjunctiva pink, Sclera clear, PERRLA, EOMI without pain.  Cardiovascular: S1 and S2, regular rate, regular rhythm, well-perfused extremities, radial pulses equal and 2+ b/l.   Respiratory: unlabored respiratory effort, clear to auscultation bilaterally no wheezing, rales and rhonchi. pt is speaking full sentences. no accessory muscle use.   Gastrointestinal: soft, non-tender, nondistended abdomen, no pulsatile mass, normal bowl sounds, no rebound, no guarding, negative psoas, negative obturator, negative murphys. no cva tenderness  Musculoskeletal: supple neck, no lower extremity edema, no calf tenderness, no midline tenderness, no palpable spinal step offs  Integumentary: warm, dry, no rash  Neurologic: awake, alert, cranial nerves II-XII grossly intact, extremities’ motor and sensory functions grossly intact. finger to nose intact. negative pronator drift.   Psychiatric: appropriate mood, appropriate affect

## 2020-10-24 NOTE — ED PROVIDER NOTE - CLINICAL SUMMARY MEDICAL DECISION MAKING FREE TEXT BOX
88 female here for nausea and dysuria. Had screening labs supportive care medications and reevaluation, found to have a UTI, symptoms improved, plan discussed with patient, will discharge with outpatient management and return and follow up instructions.

## 2020-10-24 NOTE — ED PROVIDER NOTE - CARE PLAN
Principal Discharge DX:	Nausea  Secondary Diagnosis:	UTI (urinary tract infection)  Secondary Diagnosis:	Light headedness

## 2020-10-24 NOTE — ED PROVIDER NOTE - PATIENT PORTAL LINK FT
You can access the FollowMyHealth Patient Portal offered by [facility] by registering at the following website: [email or web address]. By joining [employer]’s FollowMyHealth portal, you will also be able to view your health information using other applications (apps) compatible with our system.

## 2020-10-24 NOTE — ED PROVIDER NOTE - PROGRESS NOTE DETAILS
discussed lab and radiology results with pt. discussed dx of uti, rx abx. advised of return precautions discussed at bedside. f/u with pcp. agreeable to dc.

## 2020-10-24 NOTE — ED PROVIDER NOTE - NS ED ROS FT
CONST: No fever, chills or bodyaches  EYES: No pain, redness, drainage or visual changes.  ENT: No ear pain or discharge, nasal discharge or congestion. No sore throat  CARD: No chest pain, palpitations  RESP: No SOB, cough, hemoptysis. No hx of asthma or COPD  GI: (+) nausea. No abdominal pain, V/D  : (+) burning with urination.   MS: No joint pain, back pain or extremity pain/injury  SKIN: No rashes  NEURO: (+) lightheadedness. No headache, dizziness, paresthesias or LOC

## 2020-10-24 NOTE — ED PROVIDER NOTE - NSFOLLOWUPINSTRUCTIONS_ED_ALL_ED_FT
Urinary Tract Infection    A urinary tract infection (UTI) is an infection of any part of the urinary tract, which includes the kidneys, ureters, bladder, and urethra. Risk factors include ignoring your need to urinate, wiping back to front if female, being an uncircumcised male, and having diabetes or a weak immune system. Symptoms include frequent urination, pain or burning with urination, foul smelling urine, cloudy urine, pain in the lower abdomen, blood in the urine, and fever. If you were prescribed an antibiotic medicine, take it as told by your health care provider. Do not stop taking the antibiotic even if you start to feel better.    SEEK IMMEDIATE MEDICAL CARE IF YOU HAVE ANY OF THE FOLLOWING SYMPTOMS: severe back or abdominal pain, fever, inability to keep fluids or medicine down, dizziness/lightheadedness, or a change in mental status.    Nausea / Vomiting    Nausea is the feeling that you have to vomit. As nausea gets worse, it can lead to vomiting. Vomiting puts you at an increased risk for dehydration. Older adults and people with other diseases or a weak immune system are at higher risk for dehydration. Drink clear fluids in small but frequent amounts as tolerated. Eat bland, easy-to-digest foods in small amounts as tolerated.    SEEK IMMEDIATE MEDICAL CARE IF YOU HAVE ANY OF THE FOLLOWING SYMPTOMS: fever, inability to keep sufficient fluids down, black or bloody vomitus, black or bloody stools, lightheadedness/dizziness, chest pain, severe headache, rash, shortness of breath, cold or clammy skin, confusion, pain with urination, or any signs of dehydration.

## 2020-10-24 NOTE — ED PROVIDER NOTE - OBJECTIVE STATEMENT
89 y/o RA (not on immune modulators), Parkinson, HTN, and HLD presents to the ED for evaluation of nausea and ligthheadedness that began this afternoon after eating chinese foot. pt reports burning with urination. 87 y/o RA (not on immune modulators), Parkinson, HTN, and HLD presents to the ED for evaluation of nausea and lightheadedness that began this afternoon after eating chinese foot. pt reports burning with urination. pt denies fever, chills, sob, chest pain, abdominal pain, vomiting, diarrhea, constipation, blood in the stool blood in the urine, hx of abdominal surgeries, head injury, or weakness.

## 2020-10-24 NOTE — ED PROVIDER NOTE - ATTENDING CONTRIBUTION TO CARE
I personally evaluated the patient. I reviewed the Resident’s or Physician Assistant’s note (as assigned above), and agree with the findings and plan except as documented in my note.     88 female here for evaluation of nausea today after eating take out food. Admits to dysuria, but no other symptoms.     ROS otherwise unremarkable    GEN: female in no distress.   HEENT: EOMI PERRLA  CHEST: CTA bilateral. normal work of breathing. no accessory muscle use  CV: pulses intact S1S2  ABD: soft, non rigid, no guarding noted, non distended, no rebound  EXT: FROM x 4   NEURO: AAO 3 no focal deficits. Gait memory speech cognition and coordination grossly intact.   SKIN: no pallor no diaphoresis  PSYCH: normal mood and mentation     Impression: nausea    Plan: IV labs supportive care and reevaluation

## 2020-10-25 VITALS
OXYGEN SATURATION: 99 % | SYSTOLIC BLOOD PRESSURE: 182 MMHG | TEMPERATURE: 98 F | HEART RATE: 80 BPM | RESPIRATION RATE: 20 BRPM | DIASTOLIC BLOOD PRESSURE: 85 MMHG

## 2020-10-25 LAB
ALBUMIN SERPL ELPH-MCNC: 4.1 G/DL — SIGNIFICANT CHANGE UP (ref 3.5–5.2)
ALP SERPL-CCNC: 110 U/L — SIGNIFICANT CHANGE UP (ref 30–115)
ALT FLD-CCNC: <5 U/L — SIGNIFICANT CHANGE UP (ref 0–41)
ANION GAP SERPL CALC-SCNC: 10 MMOL/L — SIGNIFICANT CHANGE UP (ref 7–14)
APPEARANCE UR: ABNORMAL
AST SERPL-CCNC: 15 U/L — SIGNIFICANT CHANGE UP (ref 0–41)
BACTERIA # UR AUTO: ABNORMAL
BASOPHILS # BLD AUTO: 0.05 K/UL — SIGNIFICANT CHANGE UP (ref 0–0.2)
BASOPHILS NFR BLD AUTO: 1 % — SIGNIFICANT CHANGE UP (ref 0–1)
BILIRUB DIRECT SERPL-MCNC: <0.2 MG/DL — SIGNIFICANT CHANGE UP (ref 0–0.2)
BILIRUB INDIRECT FLD-MCNC: >0.1 MG/DL — LOW (ref 0.2–1.2)
BILIRUB SERPL-MCNC: 0.3 MG/DL — SIGNIFICANT CHANGE UP (ref 0.2–1.2)
BILIRUB UR-MCNC: NEGATIVE — SIGNIFICANT CHANGE UP
BUN SERPL-MCNC: 20 MG/DL — SIGNIFICANT CHANGE UP (ref 10–20)
CALCIUM SERPL-MCNC: 9.4 MG/DL — SIGNIFICANT CHANGE UP (ref 8.5–10.1)
CHLORIDE SERPL-SCNC: 103 MMOL/L — SIGNIFICANT CHANGE UP (ref 98–110)
CO2 SERPL-SCNC: 27 MMOL/L — SIGNIFICANT CHANGE UP (ref 17–32)
COLOR SPEC: YELLOW — SIGNIFICANT CHANGE UP
CREAT SERPL-MCNC: 1 MG/DL — SIGNIFICANT CHANGE UP (ref 0.7–1.5)
DIFF PNL FLD: NEGATIVE — SIGNIFICANT CHANGE UP
EOSINOPHIL # BLD AUTO: 0.03 K/UL — SIGNIFICANT CHANGE UP (ref 0–0.7)
EOSINOPHIL NFR BLD AUTO: 0.6 % — SIGNIFICANT CHANGE UP (ref 0–8)
EPI CELLS # UR: ABNORMAL /HPF
GLUCOSE SERPL-MCNC: 119 MG/DL — HIGH (ref 70–99)
GLUCOSE UR QL: NEGATIVE MG/DL — SIGNIFICANT CHANGE UP
HCT VFR BLD CALC: 42.4 % — SIGNIFICANT CHANGE UP (ref 37–47)
HGB BLD-MCNC: 13.6 G/DL — SIGNIFICANT CHANGE UP (ref 12–16)
IMM GRANULOCYTES NFR BLD AUTO: 0.4 % — HIGH (ref 0.1–0.3)
KETONES UR-MCNC: NEGATIVE — SIGNIFICANT CHANGE UP
LACTATE SERPL-SCNC: 1.1 MMOL/L — SIGNIFICANT CHANGE UP (ref 0.7–2)
LEUKOCYTE ESTERASE UR-ACNC: ABNORMAL
LIDOCAIN IGE QN: 16 U/L — SIGNIFICANT CHANGE UP (ref 7–60)
LYMPHOCYTES # BLD AUTO: 0.87 K/UL — LOW (ref 1.2–3.4)
LYMPHOCYTES # BLD AUTO: 16.9 % — LOW (ref 20.5–51.1)
MAGNESIUM SERPL-MCNC: 2.3 MG/DL — SIGNIFICANT CHANGE UP (ref 1.8–2.4)
MCHC RBC-ENTMCNC: 28.3 PG — SIGNIFICANT CHANGE UP (ref 27–31)
MCHC RBC-ENTMCNC: 32.1 G/DL — SIGNIFICANT CHANGE UP (ref 32–37)
MCV RBC AUTO: 88.3 FL — SIGNIFICANT CHANGE UP (ref 81–99)
MONOCYTES # BLD AUTO: 0.37 K/UL — SIGNIFICANT CHANGE UP (ref 0.1–0.6)
MONOCYTES NFR BLD AUTO: 7.2 % — SIGNIFICANT CHANGE UP (ref 1.7–9.3)
NEUTROPHILS # BLD AUTO: 3.8 K/UL — SIGNIFICANT CHANGE UP (ref 1.4–6.5)
NEUTROPHILS NFR BLD AUTO: 73.9 % — SIGNIFICANT CHANGE UP (ref 42.2–75.2)
NITRITE UR-MCNC: POSITIVE
NRBC # BLD: 0 /100 WBCS — SIGNIFICANT CHANGE UP (ref 0–0)
NT-PROBNP SERPL-SCNC: 288 PG/ML — SIGNIFICANT CHANGE UP (ref 0–300)
PH UR: 7.5 — SIGNIFICANT CHANGE UP (ref 5–8)
PLATELET # BLD AUTO: 275 K/UL — SIGNIFICANT CHANGE UP (ref 130–400)
POTASSIUM SERPL-MCNC: 3.8 MMOL/L — SIGNIFICANT CHANGE UP (ref 3.5–5)
POTASSIUM SERPL-SCNC: 3.8 MMOL/L — SIGNIFICANT CHANGE UP (ref 3.5–5)
PROT SERPL-MCNC: 6.5 G/DL — SIGNIFICANT CHANGE UP (ref 6–8)
PROT UR-MCNC: NEGATIVE MG/DL — SIGNIFICANT CHANGE UP
RBC # BLD: 4.8 M/UL — SIGNIFICANT CHANGE UP (ref 4.2–5.4)
RBC # FLD: 13.9 % — SIGNIFICANT CHANGE UP (ref 11.5–14.5)
RBC CASTS # UR COMP ASSIST: NEGATIVE — SIGNIFICANT CHANGE UP
SODIUM SERPL-SCNC: 140 MMOL/L — SIGNIFICANT CHANGE UP (ref 135–146)
SP GR SPEC: 1.02 — SIGNIFICANT CHANGE UP (ref 1.01–1.03)
TROPONIN T SERPL-MCNC: <0.01 NG/ML — SIGNIFICANT CHANGE UP
UROBILINOGEN FLD QL: 0.2 MG/DL — SIGNIFICANT CHANGE UP (ref 0.2–0.2)
WBC # BLD: 5.14 K/UL — SIGNIFICANT CHANGE UP (ref 4.8–10.8)
WBC # FLD AUTO: 5.14 K/UL — SIGNIFICANT CHANGE UP (ref 4.8–10.8)
WBC UR QL: ABNORMAL /HPF

## 2020-10-25 RX ORDER — NITROFURANTOIN MACROCRYSTAL 50 MG
1 CAPSULE ORAL
Qty: 14 | Refills: 0
Start: 2020-10-25 | End: 2020-10-31

## 2020-10-25 RX ORDER — CEFTRIAXONE 500 MG/1
1000 INJECTION, POWDER, FOR SOLUTION INTRAMUSCULAR; INTRAVENOUS ONCE
Refills: 0 | Status: COMPLETED | OUTPATIENT
Start: 2020-10-25 | End: 2020-10-25

## 2020-10-25 RX ADMIN — CEFTRIAXONE 100 MILLIGRAM(S): 500 INJECTION, POWDER, FOR SOLUTION INTRAMUSCULAR; INTRAVENOUS at 01:45

## 2020-10-25 RX ADMIN — SODIUM CHLORIDE 1000 MILLILITER(S): 9 INJECTION, SOLUTION INTRAVENOUS at 02:17

## 2020-10-25 NOTE — ED ADULT NURSE NOTE - NSIMPLEMENTINTERV_GEN_ALL_ED
Implemented All Fall with Harm Risk Interventions:  [place] to call system. Call bell, personal items and telephone within reach. Instruct patient to call for assistance. Room bathroom lighting operational. Non-slip footwear when patient is off stretcher. Physically safe environment: no spills, clutter or unnecessary equipment. Stretcher in lowest position, wheels locked, appropriate side rails in place. Provide visual cue, wrist band, yellow gown, etc. Monitor gait and stability. Monitor for mental status changes and reorient to person, place, and time. Review medications for side effects contributing to fall risk. Reinforce activity limits and safety measures with patient and family. Provide visual clues: red socks.

## 2020-10-27 LAB
-  AMIKACIN: SIGNIFICANT CHANGE UP
-  AMOXICILLIN/CLAVULANIC ACID: SIGNIFICANT CHANGE UP
-  AMPICILLIN/SULBACTAM: SIGNIFICANT CHANGE UP
-  AMPICILLIN: SIGNIFICANT CHANGE UP
-  AZTREONAM: SIGNIFICANT CHANGE UP
-  CEFAZOLIN: SIGNIFICANT CHANGE UP
-  CEFEPIME: SIGNIFICANT CHANGE UP
-  CEFOXITIN: SIGNIFICANT CHANGE UP
-  CEFTRIAXONE: SIGNIFICANT CHANGE UP
-  CIPROFLOXACIN: SIGNIFICANT CHANGE UP
-  ERTAPENEM: SIGNIFICANT CHANGE UP
-  GENTAMICIN: SIGNIFICANT CHANGE UP
-  LEVOFLOXACIN: SIGNIFICANT CHANGE UP
-  MEROPENEM: SIGNIFICANT CHANGE UP
-  NITROFURANTOIN: SIGNIFICANT CHANGE UP
-  PIPERACILLIN/TAZOBACTAM: SIGNIFICANT CHANGE UP
-  TOBRAMYCIN: SIGNIFICANT CHANGE UP
-  TRIMETHOPRIM/SULFAMETHOXAZOLE: SIGNIFICANT CHANGE UP
CULTURE RESULTS: SIGNIFICANT CHANGE UP
METHOD TYPE: SIGNIFICANT CHANGE UP
ORGANISM # SPEC MICROSCOPIC CNT: SIGNIFICANT CHANGE UP
ORGANISM # SPEC MICROSCOPIC CNT: SIGNIFICANT CHANGE UP
SPECIMEN SOURCE: SIGNIFICANT CHANGE UP

## 2020-10-28 RX ORDER — AMOXICILLIN 250 MG/5ML
1 SUSPENSION, RECONSTITUTED, ORAL (ML) ORAL
Qty: 14 | Refills: 0
Start: 2020-10-28 | End: 2020-11-03

## 2020-10-28 NOTE — ED POST DISCHARGE NOTE - DETAILS
SPOKE WITH DAUGHTER, REGGIE. WILL START AMOXICILLIN TODAY AND STOP MACROBID. ADVISED F/U WITH PMD WITHIN 24 HRS.

## 2020-11-09 ENCOUNTER — INPATIENT (INPATIENT)
Facility: HOSPITAL | Age: 85
LOS: 2 days | Discharge: HOME | End: 2020-11-12
Attending: HOSPITALIST | Admitting: HOSPITALIST
Payer: MEDICARE

## 2020-11-09 VITALS
RESPIRATION RATE: 19 BRPM | SYSTOLIC BLOOD PRESSURE: 183 MMHG | TEMPERATURE: 96 F | HEIGHT: 64 IN | DIASTOLIC BLOOD PRESSURE: 78 MMHG | OXYGEN SATURATION: 98 % | HEART RATE: 73 BPM

## 2020-11-09 DIAGNOSIS — Z90.710 ACQUIRED ABSENCE OF BOTH CERVIX AND UTERUS: Chronic | ICD-10-CM

## 2020-11-09 LAB
ALBUMIN SERPL ELPH-MCNC: 4 G/DL — SIGNIFICANT CHANGE UP (ref 3.5–5.2)
ALP SERPL-CCNC: 111 U/L — SIGNIFICANT CHANGE UP (ref 30–115)
ALT FLD-CCNC: <5 U/L — SIGNIFICANT CHANGE UP (ref 0–41)
ANION GAP SERPL CALC-SCNC: 8 MMOL/L — SIGNIFICANT CHANGE UP (ref 7–14)
AST SERPL-CCNC: 20 U/L — SIGNIFICANT CHANGE UP (ref 0–41)
BASOPHILS # BLD AUTO: 0.03 K/UL — SIGNIFICANT CHANGE UP (ref 0–0.2)
BASOPHILS NFR BLD AUTO: 0.4 % — SIGNIFICANT CHANGE UP (ref 0–1)
BILIRUB SERPL-MCNC: 0.3 MG/DL — SIGNIFICANT CHANGE UP (ref 0.2–1.2)
BUN SERPL-MCNC: 19 MG/DL — SIGNIFICANT CHANGE UP (ref 10–20)
CALCIUM SERPL-MCNC: 9.3 MG/DL — SIGNIFICANT CHANGE UP (ref 8.5–10.1)
CHLORIDE SERPL-SCNC: 103 MMOL/L — SIGNIFICANT CHANGE UP (ref 98–110)
CO2 SERPL-SCNC: 25 MMOL/L — SIGNIFICANT CHANGE UP (ref 17–32)
CREAT SERPL-MCNC: 1 MG/DL — SIGNIFICANT CHANGE UP (ref 0.7–1.5)
EOSINOPHIL # BLD AUTO: 0.06 K/UL — SIGNIFICANT CHANGE UP (ref 0–0.7)
EOSINOPHIL NFR BLD AUTO: 0.9 % — SIGNIFICANT CHANGE UP (ref 0–8)
GLUCOSE SERPL-MCNC: 128 MG/DL — HIGH (ref 70–99)
HCT VFR BLD CALC: 41.7 % — SIGNIFICANT CHANGE UP (ref 37–47)
HGB BLD-MCNC: 13.4 G/DL — SIGNIFICANT CHANGE UP (ref 12–16)
IMM GRANULOCYTES NFR BLD AUTO: 0.7 % — HIGH (ref 0.1–0.3)
LACTATE SERPL-SCNC: 0.8 MMOL/L — SIGNIFICANT CHANGE UP (ref 0.7–2)
LIDOCAIN IGE QN: 14 U/L — SIGNIFICANT CHANGE UP (ref 7–60)
LYMPHOCYTES # BLD AUTO: 0.44 K/UL — LOW (ref 1.2–3.4)
LYMPHOCYTES # BLD AUTO: 6.5 % — LOW (ref 20.5–51.1)
MAGNESIUM SERPL-MCNC: 2.4 MG/DL — SIGNIFICANT CHANGE UP (ref 1.8–2.4)
MCHC RBC-ENTMCNC: 28.5 PG — SIGNIFICANT CHANGE UP (ref 27–31)
MCHC RBC-ENTMCNC: 32.1 G/DL — SIGNIFICANT CHANGE UP (ref 32–37)
MCV RBC AUTO: 88.5 FL — SIGNIFICANT CHANGE UP (ref 81–99)
MONOCYTES # BLD AUTO: 0.32 K/UL — SIGNIFICANT CHANGE UP (ref 0.1–0.6)
MONOCYTES NFR BLD AUTO: 4.8 % — SIGNIFICANT CHANGE UP (ref 1.7–9.3)
NEUTROPHILS # BLD AUTO: 5.82 K/UL — SIGNIFICANT CHANGE UP (ref 1.4–6.5)
NEUTROPHILS NFR BLD AUTO: 86.7 % — HIGH (ref 42.2–75.2)
NRBC # BLD: 0 /100 WBCS — SIGNIFICANT CHANGE UP (ref 0–0)
PLATELET # BLD AUTO: 285 K/UL — SIGNIFICANT CHANGE UP (ref 130–400)
POTASSIUM SERPL-MCNC: 4.3 MMOL/L — SIGNIFICANT CHANGE UP (ref 3.5–5)
POTASSIUM SERPL-SCNC: 4.3 MMOL/L — SIGNIFICANT CHANGE UP (ref 3.5–5)
PROT SERPL-MCNC: 6.9 G/DL — SIGNIFICANT CHANGE UP (ref 6–8)
RBC # BLD: 4.71 M/UL — SIGNIFICANT CHANGE UP (ref 4.2–5.4)
RBC # FLD: 13.6 % — SIGNIFICANT CHANGE UP (ref 11.5–14.5)
SODIUM SERPL-SCNC: 136 MMOL/L — SIGNIFICANT CHANGE UP (ref 135–146)
WBC # BLD: 6.72 K/UL — SIGNIFICANT CHANGE UP (ref 4.8–10.8)
WBC # FLD AUTO: 6.72 K/UL — SIGNIFICANT CHANGE UP (ref 4.8–10.8)

## 2020-11-09 PROCEDURE — 71045 X-RAY EXAM CHEST 1 VIEW: CPT | Mod: 26

## 2020-11-09 PROCEDURE — 99285 EMERGENCY DEPT VISIT HI MDM: CPT

## 2020-11-09 RX ORDER — FAMOTIDINE 10 MG/ML
20 INJECTION INTRAVENOUS ONCE
Refills: 0 | Status: COMPLETED | OUTPATIENT
Start: 2020-11-09 | End: 2020-11-09

## 2020-11-09 RX ORDER — ONDANSETRON 8 MG/1
4 TABLET, FILM COATED ORAL ONCE
Refills: 0 | Status: COMPLETED | OUTPATIENT
Start: 2020-11-09 | End: 2020-11-09

## 2020-11-09 RX ORDER — SODIUM CHLORIDE 9 MG/ML
1000 INJECTION INTRAMUSCULAR; INTRAVENOUS; SUBCUTANEOUS ONCE
Refills: 0 | Status: COMPLETED | OUTPATIENT
Start: 2020-11-09 | End: 2020-11-09

## 2020-11-09 RX ADMIN — ONDANSETRON 4 MILLIGRAM(S): 8 TABLET, FILM COATED ORAL at 23:40

## 2020-11-09 RX ADMIN — SODIUM CHLORIDE 1000 MILLILITER(S): 9 INJECTION INTRAMUSCULAR; INTRAVENOUS; SUBCUTANEOUS at 23:40

## 2020-11-09 RX ADMIN — FAMOTIDINE 100 MILLIGRAM(S): 10 INJECTION INTRAVENOUS at 23:40

## 2020-11-09 NOTE — ED PROVIDER NOTE - PROGRESS NOTE DETAILS
I was directly involved in the care of this patient. PA Fellow Godfrey note/plan reviewed and agreed.

## 2020-11-09 NOTE — ED PROVIDER NOTE - CLINICAL SUMMARY MEDICAL DECISION MAKING FREE TEXT BOX
88yF pmhx RA (no immunomodulator, parkinson's s- recent  uti 10/24 - finished amoxicillin ,  pw nausea which is how  her uti;s present. no  flank pain fever  .   + UTI no systemic symptoms - CT ap  for ro stone

## 2020-11-09 NOTE — ED PROVIDER NOTE - NS ED ROS FT
Constitutional: No fever, chills.  Eyes:  No visual changes  ENMT:  No neck pain  Cardiac:  No chest pain  Respiratory:  No cough, SOB  GI:  (+) nausea, vomiting. No diarrhea, constipation, abdominal pain.  :  No dysuria, hematuria  MS:  No back pain.  Neuro:  No headache or lightheadedness  Skin:  No skin rash  Except as documented in the HPI,  all other systems are negative.

## 2020-11-09 NOTE — ED PROVIDER NOTE - OBJECTIVE STATEMENT
87 yo F pmh HLD, RA, HTN, Parkinson's Disease c/o nausea and 2 episodes of nonbloody vomit 89 yo F pmh HLD, RA, HTN, Parkinson's Disease c/o sudden onset of nausea and 2 episodes of nonbloody vomit x 1 day. Pt reports she was recently admitted for a UTI 10/24  and completed PO abx. Pt denies any change in diet. 87 yo F pmh HLD, RA, HTN, Parkinson's Disease c/o sudden onset of nausea and 2 episodes of nonbloody vomit x 1 day. Pt reports she was recently admitted for a UTI 10/24  and completed PO abx. Pt denies any change in diet. Denies any alleviating or aggravating factors. Denies abdominal pain, fever, chills, back pain, dysuria, hematuria, chest pain, SOB.

## 2020-11-09 NOTE — ED PROVIDER NOTE - PHYSICAL EXAMINATION
CONST: Well appearing in NAD  EYES: PERRL, EOMI, Sclera and conjunctiva clear.   CARD: Normal S1 S2; Normal rate and rhythm  RESP: Equal BS B/L, No wheezes, rhonchi or rales. No distress  GI: Soft, non-tender, non-distended. BS x4  SKIN: Warm, dry, no acute rashes. Good turgor  NEURO: A&Ox3, No focal deficits. Physical Exam    Vital Signs: I have reviewed the initial vital signs.  Constitutional: well-nourished, appears stated age, no acute distress  Eyes: Conjunctiva pink, Sclera clear, PERRLA, EOMI.  Cardiovascular: S1 and S2, regular rate, regular rhythm, well-perfused extremities, radial pulses equal and 2+  Respiratory: unlabored respiratory effort, clear to auscultation bilaterally no wheezing, rales and rhonchi  Gastrointestinal: soft, non-tender abdomen, no pulsatile mass, normal bowl sounds  Musculoskeletal: supple neck, no lower extremity edema, no midline tenderness  Integumentary: warm, dry, no rash  Neurologic: awake, alert, cranial nerves II-XII grossly intact, extremities’ motor and sensory functions grossly intact  Psychiatric: appropriate mood, appropriate affect

## 2020-11-10 LAB
APPEARANCE UR: CLEAR — SIGNIFICANT CHANGE UP
BACTERIA # UR AUTO: ABNORMAL /HPF
BILIRUB UR-MCNC: NEGATIVE — SIGNIFICANT CHANGE UP
COLOR SPEC: YELLOW — SIGNIFICANT CHANGE UP
DIFF PNL FLD: NEGATIVE — SIGNIFICANT CHANGE UP
EPI CELLS # UR: ABNORMAL /HPF
GLUCOSE UR QL: NEGATIVE MG/DL — SIGNIFICANT CHANGE UP
KETONES UR-MCNC: NEGATIVE — SIGNIFICANT CHANGE UP
LEUKOCYTE ESTERASE UR-ACNC: ABNORMAL
NITRITE UR-MCNC: POSITIVE
PH UR: 6.5 — SIGNIFICANT CHANGE UP (ref 5–8)
PROT UR-MCNC: NEGATIVE MG/DL — SIGNIFICANT CHANGE UP
RAPID RVP RESULT: SIGNIFICANT CHANGE UP
SARS-COV-2 RNA SPEC QL NAA+PROBE: SIGNIFICANT CHANGE UP
SP GR SPEC: 1.02 — SIGNIFICANT CHANGE UP (ref 1.01–1.03)
UROBILINOGEN FLD QL: 0.2 MG/DL — SIGNIFICANT CHANGE UP (ref 0.2–0.2)
WBC UR QL: >50 /HPF

## 2020-11-10 PROCEDURE — 99497 ADVNCD CARE PLAN 30 MIN: CPT | Mod: 25

## 2020-11-10 PROCEDURE — 74177 CT ABD & PELVIS W/CONTRAST: CPT | Mod: 26

## 2020-11-10 PROCEDURE — 99223 1ST HOSP IP/OBS HIGH 75: CPT

## 2020-11-10 RX ORDER — OXYCODONE AND ACETAMINOPHEN 5; 325 MG/1; MG/1
1 TABLET ORAL ONCE
Refills: 0 | Status: DISCONTINUED | OUTPATIENT
Start: 2020-11-10 | End: 2020-11-10

## 2020-11-10 RX ORDER — DILTIAZEM HCL 120 MG
300 CAPSULE, EXT RELEASE 24 HR ORAL DAILY
Refills: 0 | Status: DISCONTINUED | OUTPATIENT
Start: 2020-11-10 | End: 2020-11-12

## 2020-11-10 RX ORDER — CARBIDOPA AND LEVODOPA 25; 100 MG/1; MG/1
1 TABLET ORAL
Refills: 0 | Status: DISCONTINUED | OUTPATIENT
Start: 2020-11-10 | End: 2020-11-12

## 2020-11-10 RX ORDER — ENOXAPARIN SODIUM 100 MG/ML
40 INJECTION SUBCUTANEOUS AT BEDTIME
Refills: 0 | Status: DISCONTINUED | OUTPATIENT
Start: 2020-11-10 | End: 2020-11-12

## 2020-11-10 RX ORDER — ONDANSETRON 8 MG/1
4 TABLET, FILM COATED ORAL
Refills: 0 | Status: DISCONTINUED | OUTPATIENT
Start: 2020-11-10 | End: 2020-11-12

## 2020-11-10 RX ORDER — MORPHINE SULFATE 50 MG/1
2 CAPSULE, EXTENDED RELEASE ORAL ONCE
Refills: 0 | Status: DISCONTINUED | OUTPATIENT
Start: 2020-11-10 | End: 2020-11-10

## 2020-11-10 RX ORDER — ATORVASTATIN CALCIUM 80 MG/1
80 TABLET, FILM COATED ORAL AT BEDTIME
Refills: 0 | Status: DISCONTINUED | OUTPATIENT
Start: 2020-11-10 | End: 2020-11-10

## 2020-11-10 RX ORDER — MORPHINE SULFATE 50 MG/1
2 CAPSULE, EXTENDED RELEASE ORAL THREE TIMES A DAY
Refills: 0 | Status: DISCONTINUED | OUTPATIENT
Start: 2020-11-10 | End: 2020-11-12

## 2020-11-10 RX ORDER — OXYCODONE AND ACETAMINOPHEN 5; 325 MG/1; MG/1
1 TABLET ORAL EVERY 6 HOURS
Refills: 0 | Status: DISCONTINUED | OUTPATIENT
Start: 2020-11-10 | End: 2020-11-12

## 2020-11-10 RX ORDER — CEFTRIAXONE 500 MG/1
1000 INJECTION, POWDER, FOR SOLUTION INTRAMUSCULAR; INTRAVENOUS EVERY 24 HOURS
Refills: 0 | Status: DISCONTINUED | OUTPATIENT
Start: 2020-11-10 | End: 2020-11-12

## 2020-11-10 RX ORDER — CEFEPIME 1 G/1
2000 INJECTION, POWDER, FOR SOLUTION INTRAMUSCULAR; INTRAVENOUS ONCE
Refills: 0 | Status: COMPLETED | OUTPATIENT
Start: 2020-11-10 | End: 2020-11-10

## 2020-11-10 RX ORDER — ATORVASTATIN CALCIUM 80 MG/1
40 TABLET, FILM COATED ORAL AT BEDTIME
Refills: 0 | Status: DISCONTINUED | OUTPATIENT
Start: 2020-11-10 | End: 2020-11-12

## 2020-11-10 RX ADMIN — CARBIDOPA AND LEVODOPA 1 TABLET(S): 25; 100 TABLET ORAL at 06:19

## 2020-11-10 RX ADMIN — OXYCODONE AND ACETAMINOPHEN 1 TABLET(S): 5; 325 TABLET ORAL at 00:38

## 2020-11-10 RX ADMIN — CARBIDOPA AND LEVODOPA 1 TABLET(S): 25; 100 TABLET ORAL at 17:21

## 2020-11-10 RX ADMIN — MORPHINE SULFATE 2 MILLIGRAM(S): 50 CAPSULE, EXTENDED RELEASE ORAL at 06:19

## 2020-11-10 RX ADMIN — ONDANSETRON 4 MILLIGRAM(S): 8 TABLET, FILM COATED ORAL at 14:55

## 2020-11-10 RX ADMIN — MORPHINE SULFATE 2 MILLIGRAM(S): 50 CAPSULE, EXTENDED RELEASE ORAL at 16:38

## 2020-11-10 RX ADMIN — ATORVASTATIN CALCIUM 40 MILLIGRAM(S): 80 TABLET, FILM COATED ORAL at 22:21

## 2020-11-10 RX ADMIN — MORPHINE SULFATE 2 MILLIGRAM(S): 50 CAPSULE, EXTENDED RELEASE ORAL at 22:20

## 2020-11-10 RX ADMIN — MORPHINE SULFATE 2 MILLIGRAM(S): 50 CAPSULE, EXTENDED RELEASE ORAL at 17:05

## 2020-11-10 RX ADMIN — Medication 300 MILLIGRAM(S): at 09:59

## 2020-11-10 RX ADMIN — OXYCODONE AND ACETAMINOPHEN 1 TABLET(S): 5; 325 TABLET ORAL at 15:56

## 2020-11-10 RX ADMIN — CEFEPIME 100 MILLIGRAM(S): 1 INJECTION, POWDER, FOR SOLUTION INTRAMUSCULAR; INTRAVENOUS at 00:38

## 2020-11-10 RX ADMIN — OXYCODONE AND ACETAMINOPHEN 1 TABLET(S): 5; 325 TABLET ORAL at 00:27

## 2020-11-10 RX ADMIN — OXYCODONE AND ACETAMINOPHEN 1 TABLET(S): 5; 325 TABLET ORAL at 14:56

## 2020-11-10 RX ADMIN — MORPHINE SULFATE 2 MILLIGRAM(S): 50 CAPSULE, EXTENDED RELEASE ORAL at 22:45

## 2020-11-10 RX ADMIN — ENOXAPARIN SODIUM 40 MILLIGRAM(S): 100 INJECTION SUBCUTANEOUS at 22:21

## 2020-11-10 RX ADMIN — CEFTRIAXONE 100 MILLIGRAM(S): 500 INJECTION, POWDER, FOR SOLUTION INTRAMUSCULAR; INTRAVENOUS at 08:03

## 2020-11-10 NOTE — CONSULT NOTE ADULT - ASSESSMENT
ASSESSMENT  88y F with PMH of parkinsons' disease, RA, HLD, HTN admitted with admitted with PYELONEPHRITIS      IMPRESSION  #Pyelonephritis, sepsis ruled out on admission   #CT Abdomen and Pelvis w/ IV Cont (11.10.20): Apparent right urothelial enhancement favoring ascending urinary infection. Correlation can be made with urinalysis.    #Recurrent UTI  #Parkinson's Disease  #RA    #Abx allergy: NKDA    RECOMMENDATIONS  - ok to continue ceftriaxone 1g daily for now as clinically improved; if spikes fever or clinically worsening, can broaden to meropenem 1g q 8 hours given previously ESBL colonization  - follow-up blood cultures  - follow-up urine cultures  - will need outpatient urology follow-up to assess for any risk factors for recurent UTI  - can give premarin cream BID as outpatient for UTI prevention     Please call or message on [employer] Teams if with any questions.  Spectra [phone]

## 2020-11-10 NOTE — H&P ADULT - HISTORY OF PRESENT ILLNESS
88 year old female with pmhx of HTN, Hyperlipidemia, Parkinson disease, Rheumatoid arthritis presents to the ER with a chief complaint of nausea and increased urinary frequency As per patient, for the past few day pt having increased urinary frequency and today  severely nauseous so presented to ER. Patient denies any fever, chills, dysuria or any other complains.

## 2020-11-10 NOTE — H&P ADULT - CONVERSATION DETAILS
Spoke with pt regarding living will and what her wishes would be if her heart were to stop beating or her respiratory status worsened. pt states at this point she would want all life sustaining methods to be carried out and therefore remain FULL CODE

## 2020-11-10 NOTE — CONSULT NOTE ADULT - SUBJECTIVE AND OBJECTIVE BOX
JON GARCÍA  88y, Female  Allergy: No Known Allergies      CHIEF COMPLAINT: nausea (10 Nov 2020 04:26)      LOS      HPI:  88 year old female with pmhx of HTN, Hyperlipidemia, Parkinson disease, Rheumatoid arthritis presents to the ER with a chief complaint of nausea and increased urinary frequency As per patient, for the past few day pt having increased urinary frequency and today  severely nauseous so presented to ER. Patient denies any fever, chills, dysuria or any other complains.     (10 Nov 2020 04:26)      INFECTIOUS DISEASE HISTORY:  Reports recurrent UTIs in the past year.   Previous Urine Cx with Enterobactericiae, one isolate with ESBL E coli.   Sypmtoms started initially with urinary frequency, followed by nausea/vomiting 1 days prior to admission.   Given dose of cefepime in ED, now on ceftriaxone.   Reports feeling well this morning.   Denies any nausea, vomiting, abdominal pain. Has flank pain more on left side. No coughing, shortnes of breath.       PAST MEDICAL & SURGICAL HISTORY:  Hyperlipidemia  Rheumatoid arthritis  HTN (hypertension)  Parkinson disease    History of hysterectomy        FAMILY HISTORY  FH: hypertension    No pertinent family history in first degree relatives        SOCIAL HISTORY  Social History:  Marital Status:  (   )    (   ) Single    (   )    ( x )   Lives with: (  ) alone  ( x ) children   (  ) spouse   (  ) parents  (  ) other  Recent Travel: No recent travel    Substance Use (street drugs): ( x ) never used  (  ) other:  Tobacco Usage:  ( x  ) never smoked   (   ) former smoker   (   ) current smoker  (     ) pack year  Alcohol Usage: None (10 Nov 2020 04:26)        ROS  General: Denies rigors, nightsweats  HEENT: Denies headache, rhinorrhea, sore throat, eye pain  CV: Denies CP, palpitations  PULM: Denies wheezing, hemoptysis  GI: Denies hematemesis, hematochezia, melena  : Denies discharge, hematuria  MSK: Denies arthralgias, myalgias  SKIN: Denies rash, lesions  NEURO: Denies paresthesias, weakness  PSYCH: Denies depression, anxiety    VITALS:  T(F): 97.6, Max: 97.6 (11-10-20 @ 04:30)  HR: 71  BP: 135/62  RR: 16Vital Signs Last 24 Hrs  T(C): 36.4 (10 Nov 2020 04:30), Max: 36.4 (10 Nov 2020 04:30)  T(F): 97.6 (10 Nov 2020 04:30), Max: 97.6 (10 Nov 2020 04:30)  HR: 71 (10 Nov 2020 09:45) (71 - 75)  BP: 135/62 (10 Nov 2020 09:45) (135/62 - 184/79)  BP(mean): --  RR: 16 (10 Nov 2020 04:30) (16 - 19)  SpO2: 98% (2020 22:41) (98% - 98%)    PHYSICAL EXAM:  Gen: NAD, resting in bed  HEENT: Normocephalic, atraumatic  Neck: supple, no lymphadenopathy  CV: Regular rate & regular rhythm  Lungs: decreased BS at bases, no fremitus  Abdomen: Soft, BS present; No CVA tederness  Ext: Warm, well perfused  Neuro: non focal, awake  Skin: no rash, no erythema  Lines: no phlebitis    TESTS & MEASUREMENTS:                        13.4   6.72  )-----------( 285      ( 2020 23:30 )             41.7         136  |  103  |  19  ----------------------------<  128<H>  4.3   |  25  |  1.0    Ca    9.3      2020 23:30  Mg     2.4         TPro  6.9  /  Alb  4.0  /  TBili  0.3  /  DBili  x   /  AST  20  /  ALT  <5  /  AlkPhos  111      eGFR if : 58 mL/min/1.73M2 (20 @ 23:30)  eGFR if Non African American: 50 mL/min/1.73M2 (20 @ 23:30)    LIVER FUNCTIONS - ( 2020 23:30 )  Alb: 4.0 g/dL / Pro: 6.9 g/dL / ALK PHOS: 111 U/L / ALT: <5 U/L / AST: 20 U/L / GGT: x           Urinalysis Basic - ( 10 Nov 2020 00:00 )    Color: Yellow / Appearance: Clear / S.020 / pH: x  Gluc: x / Ketone: Negative  / Bili: Negative / Urobili: 0.2 mg/dL   Blood: x / Protein: Negative mg/dL / Nitrite: Positive   Leuk Esterase: Large / RBC: x / WBC >50 /HPF   Sq Epi: x / Non Sq Epi: Occasional /HPF / Bacteria: TNTC /HPF        Culture - Urine (collected 10-24-20 @ 22:42)  Source: .Urine Clean Catch (Midstream)  Final Report (10-27-20 @ 17:03):    >100,000 CFU/ml Proteus mirabilis  Organism: Proteus mirabilis (10-27-20 @ 17:03)  Organism: Proteus mirabilis (10-27-20 @ 17:03)      -  Amikacin: S <=16      -  Amoxicillin/Clavulanic Acid: S <=8/4      -  Ampicillin: S <=8 These ampicillin results predict results for amoxicillin      -  Ampicillin/Sulbactam: S <=4/2 Enterobacter, Citrobacter, and Serratia may develop resistance during prolonged therapy (3-4 days)      -  Aztreonam: S <=4      -  Cefazolin: S <=2 (MIC_CL_COM_ENTERIC_CEFAZU) For uncomplicated UTI with K. pneumoniae, E. coli, or P. mirablis: ADAM <=16 is sensitive and ADAM >=32 is resistant. This also predicts results for oral agents cefaclor, cefdinir, cefpodoxime, cefprozil, cefuroxime axetil, cephalexin and locarbef for uncomplicated UTI. Note that some isolates may be susceptible to these agents while testing resistant to cefazolin.      -  Cefepime: S <=2      -  Cefoxitin: S <=8      -  Ceftriaxone: S <=1 Enterobacter, Citrobacter, and Serratia may develop resistance during prolonged therapy      -  Ciprofloxacin: S <=0.25      -  Ertapenem: S <=0.5      -  Gentamicin: S <=2      -  Levofloxacin: S <=0.5      -  Meropenem: S <=1      -  Nitrofurantoin: R >64 Should not be used to treat pyelonephritis      -  Piperacillin/Tazobactam: S <=8      -  Tobramycin: S <=2      -  Trimethoprim/Sulfamethoxazole: S <=0.5/9.5      Method Type: ADAM    Culture - Urine (collected 20 @ 22:45)  Source: .Urine Clean Catch (Midstream)  Final Report (20 @ 14:18):    50,000 - 99,000 CFU/mL Enterococcus faecalis    <10,000 CFU/ml Normal Urogenital mame present  Organism: Enterococcus faecalis (20 @ 14:18)  Organism: Enterococcus faecalis (20 @ 14:18)      -  Ampicillin: S <=2 Predicts results to ampicillin/sulbactam, amoxacillin-clavulanate and  piperacillin-tazobactam.      -  Ciprofloxacin: S <=1      -  Levofloxacin: S <=1      -  Nitrofurantoin: S <=32 Should not be used to treat pyelonephritis.      -  Tetra/Doxy: R >8      -  Vancomycin: S 1      Method Type: ADAM    Culture - Urine (collected 20 @ 20:47)  Source: .Urine Catheterized  Final Report (20 @ 22:34):    50,000 - 99,000 CFU/mL Enterococcus faecalis  Organism: Enterococcus faecalis (20 @ 22:34)  Organism: Enterococcus faecalis (20 @ 22:34)      -  Ampicillin: S <=2 Predicts results to ampicillin/sulbactam, amoxacillin-clavulanate and  piperacillin-tazobactam.      -  Ciprofloxacin: S <=1      -  Levofloxacin: S <=1      -  Nitrofurantoin: S <=32 Should not be used to treat pyelonephritis.      -  Tetra/Doxy: R >8      -  Vancomycin: S 2      Method Type: ADAM    Culture - Urine (collected 20 @ 01:10)  Source: .Urine Clean Catch (Midstream)  Final Report (20 @ 08:20):    >100,000 CFU/ml Escherichia coli ESBL  Organism: Escherichia coli ESBL (20 @ 08:20)  Organism: Escherichia coli ESBL (20 @ 08:20)      -  Amikacin: S <=16      -  Ampicillin: R >16 These ampicillin results predict results for amoxicillin      -  Ampicillin/Sulbactam: R >16/8 Enterobacter, Citrobacter, and Serratia may develop resistance during prolonged therapy (3-4 days)      -  Aztreonam: R >16      -  Cefazolin: R >16 (MIC_CL_COM_ENTERIC_CEFAZU) For uncomplicated UTI with K. pneumoniae, E. coli, or P. mirablis: ADAM <=16 is sensitive and ADAM >=32 is resistant. This also predicts results for oral agents cefaclor, cefdinir, cefpodoxime, cefprozil, cefuroxime axetil, cephalexin and locarbef for uncomplicated UTI. Note that some isolates may be susceptible to these agents while testing resistant to cefazolin.      -  Cefepime: R 8      -  Cefoxitin: S <=8      -  Ceftriaxone: R >32 Enterobacter, Citrobacter, and Serratia may develop resistance during prolonged therapy      -  Ciprofloxacin: S <=1      -  Ertapenem: S <=1      -  Gentamicin: S <=4      -  Imipenem: S <=1      -  Levofloxacin: S <=2      -  Meropenem: S <=1      -  Nitrofurantoin: S <=32 Should not be used to treat pyelonephritis      -  Piperacillin/Tazobactam: R <=16      -  Tigecycline: S <=2      -  Tobramycin: S <=4      -  Trimethoprim/Sulfamethoxazole: S <=2/38      Method Type: ADAM    Culture - Blood (collected 20 @ 00:40)  Source: .Blood Blood  Final Report (20 @ 17:00):    No Growth Final    Culture - Blood (collected 20 @ 00:40)  Source: .Blood Blood  Final Report (20 @ 17:00):    No Growth Final    Culture - Urine (collected 20 @ 04:10)  Source: .Urine Clean Catch (Midstream)  Final Report (20 @ 16:29):    >100,000 CFU/ml Proteus mirabilis    <10,000 CFU/ml Normal Urogenital mame present  Organism: Proteus mirabilis (20 @ 16:29)  Organism: Proteus mirabilis (20 @ 16:29)      -  Amikacin: S <=16      -  Ampicillin: S <=8 These ampicillin results predict results for amoxicillin      -  Ampicillin/Sulbactam: S <=8/4 Enterobacter, Citrobacter, and Serratia may develop resistance during prolonged therapy (3-4 days)      -  Aztreonam: S <=4      -  Cefazolin: S <=8 (MIC_CL_COM_ENTERIC_CEFAZU) For uncomplicated UTI with K. pneumoniae, E. coli, or P. mirablis: ADAM <=16 is sensitive and ADAM >=32 is resistant. This also predicts results for oral agents cefaclor, cefdinir, cefpodoxime, cefprozil, cefuroxime axetil, cephalexin and locarbef for uncomplicated UTI. Note that some isolates may be susceptible to these agents while testing resistant to cefazolin.      -  Cefepime: S <=4      -  Cefoxitin: S <=8      -  Ceftriaxone: S <=1 Enterobacter, Citrobacter, and Serratia may develop resistance during prolonged therapy      -  Ciprofloxacin: S <=1      -  Gentamicin: S <=4      -  Levofloxacin: S <=2      -  Meropenem: S <=1      -  Nitrofurantoin: R >64 Should not be used to treat pyelonephritis      -  Piperacillin/Tazobactam: S <=16      -  Tobramycin: S <=4      -  Trimethoprim/Sulfamethoxazole: S <=2/38      Method Type: ADAM        Lactate, Blood: 0.8 mmol/L (20 @ 23:30)      INFECTIOUS DISEASES TESTING  COVID-19 PCR: NotDetec (20 @ 00:30)  COVID-19 PCR: NotDetec (20 @ 03:00)  COVID-19 PCR: NotDetec (20 @ 04:50)      RADIOLOGY & ADDITIONAL TESTS:  I have personally reviewed the last Chest xray  CXR  Xray Chest 1 View AP/PA:   EXAM:  XR CHEST 1 VIEW            PROCEDURE DATE:  2020            INTERPRETATION:  Clinical History / Reason for exam: Vomiting    Comparison : Chest radiograph 2020.    Technique/Positioning: Interval chest radiograph.    Findings:    Support devices: None.    Cardiac/mediastinum/hilum: Aortic knob calcifications. Unremarkable cardiac silhouette.    Lung parenchyma/Pleura: Minimal bibasilar atelectasis. No other focal parenchymal opacities, effusions or pneumothorax.    Skeleton/soft tissues: Degenerative changes of the spine and shoulders noted.    Impression:    No radiographic evidence of acute cardiopulmonary disease.                  MANUEL GARCIA MD; Attending Radiologist  This document has been electronically signed. Nov 10 2020 11:09AM (20 @ 23:36)      CT  CT Abdomen and Pelvis w/ IV Cont:   EXAM:  CT ABDOMEN AND PELVIS IC            PROCEDURE DATE:  11/10/2020            INTERPRETATION:  CLINICAL STATEMENT: Vomiting.    TECHNIQUE: Contiguous axial CT images were obtained from the lower chest to the pubic symphysis following administration of 100cc Optiray 320 intravenous contrast.  Oral contrast was not administered.  Reformatted images in the coronal and sagittal planes were acquired.    COMPARISON: CT abdomen pelvis 2020.      FINDINGS:    LOWER CHEST: Mild dependent subsegmental atelectasis.    HEPATOBILIARY: Unremarkable.    SPLEEN: Unremarkable.    PANCREAS: Unremarkable.    ADRENAL GLANDS: Unremarkable.    KIDNEYS: No hydronephrosis. There is suggestion of right urothelial enhancement. Right renal cysts and bilateral subcentimeter hypodensities too small to characterize. Punctate 2 mm nonobstructing left renal calculus (series 4, image 78).    ABDOMINOPELVIC NODES: Unremarkable.    PELVIC ORGANS: Hysterectomy.    PERITONEUM/MESENTERY/BOWEL: No evidence of bowel obstruction, pneumoperitoneum or ascites. Appendix is unremarkable. Diverticulosis without evidence of diverticulitis.    BONES/SOFT TISSUES: Degenerative changes of the spine and severe degenerative changes of bilateral hips    OTHER: Atheroscleroticvascular calcifications.      IMPRESSION:    Apparent right urothelial enhancement favoring ascending urinary infection. Correlation can be made with urinalysis.            GRACE NICHOLS M.D., RESIDENT RADIOLOGIST  This document has been electronically signed.  KRYSTIN LOONEY MD; Attending Radiologist  This document has been electronically signed. Nov 10 2020  2:58AM (11-10-20 @ 01:41)      CARDIOLOGY TESTING      MEDICATIONS  atorvastatin 40 Oral at bedtime  carbidopa/levodopa CR 50/200 1 Oral two times a day  cefTRIAXone   IVPB 1000 IV Intermittent every 24 hours  diltiazem    Oral daily  enoxaparin Injectable 40 SubCutaneous at bedtime      Weight  Weight (kg): 65.2 (11-10-20 @ 04:30)    ANTIBIOTICS:  cefTRIAXone   IVPB 1000 milliGRAM(s) IV Intermittent every 24 hours      ALLERGIES:  No Known Allergies

## 2020-11-10 NOTE — PROGRESS NOTE ADULT - SUBJECTIVE AND OBJECTIVE BOX
JON GARCÍA  88y, Female  Allergy: No Known Allergies    Hospital Day:     Patient seen and examined earlier today. Feeling well, no complaints. SOPHIAON.     PMH/PSH:  PAST MEDICAL & SURGICAL HISTORY:  Hyperlipidemia    Rheumatoid arthritis    HTN (hypertension)    Parkinson disease    History of hysterectomy        LAST 24-Hr EVENTS:    VITALS:  T(F): 95.9 (11-10-20 @ 13:59), Max: 97.6 (11-10-20 @ 04:30)  HR: 69 (11-10-20 @ 13:59)  BP: 139/59 (11-10-20 @ 13:59) (135/62 - 184/79)  RR: 16 (11-10-20 @ 13:59)  SpO2: 98% (20 @ 22:41)        TESTS & MEASUREMENTS:  Weight (Kg): 65.2 (11-10-20 @ 04:30)  BMI (kg/m2): 24.7 (11-10)    11-10-20 @ 07:01  -  11-10-20 @ 15:11  --------------------------------------------------------  IN: 0 mL / OUT: 600 mL / NET: -600 mL                            13.4   6.72  )-----------( 285      ( 2020 23:30 )             41.7           136  |  103  |  19  ----------------------------<  128<H>  4.3   |  25  |  1.0    Ca    9.3      2020 23:30  Mg     2.4         TPro  6.9  /  Alb  4.0  /  TBili  0.3  /  DBili  x   /  AST  20  /  ALT  <5  /  AlkPhos  111      LIVER FUNCTIONS - ( 2020 23:30 )  Alb: 4.0 g/dL / Pro: 6.9 g/dL / ALK PHOS: 111 U/L / ALT: <5 U/L / AST: 20 U/L / GGT: x                 Urinalysis Basic - ( 10 Nov 2020 00:00 )    Color: Yellow / Appearance: Clear / S.020 / pH: x  Gluc: x / Ketone: Negative  / Bili: Negative / Urobili: 0.2 mg/dL   Blood: x / Protein: Negative mg/dL / Nitrite: Positive   Leuk Esterase: Large / RBC: x / WBC >50 /HPF   Sq Epi: x / Non Sq Epi: Occasional /HPF / Bacteria: TNTC /HPF                  RADIOLOGY, ECG, & ADDITIONAL TESTS:      RECENT DIAGNOSTIC ORDERS:  Basic Metabolic Panel: AM Sched. Collection: 2020 04:30 (11-10-20 @ 15:11)  Complete Blood Count: AM Sched. Collection: 2020 04:30 (11-10-20 @ 15:11)  Culture - Urine: Routine  Specimen Source: Clean Catch (Midstream)  Addl Info: If indwelling urinary catheter > 14 days, obtain an order to remove and replace prior to c (11-10-20 @ 06:35)  Diet, DASH/TLC:   Sodium & Cholesterol Restricted (11-10-20 @ 06:35)  COVID-19  Antibody - for prior infection screening: Routine (11-10-20 @ 04:31)  Culture - Urine: Routine  Specimen Source: Clean Catch (Midstream)  Addl Info: If indwelling urinary catheter > 14 days, obtain an order to remove and replace prior to c (20 @ 23:57)      MEDICATIONS:  MEDICATIONS  (STANDING):  atorvastatin 40 milliGRAM(s) Oral at bedtime  carbidopa/levodopa CR 50/200 1 Tablet(s) Oral two times a day  cefTRIAXone   IVPB 1000 milliGRAM(s) IV Intermittent every 24 hours  diltiazem    milliGRAM(s) Oral daily  enoxaparin Injectable 40 milliGRAM(s) SubCutaneous at bedtime    MEDICATIONS  (PRN):  ondansetron    Tablet 4 milliGRAM(s) Oral two times a day PRN Nausea      HOME MEDICATIONS:  atorvastatin 80 mg oral tablet ()  carbidopa-levodopa 50 mg-200 mg oral tablet, extended release ()  DilTIAZem Hydrochloride  mg/24 hours oral capsule, extended release ()      PHYSICAL EXAM:  GENERAL: NAD, well-developed  HEAD:  Atraumatic, Normocephalic  EYES: EOMI, PERRLA, conjunctiva and sclera clear  NECK: Supple, No JVD  CHEST/LUNG: Clear to auscultation bilaterally; No wheeze  HEART: Regular rate and rhythm; No murmurs, rubs, or gallops  ABDOMEN: Soft, mild tenderness noted in the left flank, Nondistended; Bowel sounds present  EXTREMITIES:  2+ Peripheral Pulses, No clubbing, cyanosis, or edema  PSYCH: AAOx3  NEUROLOGY: non-focal  SKIN: No rashes or lesions

## 2020-11-10 NOTE — H&P ADULT - NSHPSOCIALHISTORY_GEN_ALL_CORE
88 YEAR OLD FEMALE LIVE AT HOME WITH HER DAUGHTER NOT SMOKING OR DRINKING ALCOHOL . Marital Status:  (   )    (   ) Single    (   )    ( x )   Lives with: (  ) alone  ( x ) children   (  ) spouse   (  ) parents  (  ) other  Recent Travel: No recent travel    Substance Use (street drugs): ( x ) never used  (  ) other:  Tobacco Usage:  ( x  ) never smoked   (   ) former smoker   (   ) current smoker  (     ) pack year  Alcohol Usage: None

## 2020-11-10 NOTE — PROGRESS NOTE ADULT - ASSESSMENT
88 year old female with pmhx of HTN, Hyperlipidemia, Parkinson disease, Rheumatoid arthritis presents to the ER with a chief complaint of nausea and increased urinary frequency.     # Pyelonephritis, Sepsis Ruled out on Admission   - hemodynamically stable  - CT Abdomen and Pelvis w/ IV Cont (11.10.20): Apparent right urothelial enhancement favoring ascending urinary infection.  - UA +  - UCx (10/24/20): proteus mirabilis    - cont ceftriaxone (as per sensitivity)  - f/u urine culture  - ID following    # HTN  - c/w Cardizem     # HLD  - c/w atorvastatin    # Parkinson  - c/w carbidopa-levodopa    # DVT ppx  - c/w Lovenox s/q    # DASH diet    # Ambulate as tolerated    # Full Code  - Healthcare proxy: Veronica (daughter) [phone].     #Progress Note Handoff:  Pending (specify):  improvement in UTI  Family discussion: d/w daughter  Disposition: Home___/SNF___/Other________/Unknown at this time___x_____      Lynn Packer,

## 2020-11-10 NOTE — H&P ADULT - NSHPLABSRESULTS_GEN_ALL_CORE
CT Abdomen and Pelvis w/ IV Cont (11.10.20): Apparent right urothelial enhancement favoring ascending urinary infection.

## 2020-11-10 NOTE — H&P ADULT - NSHPPHYSICALEXAM_GEN_ALL_CORE
T(C): 36.4 (11-10-20 @ 04:30), Max: 36.4 (11-10-20 @ 04:30)  HR: 75 (11-10-20 @ 04:30) (73 - 75)  BP: 184/79 (11-10-20 @ 04:30) (183/78 - 184/79)  RR: 16 (11-10-20 @ 04:30) (16 - 19)  SpO2: 98% (11-09-20 @ 22:41) (98% - 98%)    GENERAL: NAD, well-developed  HEAD:  Atraumatic, Normocephalic  EYES: EOMI, PERRLA, conjunctiva and sclera clear  ENT: Normal tympanic membrane. No nasal obstruction or discharge. No tonsillar exudate, swelling or erythema.  NECK: Supple, No JVD  CHEST/LUNG: Clear to auscultation bilaterally; No wheeze  HEART: Regular rate and rhythm; No murmurs, rubs, or gallops  ABDOMEN: Soft, Nontender, Nondistended; Bowel sounds present  EXTREMITIES:  2+ Peripheral Pulses, No clubbing, cyanosis, or edema  PSYCH: AAOx3  NEUROLOGY: non-focal  SKIN: No rashes or lesions T(C): 36.4 (11-10-20 @ 04:30), Max: 36.4 (11-10-20 @ 04:30)  HR: 75 (11-10-20 @ 04:30) (73 - 75)  BP: 184/79 (11-10-20 @ 04:30) (183/78 - 184/79)  RR: 16 (11-10-20 @ 04:30) (16 - 19)  SpO2: 98% (11-09-20 @ 22:41) (98% - 98%)    GENERAL: NAD, well-developed  HEAD:  Atraumatic, Normocephalic  EYES: EOMI, PERRLA, conjunctiva and sclera clear  ENT: Normal tympanic membrane. No nasal obstruction or discharge. No tonsillar exudate, swelling or erythema.  NECK: Supple, No JVD  CHEST/LUNG: Clear to auscultation bilaterally; No wheeze  HEART: Regular rate and rhythm; No murmurs, rubs, or gallops  ABDOMEN: Soft, mild tenderness noted in the left flank, Nondistended; Bowel sounds present  EXTREMITIES:  2+ Peripheral Pulses, No clubbing, cyanosis, or edema  PSYCH: AAOx3  NEUROLOGY: non-focal  SKIN: No rashes or lesions

## 2020-11-10 NOTE — H&P ADULT - ATTENDING COMMENTS
88 year old female with pmhx of HTN, Hyperlipidemia, Parkinson disease, Rheumatoid arthritis presents to the ER with a chief complaint of nausea and increased urinary frequeccy.     # Recurrent UTI      # HTN  - c/w Cardizem     # HLD  - c/w atorvastatin    # Parkinson  - c/w carbidopa-levodopa    # DVT ppx  - c/w lovenox s/q    # DASH diet    # Ambulate as tolerated    # Full Code 88 year old female with pmhx of HTN, Hyperlipidemia, Parkinson disease, Rheumatoid arthritis presents to the ER with a chief complaint of nausea and increased urinary frequency.     # Recurrent UTI  - hemodynamically stable  - CT Abdomen and Pelvis w/ IV Cont (11.10.20): Apparent right urothelial enhancement favoring ascending urinary infection.  - UA: bacteria +++  - UCx (10/24/20): proteus mirabilis    - start ceftriaxone (as per sensitivity)  - f/u urine culture  - ID consult    # HTN  - c/w Cardizem     # HLD  - c/w atorvastatin    # Parkinson  - c/w carbidopa-levodopa    # DVT ppx  - c/w Lovenox s/q    # DASH diet    # Ambulate as tolerated    # Full Code 88 year old female with pmhx of HTN, Hyperlipidemia, Parkinson disease, Rheumatoid arthritis presents to the ER with a chief complaint of nausea and increased urinary frequency.     # Recurrent UTI  - hemodynamically stable  - CT Abdomen and Pelvis w/ IV Cont (11.10.20): Apparent right urothelial enhancement favoring ascending urinary infection.  - UA: bacteria +++  - UCx (10/24/20): proteus mirabilis    - start ceftriaxone (as per sensitivity)  - f/u urine culture  - ID consult    # HTN  - c/w Cardizem     # HLD  - c/w atorvastatin    # Parkinson  - c/w carbidopa-levodopa    # DVT ppx  - c/w Lovenox s/q    # DASH diet    # Ambulate as tolerated    # Full Code  - Healthcare proxy: Veronica (daughter) [phone]

## 2020-11-11 LAB
ANION GAP SERPL CALC-SCNC: 10 MMOL/L — SIGNIFICANT CHANGE UP (ref 7–14)
BUN SERPL-MCNC: 15 MG/DL — SIGNIFICANT CHANGE UP (ref 10–20)
CALCIUM SERPL-MCNC: 8.6 MG/DL — SIGNIFICANT CHANGE UP (ref 8.5–10.1)
CHLORIDE SERPL-SCNC: 103 MMOL/L — SIGNIFICANT CHANGE UP (ref 98–110)
CO2 SERPL-SCNC: 24 MMOL/L — SIGNIFICANT CHANGE UP (ref 17–32)
CREAT SERPL-MCNC: 1 MG/DL — SIGNIFICANT CHANGE UP (ref 0.7–1.5)
CULTURE RESULTS: SIGNIFICANT CHANGE UP
GLUCOSE SERPL-MCNC: 95 MG/DL — SIGNIFICANT CHANGE UP (ref 70–99)
HCT VFR BLD CALC: 35.7 % — LOW (ref 37–47)
HGB BLD-MCNC: 11.4 G/DL — LOW (ref 12–16)
MCHC RBC-ENTMCNC: 28.3 PG — SIGNIFICANT CHANGE UP (ref 27–31)
MCHC RBC-ENTMCNC: 31.9 G/DL — LOW (ref 32–37)
MCV RBC AUTO: 88.6 FL — SIGNIFICANT CHANGE UP (ref 81–99)
NRBC # BLD: 0 /100 WBCS — SIGNIFICANT CHANGE UP (ref 0–0)
PLATELET # BLD AUTO: 278 K/UL — SIGNIFICANT CHANGE UP (ref 130–400)
POTASSIUM SERPL-MCNC: 4.3 MMOL/L — SIGNIFICANT CHANGE UP (ref 3.5–5)
POTASSIUM SERPL-SCNC: 4.3 MMOL/L — SIGNIFICANT CHANGE UP (ref 3.5–5)
RBC # BLD: 4.03 M/UL — LOW (ref 4.2–5.4)
RBC # FLD: 13.8 % — SIGNIFICANT CHANGE UP (ref 11.5–14.5)
SARS-COV-2 IGG SERPL QL IA: NEGATIVE — SIGNIFICANT CHANGE UP
SARS-COV-2 IGM SERPL IA-ACNC: 0.28 INDEX — SIGNIFICANT CHANGE UP
SODIUM SERPL-SCNC: 137 MMOL/L — SIGNIFICANT CHANGE UP (ref 135–146)
SPECIMEN SOURCE: SIGNIFICANT CHANGE UP
WBC # BLD: 4.97 K/UL — SIGNIFICANT CHANGE UP (ref 4.8–10.8)
WBC # FLD AUTO: 4.97 K/UL — SIGNIFICANT CHANGE UP (ref 4.8–10.8)

## 2020-11-11 PROCEDURE — 99233 SBSQ HOSP IP/OBS HIGH 50: CPT

## 2020-11-11 PROCEDURE — 93970 EXTREMITY STUDY: CPT | Mod: 26

## 2020-11-11 RX ORDER — SODIUM CHLORIDE 9 MG/ML
1000 INJECTION, SOLUTION INTRAVENOUS
Refills: 0 | Status: DISCONTINUED | OUTPATIENT
Start: 2020-11-11 | End: 2020-11-12

## 2020-11-11 RX ADMIN — ENOXAPARIN SODIUM 40 MILLIGRAM(S): 100 INJECTION SUBCUTANEOUS at 21:51

## 2020-11-11 RX ADMIN — OXYCODONE AND ACETAMINOPHEN 1 TABLET(S): 5; 325 TABLET ORAL at 01:11

## 2020-11-11 RX ADMIN — MORPHINE SULFATE 2 MILLIGRAM(S): 50 CAPSULE, EXTENDED RELEASE ORAL at 05:31

## 2020-11-11 RX ADMIN — MORPHINE SULFATE 2 MILLIGRAM(S): 50 CAPSULE, EXTENDED RELEASE ORAL at 22:21

## 2020-11-11 RX ADMIN — CEFTRIAXONE 100 MILLIGRAM(S): 500 INJECTION, POWDER, FOR SOLUTION INTRAMUSCULAR; INTRAVENOUS at 08:01

## 2020-11-11 RX ADMIN — CARBIDOPA AND LEVODOPA 1 TABLET(S): 25; 100 TABLET ORAL at 05:33

## 2020-11-11 RX ADMIN — SODIUM CHLORIDE 75 MILLILITER(S): 9 INJECTION, SOLUTION INTRAVENOUS at 14:40

## 2020-11-11 RX ADMIN — CARBIDOPA AND LEVODOPA 1 TABLET(S): 25; 100 TABLET ORAL at 17:44

## 2020-11-11 RX ADMIN — ATORVASTATIN CALCIUM 40 MILLIGRAM(S): 80 TABLET, FILM COATED ORAL at 21:52

## 2020-11-11 RX ADMIN — Medication 300 MILLIGRAM(S): at 05:33

## 2020-11-11 RX ADMIN — OXYCODONE AND ACETAMINOPHEN 1 TABLET(S): 5; 325 TABLET ORAL at 02:42

## 2020-11-11 RX ADMIN — MORPHINE SULFATE 2 MILLIGRAM(S): 50 CAPSULE, EXTENDED RELEASE ORAL at 05:49

## 2020-11-11 RX ADMIN — MORPHINE SULFATE 2 MILLIGRAM(S): 50 CAPSULE, EXTENDED RELEASE ORAL at 21:51

## 2020-11-11 NOTE — PROGRESS NOTE ADULT - SUBJECTIVE AND OBJECTIVE BOX
JON GARCÍA  88y, Female  Allergy: No Known Allergies      Patient seen and examined earlier today. Lying in bed, feeling well, no complaints.     PMH/PSH:  PAST MEDICAL & SURGICAL HISTORY:  Hyperlipidemia    Rheumatoid arthritis    HTN (hypertension)    Parkinson disease    History of hysterectomy        VITALS:  Vital Signs Last 24 Hrs  T(C): 36.7 (2020 05:21), Max: 36.7 (2020 05:21)  T(F): 98 (2020 05:21), Max: 98 (2020 05:21)  HR: 63 (2020 05:21) (63 - 75)  BP: 141/67 (2020 05:21) (139/59 - 167/74)  BP(mean): --  RR: 16 (2020 05:21) (16 - 16)  SpO2: --      TESTS & MEASUREMENTS:  Weight (Kg): 65.2 (11-10-20 @ 04:30)  BMI (kg/m2): 24.7 (11-10)                          11.4   4.97  )-----------( 278      ( 2020 05:58 )             35.7           137  |  103  |  15  ----------------------------<  95  4.3   |  24  |  1.0    Ca    8.6      2020 05:58  Mg     2.4         TPro  6.9  /  Alb  4.0  /  TBili  0.3  /  DBili  x   /  AST  20  /  ALT  <5  /  AlkPhos  111        Urinalysis Basic - ( 10 Nov 2020 00:00 )    Color: Yellow / Appearance: Clear / S.020 / pH: x  Gluc: x / Ketone: Negative  / Bili: Negative / Urobili: 0.2 mg/dL   Blood: x / Protein: Negative mg/dL / Nitrite: Positive   Leuk Esterase: Large / RBC: x / WBC >50 /HPF   Sq Epi: x / Non Sq Epi: Occasional /HPF / Bacteria: TNTC /HPF          RADIOLOGY, ECG, & ADDITIONAL TESTS:  < from: VA Duplex Lower Ext Vein Scan, Bilat (20 @ 10:51) >  Impression:    No evidence of deep venous thrombosis or superficial thrombophlebitis in the bilateral lower extremities.    < end of copied text >        MEDICATIONS:  MEDICATIONS  (STANDING):  atorvastatin 40 milliGRAM(s) Oral at bedtime  carbidopa/levodopa CR 50/200 1 Tablet(s) Oral two times a day  cefTRIAXone   IVPB 1000 milliGRAM(s) IV Intermittent every 24 hours  diltiazem    milliGRAM(s) Oral daily  enoxaparin Injectable 40 milliGRAM(s) SubCutaneous at bedtime  sodium chloride 0.45%. 1000 milliLiter(s) (75 mL/Hr) IV Continuous <Continuous>    MEDICATIONS  (PRN):  morphine  - Injectable 2 milliGRAM(s) IV Push three times a day PRN Severe Pain (7 - 10)  ondansetron    Tablet 4 milliGRAM(s) Oral two times a day PRN Nausea  oxycodone    5 mG/acetaminophen 325 mG 1 Tablet(s) Oral every 6 hours PRN Moderate Pain (4 - 6)        HOME MEDICATIONS:  atorvastatin 80 mg oral tablet ()  carbidopa-levodopa 50 mg-200 mg oral tablet, extended release ()  DilTIAZem Hydrochloride  mg/24 hours oral capsule, extended release ()      PHYSICAL EXAM:  GENERAL: NAD, well-developed  HEAD:  Atraumatic, Normocephalic  EYES: EOMI, PERRLA, conjunctiva and sclera clear  NECK: Supple, No JVD  CHEST/LUNG: Clear to auscultation bilaterally; No wheeze  HEART: Regular rate and rhythm; No murmurs, rubs, or gallops  ABDOMEN: Soft, mild tenderness noted in the left flank, Nondistended; Bowel sounds present  EXTREMITIES:  2+ Peripheral Pulses, No clubbing, cyanosis, or edema  PSYCH: AAOx3  NEUROLOGY: non-focal  SKIN: No rashes or lesions

## 2020-11-11 NOTE — PROGRESS NOTE ADULT - ASSESSMENT
88 year old female with pmhx of HTN, Hyperlipidemia, Parkinson disease, Rheumatoid arthritis presents to the ER with a chief complaint of nausea and increased urinary frequency.     # Pyelonephritis, Sepsis Ruled out on Admission   - hemodynamically stable  - CT Abdomen and Pelvis w/ IV Cont (11.10.20): Apparent right urothelial enhancement favoring ascending urinary infection.  - UA +  - UCx (10/24/20): proteus mirabilis; Cultures from this admission sent on 11/10  - cont ceftriaxone (as per sensitivity)  - f/u urine culture  - ID following  - start IVF    # HTN  - c/w Cardizem     # HLD  - c/w atorvastatin    # Parkinson  - c/w carbidopa-levodopa    # DVT ppx  - c/w Lovenox s/q    # DASH diet    # Ambulate as tolerated    # Full Code  - Healthcare proxy: Veronica (daughter) [phone]      Progress Note Handoff  Pending Consults: none  Pending Tests: none  Pending Results: culture  Family Discussion: discussed starting ivf and following up cultures - in agreement with plan of care   Disposition: Home__x___/SNF______/Other_____/Unknown at this time_____

## 2020-11-11 NOTE — PHYSICAL THERAPY INITIAL EVALUATION ADULT - GENERAL OBSERVATIONS, REHAB EVAL
15:00-15:30 Chart reviewed. Patient available to be seen for physical therapy, reports pain in B knees, confirmed with RN.  Pt rec'd in bed, needed coaxing to participate with PT.

## 2020-11-11 NOTE — PHYSICAL THERAPY INITIAL EVALUATION ADULT - ADDITIONAL COMMENTS
Pt lives with her daughter, steps to enter house, unsure of side railing is.  Pt uses RW PTA.  Pt has history of B knee OA,

## 2020-11-11 NOTE — PHYSICAL THERAPY INITIAL EVALUATION ADULT - CRITERIA FOR SKILLED THERAPEUTIC INTERVENTIONS
functional limitations in following categories/rehab potential/predicted duration of therapy intervention/therapy frequency/impairments found

## 2020-11-12 ENCOUNTER — TRANSCRIPTION ENCOUNTER (OUTPATIENT)
Age: 85
End: 2020-11-12

## 2020-11-12 VITALS — SYSTOLIC BLOOD PRESSURE: 150 MMHG | DIASTOLIC BLOOD PRESSURE: 62 MMHG | RESPIRATION RATE: 16 BRPM | HEART RATE: 66 BPM

## 2020-11-12 PROCEDURE — 99239 HOSP IP/OBS DSCHRG MGMT >30: CPT

## 2020-11-12 RX ORDER — CEFPODOXIME PROXETIL 100 MG
1 TABLET ORAL
Qty: 20 | Refills: 0
Start: 2020-11-12 | End: 2020-11-21

## 2020-11-12 RX ORDER — OXYCODONE AND ACETAMINOPHEN 5; 325 MG/1; MG/1
1 TABLET ORAL
Qty: 0 | Refills: 0 | DISCHARGE
Start: 2020-11-12

## 2020-11-12 RX ORDER — DILTIAZEM HCL 120 MG
1 CAPSULE, EXT RELEASE 24 HR ORAL
Qty: 0 | Refills: 0 | DISCHARGE
Start: 2020-11-12

## 2020-11-12 RX ORDER — CARBIDOPA AND LEVODOPA 25; 100 MG/1; MG/1
1 TABLET ORAL
Qty: 0 | Refills: 0 | DISCHARGE
Start: 2020-11-12

## 2020-11-12 RX ORDER — DILTIAZEM HCL 120 MG
1 CAPSULE, EXT RELEASE 24 HR ORAL
Qty: 0 | Refills: 0 | DISCHARGE

## 2020-11-12 RX ORDER — ATORVASTATIN CALCIUM 80 MG/1
1 TABLET, FILM COATED ORAL
Qty: 0 | Refills: 0 | DISCHARGE
Start: 2020-11-12

## 2020-11-12 RX ORDER — OXYCODONE AND ACETAMINOPHEN 5; 325 MG/1; MG/1
1 TABLET ORAL
Qty: 8 | Refills: 0
Start: 2020-11-12

## 2020-11-12 RX ORDER — ONDANSETRON 8 MG/1
1 TABLET, FILM COATED ORAL
Qty: 0 | Refills: 0 | DISCHARGE
Start: 2020-11-12

## 2020-11-12 RX ORDER — ATORVASTATIN CALCIUM 80 MG/1
1 TABLET, FILM COATED ORAL
Qty: 0 | Refills: 0 | DISCHARGE

## 2020-11-12 RX ADMIN — CEFTRIAXONE 100 MILLIGRAM(S): 500 INJECTION, POWDER, FOR SOLUTION INTRAMUSCULAR; INTRAVENOUS at 08:04

## 2020-11-12 RX ADMIN — CARBIDOPA AND LEVODOPA 1 TABLET(S): 25; 100 TABLET ORAL at 05:36

## 2020-11-12 RX ADMIN — MORPHINE SULFATE 2 MILLIGRAM(S): 50 CAPSULE, EXTENDED RELEASE ORAL at 05:36

## 2020-11-12 RX ADMIN — Medication 300 MILLIGRAM(S): at 05:36

## 2020-11-12 RX ADMIN — MORPHINE SULFATE 2 MILLIGRAM(S): 50 CAPSULE, EXTENDED RELEASE ORAL at 06:09

## 2020-11-12 RX ADMIN — OXYCODONE AND ACETAMINOPHEN 1 TABLET(S): 5; 325 TABLET ORAL at 08:39

## 2020-11-12 RX ADMIN — OXYCODONE AND ACETAMINOPHEN 1 TABLET(S): 5; 325 TABLET ORAL at 08:03

## 2020-11-12 NOTE — DISCHARGE NOTE PROVIDER - CARE PROVIDERS DIRECT ADDRESSES
,DirectAddress_Unknown ,DirectAddress_Unknown,[email or web address]

## 2020-11-12 NOTE — DISCHARGE NOTE NURSING/CASE MANAGEMENT/SOCIAL WORK - PATIENT PORTAL LINK FT
You can access the FollowMyHealth Patient Portal offered by [facility] by registering at the following website: [email or web address]. By joining [employer]’s FollowMyHealth portal, you will also be able to view your health information using other applications (apps) compatible with our system.

## 2020-11-12 NOTE — DISCHARGE NOTE PROVIDER - CARE PROVIDER_API CALL
Gucci Kamara  PEDIATRICS  [address] [place] [address]  Phone: [phone]  Fax: [phone]  Established Patient  Follow Up Time: 1 week   Gucci Kamara  PEDIATRICS  [address] [place] [address]  Phone: [phone]  Fax: [phone]  Established Patient  Follow Up Time: 1 week    Jiame Stover  UROLOGY  [address], [address]  [place] [address]  Phone: [phone]  Fax: [phone]  Follow Up Time: 1 week

## 2020-11-12 NOTE — DISCHARGE NOTE PROVIDER - EXTENDED VTE YES NO FOR MLM ENOXAPARIN
Order placed and faxed to [facility] for lipids/LFT's to be done prior to July appointment as ordered by Dr. Oliva   ,

## 2020-11-12 NOTE — DISCHARGE NOTE PROVIDER - HOSPITAL COURSE
Pt admitted for pyelonephritis. Pt was given IV antibiotics and had overall improvement in symptoms.  Pt was seen by ID who recommended cultures and continuation of IV antibx. Urine cx was negative  Duplex was ordered for lower extremity swelling and was negative for DVT.   Pt admitted for pyelonephritis. Pt was given IV antibiotics and had overall improvement in symptoms.  Pt was seen by ID who recommended follow up cultures and continuation of IV antibx. Urine cx was negative  Duplex was ordered for lower extremity swelling and was negative for DVT.    Patient remained afebrile during her hospitalization  She worked with PT and is stable for d/c home today    Patient seen and examined this morning.  I spoke to her daughter via phone today as well.  Will go home via ambulette    Vital Signs Last 24 Hrs  T(C): 36.7 (12 Nov 2020 05:30), Max: 37.1 (11 Nov 2020 21:45)  T(F): 98.1 (12 Nov 2020 05:30), Max: 98.7 (11 Nov 2020 21:45)  HR: 66 (12 Nov 2020 09:37) (65 - 77)  BP: 150/62 (12 Nov 2020 09:37) (150/62 - 195/79)  BP(mean): --  RR: 16 (12 Nov 2020 09:37) (16 - 16)  SpO2: --    PHYSICAL EXAM:  GENERAL: NAD, well-groomed, well-developed  HEAD:  Atraumatic, Normocephalic  EYES: EOMI, PERRLA, conjunctiva and sclera clear  NERVOUS SYSTEM:  Alert & Oriented X 4, Good concentration; Motor Strength 5/5 B/L upper and lower extremities; DTRs 2+ intact and symmetric  CHEST/LUNG: Clear to percussion bilaterally; No rales, rhonchi, wheezing, or rubs  HEART: Regular rate and rhythm; No murmurs, rubs, or gallops  ABDOMEN: Soft, Nontender, Nondistended; Bowel sounds present  EXTREMITIES:  2+ Peripheral Pulses, No clubbing, cyanosis, or edema  SKIN: No rashes or lesions    d/c home today; d/c planning took over 45 min  d/c papers done by me

## 2020-11-12 NOTE — DISCHARGE NOTE PROVIDER - NSDCMRMEDTOKEN_GEN_ALL_CORE_FT
atorvastatin 40 mg oral tablet: 1 tab(s) orally once a day (at bedtime)  carbidopa-levodopa 50 mg-200 mg oral tablet, extended release: 1 tab(s) orally 2 times a day  dilTIAZem 300 mg/24 hours oral capsule, extended release: 1 cap(s) orally once a day  ondansetron 4 mg oral tablet: 1 tab(s) orally 2 times a day, As needed, Nausea   atorvastatin 40 mg oral tablet: 1 tab(s) orally once a day (at bedtime)  carbidopa-levodopa 50 mg-200 mg oral tablet, extended release: 1 tab(s) orally 2 times a day  cefpodoxime 200 mg oral tablet: 1 tab(s) orally 2 times a day   dilTIAZem 300 mg/24 hours oral capsule, extended release: 1 cap(s) orally once a day  ondansetron 4 mg oral tablet: 1 tab(s) orally 2 times a day, As needed, Nausea  Percocet 5 mg-325 mg oral tablet: 1 tab(s) orally every 6 hours MDD:4 tabs

## 2020-11-12 NOTE — DISCHARGE NOTE PROVIDER - PROVIDER TOKENS
PROVIDER:[TOKEN:[[id number]],FOLLOWUP:[1 week],ESTABLISHEDPATIENT:[T]] PROVIDER:[TOKEN:[[id number]],FOLLOWUP:[1 week],ESTABLISHEDPATIENT:[T]],PROVIDER:[TOKEN:[[id number]],FOLLOWUP:[1 week]]

## 2020-11-17 DIAGNOSIS — G20 PARKINSON'S DISEASE: ICD-10-CM

## 2020-11-17 DIAGNOSIS — R11.0 NAUSEA: ICD-10-CM

## 2020-11-17 DIAGNOSIS — M06.9 RHEUMATOID ARTHRITIS, UNSPECIFIED: ICD-10-CM

## 2020-11-17 DIAGNOSIS — B96.4 PROTEUS (MIRABILIS) (MORGANII) AS THE CAUSE OF DISEASES CLASSIFIED ELSEWHERE: ICD-10-CM

## 2020-11-17 DIAGNOSIS — N12 TUBULO-INTERSTITIAL NEPHRITIS, NOT SPECIFIED AS ACUTE OR CHRONIC: ICD-10-CM

## 2020-11-17 DIAGNOSIS — Z79.899 OTHER LONG TERM (CURRENT) DRUG THERAPY: ICD-10-CM

## 2020-11-17 DIAGNOSIS — I10 ESSENTIAL (PRIMARY) HYPERTENSION: ICD-10-CM

## 2020-11-17 DIAGNOSIS — E78.5 HYPERLIPIDEMIA, UNSPECIFIED: ICD-10-CM

## 2020-11-17 DIAGNOSIS — Z90.710 ACQUIRED ABSENCE OF BOTH CERVIX AND UTERUS: ICD-10-CM

## 2020-12-27 ENCOUNTER — INPATIENT (INPATIENT)
Facility: HOSPITAL | Age: 85
LOS: 4 days | Discharge: HOME | End: 2021-01-01
Attending: HOSPITALIST | Admitting: HOSPITALIST
Payer: MEDICARE

## 2020-12-27 VITALS
TEMPERATURE: 98 F | RESPIRATION RATE: 20 BRPM | HEIGHT: 64 IN | HEART RATE: 87 BPM | DIASTOLIC BLOOD PRESSURE: 71 MMHG | OXYGEN SATURATION: 99 % | WEIGHT: 134.92 LBS | SYSTOLIC BLOOD PRESSURE: 160 MMHG

## 2020-12-27 DIAGNOSIS — H26.9 UNSPECIFIED CATARACT: ICD-10-CM

## 2020-12-27 DIAGNOSIS — I08.0 RHEUMATIC DISORDERS OF BOTH MITRAL AND AORTIC VALVES: ICD-10-CM

## 2020-12-27 DIAGNOSIS — H34.9 UNSPECIFIED RETINAL VASCULAR OCCLUSION: ICD-10-CM

## 2020-12-27 DIAGNOSIS — Z90.710 ACQUIRED ABSENCE OF BOTH CERVIX AND UTERUS: Chronic | ICD-10-CM

## 2020-12-27 DIAGNOSIS — E78.5 HYPERLIPIDEMIA, UNSPECIFIED: ICD-10-CM

## 2020-12-27 DIAGNOSIS — I10 ESSENTIAL (PRIMARY) HYPERTENSION: ICD-10-CM

## 2020-12-27 DIAGNOSIS — M06.9 RHEUMATOID ARTHRITIS, UNSPECIFIED: ICD-10-CM

## 2020-12-27 DIAGNOSIS — H35.033 HYPERTENSIVE RETINOPATHY, BILATERAL: ICD-10-CM

## 2020-12-27 DIAGNOSIS — E87.6 HYPOKALEMIA: ICD-10-CM

## 2020-12-27 DIAGNOSIS — H35.30 UNSPECIFIED MACULAR DEGENERATION: ICD-10-CM

## 2020-12-27 DIAGNOSIS — G20 PARKINSON'S DISEASE: ICD-10-CM

## 2020-12-27 LAB
ALBUMIN SERPL ELPH-MCNC: 4 G/DL — SIGNIFICANT CHANGE UP (ref 3.5–5.2)
ALP SERPL-CCNC: 100 U/L — SIGNIFICANT CHANGE UP (ref 30–115)
ALT FLD-CCNC: <5 U/L — SIGNIFICANT CHANGE UP (ref 0–41)
ANION GAP SERPL CALC-SCNC: 13 MMOL/L — SIGNIFICANT CHANGE UP (ref 7–14)
ANISOCYTOSIS BLD QL: SLIGHT — SIGNIFICANT CHANGE UP
APTT BLD: 29.7 SEC — SIGNIFICANT CHANGE UP (ref 27–39.2)
AST SERPL-CCNC: 15 U/L — SIGNIFICANT CHANGE UP (ref 0–41)
BASOPHILS # BLD AUTO: 0 K/UL — SIGNIFICANT CHANGE UP (ref 0–0.2)
BASOPHILS NFR BLD AUTO: 0 % — SIGNIFICANT CHANGE UP (ref 0–1)
BILIRUB SERPL-MCNC: 0.4 MG/DL — SIGNIFICANT CHANGE UP (ref 0.2–1.2)
BUN SERPL-MCNC: 26 MG/DL — HIGH (ref 10–20)
BURR CELLS BLD QL SMEAR: PRESENT — SIGNIFICANT CHANGE UP
CALCIUM SERPL-MCNC: 9 MG/DL — SIGNIFICANT CHANGE UP (ref 8.5–10.1)
CHLORIDE SERPL-SCNC: 99 MMOL/L — SIGNIFICANT CHANGE UP (ref 98–110)
CO2 SERPL-SCNC: 27 MMOL/L — SIGNIFICANT CHANGE UP (ref 17–32)
CREAT SERPL-MCNC: 1.3 MG/DL — SIGNIFICANT CHANGE UP (ref 0.7–1.5)
EOSINOPHIL NFR BLD AUTO: 0 % — SIGNIFICANT CHANGE UP (ref 0–8)
ERYTHROCYTE [SEDIMENTATION RATE] IN BLOOD: 37 MM/HR — HIGH (ref 0–20)
GLUCOSE SERPL-MCNC: 101 MG/DL — HIGH (ref 70–99)
HCT VFR BLD CALC: 37.9 % — SIGNIFICANT CHANGE UP (ref 37–47)
HGB BLD-MCNC: 12.3 G/DL — SIGNIFICANT CHANGE UP (ref 12–16)
INR BLD: 1.07 RATIO — SIGNIFICANT CHANGE UP (ref 0.65–1.3)
LYMPHOCYTES # BLD AUTO: 0.58 K/UL — LOW (ref 1.2–3.4)
LYMPHOCYTES # BLD AUTO: 8 % — LOW (ref 20.5–51.1)
MANUAL SMEAR VERIFICATION: SIGNIFICANT CHANGE UP
MCHC RBC-ENTMCNC: 28 PG — SIGNIFICANT CHANGE UP (ref 27–31)
MCHC RBC-ENTMCNC: 32.5 G/DL — SIGNIFICANT CHANGE UP (ref 32–37)
MCV RBC AUTO: 86.3 FL — SIGNIFICANT CHANGE UP (ref 81–99)
MONOCYTES # BLD AUTO: 0.73 K/UL — HIGH (ref 0.1–0.6)
MONOCYTES NFR BLD AUTO: 10 % — HIGH (ref 1.7–9.3)
NEUTROPHILS # BLD AUTO: 5.99 K/UL — SIGNIFICANT CHANGE UP (ref 1.4–6.5)
NEUTROPHILS NFR BLD AUTO: 82 % — HIGH (ref 42.2–75.2)
NRBC # BLD: 0 /100 — SIGNIFICANT CHANGE UP (ref 0–0)
NRBC # BLD: SIGNIFICANT CHANGE UP /100 WBCS (ref 0–0)
PLAT MORPH BLD: NORMAL — SIGNIFICANT CHANGE UP
PLATELET # BLD AUTO: 258 K/UL — SIGNIFICANT CHANGE UP (ref 130–400)
POTASSIUM SERPL-MCNC: 3.2 MMOL/L — LOW (ref 3.5–5)
POTASSIUM SERPL-SCNC: 3.2 MMOL/L — LOW (ref 3.5–5)
PROT SERPL-MCNC: 6.3 G/DL — SIGNIFICANT CHANGE UP (ref 6–8)
PROTHROM AB SERPL-ACNC: 12.3 SEC — SIGNIFICANT CHANGE UP (ref 9.95–12.87)
RAPID RVP RESULT: SIGNIFICANT CHANGE UP
RBC # BLD: 4.39 M/UL — SIGNIFICANT CHANGE UP (ref 4.2–5.4)
RBC # FLD: 13.5 % — SIGNIFICANT CHANGE UP (ref 11.5–14.5)
RBC BLD AUTO: ABNORMAL
SARS-COV-2 RNA SPEC QL NAA+PROBE: SIGNIFICANT CHANGE UP
SODIUM SERPL-SCNC: 139 MMOL/L — SIGNIFICANT CHANGE UP (ref 135–146)
TROPONIN T SERPL-MCNC: <0.01 NG/ML — SIGNIFICANT CHANGE UP
WBC # BLD: 7.3 K/UL — SIGNIFICANT CHANGE UP (ref 4.8–10.8)
WBC # FLD AUTO: 7.3 K/UL — SIGNIFICANT CHANGE UP (ref 4.8–10.8)

## 2020-12-27 PROCEDURE — 99285 EMERGENCY DEPT VISIT HI MDM: CPT

## 2020-12-27 PROCEDURE — 99497 ADVNCD CARE PLAN 30 MIN: CPT | Mod: 25

## 2020-12-27 PROCEDURE — 99283 EMERGENCY DEPT VISIT LOW MDM: CPT

## 2020-12-27 PROCEDURE — 70450 CT HEAD/BRAIN W/O DYE: CPT | Mod: 26

## 2020-12-27 PROCEDURE — 0042T: CPT

## 2020-12-27 PROCEDURE — 99223 1ST HOSP IP/OBS HIGH 75: CPT

## 2020-12-27 RX ORDER — ATORVASTATIN CALCIUM 80 MG/1
80 TABLET, FILM COATED ORAL AT BEDTIME
Refills: 0 | Status: DISCONTINUED | OUTPATIENT
Start: 2020-12-27 | End: 2021-01-01

## 2020-12-27 RX ORDER — OXYCODONE AND ACETAMINOPHEN 5; 325 MG/1; MG/1
1 TABLET ORAL EVERY 12 HOURS
Refills: 0 | Status: DISCONTINUED | OUTPATIENT
Start: 2020-12-27 | End: 2021-01-01

## 2020-12-27 RX ORDER — CARBIDOPA AND LEVODOPA 25; 100 MG/1; MG/1
2 TABLET ORAL
Refills: 0 | Status: DISCONTINUED | OUTPATIENT
Start: 2020-12-27 | End: 2021-01-01

## 2020-12-27 RX ORDER — ENTACAPONE 200 MG/1
200 TABLET, FILM COATED ORAL
Refills: 0 | Status: DISCONTINUED | OUTPATIENT
Start: 2020-12-27 | End: 2021-01-01

## 2020-12-27 RX ORDER — PRAMIPEXOLE DIHYDROCHLORIDE 0.12 MG/1
1 TABLET ORAL THREE TIMES A DAY
Refills: 0 | Status: DISCONTINUED | OUTPATIENT
Start: 2020-12-27 | End: 2021-01-01

## 2020-12-27 RX ORDER — CHLORHEXIDINE GLUCONATE 213 G/1000ML
1 SOLUTION TOPICAL
Refills: 0 | Status: DISCONTINUED | OUTPATIENT
Start: 2020-12-27 | End: 2021-01-01

## 2020-12-27 RX ORDER — ASPIRIN/CALCIUM CARB/MAGNESIUM 324 MG
325 TABLET ORAL ONCE
Refills: 0 | Status: COMPLETED | OUTPATIENT
Start: 2020-12-27 | End: 2020-12-27

## 2020-12-27 RX ORDER — PANTOPRAZOLE SODIUM 20 MG/1
40 TABLET, DELAYED RELEASE ORAL
Refills: 0 | Status: DISCONTINUED | OUTPATIENT
Start: 2020-12-27 | End: 2021-01-01

## 2020-12-27 RX ORDER — HEPARIN SODIUM 5000 [USP'U]/ML
5000 INJECTION INTRAVENOUS; SUBCUTANEOUS EVERY 8 HOURS
Refills: 0 | Status: DISCONTINUED | OUTPATIENT
Start: 2020-12-27 | End: 2021-01-01

## 2020-12-27 RX ORDER — DILTIAZEM HCL 120 MG
360 CAPSULE, EXT RELEASE 24 HR ORAL DAILY
Refills: 0 | Status: DISCONTINUED | OUTPATIENT
Start: 2020-12-27 | End: 2020-12-31

## 2020-12-27 RX ORDER — ONDANSETRON 8 MG/1
4 TABLET, FILM COATED ORAL ONCE
Refills: 0 | Status: COMPLETED | OUTPATIENT
Start: 2020-12-27 | End: 2020-12-27

## 2020-12-27 RX ORDER — OXYBUTYNIN CHLORIDE 5 MG
5 TABLET ORAL
Refills: 0 | Status: DISCONTINUED | OUTPATIENT
Start: 2020-12-27 | End: 2020-12-27

## 2020-12-27 RX ORDER — POTASSIUM CHLORIDE 20 MEQ
20 PACKET (EA) ORAL ONCE
Refills: 0 | Status: COMPLETED | OUTPATIENT
Start: 2020-12-27 | End: 2020-12-27

## 2020-12-27 RX ORDER — OXYBUTYNIN CHLORIDE 5 MG
5 TABLET ORAL AT BEDTIME
Refills: 0 | Status: DISCONTINUED | OUTPATIENT
Start: 2020-12-27 | End: 2021-01-01

## 2020-12-27 RX ORDER — HYDROCHLOROTHIAZIDE 25 MG
12.5 TABLET ORAL DAILY
Refills: 0 | Status: DISCONTINUED | OUTPATIENT
Start: 2020-12-27 | End: 2020-12-31

## 2020-12-27 RX ORDER — CARBIDOPA, LEVODOPA, AND ENTACAPONE 50; 200; 200 MG/1; MG/1; MG/1
1 TABLET, FILM COATED ORAL
Refills: 0 | Status: DISCONTINUED | OUTPATIENT
Start: 2020-12-27 | End: 2020-12-27

## 2020-12-27 RX ORDER — CLOPIDOGREL BISULFATE 75 MG/1
75 TABLET, FILM COATED ORAL DAILY
Refills: 0 | Status: DISCONTINUED | OUTPATIENT
Start: 2020-12-27 | End: 2021-01-01

## 2020-12-27 RX ORDER — IBUPROFEN 200 MG
400 TABLET ORAL
Refills: 0 | Status: DISCONTINUED | OUTPATIENT
Start: 2020-12-27 | End: 2020-12-27

## 2020-12-27 RX ORDER — TRAZODONE HCL 50 MG
50 TABLET ORAL AT BEDTIME
Refills: 0 | Status: DISCONTINUED | OUTPATIENT
Start: 2020-12-27 | End: 2021-01-01

## 2020-12-27 RX ADMIN — Medication 1000 MILLIGRAM(S): at 16:53

## 2020-12-27 RX ADMIN — Medication 50 MILLIGRAM(S): at 21:36

## 2020-12-27 RX ADMIN — CARBIDOPA AND LEVODOPA 2 TABLET(S): 25; 100 TABLET ORAL at 23:34

## 2020-12-27 RX ADMIN — Medication 20 MILLIEQUIVALENT(S): at 11:14

## 2020-12-27 RX ADMIN — PRAMIPEXOLE DIHYDROCHLORIDE 1 MILLIGRAM(S): 0.12 TABLET ORAL at 23:31

## 2020-12-27 RX ADMIN — Medication 325 MILLIGRAM(S): at 11:14

## 2020-12-27 RX ADMIN — HEPARIN SODIUM 5000 UNIT(S): 5000 INJECTION INTRAVENOUS; SUBCUTANEOUS at 21:35

## 2020-12-27 RX ADMIN — Medication 58 MILLIGRAM(S): at 09:12

## 2020-12-27 RX ADMIN — ENTACAPONE 200 MILLIGRAM(S): 200 TABLET, FILM COATED ORAL at 23:34

## 2020-12-27 RX ADMIN — ONDANSETRON 4 MILLIGRAM(S): 8 TABLET, FILM COATED ORAL at 10:08

## 2020-12-27 RX ADMIN — ATORVASTATIN CALCIUM 80 MILLIGRAM(S): 80 TABLET, FILM COATED ORAL at 21:35

## 2020-12-27 NOTE — ED PROVIDER NOTE - PROGRESS NOTE DETAILS
upon initial evaluation, stroke code called upon initial evaluation, stroke code called 0750. patient immediately to CT  and read by Dr. Tineo at 0830. Patient with IOP 11 affected eye OS and 10 OD. Neuro Dr. Reyna consulted opthalmology consulted on patient. agrees with plan. patient given ASA dr. guevara opthalmology consulted on patient. agrees with plan. patient given ASA dr. elizabeth aware of admission . as per optho will admit to [facility] ATTENDING NOTE: I personally evaluated the patient. I reviewed the Physician Assistant’s note (as assigned above), and agree with the findings and plan except as documented in my note.   89 y/o F presents for evaluation of occipital HA, nausea and L visual loss starting at 9PM last night, however, pt is unsure of the exact time of onset. Pain is described as moderate and throbbing, No vomiting, CP, SOB, or numbness/tingling/weakness. Pt said approximately 1 year ago she had a "stroke in her eye".   On exam: NCAT. PERRLA, EOMI. On L eye, pt only recognize light perception. VA is poor on L, IOP 11. OP clear. Lungs CTAB. RRR, S1S2 noted. Radial pulses 2+ and equal, pedal pulses 2+ and equal. Abdomen soft, NT/ND, no rebound or guarding. FROM x4 extremities. No focal neuro deficits.  A/P: Based on pt 9 o'clock (unconfirmed) time of onset, stroke code initiated. We obtained CT head, profusion studies, and neuro evaled pt in ED who recommended obtaining ESR and IV solumedrol, as well as advising optho consult. Obtained EKG, labs, CXR, COVID swab and consulted optho who agreed with Aspirin PO; based on her eval will admit pt for further management.

## 2020-12-27 NOTE — H&P ADULT - ASSESSMENT
87 y/o female admitted with painless loss of vision in left eye that is suspicious for Left retinal artery occlusion as per Neurologist.

## 2020-12-27 NOTE — ED PROVIDER NOTE - ATTENDING CONTRIBUTION TO CARE
I was present for and supervised the key and critical aspects of the procedures performed during the care of the patient. ATTENDING NOTE: I personally evaluated the patient. I reviewed the Physician Assistant’s note (as assigned above), and agree with the findings and plan except as documented in my note.   89 y/o F presents for evaluation of occipital HA, nausea and L visual loss starting at 9PM last night, however, pt is unsure of the exact time of onset. Pain is described as moderate and throbbing, No vomiting, CP, SOB, or numbness/tingling/weakness. Pt said approximately 1 year ago she had a "stroke in her eye".   On exam: NCAT. PERRLA, EOMI. On L eye, pt only recognize light perception. VA is poor on L, IOP 11. OP clear. Lungs CTAB. RRR, S1S2 noted. Radial pulses 2+ and equal, pedal pulses 2+ and equal. Abdomen soft, NT/ND, no rebound or guarding. FROM x4 extremities. No focal neuro deficits.  A/P: Based on pt 9 o'clock (unconfirmed) time of onset, stroke code initiated. We obtained CT head, profusion studies, and neuro evaled pt in ED who recommended obtaining ESR and IV solumedrol, as well as advising optho consult. Obtained EKG, labs, CXR, COVID swab and consulted optho who agreed with Aspirin PO; based on her eval will admit pt for further management.

## 2020-12-27 NOTE — GOALS OF CARE CONVERSATION - ADVANCED CARE PLANNING - CONVERSATION DETAILS
Elderly female with multiple medical problems presented with left retinal artery occlusion  Discussed GOC - patient wishes to remain a full code

## 2020-12-27 NOTE — ED PROVIDER NOTE - PHYSICAL EXAMINATION
Vital Signs: I have reviewed the initial vital signs.  Constitutional: well-nourished, no acute distress    ENT: MMM, no facial droop  Cardiovascular: regular rate, regular rhythm, no murmur appreciated  Respiratory: unlabored respiratory effort, clear to auscultation bilaterally  Gastrointestinal: soft, non-tender, non-distended  abdomen, no pulsatile mass  Musculoskeletal: supple neck, no lower extremity edema, no bony tenderness  Integumentary: warm, dry, no rash  Neurologic: awake, alert, cranial nerves II-XII grossly intact, extremities’ motor and sensory functions grossly intact, no focal deficits,

## 2020-12-27 NOTE — CHART NOTE - NSCHARTNOTEFT_GEN_A_CORE
I spoke with ER attending regarding patient ater he called code stroke.  I'm informed pt is 88 yof with prior "stroke in eye".  She reports around 9 pm last night noticing a vision problem describing "left side of her vision decreased".  This occurred suddenly and she experienced headache.  No other weakness or neurologic deficits noted except for pupil slow to react on left.  No issues with right eye.  Visual acuity is described as not being able to see fingers held directly in front of her eye.      She is outside window for thrombolytics but I agree with CTH and CTA head/neck.  She should have ESR drawn and ophthalmology should be called immediately in the event they have something to offer for rentinal artery occlusion.  Additionally would strongly consider giving 1 gram Solumedrol daily for several days to cover for possibility of temporal arteritis.         I discussed these recommendations with ER attending this morning at 8:04.

## 2020-12-27 NOTE — H&P ADULT - ATTENDING COMMENTS
Patient seen and examined independently  Agree with medical PA's H&P except where edited by me    # Left retinal artery occlusion  -as per Optho - will send patient [facility] as she needs an eye clinic appointment  -patient received steroids and asa by the ER  -optho follow up in am    #hypokalemia  -replaced by ER  -check labs in am    # HTN  - c/w Cardizem, HCTZ    # HLD  - c/w atorvastatin    # Parkinson  - c/w carbidopa-levodopa    FULL CODE - GOC done     Discussed and signed out to Hospitalist at [facility] as well as the MAR

## 2020-12-27 NOTE — H&P ADULT - PROBLEM SELECTOR PLAN 1
[facility] to be followed by Neurology and Opyhalmology, add ASA 81mg daily, already on Plavix [facility] to be followed by Neurology and Opthalmology, add ASA 81mg daily, already on Plavix

## 2020-12-27 NOTE — H&P ADULT - NSHPLABSRESULTS_GEN_ALL_CORE
12.3   7.30  )-----------( 258      ( 27 Dec 2020 09:03 )             37.9       12-27    139  |  99  |  26<H>  ----------------------------<  101<H>  3.2<L>   |  27  |  1.3    Ca    9.0      27 Dec 2020 09:03    TPro  6.3  /  Alb  4.0  /  TBili  0.4  /  DBili  x   /  AST  15  /  ALT  <5  /  AlkPhos  100  12-27          PT/INR - ( 27 Dec 2020 09:03 )   PT: 12.30 sec;   INR: 1.07 ratio         PTT - ( 27 Dec 2020 09:03 )  PTT:29.7 sec        CARDIAC MARKERS ( 27 Dec 2020 09:03 )  x     / <0.01 ng/mL        POCT Blood Glucose.: 104 mg/dL (27 Dec 2020 07:51)      CT Perfusion w/ Maps w/ IV Cont (12.27.20 @ 08:49) >      COMMENT:  Reference per NASCET criteria for degree of stenosis: Mild: less than 50% stenosis. Moderate: 50-69% stenosis. Severe: 70-94% stenosis. Near occlusion: 95-99% stenosis.    Per RAPID assessment for acuteinfarct, threshold for ischemic tissue volume is Tmax > 6 sec. Threshold for infarct core volume estimate is CBF < 30 percent. Threshold for poor collateral flow or severe delayed perfusion is Tmax > 10 sec.      CT Brain Stroke Protocol (12.27.20 @ 08:12) >    IMPRESSION:  No evidence of acute transcortical infarct, acute intracranial hemorrhage, or mass effect.

## 2020-12-27 NOTE — ED PROVIDER NOTE - OBJECTIVE STATEMENT
88 year old female with pmhx of HTN, Hyperlipidemia, Parkinson disease, Rheumatoid arthritis presents to the Ed for evaluation of nausea and left eye decreased vision . patient c/o occipital headache and nausea which began at 2100 last night. patient denies any facial drooping, difficulty with speech , weakness to extremities. patient denies any tinnitus. no syncope, back pain. patient able to ambulate. patient states she has prior hx of prior stroke. patient denies any eye pain or excessive tearing .

## 2020-12-27 NOTE — ED PROVIDER NOTE - NS ED ROS FT
Review of Systems    Constitutional: (-) fever/ chills (-)loss of appetite or  weight loss  Eyes (+)left eye vision change   ENT: (-) epistaxis (-) sore throat (-) ear pain  Cardiovascular: (-) chest pain, (-) syncope (-) palpitations  Respiratory: (-) cough, (-) shortness of breath  Gastrointestinal: (-) vomiting, (-) diarrhea (-) abdominal pain  neck: (-) neck pain or stiffness  Musculoskeletal:  (-) back pain, (-) joint pain   Integumentary: (-) rash, (-) swelling  Neurological: (+) headache, (-) altered mental status

## 2020-12-27 NOTE — H&P ADULT - HISTORY OF PRESENT ILLNESS
ER Admission Note:            88 year old female with pmhx of HTN, Hyperlipidemia, Parkinson disease, Rheumatoid arthritis presents to the Ed for evaluation of nausea and left eye decreased vision . patient c/o occipital headache and nausea which began at 2100 last night. patient denies any facial drooping, difficulty with speech , weakness to extremities.      *Progress: upon initial evaluation, stroke code called 0750. patient immediately to CT  and read by Dr. Tineo at 0830. Patient with IOP 11 affected eye OS and 10 OD. Neuro Dr. Reyna consulted.  ER Admission Note:            88 year old female with pmhx of HTN, Hyperlipidemia, Parkinson disease, Rheumatoid arthritis presents to the Ed for evaluation of nausea and left eye decreased vision . patient c/o occipital headache and nausea which began at 2100 last night. patient denies any facial drooping, difficulty with speech , weakness to extremities.      *Progress: upon initial evaluation, stroke code called 0750. patient immediately to CT  and read by Dr. Tineo at 0830. Patient with IOP 11 affected eye OS and 10 OD. Neuro Dr. Reyna consulted.    -Patient reports to me that last year a Doctor told her " Something went from her heart to her left eye".  She was to follow up on the tests that were done but she did not due to Covid.    Patient meds verified with [facility] on [address]. ER Admission Note:            88 year old female with pmhx of HTN, Hyperlipidemia, Parkinson disease, Rheumatoid arthritis presents to the Ed for evaluation of nausea and left eye decreased vision . patient c/o occipital headache and nausea which began at 2100 last night. patient denies any facial drooping, difficulty with speech , weakness to extremities.      *Progress: upon initial evaluation, stroke code called 0750. patient immediately to CT and read by Dr. Tineo at 0830. Patient with IOP 11 affected eye OS and 10 OD. Neuro Dr. Reyna consulted.    -Patient reports to me that last year a Doctor told her " Something went from her heart to her left eye".  She was to follow up on the tests that were done but she did not due to Covid.    Patient meds verified with [facility] on [address].

## 2020-12-27 NOTE — H&P ADULT - NSHPPHYSICALEXAM_GEN_ALL_CORE
Vital Signs Last 24 Hrs    T(F): 98.7 (12-27-20 @ 11:00), Max: 98.7 (12-27-20 @ 11:00)  HR: 76 (12-27-20 @ 11:00) (73 - 87)  BP: 168/75 (12-27-20 @ 11:00)  RR: 20 (12-27-20 @ 11:00) (20 - 20)  SpO2: 95% (12-27-20 @ 11:00) (95% - 99%)    PHYSICAL EXAM:      Constitutional: NAD, alert, answers correctly    Eyes: PERRLA    Respiratory: +air entry, no rales, no rhonchi, no wheezes    Left eye: **Decreased vision with eyelid ptosis    Cardiovascular: +S1 and S2, regular rate and rhythm, ** DENICE LLSB    Gastrointestinal: +BS, soft, non-tender, not distended    Extremities:  no edema, no calf tenderness    Vascular: +dorsal pedis and radial pulses, no extremity cyanosis    Neurological: sensation intact, ROM equal B/L, CN II-XII intact    Skin: no rashes, normal turgor Vital Signs Last 24 Hrs    T(F): 98.7 (12-27-20 @ 11:00), Max: 98.7 (12-27-20 @ 11:00)  HR: 76 (12-27-20 @ 11:00) (73 - 87)  BP: 168/75 (12-27-20 @ 11:00)  RR: 20 (12-27-20 @ 11:00) (20 - 20)  SpO2: 95% (12-27-20 @ 11:00) (95% - 99%) on ra    PHYSICAL EXAM:      Constitutional: NAD, alert, answers correctly    Eyes: PERRLA    Respiratory: +air entry, no rales, no rhonchi, no wheezes    Left eye: **Decreased vision with eyelid ptosis    Cardiovascular: +S1 and S2, regular rate and rhythm, ** DENICE LLSB    Gastrointestinal: +BS, soft, non-tender, not distended    Extremities:  no edema, no calf tenderness    Vascular: +dorsal pedis and radial pulses, no extremity cyanosis    Neurological: sensation intact, ROM equal B/L, CN II-XII intact    Skin: no rashes, normal turgor

## 2020-12-27 NOTE — ED ADULT TRIAGE NOTE - CHIEF COMPLAINT QUOTE
pt complaining of nauseas headache, also complaining of "something in the visual field of her left eye, denies injury denies fever

## 2020-12-27 NOTE — ED PROVIDER NOTE - CLINICAL SUMMARY MEDICAL DECISION MAKING FREE TEXT BOX
patient presents for evaluation of left sided visual loss that is painless reported at 9 pm but unsure of actual time of onset she has no other focal deficits being that she presented with 24 hours stroke code initiated upon initial evaluation   we obtained ekg labs, discussed the case with dr bennett we have also consulted optho I will admit for further management based on her loss of vision in addition patient given aspirin.  I will admit for f urther workup

## 2020-12-28 LAB
ALBUMIN SERPL ELPH-MCNC: 3.6 G/DL — SIGNIFICANT CHANGE UP (ref 3.5–5.2)
ALP SERPL-CCNC: 88 U/L — SIGNIFICANT CHANGE UP (ref 30–115)
ALT FLD-CCNC: <5 U/L — SIGNIFICANT CHANGE UP (ref 0–41)
ANION GAP SERPL CALC-SCNC: 13 MMOL/L — SIGNIFICANT CHANGE UP (ref 7–14)
ANISOCYTOSIS BLD QL: SLIGHT — SIGNIFICANT CHANGE UP
AST SERPL-CCNC: 15 U/L — SIGNIFICANT CHANGE UP (ref 0–41)
BASOPHILS # BLD AUTO: 0 K/UL — SIGNIFICANT CHANGE UP (ref 0–0.2)
BASOPHILS NFR BLD AUTO: 0 % — SIGNIFICANT CHANGE UP (ref 0–1)
BILIRUB SERPL-MCNC: 0.2 MG/DL — SIGNIFICANT CHANGE UP (ref 0.2–1.2)
BUN SERPL-MCNC: 42 MG/DL — HIGH (ref 10–20)
CALCIUM SERPL-MCNC: 8.3 MG/DL — LOW (ref 8.5–10.1)
CHLORIDE SERPL-SCNC: 100 MMOL/L — SIGNIFICANT CHANGE UP (ref 98–110)
CO2 SERPL-SCNC: 24 MMOL/L — SIGNIFICANT CHANGE UP (ref 17–32)
CREAT SERPL-MCNC: 1.4 MG/DL — SIGNIFICANT CHANGE UP (ref 0.7–1.5)
EOSINOPHIL # BLD AUTO: 0 K/UL — SIGNIFICANT CHANGE UP (ref 0–0.7)
EOSINOPHIL NFR BLD AUTO: 0 % — SIGNIFICANT CHANGE UP (ref 0–8)
GLUCOSE SERPL-MCNC: 189 MG/DL — HIGH (ref 70–99)
HCT VFR BLD CALC: 36 % — LOW (ref 37–47)
HGB BLD-MCNC: 11.6 G/DL — LOW (ref 12–16)
LYMPHOCYTES # BLD AUTO: 0.25 K/UL — LOW (ref 1.2–3.4)
LYMPHOCYTES # BLD AUTO: 2.6 % — LOW (ref 20.5–51.1)
MACROCYTES BLD QL: SLIGHT — SIGNIFICANT CHANGE UP
MANUAL SMEAR VERIFICATION: SIGNIFICANT CHANGE UP
MCHC RBC-ENTMCNC: 27.8 PG — SIGNIFICANT CHANGE UP (ref 27–31)
MCHC RBC-ENTMCNC: 32.2 G/DL — SIGNIFICANT CHANGE UP (ref 32–37)
MCV RBC AUTO: 86.3 FL — SIGNIFICANT CHANGE UP (ref 81–99)
MICROCYTES BLD QL: SLIGHT — SIGNIFICANT CHANGE UP
MONOCYTES # BLD AUTO: 0.17 K/UL — SIGNIFICANT CHANGE UP (ref 0.1–0.6)
MONOCYTES NFR BLD AUTO: 1.8 % — SIGNIFICANT CHANGE UP (ref 1.7–9.3)
NEUTROPHILS # BLD AUTO: 9.25 K/UL — HIGH (ref 1.4–6.5)
NEUTROPHILS NFR BLD AUTO: 93.9 % — HIGH (ref 42.2–75.2)
NEUTS BAND # BLD: 1.7 % — SIGNIFICANT CHANGE UP (ref 0–6)
NRBC # BLD: 1 /100 — HIGH (ref 0–0)
NRBC # BLD: SIGNIFICANT CHANGE UP /100 WBCS (ref 0–0)
PLAT MORPH BLD: NORMAL — SIGNIFICANT CHANGE UP
PLATELET # BLD AUTO: 270 K/UL — SIGNIFICANT CHANGE UP (ref 130–400)
POIKILOCYTOSIS BLD QL AUTO: SLIGHT — SIGNIFICANT CHANGE UP
POLYCHROMASIA BLD QL SMEAR: SLIGHT — SIGNIFICANT CHANGE UP
POTASSIUM SERPL-MCNC: 3.8 MMOL/L — SIGNIFICANT CHANGE UP (ref 3.5–5)
POTASSIUM SERPL-SCNC: 3.8 MMOL/L — SIGNIFICANT CHANGE UP (ref 3.5–5)
PROT SERPL-MCNC: 6 G/DL — SIGNIFICANT CHANGE UP (ref 6–8)
RBC # BLD: 4.17 M/UL — LOW (ref 4.2–5.4)
RBC # FLD: 13.4 % — SIGNIFICANT CHANGE UP (ref 11.5–14.5)
RBC BLD AUTO: ABNORMAL
SODIUM SERPL-SCNC: 137 MMOL/L — SIGNIFICANT CHANGE UP (ref 135–146)
WBC # BLD: 9.68 K/UL — SIGNIFICANT CHANGE UP (ref 4.8–10.8)
WBC # FLD AUTO: 9.68 K/UL — SIGNIFICANT CHANGE UP (ref 4.8–10.8)

## 2020-12-28 PROCEDURE — 99222 1ST HOSP IP/OBS MODERATE 55: CPT

## 2020-12-28 PROCEDURE — 99233 SBSQ HOSP IP/OBS HIGH 50: CPT

## 2020-12-28 RX ORDER — ASPIRIN/CALCIUM CARB/MAGNESIUM 324 MG
81 TABLET ORAL DAILY
Refills: 0 | Status: DISCONTINUED | OUTPATIENT
Start: 2020-12-28 | End: 2021-01-01

## 2020-12-28 RX ORDER — INFLUENZA VIRUS VACCINE 15; 15; 15; 15 UG/.5ML; UG/.5ML; UG/.5ML; UG/.5ML
0.5 SUSPENSION INTRAMUSCULAR ONCE
Refills: 0 | Status: COMPLETED | OUTPATIENT
Start: 2020-12-28 | End: 2021-01-01

## 2020-12-28 RX ORDER — MORPHINE SULFATE 50 MG/1
2 CAPSULE, EXTENDED RELEASE ORAL ONCE
Refills: 0 | Status: DISCONTINUED | OUTPATIENT
Start: 2020-12-28 | End: 2020-12-28

## 2020-12-28 RX ORDER — MORPHINE SULFATE 50 MG/1
1 CAPSULE, EXTENDED RELEASE ORAL ONCE
Refills: 0 | Status: DISCONTINUED | OUTPATIENT
Start: 2020-12-28 | End: 2020-12-28

## 2020-12-28 RX ORDER — ONDANSETRON 8 MG/1
4 TABLET, FILM COATED ORAL EVERY 8 HOURS
Refills: 0 | Status: COMPLETED | OUTPATIENT
Start: 2020-12-28 | End: 2020-12-28

## 2020-12-28 RX ADMIN — OXYCODONE AND ACETAMINOPHEN 1 TABLET(S): 5; 325 TABLET ORAL at 22:47

## 2020-12-28 RX ADMIN — OXYCODONE AND ACETAMINOPHEN 1 TABLET(S): 5; 325 TABLET ORAL at 10:59

## 2020-12-28 RX ADMIN — MORPHINE SULFATE 2 MILLIGRAM(S): 50 CAPSULE, EXTENDED RELEASE ORAL at 02:33

## 2020-12-28 RX ADMIN — Medication 81 MILLIGRAM(S): at 14:41

## 2020-12-28 RX ADMIN — PANTOPRAZOLE SODIUM 40 MILLIGRAM(S): 20 TABLET, DELAYED RELEASE ORAL at 07:30

## 2020-12-28 RX ADMIN — CARBIDOPA AND LEVODOPA 2 TABLET(S): 25; 100 TABLET ORAL at 14:41

## 2020-12-28 RX ADMIN — HEPARIN SODIUM 5000 UNIT(S): 5000 INJECTION INTRAVENOUS; SUBCUTANEOUS at 21:53

## 2020-12-28 RX ADMIN — PRAMIPEXOLE DIHYDROCHLORIDE 1 MILLIGRAM(S): 0.12 TABLET ORAL at 07:40

## 2020-12-28 RX ADMIN — ATORVASTATIN CALCIUM 80 MILLIGRAM(S): 80 TABLET, FILM COATED ORAL at 21:53

## 2020-12-28 RX ADMIN — Medication 360 MILLIGRAM(S): at 07:09

## 2020-12-28 RX ADMIN — PRAMIPEXOLE DIHYDROCHLORIDE 1 MILLIGRAM(S): 0.12 TABLET ORAL at 22:32

## 2020-12-28 RX ADMIN — PRAMIPEXOLE DIHYDROCHLORIDE 1 MILLIGRAM(S): 0.12 TABLET ORAL at 14:42

## 2020-12-28 RX ADMIN — ENTACAPONE 200 MILLIGRAM(S): 200 TABLET, FILM COATED ORAL at 07:11

## 2020-12-28 RX ADMIN — HEPARIN SODIUM 5000 UNIT(S): 5000 INJECTION INTRAVENOUS; SUBCUTANEOUS at 14:40

## 2020-12-28 RX ADMIN — OXYCODONE AND ACETAMINOPHEN 1 TABLET(S): 5; 325 TABLET ORAL at 21:58

## 2020-12-28 RX ADMIN — MORPHINE SULFATE 1 MILLIGRAM(S): 50 CAPSULE, EXTENDED RELEASE ORAL at 23:07

## 2020-12-28 RX ADMIN — OXYCODONE AND ACETAMINOPHEN 1 TABLET(S): 5; 325 TABLET ORAL at 00:33

## 2020-12-28 RX ADMIN — CARBIDOPA AND LEVODOPA 2 TABLET(S): 25; 100 TABLET ORAL at 18:41

## 2020-12-28 RX ADMIN — Medication 50 MILLIGRAM(S): at 21:53

## 2020-12-28 RX ADMIN — ENTACAPONE 200 MILLIGRAM(S): 200 TABLET, FILM COATED ORAL at 18:41

## 2020-12-28 RX ADMIN — HEPARIN SODIUM 5000 UNIT(S): 5000 INJECTION INTRAVENOUS; SUBCUTANEOUS at 07:09

## 2020-12-28 RX ADMIN — CLOPIDOGREL BISULFATE 75 MILLIGRAM(S): 75 TABLET, FILM COATED ORAL at 14:41

## 2020-12-28 RX ADMIN — Medication 12.5 MILLIGRAM(S): at 07:08

## 2020-12-28 RX ADMIN — ENTACAPONE 200 MILLIGRAM(S): 200 TABLET, FILM COATED ORAL at 14:41

## 2020-12-28 RX ADMIN — CARBIDOPA AND LEVODOPA 2 TABLET(S): 25; 100 TABLET ORAL at 07:09

## 2020-12-28 RX ADMIN — ONDANSETRON 4 MILLIGRAM(S): 8 TABLET, FILM COATED ORAL at 19:37

## 2020-12-28 NOTE — PROGRESS NOTE ADULT - ASSESSMENT
89 yo female with PMH of HTN, Hyperlipidemia, Parkinson disease, Rheumatoid arthritis presents to the ED for sudden left eye decreased vision, symptoms started with occipital headache, nasuea, vomiting, then she felt foggy vision on lateral half side of left eye, she denies fever, chills, no numbness, focal weakness. In the ED Stroke code activated    A/P:   Left eye blurry vision: resolved.   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   Brain CT, CT angio of head and neck didn't show acute infarction or thrombosis.   ESR 37, less likely giant cell arteritis.   Send Echo, telemetry monitor, will need event monitor to rule out Arrhythmia.   Continue ASA and Lipitor. Ophthalmology consult.     Parkinson's diease; Continue Sinemet.     HTN: Continue HCTZ and Cardizem.     Pending Neurology follow up for further recommendations.

## 2020-12-28 NOTE — PROGRESS NOTE ADULT - ASSESSMENT
88y Female with h/o PD and acute left eye vision loss ~ 9 p.m. last night with suspicion of central retinal artery occlusion.     Today is hospital day 1d. This morning she is resting comfortably in bed and reports no new issues or overnight events. Patient is hungry and did not eat for past 2 days. She reports she still has intermittent foggy vision in left eye but it has been stable. Patient denied any headache, nausea, chest pain or shortness of breath.     # Left retinal artery occlusion  -transferred [facility]  -patient received steroids and asa by the ER  -optho follow up in am, reordered, was cancelled during transfer  - s/p Methylprednisolone 1,000 mg x 1   -ESR 37, per neuro If significantly elevated ESR or if there is otherwise suspicion of giant cell arteritis can continue for total of 5 doses. Patient's vision is stable, will hold on using pulse steroid for now, will f/u with neuro  -s/p  Aspirin 325 mg x 1, 81 mg/day ordered.       #hypokalemia  -replaced by ER, f/u 11am and am labs    # HTN  - c/w Cardizem, HCTZ    # HLD  - c/w atorvastatin    # Parkinson  - c/w carbidopa-levodopa    FULL CODE - GOC done on admission  DVT PPX  GI PPX  CHG  AIT

## 2020-12-28 NOTE — PROGRESS NOTE ADULT - SUBJECTIVE AND OBJECTIVE BOX
Neurology Follow up note  Patient examined at bedside she continues to have left eye symptoms which she describes as a thin white film over her eye especially in the middle. Denies any other complaints at this time        HPI:  ER Admission Note:  88 year old female with pmhx of HTN, Hyperlipidemia, Parkinson disease, Rheumatoid arthritis presents to the Ed for evaluation of nausea and left eye decreased vision . patient c/o occipital headache and nausea which began at 2100 last night. patient denies any facial drooping, difficulty with speech , weakness to extremities.    *Progress: upon initial evaluation, stroke code called 0750. patient immediately to CT and read by Dr. Tineo at 0830. Patient with IOP 11 affected eye OS and 10 OD. Neuro Dr. Reyna consulted.        Vital Signs Last 24 Hrs  T(C): 36.4 (28 Dec 2020 16:20), Max: 36.9 (28 Dec 2020 00:36)  T(F): 97.6 (28 Dec 2020 16:20), Max: 98.5 (28 Dec 2020 00:36)  HR: 64 (28 Dec 2020 16:20) (64 - 78)  BP: 116/56 (28 Dec 2020 16:20) (116/56 - 169/73)  BP(mean): --  RR: 18 (28 Dec 2020 16:20) (18 - 20)  SpO2: 96% (28 Dec 2020 16:20) (96% - 96%)          Neurological Exam:   General: The patient is oriented to person, place, time and date. Normal attention and comprehension.  Cranial nerves:  EOMI w/o nystagmus. Poorly reactive on left.  Right pupil sluggishly reactive.  No ptosis/weakness of eyelid closure.  Facial sensation is normal.  No facial asymmetry.  Hearing grossly intact b/l.  Palate elevates midline.  Tongue midline.  Motor examination:   Increased tone, normal bulk and range of motion.  No tenderness, twitching, tremors or involuntary movements.  Formal Muscle Strength Testing: (MRC grade R/L) 5/5 UE; 4+/5 LE.  No observable drift.  Reflexes:   2+ b/l biceps, triceps, brachioradialis, patella and 1+ Achilles.  Plantar response downgoing b/l.    Sensory examination:   Intact to light touch and pinprick in all extremities.  Cerebellum:   FTN/HKS intact with normal NICKO in all limbs.  No dysmetria or dysdiadokinesia.        Medications  aspirin  chewable 81 milligrams Oral daily  atorvastatin 80 milligram Oral at bedtime  carbidopa/levodopa  25/100 2 Tablet(s) Oral four times a day  chlorhexidine 4% Liquid 1 Application(s) Topical <User Schedule>  clopidogrel Tablet 75 milligram Oral daily  diltiazem    milligram Oral daily  entacapone 200 milligram Oral four times a day  heparin   Injectable 5000 Unit(s) Subcutaneous every 8 hours  hydrochlorothiazide 12.5 milligram Oral daily  influenza   Vaccine 0.5 milliliter Intramuscular once  oxybutynin 5 milligram Oral at bedtime  oxycodone    5 mG/acetaminophen 325 mG 1 Tablet(s) Oral every 12 hours PRN  pantoprazole    Tablet 40 milligram Oral before breakfast  pramipexole 1 milligram Oral three times a day  Trazadone 50 milligrams Oral at bedtime      Lab  Comprehensive Metabolic Panel (12.28.20 @ 16:59)   Sodium, Serum: 137 mmol/L   Potassium, Serum: 3.8 mmol/L   Chloride, Serum: 100 mmol/L   Carbon Dioxide, Serum: 24 mmol/L   Anion Gap, Serum: 13 mmol/L   Blood Urea Nitrogen, Serum: 42 mg/dL   Creatinine, Serum: 1.4 mg/dL   Glucose, Serum: 189 mg/dL   Calcium, Total Serum: 8.3 mg/dL   Protein Total, Serum: 6.0 g/dL   Albumin, Serum: 3.6 g/dL   Bilirubin Total, Serum: 0.2 mg/dL   Alkaline Phosphatase, Serum: 88 U/L   Aspartate Aminotransferase (AST/SGOT): 15 U/L   Alanine Aminotransferase (ALT/SGPT): <5 U/L   eGFR if Non : 33: Interpretative comment     Complete Blood Count + Automated Diff (12.28.20 @ 16:59)   WBC Count: 9.68 K/uL   RBC Count: 4.17 M/uL   Hemoglobin: 11.6 g/dL   Hematocrit: 36.0 %   Mean Cell Volume: 86.3 fL   Mean Cell Hemoglobin: 27.8 pg   Mean Cell Hemoglobin Conc: 32.2 g/dL   Red Cell Distrib Width: 13.4 %   Platelet Count - Automated: 270 K/uL   Auto Neutrophil #: 9.25 K/uL   Auto Lymphocyte #: 0.25 K/uL   Auto Monocyte #: 0.17 K/uL   Auto Eosinophil #: 0.00 K/uL   Auto Basophil #: 0.00 K/uL   Auto Neutrophil %: 93.9: Differential percentages must be correlated with absolute numbers for   clinical significance. %   Auto Lymphocyte %: 2.6 %   Auto Monocyte %: 1.8 %   Auto Eosinophil %: 0.0 %   Auto Basophil %: 0.0 %   Nucleated RBC: NA: Manual NRBC performed. /100 WBCs       C-Reactive Protein, Serum (10.15.19 @ 05:08)   C-Reactive Protein, Serum: 10.68 mg/dL     Radiology

## 2020-12-28 NOTE — PROGRESS NOTE ADULT - SUBJECTIVE AND OBJECTIVE BOX
JON GARCÍA  88y  Female      Patient is a 88y old  Female who presents with a chief complaint of Painless loss of vision left eye (28 Dec 2020 09:44)      INTERVAL HPI/OVERNIGHT EVENTS:  She feels better, foggy vision resolved, she sees normally in left eye. No weakness or numbness.   Vital Signs Last 24 Hrs  T(C): 36.8 (28 Dec 2020 07:40), Max: 37.5 (27 Dec 2020 17:11)  T(F): 98.2 (28 Dec 2020 07:40), Max: 99.5 (27 Dec 2020 17:11)  HR: 76 (28 Dec 2020 07:40) (75 - 86)  BP: 142/62 (28 Dec 2020 07:40) (142/62 - 179/79)  BP(mean): --  RR: 20 (28 Dec 2020 07:40) (18 - 20)  SpO2: 96% (28 Dec 2020 07:40) (96% - 100%)            Consultant(s) Notes Reviewed:  [x ] YES  [ ] NO          MEDICATIONS  (STANDING):  aspirin  chewable 81 milliGRAM(s) Oral daily  atorvastatin 80 milliGRAM(s) Oral at bedtime  carbidopa/levodopa  25/100 2 Tablet(s) Oral four times a day  chlorhexidine 4% Liquid 1 Application(s) Topical <User Schedule>  clopidogrel Tablet 75 milliGRAM(s) Oral daily  diltiazem    milliGRAM(s) Oral daily  entacapone 200 milliGRAM(s) Oral four times a day  heparin   Injectable 5000 Unit(s) SubCutaneous every 8 hours  hydrochlorothiazide 12.5 milliGRAM(s) Oral daily  influenza   Vaccine 0.5 milliLiter(s) IntraMuscular once  oxybutynin 5 milliGRAM(s) Oral at bedtime  pantoprazole    Tablet 40 milliGRAM(s) Oral before breakfast  pramipexole 1 milliGRAM(s) Oral three times a day  traZODone 50 milliGRAM(s) Oral at bedtime    MEDICATIONS  (PRN):  oxycodone    5 mG/acetaminophen 325 mG 1 Tablet(s) Oral every 12 hours PRN Moderate Pain (4 - 6)      LABS                          12.3   7.30  )-----------( 258      ( 27 Dec 2020 09:03 )             37.9     12-27    139  |  99  |  26<H>  ----------------------------<  101<H>  3.2<L>   |  27  |  1.3    Ca    9.0      27 Dec 2020 09:03    TPro  6.3  /  Alb  4.0  /  TBili  0.4  /  DBili  x   /  AST  15  /  ALT  <5  /  AlkPhos  100  12-27        PT/INR - ( 27 Dec 2020 09:03 )   PT: 12.30 sec;   INR: 1.07 ratio         PTT - ( 27 Dec 2020 09:03 )  PTT:29.7 sec  Lactate Trend    CARDIAC MARKERS ( 27 Dec 2020 09:03 )  x     / <0.01 ng/mL / x     / x     / x          CAPILLARY BLOOD GLUCOSE      POCT Blood Glucose.: 104 mg/dL (27 Dec 2020 07:51)        RADIOLOGY & ADDITIONAL TESTS:    Imaging Personally Reviewed:  [ ] YES  [ ] NO    HEALTH ISSUES - PROBLEM Dx:  Hypokalemia  Hypokalemia    Hyperlipidemia  Hyperlipidemia    HTN (hypertension)  HTN (hypertension)    Parkinson disease  Parkinson disease    Rheumatoid arthritis  Rheumatoid arthritis    Retinal artery occlusion  Retinal artery occlusion            PHYSICAL EXAM:  GENERAL: NAD, well-developed.  HEAD:  Atraumatic, Normocephalic.  EYES: EOMI, PERRLA, conjunctiva and sclera clear.  NECK: Supple, No JVD.  CHEST/LUNG: Clear to auscultation bilaterally; No wheeze.  HEART: Regular rate and rhythm; S1 S2. SM 3/6 on left sternum, 2/6 on aortic area.   ABDOMEN: Soft, Nontender, Nondistended; Bowel sounds present.  EXTREMITIES:  2+ Peripheral Pulses, No clubbing, cyanosis, or edema.  PSYCH: AAOx3.  NEUROLOGY: non-focal.  SKIN: No rashes or lesions.

## 2020-12-28 NOTE — PROGRESS NOTE ADULT - ASSESSMENT
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 Opthalmology consult completed and recommends outpatient follow up. No new overnight complaints.    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 Opthalmology consult completed and recommends outpatient follow up. No new overnight complaints.    Plan:   Continue Aspirin for 30 days total and maintain Plavix 75mg QD  Continue Lipitor 80 mg q 24  Check HBAIC and lipid profile  ECHO  f/u in the stroke clinic in 2-3 weeks  Neuro attending note to follow

## 2020-12-28 NOTE — PROGRESS NOTE ADULT - SUBJECTIVE AND OBJECTIVE BOX
SUBJECTIVE:    Patient is a 88y old Female who presents with a chief complaint of Painless loss of vision left eye (27 Dec 2020 21:21)    Currently admitted to medicine with the primary diagnosis of Retinal artery occlusion       Today is hospital day 1d. This morning she is resting comfortably in bed and reports no new issues or overnight events. Patient is hungry and did not eat for past 2 days. She reports she still has intermittent foggy vision in left eye but it has been stable. Patient denied any headache, nausea, chest pain or shortness of breath.     PAST MEDICAL & SURGICAL HISTORY  Hyperlipidemia    Rheumatoid arthritis    HTN (hypertension)    Parkinson disease    History of hysterectomy      SOCIAL HISTORY:    ALLERGIES:  No Known Allergies    MEDICATIONS:  STANDING MEDICATIONS  atorvastatin 80 milliGRAM(s) Oral at bedtime  carbidopa/levodopa  25/100 2 Tablet(s) Oral four times a day  chlorhexidine 4% Liquid 1 Application(s) Topical <User Schedule>  clopidogrel Tablet 75 milliGRAM(s) Oral daily  diltiazem    milliGRAM(s) Oral daily  entacapone 200 milliGRAM(s) Oral four times a day  heparin   Injectable 5000 Unit(s) SubCutaneous every 8 hours  hydrochlorothiazide 12.5 milliGRAM(s) Oral daily  oxybutynin 5 milliGRAM(s) Oral at bedtime  pantoprazole    Tablet 40 milliGRAM(s) Oral before breakfast  pramipexole 1 milliGRAM(s) Oral three times a day  traZODone 50 milliGRAM(s) Oral at bedtime    PRN MEDICATIONS  oxycodone    5 mG/acetaminophen 325 mG 1 Tablet(s) Oral every 12 hours PRN    VITALS:   T(F): 98.2  HR: 76  BP: 142/62  RR: 20  SpO2: 96%    LABS:                        12.3   7.30  )-----------( 258      ( 27 Dec 2020 09:03 )             37.9     12-27    139  |  99  |  26<H>  ----------------------------<  101<H>  3.2<L>   |  27  |  1.3    Ca    9.0      27 Dec 2020 09:03    TPro  6.3  /  Alb  4.0  /  TBili  0.4  /  DBili  x   /  AST  15  /  ALT  <5  /  AlkPhos  100  12-27    PT/INR - ( 27 Dec 2020 09:03 )   PT: 12.30 sec;   INR: 1.07 ratio         PTT - ( 27 Dec 2020 09:03 )  PTT:29.7 sec          CARDIAC MARKERS ( 27 Dec 2020 09:03 )  x     / <0.01 ng/mL / x     / x     / x          Troponin T, Serum: <0.01 ng/mL (12-27-20 @ 09:03)      RADIOLOGY:    PHYSICAL EXAM:  GENERAL: NAD, upset about being transferred without diet orders  HEAD:  NCAT  EYES: watering of eyes, ROM intact  NECK: Supple,   NERVOUS SYSTEM: AAOX4, Good concentration  CHEST/LUNG: CTA b/l no w/r/r  HEART: +s1s2 RRR no m/g/r  ABDOMEN: soft, NT/ND (+) bs,   EXTREMITIES:  2+ Peripheral Pulses, No c/c/e  LYMPH: No lymphadenopathy noted

## 2020-12-28 NOTE — CONSULT NOTE ADULT - ASSESSMENT
Assessment  - h/o CRAO, left eye noted 10/19/2019.  At that time multiple retinal emboli were seen. Pt also had h/o JOI at that time which showed mild thickening and calcification of the mitral valve leaflets and an echodensitiy noted on the PMVL atrial aspect which was suggestive of valvular vegetation.   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 OCT  now shows central intraretinal cystic spaces  - Mild Macular degeneration OU  - Cataracts OU  - Hypertensive retinopathy OU    Plan   Continue with neuro recs  Recommend CRP, since GCA is part of the differential based on patient’s symptoms   Cardiology consult  Pt has eye  appointment with Dr. Jarod Quintanilla 1/8/2021 @ 10:20am   Assessment  - h/o CRAO, left eye noted 10/19/2019.  At that time multiple retinal emboli were seen. Pt also had h/o JOI at that time which showed mild thickening and calcification of the mitral valve leaflets and an echodensitiy noted on the PMVL atrial aspect which was suggestive of valvular vegetation.   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 OCT  now shows central intraretinal cystic spaces  - Mild Macular degeneration OU  - Cataracts OU  - Hypertensive retinopathy OU    Plan   Continue with neuro recs  Recommend CRP, since GCA is part of the differential diagnosis based on patients symptoms  Cardiology consult  Pt has eye  appointment with Dr. Jarod Quintanilla 1/8/2021 @ 10:20am   Assessment  - h/o CRAO, left eye noted 10/19/2019.  At that time multiple retinal emboli were seen. Pt also had h/o JOI at that time which showed mild thickening and calcification of the mitral valve leaflets and an echodensitiy noted on the PMVL atrial aspect which was suggestive of valvular vegetation.   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 OCT  now shows central intraretinal cystic spaces  - Mild Macular degeneration OU  - Cataracts OU  - Hypertensive retinopathy OU    Plan   Continue with neuro recs  Recommend CRP, since GCA is part of the differential diagnosis based on patients symptoms  Cardiology consult  Pt has eye  appointment with Dr. Jarod Quintanilla 1/8/2021 @ 10:20am  Patient's Daughter Veronica  [phone]

## 2020-12-29 LAB
ALBUMIN SERPL ELPH-MCNC: 3.6 G/DL — SIGNIFICANT CHANGE UP (ref 3.5–5.2)
ALP SERPL-CCNC: 86 U/L — SIGNIFICANT CHANGE UP (ref 30–115)
ALT FLD-CCNC: <5 U/L — SIGNIFICANT CHANGE UP (ref 0–41)
ANION GAP SERPL CALC-SCNC: 11 MMOL/L — SIGNIFICANT CHANGE UP (ref 7–14)
AST SERPL-CCNC: 15 U/L — SIGNIFICANT CHANGE UP (ref 0–41)
BASOPHILS # BLD AUTO: 0 K/UL — SIGNIFICANT CHANGE UP (ref 0–0.2)
BASOPHILS NFR BLD AUTO: 0 % — SIGNIFICANT CHANGE UP (ref 0–1)
BILIRUB SERPL-MCNC: 0.3 MG/DL — SIGNIFICANT CHANGE UP (ref 0.2–1.2)
BUN SERPL-MCNC: 46 MG/DL — HIGH (ref 10–20)
CALCIUM SERPL-MCNC: 8.3 MG/DL — LOW (ref 8.5–10.1)
CHLORIDE SERPL-SCNC: 99 MMOL/L — SIGNIFICANT CHANGE UP (ref 98–110)
CO2 SERPL-SCNC: 25 MMOL/L — SIGNIFICANT CHANGE UP (ref 17–32)
CREAT SERPL-MCNC: 1.1 MG/DL — SIGNIFICANT CHANGE UP (ref 0.7–1.5)
CRP SERPL-MCNC: 2.68 MG/DL — HIGH (ref 0–0.4)
EOSINOPHIL # BLD AUTO: 0.05 K/UL — SIGNIFICANT CHANGE UP (ref 0–0.7)
EOSINOPHIL NFR BLD AUTO: 0.5 % — SIGNIFICANT CHANGE UP (ref 0–8)
GLUCOSE SERPL-MCNC: 183 MG/DL — HIGH (ref 70–99)
HCT VFR BLD CALC: 34.7 % — LOW (ref 37–47)
HGB BLD-MCNC: 11.5 G/DL — LOW (ref 12–16)
IMM GRANULOCYTES NFR BLD AUTO: 0.9 % — HIGH (ref 0.1–0.3)
LYMPHOCYTES # BLD AUTO: 0.33 K/UL — LOW (ref 1.2–3.4)
LYMPHOCYTES # BLD AUTO: 3.1 % — LOW (ref 20.5–51.1)
MAGNESIUM SERPL-MCNC: 2.4 MG/DL — SIGNIFICANT CHANGE UP (ref 1.8–2.4)
MCHC RBC-ENTMCNC: 29.2 PG — SIGNIFICANT CHANGE UP (ref 27–31)
MCHC RBC-ENTMCNC: 33.1 G/DL — SIGNIFICANT CHANGE UP (ref 32–37)
MCV RBC AUTO: 88.1 FL — SIGNIFICANT CHANGE UP (ref 81–99)
MONOCYTES # BLD AUTO: 0.42 K/UL — SIGNIFICANT CHANGE UP (ref 0.1–0.6)
MONOCYTES NFR BLD AUTO: 4 % — SIGNIFICANT CHANGE UP (ref 1.7–9.3)
NEUTROPHILS # BLD AUTO: 9.59 K/UL — HIGH (ref 1.4–6.5)
NEUTROPHILS NFR BLD AUTO: 91.5 % — HIGH (ref 42.2–75.2)
NRBC # BLD: 0 /100 WBCS — SIGNIFICANT CHANGE UP (ref 0–0)
PLATELET # BLD AUTO: 263 K/UL — SIGNIFICANT CHANGE UP (ref 130–400)
POTASSIUM SERPL-MCNC: 4.1 MMOL/L — SIGNIFICANT CHANGE UP (ref 3.5–5)
POTASSIUM SERPL-SCNC: 4.1 MMOL/L — SIGNIFICANT CHANGE UP (ref 3.5–5)
PROT SERPL-MCNC: 5.7 G/DL — LOW (ref 6–8)
RBC # BLD: 3.94 M/UL — LOW (ref 4.2–5.4)
RBC # FLD: 13.3 % — SIGNIFICANT CHANGE UP (ref 11.5–14.5)
SODIUM SERPL-SCNC: 135 MMOL/L — SIGNIFICANT CHANGE UP (ref 135–146)
WBC # BLD: 10.48 K/UL — SIGNIFICANT CHANGE UP (ref 4.8–10.8)
WBC # FLD AUTO: 10.48 K/UL — SIGNIFICANT CHANGE UP (ref 4.8–10.8)

## 2020-12-29 PROCEDURE — 99232 SBSQ HOSP IP/OBS MODERATE 35: CPT

## 2020-12-29 PROCEDURE — 99233 SBSQ HOSP IP/OBS HIGH 50: CPT

## 2020-12-29 PROCEDURE — 99223 1ST HOSP IP/OBS HIGH 75: CPT

## 2020-12-29 PROCEDURE — 93306 TTE W/DOPPLER COMPLETE: CPT | Mod: 26

## 2020-12-29 RX ORDER — ONDANSETRON 8 MG/1
4 TABLET, FILM COATED ORAL ONCE
Refills: 0 | Status: COMPLETED | OUTPATIENT
Start: 2020-12-29 | End: 2020-12-29

## 2020-12-29 RX ORDER — ONDANSETRON 8 MG/1
4 TABLET, FILM COATED ORAL EVERY 8 HOURS
Refills: 0 | Status: DISCONTINUED | OUTPATIENT
Start: 2020-12-29 | End: 2020-12-29

## 2020-12-29 RX ADMIN — ENTACAPONE 200 MILLIGRAM(S): 200 TABLET, FILM COATED ORAL at 17:43

## 2020-12-29 RX ADMIN — PRAMIPEXOLE DIHYDROCHLORIDE 1 MILLIGRAM(S): 0.12 TABLET ORAL at 21:26

## 2020-12-29 RX ADMIN — HEPARIN SODIUM 5000 UNIT(S): 5000 INJECTION INTRAVENOUS; SUBCUTANEOUS at 14:31

## 2020-12-29 RX ADMIN — MORPHINE SULFATE 1 MILLIGRAM(S): 50 CAPSULE, EXTENDED RELEASE ORAL at 00:02

## 2020-12-29 RX ADMIN — ENTACAPONE 200 MILLIGRAM(S): 200 TABLET, FILM COATED ORAL at 14:32

## 2020-12-29 RX ADMIN — HEPARIN SODIUM 5000 UNIT(S): 5000 INJECTION INTRAVENOUS; SUBCUTANEOUS at 07:49

## 2020-12-29 RX ADMIN — CARBIDOPA AND LEVODOPA 2 TABLET(S): 25; 100 TABLET ORAL at 17:43

## 2020-12-29 RX ADMIN — CARBIDOPA AND LEVODOPA 2 TABLET(S): 25; 100 TABLET ORAL at 03:12

## 2020-12-29 RX ADMIN — OXYCODONE AND ACETAMINOPHEN 1 TABLET(S): 5; 325 TABLET ORAL at 14:41

## 2020-12-29 RX ADMIN — ATORVASTATIN CALCIUM 80 MILLIGRAM(S): 80 TABLET, FILM COATED ORAL at 21:25

## 2020-12-29 RX ADMIN — ONDANSETRON 4 MILLIGRAM(S): 8 TABLET, FILM COATED ORAL at 19:42

## 2020-12-29 RX ADMIN — PANTOPRAZOLE SODIUM 40 MILLIGRAM(S): 20 TABLET, DELAYED RELEASE ORAL at 07:50

## 2020-12-29 RX ADMIN — CARBIDOPA AND LEVODOPA 2 TABLET(S): 25; 100 TABLET ORAL at 10:40

## 2020-12-29 RX ADMIN — HEPARIN SODIUM 5000 UNIT(S): 5000 INJECTION INTRAVENOUS; SUBCUTANEOUS at 21:25

## 2020-12-29 RX ADMIN — Medication 81 MILLIGRAM(S): at 11:32

## 2020-12-29 RX ADMIN — Medication 50 MILLIGRAM(S): at 21:25

## 2020-12-29 RX ADMIN — Medication 360 MILLIGRAM(S): at 07:49

## 2020-12-29 RX ADMIN — ENTACAPONE 200 MILLIGRAM(S): 200 TABLET, FILM COATED ORAL at 03:08

## 2020-12-29 RX ADMIN — CHLORHEXIDINE GLUCONATE 1 APPLICATION(S): 213 SOLUTION TOPICAL at 07:50

## 2020-12-29 RX ADMIN — CARBIDOPA AND LEVODOPA 2 TABLET(S): 25; 100 TABLET ORAL at 14:32

## 2020-12-29 RX ADMIN — Medication 12.5 MILLIGRAM(S): at 07:49

## 2020-12-29 RX ADMIN — ENTACAPONE 200 MILLIGRAM(S): 200 TABLET, FILM COATED ORAL at 10:40

## 2020-12-29 RX ADMIN — CLOPIDOGREL BISULFATE 75 MILLIGRAM(S): 75 TABLET, FILM COATED ORAL at 11:32

## 2020-12-29 RX ADMIN — PRAMIPEXOLE DIHYDROCHLORIDE 1 MILLIGRAM(S): 0.12 TABLET ORAL at 07:49

## 2020-12-29 RX ADMIN — PRAMIPEXOLE DIHYDROCHLORIDE 1 MILLIGRAM(S): 0.12 TABLET ORAL at 14:32

## 2020-12-29 NOTE — PROGRESS NOTE ADULT - SUBJECTIVE AND OBJECTIVE BOX
SUBJECTIVE:    Patient is a 88y old Female who presents with a chief complaint of Painless loss of vision left eye (27 Dec 2020 21:21)    Currently admitted to medicine with the primary diagnosis of Retinal artery occlusion       Today is hospital day 2d. This morning she is resting comfortably in bed and reports no new issues or overnight events.   She reports she still has intermittent foggy vision in left eye but it has been stable.   Patient denied any headache, nausea, chest pain or shortness of breath.     PAST MEDICAL & SURGICAL HISTORY  Hyperlipidemia  Rheumatoid arthritis  HTN (hypertension)  Parkinson disease  History of hysterectomy    ALLERGIES:  No Known Allergies    MEDICATIONS:  STANDING MEDICATIONS  atorvastatin 80 milliGRAM(s) Oral at bedtime  carbidopa/levodopa  25/100 2 Tablet(s) Oral four times a day  chlorhexidine 4% Liquid 1 Application(s) Topical <User Schedule>  clopidogrel Tablet 75 milliGRAM(s) Oral daily  diltiazem    milliGRAM(s) Oral daily  entacapone 200 milliGRAM(s) Oral four times a day  heparin   Injectable 5000 Unit(s) SubCutaneous every 8 hours  hydrochlorothiazide 12.5 milliGRAM(s) Oral daily  oxybutynin 5 milliGRAM(s) Oral at bedtime  pantoprazole    Tablet 40 milliGRAM(s) Oral before breakfast  pramipexole 1 milliGRAM(s) Oral three times a day  traZODone 50 milliGRAM(s) Oral at bedtime    PRN MEDICATIONS  oxycodone    5 mG/acetaminophen 325 mG 1 Tablet(s) Oral every 12 hours PRN    VITALS:   T(C): 36.1 (29 Dec 2020 13:42), Max: 36.7 (29 Dec 2020 05:57)  T(F): 96.9 (29 Dec 2020 13:42), Max: 98 (29 Dec 2020 05:57)  HR: 60 (29 Dec 2020 13:42) (59 - 67)  BP: 106/54 (29 Dec 2020 13:42) (106/54 - 134/62)  RR: 18 (29 Dec 2020 13:42) (18 - 18)  SpO2: 96% (28 Dec 2020 16:20) (96% - 96%)    LABS:            12-29    135  |  99  |  46<H>  ----------------------------<  183<H>  4.1   |  25  |  1.1    Ca    8.3<L>      29 Dec 2020 08:22  Mg     2.4     12-29    TPro  5.7<L>  /  Alb  3.6  /  TBili  0.3  /  DBili  x   /  AST  15  /  ALT  <5  /  AlkPhos  86  12-29                            11.5   10.48 )-----------( 263      ( 29 Dec 2020 08:22 )             34.7     PHYSICAL EXAM:  GENERAL: NAD, upset about being transferred without diet orders  HEAD:  NCAT  EYES: watering of eyes, ROM intact  NECK: Supple,   NERVOUS SYSTEM: AAOX4, Good concentration  CHEST/LUNG: CTA b/l no w/r/r  HEART: +s1s2 RRR no m/g/r  ABDOMEN: soft, NT/ND (+) bs,   EXTREMITIES:  2+ Peripheral Pulses, No c/c/e  LYMPH: No lymphadenopathy noted

## 2020-12-29 NOTE — PROGRESS NOTE ADULT - ASSESSMENT
89 yo female with PMH of HTN, Hyperlipidemia, Parkinson disease, Rheumatoid arthritis presents to the ED for sudden left eye decreased vision, symptoms started with occipital headache, nasuea, vomiting, then she felt foggy vision on lateral half side of left eye, she denies fever, chills, no numbness, focal weakness. In the ED Stroke code activated    A/P:   Left eye blurry vision:   Patient has history of embolic occlusion of left retinal artery due to endocarditis per ophthalmology.   Brain CT, CT angio of head and neck didn't show acute infarction or thrombosis.   ESR 37, less likely giant cell arteritis.   Ophthalmology exam showed pallor of optic nerve, similar to previous exam.  Echo showed normal LVEF, mild MR, mild TR, mod AS.   EP consult for loop recorder.   Neurology outpatient follow up.   Continue ASA and Lipitor.     Parkinson's diease; Continue Sinemet.     HTN: Continue HCTZ and Cardizem.     Pending: Loop recorder placement.

## 2020-12-29 NOTE — PROGRESS NOTE ADULT - SUBJECTIVE AND OBJECTIVE BOX
JON GARCÍA  88y  Female      Patient is a 88y old  Female who presents with a chief complaint of Painless loss of vision left eye (29 Dec 2020 13:53)      INTERVAL HPI/OVERNIGHT EVENTS:  She is still seeing points in her left eye field, no focal weakness or numbness.   Vital Signs Last 24 Hrs  T(C): 36.1 (29 Dec 2020 13:42), Max: 36.7 (29 Dec 2020 05:57)  T(F): 96.9 (29 Dec 2020 13:42), Max: 98 (29 Dec 2020 05:57)  HR: 60 (29 Dec 2020 13:42) (59 - 67)  BP: 106/54 (29 Dec 2020 13:42) (106/54 - 134/62)  BP(mean): --  RR: 18 (29 Dec 2020 13:42) (18 - 18)  SpO2: --      12-29-20 @ 07:01  -  12-29-20 @ 16:48  --------------------------------------------------------  IN: 0 mL / OUT: 500 mL / NET: -500 mL            Consultant(s) Notes Reviewed:  [x ] YES  [ ] NO          MEDICATIONS  (STANDING):  aspirin  chewable 81 milliGRAM(s) Oral daily  atorvastatin 80 milliGRAM(s) Oral at bedtime  carbidopa/levodopa  25/100 2 Tablet(s) Oral four times a day  chlorhexidine 4% Liquid 1 Application(s) Topical <User Schedule>  clopidogrel Tablet 75 milliGRAM(s) Oral daily  diltiazem    milliGRAM(s) Oral daily  entacapone 200 milliGRAM(s) Oral four times a day  heparin   Injectable 5000 Unit(s) SubCutaneous every 8 hours  hydrochlorothiazide 12.5 milliGRAM(s) Oral daily  influenza   Vaccine 0.5 milliLiter(s) IntraMuscular once  oxybutynin 5 milliGRAM(s) Oral at bedtime  pantoprazole    Tablet 40 milliGRAM(s) Oral before breakfast  pramipexole 1 milliGRAM(s) Oral three times a day  traZODone 50 milliGRAM(s) Oral at bedtime    MEDICATIONS  (PRN):  oxycodone    5 mG/acetaminophen 325 mG 1 Tablet(s) Oral every 12 hours PRN Moderate Pain (4 - 6)      LABS                          11.5   10.48 )-----------( 263      ( 29 Dec 2020 08:22 )             34.7     12-29    135  |  99  |  46<H>  ----------------------------<  183<H>  4.1   |  25  |  1.1    Ca    8.3<L>      29 Dec 2020 08:22  Mg     2.4     12-29    TPro  5.7<L>  /  Alb  3.6  /  TBili  0.3  /  DBili  x   /  AST  15  /  ALT  <5  /  AlkPhos  86  12-29          Lactate Trend        CAPILLARY BLOOD GLUCOSE            RADIOLOGY & ADDITIONAL TESTS:    Imaging Personally Reviewed:  [ ] YES  [ ] NO    HEALTH ISSUES - PROBLEM Dx:  Hypokalemia  Hypokalemia    Hyperlipidemia  Hyperlipidemia    HTN (hypertension)  HTN (hypertension)    Parkinson disease  Parkinson disease    Rheumatoid arthritis  Rheumatoid arthritis    Retinal artery occlusion  Retinal artery occlusion            PHYSICAL EXAM:  GENERAL: NAD, well-developed.  HEAD:  Atraumatic, Normocephalic.  EYES: EOMI, PERRLA, conjunctiva and sclera clear.  NECK: Supple, No JVD.  CHEST/LUNG: Clear to auscultation bilaterally; No wheeze.  HEART: Regular rate and rhythm; S1 S2. SM 3/6 on left sternum, 2/6 on aortic area.   ABDOMEN: Soft, Nontender, Nondistended; Bowel sounds present.  EXTREMITIES:  2+ Peripheral Pulses, No clubbing, cyanosis, or edema.  PSYCH: AAOx3.  NEUROLOGY: non-focal.  SKIN: No rashes or lesions.

## 2020-12-29 NOTE — CONSULT NOTE ADULT - ASSESSMENT
Assessment: 87 yo F with hx of HLD, HTN, Parkinson's, RA and CRAO (10/19) who p/w general complaint of nausea and decreased vision in L eye. Pt found to have likely retinal artery occlusion of left eye. EP consulted for loop to r/o AF    Impression  CRAO (2019 and now)  HLD  HTN  Parkinsons    Plan:  - Spoke at length with both daughters regarding the need for loop recorder given embolic events twice and the increase risk of AF  - Daughters want to speak with PMD before deciding  - If they agree with likely plan for ILR tomorrow  - Patient does not need to be NPO for procedure  - Will follow

## 2020-12-29 NOTE — CONSULT NOTE ADULT - SUBJECTIVE AND OBJECTIVE BOX
Neurology Consult    Patient is a 88y old  Female who presents with a chief complaint of Painless loss of vision left eye (27 Dec 2020 11:52)      HPI:  ER Admission Note:            88 year old female with pmhx of HTN, Hyperlipidemia, Parkinson disease, Rheumatoid arthritis presents to the Ed for evaluation of nausea and left eye decreased vision . patient c/o occipital headache and nausea which began at 2100 last night. patient denies any facial drooping, difficulty with speech , weakness to extremities.      *Progress: upon initial evaluation, stroke code called 0750. patient immediately to CT and read by Dr. Tineo at 0830. Patient with IOP 11 affected eye OS and 10 OD. Neuro Dr. Reyna consulted.    -Patient reports to me that last year a Doctor told her " Something went from her heart to her left eye".  She was to follow up on the tests that were done but she did not due to Covid.    [facility] meds verified with [facility] on [facility]. (27 Dec 2020 11:52)      PAST MEDICAL & SURGICAL HISTORY:  Hyperlipidemia    Rheumatoid arthritis    HTN (hypertension)    Parkinson disease    History of hysterectomy        FAMILY HISTORY:  FH: hypertension (Mother)        Social History: (-) x 3    Allergies    No Known Allergies    Intolerances        MEDICATIONS  (STANDING):  atorvastatin 80 milliGRAM(s) Oral at bedtime  carbidopa/levodopa  25/100 2 Tablet(s) Oral four times a day  chlorhexidine 4% Liquid 1 Application(s) Topical <User Schedule>  clopidogrel Tablet 75 milliGRAM(s) Oral daily  diltiazem    milliGRAM(s) Oral daily  entacapone 200 milliGRAM(s) Oral four times a day  heparin   Injectable 5000 Unit(s) SubCutaneous every 8 hours  hydrochlorothiazide 12.5 milliGRAM(s) Oral daily  oxybutynin 5 milliGRAM(s) Oral at bedtime  pantoprazole    Tablet 40 milliGRAM(s) Oral before breakfast  pramipexole 1 milliGRAM(s) Oral three times a day  traZODone 50 milliGRAM(s) Oral at bedtime    MEDICATIONS  (PRN):  oxycodone    5 mG/acetaminophen 325 mG 1 Tablet(s) Oral every 12 hours PRN Moderate Pain (4 - 6)      Review of systems:    Constitutional: No fever, weight loss or fatigue    Eyes: No eye pain or discharge  ENMT:  No difficulty hearing; No sinus or throat pain  Neck: No pain or stiffness  Respiratory: No cough, wheezing, chills or hemoptysis  Cardiovascular: No chest pain, palpitations, shortness of breath, dyspnea on exertion  Gastrointestinal: No abdominal pain, nausea, vomiting or hematemesis; No diarrhea or constipation.   Genitourinary: No dysuria, frequency, hematuria or incontinence  Neurological: As per HPI  Skin: No rashes or lesions   Endocrine: No heat or cold intolerance; No hair loss  Musculoskeletal: No joint pain or swelling  Psychiatric: No depression, anxiety, mood swings  Heme/Lymph: No easy bruising or bleeding gums    Vital Signs Last 24 Hrs  T(C): 37.5 (27 Dec 2020 17:11), Max: 37.5 (27 Dec 2020 17:11)  T(F): 99.5 (27 Dec 2020 17:11), Max: 99.5 (27 Dec 2020 17:11)  HR: 75 (27 Dec 2020 17:11) (72 - 87)  BP: 179/79 (27 Dec 2020 17:11) (152/70 - 179/79)  BP(mean): --  RR: 20 (27 Dec 2020 17:11) (18 - 20)  SpO2: 97% (27 Dec 2020 17:11) (95% - 100%)    Neurologic Examination:  General:  Appearance is consistent with chronologic age.   General: The patient is oriented to person, place, time and date.  Recent and remote memory intact.  Fund of knowledge is intact and normal.  Language with normal repetition, comprehension and naming.  Nondysarthric.    Cranial nerves: intact VA right eye, but only able to see motion in left eye.  EOMI w/o nystagmus. Poorly reactive on left.  Right pupil reactive.  No ptosis/weakness of eyelid closure.  Facial sensation is normal.  No facial asymmetry.  Hearing grossly intact b/l.  Palate elevates midline.  Tongue midline.  Motor examination:   Increased tone, normal bulk and range of motion.  No tenderness, twitching, tremors or involuntary movements.  Formal Muscle Strength Testing: (MRC grade R/L) 5/5 UE; 5/5 LE.  No observable drift.  Reflexes:   2+ b/l biceps, triceps, brachioradialis, patella and 1+ Achilles.  Plantar response downgoing b/l.    Sensory examination:   Intact to light touch and pinprick in all extremities.  Cerebellum:   FTN/HKS intact with normal NICKO in all limbs.  No dysmetria or dysdiadokinesia.      Labs:   CBC Full  -  ( 27 Dec 2020 09:03 )  WBC Count : 7.30 K/uL  RBC Count : 4.39 M/uL  Hemoglobin : 12.3 g/dL  Hematocrit : 37.9 %  Platelet Count - Automated : 258 K/uL  Mean Cell Volume : 86.3 fL  Mean Cell Hemoglobin : 28.0 pg  Mean Cell Hemoglobin Concentration : 32.5 g/dL  Auto Neutrophil # : 5.99 K/uL  Auto Lymphocyte # : 0.58 K/uL  Auto Monocyte # : 0.73 K/uL  Auto Eosinophil # : x  Auto Basophil # : 0.00 K/uL  Auto Neutrophil % : 82.0 %  Auto Lymphocyte % : 8.0 %  Auto Monocyte % : 10.0 %  Auto Eosinophil % : 0.0 %  Auto Basophil % : 0.0 %    12-27    139  |  99  |  26<H>  ----------------------------<  101<H>  3.2<L>   |  27  |  1.3    Ca    9.0      27 Dec 2020 09:03    TPro  6.3  /  Alb  4.0  /  TBili  0.4  /  DBili  x   /  AST  15  /  ALT  <5  /  AlkPhos  100  12-27    LIVER FUNCTIONS - ( 27 Dec 2020 09:03 )  Alb: 4.0 g/dL / Pro: 6.3 g/dL / ALK PHOS: 100 U/L / ALT: <5 U/L / AST: 15 U/L / GGT: x           PT/INR - ( 27 Dec 2020 09:03 )   PT: 12.30 sec;   INR: 1.07 ratio         PTT - ( 27 Dec 2020 09:03 )  PTT:29.7 sec    < from: CT Angio Head w/ IV Cont (10.17.19 @ 18:51) >  No CTA evidence of major vascular stenoses or occlusions.    Partially visualized bilateral pleural effusions.    Multinodular left thyroid gland measuring up to 5 cm. Consider   nonemergent/outpatient thyroid ultrasound follow-up as clinically   indicated.    < end of copied text >        Assessment:  This is a 88y Female with h/o PD and acute left eye vision loss ~ 9 p.m. last night.  Likely central retinal artery occlusion.      Plan:   1.  Methylprednisolone 1,000 mg x 1 until ESR is back.  If significantly elevated ESR or if there is otherwise suspicion of giant cell arteritis can continue for total of 5 doses.  2.  Ophthalmology consultation (I encouraged ED attending to reach out to ophthalmology this morning when patient seen in ER at [facility]).  3.  Aspirin 325 mg x 1 then 81 mg/day.     12-27-20 @ 21:27      
JON GARCÍA  MRN-[id number]  88y Female        Patient is a 88y old  Female who presents with a chief complaint of Painless loss of vision left eye (28 Dec 2020 15:43)    HEALTH ISSUES - R/O PROBLEM Dx:    HPI:  ER Admission Note:            88 year old female with pmhx of HTN, Hyperlipidemia, Parkinson disease, Rheumatoid arthritis presents to the Ed for evaluation of nausea and left eye decreased vision . patient c/o occipital headache and nausea which began at 2100 last night. patient denies any facial drooping, difficulty with speech , weakness to extremities.      *Progress: upon initial evaluation, stroke code called 0750. patient immediately to CT and read by Dr. Tineo at 0830. Patient with IOP 11 affected eye OS and 10 OD. Neuro Dr. Reyna consulted.    -Patient reports to me that last year a Doctor told her " Something went from her heart to her left eye".  She was to follow up on the tests that were done but she did not due to Covid.    [facility]. (27 Dec 2020 11:52)    PAST MEDICAL & SURGICAL HISTORY:  Hyperlipidemia    Rheumatoid arthritis    HTN (hypertension)    Parkinson disease    History of hysterectomy      MEDICATIONS  (STANDING):  aspirin  chewable 81 milliGRAM(s) Oral daily  atorvastatin 80 milliGRAM(s) Oral at bedtime  carbidopa/levodopa  25/100 2 Tablet(s) Oral four times a day  chlorhexidine 4% Liquid 1 Application(s) Topical <User Schedule>  clopidogrel Tablet 75 milliGRAM(s) Oral daily  diltiazem    milliGRAM(s) Oral daily  entacapone 200 milliGRAM(s) Oral four times a day  heparin   Injectable 5000 Unit(s) SubCutaneous every 8 hours  hydrochlorothiazide 12.5 milliGRAM(s) Oral daily  influenza   Vaccine 0.5 milliLiter(s) IntraMuscular once  oxybutynin 5 milliGRAM(s) Oral at bedtime  pantoprazole    Tablet 40 milliGRAM(s) Oral before breakfast  pramipexole 1 milliGRAM(s) Oral three times a day  traZODone 50 milliGRAM(s) Oral at bedtime    MEDICATIONS  (PRN):  oxycodone    5 mG/acetaminophen 325 mG 1 Tablet(s) Oral every 12 hours PRN Moderate Pain (4 - 6)    Allergies    No Known Allergies    Intolerances      HEALTH ISSUES - PROBLEM Dx:  Hypokalemia  Hypokalemia    Hyperlipidemia  Hyperlipidemia    HTN (hypertension)  HTN (hypertension)    Parkinson disease  Parkinson disease    Rheumatoid arthritis  Rheumatoid arthritis    Retinal artery occlusion  Retinal artery occlusion            PAULO: 1/24/20  POHx: - 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 (12/5/2019 and 01/24/20). Pt was lost to f/u. Pt c/o 3 day onset of nausea, occipital head pain and increased fogginess of the central vision, left eye. Pt states nausea has improved, HA has dissipated and vision remains unchanged    Ocular Medications: none        EXTERNAL:    VISUAL ACUITY:           MANIFEST:    RIGHT EYE: +1.00 -1.50x100 - 20/40-2                LEFT EYE: LP with temp projection    NEURO TESTING  EXTRAOCULAR MOVEMENTS: full OU; small angle left exotropia  PUPILS: PERRL; + [employer]  VFc: FTFC OD; unable OS    ANTERIOR SEGMENT EVALUATION: :    LIDS/ADENEXA: clear OU  CONJUNCTIVA/SCLERA: clear Ou  CORNEA: Clear Ou  ANTERIOR CHAMBER: VH 2/2+  IRIS/PUPIL: flat OU  LENS/VITREOUS: grade 3 NS Ou    INTRAOCULAR PRESSURE:   OD: 13mmHg  OS: 13mmHg  @ 11:30am    POSTERIOR SEGMENT EVALUATION  DFE: 1% Tropicamide    OPTIC NERVE: 0.4/0.6 + pallor ONH OS with anomoulous vessel on the ONH  MACULA: paramacular RPE changes OU  A/V: a/v nicking OU; constiction OS  FUNDUS/PERIPHERY: flat OU    SUPPLEMENTAL TESTING:  OCT macula  OD - flat   OS- intraretinal cystic spaces             
Patient is a 88y old  Female who presents with a chief complaint of Painless loss of vision left eye (28 Dec 2020 23:52)    HPI: Patient is an 87 yo F with hx of HLD, HTN, Parkinson's, RA and CRAO (10/19) who p/w general complaint of nausea and decreased vision in L eye. Pt was initially stroke code in the ED and was found to have elevated IOP, neuro consulted at that time. Patient has hx of CRAO, left eye noted 10/19/2019. At that time multiple retinal emboli were seen. Pt also had h/o JOI at that time which showed mild thickening and calcification of the mitral valve leaflets and an echodensitiy noted on the PMVL atrial aspect which was suggestive of valvular vegetation. She has h/o Endocarditis of the mitral valve with treatment with home IV antibiotics for 5 weeks. Pt denies any palpitations, dizziness or syncope. EP consulted for loop recorder to r/o arrhythmia.    PAST MEDICAL & SURGICAL HISTORY:  Hyperlipidemia    Rheumatoid arthritis    HTN (hypertension)    Parkinson disease    History of hysterectomy    PREVIOUS DIAGNOSTIC TESTING:      ECHO  FINDINGS:  < from: TTE Echo Complete w/o Contrast w/ Doppler (12.29.20 @ 09:26) >  Summary:   1. Normal global left ventricular systolic function.   2. Mild to moderately enlarged left atrium.   3. Normal right atrial size.   4. Mild mitral valve regurgitation.   5. Mild to moderate mitral annular calcification.   6. Mild tricuspid regurgitation.   7. PSAP 45.   8. Sclerotic aortic valve with decreased opening.   9. Peak gradient of 18 with a mean of 11 c/w and GLENNY 1.5 Mild AS.  10. Peak transaortic gradient equals 23.3 mmHg, mean transaortic gradient equals 10.4 mmHg, the calculated aortic valve area equals 1.47 cm² by the continuity equation consistent with moderate aortic stenosis.    < end of copied text >      STRESS  FINDINGS:    CATHETERIZATION  FINDINGS:    ELECTROPHYSIOLOGY STUDY  FINDINGS:    CAROTID ULTRASOUND:  FINDINGS    VENOUS DUPLEX SCAN:  FINDINGS:    CHEST CT PULMONARY ANGIO with IV Contrast:  FINDINGS:      MEDICATIONS  (STANDING):  aspirin  chewable 81 milliGRAM(s) Oral daily  atorvastatin 80 milliGRAM(s) Oral at bedtime  carbidopa/levodopa  25/100 2 Tablet(s) Oral four times a day  chlorhexidine 4% Liquid 1 Application(s) Topical <User Schedule>  clopidogrel Tablet 75 milliGRAM(s) Oral daily  diltiazem    milliGRAM(s) Oral daily  entacapone 200 milliGRAM(s) Oral four times a day  heparin   Injectable 5000 Unit(s) SubCutaneous every 8 hours  hydrochlorothiazide 12.5 milliGRAM(s) Oral daily  influenza   Vaccine 0.5 milliLiter(s) IntraMuscular once  oxybutynin 5 milliGRAM(s) Oral at bedtime  pantoprazole    Tablet 40 milliGRAM(s) Oral before breakfast  pramipexole 1 milliGRAM(s) Oral three times a day  traZODone 50 milliGRAM(s) Oral at bedtime    MEDICATIONS  (PRN):  oxycodone    5 mG/acetaminophen 325 mG 1 Tablet(s) Oral every 12 hours PRN Moderate Pain (4 - 6)      FAMILY HISTORY:  FH: hypertension (Mother)    SOCIAL HISTORY: No smoking, ETOH or illicit drug use    Past Surgical History: No significant hx    Allergies:  No Known Allergies      REVIEW OF SYSTEMS:  CONSTITUTIONAL: No fever, weight loss, chills, shakes, or fatigue  EYES: + Decreased vision in L eye  RESPIRATORY: No cough, wheezing, hemoptysis, or shortness of breath  CARDIOVASCULAR: No chest pain, dyspnea, palpitations, dizziness, syncope, paroxysmal nocturnal dyspnea, orthopnea, or arm or leg swelling  GASTROINTESTINAL: +Nausea; No abdominal  or epigastric pain, vomiting, hematemesis, diarrhea, constipation, melena or bright red blood.  NEUROLOGICAL: No headaches, memory loss, slurred speech, limb weakness, loss of strength, numbness, or tremors  ENDOCRINE: No heat or cold intolerance, or hair loss      Vital Signs Last 24 Hrs  T(C): 36.1 (29 Dec 2020 13:42), Max: 36.7 (29 Dec 2020 05:57)  T(F): 96.9 (29 Dec 2020 13:42), Max: 98 (29 Dec 2020 05:57)  HR: 60 (29 Dec 2020 13:42) (59 - 67)  BP: 106/54 (29 Dec 2020 13:42) (106/54 - 134/62)  BP(mean): --  RR: 18 (29 Dec 2020 13:42) (18 - 18)  SpO2: 96% (28 Dec 2020 16:20) (96% - 96%)    PHYSICAL EXAM:  GENERAL: In no apparent distress, well nourished, and hydrated.  HEAD:  Atraumatic, Normocephalic  EYES: EOMI, PERRLA, conjunctiva and sclera clear  ENMT: No tonsillar erythema, exudates, or enlargements; ist mucous membranes, Good dentition, No lesions  NECK: Supple and normal thyroid.  No JVD or carotid bruit.  Carotid pulse is 2+ bilaterally.  HEART: Regular rate and rhythm; No murmurs, rubs, or gallops.  PULMONARY: Clear to auscultation and perfusion.  No rales, wheezing, or rhonchi bilaterally.  ABDOMEN: Soft, Nontender, Nondistended; Bowel sounds present  EXTREMITIES:  2+ Peripheral Pulses, No clubbing, cyanosis, or edema  NEUROLOGICAL: Grossly nonfocal      INTERPRETATION OF TELEMETRY: Not on telemetry    ECG:  < from: 12 Lead ECG (12.27.20 @ 09:33) >    Ventricular Rate 71 BPM    Atrial Rate 71 BPM    P-R Interval 142 ms    QRS Duration 86 ms    Q-T Interval 418 ms    QTC Calculation(Bazett) 454 ms    P Axis 90 degrees    R Axis 70 degrees    T Axis 58 degrees    Diagnosis Line Normal sinus rhythm  Normal ECG    Confirmed by JON ABERNATHY MD ([id number]) on 12/27/2020 10:18:48 AM    < end of copied text >      I&O's Detail      LABS:                        11.5   10.48 )-----------( 263      ( 29 Dec 2020 08:22 )             34.7     12-29    135  |  99  |  46<H>  ----------------------------<  183<H>  4.1   |  25  |  1.1    Ca    8.3<L>      29 Dec 2020 08:22  Mg     2.4     12-29    TPro  5.7<L>  /  Alb  3.6  /  TBili  0.3  /  DBili  x   /  AST  15  /  ALT  <5  /  AlkPhos  86  12-29            BNP  I&O's Detail    Daily     Daily     RADIOLOGY & ADDITIONAL STUDIES:

## 2020-12-29 NOTE — PROGRESS NOTE ADULT - ASSESSMENT
88y Female with h/o PD and acute left eye vision loss ~ central retinal artery occlusion.     Today is hospital day 2d. This morning she is resting comfortably in bed and reports no new issues or overnight events.   She reports she still has intermittent foggy vision in left eye but it has been stable. Patient denied any headache, nausea, chest pain or shortness of breath.     # Left retinal artery occlusion    -patient one dose of steroid then d/c due to esr being NL  -optho recc reviewed  -ESR 37, per neuro If significantly elevated ESR or if there is otherwise suspicion of giant cell arteritis can continue for total of 5 doses. Patient's vision is stable, will hold on using pulse steroid for now,   -c/w plavix / Aspirin 81 mg/day ordered.     #hypokalemia- resolved 4.1    # HTN  - c/w Cardizem, HCTZ    # HLD  - c/w atorvastatin    # Parkinson  - c/w carbidopa-levodopa    FULL CODE - GOC done on admission  DVT PPX  GI PPX  CHG    anticipate to be D/C 12/29

## 2020-12-29 NOTE — CONSULT NOTE ADULT - ATTENDING COMMENTS
88, F, HTN, Parkinson's disease, Moderate AS (12/20), ?Chronic MV vegetation, CRAO, RA presented with painless loss of left vision- found to have CRAO. Denies chest pain, shortness of breath, palpitations. She had similar episode ~1 year ago and was thought to be due to chronic MV vegetation- antibiotics were given for 5 days.    ## Central Artery Retinal Occlusion- recurrent episode    - Unknown etiology of this recurrent episode. Potential cause can be atrial fibrillation. In this scenario, we suggested long-term cardiac monitoring. Discussed potential option of loop recorder vs event monitor+ ILR. After detailed discussion for pros-cons, ILR was recommended. Family wants to discuss with PMD.  - Continue further management as per primary team, Neuro and opthalmology.  - Outpatient cardiology follow-up for moderate AS

## 2020-12-29 NOTE — CONSULT NOTE ADULT - CONSULT REASON
Loop Recorder Eval
Painless loss of vision left eye: suspicious for retinal artery occlusion
vision loss left eye

## 2020-12-30 ENCOUNTER — TRANSCRIPTION ENCOUNTER (OUTPATIENT)
Age: 85
End: 2020-12-30

## 2020-12-30 PROBLEM — Z00.00 ENCOUNTER FOR PREVENTIVE HEALTH EXAMINATION: Status: ACTIVE | Noted: 2020-12-30

## 2020-12-30 PROCEDURE — 33285 INSJ SUBQ CAR RHYTHM MNTR: CPT

## 2020-12-30 PROCEDURE — 99233 SBSQ HOSP IP/OBS HIGH 50: CPT

## 2020-12-30 RX ORDER — ONDANSETRON 8 MG/1
4 TABLET, FILM COATED ORAL EVERY 6 HOURS
Refills: 0 | Status: DISCONTINUED | OUTPATIENT
Start: 2020-12-30 | End: 2021-01-01

## 2020-12-30 RX ORDER — ONDANSETRON 8 MG/1
4 TABLET, FILM COATED ORAL ONCE
Refills: 0 | Status: COMPLETED | OUTPATIENT
Start: 2020-12-30 | End: 2020-12-30

## 2020-12-30 RX ORDER — ACETAMINOPHEN 500 MG
650 TABLET ORAL ONCE
Refills: 0 | Status: COMPLETED | OUTPATIENT
Start: 2020-12-30 | End: 2020-12-30

## 2020-12-30 RX ADMIN — OXYCODONE AND ACETAMINOPHEN 1 TABLET(S): 5; 325 TABLET ORAL at 04:56

## 2020-12-30 RX ADMIN — Medication 360 MILLIGRAM(S): at 08:42

## 2020-12-30 RX ADMIN — PANTOPRAZOLE SODIUM 40 MILLIGRAM(S): 20 TABLET, DELAYED RELEASE ORAL at 06:36

## 2020-12-30 RX ADMIN — ENTACAPONE 200 MILLIGRAM(S): 200 TABLET, FILM COATED ORAL at 17:26

## 2020-12-30 RX ADMIN — ENTACAPONE 200 MILLIGRAM(S): 200 TABLET, FILM COATED ORAL at 23:48

## 2020-12-30 RX ADMIN — CARBIDOPA AND LEVODOPA 2 TABLET(S): 25; 100 TABLET ORAL at 17:26

## 2020-12-30 RX ADMIN — CLOPIDOGREL BISULFATE 75 MILLIGRAM(S): 75 TABLET, FILM COATED ORAL at 11:17

## 2020-12-30 RX ADMIN — PRAMIPEXOLE DIHYDROCHLORIDE 1 MILLIGRAM(S): 0.12 TABLET ORAL at 14:58

## 2020-12-30 RX ADMIN — PRAMIPEXOLE DIHYDROCHLORIDE 1 MILLIGRAM(S): 0.12 TABLET ORAL at 21:03

## 2020-12-30 RX ADMIN — CARBIDOPA AND LEVODOPA 2 TABLET(S): 25; 100 TABLET ORAL at 00:04

## 2020-12-30 RX ADMIN — Medication 650 MILLIGRAM(S): at 11:50

## 2020-12-30 RX ADMIN — ENTACAPONE 200 MILLIGRAM(S): 200 TABLET, FILM COATED ORAL at 00:04

## 2020-12-30 RX ADMIN — PRAMIPEXOLE DIHYDROCHLORIDE 1 MILLIGRAM(S): 0.12 TABLET ORAL at 06:36

## 2020-12-30 RX ADMIN — CHLORHEXIDINE GLUCONATE 1 APPLICATION(S): 213 SOLUTION TOPICAL at 06:36

## 2020-12-30 RX ADMIN — HEPARIN SODIUM 5000 UNIT(S): 5000 INJECTION INTRAVENOUS; SUBCUTANEOUS at 14:58

## 2020-12-30 RX ADMIN — HEPARIN SODIUM 5000 UNIT(S): 5000 INJECTION INTRAVENOUS; SUBCUTANEOUS at 21:03

## 2020-12-30 RX ADMIN — Medication 12.5 MILLIGRAM(S): at 06:36

## 2020-12-30 RX ADMIN — Medication 81 MILLIGRAM(S): at 11:17

## 2020-12-30 RX ADMIN — ENTACAPONE 200 MILLIGRAM(S): 200 TABLET, FILM COATED ORAL at 06:36

## 2020-12-30 RX ADMIN — CARBIDOPA AND LEVODOPA 2 TABLET(S): 25; 100 TABLET ORAL at 06:36

## 2020-12-30 RX ADMIN — CARBIDOPA AND LEVODOPA 2 TABLET(S): 25; 100 TABLET ORAL at 23:48

## 2020-12-30 RX ADMIN — ONDANSETRON 4 MILLIGRAM(S): 8 TABLET, FILM COATED ORAL at 09:21

## 2020-12-30 RX ADMIN — Medication 50 MILLIGRAM(S): at 21:03

## 2020-12-30 RX ADMIN — Medication 650 MILLIGRAM(S): at 11:16

## 2020-12-30 RX ADMIN — ATORVASTATIN CALCIUM 80 MILLIGRAM(S): 80 TABLET, FILM COATED ORAL at 21:03

## 2020-12-30 RX ADMIN — HEPARIN SODIUM 5000 UNIT(S): 5000 INJECTION INTRAVENOUS; SUBCUTANEOUS at 06:36

## 2020-12-30 RX ADMIN — OXYCODONE AND ACETAMINOPHEN 1 TABLET(S): 5; 325 TABLET ORAL at 04:26

## 2020-12-30 RX ADMIN — ENTACAPONE 200 MILLIGRAM(S): 200 TABLET, FILM COATED ORAL at 11:17

## 2020-12-30 RX ADMIN — CARBIDOPA AND LEVODOPA 2 TABLET(S): 25; 100 TABLET ORAL at 11:17

## 2020-12-30 NOTE — PHYSICAL THERAPY INITIAL EVALUATION ADULT - SPECIFY REASON(S)
Pt is unavailable for PT initial evaluation as this time, nurse reports pt went for procedure. PT will f/u as appropriate.

## 2020-12-30 NOTE — PROGRESS NOTE ADULT - SUBJECTIVE AND OBJECTIVE BOX
SUBJECTIVE:    Patient is a 88y old Female who presents with a chief complaint of Painless loss of vision left eye (27 Dec 2020 21:21)    Currently admitted to medicine with the primary diagnosis of Retinal artery occlusion       Today is hospital day 3d. This morning she is resting comfortably in bed and reports no new issues or overnight events.   She reports she still has intermittent foggy vision in left eye but it has been stable.   Patient denied any headache, nausea, chest pain or shortness of breath.     PAST MEDICAL & SURGICAL HISTORY  Hyperlipidemia  Rheumatoid arthritis  HTN (hypertension)  Parkinson disease  History of hysterectomy    ALLERGIES:  No Known Allergies    MEDICATIONS:  STANDING MEDICATIONS  atorvastatin 80 milliGRAM(s) Oral at bedtime  carbidopa/levodopa  25/100 2 Tablet(s) Oral four times a day  chlorhexidine 4% Liquid 1 Application(s) Topical <User Schedule>  clopidogrel Tablet 75 milliGRAM(s) Oral daily  diltiazem    milliGRAM(s) Oral daily  entacapone 200 milliGRAM(s) Oral four times a day  heparin   Injectable 5000 Unit(s) SubCutaneous every 8 hours  hydrochlorothiazide 12.5 milliGRAM(s) Oral daily  oxybutynin 5 milliGRAM(s) Oral at bedtime  pantoprazole    Tablet 40 milliGRAM(s) Oral before breakfast  pramipexole 1 milliGRAM(s) Oral three times a day  traZODone 50 milliGRAM(s) Oral at bedtime    PRN MEDICATIONS  oxycodone    5 mG/acetaminophen 325 mG 1 Tablet(s) Oral every 12 hours PRN    VITALS:   T(C): 36.1 (29 Dec 2020 13:42), Max: 36.7 (29 Dec 2020 05:57)  T(F): 96.9 (29 Dec 2020 13:42), Max: 98 (29 Dec 2020 05:57)  HR: 60 (29 Dec 2020 13:42) (59 - 67)  BP: 106/54 (29 Dec 2020 13:42) (106/54 - 134/62)  RR: 18 (29 Dec 2020 13:42) (18 - 18)  SpO2: 96% (28 Dec 2020 16:20) (96% - 96%)    LABS:            12-29    135  |  99  |  46<H>  ----------------------------<  183<H>  4.1   |  25  |  1.1    Ca    8.3<L>      29 Dec 2020 08:22  Mg     2.4     12-29    TPro  5.7<L>  /  Alb  3.6  /  TBili  0.3  /  DBili  x   /  AST  15  /  ALT  <5  /  AlkPhos  86  12-29                            11.5   10.48 )-----------( 263      ( 29 Dec 2020 08:22 )             34.7     PHYSICAL EXAM:  GENERAL: NAD, upset about being transferred without diet orders  HEAD:  NCAT  EYES: watering of eyes, ROM intact  NECK: Supple,   NERVOUS SYSTEM: AAOX4, Good concentration  CHEST/LUNG: CTA b/l no w/r/r  HEART: +s1s2 RRR no m/g/r  ABDOMEN: soft, NT/ND (+) bs,   EXTREMITIES:  2+ Peripheral Pulses, No c/c/e  LYMPH: No lymphadenopathy noted   SUBJECTIVE:    Patient is a 88y old Female who presents with a chief complaint of Painless loss of vision left eye (27 Dec 2020 21:21)    Currently admitted to medicine with the primary diagnosis of Retinal artery occlusion       Today is hospital day 3d. This morning she is resting comfortably in bed and reports no new issues or overnight events. HR of 57.   She reports she still has intermittent foggy vision in left eye but it has been stable. Patient denied any headache, nausea, chest pain or shortness of breath.     PAST MEDICAL & SURGICAL HISTORY  Hyperlipidemia  Rheumatoid arthritis  HTN (hypertension)  Parkinson disease  History of hysterectomy    ALLERGIES:  No Known Allergies    MEDICATIONS:  MEDICATIONS  (STANDING):  aspirin  chewable 81 milliGRAM(s) Oral daily  atorvastatin 80 milliGRAM(s) Oral at bedtime  carbidopa/levodopa  25/100 2 Tablet(s) Oral four times a day  chlorhexidine 4% Liquid 1 Application(s) Topical <User Schedule>  clopidogrel Tablet 75 milliGRAM(s) Oral daily  diltiazem    milliGRAM(s) Oral daily  entacapone 200 milliGRAM(s) Oral four times a day  heparin   Injectable 5000 Unit(s) SubCutaneous every 8 hours  hydrochlorothiazide 12.5 milliGRAM(s) Oral daily  influenza   Vaccine 0.5 milliLiter(s) IntraMuscular once  oxybutynin 5 milliGRAM(s) Oral at bedtime  pantoprazole    Tablet 40 milliGRAM(s) Oral before breakfast  pramipexole 1 milliGRAM(s) Oral three times a day  traZODone 50 milliGRAM(s) Oral at bedtime    MEDICATIONS  (PRN):  oxycodone    5 mG/acetaminophen 325 mG 1 Tablet(s) Oral every 12 hours PRN Moderate Pain (4 - 6)      OBJECTIVES    VITALS:   Vital Signs Last 24 Hrs  T(C): 36.2 (30 Dec 2020 06:00), Max: 36.2 (30 Dec 2020 06:00)  T(F): 97.2 (30 Dec 2020 06:00), Max: 97.2 (30 Dec 2020 06:00)  HR: 57 (30 Dec 2020 08:07) (51 - 62)  BP: 123/60 (30 Dec 2020 08:04) (106/54 - 140/66)  RR: 18 (30 Dec 2020 06:00) (18 - 18)    PHYSICAL EXAM:  GENERAL: NAD, upset about being transferred without diet orders  HEAD:  NCAT  EYES: watering of eyes, ROM intact  NECK: Supple,   NERVOUS SYSTEM: AAOX4, Good concentration  CHEST/LUNG: CTA b/l no w/r/r  HEART: +s1s2 RRR no m/g/r  ABDOMEN: soft, NT/ND (+) bs,   EXTREMITIES:  2+ Peripheral Pulses, No c/c/e  LYMPH: No lymphadenopathy noted    LABS:                                    11.5   10.48 )-----------( 263      ( 29 Dec 2020 08:22 )             34.7       12-29    135  |  99  |  46<H>  ----------------------------<  183<H>  4.1   |  25  |  1.1    Ca    8.3<L>      29 Dec 2020 08:22  Mg     2.4     12-29    TPro  5.7<L>  /  Alb  3.6  /  TBili  0.3  /  DBili  x   /  AST  15  /  ALT  <5  /  AlkPhos  86  12-29         SUBJECTIVE:    Patient is a 88y old Female who presents with a chief complaint of Painless loss of vision left eye (27 Dec 2020 21:21)    Currently admitted to medicine with the primary diagnosis of Retinal artery occlusion       Today is hospital day 3d. This morning she is resting comfortably in bed and reports no new issues or overnight events. HR of 57.   She reports she still has intermittent foggy vision in left eye and complaints "I have something in my eye", states it has not worsened or improved since yesterday. Patient also admits to being nauseous. Patient denied any headache, chest pain or shortness of breath.     PAST MEDICAL & SURGICAL HISTORY  Hyperlipidemia  Rheumatoid arthritis  HTN (hypertension)  Parkinson disease  History of hysterectomy    ALLERGIES:  No Known Allergies    MEDICATIONS:  MEDICATIONS  (STANDING):  aspirin  chewable 81 milliGRAM(s) Oral daily  atorvastatin 80 milliGRAM(s) Oral at bedtime  carbidopa/levodopa  25/100 2 Tablet(s) Oral four times a day  chlorhexidine 4% Liquid 1 Application(s) Topical <User Schedule>  clopidogrel Tablet 75 milliGRAM(s) Oral daily  diltiazem    milliGRAM(s) Oral daily  entacapone 200 milliGRAM(s) Oral four times a day  heparin   Injectable 5000 Unit(s) SubCutaneous every 8 hours  hydrochlorothiazide 12.5 milliGRAM(s) Oral daily  influenza   Vaccine 0.5 milliLiter(s) IntraMuscular once  oxybutynin 5 milliGRAM(s) Oral at bedtime  pantoprazole    Tablet 40 milliGRAM(s) Oral before breakfast  pramipexole 1 milliGRAM(s) Oral three times a day  traZODone 50 milliGRAM(s) Oral at bedtime    MEDICATIONS  (PRN):  oxycodone    5 mG/acetaminophen 325 mG 1 Tablet(s) Oral every 12 hours PRN Moderate Pain (4 - 6)      OBJECTIVES    VITALS:   Vital Signs Last 24 Hrs  T(C): 36.2 (30 Dec 2020 06:00), Max: 36.2 (30 Dec 2020 06:00)  T(F): 97.2 (30 Dec 2020 06:00), Max: 97.2 (30 Dec 2020 06:00)  HR: 57 (30 Dec 2020 08:07) (51 - 62)  BP: 123/60 (30 Dec 2020 08:04) (106/54 - 140/66)  RR: 18 (30 Dec 2020 06:00) (18 - 18)    PHYSICAL EXAM:  GENERAL: NAD, upset about being transferred without diet orders  HEAD:  NCAT  EYES: watering of eyes, ROM intact  NECK: Supple,   NERVOUS SYSTEM: AAOX4, Good concentration  CHEST/LUNG: CTA b/l no w/r/r  HEART: +s1s2 RRR no m/g/r  ABDOMEN: soft, NT/ND (+) bs,   EXTREMITIES:  2+ Peripheral Pulses, No c/c/e  LYMPH: No lymphadenopathy noted    LABS:                                    11.5   10.48 )-----------( 263      ( 29 Dec 2020 08:22 )             34.7       12-29    135  |  99  |  46<H>  ----------------------------<  183<H>  4.1   |  25  |  1.1    Ca    8.3<L>      29 Dec 2020 08:22  Mg     2.4     12-29    TPro  5.7<L>  /  Alb  3.6  /  TBili  0.3  /  DBili  x   /  AST  15  /  ALT  <5  /  AlkPhos  86  12-29

## 2020-12-30 NOTE — PROGRESS NOTE ADULT - SUBJECTIVE AND OBJECTIVE BOX
Electrophysiology Brief Post-Op Note    I have personally seen and examined the patient.  I agree with the history and physical which I have reviewed and noted any changes below.  12-30-20 @ 13:45    PRE-OP DIAGNOSIS: Retinal Arterial Occlusion  POST-OP DIAGNOSIS: Retinal Arterial Occlusion    PROCEDURE: Loop Implant    Physician: Leah Monzon  Assistant: Cabrera KAUR    ESTIMATED BLOOD LOSS:  1     mL    ANESTHESIA TYPE:  [  ]General Anesthesia  [  ] Sedation  [X  ] Local/Regional    CONDITION  [  ] Critical  [  ] Serious  [  ]Fair  [ X ]Good      SPECIMENS REMOVED (IF APPLICABLE):  none    IMPLANTS (IF APPLICABLE)  Loop    FINDINGS: None    PLAN OF CARE  - Remove bandaid tomorrow  - Shower in 48 hours  - Follow up with Dr Mnozon 1/28/21 at 11:00 AM  [address]  [facility] [address] [phone]

## 2020-12-30 NOTE — DISCHARGE NOTE NURSING/CASE MANAGEMENT/SOCIAL WORK - PATIENT PORTAL LINK FT
You can access the FollowMyHealth Patient Portal offered by [facility] by registering at the following website: [email or web address]. By joining [employer]’s FollowMyHealth portal, you will also be able to view your health information using other applications (apps) compatible with our system.

## 2020-12-30 NOTE — CHART NOTE - NSCHARTNOTEFT_GEN_A_CORE
Had a depth phone conversation regarding patient's current clinical condition, diagnosis, therapy, further options for care, and plan with Veronica, the daughter, over the phone   All questions answered at length to the satisfaction of all participants.   Patient and family encouraged to contact PA with any further issues.     Conversation start time: 10:37  Conversation end time: 10:41  Total time of conversation: 4 minutes       Family still undecided about loop recorder  Concerned about nauseousness

## 2020-12-30 NOTE — PROGRESS NOTE ADULT - ASSESSMENT
88y Female with h/o PD and acute left eye vision loss ~ central retinal artery occlusion.     Today is hospital day 2d. This morning she is resting comfortably in bed and reports no new issues or overnight events.   She reports she still has intermittent foggy vision in left eye but it has been stable. Patient denied any headache, nausea, chest pain or shortness of breath.     # Left retinal artery occlusion    -patient one dose of steroid then d/c due to esr being NL  -optho recc reviewed  -ESR 37, per neuro If significantly elevated ESR or if there is otherwise suspicion of giant cell arteritis can continue for total of 5 doses. Patient's vision is stable, will hold on using pulse steroid for now,   -c/w plavix / Aspirin 81 mg/day ordered.     #hypokalemia- resolved 4.1    # HTN  - c/w Cardizem, HCTZ    # HLD  - c/w atorvastatin    # Parkinson  - c/w carbidopa-levodopa    FULL CODE - GOC done on admission  DVT PPX  GI PPX  CHG    anticipate to be D/C 12/29 Patient is a 88 year old Female with h/o PD and acute left eye vision loss ~ central retinal artery occlusion.       # Left retinal artery occlusion  - patient one dose of steroid then d/c due to esr being NL  - optho recc reviewed  - ESR 37, per neuro If significantly elevated ESR or if there is otherwise suspicion of giant cell arteritis can continue for total of 5 doses. Patient's vision is stable, will hold on using pulse steroid for now,   - c/w plavix / Aspirin 81 mg/day ordered.  - Family to decide Loop recorder with PMD      #hypokalemia- resolved   - K is 4.1     # HTN  - continue with Cardizem, HCTZ    # HLD  - c/w atorvastatin    # Parkinson  - c/w carbidopa-levodopa    FULL CODE - GOC done on admission    Plan: Family to decide on Loop recorder prior to dc  Patient is a 88 year old Female with h/o PD and acute left eye vision loss ~ central retinal artery occlusion.       # Left retinal artery occlusion  - patient one dose of steroid then d/c due to esr being NL  - optho recc reviewed  - ESR 37, per neuro If significantly elevated ESR or if there is otherwise suspicion of giant cell arteritis can continue for total of 5 doses. Patient's vision is stable, will hold on using pulse steroid for now,   - c/w plavix / Aspirin 81 mg/day ordered.  - Ophthalmology following - has an outpatient follow up on 1/8   - Family to decide Loop recorder with PMD      #hypokalemia- resolved   - K is 4.1     # HTN  - continue with Cardizem, HCTZ    # HLD  - c/w atorvastatin    # Parkinson  - c/w carbidopa-levodopa    FULL CODE - GOC done on admission    Plan: Family to decide on Loop recorder prior to dc

## 2020-12-30 NOTE — CHART NOTE - NSCHARTNOTEFT_GEN_A_CORE
Cardiac Electrophysiology Procedure Note    Procedure:  [employer]  Implantable Loop Recorder Implant    Indication: Palpitations  Attending:	Leah Keith, PAC    EQUIPMENT IMPLANTED      [employer]onic Linq  Serial Number  [id number]        DESCRIPTION OF PROCEDURE  The patient was brought to the Procedure Room in a nonsedated and fasting state, having received preoperative antibiotics. Informed, written consent was obtained prior to the procedure.  The left shoulder region was cleaned and prepped with serial applications of Chlorhexidine. Patient was then covered with sterile drapes in the usual manner. Blood pressure, oxygenation and level of comfort were stable throughout.   	The left parasternal region was defined; 10 cc of lidocaine solution were infiltrated into the skin overlying the left deltopectoral groove. A 5 mm. incision was then made. The loop recorder was injected under the skin..     The wound margins approximated well, without any tension or overlap. The wound was closed using dermabond  A dry, sterile dressing was placed over this.     COMPLICATIONS:  The patient tolerated the procedure well. There were no immediate complications.    CONCLUSIONS:  Successful implant of loop recorder..

## 2020-12-31 LAB
ALBUMIN SERPL ELPH-MCNC: 3.1 G/DL — LOW (ref 3.5–5.2)
ALP SERPL-CCNC: 83 U/L — SIGNIFICANT CHANGE UP (ref 30–115)
ALT FLD-CCNC: <5 U/L — SIGNIFICANT CHANGE UP (ref 0–41)
ANION GAP SERPL CALC-SCNC: 11 MMOL/L — SIGNIFICANT CHANGE UP (ref 7–14)
AST SERPL-CCNC: 11 U/L — SIGNIFICANT CHANGE UP (ref 0–41)
BILIRUB SERPL-MCNC: 0.2 MG/DL — SIGNIFICANT CHANGE UP (ref 0.2–1.2)
BUN SERPL-MCNC: 26 MG/DL — HIGH (ref 10–20)
CALCIUM SERPL-MCNC: 8.1 MG/DL — LOW (ref 8.5–10.1)
CHLORIDE SERPL-SCNC: 99 MMOL/L — SIGNIFICANT CHANGE UP (ref 98–110)
CO2 SERPL-SCNC: 25 MMOL/L — SIGNIFICANT CHANGE UP (ref 17–32)
CREAT SERPL-MCNC: 0.9 MG/DL — SIGNIFICANT CHANGE UP (ref 0.7–1.5)
GLUCOSE SERPL-MCNC: 167 MG/DL — HIGH (ref 70–99)
HCT VFR BLD CALC: 37.5 % — SIGNIFICANT CHANGE UP (ref 37–47)
HGB BLD-MCNC: 12.1 G/DL — SIGNIFICANT CHANGE UP (ref 12–16)
MCHC RBC-ENTMCNC: 27.9 PG — SIGNIFICANT CHANGE UP (ref 27–31)
MCHC RBC-ENTMCNC: 32.3 G/DL — SIGNIFICANT CHANGE UP (ref 32–37)
MCV RBC AUTO: 86.4 FL — SIGNIFICANT CHANGE UP (ref 81–99)
NRBC # BLD: 0 /100 WBCS — SIGNIFICANT CHANGE UP (ref 0–0)
PLATELET # BLD AUTO: 255 K/UL — SIGNIFICANT CHANGE UP (ref 130–400)
POTASSIUM SERPL-MCNC: 4 MMOL/L — SIGNIFICANT CHANGE UP (ref 3.5–5)
POTASSIUM SERPL-SCNC: 4 MMOL/L — SIGNIFICANT CHANGE UP (ref 3.5–5)
PROT SERPL-MCNC: 5.2 G/DL — LOW (ref 6–8)
RBC # BLD: 4.34 M/UL — SIGNIFICANT CHANGE UP (ref 4.2–5.4)
RBC # FLD: 13.2 % — SIGNIFICANT CHANGE UP (ref 11.5–14.5)
SODIUM SERPL-SCNC: 135 MMOL/L — SIGNIFICANT CHANGE UP (ref 135–146)
WBC # BLD: 6.07 K/UL — SIGNIFICANT CHANGE UP (ref 4.8–10.8)
WBC # FLD AUTO: 6.07 K/UL — SIGNIFICANT CHANGE UP (ref 4.8–10.8)

## 2020-12-31 PROCEDURE — 99233 SBSQ HOSP IP/OBS HIGH 50: CPT

## 2020-12-31 RX ORDER — DILTIAZEM HCL 120 MG
240 CAPSULE, EXT RELEASE 24 HR ORAL DAILY
Refills: 0 | Status: DISCONTINUED | OUTPATIENT
Start: 2020-12-31 | End: 2021-01-01

## 2020-12-31 RX ADMIN — CARBIDOPA AND LEVODOPA 2 TABLET(S): 25; 100 TABLET ORAL at 23:20

## 2020-12-31 RX ADMIN — CHLORHEXIDINE GLUCONATE 1 APPLICATION(S): 213 SOLUTION TOPICAL at 06:33

## 2020-12-31 RX ADMIN — ATORVASTATIN CALCIUM 80 MILLIGRAM(S): 80 TABLET, FILM COATED ORAL at 21:47

## 2020-12-31 RX ADMIN — CARBIDOPA AND LEVODOPA 2 TABLET(S): 25; 100 TABLET ORAL at 12:21

## 2020-12-31 RX ADMIN — Medication 81 MILLIGRAM(S): at 12:21

## 2020-12-31 RX ADMIN — PRAMIPEXOLE DIHYDROCHLORIDE 1 MILLIGRAM(S): 0.12 TABLET ORAL at 06:33

## 2020-12-31 RX ADMIN — ENTACAPONE 200 MILLIGRAM(S): 200 TABLET, FILM COATED ORAL at 06:33

## 2020-12-31 RX ADMIN — CARBIDOPA AND LEVODOPA 2 TABLET(S): 25; 100 TABLET ORAL at 17:57

## 2020-12-31 RX ADMIN — CARBIDOPA AND LEVODOPA 2 TABLET(S): 25; 100 TABLET ORAL at 06:33

## 2020-12-31 RX ADMIN — PANTOPRAZOLE SODIUM 40 MILLIGRAM(S): 20 TABLET, DELAYED RELEASE ORAL at 06:33

## 2020-12-31 RX ADMIN — Medication 12.5 MILLIGRAM(S): at 06:33

## 2020-12-31 RX ADMIN — Medication 240 MILLIGRAM(S): at 14:27

## 2020-12-31 RX ADMIN — ENTACAPONE 200 MILLIGRAM(S): 200 TABLET, FILM COATED ORAL at 12:21

## 2020-12-31 RX ADMIN — HEPARIN SODIUM 5000 UNIT(S): 5000 INJECTION INTRAVENOUS; SUBCUTANEOUS at 06:33

## 2020-12-31 RX ADMIN — PRAMIPEXOLE DIHYDROCHLORIDE 1 MILLIGRAM(S): 0.12 TABLET ORAL at 14:27

## 2020-12-31 RX ADMIN — Medication 50 MILLIGRAM(S): at 21:47

## 2020-12-31 RX ADMIN — CLOPIDOGREL BISULFATE 75 MILLIGRAM(S): 75 TABLET, FILM COATED ORAL at 12:21

## 2020-12-31 RX ADMIN — OXYCODONE AND ACETAMINOPHEN 1 TABLET(S): 5; 325 TABLET ORAL at 12:21

## 2020-12-31 RX ADMIN — PRAMIPEXOLE DIHYDROCHLORIDE 1 MILLIGRAM(S): 0.12 TABLET ORAL at 21:47

## 2020-12-31 RX ADMIN — HEPARIN SODIUM 5000 UNIT(S): 5000 INJECTION INTRAVENOUS; SUBCUTANEOUS at 14:27

## 2020-12-31 RX ADMIN — ENTACAPONE 200 MILLIGRAM(S): 200 TABLET, FILM COATED ORAL at 17:57

## 2020-12-31 RX ADMIN — ENTACAPONE 200 MILLIGRAM(S): 200 TABLET, FILM COATED ORAL at 23:20

## 2020-12-31 RX ADMIN — HEPARIN SODIUM 5000 UNIT(S): 5000 INJECTION INTRAVENOUS; SUBCUTANEOUS at 21:47

## 2020-12-31 NOTE — PHYSICAL THERAPY INITIAL EVALUATION ADULT - GAIT DEVIATIONS NOTED, PT EVAL
Very unsteady with b/l knee/decreased glenn/increased time in double stance/decreased step length/decreased stride length/decreased weight-shifting ability

## 2020-12-31 NOTE — PROGRESS NOTE ADULT - SUBJECTIVE AND OBJECTIVE BOX
Patient is a 88y old  Female who presents with a chief complaint of Painless loss of vision left eye (30 Dec 2020 14:11)    HPI:  ER Admission Note: 88 year old female with pmhx of HTN, Hyperlipidemia, Parkinson disease, Rheumatoid arthritis presents to the Ed for evaluation of nausea and left eye decreased vision . patient c/o occipital headache and nausea which began at 2100 last night. patient denies any facial drooping, difficulty with speech , weakness to extremities.    *Progress: upon initial evaluation, stroke code called 0750. patient immediately to CT and read by Dr. Tineo at 0830. Patient with IOP 11 affected eye OS and 10 OD. Neuro Dr. Reyna consulted.  -Patient reports to me that last year a Doctor told her " Something went from her heart to her left eye".  She was to follow up on the tests that were done but she did not due to Covid.  Patient meds verified with [facility]. (27 Dec 2020 11:52)    PAST MEDICAL & SURGICAL HISTORY:  Hyperlipidemia  Rheumatoid arthritis  HTN (hypertension)  Parkinson disease      patient seen and examined independently on morning rounds, chart reviewed and discussed with the medicine PA and on interdisciplinary rounds    no overnight events--continues to c/o left eye pressure however no headache or worsening of symptoms- ILR placed yesterday (12/30)---awaiitng homecare to be arranged for discharge home (lives with family---needs home PT and RW upon dc)    Vital Signs Last 24 Hrs  T(C): 36.8 (31 Dec 2020 13:44), Max: 36.8 (31 Dec 2020 13:44)  T(F): 98.3 (31 Dec 2020 13:44), Max: 98.3 (31 Dec 2020 13:44)  HR: 69 (31 Dec 2020 13:44) (50 - 69)  BP: 167/69 (31 Dec 2020 13:44) (114/58 - 167/69)  BP(mean): 83 (31 Dec 2020 08:47) (83 - 83)  RR: 18 (31 Dec 2020 13:44) (16 - 18)  SpO2: 96% (31 Dec 2020 08:47) (96% - 96%)             PE:  GEN-NAD, AAOx3, Left eye bandage/covering  PULM- Clear to auscultation bilaterally, fair air entry  [facility]- +s1/s2 RRR   GI- soft NT ND +bs, no rebound, no guarding  EXT- no edema               12.1   6.07  )-----------( 255      ( 31 Dec 2020 08:37 )             37.5     12-31    135  |  99  |  26<H>  ----------------------------<  167<H>  4.0   |  25  |  0.9    Ca    8.1<L>      31 Dec 2020 08:37    TPro  5.2<L>  /  Alb  3.1<L>  /  TBili  0.2  /  DBili  x   /  AST  11  /  ALT  <5  /  AlkPhos  83  12-31        MEDICATIONS  (STANDING):  aspirin  chewable 81 milliGRAM(s) Oral daily  atorvastatin 80 milliGRAM(s) Oral at bedtime  carbidopa/levodopa  25/100 2 Tablet(s) Oral four times a day  chlorhexidine 4% Liquid 1 Application(s) Topical <User Schedule>  clopidogrel Tablet 75 milliGRAM(s) Oral daily  diltiazem    milliGRAM(s) Oral daily  entacapone 200 milliGRAM(s) Oral four times a day  heparin   Injectable 5000 Unit(s) SubCutaneous every 8 hours  hydrochlorothiazide 12.5 milliGRAM(s) Oral daily  influenza   Vaccine 0.5 milliLiter(s) IntraMuscular once  oxybutynin 5 milliGRAM(s) Oral at bedtime  pantoprazole    Tablet 40 milliGRAM(s) Oral before breakfast  pramipexole 1 milliGRAM(s) Oral three times a day  traZODone 50 milliGRAM(s) Oral at bedtime

## 2020-12-31 NOTE — PROGRESS NOTE ADULT - ASSESSMENT
a/p:  #Left retinal artery occlusion  -ophthalmology following---will need close outaptient f/u after dc  -esr normal--steroids stopped after 1 dose as giant cell arteritis less likely  -cont asa, plavix  -ILR insertted 12/30----f/u with EPS as outpatient    #HTN  -will stop hctz (concern for AE of dizziness in elderly)  -HR this moring at rest in the 50's---asymptomatic but will decrease cardizem to 240 mg daily (wwas on 360 mg daily)--f/u with outpatient cardiologist for bp checks    #DVT/GI ppx  continue current manamgnet of chronic medical issues as per above    anticipate likely dc home in next 24 hrs once home care arranged--patient lives with family

## 2020-12-31 NOTE — PHYSICAL THERAPY INITIAL EVALUATION ADULT - LEVEL OF INDEPENDENCE: SIT/STAND, REHAB EVAL
2 trials from EOB. both times, pt with b/l knees flexed with decreased balance, pt reports she is unable to fully extend knees due to arthritic pain. Pt stood for ~30 seconds with PT mod assist./moderate assist (50% patients effort)

## 2020-12-31 NOTE — PHYSICAL THERAPY INITIAL EVALUATION ADULT - PERTINENT HX OF CURRENT PROBLEM, REHAB EVAL
Patient is a 88y old Female who presents with a chief complaint of Painless loss of vision left eye (27 Dec 2020 21:21)

## 2020-12-31 NOTE — PHYSICAL THERAPY INITIAL EVALUATION ADULT - LEVEL OF INDEPENDENCE: SUPINE/SIT, REHAB EVAL
Pt c/o mild lightheadedness at rest that worsened in sitting/standing. /68 semifowler and 150/66 sitting at EOB./minimum assist (75% patients effort)

## 2020-12-31 NOTE — PHYSICAL THERAPY INITIAL EVALUATION ADULT - GENERAL OBSERVATIONS, REHAB EVAL
2057 - 0044 Pt rec semifowler in bed with +bed alarm, +primafit, in NAD. Pt continues to c/o decreased L eye vision and b/l knee pain (due to arthritis as per pt).

## 2021-01-01 ENCOUNTER — TRANSCRIPTION ENCOUNTER (OUTPATIENT)
Age: 86
End: 2021-01-01

## 2021-01-01 VITALS
SYSTOLIC BLOOD PRESSURE: 170 MMHG | HEART RATE: 75 BPM | RESPIRATION RATE: 17 BRPM | TEMPERATURE: 98 F | DIASTOLIC BLOOD PRESSURE: 74 MMHG

## 2021-01-01 PROCEDURE — 99239 HOSP IP/OBS DSCHRG MGMT >30: CPT

## 2021-01-01 RX ORDER — ASPIRIN/CALCIUM CARB/MAGNESIUM 324 MG
1 TABLET ORAL
Qty: 30 | Refills: 0
Start: 2021-01-01 | End: 2021-01-30

## 2021-01-01 RX ADMIN — PRAMIPEXOLE DIHYDROCHLORIDE 1 MILLIGRAM(S): 0.12 TABLET ORAL at 14:11

## 2021-01-01 RX ADMIN — ENTACAPONE 200 MILLIGRAM(S): 200 TABLET, FILM COATED ORAL at 12:18

## 2021-01-01 RX ADMIN — CHLORHEXIDINE GLUCONATE 1 APPLICATION(S): 213 SOLUTION TOPICAL at 05:53

## 2021-01-01 RX ADMIN — HEPARIN SODIUM 5000 UNIT(S): 5000 INJECTION INTRAVENOUS; SUBCUTANEOUS at 14:11

## 2021-01-01 RX ADMIN — Medication 240 MILLIGRAM(S): at 05:54

## 2021-01-01 RX ADMIN — CARBIDOPA AND LEVODOPA 2 TABLET(S): 25; 100 TABLET ORAL at 05:55

## 2021-01-01 RX ADMIN — CARBIDOPA AND LEVODOPA 2 TABLET(S): 25; 100 TABLET ORAL at 17:52

## 2021-01-01 RX ADMIN — Medication 81 MILLIGRAM(S): at 12:17

## 2021-01-01 RX ADMIN — PRAMIPEXOLE DIHYDROCHLORIDE 1 MILLIGRAM(S): 0.12 TABLET ORAL at 05:54

## 2021-01-01 RX ADMIN — ENTACAPONE 200 MILLIGRAM(S): 200 TABLET, FILM COATED ORAL at 17:52

## 2021-01-01 RX ADMIN — INFLUENZA VIRUS VACCINE 0.5 MILLILITER(S): 15; 15; 15; 15 SUSPENSION INTRAMUSCULAR at 15:19

## 2021-01-01 RX ADMIN — CARBIDOPA AND LEVODOPA 2 TABLET(S): 25; 100 TABLET ORAL at 12:17

## 2021-01-01 RX ADMIN — HEPARIN SODIUM 5000 UNIT(S): 5000 INJECTION INTRAVENOUS; SUBCUTANEOUS at 05:54

## 2021-01-01 RX ADMIN — PANTOPRAZOLE SODIUM 40 MILLIGRAM(S): 20 TABLET, DELAYED RELEASE ORAL at 05:55

## 2021-01-01 RX ADMIN — ENTACAPONE 200 MILLIGRAM(S): 200 TABLET, FILM COATED ORAL at 05:54

## 2021-01-01 RX ADMIN — CLOPIDOGREL BISULFATE 75 MILLIGRAM(S): 75 TABLET, FILM COATED ORAL at 12:17

## 2021-01-01 NOTE — DISCHARGE NOTE PROVIDER - NSDCCPCAREPLAN_GEN_ALL_CORE_FT
PRINCIPAL DISCHARGE DIAGNOSIS  Diagnosis: Retinal artery occlusion  Assessment and Plan of Treatment: -  - patient one dose of steroid then d/c due to esr being NL  - optho recc reviewed  - ESR 37, per neuro If significantly elevated ESR or if there is otherwise suspicion of giant cell arteritis can continue for total of 5 doses. Patient's vision is stable, will hold on using pulse steroid for now,   - continue with plavix / Aspirin 81 mg/day ordered.  - Ophthalmology following - has an outpatient follow up on 1/8   - S/P Loop recorder 12/30  - Follow up with Dr Monzon 1/28/21 at 11:00 AM, [address], [place] [address], [phone]  - Follow up with Stroke clinic in 2 weeks ([phone]) .

## 2021-01-01 NOTE — PROGRESS NOTE ADULT - ATTENDING COMMENTS
patient seen and examined independently on morning rounds for the first time today, chart reviewed and discussed with the medicine PA and on interdisciplinary rounds and agree with the above progress note with the following addendum:    no overnight events--continues to c/o left eye pressure however no headache or worsening of symptoms- ESR normal and received 1 dose iv steroids- awaiting eval by EPS for loop recorder prior to discharge (due to concern for underlying embolic events/cardiac arrythmia)    a/p:  #Left retinal artery occlusion  -ophthalmology following---will need close outaptient f/u after dc  -esr normal--steroids stopped after 1 dose as giant cell arteritis less likely  -cont asa, plavix  -awaiting loop recorder prior to discharge    #DVT/GI ppx  continue current manamgnet of chronic medical issues as per above    anticipate likely dc home in next 24-48 hrs
patient seen and examined independently on morning rounds for the first time today, chart reviewed and discussed with the medicine PA and on interdisciplinary rounds and agree with the above progress note with the following addendum:    no overnight events--continues to c/o left eye pressure however no headache or worsening of symptoms- ESR normal and received 1 dose iv steroids- awaiting eval by EPS for loop recorder prior to discharge (due to concern for underlying embolic events/cardiac arrythmia)    a/p:  #Left retinal artery occlusion  -ophthalmology following---will need close outaptient f/u after dc  -esr normal--steroids stopped after 1 dose as giant cell arteritis less likely  -cont asa, plavix  -awaiting loop recorder prior to discharge    #DVT/GI ppx  continue current manamgnet of chronic medical issues as per above    anticipate likely dc home in next 24-48 hrs
Patient seen and examined and agree with above except as noted.  Patients history, notes, labs, imaging, vitals and meds reviewed personally.  PAtients symptoms not suggestive of temporal arteritis and some plque on carotids without stenosis or occlusion.  Exam other than eye non-focal and ophtho input appreciated.    Plan as above (Aspirin for only 30 days then continue only plavix; should have stroke clinic follow up in 2-3 weeks [phone])
patient seen and examined independently on morning rounds for the first time today, chart reviewed and discussed with the medicine PA and on interdisciplinary rounds and agree with the above progress note with the following addendum:    no overnight events--continues to c/o left eye pressure however no headache or worsening of symptoms- ESR normal and received 1 dose iv steroids- awaiting eval by EPS for loop recorder prior to discharge (due to concern for underlying embolic events/cardiac arrythmia)    a/p:  #Left retinal artery occlusion  -ophthalmology following---will need close outaptient f/u after dc  -esr normal--steroids stopped after 1 dose as giant cell arteritis less likely  -cont asa, plavix  -awaiting loop recorder prior to discharge    #DVT/GI ppx  continue current manamgnet of chronic medical issues as per above    anticipate likely dc home in next 24-48 hrs

## 2021-01-01 NOTE — DISCHARGE NOTE PROVIDER - PROVIDER TOKENS
PROVIDER:[TOKEN:[[id number]],FOLLOWUP:[1-3 days]],PROVIDER:[TOKEN:[[id number]],SCHEDULEDAPPT:[01/21/2021],SCHEDULEDAPPTTIME:[08:00 AM]] PROVIDER:[TOKEN:[[id number]],SCHEDULEDAPPT:[01/21/2021],SCHEDULEDAPPTTIME:[08:00 AM]],PROVIDER:[TOKEN:[[id number]],SCHEDULEDAPPT:[01/04/2021]]

## 2021-01-01 NOTE — DISCHARGE NOTE PROVIDER - NSDCMRMEDTOKEN_GEN_ALL_CORE_FT
aspirin 81 mg oral tablet, chewable: 1 tab(s) orally once a day  atorvastatin 80 mg oral tablet: 1 tab(s) orally once a day (at bedtime)  carbidopa/levodopa/entacapone 50 mg-200 mg-200 mg oral tablet: 1 tab(s) orally 4 times a day  dilTIAZem 360 mg/24 hours oral capsule, extended release: 1 cap(s) orally once a day  hydroCHLOROthiazide 12.5 mg oral tablet: 1 tab(s) orally once a day  ibuprofen 400 mg oral tablet: 1 tab(s) orally 3 times a day, As Needed for pain relief  oxybutynin 5 mg/24 hours oral tablet, extended release: 1 tab(s) orally once a day  Percocet 5 mg-325 mg oral tablet: 1 tab(s) orally 2 times a day, As Needed for pain relief  Plavix 75 mg oral tablet: 1 tab(s) orally once a day  pramipexole 1 mg oral tablet: 1 tab(s) orally 3 times a day  Protonix 40 mg oral delayed release tablet: 1 tab(s) orally once a day  traZODone 50 mg oral tablet: 1 tab(s) orally once a day (at bedtime)

## 2021-01-01 NOTE — DISCHARGE NOTE PROVIDER - NSFOLLOWUPCLINICS_GEN_ALL_ED_FT
[facility]  Medicine  [address], [place]   Phone: [phone]  Fax:   Follow Up Time: 1-3 days    [facility]  Neurology  [address] [place] [id number]  Phone: [phone]  Fax:   Follow Up Time: 2 weeks

## 2021-01-01 NOTE — PROGRESS NOTE ADULT - PROVIDER SPECIALTY LIST ADULT
Electrophysiology
Hospitalist
Internal Medicine
Hospitalist
Hospitalist
Internal Medicine
Internal Medicine
Neurology
Hospitalist

## 2021-01-01 NOTE — PROGRESS NOTE ADULT - ASSESSMENT
a/p:  #Left retinal artery occlusion  -ophthalmology following---will need close outaptient f/u after dc  -esr normal--steroids stopped after 1 dose as giant cell arteritis less likely  -cont asa, plavix  -ILR insertted 12/30----f/u with EPS as outpatient    #HTN  -will stop hctz (concern for AE of dizziness in elderly)  -Resume home medications and f/u with cardiologist    #DVT/GI ppx  continue current manamgnet of chronic medical issues as per above    Discharge home with home care today. Lives with daughter, called daughter at [phone] and discussed discharge plan with her.

## 2021-01-01 NOTE — PROGRESS NOTE ADULT - SUBJECTIVE AND OBJECTIVE BOX
Patient is a 88y old  Female who presents with a chief complaint of Painless loss of vision left eye (30 Dec 2020 14:11)    Admission HPI:  ER Admission Note: 88 year old female with pmhx of HTN, Hyperlipidemia, Parkinson disease, Rheumatoid arthritis presents to the Ed for evaluation of nausea and left eye decreased vision . patient c/o occipital headache and nausea which began at 2100 last night. patient denies any facial drooping, difficulty with speech , weakness to extremities.    *Progress: upon initial evaluation, stroke code called 0750. patient immediately to CT and read by Dr. Tineo at 0830. Patient with IOP 11 affected eye OS and 10 OD. Neuro Dr. Reyna consulted.  -Patient reports to me that last year a Doctor told her " Something went from her heart to her left eye".  She was to follow up on the tests that were done but she did not due to Covid.  Patient meds verified with [facility] [address]. (27 Dec 2020 11:52)    PAST MEDICAL & SURGICAL HISTORY:  Hyperlipidemia  Rheumatoid arthritis  HTN (hypertension)  Parkinson disease      Overnight event:   Seen and evaluated at bedside, mild bleeding at site of procedure. Dressing changed and not soaked. Denied chest pain, sob, palpitations and overall doing great.   Tolerating her diet.   continues to c/o left eye pressure however no headache or worsening of symptoms- ILR placed yesterday (12/30)---awaiitng homecare to be arranged for discharge home (lives with family---needs home PT and RW upon dc)    Vital Signs Last 24 Hrs  T(C): 36.4 (01 Jan 2021 05:47), Max: 36.8 (31 Dec 2020 13:44)  T(F): 97.5 (01 Jan 2021 05:47), Max: 98.3 (31 Dec 2020 13:44)  HR: 58 (01 Jan 2021 05:47) (58 - 69)  BP: 122/58 (01 Jan 2021 05:47) (122/58 - 167/69)  BP(mean): --  RR: 18 (01 Jan 2021 05:47) (18 - 18)  SpO2: --             PE:  GEN-NAD, AAOx3, Left eye bandage/covering  HEENT: MMM  Chest : dressing intact and clean, no erythema around dressing  PULM- Clear to auscultation bilaterally, fair air entry  [facility]- +s1/s2 RRR   GI- soft NT ND +bs, no rebound, no guarding  EXT- no edema               12.1   6.07  )-----------( 255      ( 31 Dec 2020 08:37 )             37.5     12-31    135  |  99  |  26<H>  ----------------------------<  167<H>  4.0   |  25  |  0.9    Ca    8.1<L>      31 Dec 2020 08:37    TPro  5.2<L>  /  Alb  3.1<L>  /  TBili  0.2  /  DBili  x   /  AST  11  /  ALT  <5  /  AlkPhos  83  12-31        MEDICATIONS  (STANDING):  aspirin  chewable 81 milliGRAM(s) Oral daily  atorvastatin 80 milliGRAM(s) Oral at bedtime  carbidopa/levodopa  25/100 2 Tablet(s) Oral four times a day  chlorhexidine 4% Liquid 1 Application(s) Topical <User Schedule>  clopidogrel Tablet 75 milliGRAM(s) Oral daily  diltiazem    milliGRAM(s) Oral daily  entacapone 200 milliGRAM(s) Oral four times a day  heparin   Injectable 5000 Unit(s) SubCutaneous every 8 hours  hydrochlorothiazide 12.5 milliGRAM(s) Oral daily  influenza   Vaccine 0.5 milliLiter(s) IntraMuscular once  oxybutynin 5 milliGRAM(s) Oral at bedtime  pantoprazole    Tablet 40 milliGRAM(s) Oral before breakfast  pramipexole 1 milliGRAM(s) Oral three times a day  traZODone 50 milliGRAM(s) Oral at bedtime

## 2021-01-01 NOTE — DISCHARGE NOTE PROVIDER - HOSPITAL COURSE
To be completed by the Attending     Patient is a 88 year old Female with h/o PD and acute left eye vision loss ~ central retinal artery occlusion.       # Left retinal artery occlusion  - patient one dose of steroid then d/c due to esr being NL  - optho recc reviewed  - ESR 37, per neuro If significantly elevated ESR or if there is otherwise suspicion of giant cell arteritis can continue for total of 5 doses. Patient's vision is stable, will hold on using pulse steroid for now,   - c/w plavix / Aspirin 81 mg/day ordered.  - Ophthalmology following - has an outpatient follow up on 1/8   - S/P Loop recorder 12/30  - Follow up with Dr Monzon 1/28/21 at 11:00 AM, [address] [facility] [address], [place] [address], [phone]  - Follow up with Stroke clinic in 2 weeks ([phone]) .       #hypokalemia- resolved   - K is 4.1     # HTN  - continue with Cardizem, HCTZ    # HLD  - c/w atorvastatin    # Parkinson  - c/w carbidopa-levodopa       Patient is a 88 year old Female with h/o PD and acute left eye vision loss ~ central retinal artery occlusion.       # Left retinal artery occlusion  - patient one dose of steroid then d/c due to esr being NL- optho recc reviewed  - ESR 37, per neuro If significantly elevated ESR or if there is otherwise suspicion of giant cell arteritis can continue for total of 5 doses. Patient's vision is stable, will hold on using pulse steroid for now,   - c/w plavix / Aspirin 81 mg/day ordered.  - Ophthalmology following - has an outpatient follow up on 1/8   - S/P Loop recorder 12/30  - Follow up with Dr Monzon 1/28/21 at 11:00 AM, [address] [facility] [address], [place] [address], [phone]  - Follow up with Stroke clinic in 2 weeks ([phone]) .       #hypokalemia- resolved   - K is 4.1     # HTN  - continue with Cardizem, HCTZ    # HLD  - c/w atorvastatin    # Parkinson  - c/w carbidopa-levodopa

## 2021-01-01 NOTE — DISCHARGE NOTE PROVIDER - INSTRUCTIONS
DASH diet (Dietary Approaches to Stop Hypertension) diet – This diet is low in salt and fat. It includes 4 to 5 servings each of fruits and vegetables and 2 to 3 servings of low-fat dairy products per day. This diet can lower your blood pressure, weight, and blood sugar, and improve lipid levels.

## 2021-01-01 NOTE — DISCHARGE NOTE PROVIDER - NSDCFUADDAPPT_GEN_ALL_CORE_FT
Follow up with Primary Medical Doctor in 1-3 days   Follow up with Stroke clinic in 2 weeks ([phone]) .  Follow up with Dr. Monzon 1/28/21 at 11:00 AM, [address] [address], [facility] [address], [phone]

## 2021-01-01 NOTE — DISCHARGE NOTE PROVIDER - NSDCFUSCHEDAPPT_GEN_ALL_CORE_FT
[facility] ; 01/08/2021 ; NPP Ophthal  Julito Pulido  [facility] ; 01/28/2021 ; NPP Cardio [address] [facility]

## 2021-01-01 NOTE — DISCHARGE NOTE PROVIDER - CARE PROVIDER_API CALL
Gucci Kamara  PEDIATRICS  [address] [place] [address]  Phone: [phone]  Fax: [phone]  Follow Up Time: 1-3 days    Leah Monzon)  Cardiac Electrophysiology; Cardiology; Internal Medicine  [address] [place] [address]  Phone: [phone]  Fax: [phone]  Scheduled Appointment: 01/21/2021 08:00 AM   Leah Monzon)  Cardiac Electrophysiology; Cardiology; Internal Medicine  [address] [place] [address]  Phone: [phone]  Fax: [phone]  Scheduled Appointment: 01/21/2021 08:00 AM    Gucci Kamara  PEDIATRICS  [address] [place] [address]  Phone: [phone]  Fax: [phone]  Scheduled Appointment: 01/04/2021

## 2021-01-01 NOTE — PROGRESS NOTE ADULT - REASON FOR ADMISSION
Painless loss of vision left eye

## 2021-01-06 DIAGNOSIS — I10 ESSENTIAL (PRIMARY) HYPERTENSION: ICD-10-CM

## 2021-01-06 DIAGNOSIS — E87.6 HYPOKALEMIA: ICD-10-CM

## 2021-01-06 DIAGNOSIS — M06.9 RHEUMATOID ARTHRITIS, UNSPECIFIED: ICD-10-CM

## 2021-01-06 DIAGNOSIS — I35.0 NONRHEUMATIC AORTIC (VALVE) STENOSIS: ICD-10-CM

## 2021-01-06 DIAGNOSIS — H54.7 UNSPECIFIED VISUAL LOSS: ICD-10-CM

## 2021-01-06 DIAGNOSIS — I38 ENDOCARDITIS, VALVE UNSPECIFIED: ICD-10-CM

## 2021-01-06 DIAGNOSIS — G20 PARKINSON'S DISEASE: ICD-10-CM

## 2021-01-06 DIAGNOSIS — E78.5 HYPERLIPIDEMIA, UNSPECIFIED: ICD-10-CM

## 2021-01-06 DIAGNOSIS — H34.12 CENTRAL RETINAL ARTERY OCCLUSION, LEFT EYE: ICD-10-CM

## 2021-01-06 DIAGNOSIS — Z90.710 ACQUIRED ABSENCE OF BOTH CERVIX AND UTERUS: ICD-10-CM

## 2021-01-13 ENCOUNTER — NON-APPOINTMENT (OUTPATIENT)
Age: 86
End: 2021-01-13

## 2021-01-14 ENCOUNTER — APPOINTMENT (OUTPATIENT)
Dept: CARDIOLOGY | Facility: CLINIC | Age: 86
End: 2021-01-14
Payer: MEDICARE

## 2021-01-14 VITALS
HEART RATE: 80 BPM | TEMPERATURE: 97.2 F | DIASTOLIC BLOOD PRESSURE: 79 MMHG | HEIGHT: 65 IN | SYSTOLIC BLOOD PRESSURE: 170 MMHG | BODY MASS INDEX: 23.32 KG/M2 | WEIGHT: 140 LBS

## 2021-01-14 DIAGNOSIS — H34.10 CENTRAL RETINAL ARTERY OCCLUSION, UNSPECIFIED EYE: ICD-10-CM

## 2021-01-14 DIAGNOSIS — G20 PARKINSON'S DISEASE: ICD-10-CM

## 2021-01-14 DIAGNOSIS — I10 ESSENTIAL (PRIMARY) HYPERTENSION: ICD-10-CM

## 2021-01-14 PROCEDURE — 93000 ELECTROCARDIOGRAM COMPLETE: CPT | Mod: 59

## 2021-01-14 PROCEDURE — 93285 PRGRMG DEV EVAL SCRMS IP: CPT

## 2021-01-14 PROCEDURE — 99072 ADDL SUPL MATRL&STAF TM PHE: CPT

## 2021-01-14 PROCEDURE — 99213 OFFICE O/P EST LOW 20 MIN: CPT

## 2021-01-15 ENCOUNTER — OUTPATIENT (OUTPATIENT)
Dept: OUTPATIENT SERVICES | Facility: HOSPITAL | Age: 86
LOS: 1 days | Discharge: HOME | End: 2021-01-15
Payer: MEDICARE

## 2021-01-15 ENCOUNTER — APPOINTMENT (OUTPATIENT)
Dept: OPHTHALMOLOGY | Facility: CLINIC | Age: 86
End: 2021-01-15

## 2021-01-15 DIAGNOSIS — Z90.710 ACQUIRED ABSENCE OF BOTH CERVIX AND UTERUS: Chronic | ICD-10-CM

## 2021-01-15 PROCEDURE — 92202 OPSCPY EXTND ON/MAC DRAW: CPT

## 2021-01-15 PROCEDURE — 92004 COMPRE OPH EXAM NEW PT 1/>: CPT

## 2021-01-28 ENCOUNTER — APPOINTMENT (OUTPATIENT)
Dept: CARDIOLOGY | Facility: CLINIC | Age: 86
End: 2021-01-28

## 2021-02-02 PROBLEM — G20 PARKINSON'S DISEASE: Status: ACTIVE | Noted: 2021-02-02

## 2021-02-02 PROBLEM — I10 BENIGN ESSENTIAL HYPERTENSION: Status: ACTIVE | Noted: 2021-02-02

## 2021-02-02 PROBLEM — H34.10 CRAO (CENTRAL RETINAL ARTERY OCCLUSION): Status: ACTIVE | Noted: 2021-02-02

## 2021-02-02 RX ORDER — DILTIAZEM HYDROCHLORIDE 360 MG/1
360 TABLET, EXTENDED RELEASE ORAL DAILY
Refills: 0 | Status: ACTIVE | COMMUNITY
Start: 2021-02-02

## 2021-02-02 NOTE — ASSESSMENT
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 However, manual examination of those report shows those events are NOT AF, but are sinus with PACs\par - Continue cardizem\par - Continue DAPT\par - Cardiology referral for moderate AS. Referrals given to patient. Patient to talk to Dr. Kamara further.

## 2021-02-02 NOTE — PROCEDURE
[No] : not [See Device Printout] : See device printout [de-identified] : [employer] [de-identified] : Reveal Linq [id number] [de-identified] : [id number] [de-identified] : 12/30/2020 [de-identified] : Loop Battery Good [de-identified] : No true AF , Sinus with PAC's

## 2021-02-02 NOTE — HISTORY OF PRESENT ILLNESS
[de-identified] : I had a pleasure of seeing Ms. GARCÍA for follow-up consultation for embolic CVA. \par \par Ms. GARCÍA is a 88 year-year old female with history of HTN, DL, Parkinsons disease, recurrent CRAO admitted with an episode of CRAO. Had an ILR placed. She is here for routine f-up.\par \par Denies chest pain, shortness of breath, palpitation, dizziness or LOC.\par \par EKG: SR@ 79, baseline artifact\par \par

## 2021-02-03 ENCOUNTER — APPOINTMENT (OUTPATIENT)
Dept: CARDIOLOGY | Facility: CLINIC | Age: 86
End: 2021-02-03
Payer: MEDICARE

## 2021-02-03 ENCOUNTER — NON-APPOINTMENT (OUTPATIENT)
Age: 86
End: 2021-02-03

## 2021-02-03 PROCEDURE — 93298 REM INTERROG DEV EVAL SCRMS: CPT

## 2021-02-03 PROCEDURE — G2066: CPT

## 2021-02-05 ENCOUNTER — INPATIENT (INPATIENT)
Facility: HOSPITAL | Age: 86
LOS: 4 days | Discharge: HOME HEALTH | End: 2021-02-10
Attending: STUDENT IN AN ORGANIZED HEALTH CARE EDUCATION/TRAINING PROGRAM | Admitting: STUDENT IN AN ORGANIZED HEALTH CARE EDUCATION/TRAINING PROGRAM
Payer: MEDICARE

## 2021-02-05 VITALS
DIASTOLIC BLOOD PRESSURE: 57 MMHG | HEIGHT: 64 IN | SYSTOLIC BLOOD PRESSURE: 115 MMHG | OXYGEN SATURATION: 94 % | TEMPERATURE: 98 F | WEIGHT: 139.99 LBS | RESPIRATION RATE: 18 BRPM | HEART RATE: 71 BPM

## 2021-02-05 DIAGNOSIS — Z95.818 PRESENCE OF OTHER CARDIAC IMPLANTS AND GRAFTS: Chronic | ICD-10-CM

## 2021-02-05 DIAGNOSIS — Z90.710 ACQUIRED ABSENCE OF BOTH CERVIX AND UTERUS: Chronic | ICD-10-CM

## 2021-02-05 DIAGNOSIS — Z98.890 OTHER SPECIFIED POSTPROCEDURAL STATES: Chronic | ICD-10-CM

## 2021-02-05 LAB
ALBUMIN SERPL ELPH-MCNC: 3.7 G/DL — SIGNIFICANT CHANGE UP (ref 3.5–5.2)
ALP SERPL-CCNC: 151 U/L — HIGH (ref 30–115)
ALT FLD-CCNC: <5 U/L — SIGNIFICANT CHANGE UP (ref 0–41)
ANION GAP SERPL CALC-SCNC: 14 MMOL/L — SIGNIFICANT CHANGE UP (ref 7–14)
APPEARANCE UR: ABNORMAL
AST SERPL-CCNC: 16 U/L — SIGNIFICANT CHANGE UP (ref 0–41)
BACTERIA # UR AUTO: ABNORMAL
BASOPHILS # BLD AUTO: 0.04 K/UL — SIGNIFICANT CHANGE UP (ref 0–0.2)
BASOPHILS NFR BLD AUTO: 0.2 % — SIGNIFICANT CHANGE UP (ref 0–1)
BILIRUB SERPL-MCNC: 0.4 MG/DL — SIGNIFICANT CHANGE UP (ref 0.2–1.2)
BILIRUB UR-MCNC: ABNORMAL
BUN SERPL-MCNC: 24 MG/DL — HIGH (ref 10–20)
BURR CELLS BLD QL SMEAR: PRESENT — SIGNIFICANT CHANGE UP
CALCIUM SERPL-MCNC: 9.8 MG/DL — SIGNIFICANT CHANGE UP (ref 8.5–10.1)
CHLORIDE SERPL-SCNC: 104 MMOL/L — SIGNIFICANT CHANGE UP (ref 98–110)
CK MB CFR SERPL CALC: 1.8 NG/ML — SIGNIFICANT CHANGE UP (ref 0.6–6.3)
CK SERPL-CCNC: 78 U/L — SIGNIFICANT CHANGE UP (ref 0–225)
CO2 SERPL-SCNC: 23 MMOL/L — SIGNIFICANT CHANGE UP (ref 17–32)
COLOR SPEC: SIGNIFICANT CHANGE UP
CREAT SERPL-MCNC: 1.4 MG/DL — SIGNIFICANT CHANGE UP (ref 0.7–1.5)
DIFF PNL FLD: ABNORMAL
EOSINOPHIL # BLD AUTO: 0.01 K/UL — SIGNIFICANT CHANGE UP (ref 0–0.7)
EOSINOPHIL NFR BLD AUTO: 0 % — SIGNIFICANT CHANGE UP (ref 0–8)
EPI CELLS # UR: ABNORMAL /HPF
GLUCOSE SERPL-MCNC: 136 MG/DL — HIGH (ref 70–99)
GLUCOSE UR QL: NEGATIVE MG/DL — SIGNIFICANT CHANGE UP
HCT VFR BLD CALC: 34.4 % — LOW (ref 37–47)
HGB BLD-MCNC: 11.1 G/DL — LOW (ref 12–16)
IMM GRANULOCYTES NFR BLD AUTO: 0.6 % — HIGH (ref 0.1–0.3)
KETONES UR-MCNC: 15
LACTATE SERPL-SCNC: 1.8 MMOL/L — SIGNIFICANT CHANGE UP (ref 0.7–2)
LACTATE SERPL-SCNC: 2.5 MMOL/L — HIGH (ref 0.7–2)
LEUKOCYTE ESTERASE UR-ACNC: ABNORMAL
LIDOCAIN IGE QN: 15 U/L — SIGNIFICANT CHANGE UP (ref 7–60)
LYMPHOCYTES # BLD AUTO: 0.33 K/UL — LOW (ref 1.2–3.4)
LYMPHOCYTES # BLD AUTO: 1.3 % — LOW (ref 20.5–51.1)
MCHC RBC-ENTMCNC: 29 PG — SIGNIFICANT CHANGE UP (ref 27–31)
MCHC RBC-ENTMCNC: 32.3 G/DL — SIGNIFICANT CHANGE UP (ref 32–37)
MCV RBC AUTO: 89.8 FL — SIGNIFICANT CHANGE UP (ref 81–99)
MONOCYTES # BLD AUTO: 1.08 K/UL — HIGH (ref 0.1–0.6)
MONOCYTES NFR BLD AUTO: 4.4 % — SIGNIFICANT CHANGE UP (ref 1.7–9.3)
NEUTROPHILS # BLD AUTO: 23.2 K/UL — HIGH (ref 1.4–6.5)
NEUTROPHILS NFR BLD AUTO: 93.5 % — HIGH (ref 42.2–75.2)
NEUTS BAND # BLD: 3 % — SIGNIFICANT CHANGE UP (ref 0–6)
NITRITE UR-MCNC: POSITIVE
NRBC # BLD: 0 /100 WBCS — SIGNIFICANT CHANGE UP (ref 0–0)
NRBC # BLD: 0 /100 — SIGNIFICANT CHANGE UP (ref 0–0)
PH UR: 5.5 — SIGNIFICANT CHANGE UP (ref 5–8)
PLAT MORPH BLD: NORMAL — SIGNIFICANT CHANGE UP
PLATELET # BLD AUTO: 289 K/UL — SIGNIFICANT CHANGE UP (ref 130–400)
POTASSIUM SERPL-MCNC: 4.1 MMOL/L — SIGNIFICANT CHANGE UP (ref 3.5–5)
POTASSIUM SERPL-SCNC: 4.1 MMOL/L — SIGNIFICANT CHANGE UP (ref 3.5–5)
PROT SERPL-MCNC: 5.9 G/DL — LOW (ref 6–8)
PROT UR-MCNC: 30 MG/DL
RAPID RVP RESULT: SIGNIFICANT CHANGE UP
RBC # BLD: 3.83 M/UL — LOW (ref 4.2–5.4)
RBC # FLD: 14.5 % — SIGNIFICANT CHANGE UP (ref 11.5–14.5)
RBC BLD AUTO: ABNORMAL
RBC CASTS # UR COMP ASSIST: SIGNIFICANT CHANGE UP /HPF
SARS-COV-2 RNA SPEC QL NAA+PROBE: SIGNIFICANT CHANGE UP
SODIUM SERPL-SCNC: 141 MMOL/L — SIGNIFICANT CHANGE UP (ref 135–146)
SP GR SPEC: 1.02 — SIGNIFICANT CHANGE UP (ref 1.01–1.03)
TROPONIN T SERPL-MCNC: <0.01 NG/ML — SIGNIFICANT CHANGE UP
UROBILINOGEN FLD QL: 0.2 MG/DL — SIGNIFICANT CHANGE UP (ref 0.2–0.2)
WBC # BLD: 24.82 K/UL — HIGH (ref 4.8–10.8)
WBC # FLD AUTO: 24.82 K/UL — HIGH (ref 4.8–10.8)
WBC UR QL: >50 /HPF

## 2021-02-05 PROCEDURE — 71045 X-RAY EXAM CHEST 1 VIEW: CPT | Mod: 26

## 2021-02-05 PROCEDURE — 74177 CT ABD & PELVIS W/CONTRAST: CPT | Mod: 26

## 2021-02-05 PROCEDURE — 99223 1ST HOSP IP/OBS HIGH 75: CPT

## 2021-02-05 PROCEDURE — 99285 EMERGENCY DEPT VISIT HI MDM: CPT

## 2021-02-05 RX ORDER — ONDANSETRON 8 MG/1
4 TABLET, FILM COATED ORAL ONCE
Refills: 0 | Status: COMPLETED | OUTPATIENT
Start: 2021-02-05 | End: 2021-02-05

## 2021-02-05 RX ORDER — PRAMIPEXOLE DIHYDROCHLORIDE 0.12 MG/1
1 TABLET ORAL
Qty: 0 | Refills: 0 | DISCHARGE

## 2021-02-05 RX ORDER — ACETAMINOPHEN 500 MG
650 TABLET ORAL EVERY 4 HOURS
Refills: 0 | Status: DISCONTINUED | OUTPATIENT
Start: 2021-02-05 | End: 2021-02-10

## 2021-02-05 RX ORDER — ALPRAZOLAM 0.25 MG
0 TABLET ORAL
Qty: 60 | Refills: 0 | DISCHARGE

## 2021-02-05 RX ORDER — PRAMIPEXOLE DIHYDROCHLORIDE 0.12 MG/1
1 TABLET ORAL
Refills: 0 | Status: DISCONTINUED | OUTPATIENT
Start: 2021-02-05 | End: 2021-02-10

## 2021-02-05 RX ORDER — ARIPIPRAZOLE 15 MG/1
10 TABLET ORAL DAILY
Refills: 0 | Status: DISCONTINUED | OUTPATIENT
Start: 2021-02-05 | End: 2021-02-10

## 2021-02-05 RX ORDER — OXYCODONE AND ACETAMINOPHEN 5; 325 MG/1; MG/1
1 TABLET ORAL EVERY 6 HOURS
Refills: 0 | Status: DISCONTINUED | OUTPATIENT
Start: 2021-02-05 | End: 2021-02-10

## 2021-02-05 RX ORDER — CIPROFLOXACIN LACTATE 400MG/40ML
400 VIAL (ML) INTRAVENOUS EVERY 12 HOURS
Refills: 0 | Status: DISCONTINUED | OUTPATIENT
Start: 2021-02-05 | End: 2021-02-05

## 2021-02-05 RX ORDER — ONDANSETRON 8 MG/1
4 TABLET, FILM COATED ORAL EVERY 6 HOURS
Refills: 0 | Status: DISCONTINUED | OUTPATIENT
Start: 2021-02-05 | End: 2021-02-10

## 2021-02-05 RX ORDER — HEPARIN SODIUM 5000 [USP'U]/ML
5000 INJECTION INTRAVENOUS; SUBCUTANEOUS EVERY 8 HOURS
Refills: 0 | Status: DISCONTINUED | OUTPATIENT
Start: 2021-02-05 | End: 2021-02-10

## 2021-02-05 RX ORDER — ENTACAPONE 200 MG/1
200 TABLET, FILM COATED ORAL
Refills: 0 | Status: DISCONTINUED | OUTPATIENT
Start: 2021-02-05 | End: 2021-02-10

## 2021-02-05 RX ORDER — ASPIRIN/CALCIUM CARB/MAGNESIUM 324 MG
81 TABLET ORAL DAILY
Refills: 0 | Status: DISCONTINUED | OUTPATIENT
Start: 2021-02-05 | End: 2021-02-10

## 2021-02-05 RX ORDER — METRONIDAZOLE 500 MG
500 TABLET ORAL EVERY 8 HOURS
Refills: 0 | Status: DISCONTINUED | OUTPATIENT
Start: 2021-02-05 | End: 2021-02-10

## 2021-02-05 RX ORDER — ATORVASTATIN CALCIUM 80 MG/1
80 TABLET, FILM COATED ORAL AT BEDTIME
Refills: 0 | Status: DISCONTINUED | OUTPATIENT
Start: 2021-02-05 | End: 2021-02-10

## 2021-02-05 RX ORDER — PANTOPRAZOLE SODIUM 20 MG/1
40 TABLET, DELAYED RELEASE ORAL
Refills: 0 | Status: DISCONTINUED | OUTPATIENT
Start: 2021-02-05 | End: 2021-02-10

## 2021-02-05 RX ORDER — OXYBUTYNIN CHLORIDE 5 MG
5 TABLET ORAL DAILY
Refills: 0 | Status: DISCONTINUED | OUTPATIENT
Start: 2021-02-05 | End: 2021-02-10

## 2021-02-05 RX ORDER — CARBIDOPA, LEVODOPA, AND ENTACAPONE 50; 200; 200 MG/1; MG/1; MG/1
1 TABLET, FILM COATED ORAL
Refills: 0 | Status: DISCONTINUED | OUTPATIENT
Start: 2021-02-05 | End: 2021-02-05

## 2021-02-05 RX ORDER — HYDROCHLOROTHIAZIDE 25 MG
12.5 TABLET ORAL DAILY
Refills: 0 | Status: DISCONTINUED | OUTPATIENT
Start: 2021-02-05 | End: 2021-02-10

## 2021-02-05 RX ORDER — CEFEPIME 1 G/1
1000 INJECTION, POWDER, FOR SOLUTION INTRAMUSCULAR; INTRAVENOUS ONCE
Refills: 0 | Status: COMPLETED | OUTPATIENT
Start: 2021-02-05 | End: 2021-02-05

## 2021-02-05 RX ORDER — CLOPIDOGREL BISULFATE 75 MG/1
75 TABLET, FILM COATED ORAL DAILY
Refills: 0 | Status: DISCONTINUED | OUTPATIENT
Start: 2021-02-05 | End: 2021-02-10

## 2021-02-05 RX ORDER — MORPHINE SULFATE 50 MG/1
2 CAPSULE, EXTENDED RELEASE ORAL ONCE
Refills: 0 | Status: DISCONTINUED | OUTPATIENT
Start: 2021-02-05 | End: 2021-02-05

## 2021-02-05 RX ORDER — CEFEPIME 1 G/1
500 INJECTION, POWDER, FOR SOLUTION INTRAMUSCULAR; INTRAVENOUS EVERY 24 HOURS
Refills: 0 | Status: DISCONTINUED | OUTPATIENT
Start: 2021-02-05 | End: 2021-02-09

## 2021-02-05 RX ORDER — SODIUM CHLORIDE 9 MG/ML
1000 INJECTION INTRAMUSCULAR; INTRAVENOUS; SUBCUTANEOUS ONCE
Refills: 0 | Status: COMPLETED | OUTPATIENT
Start: 2021-02-05 | End: 2021-02-05

## 2021-02-05 RX ORDER — CARBIDOPA AND LEVODOPA 25; 100 MG/1; MG/1
2 TABLET ORAL
Refills: 0 | Status: DISCONTINUED | OUTPATIENT
Start: 2021-02-05 | End: 2021-02-10

## 2021-02-05 RX ORDER — DILTIAZEM HCL 120 MG
360 CAPSULE, EXT RELEASE 24 HR ORAL DAILY
Refills: 0 | Status: DISCONTINUED | OUTPATIENT
Start: 2021-02-05 | End: 2021-02-10

## 2021-02-05 RX ORDER — SODIUM CHLORIDE 9 MG/ML
1000 INJECTION INTRAMUSCULAR; INTRAVENOUS; SUBCUTANEOUS
Refills: 0 | Status: DISCONTINUED | OUTPATIENT
Start: 2021-02-05 | End: 2021-02-09

## 2021-02-05 RX ORDER — ENTACAPONE 200 MG/1
200 TABLET, FILM COATED ORAL
Refills: 0 | Status: DISCONTINUED | OUTPATIENT
Start: 2021-02-05 | End: 2021-02-05

## 2021-02-05 RX ORDER — TRAZODONE HCL 50 MG
50 TABLET ORAL AT BEDTIME
Refills: 0 | Status: DISCONTINUED | OUTPATIENT
Start: 2021-02-05 | End: 2021-02-10

## 2021-02-05 RX ADMIN — Medication 5 MILLIGRAM(S): at 12:02

## 2021-02-05 RX ADMIN — ENTACAPONE 200 MILLIGRAM(S): 200 TABLET, FILM COATED ORAL at 23:15

## 2021-02-05 RX ADMIN — Medication 81 MILLIGRAM(S): at 12:02

## 2021-02-05 RX ADMIN — ARIPIPRAZOLE 10 MILLIGRAM(S): 15 TABLET ORAL at 12:02

## 2021-02-05 RX ADMIN — Medication 100 MILLIGRAM(S): at 15:27

## 2021-02-05 RX ADMIN — Medication 650 MILLIGRAM(S): at 18:30

## 2021-02-05 RX ADMIN — PRAMIPEXOLE DIHYDROCHLORIDE 1 MILLIGRAM(S): 0.12 TABLET ORAL at 12:02

## 2021-02-05 RX ADMIN — HEPARIN SODIUM 5000 UNIT(S): 5000 INJECTION INTRAVENOUS; SUBCUTANEOUS at 15:27

## 2021-02-05 RX ADMIN — ATORVASTATIN CALCIUM 80 MILLIGRAM(S): 80 TABLET, FILM COATED ORAL at 21:30

## 2021-02-05 RX ADMIN — ONDANSETRON 4 MILLIGRAM(S): 8 TABLET, FILM COATED ORAL at 04:35

## 2021-02-05 RX ADMIN — Medication 100 MILLIGRAM(S): at 21:29

## 2021-02-05 RX ADMIN — ONDANSETRON 4 MILLIGRAM(S): 8 TABLET, FILM COATED ORAL at 07:44

## 2021-02-05 RX ADMIN — CEFEPIME 100 MILLIGRAM(S): 1 INJECTION, POWDER, FOR SOLUTION INTRAMUSCULAR; INTRAVENOUS at 06:57

## 2021-02-05 RX ADMIN — MORPHINE SULFATE 2 MILLIGRAM(S): 50 CAPSULE, EXTENDED RELEASE ORAL at 07:44

## 2021-02-05 RX ADMIN — Medication 100 MILLIGRAM(S): at 07:57

## 2021-02-05 RX ADMIN — MORPHINE SULFATE 2 MILLIGRAM(S): 50 CAPSULE, EXTENDED RELEASE ORAL at 04:38

## 2021-02-05 RX ADMIN — PRAMIPEXOLE DIHYDROCHLORIDE 1 MILLIGRAM(S): 0.12 TABLET ORAL at 23:15

## 2021-02-05 RX ADMIN — HEPARIN SODIUM 5000 UNIT(S): 5000 INJECTION INTRAVENOUS; SUBCUTANEOUS at 21:29

## 2021-02-05 RX ADMIN — SODIUM CHLORIDE 1000 MILLILITER(S): 9 INJECTION INTRAMUSCULAR; INTRAVENOUS; SUBCUTANEOUS at 04:35

## 2021-02-05 RX ADMIN — Medication 50 MILLIGRAM(S): at 21:29

## 2021-02-05 RX ADMIN — CLOPIDOGREL BISULFATE 75 MILLIGRAM(S): 75 TABLET, FILM COATED ORAL at 12:02

## 2021-02-05 RX ADMIN — SODIUM CHLORIDE 75 MILLILITER(S): 9 INJECTION INTRAMUSCULAR; INTRAVENOUS; SUBCUTANEOUS at 11:04

## 2021-02-05 RX ADMIN — ONDANSETRON 4 MILLIGRAM(S): 8 TABLET, FILM COATED ORAL at 06:57

## 2021-02-05 RX ADMIN — CARBIDOPA AND LEVODOPA 2 TABLET(S): 25; 100 TABLET ORAL at 23:15

## 2021-02-05 NOTE — ED ADULT NURSE NOTE - NS ED NURSE RECORD ANOTHER VITAL SIGN
Patient and spouse report onset of sinusitis symptoms since last weekend: Both are requests antibiotic to [facility] on [address]    + red watery eyes  + plugged nose  + post nasal drip and tickle in throat  + ears are plugge  + sinus congestion and HA  Using OTC: Mucinex without relief  Also sees Dr. Rodriguez, allergy who ordered:  -- Singulair 10 mg nightly  -- Flonase to 1 spray in each nostril TWICE a day  -- Can try 2 sprays twice a day for 1 week and then back down to 1 spray twice daily  -- Astelin (Azelastine) 2 spray per nostril 2 times a day   -- use consistently for 2-3 weeks until symptoms improve, then decrease to 1-2 spray per nostril 1-2 times a day as needed  -- if symptoms not improved in 2-3 weeks, add Zyrtec (cetirizine) 10 mg daily OR Allegra (fexofenadine) 180 mg daily  -- Pataday eye drops 1 drop in each eye daily (if too expensive, try over the counter Zaditor or Optivar eye drops)  
Pt informed. MAKENZIE.   
Script sent  
Wife states that the patient has a sinus infection. Wife wants medication prescribed if possible. Please call to advise.   
Yes

## 2021-02-05 NOTE — ED ADULT TRIAGE NOTE - CHIEF COMPLAINT QUOTE
Pt states she was walking to bathroom when felt lightheaded and almost fainted, and daughter called 911. nausea

## 2021-02-05 NOTE — ED ADULT NURSE REASSESSMENT NOTE - NS ED NURSE REASSESS COMMENT FT1
pt being admitted to the floor. blood cultures requested by medical team. pt received antibotics prior to cultures being drawn. md Andrew tavarez

## 2021-02-05 NOTE — H&P ADULT - NSICDXPASTSURGICALHX_GEN_ALL_CORE_FT
PAST SURGICAL HISTORY:  History of hysterectomy     S/P hernia repair     Status post placement of implantable loop recorder 01/2021

## 2021-02-05 NOTE — CONSULT NOTE ADULT - SUBJECTIVE AND OBJECTIVE BOX
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 Patient has been nauseous with LLQ abdominal pain since yesterday. Patient was getting up to use the bathroom at night and felt dizzy. Patient did not fall, was assisted by daughter who then called EMS.  Patient denies fever, chills, cough, sore throat, dyspnea, orthopnea, chest pain, vomiting, diarrhea, constipation, hematuria, melena, hematochezia, dysuria, palpitations, numbness, visual changes.    In ED: VSS, sinus on monitor, CTA/P- descending colitis, UA +, WBC 24 with neutrophilia, Lactis acidosis 2.5, mild JOHN, s/p morphine 2 mg IV x2, zofran 4 mg IV x3, 1L NS bolus, cefepime 1g, flagyl 500 mg, vasquez in place    Chart review: Patient admitted to [facility] one month ago with L central retinal artery occulusion presumably from stroke. Patient had implanted loop recorder placed during admision. Follow up EP notes revealed recorder captured suspected AF rhythm which was later reviewed to be sinus with PAC. No AC was recommended, was continued on asa, plavix, lipitor. (2021 08:56)      INFECTIOUS DISEASE HISTORY:  History as above.   Main complain is LLQ pain.  Denies any dysuria, hematuria, nausea, vomiting.   Vasquez placed in ED.   No recent antibiotics.   Denies any coughing, shrotness of breath .   PAST MEDICAL & SURGICAL HISTORY:  CRAO (central retinal artery occlusion)  Left    Hyperlipidemia    Rheumatoid arthritis    HTN (hypertension)    Parkinson disease    S/P hernia repair    Status post placement of implantable loop recorder  2021    History of hysterectomy        FAMILY HISTORY  FH: hypertension (Mother)    No pertinent family history in first degree relatives        SOCIAL HISTORY  Social History:  lives with daughter Hanna  denies T/A/D  ambulates with walker (2021 08:56)        ROS  General: Denies rigors, nightsweats  HEENT: Denies headache, rhinorrhea, sore throat, eye pain  CV: Denies CP, palpitations  PULM: Denies wheezing, hemoptysis  GI: Denies hematemesis, hematochezia, melena  : Denies discharge, hematuria  MSK: Denies arthralgias, myalgias  SKIN: Denies rash, lesions  NEURO: Denies paresthesias, weakness  PSYCH: Denies depression, anxiety    VITALS:  T(F): 98.1, Max: 100.3 (21 @ 16:59)  HR: 88  BP: 159/72  RR: 20Vital Signs Last 24 Hrs  T(C): 36.7 (2021 17:02), Max: 37.9 (2021 16:59)  T(F): 98.1 (2021 17:02), Max: 100.3 (2021 16:59)  HR: 88 (2021 17:02) (60 - 88)  BP: 159/72 (2021 17:02) (114/60 - 159/72)  BP(mean): --  RR: 20 (2021 17:02) (18 - 20)  SpO2: 100% (2021 17:02) (94% - 100%)    PHYSICAL EXAM:  Gen: NAD, resting in bed  HEENT: Normocephalic, atraumatic  Neck: supple, no lymphadenopathy  CV: Regular rate & regular rhythm  Lungs: decreased BS at bases, no fremitus  Abdomen: Soft, BS present  Ext: Warm, well perfused  Neuro: non focal, awake  Skin: no rash, no erythema  Lines: no phlebitis    TESTS & MEASUREMENTS:                        11.1   24.82 )-----------( 289      ( 2021 04:50 )             34.4     02-    141  |  104  |  24<H>  ----------------------------<  136<H>  4.1   |  23  |  1.4    Ca    9.8      2021 04:05    TPro  5.9<L>  /  Alb  3.7  /  TBili  0.4  /  DBili  x   /  AST  16  /  ALT  <5  /  AlkPhos  151<H>  02-05    eGFR if Non African American: 33 mL/min/1.73M2 (21 @ 04:05)  eGFR if African American: 39 mL/min/1.73M2 (21 @ 04:05)    LIVER FUNCTIONS - ( 2021 04:05 )  Alb: 3.7 g/dL / Pro: 5.9 g/dL / ALK PHOS: 151 U/L / ALT: <5 U/L / AST: 16 U/L / GGT: x           Urinalysis Basic - ( 2021 07:29 )    Color: Trudi / Appearance: Slightly Cloudy / S.020 / pH: x  Gluc: x / Ketone: 15  / Bili: Moderate / Urobili: 0.2 mg/dL   Blood: x / Protein: 30 mg/dL / Nitrite: Positive   Leuk Esterase: Small / RBC: 1-2 /HPF / WBC >50 /HPF   Sq Epi: x / Non Sq Epi: Few /HPF / Bacteria: Many        Culture - Urine (collected 11-10-20 @ 10:20)  Source: .Urine Clean Catch (Midstream)  Final Report (20 @ 15:25):    <10,000 CFU/mL Normal Urogenital Nimo    INFECTIOUS DISEASES TESTING  COVID-19 PCR: NotDetec (20 @ 00:30)  COVID-19 PCR: NotDetec (20 @ 03:00)  COVID-19 PCR: NotDetec (20 @ 04:50)      RADIOLOGY & ADDITIONAL TESTS:  I have personally reviewed the last Chest xray  CXR  Xray Chest 1 View- PORTABLE-Urgent:   EXAM:  XR CHEST PORTABLE URGENT 1V            PROCEDURE DATE:  2021            INTERPRETATION:  Clinical History / Reason for exam: Baseline    Comparison : Chest radiograph 2020.    Technique/Positioning: Frontal radiograph of the chest.    Findings:    Support devices: Stable loop recorder.    Cardiac/mediastinum/hilum: Stable.    Lung parenchyma/Pleura: Bibasilar atelectasis. No visualized pneumothorax.    Skeleton/soft tissues: No acute osseous abnormality.    Impression:    Bibasilar atelectasis.                  KELLI TALAVERA MD; Attending Radiologist  This document has been electronically signed. 2021 12:09PM (21 @ 07:00)      CT  CT Abdomen and Pelvis w/ IV Cont:   EXAM:  CT ABDOMEN AND PELVIS IC            PROCEDURE DATE:  2021            INTERPRETATION:  CLINICAL STATEMENT: Lower abdominal pain.    TECHNIQUE: Contiguous axial CT images were obtained from the lower chest to the pubic symphysis following administration of intravenous contrast.  Oral contrast was not administered.  Reformatted images in the coronal and sagittal planes were acquired.    COMPARISON CT: 11/10/2020.      FINDINGS:    LOWER CHEST: Bibasilar atelectasis..    HEPATOBILIARY:Unremarkable..    SPLEEN: Unremarkable..    PANCREAS: Unremarkable..    ADRENAL GLANDS: Unremarkable..    KIDNEYS: Right renal cysts and bilateral subcentimeter hypodensities too small to characterize. Right interpolar nonobstructing renal calculi measuring up to 6 mm. Symmetric renal enhancement. No hydronephrosis..    ABDOMINOPELVIC NODES: Unremarkable..    PELVIC ORGANS: Post hysterectomy..    PERITONEUM/MESENTERY/BOWEL: Mild descending colon inflammatory changes, consistent with colitis. Colonic diverticulosis. No bowel obstruction, ascites or pneumoperitoneum. Normal appendix. Status post ventral hernia repair with mesh present and postoperative change..    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    OTHER: Vascular calcifications..      IMPRESSION:    Mild descending colon inflammatory changes, consistent with colitis.    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  FUENTES MICHEL MD; Attending Radiologist  This document has been electronically signed. 2021  7:16AM (21 @ 06:32)      CARDIOLOGY TESTING  12 Lead ECG:   Ventricular Rate 62 BPM    Atrial Rate 62 BPM    P-R Interval 168 ms    QRS Duration 88 ms    Q-T Interval 434 ms    QTC Calculation(Bazett) 440 ms    P Axis 93 degrees    R Axis 55 degrees    T Axis 52 degrees    Diagnosis Line Sinus rhythm with sinus arrhythmia  Normal ECG    Confirmed by JON ABERNATHY MD ([id number]) on 2021 11:09:23 AM (21 @ 05:51)  12 Lead ECG:   Ventricular Rate 109 BPM    Atrial Rate 416 BPM    QRS Duration 74 ms    Q-T Interval 380 ms    QTC Calculation(Bazett) 511 ms    R Axis 56 degrees    T Axis 47 degrees    Diagnosis Line Atrial fibrillation  Baseline artifact  Confirmed by JON WARREN ([id number]) on 2021 11:09:07 AM (21 @ 04:08)      MEDICATIONS  ARIPiprazole 10 Oral daily  aspirin  chewable 81 Oral daily  atorvastatin 80 Oral at bedtime  carbidopa/levodopa  25/100 2 Oral four times a day  cefepime   IVPB 500 IV Intermittent every 24 hours  clopidogrel Tablet 75 Oral daily  diltiazem    Oral daily  entacapone 200 Oral four times a day  heparin   Injectable 5000 SubCutaneous every 8 hours  hydrochlorothiazide 12.5 Oral daily  metroNIDAZOLE  IVPB 500 IV Intermittent every 8 hours  oxybutynin 5 Oral daily  pantoprazole    Tablet 40 Oral before breakfast  pramipexole 1 Oral four times a day  sodium chloride 0.9%. 1000 IV Continuous <Continuous>  traZODone 50 Oral at bedtime      Weight  Weight (kg): 63.5 (21 @ 03:54)    ANTIBIOTICS:  cefepime   IVPB 500 milliGRAM(s) IV Intermittent every 24 hours  metroNIDAZOLE  IVPB 500 milliGRAM(s) IV Intermittent every 8 hours      ALLERGIES:  No Known Allergies

## 2021-02-05 NOTE — ED PROVIDER NOTE - NS ED ROS FT
Constitutional: no fever, chills, no recent weight loss, change in appetite or malaise  Eyes: no redness/discharge/pain/vision changes  ENT: no rhinorrhea/ear pain/sore throat  Cardiac: + near syncope. No chest pain, SOB or edema.  Respiratory: No cough or respiratory distress  GI: + nausea and abdominal pain. No vomiting, diarrhea   : No dysuria, frequency, urgency or hematuria  MS: no pain to back or extremities, no loss of ROM, no weakness  Neuro: No headache or weakness. No LOC.  Skin: No skin rash.  Endocrine: No history of thyroid disease or diabetes.

## 2021-02-05 NOTE — H&P ADULT - NSICDXPASTMEDICALHX_GEN_ALL_CORE_FT
PAST MEDICAL HISTORY:  CRAO (central retinal artery occlusion) Left    HTN (hypertension)     Hyperlipidemia     Parkinson disease     Rheumatoid arthritis

## 2021-02-05 NOTE — ED PROVIDER NOTE - PROGRESS NOTE DETAILS
BI: pt endorsed to morning team by Dr. Klerman. CT found with colitis and UA with UTI. Will admit pt for syncope.

## 2021-02-05 NOTE — H&P ADULT - ATTENDING COMMENTS
Patient seen and examined at bedside independently of PA and agree with the H/P unless otherwise stated     1) Sepsis/Lactic acidosis/UTI/Descending Colitis  -Broad spectrum IV antibiotics  -recheck LA; follow up urine and blood cultures   -monitor WBC   -ID consult     2) Pre-syncope   -check serial CE  -check orthostatic vitals   -ECHO    3) CKD stage III  -monitor kidney function while on abx     4) Debility  -would need rehab/pt     5) HTN  -monitor bp on current meds     6) Parkinson's Disease   -home meds and outpatient follow up with Neurology     7) Nausea/Vomiting  -zofran prn  -IVF, NPO    dvt and gi ppx       # Progress Note Handoff  PENDING as follows  consults: ID consult   Test: CE, ECHO, Blood and urine cultures  Family discussion: discussed with patient who comprehends her medical care and is agreeable for inpatient care  Disposition: ACUTE     Attending Physician Dr. Lorenza Maloney # [id number]

## 2021-02-05 NOTE — H&P ADULT - ASSESSMENT
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 Patient has been nauseous with LLQ abdominal pain since yesterday. Patient was getting up to use the bathroom at night and felt dizzy. Patient did not fall, was assisted by daughter who then called EMS.  Patient denies fever, chills, cough, sore throat, dyspnea, orthopnea, chest pain, vomiting, diarrhea, constipation, hematuria, melena, hematochezia, dysuria, palpitations, numbness, visual changes.    In ED: VSS, sinus on monitor, CTA/P- descending colitis, UA +, WBC 24 with neutrophilia, Lactis acidosis 2.5, mild JOHN, s/p morphine 2 mg IV x2, zofran 4 mg IV x3, 1L NS bolus, cefepime 1g, flagyl 500 mg, vasquez in place    Chart review: Patient admitted to [facility] one month ago with L central retinal artery occulusion presumably from stroke. Patient had implanted loop recorder placed during admision. Follow up EP notes revealed recorder captured suspected AF rhythm which was later reviewed to be sinus with PAC. No AC was recommended, was continued on asa, plavix, lipitor.    admit to non-CCU telemetry, case discussed with Dr. Maloney    # presyncope  # sepsis  # lactic acidosis  # descending colitis  # UTI  - cardiac monitoring on telemetry  - trend cardiac enzymes  - received 12 mg of zofran- will repeat EKG  - continue cefepime and flagyl  - ID consult  - NPO  - IV fluids at 75/hr  - FU CXR  - FU cultures, blood cx obtained after abx given  - FU repeat lactate    # L CRAO  # moderate AS  # L ptosis  - patient follows with ophtho and EP- patient does not have AF per notes  - no visual changes currently  - doubt current presyncope is related to prior stroke, however will monitor on tele and give IVF given AS  - continue aspirin, plavix, statin, diltiazem    # Parkinsons's disease  - continue home meds  - PT    # RA  - patient only on ibuprofen  - will hold today    # HTN  # HLD  - continue home meds      Diet: NPO  Activity: OOB  DVT PPX: heparin sq  GI PPX: on protonix   Code status: FULL CODE  Dispo: acute from home   Patient is a 88 year old female with hx of crypotogenic stroke ( L ptosis) resulting in L central retinal artery occlusion 12/2020 s/p implanted loop recorder placement last month, Parkinson's disease, RA, HTN, HLD, moderate AS,  presenting with presyncopal episode overnight. Patient has been nauseous with LLQ abdominal pain since yesterday. Patient was getting up to use the bathroom at night and felt dizzy. Patient did not fall, was assisted by daughter who then called EMS.  Patient denies fever, chills, cough, sore throat, dyspnea, orthopnea, chest pain, vomiting, diarrhea, constipation, hematuria, melena, hematochezia, dysuria, palpitations, numbness, visual changes.    In ED: VSS, sinus on monitor, CTA/P- descending colitis, UA +, WBC 24 with neutrophilia, Lactis acidosis 2.5, mild JOHN, s/p morphine 2 mg IV x2, zofran 4 mg IV x3, 1L NS bolus, cefepime 1g, flagyl 500 mg, vasquez in place    Chart review: Patient admitted to [facility] one month ago with L central retinal artery occulusion presumably from stroke. Patient had implanted loop recorder placed during admision. Follow up EP notes revealed recorder captured suspected AF rhythm which was later reviewed to be sinus with PAC. No AC was recommended, was continued on asa, plavix, lipitor.    admit to non-CCU telemetry, case discussed with Dr. Maloney    # presyncope  # sepsis  # lactic acidosis  # descending colitis  # UTI  - cardiac monitoring on telemetry  - trend cardiac enzymes  - received 12 mg of zofran- will repeat EKG  - continue cefepime and flagyl  - ID consult  - NPO  - IV fluids at 75/hr  - FU CXR  - FU cultures, blood cx obtained after abx given  - repeat lactate 1.8 (from 2.5)    # L CRAO  # moderate AS  # L ptosis  - patient follows with ophtho and EP- patient does not have AF per notes  - no visual changes currently  - doubt current presyncope is related to prior stroke, however will monitor on tele and give IVF given AS  - continue aspirin, plavix, statin, diltiazem    # Parkinsons's disease  - continue home meds  - PT    # RA  - patient on ibuprofen and diclofenac PO, will hold today  - continue percocet PRN    # HTN  # HLD  - continue home meds      Diet: NPO  Activity: OOB  DVT PPX: heparin sq  GI PPX: on protonix   Code status: FULL CODE  Dispo: acute from home

## 2021-02-05 NOTE — ED PROVIDER NOTE - CARE PLAN
Principal Discharge DX:	Syncope  Secondary Diagnosis:	Colitis  Secondary Diagnosis:	UTI (urinary tract infection)  Secondary Diagnosis:	Sepsis

## 2021-02-05 NOTE — H&P ADULT - HISTORY OF PRESENT ILLNESS
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 Patient has been nauseous with LLQ abdominal pain since yesterday. Patient was getting up to use the bathroom at night and felt dizzy. Patient did not fall, was assisted by daughter who then called EMS.  Patient denies fever, chills, cough, sore throat, dyspnea, orthopnea, chest pain, vomiting, diarrhea, constipation, hematuria, melena, hematochezia, dysuria, palpitations, numbness, visual changes.    In ED: RITA Patient is a 88 year old female with hx of crypotogenic stroke ( L ptosis) resulting in L central retinal artery occlusion 12/2020 s/p implanted loop recorder placement last month, Parkinson's disease, RA, HTN, HLD, moderate AS,  presenting with presyncopal episode overnight. Patient has been nauseous with LLQ abdominal pain since yesterday. Patient was getting up to use the bathroom at night and felt dizzy. Patient did not fall, was assisted by daughter who then called EMS.  Patient denies fever, chills, cough, sore throat, dyspnea, orthopnea, chest pain, vomiting, diarrhea, constipation, hematuria, melena, hematochezia, dysuria, palpitations, numbness, visual changes.    In ED: VSS, sinus on monitor, CTA/P- descending colitis, UA +, WBC 24 with neutrophilia, Lactis acidosis 2.5, mild JOHN, s/p morphine 2 mg IV x2, zofran 4 mg IV x3, 1L NS bolus, cefepime 1g, flagyl 500 mg, vasquez in place    Chart review: Patient admitted to [facility] one month ago with L central retinal artery occulusion presumably from stroke. Patient had implanted loop recorder placed during admision. Follow up EP notes revealed recorder captured suspected AF rhythm which was later reviewed to be sinus with PAC. No AC was recommended, was continued on asa, plavix, lipitor.

## 2021-02-05 NOTE — ED PROVIDER NOTE - ATTENDING CONTRIBUTION TO CARE
88yF HTN DLD Parkinson's BIBEMS for near-syncope.  Hx provided by pt and daughter - was walking to the bathroom with her daughter's escort when she felt lightheaded.  Did not lose consciousness.  +nausea and abd pain.  Daughter was worried and called EMS.  No hematemesis or hematochezia/melena.  No fevers.

## 2021-02-05 NOTE — ED PROVIDER NOTE - CLINICAL SUMMARY MEDICAL DECISION MAKING FREE TEXT BOX
pt needs cardiac monitoring and IV abx.  In my opinion, in patient treatment is medically justifiable and appropriate.

## 2021-02-05 NOTE — H&P ADULT - NSHPPHYSICALEXAM_GEN_ALL_CORE
Vital Signs (24 Hrs):  T(C): 36.4 (02-05-21 @ 03:54), Max: 36.4 (02-05-21 @ 03:54)  HR: 60 (02-05-21 @ 07:35) (60 - 71)  BP: 114/60 (02-05-21 @ 07:35) (114/60 - 115/57)  RR: 20 (02-05-21 @ 07:35) (18 - 20)  SpO2: 96% (02-05-21 @ 07:35) (94% - 97%)  Wt(kg): --  Daily Height in cm: 162.56 (05 Feb 2021 03:54)    Daily     I&O's Summary    PHYSICAL EXAM:  GENERAL: NAD, well-developed  SKIN: No rashes or lesions  HEAD:  Atraumatic, Normocephalic  EYES: EOMI, PERRLA, conjunctiva and sclera clear, + L ptosis  NECK: Supple, No JVD  CHEST/LUNG: Clear to auscultation bilaterally; No wheeze, L sided chest implanted loop recorder  HEART: Regular rate and rhythm; 2/6 systolic murmur at RSB, distal pulses 2+ and equal bilateral  ABDOMEN: Soft, + TTP suprapubic and LLQ, Nondistended; Bowel sounds present, well healed midline incisional scar below umbilicus  EXTREMITIES:  No clubbing, cyanosis. + trace symmetric edema bilateral  CNS: AAOx3, resting tremor

## 2021-02-05 NOTE — ED PROVIDER NOTE - PHYSICAL EXAMINATION
CONSTITUTIONAL: Well-appearing; well-nourished; in no apparent distress.   EYES: PERRL; EOM intact.   CARDIOVASCULAR: Normal S1, S2; no murmurs, rubs, or gallops.   RESPIRATORY: Normal chest excursion with respiration; breath sounds clear and equal bilaterally; no wheezes, rhonchi, or rales.  GI/: Normal bowel sounds; non-distended; non-tender; no palpable organomegaly.   MS: No calf swelling and tenderness.  SKIN: Normal for age and race; warm; dry; good turgor; no apparent lesions or exudate.   NEURO/PSYCH: A & O x 4; CN II-XII grossly unremarkable. + tremors noted which are baseline. speaking coherently and move all extremities. CONSTITUTIONAL: Well-appearing; well-nourished; in no apparent distress.   EYES: PERRL; EOM intact.   CARDIOVASCULAR: + 3/6 systolic murmur best heard over aortic region. RRR   RESPIRATORY: Normal chest excursion with respiration; breath sounds clear and equal bilaterally; no wheezes, rhonchi, or rales.  GI/: + diffuse lower abdominal tenderness with mid distension/guarding. no palpable organomegaly.   MS: No calf swelling and tenderness.  SKIN: Normal for age and race; warm; dry; good turgor; no apparent lesions or exudate.   NEURO/PSYCH: A & O x 4; CN II-XII grossly unremarkable. + tremors noted which are baseline. speaking coherently and move all extremities.

## 2021-02-05 NOTE — H&P ADULT - NSHPLABSRESULTS_GEN_ALL_CORE
11.1   24.82 )-----------( 289      ( 2021 04:50 )             34.4       -    141  |  104  |  24<H>  ----------------------------<  136<H>  4.1   |  23  |  1.4    Ca    9.8      2021 04:05    TPro  5.9<L>  /  Alb  3.7  /  TBili  0.4  /  DBili  x   /  AST  16  /  ALT  <5  /  AlkPhos  151<H>  02-          Urinalysis Basic - ( 2021 07:29 )    Color: Trudi / Appearance: Slightly Cloudy / S.020 / pH: x  Gluc: x / Ketone: 15  / Bili: Moderate / Urobili: 0.2 mg/dL   Blood: x / Protein: 30 mg/dL / Nitrite: Positive   Leuk Esterase: Small / RBC: x / WBC x   Sq Epi: x / Non Sq Epi: x / Bacteria: x      Lactate Trend   @ 08:07 Lactate:1.8    @ 04:05 Lactate:2.5       CARDIAC MARKERS ( 2021 04:05 )  x     / <0.01 ng/mL / x     / x     / x        < from: CT Abdomen and Pelvis w/ IV Cont (21 @ 06:32) >        < end of copied text >    awaiting CXR

## 2021-02-06 LAB
ANION GAP SERPL CALC-SCNC: 10 MMOL/L — SIGNIFICANT CHANGE UP (ref 7–14)
BASOPHILS # BLD AUTO: 0.03 K/UL — SIGNIFICANT CHANGE UP (ref 0–0.2)
BASOPHILS NFR BLD AUTO: 0.1 % — SIGNIFICANT CHANGE UP (ref 0–1)
BUN SERPL-MCNC: 19 MG/DL — SIGNIFICANT CHANGE UP (ref 10–20)
CALCIUM SERPL-MCNC: 8 MG/DL — LOW (ref 8.5–10.1)
CHLORIDE SERPL-SCNC: 108 MMOL/L — SIGNIFICANT CHANGE UP (ref 98–110)
CO2 SERPL-SCNC: 22 MMOL/L — SIGNIFICANT CHANGE UP (ref 17–32)
CREAT SERPL-MCNC: 1.1 MG/DL — SIGNIFICANT CHANGE UP (ref 0.7–1.5)
EOSINOPHIL # BLD AUTO: 0.02 K/UL — SIGNIFICANT CHANGE UP (ref 0–0.7)
EOSINOPHIL NFR BLD AUTO: 0.1 % — SIGNIFICANT CHANGE UP (ref 0–8)
GLUCOSE SERPL-MCNC: 85 MG/DL — SIGNIFICANT CHANGE UP (ref 70–99)
HCT VFR BLD CALC: 33.4 % — LOW (ref 37–47)
HGB BLD-MCNC: 10.5 G/DL — LOW (ref 12–16)
IMM GRANULOCYTES NFR BLD AUTO: 1.1 % — HIGH (ref 0.1–0.3)
LYMPHOCYTES # BLD AUTO: 0.64 K/UL — LOW (ref 1.2–3.4)
LYMPHOCYTES # BLD AUTO: 3.1 % — LOW (ref 20.5–51.1)
MAGNESIUM SERPL-MCNC: 2.4 MG/DL — SIGNIFICANT CHANGE UP (ref 1.8–2.4)
MCHC RBC-ENTMCNC: 28.2 PG — SIGNIFICANT CHANGE UP (ref 27–31)
MCHC RBC-ENTMCNC: 31.4 G/DL — LOW (ref 32–37)
MCV RBC AUTO: 89.8 FL — SIGNIFICANT CHANGE UP (ref 81–99)
MONOCYTES # BLD AUTO: 1.08 K/UL — HIGH (ref 0.1–0.6)
MONOCYTES NFR BLD AUTO: 5.1 % — SIGNIFICANT CHANGE UP (ref 1.7–9.3)
NEUTROPHILS # BLD AUTO: 18.98 K/UL — HIGH (ref 1.4–6.5)
NEUTROPHILS NFR BLD AUTO: 90.5 % — HIGH (ref 42.2–75.2)
NRBC # BLD: 0 /100 WBCS — SIGNIFICANT CHANGE UP (ref 0–0)
PLATELET # BLD AUTO: 265 K/UL — SIGNIFICANT CHANGE UP (ref 130–400)
POTASSIUM SERPL-MCNC: 4.2 MMOL/L — SIGNIFICANT CHANGE UP (ref 3.5–5)
POTASSIUM SERPL-SCNC: 4.2 MMOL/L — SIGNIFICANT CHANGE UP (ref 3.5–5)
RBC # BLD: 3.72 M/UL — LOW (ref 4.2–5.4)
RBC # FLD: 14.7 % — HIGH (ref 11.5–14.5)
SODIUM SERPL-SCNC: 140 MMOL/L — SIGNIFICANT CHANGE UP (ref 135–146)
WBC # BLD: 20.98 K/UL — HIGH (ref 4.8–10.8)
WBC # FLD AUTO: 20.98 K/UL — HIGH (ref 4.8–10.8)

## 2021-02-06 PROCEDURE — 99233 SBSQ HOSP IP/OBS HIGH 50: CPT

## 2021-02-06 RX ADMIN — Medication 12.5 MILLIGRAM(S): at 06:27

## 2021-02-06 RX ADMIN — PRAMIPEXOLE DIHYDROCHLORIDE 1 MILLIGRAM(S): 0.12 TABLET ORAL at 18:21

## 2021-02-06 RX ADMIN — PRAMIPEXOLE DIHYDROCHLORIDE 1 MILLIGRAM(S): 0.12 TABLET ORAL at 11:27

## 2021-02-06 RX ADMIN — PANTOPRAZOLE SODIUM 40 MILLIGRAM(S): 20 TABLET, DELAYED RELEASE ORAL at 06:27

## 2021-02-06 RX ADMIN — HEPARIN SODIUM 5000 UNIT(S): 5000 INJECTION INTRAVENOUS; SUBCUTANEOUS at 21:31

## 2021-02-06 RX ADMIN — ATORVASTATIN CALCIUM 80 MILLIGRAM(S): 80 TABLET, FILM COATED ORAL at 21:31

## 2021-02-06 RX ADMIN — OXYCODONE AND ACETAMINOPHEN 1 TABLET(S): 5; 325 TABLET ORAL at 16:24

## 2021-02-06 RX ADMIN — CEFEPIME 100 MILLIGRAM(S): 1 INJECTION, POWDER, FOR SOLUTION INTRAMUSCULAR; INTRAVENOUS at 06:22

## 2021-02-06 RX ADMIN — CARBIDOPA AND LEVODOPA 2 TABLET(S): 25; 100 TABLET ORAL at 13:46

## 2021-02-06 RX ADMIN — Medication 100 MILLIGRAM(S): at 21:31

## 2021-02-06 RX ADMIN — Medication 100 MILLIGRAM(S): at 13:48

## 2021-02-06 RX ADMIN — CARBIDOPA AND LEVODOPA 2 TABLET(S): 25; 100 TABLET ORAL at 18:21

## 2021-02-06 RX ADMIN — SODIUM CHLORIDE 75 MILLILITER(S): 9 INJECTION INTRAMUSCULAR; INTRAVENOUS; SUBCUTANEOUS at 08:45

## 2021-02-06 RX ADMIN — ENTACAPONE 200 MILLIGRAM(S): 200 TABLET, FILM COATED ORAL at 18:23

## 2021-02-06 RX ADMIN — HEPARIN SODIUM 5000 UNIT(S): 5000 INJECTION INTRAVENOUS; SUBCUTANEOUS at 13:48

## 2021-02-06 RX ADMIN — ENTACAPONE 200 MILLIGRAM(S): 200 TABLET, FILM COATED ORAL at 06:28

## 2021-02-06 RX ADMIN — HEPARIN SODIUM 5000 UNIT(S): 5000 INJECTION INTRAVENOUS; SUBCUTANEOUS at 06:27

## 2021-02-06 RX ADMIN — Medication 100 MILLIGRAM(S): at 06:22

## 2021-02-06 RX ADMIN — CLOPIDOGREL BISULFATE 75 MILLIGRAM(S): 75 TABLET, FILM COATED ORAL at 11:34

## 2021-02-06 RX ADMIN — PRAMIPEXOLE DIHYDROCHLORIDE 1 MILLIGRAM(S): 0.12 TABLET ORAL at 06:27

## 2021-02-06 RX ADMIN — Medication 650 MILLIGRAM(S): at 06:35

## 2021-02-06 RX ADMIN — CARBIDOPA AND LEVODOPA 2 TABLET(S): 25; 100 TABLET ORAL at 06:27

## 2021-02-06 RX ADMIN — Medication 50 MILLIGRAM(S): at 21:31

## 2021-02-06 RX ADMIN — ENTACAPONE 200 MILLIGRAM(S): 200 TABLET, FILM COATED ORAL at 13:48

## 2021-02-06 RX ADMIN — OXYCODONE AND ACETAMINOPHEN 1 TABLET(S): 5; 325 TABLET ORAL at 11:34

## 2021-02-06 RX ADMIN — ARIPIPRAZOLE 10 MILLIGRAM(S): 15 TABLET ORAL at 13:45

## 2021-02-06 RX ADMIN — Medication 81 MILLIGRAM(S): at 13:47

## 2021-02-06 RX ADMIN — Medication 5 MILLIGRAM(S): at 13:46

## 2021-02-06 RX ADMIN — Medication 360 MILLIGRAM(S): at 06:27

## 2021-02-06 NOTE — PHYSICAL THERAPY INITIAL EVALUATION ADULT - GENERAL OBSERVATIONS, REHAB EVAL
8:55 - 9:18. Chart reviewed. Patient available to be seen for physical therapy, confirmed with nurse. Patient encountered semi-reclined in bed, +IV, +tele, +vasquez. Denies pain at rest, agreeable for PT evaluation now.

## 2021-02-06 NOTE — CONSULT NOTE ADULT - ASSESSMENT
Patient with above history. Near syncope possibly secondary decreased bp from Colitis. She has Parkinson's Rhythm appears artifact . Not v tach. Rx coleitis Ortho BP when stable. Continue out patient monitor implantable event recorder
ASSESSMENT  88 year old female with hx of crypotogenic stroke ( L ptosis) resulting in L central retinal artery occlusion 12/2020 s/p implanted loop recorder placement last month, Parkinson's disease, RA, HTN, HLD, moderate AS,  presenting with presyncopal episode overnight.    IMPRESSION  #Sepsis on admission (WBC>12, Tachycardia) secondary to Colitis  - CT Abdomen and Pelvis w/ IV Cont (02.05.21 @ 06:32): Mild descending colon inflammatory changes, consistent with colitis. Severe degenerative changes of bilateral hips with possible acetabular protrusion    #Cryptogenic stroke with Left Ptosis c/ left central retinal artery occlusion 12/2020  #Parkinson's disease  #Moderate AS  #Abx allergy: NKDA    RECOMMENDATIONS  - colonized with many organisms including ESBL in Urine -- no clear UTI symptoms and no renal/bladder pathology on CT imaging other than small calculi  - continue cefepime and flagyl for now  - if with diarrhea, please send C diff and GI PCR  - follow-up blood cultures and urine cultures    Please call or message on [employer] Teams if with any questions.  Spectra [phone]

## 2021-02-06 NOTE — PROGRESS NOTE ADULT - ASSESSMENT
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 Patient has been nauseous with LLQ abdominal pain since yesterday. Patient was getting up to use the bathroom at night and felt dizzy. Patient did not fall, was assisted by daughter who then called EMS.  Patient denies fever, chills, cough, sore throat, dyspnea, orthopnea, chest pain, vomiting, diarrhea, constipation, hematuria, melena, hematochezia, dysuria, palpitations, numbness, visual changes.    In ED: VSS, sinus on monitor, CTA/P- descending colitis, UA +, WBC 24 with neutrophilia, Lactis acidosis 2.5, mild JOHN, s/p morphine 2 mg IV x2, zofran 4 mg IV x3, 1L NS bolus, cefepime 1g, flagyl 500 mg, vasquez in place    Chart review: Patient admitted to [facility] one month ago with L central retinal artery occulusion presumably from stroke. Patient had implanted loop recorder placed during admision. Follow up EP notes revealed recorder captured suspected AF rhythm which was later reviewed to be sinus with PAC. No AC was recommended, was continued on asa, plavix, lipitor.        1) Sepsis/Lactic acidosis/UTI/Descending Colitis  -Broad spectrum IV antibiotics  -follow up urine and blood cultures   -leukocytosis downtrended   -ID consult appreciated     2) Pre-syncope   -check serial CE negative   -ECHO  -cardio consult appreciated --- no real v tach (tremors from parkinson's disease)---- continue with tele for 24 more hrs and monitor (spoke to Dr. Osborn and he agrees)    3) CKD stage III  -monitor kidney function while on abx     4) Debility  -would need rehab/pt     5) HTN  -monitor bp on current meds     6) Parkinson's Disease   -home meds and outpatient follow up with Neurology     7) Nausea/Vomiting  -zofran prn  -start diet as tolerated     dvt and gi ppx       # Progress Note Handoff  PENDING as follows  consults: ID consult noted   Test: CE, ECHO, Blood and urine cultures  Family discussion: discussed with patient who comprehends her medical care and is agreeable for inpatient care  Disposition: ACUTE     Attending Physician Dr. Lorenza Maloney # [id number] .

## 2021-02-06 NOTE — PHYSICAL THERAPY INITIAL EVALUATION ADULT - ADDITIONAL COMMENTS
As per patient, resides with daughter in private home.  Flight of steps (approx 10-12) to enter home, but patient says she "does not go out much," inside home 3 steps to bathroom.  Ambulates with rolling walker at baseline.

## 2021-02-06 NOTE — PROGRESS NOTE ADULT - SUBJECTIVE AND OBJECTIVE BOX
JON GARCÍA  88y  Female      Patient is a 88y old  Female who presents with a chief complaint of sepsis, presyncope (2021 08:53)      INTERVAL HPI/OVERNIGHT EVENTS:  pt seen and examined at bedside; doing well, will start clear liquid diet   -continue with abx   -monitor daily labs, and cultures  -ID follow up   -pt with loop recorder---- need reading on it     REVIEW OF SYSTEMS:  -hungry for food  -denies palpitations      -Vital Signs Last 24 Hrs  T(C): 38.2 (2021 05:00), Max: 38.3 (2021 18:00)  T(F): 100.7 (2021 05:00), Max: 101 (2021 18:00)  HR: 85 (2021 05:00) (83 - 89)  BP: 144/69 (2021 05:00) (127/64 - 173/72)  RR: 18 (2021 05:00) (18 - 20)  SpO2: 100% (2021 17:02) (98% - 100%)    PHYSICAL EXAM:  GENERAL: NAD, speaking in full sentences, sitting up in chair   HEAD:  Atraumatic, Normocephalic  EYES: EOMI, PERRLA, conjunctiva and sclera clear  NERVOUS SYSTEM:  Alert & Oriented X 3  CHEST/LUNG: Clear to percussion bilaterally; No rales, rhonchi, wheezing, or rubs  CV/HEART: Regular rate and rhythm; No murmurs, rubs, or gallops  GI/ABDOMEN: Soft, Nontender, Nondistended; Bowel sounds present  EXTREMITIES:  2+ Peripheral Pulses, No clubbing, cyanosis, or edema  SKIN: No rashes or lesions    LAB:                        10.5   20.98 )-----------( 265      ( 2021 06:52 )             33.4     02-06    140  |  108  |  19  ----------------------------<  85  4.2   |  22  |  1.1    Ca    8.0<L>      2021 06:52  Mg     2.4     02-06    TPro  5.9<L>  /  Alb  3.7  /  TBili  0.4  /  DBili  x   /  AST  16  /  ALT  <5  /  AlkPhos  151<H>  02-05    CARDIAC MARKERS ( 2021 21:45 )  x     / <0.01 ng/mL / x     / x     / x      CARDIAC MARKERS ( 2021 16:03 )  x     / <0.01 ng/mL / x     / x     / x      CARDIAC MARKERS ( 2021 10:17 )  x     / x     / 78 U/L / x     / 1.8 ng/mL  CARDIAC MARKERS ( 2021 04:05 )  x     / <0.01 ng/mL / x     / x     / x        Daily Height in cm: 154.94 (2021 18:00)    Daily   CAPILLARY BLOOD GLUCOSE    Urinalysis Basic - ( 2021 07:29 )    Color: Trudi / Appearance: Slightly Cloudy / S.020 / pH: x  Gluc: x / Ketone: 15  / Bili: Moderate / Urobili: 0.2 mg/dL   Blood: x / Protein: 30 mg/dL / Nitrite: Positive   Leuk Esterase: Small / RBC: 1-2 /HPF / WBC >50 /HPF   Sq Epi: x / Non Sq Epi: Few /HPF / Bacteria: Many    LIVER FUNCTIONS - ( 2021 04:05 )  Alb: 3.7 g/dL / Pro: 5.9 g/dL / ALK PHOS: 151 U/L / ALT: <5 U/L / AST: 16 U/L / GGT: x           RADIOLOGY:    Imaging Personally Reviewed:  [ Y ] YES  [ ] NO    HEALTH ISSUES - PROBLEM Dx:    MEDS:  acetaminophen   Tablet .. 650 milliGRAM(s) Oral every 4 hours PRN  ARIPiprazole 10 milliGRAM(s) Oral daily  aspirin  chewable 81 milliGRAM(s) Oral daily  atorvastatin 80 milliGRAM(s) Oral at bedtime  carbidopa/levodopa  25/100 2 Tablet(s) Oral four times a day  cefepime   IVPB 500 milliGRAM(s) IV Intermittent every 24 hours  clopidogrel Tablet 75 milliGRAM(s) Oral daily  diltiazem    milliGRAM(s) Oral daily  entacapone 200 milliGRAM(s) Oral four times a day  heparin   Injectable 5000 Unit(s) SubCutaneous every 8 hours  hydrochlorothiazide 12.5 milliGRAM(s) Oral daily  metroNIDAZOLE  IVPB 500 milliGRAM(s) IV Intermittent every 8 hours  ondansetron Injectable 4 milliGRAM(s) IV Push every 6 hours PRN  oxybutynin 5 milliGRAM(s) Oral daily  oxycodone    5 mG/acetaminophen 325 mG 1 Tablet(s) Oral every 6 hours PRN  pantoprazole    Tablet 40 milliGRAM(s) Oral before breakfast  pramipexole 1 milliGRAM(s) Oral four times a day  sodium chloride 0.9%. 1000 milliLiter(s) IV Continuous <Continuous>  traZODone 50 milliGRAM(s) Oral at bedtime

## 2021-02-06 NOTE — PHYSICAL THERAPY INITIAL EVALUATION ADULT - GAIT DEVIATIONS NOTED, PT EVAL
forward flexed posture , decreased heel strike / push off/decreased glenn/increased time in double stance/decreased step length/decreased weight-shifting ability

## 2021-02-06 NOTE — CONSULT NOTE ADULT - SUBJECTIVE AND OBJECTIVE BOX
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 Patient has been nauseous with LLQ abdominal pain since day befoire. Patient was getting up to use the bathroom at night and felt dizzy. Patient did not fall, was assisted by daughter who then called EMS.  Patient denies fever, chills, cough, sore throat, dyspnea, orthopnea, chest pain, vomiting, diarrhea, constipation, hematuria, melena, hematochezia, dysuria, palpitations, numbness, visual changes.    In ED: VSS, sinus on monitor, CTA/P- descending colitis, UA +, WBC 24 with neutrophilia, Lactis acidosis 2.5, mild JOHN, s/p morphine 2 mg IV x2, zofran 4 mg IV x3, 1L NS bolus, cefepime 1g, flagyl 500 mg, vasquez in place    Chart review: Patient admitted to [facility] one month ago with L central retinal artery occulusion presumably from stroke. Patient had implanted loop recorder placed during admision. Follow up EP notes revealed recorder captured suspected AF rhythm which was later reviewed to be sinus with PAC. No AC was recommended, was continued on asa, plavix, lipitor. (05 Feb 2021 08:56)      PAST MEDICAL & SURGICAL HISTORY:  CRAO (central retinal artery occlusion)  Left    Hyperlipidemia    Rheumatoid arthritis    HTN (hypertension)    Parkinson disease    S/P hernia repair    Status post placement of implantable loop recorder  01/2021    History of hysterectomy        Cardiac Risks:   [x ]HTN, [ ] DM, [ ] Smoking, [ ] FH,  [ x] Lipids        MEDICATIONS:  MEDICATIONS  (STANDING):  ARIPiprazole 10 milliGRAM(s) Oral daily  aspirin  chewable 81 milliGRAM(s) Oral daily  atorvastatin 80 milliGRAM(s) Oral at bedtime  carbidopa/levodopa  25/100 2 Tablet(s) Oral four times a day  cefepime   IVPB 500 milliGRAM(s) IV Intermittent every 24 hours  clopidogrel Tablet 75 milliGRAM(s) Oral daily  diltiazem    milliGRAM(s) Oral daily  entacapone 200 milliGRAM(s) Oral four times a day  heparin   Injectable 5000 Unit(s) SubCutaneous every 8 hours  hydrochlorothiazide 12.5 milliGRAM(s) Oral daily  metroNIDAZOLE  IVPB 500 milliGRAM(s) IV Intermittent every 8 hours  oxybutynin 5 milliGRAM(s) Oral daily  pantoprazole    Tablet 40 milliGRAM(s) Oral before breakfast  pramipexole 1 milliGRAM(s) Oral four times a day  sodium chloride 0.9%. 1000 milliLiter(s) (75 mL/Hr) IV Continuous <Continuous>  traZODone 50 milliGRAM(s) Oral at bedtime      FAMILY HISTORY:  FH: hypertension (Mother)        SOCIAL HISTORY:      [ ] Marital status    Allergies    No Known Allergies        	    REVIEW OF SYSTEMS:  CONSTITUTIONAL: No fever, weight loss, or fatigue  EYES: No eye pain, visual disturbances, or discharge  ENMT:  No difficulty hearing, tinnitus, vertigo; No sinus or throat pain  NECK: No pain or stiffness  RESPIRATORY: No cough, wheezing, chills or hemoptysis; No Shortness of Breath  CARDIOVASCULAR: No chest pain, palpitations, passing out, dizziness, or leg swelling  GASTROINTESTINAL: No abdominal or epigastric pain. No nausea, vomiting, or hematemesis; No diarrhea or constipation. No melena or hematochezia.  GENITOURINARY: No dysuria, frequency, hematuria, or incontinence  NEUROLOGICAL: No headaches, memory loss, loss of strength, numbness, or tremors  SKIN: No itching, burning, rashes, or lesions   	        PHYSICAL EXAM:  T(C): 38.2 (02-06-21 @ 05:00), Max: 38.3 (02-05-21 @ 18:00)  HR: 85 (02-06-21 @ 05:00) (61 - 89)  BP: 144/69 (02-06-21 @ 05:00) (127/64 - 173/72)  RR: 18 (02-06-21 @ 05:00) (18 - 20)  SpO2: 100% (02-05-21 @ 17:02) (97% - 100%)  Wt(kg): --  I&O's Summary    05 Feb 2021 07:01  -  06 Feb 2021 07:00  --------------------------------------------------------  IN: 0 mL / OUT: 600 mL / NET: -600 mL        Appearance: Normal	  Psychiatry: A & O x 3, Mood & affect appropriate  HEENT:   Normal oral mucosa, PERRL, EOMI	  Lymphatic: No lymphadenopathy  Cardiovascular: Normal S1 S2,RRR, No JVD, I/VI DENICE LSB  Respiratory: Lungs clear to auscultation	  Gastrointestinal:  Soft, Non-tender, + BS	  Skin: No rashes, No ecchymoses, No cyanosis	  Neurologic: Non-focal  Extremities: Normal range of motion, No clubbing, cyanosis or edema  Vascular: Peripheral pulses palpable 2+ bilaterally      ECG:  	  < from: 12 Lead ECG (02.05.21 @ 05:51) >  Diagnosis Line Sinus rhythm with sinus arrhythmia  Normal ECG    Confirmed by JON ABERNATHY MD ([id number]) on 2/5/2021 11:09:23 AM  	  LABS:	 	    CARDIAC MARKERS:                                    11.1   24.82 )-----------( 289      ( 05 Feb 2021 04:50 )             34.4     02-05    141  |  104  |  24<H>  ----------------------------<  136<H>  4.1   |  23  |  1.4    Ca    9.8      05 Feb 2021 04:05    TPro  5.9<L>  /  Alb  3.7  /  TBili  0.4  /  DBili  x   /  AST  16  /  ALT  <5  /  AlkPhos  151<H>  02-05

## 2021-02-07 LAB
ANION GAP SERPL CALC-SCNC: 9 MMOL/L — SIGNIFICANT CHANGE UP (ref 7–14)
BUN SERPL-MCNC: 15 MG/DL — SIGNIFICANT CHANGE UP (ref 10–20)
CALCIUM SERPL-MCNC: 7.9 MG/DL — LOW (ref 8.5–10.1)
CHLORIDE SERPL-SCNC: 108 MMOL/L — SIGNIFICANT CHANGE UP (ref 98–110)
CO2 SERPL-SCNC: 22 MMOL/L — SIGNIFICANT CHANGE UP (ref 17–32)
CREAT SERPL-MCNC: 0.9 MG/DL — SIGNIFICANT CHANGE UP (ref 0.7–1.5)
CULTURE RESULTS: SIGNIFICANT CHANGE UP
GLUCOSE SERPL-MCNC: 95 MG/DL — SIGNIFICANT CHANGE UP (ref 70–99)
HCT VFR BLD CALC: 31.9 % — LOW (ref 37–47)
HGB BLD-MCNC: 10.1 G/DL — LOW (ref 12–16)
MCHC RBC-ENTMCNC: 28.3 PG — SIGNIFICANT CHANGE UP (ref 27–31)
MCHC RBC-ENTMCNC: 31.7 G/DL — LOW (ref 32–37)
MCV RBC AUTO: 89.4 FL — SIGNIFICANT CHANGE UP (ref 81–99)
NRBC # BLD: 0 /100 WBCS — SIGNIFICANT CHANGE UP (ref 0–0)
PLATELET # BLD AUTO: 238 K/UL — SIGNIFICANT CHANGE UP (ref 130–400)
POTASSIUM SERPL-MCNC: 4.3 MMOL/L — SIGNIFICANT CHANGE UP (ref 3.5–5)
POTASSIUM SERPL-SCNC: 4.3 MMOL/L — SIGNIFICANT CHANGE UP (ref 3.5–5)
RBC # BLD: 3.57 M/UL — LOW (ref 4.2–5.4)
RBC # FLD: 14.5 % — SIGNIFICANT CHANGE UP (ref 11.5–14.5)
SODIUM SERPL-SCNC: 139 MMOL/L — SIGNIFICANT CHANGE UP (ref 135–146)
SPECIMEN SOURCE: SIGNIFICANT CHANGE UP
WBC # BLD: 12.43 K/UL — HIGH (ref 4.8–10.8)
WBC # FLD AUTO: 12.43 K/UL — HIGH (ref 4.8–10.8)

## 2021-02-07 PROCEDURE — 99233 SBSQ HOSP IP/OBS HIGH 50: CPT

## 2021-02-07 RX ORDER — LANOLIN ALCOHOL/MO/W.PET/CERES
3 CREAM (GRAM) TOPICAL ONCE
Refills: 0 | Status: COMPLETED | OUTPATIENT
Start: 2021-02-07 | End: 2021-02-07

## 2021-02-07 RX ADMIN — PRAMIPEXOLE DIHYDROCHLORIDE 1 MILLIGRAM(S): 0.12 TABLET ORAL at 01:46

## 2021-02-07 RX ADMIN — PRAMIPEXOLE DIHYDROCHLORIDE 1 MILLIGRAM(S): 0.12 TABLET ORAL at 06:08

## 2021-02-07 RX ADMIN — HEPARIN SODIUM 5000 UNIT(S): 5000 INJECTION INTRAVENOUS; SUBCUTANEOUS at 22:00

## 2021-02-07 RX ADMIN — PANTOPRAZOLE SODIUM 40 MILLIGRAM(S): 20 TABLET, DELAYED RELEASE ORAL at 06:07

## 2021-02-07 RX ADMIN — Medication 5 MILLIGRAM(S): at 11:46

## 2021-02-07 RX ADMIN — CARBIDOPA AND LEVODOPA 2 TABLET(S): 25; 100 TABLET ORAL at 23:57

## 2021-02-07 RX ADMIN — HEPARIN SODIUM 5000 UNIT(S): 5000 INJECTION INTRAVENOUS; SUBCUTANEOUS at 14:30

## 2021-02-07 RX ADMIN — ATORVASTATIN CALCIUM 80 MILLIGRAM(S): 80 TABLET, FILM COATED ORAL at 22:00

## 2021-02-07 RX ADMIN — CARBIDOPA AND LEVODOPA 2 TABLET(S): 25; 100 TABLET ORAL at 06:06

## 2021-02-07 RX ADMIN — Medication 100 MILLIGRAM(S): at 22:01

## 2021-02-07 RX ADMIN — PRAMIPEXOLE DIHYDROCHLORIDE 1 MILLIGRAM(S): 0.12 TABLET ORAL at 17:26

## 2021-02-07 RX ADMIN — ENTACAPONE 200 MILLIGRAM(S): 200 TABLET, FILM COATED ORAL at 06:06

## 2021-02-07 RX ADMIN — Medication 81 MILLIGRAM(S): at 11:46

## 2021-02-07 RX ADMIN — CEFEPIME 100 MILLIGRAM(S): 1 INJECTION, POWDER, FOR SOLUTION INTRAMUSCULAR; INTRAVENOUS at 06:06

## 2021-02-07 RX ADMIN — Medication 12.5 MILLIGRAM(S): at 06:06

## 2021-02-07 RX ADMIN — Medication 100 MILLIGRAM(S): at 06:07

## 2021-02-07 RX ADMIN — ARIPIPRAZOLE 10 MILLIGRAM(S): 15 TABLET ORAL at 11:46

## 2021-02-07 RX ADMIN — Medication 100 MILLIGRAM(S): at 14:30

## 2021-02-07 RX ADMIN — Medication 50 MILLIGRAM(S): at 22:02

## 2021-02-07 RX ADMIN — CLOPIDOGREL BISULFATE 75 MILLIGRAM(S): 75 TABLET, FILM COATED ORAL at 11:46

## 2021-02-07 RX ADMIN — CARBIDOPA AND LEVODOPA 2 TABLET(S): 25; 100 TABLET ORAL at 11:46

## 2021-02-07 RX ADMIN — ENTACAPONE 200 MILLIGRAM(S): 200 TABLET, FILM COATED ORAL at 17:25

## 2021-02-07 RX ADMIN — ENTACAPONE 200 MILLIGRAM(S): 200 TABLET, FILM COATED ORAL at 11:46

## 2021-02-07 RX ADMIN — Medication 360 MILLIGRAM(S): at 06:06

## 2021-02-07 RX ADMIN — ENTACAPONE 200 MILLIGRAM(S): 200 TABLET, FILM COATED ORAL at 01:46

## 2021-02-07 RX ADMIN — PRAMIPEXOLE DIHYDROCHLORIDE 1 MILLIGRAM(S): 0.12 TABLET ORAL at 11:46

## 2021-02-07 RX ADMIN — Medication 3 MILLIGRAM(S): at 01:46

## 2021-02-07 RX ADMIN — HEPARIN SODIUM 5000 UNIT(S): 5000 INJECTION INTRAVENOUS; SUBCUTANEOUS at 06:06

## 2021-02-07 RX ADMIN — CARBIDOPA AND LEVODOPA 2 TABLET(S): 25; 100 TABLET ORAL at 17:24

## 2021-02-07 RX ADMIN — CARBIDOPA AND LEVODOPA 2 TABLET(S): 25; 100 TABLET ORAL at 01:46

## 2021-02-07 NOTE — PROGRESS NOTE ADULT - ASSESSMENT
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 Patient has been nauseous with LLQ abdominal pain since yesterday. Patient was getting up to use the bathroom at night and felt dizzy. Patient did not fall, was assisted by daughter who then called EMS.  Patient denies fever, chills, cough, sore throat, dyspnea, orthopnea, chest pain, vomiting, diarrhea, constipation, hematuria, melena, hematochezia, dysuria, palpitations, numbness, visual changes.    In ED: VSS, sinus on monitor, CTA/P- descending colitis, UA +, WBC 24 with neutrophilia, Lactis acidosis 2.5, mild JOHN, s/p morphine 2 mg IV x2, zofran 4 mg IV x3, 1L NS bolus, cefepime 1g, flagyl 500 mg, vasquez in place    Chart review: Patient admitted to [facility] one month ago with L central retinal artery occulusion presumably from stroke. Patient had implanted loop recorder placed during admision. Follow up EP notes revealed recorder captured suspected AF rhythm which was later reviewed to be sinus with PAC. No AC was recommended, was continued on asa, plavix, lipitor.        1) Sepsis/Lactic acidosis/UTI/Descending Colitis  -Broad spectrum IV antibiotics  -prelim blood cultures negative x 2  -prelim urine cultures with gram negative rods --- will follow official with sensitivities   -leukocytosis downtrended   -ID following     2) Pre-syncope   -checked serial CE; negative   -ECHO pending results   -cardio consult appreciated --- d/c tele today; no significant events     3) CKD stage III  -monitor kidney function while on abx     4) Debility  -would need rehab/pt     5) HTN  -monitor bp on current meds     6) Parkinson's Disease   -home meds and outpatient follow up with Neurology     7) Nausea/Vomiting  -zofran prn  -started diet --- tolerated     dvt and gi ppx       # Progress Note Handoff  PENDING as follows  consults: ID consult noted   Test: cultures   Family discussion: discussed with patient who comprehends her medical care and is agreeable for inpatient care  Disposition: ACUTE     Attending Physician Dr. Lorenza Maloney # [id number] .

## 2021-02-07 NOTE — PROGRESS NOTE ADULT - SUBJECTIVE AND OBJECTIVE BOX
JON GARCÍA  88y  Female      Patient is a 88y old  Female who presents with a chief complaint of sepsis, presyncope (2021 08:53)      INTERVAL HPI/OVERNIGHT EVENTS:  pt seen and examined at bedside; doing well, will start clear liquid diet   -continue with abx   -monitor daily labs, and cultures  -ID follow up   -no significant events notified; d/c tele monitoring  -continue with current inpatient IV abx   -oob to chair with assistance     REVIEW OF SYSTEMS:  -hungry for food  -denies palpitations      Vital Signs Last 24 Hrs  T(C): 36.2 (2021 05:53), Max: 36.2 (2021 05:53)  T(F): 97.2 (2021 05:53), Max: 97.2 (2021 05:53)  HR: 77 (2021 05:53) (57 - 77)  BP: 153/70 (2021 05:53) (104/51 - 153/70)  RR: 16 (2021 05:53) (16 - 16)    PHYSICAL EXAM:  GENERAL: NAD, speaking in full sentences, sitting up in chair   HEAD:  Atraumatic, Normocephalic  EYES: EOMI, PERRLA, conjunctiva and sclera clear  NERVOUS SYSTEM:  Alert & Oriented X 3  CHEST/LUNG: Clear to percussion bilaterally; No rales, rhonchi, wheezing, or rubs  CV/HEART: Regular rate and rhythm; No murmurs, rubs, or gallops  GI/ABDOMEN: Soft, Nontender, Nondistended; Bowel sounds present  EXTREMITIES:  2+ Peripheral Pulses, No clubbing, cyanosis, or edema  SKIN: No rashes or lesions    LAB:                                 10.1   12.43 )-----------( 238      ( 2021 07:04 )             31.9                10.5   20.98 )-----------( 265      ( 2021 06:52 )             33.4     02-06    140  |  108  |  19  ----------------------------<  85  4.2   |  22  |  1.1    Ca    8.0<L>      2021 06:52  Mg     2.4     02-06    TPro  5.9<L>  /  Alb  3.7  /  TBili  0.4  /  DBili  x   /  AST  16  /  ALT  <5  /  AlkPhos  151<H>  02-05    CARDIAC MARKERS ( 2021 21:45 )  x     / <0.01 ng/mL / x     / x     / x      CARDIAC MARKERS ( 2021 16:03 )  x     / <0.01 ng/mL / x     / x     / x      CARDIAC MARKERS ( 2021 10:17 )  x     / x     / 78 U/L / x     / 1.8 ng/mL  CARDIAC MARKERS ( 2021 04:05 )  x     / <0.01 ng/mL / x     / x     / x        Daily Height in cm: 154.94 (2021 18:00)    Daily   CAPILLARY BLOOD GLUCOSE    Urinalysis Basic - ( 2021 07:29 )    Color: Trudi / Appearance: Slightly Cloudy / S.020 / pH: x  Gluc: x / Ketone: 15  / Bili: Moderate / Urobili: 0.2 mg/dL   Blood: x / Protein: 30 mg/dL / Nitrite: Positive   Leuk Esterase: Small / RBC: 1-2 /HPF / WBC >50 /HPF   Sq Epi: x / Non Sq Epi: Few /HPF / Bacteria: Many    LIVER FUNCTIONS - ( 2021 04:05 )  Alb: 3.7 g/dL / Pro: 5.9 g/dL / ALK PHOS: 151 U/L / ALT: <5 U/L / AST: 16 U/L / GGT: x           RADIOLOGY:    Imaging Personally Reviewed:  [ Y ] YES  [ ] NO    HEALTH ISSUES - PROBLEM Dx:    MEDICATIONS  (STANDING):  ARIPiprazole 10 milliGRAM(s) Oral daily  aspirin  chewable 81 milliGRAM(s) Oral daily  atorvastatin 80 milliGRAM(s) Oral at bedtime  carbidopa/levodopa  25/100 2 Tablet(s) Oral four times a day  cefepime   IVPB 500 milliGRAM(s) IV Intermittent every 24 hours  clopidogrel Tablet 75 milliGRAM(s) Oral daily  diltiazem    milliGRAM(s) Oral daily  entacapone 200 milliGRAM(s) Oral four times a day  heparin   Injectable 5000 Unit(s) SubCutaneous every 8 hours  hydrochlorothiazide 12.5 milliGRAM(s) Oral daily  metroNIDAZOLE  IVPB 500 milliGRAM(s) IV Intermittent every 8 hours  oxybutynin 5 milliGRAM(s) Oral daily  pantoprazole    Tablet 40 milliGRAM(s) Oral before breakfast  pramipexole 1 milliGRAM(s) Oral four times a day  sodium chloride 0.9%. 1000 milliLiter(s) (75 mL/Hr) IV Continuous <Continuous>  traZODone 50 milliGRAM(s) Oral at bedtime    MEDICATIONS  (PRN):  acetaminophen   Tablet .. 650 milliGRAM(s) Oral every 4 hours PRN Mild Pain (1 - 3)  ondansetron Injectable 4 milliGRAM(s) IV Push every 6 hours PRN Nausea  oxycodone    5 mG/acetaminophen 325 mG 1 Tablet(s) Oral every 6 hours PRN Moderate Pain (4 - 6)

## 2021-02-08 LAB
ANION GAP SERPL CALC-SCNC: 7 MMOL/L — SIGNIFICANT CHANGE UP (ref 7–14)
BUN SERPL-MCNC: 11 MG/DL — SIGNIFICANT CHANGE UP (ref 10–20)
CALCIUM SERPL-MCNC: 8.1 MG/DL — LOW (ref 8.5–10.1)
CHLORIDE SERPL-SCNC: 111 MMOL/L — HIGH (ref 98–110)
CO2 SERPL-SCNC: 23 MMOL/L — SIGNIFICANT CHANGE UP (ref 17–32)
CREAT SERPL-MCNC: 0.8 MG/DL — SIGNIFICANT CHANGE UP (ref 0.7–1.5)
ERYTHROCYTE [SEDIMENTATION RATE] IN BLOOD: 2 MM/HR — SIGNIFICANT CHANGE UP (ref 0–20)
GLUCOSE SERPL-MCNC: 95 MG/DL — SIGNIFICANT CHANGE UP (ref 70–99)
HCT VFR BLD CALC: 31.2 % — LOW (ref 37–47)
HGB BLD-MCNC: 9.9 G/DL — LOW (ref 12–16)
MCHC RBC-ENTMCNC: 27.9 PG — SIGNIFICANT CHANGE UP (ref 27–31)
MCHC RBC-ENTMCNC: 31.7 G/DL — LOW (ref 32–37)
MCV RBC AUTO: 87.9 FL — SIGNIFICANT CHANGE UP (ref 81–99)
NRBC # BLD: 0 /100 WBCS — SIGNIFICANT CHANGE UP (ref 0–0)
PLATELET # BLD AUTO: 269 K/UL — SIGNIFICANT CHANGE UP (ref 130–400)
POTASSIUM SERPL-MCNC: 3.9 MMOL/L — SIGNIFICANT CHANGE UP (ref 3.5–5)
POTASSIUM SERPL-SCNC: 3.9 MMOL/L — SIGNIFICANT CHANGE UP (ref 3.5–5)
RBC # BLD: 3.55 M/UL — LOW (ref 4.2–5.4)
RBC # FLD: 14.5 % — SIGNIFICANT CHANGE UP (ref 11.5–14.5)
SODIUM SERPL-SCNC: 141 MMOL/L — SIGNIFICANT CHANGE UP (ref 135–146)
WBC # BLD: 6.97 K/UL — SIGNIFICANT CHANGE UP (ref 4.8–10.8)
WBC # FLD AUTO: 6.97 K/UL — SIGNIFICANT CHANGE UP (ref 4.8–10.8)

## 2021-02-08 PROCEDURE — 99233 SBSQ HOSP IP/OBS HIGH 50: CPT

## 2021-02-08 PROCEDURE — 99222 1ST HOSP IP/OBS MODERATE 55: CPT

## 2021-02-08 PROCEDURE — 70450 CT HEAD/BRAIN W/O DYE: CPT | Mod: 26

## 2021-02-08 PROCEDURE — 93880 EXTRACRANIAL BILAT STUDY: CPT | Mod: 26

## 2021-02-08 RX ADMIN — ENTACAPONE 200 MILLIGRAM(S): 200 TABLET, FILM COATED ORAL at 18:23

## 2021-02-08 RX ADMIN — HEPARIN SODIUM 5000 UNIT(S): 5000 INJECTION INTRAVENOUS; SUBCUTANEOUS at 21:27

## 2021-02-08 RX ADMIN — Medication 81 MILLIGRAM(S): at 14:17

## 2021-02-08 RX ADMIN — CLOPIDOGREL BISULFATE 75 MILLIGRAM(S): 75 TABLET, FILM COATED ORAL at 14:17

## 2021-02-08 RX ADMIN — PRAMIPEXOLE DIHYDROCHLORIDE 1 MILLIGRAM(S): 0.12 TABLET ORAL at 14:17

## 2021-02-08 RX ADMIN — Medication 100 MILLIGRAM(S): at 21:27

## 2021-02-08 RX ADMIN — SODIUM CHLORIDE 75 MILLILITER(S): 9 INJECTION INTRAMUSCULAR; INTRAVENOUS; SUBCUTANEOUS at 07:03

## 2021-02-08 RX ADMIN — PRAMIPEXOLE DIHYDROCHLORIDE 1 MILLIGRAM(S): 0.12 TABLET ORAL at 00:01

## 2021-02-08 RX ADMIN — HEPARIN SODIUM 5000 UNIT(S): 5000 INJECTION INTRAVENOUS; SUBCUTANEOUS at 14:18

## 2021-02-08 RX ADMIN — CARBIDOPA AND LEVODOPA 2 TABLET(S): 25; 100 TABLET ORAL at 14:16

## 2021-02-08 RX ADMIN — Medication 5 MILLIGRAM(S): at 14:16

## 2021-02-08 RX ADMIN — ENTACAPONE 200 MILLIGRAM(S): 200 TABLET, FILM COATED ORAL at 05:58

## 2021-02-08 RX ADMIN — HEPARIN SODIUM 5000 UNIT(S): 5000 INJECTION INTRAVENOUS; SUBCUTANEOUS at 05:56

## 2021-02-08 RX ADMIN — Medication 12.5 MILLIGRAM(S): at 05:55

## 2021-02-08 RX ADMIN — Medication 100 MILLIGRAM(S): at 05:59

## 2021-02-08 RX ADMIN — ATORVASTATIN CALCIUM 80 MILLIGRAM(S): 80 TABLET, FILM COATED ORAL at 21:26

## 2021-02-08 RX ADMIN — PRAMIPEXOLE DIHYDROCHLORIDE 1 MILLIGRAM(S): 0.12 TABLET ORAL at 18:23

## 2021-02-08 RX ADMIN — Medication 100 MILLIGRAM(S): at 14:15

## 2021-02-08 RX ADMIN — PANTOPRAZOLE SODIUM 40 MILLIGRAM(S): 20 TABLET, DELAYED RELEASE ORAL at 05:55

## 2021-02-08 RX ADMIN — Medication 50 MILLIGRAM(S): at 21:27

## 2021-02-08 RX ADMIN — ENTACAPONE 200 MILLIGRAM(S): 200 TABLET, FILM COATED ORAL at 14:18

## 2021-02-08 RX ADMIN — CEFEPIME 100 MILLIGRAM(S): 1 INJECTION, POWDER, FOR SOLUTION INTRAMUSCULAR; INTRAVENOUS at 05:56

## 2021-02-08 RX ADMIN — CARBIDOPA AND LEVODOPA 2 TABLET(S): 25; 100 TABLET ORAL at 18:22

## 2021-02-08 RX ADMIN — ARIPIPRAZOLE 10 MILLIGRAM(S): 15 TABLET ORAL at 14:19

## 2021-02-08 RX ADMIN — CARBIDOPA AND LEVODOPA 2 TABLET(S): 25; 100 TABLET ORAL at 05:55

## 2021-02-08 RX ADMIN — OXYCODONE AND ACETAMINOPHEN 1 TABLET(S): 5; 325 TABLET ORAL at 00:00

## 2021-02-08 RX ADMIN — Medication 360 MILLIGRAM(S): at 05:55

## 2021-02-08 RX ADMIN — ENTACAPONE 200 MILLIGRAM(S): 200 TABLET, FILM COATED ORAL at 00:02

## 2021-02-08 RX ADMIN — PRAMIPEXOLE DIHYDROCHLORIDE 1 MILLIGRAM(S): 0.12 TABLET ORAL at 05:56

## 2021-02-08 NOTE — PROGRESS NOTE ADULT - ASSESSMENT
88 year old female with hx of crypotogenic stroke ( L ptosis) resulting in L central retinal artery occlusion 12/2020 s/p implanted loop recorder placement last month, Parkinson's disease, RA, HTN, HLD, moderate AS,  presenting with presyncopal episode overnight.    IMPRESSION  #Sepsis on admission (WBC>12, Tachycardia) secondary to Colitis  - CT Abdomen and Pelvis w/ IV Cont (02.05.21 @ 06:32): Mild descending colon inflammatory changes, consistent with colitis. Severe degenerative changes of bilateral hips with possible acetabular protrusion    #Cryptogenic stroke with Left Ptosis c/ left central retinal artery occlusion 12/2020  #Parkinson's disease  #Moderate AS  #Abx allergy: NKDA    RECOMMENDATIONS  - colonized with many organisms including ESBL in Urine -- no clear UTI symptoms and no renal/bladder pathology on CT imaging other than small calculi  - continue cefepime and flagyl for now  - if with diarrhea, please send C diff and GI PCR  - follow-up blood cultures and urine cultures        Attending Attestation:   45 minutes spent on total encounter; more than 50% of the visit was spent counseling and/or coordinating care by the attending physician.     88 year old female with hx of crypotogenic stroke ( L ptosis) resulting in L central retinal artery occlusion 12/2020 s/p implanted loop recorder placement last month, Parkinson's disease, RA, HTN, HLD, moderate AS,  presenting with presyncopal episode overnight.    IMPRESSION  #Sepsis on admission (WBC>12, Tachycardia) secondary to Colitis  - CT Abdomen and Pelvis w/ IV Cont (02.05.21 @ 06:32): Mild descending colon inflammatory changes, consistent with colitis. Severe degenerative changes of bilateral hips with possible acetabular protrusion    #Cryptogenic stroke with Left Ptosis c/ left central retinal artery occlusion 12/2020    # UTI- 2/5/21, UA, WBC is >50    would recommend:    1. Repeat Urien culture and please take the sample to MICRO LAB in a timely fashion  2. Please adjust Cefepime doses based on CRcl and c/w flagyl   3. if with diarrhea, please send C diff and GI PCR  4. Aspiration precaution       Attending Attestation:     45 minutes spent on total encounter; more than 50% of the visit was spent counseling and/or coordinating care by the attending physician.

## 2021-02-08 NOTE — PROGRESS NOTE ADULT - SUBJECTIVE AND OBJECTIVE BOX
JON GARCÍA  88y  Female      Patient is a 88y old  Female who presents with a chief complaint of sepsis, presyncope (2021 08:53)      INTERVAL HPI/OVERNIGHT EVENTS:  pt seen and examined at bedside; doing well  -leukocytosis resolved; tolerating diet; continue with abx  -also, patient with left eye hemianopsia; unsure if worsening as she has history of retinal artery occlusion ---- CT Angio head and Neck ordered; patient does not have good IV access and several PAs tried with no success. Medical Floor will attempt again for access and then CT angio to be done  -I gave updates to daughter Veronica over the phone and agreeable for current plan of care     REVIEW OF SYSTEMS:  - appears comfortable   - left eye vision loss (POA); not worsening     Vital Signs Last 24 Hrs  T(C): 36.4 (2021 14:00), Max: 36.4 (2021 14:00)  T(F): 97.6 (2021 14:00), Max: 97.6 (2021 14:00)  HR: 89 (2021 14:00) (73 - 89)  BP: 178/83 (2021 14:00) (154/68 - 178/83)  RR: 18 (2021 14:00) (16 - 18)  SpO2: 97% (2021 09:47) (97% - 97%)    PHYSICAL EXAM:  GENERAL: NAD, speaking in full sentences, sitting up in chair   HEAD:  Atraumatic, Normocephalic  EYES: EOMI, PERRLA, conjunctiva and sclera clear  NERVOUS SYSTEM:  Alert & Oriented X 3  CHEST/LUNG: Clear to percussion bilaterally; No rales, rhonchi, wheezing, or rubs  CV/HEART: Regular rate and rhythm; No murmurs, rubs, or gallops  GI/ABDOMEN: Soft, Nontender, Nondistended; Bowel sounds present  EXTREMITIES:  2+ Peripheral Pulses, No clubbing, cyanosis, or edema  SKIN: No rashes or lesions    LAB:                          9.9    6.97  )-----------( 269      ( 2021 07:09 )             31.2   02-08    141  |  111<H>  |  11  ----------------------------<  95  3.9   |  23  |  0.8    Ca    8.1<L>      2021 07:09    CARDIAC MARKERS ( 2021 21:45 )  x     / <0.01 ng/mL / x     / x     / x      CARDIAC MARKERS ( 2021 16:03 )  x     / <0.01 ng/mL / x     / x     / x      CARDIAC MARKERS ( 2021 10:17 )  x     / x     / 78 U/L / x     / 1.8 ng/mL  CARDIAC MARKERS ( 2021 04:05 )  x     / <0.01 ng/mL / x     / x     / x        Daily Height in cm: 154.94 (2021 18:00)    Daily   CAPILLARY BLOOD GLUCOSE    Urinalysis Basic - ( 2021 07:29 )    Color: Trudi / Appearance: Slightly Cloudy / S.020 / pH: x  Gluc: x / Ketone: 15  / Bili: Moderate / Urobili: 0.2 mg/dL   Blood: x / Protein: 30 mg/dL / Nitrite: Positive   Leuk Esterase: Small / RBC: 1-2 /HPF / WBC >50 /HPF   Sq Epi: x / Non Sq Epi: Few /HPF / Bacteria: Many    LIVER FUNCTIONS - ( 2021 04:05 )  Alb: 3.7 g/dL / Pro: 5.9 g/dL / ALK PHOS: 151 U/L / ALT: <5 U/L / AST: 16 U/L / GGT: x           RADIOLOGY:    Imaging Personally Reviewed:  [ Y ] YES  [ ] NO    HEALTH ISSUES - PROBLEM Dx:    MEDICATIONS  (STANDING):  ARIPiprazole 10 milliGRAM(s) Oral daily  aspirin  chewable 81 milliGRAM(s) Oral daily  atorvastatin 80 milliGRAM(s) Oral at bedtime  carbidopa/levodopa  25/100 2 Tablet(s) Oral four times a day  cefepime   IVPB 500 milliGRAM(s) IV Intermittent every 24 hours  clopidogrel Tablet 75 milliGRAM(s) Oral daily  diltiazem    milliGRAM(s) Oral daily  entacapone 200 milliGRAM(s) Oral four times a day  heparin   Injectable 5000 Unit(s) SubCutaneous every 8 hours  hydrochlorothiazide 12.5 milliGRAM(s) Oral daily  metroNIDAZOLE  IVPB 500 milliGRAM(s) IV Intermittent every 8 hours  oxybutynin 5 milliGRAM(s) Oral daily  pantoprazole    Tablet 40 milliGRAM(s) Oral before breakfast  pramipexole 1 milliGRAM(s) Oral four times a day  sodium chloride 0.9%. 1000 milliLiter(s) (75 mL/Hr) IV Continuous <Continuous>  traZODone 50 milliGRAM(s) Oral at bedtime    MEDICATIONS  (PRN):  acetaminophen   Tablet .. 650 milliGRAM(s) Oral every 4 hours PRN Mild Pain (1 - 3)  ondansetron Injectable 4 milliGRAM(s) IV Push every 6 hours PRN Nausea  oxycodone    5 mG/acetaminophen 325 mG 1 Tablet(s) Oral every 6 hours PRN Moderate Pain (4 - 6)

## 2021-02-08 NOTE — PROGRESS NOTE ADULT - SUBJECTIVE AND OBJECTIVE BOX
Patient is seen and examined at the bed side, is afebrile.        REVIEW OF SYSTEMS: All other review systems are negative      ALLERGIES: No Known Allergies      Vital Signs Last 24 Hrs  T(C): 36.4 (08 Feb 2021 14:00), Max: 36.4 (08 Feb 2021 14:00)  T(F): 97.6 (08 Feb 2021 14:00), Max: 97.6 (08 Feb 2021 14:00)  HR: 89 (08 Feb 2021 14:00) (73 - 89)  BP: 178/83 (08 Feb 2021 14:00) (154/68 - 178/83)  BP(mean): --  RR: 18 (08 Feb 2021 14:00) (16 - 18)  SpO2: 97% (08 Feb 2021 09:47) (97% - 97%)        PHYSICAL EXAM:  GENERAL: Not in distress   CHEST/LUNG:  Aire ntry bilaterally  HEART: s1 and s2 present  ABDOMEN:  Nontender and  Nondistended  EXTREMITIES: No pedal  edema  CNS: Awake and Alert      LABS:                        9.9    6.97  )-----------( 269      ( 08 Feb 2021 07:09 )             31.2     02-08    141  |  111<H>  |  11  ----------------------------<  95  3.9   |  23  |  0.8    Ca    8.1<L>      08 Feb 2021 07:09          MEDICATIONS  (STANDING):  ARIPiprazole 10 milliGRAM(s) Oral daily  aspirin  chewable 81 milliGRAM(s) Oral daily  atorvastatin 80 milliGRAM(s) Oral at bedtime  carbidopa/levodopa  25/100 2 Tablet(s) Oral four times a day  cefepime   IVPB 500 milliGRAM(s) IV Intermittent every 24 hours  clopidogrel Tablet 75 milliGRAM(s) Oral daily  diltiazem    milliGRAM(s) Oral daily  entacapone 200 milliGRAM(s) Oral four times a day  heparin   Injectable 5000 Unit(s) SubCutaneous every 8 hours  hydrochlorothiazide 12.5 milliGRAM(s) Oral daily  metroNIDAZOLE  IVPB 500 milliGRAM(s) IV Intermittent every 8 hours  oxybutynin 5 milliGRAM(s) Oral daily  pantoprazole    Tablet 40 milliGRAM(s) Oral before breakfast  pramipexole 1 milliGRAM(s) Oral four times a day  sodium chloride 0.9%. 1000 milliLiter(s) (75 mL/Hr) IV Continuous <Continuous>  traZODone 50 milliGRAM(s) Oral at bedtime    MEDICATIONS  (PRN):  acetaminophen   Tablet .. 650 milliGRAM(s) Oral every 4 hours PRN Mild Pain (1 - 3)  ondansetron Injectable 4 milliGRAM(s) IV Push every 6 hours PRN Nausea  oxycodone    5 mG/acetaminophen 325 mG 1 Tablet(s) Oral every 6 hours PRN Moderate Pain (4 - 6)          RADIOLOGY & ADDITIONAL TESTS:               Patient is afebrile. The WBC count and kidney function stay  normal. The blood cultures have no growth to date and urine culture  was a probable collection contamination,       REVIEW OF SYSTEMS: All other review systems are negative      ALLERGIES: No Known Allergies      Vital Signs Last 24 Hrs  T(C): 36.4 (08 Feb 2021 14:00), Max: 36.4 (08 Feb 2021 14:00)  T(F): 97.6 (08 Feb 2021 14:00), Max: 97.6 (08 Feb 2021 14:00)  HR: 89 (08 Feb 2021 14:00) (73 - 89)  BP: 178/83 (08 Feb 2021 14:00) (154/68 - 178/83)  BP(mean): --  RR: 18 (08 Feb 2021 14:00) (16 - 18)  SpO2: 97% (08 Feb 2021 09:47) (97% - 97%)        PHYSICAL EXAM:  GENERAL: Not in distress   CHEST/LUNG: Not using accessory muscles   HEART: s1 and s2 present  ABDOMEN:  Nontender and  Nondistended  EXTREMITIES: No pedal  edema  CNS: Awake and Alert      LABS:                        9.9    6.97  )-----------( 269      ( 08 Feb 2021 07:09 )             31.2     02-08    141  |  111<H>  |  11  ----------------------------<  95  3.9   |  23  |  0.8    Ca    8.1<L>      08 Feb 2021 07:09        MEDICATIONS  (STANDING):  ARIPiprazole 10 milliGRAM(s) Oral daily  aspirin  chewable 81 milliGRAM(s) Oral daily  atorvastatin 80 milliGRAM(s) Oral at bedtime  carbidopa/levodopa  25/100 2 Tablet(s) Oral four times a day  cefepime   IVPB 500 milliGRAM(s) IV Intermittent every 24 hours  clopidogrel Tablet 75 milliGRAM(s) Oral daily  diltiazem    milliGRAM(s) Oral daily  entacapone 200 milliGRAM(s) Oral four times a day  heparin   Injectable 5000 Unit(s) SubCutaneous every 8 hours  hydrochlorothiazide 12.5 milliGRAM(s) Oral daily  metroNIDAZOLE  IVPB 500 milliGRAM(s) IV Intermittent every 8 hours  oxybutynin 5 milliGRAM(s) Oral daily  pantoprazole    Tablet 40 milliGRAM(s) Oral before breakfast  pramipexole 1 milliGRAM(s) Oral four times a day  sodium chloride 0.9%. 1000 milliLiter(s) (75 mL/Hr) IV Continuous <Continuous>  traZODone 50 milliGRAM(s) Oral at bedtime    MEDICATIONS  (PRN):  acetaminophen   Tablet .. 650 milliGRAM(s) Oral every 4 hours PRN Mild Pain (1 - 3)  ondansetron Injectable 4 milliGRAM(s) IV Push every 6 hours PRN Nausea  oxycodone    5 mG/acetaminophen 325 mG 1 Tablet(s) Oral every 6 hours PRN Moderate Pain (4 - 6)        RADIOLOGY & ADDITIONAL TESTS:    < from: Xray Chest 1 View- PORTABLE-Urgent (Xray Chest 1 View- PORTABLE-Urgent .) (02.05.21 @ 07:00) >  Bibasilar atelectasis.        < from: CT Abdomen and Pelvis w/ IV Cont (02.05.21 @ 06:32) >  Mild descending colon inflammatory changes, consistent with colitis.    Severe degenerative changes of bilateral hips with possible acetabular protrusio        MICROBIOLOGY DATA:      mCulture - Blood (02.05.21 @ 08:48)   Specimen Source: .Blood Blood   Culture Results: No growth to date.     Culture - Blood (02.05.21 @ 08:48)   Specimen Source: .Blood Blood   Culture Results: No growth to date.  .   Culture - Urine (02.05.21 @ 07:30)   Specimen Source: .Urine Clean Catch (Midstream)   Culture Results: >=3 organisms. Probable collection contamination.     Urine Microscopic-Add On (NC) (02.05.21 @ 07:29)   Bacteria: Many   Epithelial Cells: Few /HPF   Red Blood Cell - Urine: 1-2 /HPF   White Blood Cell - Urine: >50 /HPF     Respiratory Viral Panel with COVID-19 by ROSAURA (02.05.21 @ 04:50)   Rapid RVP Result: NotCritical access hospital   SARS-CoV-2: NotCritical access hospital

## 2021-02-08 NOTE — PROGRESS NOTE ADULT - ASSESSMENT
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 Patient has been nauseous with LLQ abdominal pain since yesterday. Patient was getting up to use the bathroom at night and felt dizzy. Patient did not fall, was assisted by daughter who then called EMS.  Patient denies fever, chills, cough, sore throat, dyspnea, orthopnea, chest pain, vomiting, diarrhea, constipation, hematuria, melena, hematochezia, dysuria, palpitations, numbness, visual changes.    In ED: VSS, sinus on monitor, CTA/P- descending colitis, UA +, WBC 24 with neutrophilia, Lactis acidosis 2.5, mild JOHN, s/p morphine 2 mg IV x2, zofran 4 mg IV x3, 1L NS bolus, cefepime 1g, flagyl 500 mg, vasquez in place    Chart review: Patient admitted to [facility] one month ago with L central retinal artery occulusion presumably from stroke. Patient had implanted loop recorder placed during admision. Follow up EP notes revealed recorder captured suspected AF rhythm which was later reviewed to be sinus with PAC. No AC was recommended, was continued on asa, plavix, lipitor.    1) Sepsis/Lactic acidosis/UTI/Descending Colitis  -Broad spectrum IV antibiotics  -prelim blood cultures negative x 2  -prelim urine cultures with gram negative rods --- will follow official with sensitivities   -leukocytosis resolved   -ID follow up today     2) Pre-syncope   -likely vasovagal   -off tele  -needs loop recorder reading     3) CKD stage III  -monitor kidney function while on abx     4) Debility  -would need rehab/pt     5) HTN  -monitor bp on current meds     6) Parkinson's Disease   -home meds and outpatient follow up with Neurology     7) Nausea/Vomiting  -zofran prn  -started diet --- tolerated     8) hx of cryptogenic strokes/ptosis  -Neurology consult  -CT Angio head and Neck with IV contrast   (PAs have attempted to get an access with US; will attempt again so CT angio can be done)    dvt and gi ppx       # Progress Note Handoff  PENDING as follows  consults: Neurology   Test: CT Angio head and Neck   Family discussion: discussed with patient who comprehends her medical care and is agreeable for inpatient care; Updates given to daughter Veronica over the phone   Disposition: ACUTE     Attending Physician Dr. Lorenza Maloney # [id number] .

## 2021-02-08 NOTE — CONSULT NOTE ADULT - SUBJECTIVE AND OBJECTIVE BOX
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 Patient has been nauseous with LLQ abdominal pain since yesterday. Patient was getting up to use the bathroom at night and felt dizzy. Patient did not fall, was assisted by daughter who then called EMS.  Patient denies fever, chills, cough, sore throat, dyspnea, orthopnea, chest pain, vomiting, diarrhea, constipation, hematuria, melena, hematochezia, dysuria, palpitations, numbness, visual changes.    In ED: VSS, sinus on monitor, CTA/P- descending colitis, UA +, WBC 24 with neutrophilia, Lactis acidosis 2.5, mild JOHN, s/p morphine 2 mg IV x2, zofran 4 mg IV x3, 1L NS bolus, cefepime 1g, flagyl 500 mg, vasquez in place    Chart review: Patient admitted to [facility] one month ago with L central retinal artery occulusion presumably from stroke. Patient had implanted loop recorder placed during admision. Follow up EP notes revealed recorder captured suspected AF rhythm which was later reviewed to be sinus with PAC. No AC was recommended, was continued on asa, plavix, lipitor. (05 Feb 2021 08:56)    Patient seen and examined and history reviewed.  Patient woke this morning with loss of vision in the left eye.  She had CRAO last year but says her vision significantly improved.  This morning she woke and noticed she was unable to see out of her left eye. She is able to see light and some vague movement but cannot make out any details.    ROS:  Constitutional, Neurological, Psychiatric, Eyes, ENT, Cardiovascular, Respiratory, Gastrointestinal, Genitourinary, Musculoskeletal, Integumentary, Endocrine and Heme/Lymph are otherwise negative. Except for above    Social History: NoO smoking, No drinking, No drug use    FAMILY HISTORY:  FH: hypertension (Mother)        HEALTH ISSUES - PROBLEM Dx:          Vital Signs Last 24 Hrs  T(C): 36.4 (08 Feb 2021 14:00), Max: 36.4 (08 Feb 2021 14:00)  T(F): 97.6 (08 Feb 2021 14:00), Max: 97.6 (08 Feb 2021 14:00)  HR: 89 (08 Feb 2021 14:00) (73 - 89)  BP: 178/83 (08 Feb 2021 14:00) (154/68 - 178/83)  BP(mean): --  RR: 18 (08 Feb 2021 14:00) (16 - 18)  SpO2: 97% (08 Feb 2021 09:47) (97% - 97%)    Physical Exam:  Constitutional: alert and in no acute distress.  Eyes: the sclera and conjunctiva were normal, pupils were equal in size, round, reactive to light, with normal accommodation and extraocular movements were intact.   Back: no costovertebral angle tenderness and no spinal tenderness.      Neuro Exam:  a+Ox3 language and attention normal  CN 2-12 normal except no vision in left eye but able to perceive movement but cannot count fingers  No drift power 5/5  Temp, LT symmetric  FTN NL  Gait deferred      NIHSS: 2  mrankin 1    Allergies    No Known Allergies    Intolerances       Home Medications:  ARIPiprazole 10 mg oral tablet: 1 tab(s) orally once a day (05 Feb 2021 09:35)  atorvastatin 80 mg oral tablet: 1 tab(s) orally once a day (at bedtime) (05 Feb 2021 09:35)  carbidopa/levodopa/entacapone 50 mg-200 mg-200 mg oral tablet: 1 tab(s) orally 4 times a day (05 Feb 2021 09:35)  diclofenac sodium 50 mg oral delayed release tablet: 2 tab(s) orally 3 times a day (05 Feb 2021 09:35)  dilTIAZem 360 mg/24 hours oral capsule, extended release: 1 cap(s) orally once a day (05 Feb 2021 09:35)  hydroCHLOROthiazide 12.5 mg oral tablet: 1 tab(s) orally once a day (05 Feb 2021 09:35)  ibuprofen 400 mg oral tablet: 1 tab(s) orally 3 times a day, As Needed for pain relief (05 Feb 2021 09:35)  oxybutynin 5 mg/24 hours oral tablet, extended release: 1 tab(s) orally once a day (05 Feb 2021 09:35)  Percocet 5 mg-325 mg oral tablet: 1 tab(s) orally 2 times a day, As Needed for pain relief (05 Feb 2021 09:35)  Plavix 75 mg oral tablet: 1 tab(s) orally once a day (05 Feb 2021 09:35)  pramipexole 1 mg oral tablet: 1 tab(s) orally 4 times a day (05 Feb 2021 09:35)  Protonix 40 mg oral delayed release tablet: 1 tab(s) orally once a day (05 Feb 2021 09:35)  traZODone 50 mg oral tablet: 1 tab(s) orally once a day (at bedtime) (05 Feb 2021 09:35)      MEDICATIONS  (STANDING):  ARIPiprazole 10 milliGRAM(s) Oral daily  aspirin  chewable 81 milliGRAM(s) Oral daily  atorvastatin 80 milliGRAM(s) Oral at bedtime  carbidopa/levodopa  25/100 2 Tablet(s) Oral four times a day  cefepime   IVPB 500 milliGRAM(s) IV Intermittent every 24 hours  clopidogrel Tablet 75 milliGRAM(s) Oral daily  diltiazem    milliGRAM(s) Oral daily  entacapone 200 milliGRAM(s) Oral four times a day  heparin   Injectable 5000 Unit(s) SubCutaneous every 8 hours  hydrochlorothiazide 12.5 milliGRAM(s) Oral daily  metroNIDAZOLE  IVPB 500 milliGRAM(s) IV Intermittent every 8 hours  oxybutynin 5 milliGRAM(s) Oral daily  pantoprazole    Tablet 40 milliGRAM(s) Oral before breakfast  pramipexole 1 milliGRAM(s) Oral four times a day  sodium chloride 0.9%. 1000 milliLiter(s) (75 mL/Hr) IV Continuous <Continuous>  traZODone 50 milliGRAM(s) Oral at bedtime    MEDICATIONS  (PRN):  acetaminophen   Tablet .. 650 milliGRAM(s) Oral every 4 hours PRN Mild Pain (1 - 3)  ondansetron Injectable 4 milliGRAM(s) IV Push every 6 hours PRN Nausea  oxycodone    5 mG/acetaminophen 325 mG 1 Tablet(s) Oral every 6 hours PRN Moderate Pain (4 - 6)      LABS:                        9.9    6.97  )-----------( 269      ( 08 Feb 2021 07:09 )             31.2     02-08    141  |  111<H>  |  11  ----------------------------<  95  3.9   |  23  |  0.8    Ca    8.1<L>      08 Feb 2021 07:09    Sedimentation Rate, Erythrocyte: 37 mm/Hr (12.27.20 @ 09:03)  C-Reactive Protein, Serum: 2.68 mg/dL (12.28.20 @ 16:59)    Sedimentation Rate, Erythrocyte: 106 mm/Hr (10.12.19 @ 23:33)  C-Reactive Protein, Serum: 10.68 mg/dL (10.15.19 @ 05:08)              Neuro Imaging:  NCHCT:   < from: CT Perfusion w/ Maps w/ IV Cont (12.27.20 @ 08:49) >  IMPRESSION:  CT brain perfusion: No evidence of acute infarct or ischemia.    CTA neck: No significant stenosis. No dissection.    CTA brain: No stenosis or aneurysm.    COMMENT:  Reference per NASCET criteria for degree of stenosis: Mild: less than 50% stenosis. Moderate: 50-69% stenosis. Severe: 70-94% stenosis. Near occlusion: 95-99% stenosis.    Per RAPID assessment for acuteinfarct, threshold for ischemic tissue volume is Tmax > 6 sec. Threshold for infarct core volume estimate is CBF < 30 percent. Threshold for poor collateral flow or severe delayed perfusion is Tmax > 10 sec.    < end of copied text >      Assessment / Plan: This is a 88y year old Female presenting with presyncopal episode and while in hospital developed loss of vision in the left eye.  She had CRAO to the left eye last year but says she was able to see form the eye afterwards.  DDX: CRAO r/o Temporal artertisi  1. Send ESR, CRP  2. Repeat CTH and CTA Neck and head  3. If unable to obtain CTA then obtain carotid dopplers  4. Ophtho evaluation  5. Continue aspirin and plavix and statin  6. 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 Patient has been nauseous with LLQ abdominal pain since yesterday. Patient was getting up to use the bathroom at night and felt dizzy. Patient did not fall, was assisted by daughter who then called EMS.  Patient denies fever, chills, cough, sore throat, dyspnea, orthopnea, chest pain, vomiting, diarrhea, constipation, hematuria, melena, hematochezia, dysuria, palpitations, numbness, visual changes.    In ED: VSS, sinus on monitor, CTA/P- descending colitis, UA +, WBC 24 with neutrophilia, Lactis acidosis 2.5, mild JOHN, s/p morphine 2 mg IV x2, zofran 4 mg IV x3, 1L NS bolus, cefepime 1g, flagyl 500 mg, vasquez in place    Chart review: Patient admitted to [facility] one month ago with L central retinal artery occulusion presumably from stroke. Patient had implanted loop recorder placed during admision. Follow up EP notes revealed recorder captured suspected AF rhythm which was later reviewed to be sinus with PAC. No AC was recommended, was continued on asa, plavix, lipitor. (05 Feb 2021 08:56)    Patient seen and examined and history reviewed.  Patient woke this morning with loss of vision in the left eye.  She had CRAO last year but says her vision significantly improved.  This morning she woke and noticed she was unable to see out of her left eye. She is able to see light and some vague movement but cannot make out any details.    ROS:  Constitutional, Neurological, Psychiatric, Eyes, ENT, Cardiovascular, Respiratory, Gastrointestinal, Genitourinary, Musculoskeletal, Integumentary, Endocrine and Heme/Lymph are otherwise negative. Except for above    Social History: NoO smoking, No drinking, No drug use    FAMILY HISTORY:  FH: hypertension (Mother)        HEALTH ISSUES - PROBLEM Dx:          Vital Signs Last 24 Hrs  T(C): 36.4 (08 Feb 2021 14:00), Max: 36.4 (08 Feb 2021 14:00)  T(F): 97.6 (08 Feb 2021 14:00), Max: 97.6 (08 Feb 2021 14:00)  HR: 89 (08 Feb 2021 14:00) (73 - 89)  BP: 178/83 (08 Feb 2021 14:00) (154/68 - 178/83)  BP(mean): --  RR: 18 (08 Feb 2021 14:00) (16 - 18)  SpO2: 97% (08 Feb 2021 09:47) (97% - 97%)    Physical Exam:  Constitutional: alert and in no acute distress.  Eyes: the sclera and conjunctiva were normal, pupils were equal in size, round, reactive to light, with normal accommodation and extraocular movements were intact.   Back: no costovertebral angle tenderness and no spinal tenderness.      Neuro Exam:  a+Ox3 language and attention normal  CN 2-12 normal except no vision in left eye but able to perceive movement but cannot count fingers  No drift power 5/5  Temp, LT symmetric  FTN NL  Gait deferred      NIHSS: 2  mrankin 1    Allergies    No Known Allergies    Intolerances       Home Medications:  ARIPiprazole 10 mg oral tablet: 1 tab(s) orally once a day (05 Feb 2021 09:35)  atorvastatin 80 mg oral tablet: 1 tab(s) orally once a day (at bedtime) (05 Feb 2021 09:35)  carbidopa/levodopa/entacapone 50 mg-200 mg-200 mg oral tablet: 1 tab(s) orally 4 times a day (05 Feb 2021 09:35)  diclofenac sodium 50 mg oral delayed release tablet: 2 tab(s) orally 3 times a day (05 Feb 2021 09:35)  dilTIAZem 360 mg/24 hours oral capsule, extended release: 1 cap(s) orally once a day (05 Feb 2021 09:35)  hydroCHLOROthiazide 12.5 mg oral tablet: 1 tab(s) orally once a day (05 Feb 2021 09:35)  ibuprofen 400 mg oral tablet: 1 tab(s) orally 3 times a day, As Needed for pain relief (05 Feb 2021 09:35)  oxybutynin 5 mg/24 hours oral tablet, extended release: 1 tab(s) orally once a day (05 Feb 2021 09:35)  Percocet 5 mg-325 mg oral tablet: 1 tab(s) orally 2 times a day, As Needed for pain relief (05 Feb 2021 09:35)  Plavix 75 mg oral tablet: 1 tab(s) orally once a day (05 Feb 2021 09:35)  pramipexole 1 mg oral tablet: 1 tab(s) orally 4 times a day (05 Feb 2021 09:35)  Protonix 40 mg oral delayed release tablet: 1 tab(s) orally once a day (05 Feb 2021 09:35)  traZODone 50 mg oral tablet: 1 tab(s) orally once a day (at bedtime) (05 Feb 2021 09:35)      MEDICATIONS  (STANDING):  ARIPiprazole 10 milliGRAM(s) Oral daily  aspirin  chewable 81 milliGRAM(s) Oral daily  atorvastatin 80 milliGRAM(s) Oral at bedtime  carbidopa/levodopa  25/100 2 Tablet(s) Oral four times a day  cefepime   IVPB 500 milliGRAM(s) IV Intermittent every 24 hours  clopidogrel Tablet 75 milliGRAM(s) Oral daily  diltiazem    milliGRAM(s) Oral daily  entacapone 200 milliGRAM(s) Oral four times a day  heparin   Injectable 5000 Unit(s) SubCutaneous every 8 hours  hydrochlorothiazide 12.5 milliGRAM(s) Oral daily  metroNIDAZOLE  IVPB 500 milliGRAM(s) IV Intermittent every 8 hours  oxybutynin 5 milliGRAM(s) Oral daily  pantoprazole    Tablet 40 milliGRAM(s) Oral before breakfast  pramipexole 1 milliGRAM(s) Oral four times a day  sodium chloride 0.9%. 1000 milliLiter(s) (75 mL/Hr) IV Continuous <Continuous>  traZODone 50 milliGRAM(s) Oral at bedtime    MEDICATIONS  (PRN):  acetaminophen   Tablet .. 650 milliGRAM(s) Oral every 4 hours PRN Mild Pain (1 - 3)  ondansetron Injectable 4 milliGRAM(s) IV Push every 6 hours PRN Nausea  oxycodone    5 mG/acetaminophen 325 mG 1 Tablet(s) Oral every 6 hours PRN Moderate Pain (4 - 6)      LABS:                        9.9    6.97  )-----------( 269      ( 08 Feb 2021 07:09 )             31.2     02-08    141  |  111<H>  |  11  ----------------------------<  95  3.9   |  23  |  0.8    Ca    8.1<L>      08 Feb 2021 07:09    Sedimentation Rate, Erythrocyte: 37 mm/Hr (12.27.20 @ 09:03)  C-Reactive Protein, Serum: 2.68 mg/dL (12.28.20 @ 16:59)    Sedimentation Rate, Erythrocyte: 106 mm/Hr (10.12.19 @ 23:33)  C-Reactive Protein, Serum: 10.68 mg/dL (10.15.19 @ 05:08)              Neuro Imaging:  NCHCT:   < from: CT Perfusion w/ Maps w/ IV Cont (12.27.20 @ 08:49) >  IMPRESSION:  CT brain perfusion: No evidence of acute infarct or ischemia.    CTA neck: No significant stenosis. No dissection.    CTA brain: No stenosis or aneurysm.    COMMENT:  Reference per NASCET criteria for degree of stenosis: Mild: less than 50% stenosis. Moderate: 50-69% stenosis. Severe: 70-94% stenosis. Near occlusion: 95-99% stenosis.    Per RAPID assessment for acuteinfarct, threshold for ischemic tissue volume is Tmax > 6 sec. Threshold for infarct core volume estimate is CBF < 30 percent. Threshold for poor collateral flow or severe delayed perfusion is Tmax > 10 sec.    < end of copied text >      Assessment / Plan: This is a 88y year old Female presenting with presyncopal episode and while in hospital developed loss of vision in the left eye.  She had CRAO to the left eye last year but says she was able to see form the eye afterwards.  DDX: CRAO r/o Temporal artertisi  1. Send ESR, CRP  2. Repeat CTH and CTA Neck and head  3. If unable to obtain CTA then obtain carotid dopplers  4. Ophtho evaluation  5. Continue aspirin and plavix and statin  6. If ESR, CRP elevated may need temporal artery biopsy if not confirmed temporal arteritis in the past   7. Give Prednisone 60mg now and will continue if temporal artery biopsy was done and showed temporal arteritis

## 2021-02-09 PROBLEM — H34.10: Chronic | Status: ACTIVE | Noted: 2021-02-05

## 2021-02-09 LAB
ANION GAP SERPL CALC-SCNC: 7 MMOL/L — SIGNIFICANT CHANGE UP (ref 7–14)
BUN SERPL-MCNC: 9 MG/DL — LOW (ref 10–20)
CALCIUM SERPL-MCNC: 8.2 MG/DL — LOW (ref 8.5–10.1)
CHLORIDE SERPL-SCNC: 111 MMOL/L — HIGH (ref 98–110)
CO2 SERPL-SCNC: 24 MMOL/L — SIGNIFICANT CHANGE UP (ref 17–32)
CREAT SERPL-MCNC: 0.8 MG/DL — SIGNIFICANT CHANGE UP (ref 0.7–1.5)
CRP SERPL-MCNC: 6.63 MG/DL — HIGH (ref 0–0.4)
GLUCOSE SERPL-MCNC: 109 MG/DL — HIGH (ref 70–99)
HCT VFR BLD CALC: 31.2 % — LOW (ref 37–47)
HGB BLD-MCNC: 9.9 G/DL — LOW (ref 12–16)
MCHC RBC-ENTMCNC: 27.7 PG — SIGNIFICANT CHANGE UP (ref 27–31)
MCHC RBC-ENTMCNC: 31.7 G/DL — LOW (ref 32–37)
MCV RBC AUTO: 87.4 FL — SIGNIFICANT CHANGE UP (ref 81–99)
NRBC # BLD: 0 /100 WBCS — SIGNIFICANT CHANGE UP (ref 0–0)
PLATELET # BLD AUTO: 282 K/UL — SIGNIFICANT CHANGE UP (ref 130–400)
POTASSIUM SERPL-MCNC: 3.9 MMOL/L — SIGNIFICANT CHANGE UP (ref 3.5–5)
POTASSIUM SERPL-SCNC: 3.9 MMOL/L — SIGNIFICANT CHANGE UP (ref 3.5–5)
RBC # BLD: 3.57 M/UL — LOW (ref 4.2–5.4)
RBC # FLD: 14.2 % — SIGNIFICANT CHANGE UP (ref 11.5–14.5)
SARS-COV-2 IGG SERPL QL IA: NEGATIVE — SIGNIFICANT CHANGE UP
SARS-COV-2 IGM SERPL IA-ACNC: <3.8 AU/ML — SIGNIFICANT CHANGE UP
SODIUM SERPL-SCNC: 142 MMOL/L — SIGNIFICANT CHANGE UP (ref 135–146)
WBC # BLD: 5.47 K/UL — SIGNIFICANT CHANGE UP (ref 4.8–10.8)
WBC # FLD AUTO: 5.47 K/UL — SIGNIFICANT CHANGE UP (ref 4.8–10.8)

## 2021-02-09 PROCEDURE — 99233 SBSQ HOSP IP/OBS HIGH 50: CPT

## 2021-02-09 RX ORDER — CEFEPIME 1 G/1
1000 INJECTION, POWDER, FOR SOLUTION INTRAMUSCULAR; INTRAVENOUS ONCE
Refills: 0 | Status: COMPLETED | OUTPATIENT
Start: 2021-02-09 | End: 2021-02-09

## 2021-02-09 RX ORDER — CEFEPIME 1 G/1
INJECTION, POWDER, FOR SOLUTION INTRAMUSCULAR; INTRAVENOUS
Refills: 0 | Status: DISCONTINUED | OUTPATIENT
Start: 2021-02-09 | End: 2021-02-10

## 2021-02-09 RX ORDER — HYDROXYZINE HCL 10 MG
25 TABLET ORAL ONCE
Refills: 0 | Status: COMPLETED | OUTPATIENT
Start: 2021-02-09 | End: 2021-02-09

## 2021-02-09 RX ORDER — CEFEPIME 1 G/1
1000 INJECTION, POWDER, FOR SOLUTION INTRAMUSCULAR; INTRAVENOUS EVERY 8 HOURS
Refills: 0 | Status: DISCONTINUED | OUTPATIENT
Start: 2021-02-09 | End: 2021-02-10

## 2021-02-09 RX ADMIN — PANTOPRAZOLE SODIUM 40 MILLIGRAM(S): 20 TABLET, DELAYED RELEASE ORAL at 04:47

## 2021-02-09 RX ADMIN — CARBIDOPA AND LEVODOPA 2 TABLET(S): 25; 100 TABLET ORAL at 11:27

## 2021-02-09 RX ADMIN — Medication 100 MILLIGRAM(S): at 04:49

## 2021-02-09 RX ADMIN — PRAMIPEXOLE DIHYDROCHLORIDE 1 MILLIGRAM(S): 0.12 TABLET ORAL at 17:40

## 2021-02-09 RX ADMIN — CEFEPIME 100 MILLIGRAM(S): 1 INJECTION, POWDER, FOR SOLUTION INTRAMUSCULAR; INTRAVENOUS at 14:12

## 2021-02-09 RX ADMIN — CARBIDOPA AND LEVODOPA 2 TABLET(S): 25; 100 TABLET ORAL at 17:39

## 2021-02-09 RX ADMIN — PRAMIPEXOLE DIHYDROCHLORIDE 1 MILLIGRAM(S): 0.12 TABLET ORAL at 04:48

## 2021-02-09 RX ADMIN — CARBIDOPA AND LEVODOPA 2 TABLET(S): 25; 100 TABLET ORAL at 23:31

## 2021-02-09 RX ADMIN — CEFEPIME 100 MILLIGRAM(S): 1 INJECTION, POWDER, FOR SOLUTION INTRAMUSCULAR; INTRAVENOUS at 04:49

## 2021-02-09 RX ADMIN — CEFEPIME 100 MILLIGRAM(S): 1 INJECTION, POWDER, FOR SOLUTION INTRAMUSCULAR; INTRAVENOUS at 20:43

## 2021-02-09 RX ADMIN — Medication 12.5 MILLIGRAM(S): at 04:47

## 2021-02-09 RX ADMIN — CARBIDOPA AND LEVODOPA 2 TABLET(S): 25; 100 TABLET ORAL at 04:47

## 2021-02-09 RX ADMIN — Medication 100 MILLIGRAM(S): at 22:43

## 2021-02-09 RX ADMIN — ATORVASTATIN CALCIUM 80 MILLIGRAM(S): 80 TABLET, FILM COATED ORAL at 20:43

## 2021-02-09 RX ADMIN — HEPARIN SODIUM 5000 UNIT(S): 5000 INJECTION INTRAVENOUS; SUBCUTANEOUS at 20:43

## 2021-02-09 RX ADMIN — ENTACAPONE 200 MILLIGRAM(S): 200 TABLET, FILM COATED ORAL at 04:48

## 2021-02-09 RX ADMIN — ONDANSETRON 4 MILLIGRAM(S): 8 TABLET, FILM COATED ORAL at 02:38

## 2021-02-09 RX ADMIN — ENTACAPONE 200 MILLIGRAM(S): 200 TABLET, FILM COATED ORAL at 17:40

## 2021-02-09 RX ADMIN — Medication 5 MILLIGRAM(S): at 11:27

## 2021-02-09 RX ADMIN — ENTACAPONE 200 MILLIGRAM(S): 200 TABLET, FILM COATED ORAL at 23:33

## 2021-02-09 RX ADMIN — Medication 360 MILLIGRAM(S): at 04:48

## 2021-02-09 RX ADMIN — HEPARIN SODIUM 5000 UNIT(S): 5000 INJECTION INTRAVENOUS; SUBCUTANEOUS at 04:48

## 2021-02-09 RX ADMIN — PRAMIPEXOLE DIHYDROCHLORIDE 1 MILLIGRAM(S): 0.12 TABLET ORAL at 11:27

## 2021-02-09 RX ADMIN — PRAMIPEXOLE DIHYDROCHLORIDE 1 MILLIGRAM(S): 0.12 TABLET ORAL at 23:31

## 2021-02-09 RX ADMIN — Medication 81 MILLIGRAM(S): at 11:27

## 2021-02-09 RX ADMIN — CARBIDOPA AND LEVODOPA 2 TABLET(S): 25; 100 TABLET ORAL at 00:09

## 2021-02-09 RX ADMIN — ENTACAPONE 200 MILLIGRAM(S): 200 TABLET, FILM COATED ORAL at 00:09

## 2021-02-09 RX ADMIN — CEFEPIME 100 MILLIGRAM(S): 1 INJECTION, POWDER, FOR SOLUTION INTRAMUSCULAR; INTRAVENOUS at 11:30

## 2021-02-09 RX ADMIN — Medication 60 MILLIGRAM(S): at 04:49

## 2021-02-09 RX ADMIN — CLOPIDOGREL BISULFATE 75 MILLIGRAM(S): 75 TABLET, FILM COATED ORAL at 11:27

## 2021-02-09 RX ADMIN — PRAMIPEXOLE DIHYDROCHLORIDE 1 MILLIGRAM(S): 0.12 TABLET ORAL at 00:08

## 2021-02-09 RX ADMIN — HEPARIN SODIUM 5000 UNIT(S): 5000 INJECTION INTRAVENOUS; SUBCUTANEOUS at 14:13

## 2021-02-09 RX ADMIN — Medication 25 MILLIGRAM(S): at 23:31

## 2021-02-09 RX ADMIN — Medication 100 MILLIGRAM(S): at 14:13

## 2021-02-09 RX ADMIN — ARIPIPRAZOLE 10 MILLIGRAM(S): 15 TABLET ORAL at 11:27

## 2021-02-09 RX ADMIN — ENTACAPONE 200 MILLIGRAM(S): 200 TABLET, FILM COATED ORAL at 11:28

## 2021-02-09 NOTE — PROGRESS NOTE ADULT - SUBJECTIVE AND OBJECTIVE BOX
JON GARCÍA  88y  Female      Patient is a 88y old  Female who presents with a chief complaint of sepsis, presyncope (2021 08:53)      INTERVAL HPI/OVERNIGHT EVENTS:  21:  pt seen and examined at bedside; doing well  -leukocytosis resolved; tolerating diet; continue with abx  -also, patient with left eye hemianopsia; unsure if worsening as she has history of retinal artery occlusion ---- CT Angio head and Neck ordered; patient does not have good IV access and several PAs tried with no success. Medical Floor will attempt again for access and then CT angio to be done  -I gave updates to daughter Veronica over the phone and agreeable for current plan of care     21:  pt seen and examined at bedside  -continue with current abx regimen and ID follow up today  -Also unable to perform CT angio head and neck as patient's IV infiltrated (several PAs tried, unsuccessful attempts) --- follow up regular head ct non contrast  -will arrange for eye exam at [place] today for left eye vision loss (with history but unsure if worsening)  -no discharge   -trina Martin aware of the plan of care     REVIEW OF SYSTEMS:  - appears comfortable   - left eye vision loss (POA); not worsening     Vital Signs Last 24 Hrs  T(C): 36.5 (2021 04:37), Max: 36.5 (2021 04:37)  T(F): 97.7 (2021 04:37), Max: 97.7 (2021 04:37)  HR: 71 (2021 04:37) (71 - 95)  BP: 139/63 (2021 04:37) (123/90 - 178/83)  RR: 18 (2021 04:37) (18 - 18)  SpO2: 97% (2021 09:47) (97% - 97%)    PHYSICAL EXAM:  GENERAL: NAD, speaking in full sentences, sitting up in chair   HEAD:  Atraumatic, Normocephalic  EYES: EOMI, PERRLA, conjunctiva and sclera clear  NERVOUS SYSTEM:  Alert & Oriented X 3  CHEST/LUNG: Clear to percussion bilaterally; No rales, rhonchi, wheezing, or rubs  CV/HEART: Regular rate and rhythm; No murmurs, rubs, or gallops  GI/ABDOMEN: Soft, Nontender, Nondistended; Bowel sounds present  EXTREMITIES:  2+ Peripheral Pulses, No clubbing, cyanosis, or edema  SKIN: No rashes or lesions    LAB:                          9.9    5.47  )-----------( 282      ( 2021 06:48 )             31.2   02-08    141  |  111<H>  |  11  ----------------------------<  95  3.9   |  23  |  0.8    Ca    8.1<L>      2021 07:09    CARDIAC MARKERS ( 2021 21:45 )  x     / <0.01 ng/mL / x     / x     / x      CARDIAC MARKERS ( 2021 16:03 )  x     / <0.01 ng/mL / x     / x     / x      CARDIAC MARKERS ( 2021 10:17 )  x     / x     / 78 U/L / x     / 1.8 ng/mL  CARDIAC MARKERS ( 2021 04:05 )  x     / <0.01 ng/mL / x     / x     / x        Daily Height in cm: 154.94 (2021 18:00)    Daily   CAPILLARY BLOOD GLUCOSE    Urinalysis Basic - ( 2021 07:29 )    Color: Trudi / Appearance: Slightly Cloudy / S.020 / pH: x  Gluc: x / Ketone: 15  / Bili: Moderate / Urobili: 0.2 mg/dL   Blood: x / Protein: 30 mg/dL / Nitrite: Positive   Leuk Esterase: Small / RBC: 1-2 /HPF / WBC >50 /HPF   Sq Epi: x / Non Sq Epi: Few /HPF / Bacteria: Many    LIVER FUNCTIONS - ( 2021 04:05 )  Alb: 3.7 g/dL / Pro: 5.9 g/dL / ALK PHOS: 151 U/L / ALT: <5 U/L / AST: 16 U/L / GGT: x           RADIOLOGY:    Imaging Personally Reviewed:  [ Y ] YES  [ ] NO    HEALTH ISSUES - PROBLEM Dx:    MEDICATIONS  (STANDING):  ARIPiprazole 10 milliGRAM(s) Oral daily  aspirin  chewable 81 milliGRAM(s) Oral daily  atorvastatin 80 milliGRAM(s) Oral at bedtime  carbidopa/levodopa  25/100 2 Tablet(s) Oral four times a day  cefepime   IVPB 1000 milliGRAM(s) IV Intermittent once  cefepime   IVPB 1000 milliGRAM(s) IV Intermittent every 8 hours  cefepime   IVPB      clopidogrel Tablet 75 milliGRAM(s) Oral daily  diltiazem    milliGRAM(s) Oral daily  entacapone 200 milliGRAM(s) Oral four times a day  heparin   Injectable 5000 Unit(s) SubCutaneous every 8 hours  hydrochlorothiazide 12.5 milliGRAM(s) Oral daily  metroNIDAZOLE  IVPB 500 milliGRAM(s) IV Intermittent every 8 hours  oxybutynin 5 milliGRAM(s) Oral daily  pantoprazole    Tablet 40 milliGRAM(s) Oral before breakfast  pramipexole 1 milliGRAM(s) Oral four times a day  predniSONE   Tablet 60 milliGRAM(s) Oral daily  sodium chloride 0.9%. 1000 milliLiter(s) (75 mL/Hr) IV Continuous <Continuous>  traZODone 50 milliGRAM(s) Oral at bedtime    MEDICATIONS  (PRN):  acetaminophen   Tablet .. 650 milliGRAM(s) Oral every 4 hours PRN Mild Pain (1 - 3)  ondansetron Injectable 4 milliGRAM(s) IV Push every 6 hours PRN Nausea  oxycodone    5 mG/acetaminophen 325 mG 1 Tablet(s) Oral every 6 hours PRN Moderate Pain (4 - 6)

## 2021-02-09 NOTE — CHART NOTE - NSCHARTNOTEFT_GEN_A_CORE
CT Head No Cont (02.08.21 @ 20:28)     IMPRESSION:    In comparison with the prior CT scan of the brain dated December 27, 2020:    No acute intracranial hemorrhage or acute large territorial infarct.    If symptoms persist, MRI is suggested for further evaluation if not contraindicated in this patient.    Patient's care discussed with her retinal specialist Dr. Lyman--- current symptoms of left eye vision loss present from before and not NEW. Pt has complete occlusion of retinal artery -- no further work up at this point    -I gave the above update to patient who is now aware of the findings   -outpatient follow up with Dr. Quintanilla
Patient has telemetry monitor strips which look like V Tach episodes but patient denies any symptoms (specifically asked about palpitations, skipped beats or lightheadedness). EKG done soon after 1 episode was unremarkable. Labs drawn this am to include BMP, Magnesium. Will place cardiology consult
Patient's overall plan of care re-discussed with daughter Veronica over the phone; aware of the labs (improved); ID follow up today for home abx regimen, will do rehab/pt and wants to discuss with Case management Kaci the home dispo for tomorrow  -pt will be anticipated for d/c tomorrow
[facility]    Case discussed with Hospitalist Dr DYLAN Maloney and Dr Rashid, who spoke with patient's ophthalmologist Dr Quintanilla.  Acknowledged visual deficit to left eye, and stated it is NOT ACUTE and NOT NEW, but chronic condition for which he follows patient.    No need for further Neurology follow up inpatient.  Patient can continue care as per medicine team, and can follow outpatient neurology if needed.    88y year old Female presenting with presyncopal episode and while in hospital c/o loss of vision in the left eye.        1. Send ESR noted  2. Continue aspirin and plavix and statin  3. outpatient f/u with opthalmology

## 2021-02-09 NOTE — PROGRESS NOTE ADULT - REASON FOR ADMISSION
sepsis, presyncope

## 2021-02-09 NOTE — PROGRESS NOTE ADULT - SUBJECTIVE AND OBJECTIVE BOX
JON GARCÍA  88y, Female  Allergy: No Known Allergies      LOS  4d    CHIEF COMPLAINT: sepsis, presyncope (09 Feb 2021 08:34)      INTERVAL EVENTS/HPI  - No acute events overnight  - T(F): , Max: 97.7 (02-09-21 @ 04:37)  - Denies any worsening symptoms  - Tolerating medication  - denies any dysuria, no diarrhea   - WBC Count: 5.47 (02-09-21 @ 06:48)  WBC Count: 6.97 (02-08-21 @ 07:09)     - Creatinine, Serum: 0.8 (02-09-21 @ 06:48)  Creatinine, Serum: 0.8 (02-08-21 @ 07:09)       ROS  General: Denies rigors, nightsweats  HEENT: Denies headache, rhinorrhea, sore throat, eye pain  CV: Denies CP, palpitations  PULM: Denies wheezing, hemoptysis  GI: Denies hematemesis, hematochezia, melena  : Denies discharge, hematuria  MSK: Denies arthralgias, myalgias  SKIN: Denies rash, lesions  NEURO: Denies paresthesias, weakness  PSYCH: Denies depression, anxiety    VITALS:  T(F): 97.7, Max: 97.7 (02-09-21 @ 04:37)  HR: 71  BP: 139/63  RR: 18Vital Signs Last 24 Hrs  T(C): 36.5 (09 Feb 2021 04:37), Max: 36.5 (09 Feb 2021 04:37)  T(F): 97.7 (09 Feb 2021 04:37), Max: 97.7 (09 Feb 2021 04:37)  HR: 71 (09 Feb 2021 04:37) (71 - 95)  BP: 139/63 (09 Feb 2021 04:37) (123/90 - 178/83)  BP(mean): --  RR: 18 (09 Feb 2021 04:37) (18 - 18)  SpO2: --    PHYSICAL EXAM:  Gen: NAD, resting in bed  HEENT: Normocephalic, atraumatic  Neck: supple, no lymphadenopathy  CV: Regular rate & regular rhythm  Lungs: decreased BS at bases, no fremitus  Abdomen: Soft, BS present  Ext: Warm, well perfused  Neuro: non focal, awake  Skin: no rash, no erythema  Lines: no phlebitis    FH: Non-contributory  Social Hx: Non-contributory    TESTS & MEASUREMENTS:                        9.9    5.47  )-----------( 282      ( 09 Feb 2021 06:48 )             31.2     02-09    142  |  111<H>  |  9<L>  ----------------------------<  109<H>  3.9   |  24  |  0.8    Ca    8.2<L>      09 Feb 2021 06:48      eGFR if Non African American: 66 mL/min/1.73M2 (02-09-21 @ 06:48)  eGFR if : 76 mL/min/1.73M2 (02-09-21 @ 06:48)          Culture - Blood (collected 02-05-21 @ 08:48)  Source: .Blood Blood  Preliminary Report (02-06-21 @ 23:02):    No growth to date.    Culture - Blood (collected 02-05-21 @ 08:48)  Source: .Blood Blood  Preliminary Report (02-06-21 @ 23:02):    No growth to date.    Culture - Urine (collected 02-05-21 @ 07:30)  Source: .Urine Clean Catch (Midstream)  Final Report (02-07-21 @ 21:16):    >=3 organisms. Probable collection contamination.        Lactate, Blood: 1.8 mmol/L (02-05-21 @ 08:07)  Lactate, Blood: 2.5 mmol/L (02-05-21 @ 04:05)      INFECTIOUS DISEASES TESTING  Rapid RVP Result: NotDetec (02-05-21 @ 04:50)  Rapid RVP Result: NotDetec (12-27-20 @ 09:05)  Rapid RVP Result: NotDetec (11-09-20 @ 23:00)  COVID-19 PCR: NotDetec (07-20-20 @ 00:30)  COVID-19 PCR: NotDetec (06-22-20 @ 03:00)  COVID-19 PCR: NotDetec (06-03-20 @ 04:50)      INFLAMMATORY MARKERS  Sedimentation Rate, Erythrocyte: 2 mm/Hr (02-08-21 @ 20:28)  C-Reactive Protein, Serum: 6.63 mg/dL (02-08-21 @ 20:28)  C-Reactive Protein, Serum: 2.68 mg/dL (12-28-20 @ 16:59)  Sedimentation Rate, Erythrocyte: 37 mm/Hr (12-27-20 @ 09:03)      RADIOLOGY & ADDITIONAL TESTS:  I have personally reviewed the last available Chest xray  CXR      CT      CARDIOLOGY TESTING  12 Lead ECG:   Ventricular Rate 85 BPM    Atrial Rate 85 BPM    P-R Interval 146 ms    QRS Duration 86 ms    Q-T Interval 390 ms    QTC Calculation(Bazett) 464 ms    P Axis 62 degrees    R Axis 14 degrees    T Axis 32 degrees    Diagnosis Line Sinus rhythm with Premature atrial complexes  Otherwise normal ECG    Confirmed by JON ABERNATHY MD ([id number]) on 2/8/2021 1:01:09 PM (02-08-21 @ 10:12)  12 Lead ECG:   Ventricular Rate 74 BPM    Atrial Rate 74 BPM    P-R Interval 158 ms    QRS Duration 82 ms    Q-T Interval 398 ms    QTC Calculation(Bazett) 441 ms    P Axis 63 degrees    R Axis 14 degrees    T Axis 25 degrees    Diagnosis Line Normal sinus rhythmwith sinus arrhythmia  Normal ECG    Confirmed by JON ABERNATHY MD ([id number]) on 2/8/2021 1:01:06 PM (02-07-21 @ 11:19)      MEDICATIONS  ARIPiprazole 10 Oral daily  aspirin  chewable 81 Oral daily  atorvastatin 80 Oral at bedtime  carbidopa/levodopa  25/100 2 Oral four times a day  cefepime   IVPB 1000 IV Intermittent every 8 hours  cefepime   IVPB     clopidogrel Tablet 75 Oral daily  diltiazem    Oral daily  entacapone 200 Oral four times a day  heparin   Injectable 5000 SubCutaneous every 8 hours  hydrochlorothiazide 12.5 Oral daily  metroNIDAZOLE  IVPB 500 IV Intermittent every 8 hours  oxybutynin 5 Oral daily  pantoprazole    Tablet 40 Oral before breakfast  pramipexole 1 Oral four times a day  predniSONE   Tablet 60 Oral daily  traZODone 50 Oral at bedtime      WEIGHT  Weight (kg): 66.2 (02-05-21 @ 18:00)  Creatinine, Serum: 0.8 mg/dL (02-09-21 @ 06:48)      ANTIBIOTICS:  cefepime   IVPB 1000 milliGRAM(s) IV Intermittent every 8 hours  cefepime   IVPB      metroNIDAZOLE  IVPB 500 milliGRAM(s) IV Intermittent every 8 hours      All available historical records have been reviewed

## 2021-02-09 NOTE — PROGRESS NOTE ADULT - ASSESSMENT
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 Patient has been nauseous with LLQ abdominal pain since yesterday. Patient was getting up to use the bathroom at night and felt dizzy. Patient did not fall, was assisted by daughter who then called EMS.  Patient denies fever, chills, cough, sore throat, dyspnea, orthopnea, chest pain, vomiting, diarrhea, constipation, hematuria, melena, hematochezia, dysuria, palpitations, numbness, visual changes.    In ED: VSS, sinus on monitor, CTA/P- descending colitis, UA +, WBC 24 with neutrophilia, Lactis acidosis 2.5, mild JOHN, s/p morphine 2 mg IV x2, zofran 4 mg IV x3, 1L NS bolus, cefepime 1g, flagyl 500 mg, vasquez in place    Chart review: Patient admitted to [facility] one month ago with L central retinal artery occulusion presumably from stroke. Patient had implanted loop recorder placed during admision. Follow up EP notes revealed recorder captured suspected AF rhythm which was later reviewed to be sinus with PAC. No AC was recommended, was continued on asa, plavix, lipitor.    1) Sepsis/Lactic acidosis/UTI/Descending Colitis  -Broad spectrum IV antibiotics  -prelim blood cultures negative x 2  -prelim urine cultures with gram negative rods --- will follow official with sensitivities   -leukocytosis resolved   -ID follow up     2) Pre-syncope   -likely vasovagal   -off tele  -needs loop recorder reading -- not done yet     3) CKD stage III  -monitor kidney function while on abx     4) Debility  -would need rehab/pt     5) HTN  -monitor bp on current meds     6) Parkinson's Disease   -home meds and outpatient follow up with Neurology     7) Nausea/Vomiting  -zofran prn  -started diet --- tolerated     8) hx of cryptogenic strokes/ptosis/Left eye blurry vision   -Neurology consult appreciated   -CT Angio head and Neck with IV contrast ordered but unsuccessful as pt's IV site blew/infiltrated multiple times and several PAs attempted   --- neuro ok with regular head CT non contrast for now and will do a follow up today   -Also, will try to arrange for eye exam at optometry clinic at [place] this afternoon     dvt and gi ppx       # Progress Note Handoff  PENDING as follows  consults: Neurology follow up today  Test: CT head  Family discussion: discussed with patient who comprehends her medical care and is agreeable for inpatient care; Updates given to daughter Veronica over the phone   Disposition: ACUTE; not ready     Attending Physician Dr. Lorenza Maloney # [id number] .

## 2021-02-09 NOTE — PROGRESS NOTE ADULT - ASSESSMENT
88 year old female with hx of crypotogenic stroke ( L ptosis) resulting in L central retinal artery occlusion 12/2020 s/p implanted loop recorder placement last month, Parkinson's disease, RA, HTN, HLD, moderate AS,  presenting with presyncopal episode overnight.    IMPRESSION  #Sepsis on admission (WBC>12, Tachycardia) secondary to Colitis  - CT Abdomen and Pelvis w/ IV Cont (02.05.21 @ 06:32): Mild descending colon inflammatory changes, consistent with colitis. Severe degenerative changes of bilateral hips with possible acetabular protrusion    #Cryptogenic stroke with Left Ptosis c/ left central retinal artery occlusion 12/2020    # UTI- 2/5/21, UA, WBC is >50    Creatinine, Serum: 0.8 mg/dL (02.09.21 @ 06:48)  Weight (kg): 66.2 (05 Feb 2021 18:00)    Recommendation  - unclear if with UTI -- currently asymptomatic   - continue cefepime and flagyl for now -- would plan for 7 day course (end date 2/11); can change cefepime to vant 500 mg BID o  - if with diarrhea, please send C diff and GI PCR  4. Aspiration precaution       Attending Attestation:     45 minutes spent on total encounter; more than 50% of the visit was spent counseling and/or coordinating care by the attending physician.     88 year old female with hx of crypotogenic stroke ( L ptosis) resulting in L central retinal artery occlusion 12/2020 s/p implanted loop recorder placement last month, Parkinson's disease, RA, HTN, HLD, moderate AS,  presenting with presyncopal episode overnight.    IMPRESSION  #Sepsis on admission (WBC>12, Tachycardia) secondary to Colitis  - CT Abdomen and Pelvis w/ IV Cont (02.05.21 @ 06:32): Mild descending colon inflammatory changes, consistent with colitis. Severe degenerative changes of bilateral hips with possible acetabular protrusion    #Cryptogenic stroke with Left Ptosis c/ left central retinal artery occlusion 12/2020    # UTI- 2/5/21, UA, WBC is >50    Creatinine, Serum: 0.8 mg/dL (02.09.21 @ 06:48)  Weight (kg): 66.2 (05 Feb 2021 18:00)    Recommendation  - unclear if with UTI -- currently asymptomatic   - continue cefepime and flagyl for now -- would plan for 7 day course (end date 2/11); can change cefepime to vantin 200 mg BID  on discharge    Please call or message on Microsoft Teams if with any questions.  Spectra [phone]

## 2021-02-10 ENCOUNTER — TRANSCRIPTION ENCOUNTER (OUTPATIENT)
Age: 86
End: 2021-02-10

## 2021-02-10 VITALS — HEART RATE: 64 BPM | DIASTOLIC BLOOD PRESSURE: 58 MMHG | SYSTOLIC BLOOD PRESSURE: 127 MMHG | TEMPERATURE: 96 F

## 2021-02-10 LAB
ANION GAP SERPL CALC-SCNC: 8 MMOL/L — SIGNIFICANT CHANGE UP (ref 7–14)
BUN SERPL-MCNC: 15 MG/DL — SIGNIFICANT CHANGE UP (ref 10–20)
CALCIUM SERPL-MCNC: 8.5 MG/DL — SIGNIFICANT CHANGE UP (ref 8.5–10.1)
CHLORIDE SERPL-SCNC: 108 MMOL/L — SIGNIFICANT CHANGE UP (ref 98–110)
CO2 SERPL-SCNC: 24 MMOL/L — SIGNIFICANT CHANGE UP (ref 17–32)
CREAT SERPL-MCNC: 0.9 MG/DL — SIGNIFICANT CHANGE UP (ref 0.7–1.5)
CULTURE RESULTS: SIGNIFICANT CHANGE UP
CULTURE RESULTS: SIGNIFICANT CHANGE UP
GLUCOSE SERPL-MCNC: 110 MG/DL — HIGH (ref 70–99)
HCT VFR BLD CALC: 30.7 % — LOW (ref 37–47)
HGB BLD-MCNC: 10 G/DL — LOW (ref 12–16)
MCHC RBC-ENTMCNC: 28.5 PG — SIGNIFICANT CHANGE UP (ref 27–31)
MCHC RBC-ENTMCNC: 32.6 G/DL — SIGNIFICANT CHANGE UP (ref 32–37)
MCV RBC AUTO: 87.5 FL — SIGNIFICANT CHANGE UP (ref 81–99)
NRBC # BLD: 0 /100 WBCS — SIGNIFICANT CHANGE UP (ref 0–0)
PLATELET # BLD AUTO: 309 K/UL — SIGNIFICANT CHANGE UP (ref 130–400)
POTASSIUM SERPL-MCNC: 3.9 MMOL/L — SIGNIFICANT CHANGE UP (ref 3.5–5)
POTASSIUM SERPL-SCNC: 3.9 MMOL/L — SIGNIFICANT CHANGE UP (ref 3.5–5)
RBC # BLD: 3.51 M/UL — LOW (ref 4.2–5.4)
RBC # FLD: 14.1 % — SIGNIFICANT CHANGE UP (ref 11.5–14.5)
SODIUM SERPL-SCNC: 140 MMOL/L — SIGNIFICANT CHANGE UP (ref 135–146)
SPECIMEN SOURCE: SIGNIFICANT CHANGE UP
SPECIMEN SOURCE: SIGNIFICANT CHANGE UP
WBC # BLD: 6.41 K/UL — SIGNIFICANT CHANGE UP (ref 4.8–10.8)
WBC # FLD AUTO: 6.41 K/UL — SIGNIFICANT CHANGE UP (ref 4.8–10.8)

## 2021-02-10 PROCEDURE — 99239 HOSP IP/OBS DSCHRG MGMT >30: CPT

## 2021-02-10 RX ORDER — CEFPODOXIME PROXETIL 100 MG
1 TABLET ORAL
Qty: 4 | Refills: 0
Start: 2021-02-10 | End: 2021-02-11

## 2021-02-10 RX ADMIN — PRAMIPEXOLE DIHYDROCHLORIDE 1 MILLIGRAM(S): 0.12 TABLET ORAL at 12:04

## 2021-02-10 RX ADMIN — ARIPIPRAZOLE 10 MILLIGRAM(S): 15 TABLET ORAL at 12:04

## 2021-02-10 RX ADMIN — Medication 81 MILLIGRAM(S): at 12:04

## 2021-02-10 RX ADMIN — HEPARIN SODIUM 5000 UNIT(S): 5000 INJECTION INTRAVENOUS; SUBCUTANEOUS at 12:39

## 2021-02-10 RX ADMIN — ENTACAPONE 200 MILLIGRAM(S): 200 TABLET, FILM COATED ORAL at 06:48

## 2021-02-10 RX ADMIN — CLOPIDOGREL BISULFATE 75 MILLIGRAM(S): 75 TABLET, FILM COATED ORAL at 12:04

## 2021-02-10 RX ADMIN — CARBIDOPA AND LEVODOPA 2 TABLET(S): 25; 100 TABLET ORAL at 12:04

## 2021-02-10 RX ADMIN — PRAMIPEXOLE DIHYDROCHLORIDE 1 MILLIGRAM(S): 0.12 TABLET ORAL at 06:49

## 2021-02-10 RX ADMIN — Medication 12.5 MILLIGRAM(S): at 06:49

## 2021-02-10 RX ADMIN — PANTOPRAZOLE SODIUM 40 MILLIGRAM(S): 20 TABLET, DELAYED RELEASE ORAL at 06:49

## 2021-02-10 RX ADMIN — CEFEPIME 100 MILLIGRAM(S): 1 INJECTION, POWDER, FOR SOLUTION INTRAMUSCULAR; INTRAVENOUS at 12:33

## 2021-02-10 RX ADMIN — CARBIDOPA AND LEVODOPA 2 TABLET(S): 25; 100 TABLET ORAL at 06:49

## 2021-02-10 RX ADMIN — Medication 360 MILLIGRAM(S): at 06:49

## 2021-02-10 RX ADMIN — Medication 5 MILLIGRAM(S): at 12:04

## 2021-02-10 RX ADMIN — HEPARIN SODIUM 5000 UNIT(S): 5000 INJECTION INTRAVENOUS; SUBCUTANEOUS at 06:49

## 2021-02-10 RX ADMIN — CEFEPIME 100 MILLIGRAM(S): 1 INJECTION, POWDER, FOR SOLUTION INTRAMUSCULAR; INTRAVENOUS at 06:49

## 2021-02-10 RX ADMIN — ENTACAPONE 200 MILLIGRAM(S): 200 TABLET, FILM COATED ORAL at 12:04

## 2021-02-10 RX ADMIN — Medication 60 MILLIGRAM(S): at 06:49

## 2021-02-10 RX ADMIN — Medication 100 MILLIGRAM(S): at 07:25

## 2021-02-10 NOTE — DISCHARGE NOTE NURSING/CASE MANAGEMENT/SOCIAL WORK - PATIENT PORTAL LINK FT
You can access the FollowMyHealth Patient Portal offered by [facility] by registering at the following website: [email or web address]. By joining [facility]’s FollowMyHealth portal, you will also be able to view your health information using other applications (apps) compatible with our system.

## 2021-02-10 NOTE — DISCHARGE NOTE PROVIDER - NSDCCPCAREPLAN_GEN_ALL_CORE_FT
PRINCIPAL DISCHARGE DIAGNOSIS  Diagnosis: Syncope  Assessment and Plan of Treatment: likely simple fainting, follow with your primary doctor      SECONDARY DISCHARGE DIAGNOSES  Diagnosis: UTI (urinary tract infection)  Assessment and Plan of Treatment: Continue antibiotic Vantin to stop 2/11    Diagnosis: Colitis  Assessment and Plan of Treatment: Continue antibiotic Vantin to stop 2/11

## 2021-02-10 NOTE — DISCHARGE NOTE PROVIDER - HOSPITAL COURSE
88 year old female with hx of crypotogenic stroke ( L ptosis) resulting in L central retinal artery occlusion 12/2020 s/p implanted loop recorder placement last month, Parkinson's disease, RA, HTN, HLD, moderate AS,  presenting with presyncopal episode.    1) Sepsis/Lactic acidosis/UTI/Descending Colitis  -prelim blood cultures negative x 2  DC on PO Vantin as per ID    2) Pre-syncope   -likely vasovagal   -off tele  -cleared by cardio for outpatient follow up    3) CKD stage III  -stable    4) Debility  -homecare    5) HTN  -monitor bp on current meds     6) Parkinson's Disease   -home meds and outpatient follow up with Neurology     7) Nausea/Vomiting  -zofran prn  -started diet --- tolerated     8) hx of cryptogenic strokes/ptosis/Left eye blurry vision   -Neurology consult appreciated-outpatient follow up, no acute intervention.

## 2021-02-10 NOTE — DISCHARGE NOTE PROVIDER - NSDCMRMEDTOKEN_GEN_ALL_CORE_FT
ARIPiprazole 10 mg oral tablet: 1 tab(s) orally once a day  aspirin 81 mg oral tablet, chewable: 1 tab(s) orally once a day  atorvastatin 80 mg oral tablet: 1 tab(s) orally once a day (at bedtime)  carbidopa/levodopa/entacapone 50 mg-200 mg-200 mg oral tablet: 1 tab(s) orally 4 times a day  cefpodoxime 200 mg oral tablet: 1 tab(s) orally 2 times a day   diclofenac sodium 50 mg oral delayed release tablet: 2 tab(s) orally 3 times a day  dilTIAZem 360 mg/24 hours oral capsule, extended release: 1 cap(s) orally once a day  hydroCHLOROthiazide 12.5 mg oral tablet: 1 tab(s) orally once a day  ibuprofen 400 mg oral tablet: 1 tab(s) orally 3 times a day, As Needed for pain relief  oxybutynin 5 mg/24 hours oral tablet, extended release: 1 tab(s) orally once a day  Percocet 5 mg-325 mg oral tablet: 1 tab(s) orally 2 times a day, As Needed for pain relief  Plavix 75 mg oral tablet: 1 tab(s) orally once a day  pramipexole 1 mg oral tablet: 1 tab(s) orally 4 times a day  Protonix 40 mg oral delayed release tablet: 1 tab(s) orally once a day  traZODone 50 mg oral tablet: 1 tab(s) orally once a day (at bedtime)

## 2021-02-11 LAB — SARS-COV-2 RNA SPEC QL NAA+PROBE: SIGNIFICANT CHANGE UP

## 2021-02-21 ENCOUNTER — EMERGENCY (EMERGENCY)
Facility: HOSPITAL | Age: 86
LOS: 0 days | Discharge: HOME | End: 2021-02-22
Attending: EMERGENCY MEDICINE | Admitting: EMERGENCY MEDICINE
Payer: MEDICARE

## 2021-02-21 VITALS
HEART RATE: 70 BPM | WEIGHT: 139.99 LBS | OXYGEN SATURATION: 100 % | DIASTOLIC BLOOD PRESSURE: 79 MMHG | HEIGHT: 61 IN | RESPIRATION RATE: 19 BRPM | SYSTOLIC BLOOD PRESSURE: 143 MMHG | TEMPERATURE: 97 F

## 2021-02-21 DIAGNOSIS — Z95.818 PRESENCE OF OTHER CARDIAC IMPLANTS AND GRAFTS: Chronic | ICD-10-CM

## 2021-02-21 DIAGNOSIS — N39.0 URINARY TRACT INFECTION, SITE NOT SPECIFIED: ICD-10-CM

## 2021-02-21 DIAGNOSIS — Z90.710 ACQUIRED ABSENCE OF BOTH CERVIX AND UTERUS: Chronic | ICD-10-CM

## 2021-02-21 DIAGNOSIS — R42 DIZZINESS AND GIDDINESS: ICD-10-CM

## 2021-02-21 DIAGNOSIS — E78.5 HYPERLIPIDEMIA, UNSPECIFIED: ICD-10-CM

## 2021-02-21 DIAGNOSIS — I10 ESSENTIAL (PRIMARY) HYPERTENSION: ICD-10-CM

## 2021-02-21 DIAGNOSIS — Z98.890 OTHER SPECIFIED POSTPROCEDURAL STATES: Chronic | ICD-10-CM

## 2021-02-21 LAB
ALBUMIN SERPL ELPH-MCNC: 4.4 G/DL — SIGNIFICANT CHANGE UP (ref 3.5–5.2)
ALP SERPL-CCNC: 121 U/L — HIGH (ref 30–115)
ALT FLD-CCNC: <5 U/L — SIGNIFICANT CHANGE UP (ref 0–41)
ANION GAP SERPL CALC-SCNC: 12 MMOL/L — SIGNIFICANT CHANGE UP (ref 7–14)
APPEARANCE UR: CLEAR — SIGNIFICANT CHANGE UP
AST SERPL-CCNC: 13 U/L — SIGNIFICANT CHANGE UP (ref 0–41)
BACTERIA # UR AUTO: ABNORMAL
BASOPHILS # BLD AUTO: 0.06 K/UL — SIGNIFICANT CHANGE UP (ref 0–0.2)
BASOPHILS NFR BLD AUTO: 1.2 % — HIGH (ref 0–1)
BILIRUB SERPL-MCNC: 0.3 MG/DL — SIGNIFICANT CHANGE UP (ref 0.2–1.2)
BILIRUB UR-MCNC: NEGATIVE — SIGNIFICANT CHANGE UP
BUN SERPL-MCNC: 12 MG/DL — SIGNIFICANT CHANGE UP (ref 10–20)
CALCIUM SERPL-MCNC: 9.5 MG/DL — SIGNIFICANT CHANGE UP (ref 8.5–10.1)
CHLORIDE SERPL-SCNC: 107 MMOL/L — SIGNIFICANT CHANGE UP (ref 98–110)
CO2 SERPL-SCNC: 25 MMOL/L — SIGNIFICANT CHANGE UP (ref 17–32)
COLOR SPEC: YELLOW — SIGNIFICANT CHANGE UP
CREAT SERPL-MCNC: 0.8 MG/DL — SIGNIFICANT CHANGE UP (ref 0.7–1.5)
DIFF PNL FLD: NEGATIVE — SIGNIFICANT CHANGE UP
EOSINOPHIL # BLD AUTO: 0.04 K/UL — SIGNIFICANT CHANGE UP (ref 0–0.7)
EOSINOPHIL NFR BLD AUTO: 0.8 % — SIGNIFICANT CHANGE UP (ref 0–8)
EPI CELLS # UR: ABNORMAL /HPF
GLUCOSE SERPL-MCNC: 110 MG/DL — HIGH (ref 70–99)
GLUCOSE UR QL: NEGATIVE MG/DL — SIGNIFICANT CHANGE UP
HCT VFR BLD CALC: 40.2 % — SIGNIFICANT CHANGE UP (ref 37–47)
HGB BLD-MCNC: 12.7 G/DL — SIGNIFICANT CHANGE UP (ref 12–16)
IMM GRANULOCYTES NFR BLD AUTO: 0.8 % — HIGH (ref 0.1–0.3)
KETONES UR-MCNC: NEGATIVE — SIGNIFICANT CHANGE UP
LEUKOCYTE ESTERASE UR-ACNC: ABNORMAL
LIDOCAIN IGE QN: 13 U/L — SIGNIFICANT CHANGE UP (ref 7–60)
LYMPHOCYTES # BLD AUTO: 0.63 K/UL — LOW (ref 1.2–3.4)
LYMPHOCYTES # BLD AUTO: 12.5 % — LOW (ref 20.5–51.1)
MAGNESIUM SERPL-MCNC: 1.9 MG/DL — SIGNIFICANT CHANGE UP (ref 1.8–2.4)
MCHC RBC-ENTMCNC: 28 PG — SIGNIFICANT CHANGE UP (ref 27–31)
MCHC RBC-ENTMCNC: 31.6 G/DL — LOW (ref 32–37)
MCV RBC AUTO: 88.5 FL — SIGNIFICANT CHANGE UP (ref 81–99)
MONOCYTES # BLD AUTO: 0.44 K/UL — SIGNIFICANT CHANGE UP (ref 0.1–0.6)
MONOCYTES NFR BLD AUTO: 8.8 % — SIGNIFICANT CHANGE UP (ref 1.7–9.3)
NEUTROPHILS # BLD AUTO: 3.81 K/UL — SIGNIFICANT CHANGE UP (ref 1.4–6.5)
NEUTROPHILS NFR BLD AUTO: 75.9 % — HIGH (ref 42.2–75.2)
NITRITE UR-MCNC: NEGATIVE — SIGNIFICANT CHANGE UP
NRBC # BLD: 0 /100 WBCS — SIGNIFICANT CHANGE UP (ref 0–0)
PH UR: 6 — SIGNIFICANT CHANGE UP (ref 5–8)
PLATELET # BLD AUTO: 385 K/UL — SIGNIFICANT CHANGE UP (ref 130–400)
POTASSIUM SERPL-MCNC: 3.3 MMOL/L — LOW (ref 3.5–5)
POTASSIUM SERPL-SCNC: 3.3 MMOL/L — LOW (ref 3.5–5)
PROT SERPL-MCNC: 7.2 G/DL — SIGNIFICANT CHANGE UP (ref 6–8)
PROT UR-MCNC: 30 MG/DL
RBC # BLD: 4.54 M/UL — SIGNIFICANT CHANGE UP (ref 4.2–5.4)
RBC # FLD: 14.5 % — SIGNIFICANT CHANGE UP (ref 11.5–14.5)
RBC CASTS # UR COMP ASSIST: SIGNIFICANT CHANGE UP /HPF
SODIUM SERPL-SCNC: 144 MMOL/L — SIGNIFICANT CHANGE UP (ref 135–146)
SP GR SPEC: 1.02 — SIGNIFICANT CHANGE UP (ref 1.01–1.03)
TROPONIN T SERPL-MCNC: <0.01 NG/ML — SIGNIFICANT CHANGE UP
UROBILINOGEN FLD QL: 0.2 MG/DL — SIGNIFICANT CHANGE UP (ref 0.2–0.2)
WBC # BLD: 5.02 K/UL — SIGNIFICANT CHANGE UP (ref 4.8–10.8)
WBC # FLD AUTO: 5.02 K/UL — SIGNIFICANT CHANGE UP (ref 4.8–10.8)
WBC UR QL: ABNORMAL /HPF

## 2021-02-21 PROCEDURE — 99285 EMERGENCY DEPT VISIT HI MDM: CPT

## 2021-02-21 RX ORDER — SODIUM CHLORIDE 9 MG/ML
1000 INJECTION, SOLUTION INTRAVENOUS ONCE
Refills: 0 | Status: COMPLETED | OUTPATIENT
Start: 2021-02-21 | End: 2021-02-21

## 2021-02-21 RX ORDER — ONDANSETRON 8 MG/1
1 TABLET, FILM COATED ORAL
Qty: 6 | Refills: 0
Start: 2021-02-21 | End: 2021-02-22

## 2021-02-21 RX ORDER — FAMOTIDINE 10 MG/ML
20 INJECTION INTRAVENOUS ONCE
Refills: 0 | Status: COMPLETED | OUTPATIENT
Start: 2021-02-21 | End: 2021-02-21

## 2021-02-21 RX ORDER — ONDANSETRON 8 MG/1
4 TABLET, FILM COATED ORAL ONCE
Refills: 0 | Status: COMPLETED | OUTPATIENT
Start: 2021-02-21 | End: 2021-02-21

## 2021-02-21 RX ORDER — ACETAMINOPHEN 500 MG
975 TABLET ORAL ONCE
Refills: 0 | Status: COMPLETED | OUTPATIENT
Start: 2021-02-21 | End: 2021-02-21

## 2021-02-21 RX ADMIN — Medication 975 MILLIGRAM(S): at 22:47

## 2021-02-21 RX ADMIN — FAMOTIDINE 100 MILLIGRAM(S): 10 INJECTION INTRAVENOUS at 21:09

## 2021-02-21 RX ADMIN — ONDANSETRON 4 MILLIGRAM(S): 8 TABLET, FILM COATED ORAL at 21:08

## 2021-02-21 RX ADMIN — SODIUM CHLORIDE 1000 MILLILITER(S): 9 INJECTION, SOLUTION INTRAVENOUS at 21:08

## 2021-02-21 NOTE — ED ADULT NURSE NOTE - PMH
CRAO (central retinal artery occlusion)  Left  HTN (hypertension)    Hyperlipidemia    Parkinson disease    Rheumatoid arthritis

## 2021-02-21 NOTE — ED PROVIDER NOTE - ATTENDING CONTRIBUTION TO CARE
I personally evaluated the patient. I reviewed the Resident’s or Physician Assistant’s note (as assigned above), and agree with the findings and plan except as documented in my note.  Chart reviewed.  H/O CVA, HTN, Parkinson's, presents to ED with nausea for 1 day. No abdominal pain, vomiting or diarrhea.  Last BM today. No chest pain or SOB.  Exam shows alert patient in no distress, HEENT NCAT, PERRL, neck supple, lungs clear, RR S1S2, abdomen soft NT +BS, no CCE, neuro A&OX3 no deficits.

## 2021-02-21 NOTE — ED PROVIDER NOTE - PHYSICAL EXAMINATION
VITAL SIGNS: I have reviewed nursing notes and confirm.  CONSTITUTIONAL: Elderly female laying on stretcher in NAD.   SKIN: Skin exam is warm and dry, no acute rash.  HEAD: Normocephalic; atraumatic.  EYES: PERRL, EOM intact; conjunctiva and sclera clear.  ENT: No nasal discharge; airway clear.   CARD: S1, S2 normal; no murmurs, gallops, or rubs. Regular rate and rhythm.  RESP: No wheezes, rales or rhonchi. Speaking in full sentences.   ABD: Normal bowel sounds; soft; non-distended; non-tender; No rebound or guarding. No CVA tenderness.  EXT: Normal ROM. No clubbing, cyanosis or edema.  NEURO: Alert, oriented. Grossly unremarkable. No focal deficits.

## 2021-02-21 NOTE — ED PROVIDER NOTE - CARE PROVIDERS DIRECT ADDRESSES
,DirectAddress_Unknown,[email or web address]_Unknown,[email or web address]

## 2021-02-21 NOTE — ED PROVIDER NOTE - NSFOLLOWUPINSTRUCTIONS_ED_ALL_ED_FT
Nausea / Vomiting    Nausea is the feeling that you have to vomit. As nausea gets worse, it can lead to vomiting. Vomiting puts you at an increased risk for dehydration. Older adults and people with other diseases or a weak immune system are at higher risk for dehydration. Drink clear fluids in small but frequent amounts as tolerated. Eat bland, easy-to-digest foods in small amounts as tolerated.    SEEK IMMEDIATE MEDICAL CARE IF YOU HAVE ANY OF THE FOLLOWING SYMPTOMS: fever, inability to keep sufficient fluids down, black or bloody vomitus, black or bloody stools, lightheadedness/dizziness, chest pain, severe headache, rash, shortness of breath, cold or clammy skin, confusion, pain with urination, or any signs of dehydration.    Urinary Tract Infection    A urinary tract infection (UTI) is an infection of any part of the urinary tract, which includes the kidneys, ureters, bladder, and urethra. Risk factors include ignoring your need to urinate, wiping back to front if female, being an uncircumcised male, and having diabetes or a weak immune system. Symptoms include frequent urination, pain or burning with urination, foul smelling urine, cloudy urine, pain in the lower abdomen, blood in the urine, and fever. If you were prescribed an antibiotic medicine, take it as told by your health care provider. Do not stop taking the antibiotic even if you start to feel better.    SEEK IMMEDIATE MEDICAL CARE IF YOU HAVE ANY OF THE FOLLOWING SYMPTOMS: severe back or abdominal pain, fever, inability to keep fluids or medicine down, dizziness/lightheadedness, or a change in mental status.

## 2021-02-21 NOTE — ED PROVIDER NOTE - OBJECTIVE STATEMENT
89 yo F with PMHx of HTN, HLD, RA, Parkinson's, Left CRAO, and moderate AS presents to the ED c/o nausea. Symptoms started earlier today and have been persisting. Pt has not eaten throughout the day 2/2 symptoms. She denies other complaints. Pt denies fever, chills, vomiting, abdominal pain, diarrhea, headache, dizziness, weakness, chest pain, SOB, back pain, LOC, trauma, urinary symptoms, cough, calf pain/swelling, recent travel, recent surgery.

## 2021-02-21 NOTE — ED PROVIDER NOTE - PROVIDER TOKENS
PROVIDER:[TOKEN:[[id number]],FOLLOWUP:[1-3 Days]],PROVIDER:[TOKEN:[[id number]],FOLLOWUP:[1-3 Days]],PROVIDER:[TOKEN:[[id number]],FOLLOWUP:[1-3 Days]],PROVIDER:[TOKEN:[[id number]],FOLLOWUP:[1-3 Days]]

## 2021-02-21 NOTE — ED ADULT NURSE NOTE - NSIMPLEMENTINTERV_GEN_ALL_ED
Implemented All Fall with Harm Risk Interventions:  [place] to call system. Call bell, personal items and telephone within reach. Instruct patient to call for assistance. Room bathroom lighting operational. Non-slip footwear when patient is off stretcher. Physically safe environment: no spills, clutter or unnecessary equipment. Stretcher in lowest position, wheels locked, appropriate side rails in place. Provide visual cue, wrist band, yellow gown, etc. Monitor gait and stability. Monitor for mental status changes and reorient to person, place, and time. Review medications for side effects contributing to fall risk. Reinforce activity limits and safety measures with patient and family. Provide visual clues: red socks.

## 2021-02-21 NOTE — ED PROVIDER NOTE - CLINICAL SUMMARY MEDICAL DECISION MAKING FREE TEXT BOX
Labs unremarkable.  UA +WBC's and bacteria.  EKG no acute changes.  Given IVF, Zofran and Pepcid with relief.  Will D/C on Levaquin and Zofran. Will D/C and refer to GI and ENT.

## 2021-02-21 NOTE — ED PROVIDER NOTE - CARE PROVIDER_API CALL
Helena Clemens  GASTROENTEROLOGY  [address]  [place] [address]  Phone: [phone]  Fax: [phone]  Follow Up Time: 1-3 Days    Slime Li)  Gastroenterology  [address] [place] [address]  Phone: [phone]  Fax: [phone]  Follow Up Time: 1-3 Days    Aly Myles)  Urology  [address] [place] [address]  Phone: [phone]  Fax: [phone]  Follow Up Time: 1-3 Days    Inocencio Elizabeth)  Urology  [address], [address]  [place] [address]  Phone: [phone]  Fax: [phone]  Follow Up Time: 1-3 Days

## 2021-02-22 DIAGNOSIS — N39.0 URINARY TRACT INFECTION, SITE NOT SPECIFIED: ICD-10-CM

## 2021-02-22 DIAGNOSIS — N18.30 CHRONIC KIDNEY DISEASE, STAGE 3 UNSPECIFIED: ICD-10-CM

## 2021-02-22 DIAGNOSIS — Z79.82 LONG TERM (CURRENT) USE OF ASPIRIN: ICD-10-CM

## 2021-02-22 DIAGNOSIS — K52.89 OTHER SPECIFIED NONINFECTIVE GASTROENTERITIS AND COLITIS: ICD-10-CM

## 2021-02-22 DIAGNOSIS — G20 PARKINSON'S DISEASE: ICD-10-CM

## 2021-02-22 DIAGNOSIS — M16.0 BILATERAL PRIMARY OSTEOARTHRITIS OF HIP: ICD-10-CM

## 2021-02-22 DIAGNOSIS — E87.2 ACIDOSIS: ICD-10-CM

## 2021-02-22 DIAGNOSIS — A41.9 SEPSIS, UNSPECIFIED ORGANISM: ICD-10-CM

## 2021-02-22 DIAGNOSIS — E78.5 HYPERLIPIDEMIA, UNSPECIFIED: ICD-10-CM

## 2021-02-22 DIAGNOSIS — M06.9 RHEUMATOID ARTHRITIS, UNSPECIFIED: ICD-10-CM

## 2021-02-22 DIAGNOSIS — R53.81 OTHER MALAISE: ICD-10-CM

## 2021-02-22 DIAGNOSIS — N17.9 ACUTE KIDNEY FAILURE, UNSPECIFIED: ICD-10-CM

## 2021-02-22 DIAGNOSIS — E87.5 HYPERKALEMIA: ICD-10-CM

## 2021-02-22 DIAGNOSIS — Z79.899 OTHER LONG TERM (CURRENT) DRUG THERAPY: ICD-10-CM

## 2021-02-22 DIAGNOSIS — I35.0 NONRHEUMATIC AORTIC (VALVE) STENOSIS: ICD-10-CM

## 2021-02-22 DIAGNOSIS — R55 SYNCOPE AND COLLAPSE: ICD-10-CM

## 2021-02-22 DIAGNOSIS — H53.47 HETERONYMOUS BILATERAL FIELD DEFECTS: ICD-10-CM

## 2021-02-22 DIAGNOSIS — I12.9 HYPERTENSIVE CHRONIC KIDNEY DISEASE WITH STAGE 1 THROUGH STAGE 4 CHRONIC KIDNEY DISEASE, OR UNSPECIFIED CHRONIC KIDNEY DISEASE: ICD-10-CM

## 2021-02-22 DIAGNOSIS — H54.62 UNQUALIFIED VISUAL LOSS, LEFT EYE, NORMAL VISION RIGHT EYE: ICD-10-CM

## 2021-02-22 DIAGNOSIS — H34.12 CENTRAL RETINAL ARTERY OCCLUSION, LEFT EYE: ICD-10-CM

## 2021-02-22 DIAGNOSIS — I69.898 OTHER SEQUELAE OF OTHER CEREBROVASCULAR DISEASE: ICD-10-CM

## 2021-02-22 DIAGNOSIS — Z79.02 LONG TERM (CURRENT) USE OF ANTITHROMBOTICS/ANTIPLATELETS: ICD-10-CM

## 2021-03-01 NOTE — ED ADULT NURSE NOTE - CINV DISCH MEDS REVIEWED YN
Yes Patient instructed to make a follow up appointment at [facility], [phone] for 2-3 weeks from delivery date. Patient also instructed to make a follow up appointment for the baby at [facility], Division of General Pediatrics, [phone] for 1-2 days from discharge date.

## 2021-03-06 ENCOUNTER — INPATIENT (INPATIENT)
Facility: HOSPITAL | Age: 86
LOS: 2 days | Discharge: HOME | End: 2021-03-09
Attending: INTERNAL MEDICINE | Admitting: INTERNAL MEDICINE
Payer: MEDICARE

## 2021-03-06 VITALS
DIASTOLIC BLOOD PRESSURE: 72 MMHG | SYSTOLIC BLOOD PRESSURE: 154 MMHG | OXYGEN SATURATION: 97 % | RESPIRATION RATE: 20 BRPM | HEART RATE: 88 BPM

## 2021-03-06 DIAGNOSIS — H34.12 CENTRAL RETINAL ARTERY OCCLUSION, LEFT EYE: ICD-10-CM

## 2021-03-06 DIAGNOSIS — Z90.710 ACQUIRED ABSENCE OF BOTH CERVIX AND UTERUS: ICD-10-CM

## 2021-03-06 DIAGNOSIS — R11.0 NAUSEA: ICD-10-CM

## 2021-03-06 DIAGNOSIS — I12.9 HYPERTENSIVE CHRONIC KIDNEY DISEASE WITH STAGE 1 THROUGH STAGE 4 CHRONIC KIDNEY DISEASE, OR UNSPECIFIED CHRONIC KIDNEY DISEASE: ICD-10-CM

## 2021-03-06 DIAGNOSIS — M06.9 RHEUMATOID ARTHRITIS, UNSPECIFIED: ICD-10-CM

## 2021-03-06 DIAGNOSIS — E78.5 HYPERLIPIDEMIA, UNSPECIFIED: ICD-10-CM

## 2021-03-06 DIAGNOSIS — N18.30 CHRONIC KIDNEY DISEASE, STAGE 3 UNSPECIFIED: ICD-10-CM

## 2021-03-06 DIAGNOSIS — H54.62 UNQUALIFIED VISUAL LOSS, LEFT EYE, NORMAL VISION RIGHT EYE: ICD-10-CM

## 2021-03-06 DIAGNOSIS — N21.0 CALCULUS IN BLADDER: ICD-10-CM

## 2021-03-06 DIAGNOSIS — Z95.818 PRESENCE OF OTHER CARDIAC IMPLANTS AND GRAFTS: Chronic | ICD-10-CM

## 2021-03-06 DIAGNOSIS — G20 PARKINSON'S DISEASE: ICD-10-CM

## 2021-03-06 DIAGNOSIS — Z79.899 OTHER LONG TERM (CURRENT) DRUG THERAPY: ICD-10-CM

## 2021-03-06 DIAGNOSIS — Z90.710 ACQUIRED ABSENCE OF BOTH CERVIX AND UTERUS: Chronic | ICD-10-CM

## 2021-03-06 DIAGNOSIS — Z79.82 LONG TERM (CURRENT) USE OF ASPIRIN: ICD-10-CM

## 2021-03-06 DIAGNOSIS — Z98.890 OTHER SPECIFIED POSTPROCEDURAL STATES: Chronic | ICD-10-CM

## 2021-03-06 DIAGNOSIS — R53.81 OTHER MALAISE: ICD-10-CM

## 2021-03-06 LAB
ALBUMIN SERPL ELPH-MCNC: 4.3 G/DL — SIGNIFICANT CHANGE UP (ref 3.5–5.2)
ALP SERPL-CCNC: 132 U/L — HIGH (ref 30–115)
ALT FLD-CCNC: <5 U/L — SIGNIFICANT CHANGE UP (ref 0–41)
ANION GAP SERPL CALC-SCNC: 13 MMOL/L — SIGNIFICANT CHANGE UP (ref 7–14)
APPEARANCE UR: CLEAR — SIGNIFICANT CHANGE UP
APTT BLD: 31.4 SEC — SIGNIFICANT CHANGE UP (ref 27–39.2)
AST SERPL-CCNC: 19 U/L — SIGNIFICANT CHANGE UP (ref 0–41)
BASOPHILS # BLD AUTO: 0.04 K/UL — SIGNIFICANT CHANGE UP (ref 0–0.2)
BASOPHILS NFR BLD AUTO: 0.8 % — SIGNIFICANT CHANGE UP (ref 0–1)
BILIRUB SERPL-MCNC: 0.2 MG/DL — SIGNIFICANT CHANGE UP (ref 0.2–1.2)
BILIRUB UR-MCNC: NEGATIVE — SIGNIFICANT CHANGE UP
BUN SERPL-MCNC: 16 MG/DL — SIGNIFICANT CHANGE UP (ref 10–20)
CALCIUM SERPL-MCNC: 9.4 MG/DL — SIGNIFICANT CHANGE UP (ref 8.5–10.1)
CHLORIDE SERPL-SCNC: 104 MMOL/L — SIGNIFICANT CHANGE UP (ref 98–110)
CO2 SERPL-SCNC: 23 MMOL/L — SIGNIFICANT CHANGE UP (ref 17–32)
COLOR SPEC: YELLOW — SIGNIFICANT CHANGE UP
CREAT SERPL-MCNC: 0.9 MG/DL — SIGNIFICANT CHANGE UP (ref 0.7–1.5)
DIFF PNL FLD: NEGATIVE — SIGNIFICANT CHANGE UP
EOSINOPHIL # BLD AUTO: 0.17 K/UL — SIGNIFICANT CHANGE UP (ref 0–0.7)
EOSINOPHIL NFR BLD AUTO: 3.5 % — SIGNIFICANT CHANGE UP (ref 0–8)
GLUCOSE SERPL-MCNC: 91 MG/DL — SIGNIFICANT CHANGE UP (ref 70–99)
GLUCOSE UR QL: NEGATIVE MG/DL — SIGNIFICANT CHANGE UP
HCT VFR BLD CALC: 40.1 % — SIGNIFICANT CHANGE UP (ref 37–47)
HGB BLD-MCNC: 13 G/DL — SIGNIFICANT CHANGE UP (ref 12–16)
IMM GRANULOCYTES NFR BLD AUTO: 0.8 % — HIGH (ref 0.1–0.3)
INR BLD: 1 RATIO — SIGNIFICANT CHANGE UP (ref 0.65–1.3)
KETONES UR-MCNC: NEGATIVE — SIGNIFICANT CHANGE UP
LACTATE SERPL-SCNC: 1.1 MMOL/L — SIGNIFICANT CHANGE UP (ref 0.7–2)
LEUKOCYTE ESTERASE UR-ACNC: NEGATIVE — SIGNIFICANT CHANGE UP
LIDOCAIN IGE QN: 20 U/L — SIGNIFICANT CHANGE UP (ref 7–60)
LYMPHOCYTES # BLD AUTO: 1.16 K/UL — LOW (ref 1.2–3.4)
LYMPHOCYTES # BLD AUTO: 24.1 % — SIGNIFICANT CHANGE UP (ref 20.5–51.1)
MAGNESIUM SERPL-MCNC: 2.3 MG/DL — SIGNIFICANT CHANGE UP (ref 1.8–2.4)
MCHC RBC-ENTMCNC: 28.1 PG — SIGNIFICANT CHANGE UP (ref 27–31)
MCHC RBC-ENTMCNC: 32.4 G/DL — SIGNIFICANT CHANGE UP (ref 32–37)
MCV RBC AUTO: 86.6 FL — SIGNIFICANT CHANGE UP (ref 81–99)
MONOCYTES # BLD AUTO: 0.47 K/UL — SIGNIFICANT CHANGE UP (ref 0.1–0.6)
MONOCYTES NFR BLD AUTO: 9.8 % — HIGH (ref 1.7–9.3)
NEUTROPHILS # BLD AUTO: 2.93 K/UL — SIGNIFICANT CHANGE UP (ref 1.4–6.5)
NEUTROPHILS NFR BLD AUTO: 61 % — SIGNIFICANT CHANGE UP (ref 42.2–75.2)
NITRITE UR-MCNC: NEGATIVE — SIGNIFICANT CHANGE UP
NRBC # BLD: 0 /100 WBCS — SIGNIFICANT CHANGE UP (ref 0–0)
PH UR: 7 — SIGNIFICANT CHANGE UP (ref 5–8)
PLATELET # BLD AUTO: 273 K/UL — SIGNIFICANT CHANGE UP (ref 130–400)
POTASSIUM SERPL-MCNC: 4.5 MMOL/L — SIGNIFICANT CHANGE UP (ref 3.5–5)
POTASSIUM SERPL-SCNC: 4.5 MMOL/L — SIGNIFICANT CHANGE UP (ref 3.5–5)
PROT SERPL-MCNC: 7 G/DL — SIGNIFICANT CHANGE UP (ref 6–8)
PROT UR-MCNC: NEGATIVE MG/DL — SIGNIFICANT CHANGE UP
PROTHROM AB SERPL-ACNC: 11.5 SEC — SIGNIFICANT CHANGE UP (ref 9.95–12.87)
RAPID RVP RESULT: SIGNIFICANT CHANGE UP
RBC # BLD: 4.63 M/UL — SIGNIFICANT CHANGE UP (ref 4.2–5.4)
RBC # FLD: 13.9 % — SIGNIFICANT CHANGE UP (ref 11.5–14.5)
SARS-COV-2 RNA SPEC QL NAA+PROBE: SIGNIFICANT CHANGE UP
SODIUM SERPL-SCNC: 140 MMOL/L — SIGNIFICANT CHANGE UP (ref 135–146)
SP GR SPEC: 1.01 — SIGNIFICANT CHANGE UP (ref 1.01–1.03)
TROPONIN T SERPL-MCNC: <0.01 NG/ML — SIGNIFICANT CHANGE UP
UROBILINOGEN FLD QL: 0.2 MG/DL — SIGNIFICANT CHANGE UP (ref 0.2–0.2)
WBC # BLD: 4.81 K/UL — SIGNIFICANT CHANGE UP (ref 4.8–10.8)
WBC # FLD AUTO: 4.81 K/UL — SIGNIFICANT CHANGE UP (ref 4.8–10.8)

## 2021-03-06 PROCEDURE — 99223 1ST HOSP IP/OBS HIGH 75: CPT

## 2021-03-06 PROCEDURE — 99285 EMERGENCY DEPT VISIT HI MDM: CPT

## 2021-03-06 PROCEDURE — 74177 CT ABD & PELVIS W/CONTRAST: CPT | Mod: 26

## 2021-03-06 PROCEDURE — 71045 X-RAY EXAM CHEST 1 VIEW: CPT | Mod: 26

## 2021-03-06 RX ORDER — DICLOFENAC SODIUM 75 MG/1
2 TABLET, DELAYED RELEASE ORAL
Qty: 0 | Refills: 0 | DISCHARGE

## 2021-03-06 RX ORDER — TRAZODONE HCL 50 MG
1 TABLET ORAL
Qty: 0 | Refills: 0 | DISCHARGE

## 2021-03-06 RX ORDER — HEPARIN SODIUM 5000 [USP'U]/ML
5000 INJECTION INTRAVENOUS; SUBCUTANEOUS
Refills: 0 | Status: DISCONTINUED | OUTPATIENT
Start: 2021-03-07 | End: 2021-03-09

## 2021-03-06 RX ORDER — DILTIAZEM HCL 120 MG
30 CAPSULE, EXT RELEASE 24 HR ORAL ONCE
Refills: 0 | Status: COMPLETED | OUTPATIENT
Start: 2021-03-06 | End: 2021-03-06

## 2021-03-06 RX ORDER — ACETAMINOPHEN 500 MG
650 TABLET ORAL ONCE
Refills: 0 | Status: COMPLETED | OUTPATIENT
Start: 2021-03-06 | End: 2021-03-06

## 2021-03-06 RX ORDER — SODIUM CHLORIDE 9 MG/ML
500 INJECTION, SOLUTION INTRAVENOUS ONCE
Refills: 0 | Status: COMPLETED | OUTPATIENT
Start: 2021-03-06 | End: 2021-03-06

## 2021-03-06 RX ORDER — ONDANSETRON 8 MG/1
4 TABLET, FILM COATED ORAL ONCE
Refills: 0 | Status: COMPLETED | OUTPATIENT
Start: 2021-03-06 | End: 2021-03-06

## 2021-03-06 RX ORDER — SODIUM CHLORIDE 9 MG/ML
1000 INJECTION INTRAMUSCULAR; INTRAVENOUS; SUBCUTANEOUS
Refills: 0 | Status: DISCONTINUED | OUTPATIENT
Start: 2021-03-06 | End: 2021-03-06

## 2021-03-06 RX ORDER — ONDANSETRON 8 MG/1
4 TABLET, FILM COATED ORAL EVERY 4 HOURS
Refills: 0 | Status: DISCONTINUED | OUTPATIENT
Start: 2021-03-06 | End: 2021-03-09

## 2021-03-06 RX ADMIN — Medication 650 MILLIGRAM(S): at 23:06

## 2021-03-06 RX ADMIN — Medication 650 MILLIGRAM(S): at 21:10

## 2021-03-06 RX ADMIN — SODIUM CHLORIDE 500 MILLILITER(S): 9 INJECTION, SOLUTION INTRAVENOUS at 17:14

## 2021-03-06 RX ADMIN — ONDANSETRON 4 MILLIGRAM(S): 8 TABLET, FILM COATED ORAL at 17:13

## 2021-03-06 RX ADMIN — Medication 30 MILLIGRAM(S): at 23:45

## 2021-03-06 NOTE — ED ADULT TRIAGE NOTE - CHIEF COMPLAINT QUOTE
BIBA via [facility] from home, ems states family called as patient has been having increasing weakness x 3 days and patient is complaining of nausea, pt denies chest pain and any other type of pain. recent in hospital for UTI as per EMS

## 2021-03-06 NOTE — ED PROVIDER NOTE - CLINICAL SUMMARY MEDICAL DECISION MAKING FREE TEXT BOX
Patient remains symptomatic at this time given history and presentation I will admit for further workup at this time

## 2021-03-06 NOTE — H&P ADULT - PROBLEM SELECTOR PLAN 1
Trial of clear liquids but if unable to tolerate food by morning, would consider GI assessment (Will then start IV fluids if BP not high) Trial of clear liquids but if unable to tolerate food by morning, would consider GI assessment (Will then start IV fluids if BP not high). Labs not too bad

## 2021-03-06 NOTE — H&P ADULT - HISTORY OF PRESENT ILLNESS
89yo female whose PMH includes HLD, RA, Parkinson's, and hypertension presents to the ER due to nausea over 2 days with associated poor oral intake. She denied fevers, chills or chest pains and currently denies abdominal pains however ER treatment Zofran IV fluids) did not improve symptoms and she still has no appetite.  Actually had similar complaints 2 weeks ago but at that time was found to have a UTI, there is no evidence for active infection at this time

## 2021-03-06 NOTE — ED ADULT NURSE NOTE - PSH
History of hysterectomy    S/P hernia repair    Status post placement of implantable loop recorder  01/2021

## 2021-03-06 NOTE — ED PROVIDER NOTE - PROGRESS NOTE DETAILS
Patient still c/o nausea and unabel to eat after IVF and zofran, Will admit for IVF and further eval. Dr Sanders accepts,

## 2021-03-06 NOTE — H&P ADULT - NSHPLABSRESULTS_GEN_ALL_CORE
< from: CT Abdomen and Pelvis w/ IV Cont (21 @ 18:45) >    EXAM:  CT ABDOMEN AND PELVIS IC          PROCEDURE DATE:  2021      < from: CT Abdomen and Pelvis w/ IV Cont (21 @ 18:45) >    IMPRESSION:    Calculus measuring 0.3 cm layering laterally within urinary bladder, new, compatible with passing calculus; no hydronephrosis bilaterally.    Otherwise, no evidence of acute intra-abdominal pathology.    SLAVA ESPINOZA MD; Attending Radiologist  This document has been electronically signed. Mar  6 2021  7:02PM    < end of copied text >                          13.0   4.81  )-----------( 273      ( 06 Mar 2021 16:50 )             40.1         140  |  104  |  16  ----------------------------<  91  4.5   |  23  |  0.9    Ca    9.4      06 Mar 2021 16:50  Mg     2.3         TPro  7.0  /  Alb  4.3  /  TBili  0.2  /  DBili  x   /  AST  19  /  ALT  <5  /  AlkPhos  132<H>            Urinalysis Basic - ( 06 Mar 2021 16:06 )    Color: Yellow / Appearance: Clear / S.015 / pH: x  Gluc: x / Ketone: Negative  / Bili: Negative / Urobili: 0.2 mg/dL   Blood: x / Protein: Negative mg/dL / Nitrite: Negative   Leuk Esterase: Negative / RBC: x / WBC x   Sq Epi: x / Non Sq Epi: x / Bacteria: x      PT/INR - ( 06 Mar 2021 16:50 )   PT: 11.50 sec;   INR: 1.00 ratio         PTT - ( 06 Mar 2021 16:50 )  PTT:31.4 sec  Lactate Trend   @ 16:50 Lactate:1.1     CARDIAC MARKERS ( 06 Mar 2021 16:50 )  x     / <0.01 ng/mL / x     / x     / x          CAPILLARY BLOOD GLUCOSE        Culture Results:   >100,000 CFU/ml Escherichia coli ESBL ( @ 20:55)  Culture Results:   No Growth Final ( @ 08:48)  Culture Results:   No Growth Final ( @ 08:48)  Culture Results:   >=3 organisms. Probable collection contamination. ( @ 07:30)

## 2021-03-06 NOTE — H&P ADULT - PROBLEM SELECTOR PLAN 6
Will order analgesics if requested (she says she no longer on NSAID or percocet listed on ER medication reconciliation list

## 2021-03-06 NOTE — ED PROVIDER NOTE - OBJECTIVE STATEMENT
Patient BIBA from home, C/o nausea, no vomiting for 2 days, Unable to eat or drink, C/o lower abdominal discomfort, Has same sx 2 weeks ago with UTI. no fever, no chest pain, no HA, no dizziness, no SOB>

## 2021-03-06 NOTE — ED PROVIDER NOTE - ENDOCRINE NEGATIVE STATEMENT, MLM
Health Maintenance Due   Topic Date Due   • Diabetes Eye Exam  08/05/1997   • Pneumococcal 19-64 Medium Risk (1 of 1 - PPSV23) 08/05/1998   • Diabetes Foot Exam  06/01/2017   • Influenza Vaccine (1) 09/01/2018   • Diabetes A1C  09/03/2018   • Diabetes Urine Microalbumin  03/03/2019   • Diabetes GFR  03/03/2019       Patient is due for topics as listed above but is not proceeding with Immunization(s) Influenza and Pneumococcal at this time.              no diabetes and no thyroid trouble.

## 2021-03-06 NOTE — ED PROVIDER NOTE - ATTENDING CONTRIBUTION TO CARE
I was present for and supervised the key and critical aspects of the procedures performed during the care of the patient.  Patient presents for evaluation of nausea worse over the past 2 days she had similar symptoms approx 2 weeks prior attributed to UTI, she denies any pain she denies any abdominal pain or back pain she denies any chest pain or sob, she denies any fevers or chills.  no weakness at this time.

## 2021-03-07 DIAGNOSIS — F50.89 OTHER SPECIFIED EATING DISORDER: ICD-10-CM

## 2021-03-07 DIAGNOSIS — R11.0 NAUSEA: ICD-10-CM

## 2021-03-07 DIAGNOSIS — M06.9 RHEUMATOID ARTHRITIS, UNSPECIFIED: ICD-10-CM

## 2021-03-07 DIAGNOSIS — N21.0 CALCULUS IN BLADDER: ICD-10-CM

## 2021-03-07 DIAGNOSIS — G20 PARKINSON'S DISEASE: ICD-10-CM

## 2021-03-07 DIAGNOSIS — E78.5 HYPERLIPIDEMIA, UNSPECIFIED: ICD-10-CM

## 2021-03-07 DIAGNOSIS — I10 ESSENTIAL (PRIMARY) HYPERTENSION: ICD-10-CM

## 2021-03-07 LAB
ALBUMIN SERPL ELPH-MCNC: 3.7 G/DL — SIGNIFICANT CHANGE UP (ref 3.5–5.2)
ALP SERPL-CCNC: 110 U/L — SIGNIFICANT CHANGE UP (ref 30–115)
ALT FLD-CCNC: <5 U/L — SIGNIFICANT CHANGE UP (ref 0–41)
ANION GAP SERPL CALC-SCNC: 8 MMOL/L — SIGNIFICANT CHANGE UP (ref 7–14)
AST SERPL-CCNC: 12 U/L — SIGNIFICANT CHANGE UP (ref 0–41)
BILIRUB SERPL-MCNC: 0.2 MG/DL — SIGNIFICANT CHANGE UP (ref 0.2–1.2)
BUN SERPL-MCNC: 9 MG/DL — LOW (ref 10–20)
CALCIUM SERPL-MCNC: 8.9 MG/DL — SIGNIFICANT CHANGE UP (ref 8.5–10.1)
CHLORIDE SERPL-SCNC: 104 MMOL/L — SIGNIFICANT CHANGE UP (ref 98–110)
CO2 SERPL-SCNC: 28 MMOL/L — SIGNIFICANT CHANGE UP (ref 17–32)
CREAT SERPL-MCNC: 0.8 MG/DL — SIGNIFICANT CHANGE UP (ref 0.7–1.5)
CULTURE RESULTS: SIGNIFICANT CHANGE UP
GLUCOSE SERPL-MCNC: 97 MG/DL — SIGNIFICANT CHANGE UP (ref 70–99)
HCT VFR BLD CALC: 35.8 % — LOW (ref 37–47)
HGB BLD-MCNC: 11.5 G/DL — LOW (ref 12–16)
MCHC RBC-ENTMCNC: 27.8 PG — SIGNIFICANT CHANGE UP (ref 27–31)
MCHC RBC-ENTMCNC: 32.1 G/DL — SIGNIFICANT CHANGE UP (ref 32–37)
MCV RBC AUTO: 86.5 FL — SIGNIFICANT CHANGE UP (ref 81–99)
NRBC # BLD: 0 /100 WBCS — SIGNIFICANT CHANGE UP (ref 0–0)
PLATELET # BLD AUTO: 288 K/UL — SIGNIFICANT CHANGE UP (ref 130–400)
POTASSIUM SERPL-MCNC: 4.1 MMOL/L — SIGNIFICANT CHANGE UP (ref 3.5–5)
POTASSIUM SERPL-SCNC: 4.1 MMOL/L — SIGNIFICANT CHANGE UP (ref 3.5–5)
PROT SERPL-MCNC: 5.5 G/DL — LOW (ref 6–8)
RBC # BLD: 4.14 M/UL — LOW (ref 4.2–5.4)
RBC # FLD: 13.8 % — SIGNIFICANT CHANGE UP (ref 11.5–14.5)
SODIUM SERPL-SCNC: 140 MMOL/L — SIGNIFICANT CHANGE UP (ref 135–146)
SPECIMEN SOURCE: SIGNIFICANT CHANGE UP
WBC # BLD: 4.45 K/UL — LOW (ref 4.8–10.8)
WBC # FLD AUTO: 4.45 K/UL — LOW (ref 4.8–10.8)

## 2021-03-07 PROCEDURE — 99233 SBSQ HOSP IP/OBS HIGH 50: CPT

## 2021-03-07 RX ORDER — CARBIDOPA AND LEVODOPA 25; 100 MG/1; MG/1
2 TABLET ORAL
Refills: 0 | Status: DISCONTINUED | OUTPATIENT
Start: 2021-03-07 | End: 2021-03-09

## 2021-03-07 RX ORDER — CLOPIDOGREL BISULFATE 75 MG/1
75 TABLET, FILM COATED ORAL DAILY
Refills: 0 | Status: DISCONTINUED | OUTPATIENT
Start: 2021-03-07 | End: 2021-03-09

## 2021-03-07 RX ORDER — ENTACAPONE 200 MG/1
200 TABLET, FILM COATED ORAL
Refills: 0 | Status: DISCONTINUED | OUTPATIENT
Start: 2021-03-07 | End: 2021-03-09

## 2021-03-07 RX ORDER — IBUPROFEN 200 MG
400 TABLET ORAL EVERY 12 HOURS
Refills: 0 | Status: DISCONTINUED | OUTPATIENT
Start: 2021-03-07 | End: 2021-03-09

## 2021-03-07 RX ORDER — ATORVASTATIN CALCIUM 80 MG/1
80 TABLET, FILM COATED ORAL AT BEDTIME
Refills: 0 | Status: DISCONTINUED | OUTPATIENT
Start: 2021-03-07 | End: 2021-03-09

## 2021-03-07 RX ORDER — ASPIRIN/CALCIUM CARB/MAGNESIUM 324 MG
81 TABLET ORAL DAILY
Refills: 0 | Status: DISCONTINUED | OUTPATIENT
Start: 2021-03-07 | End: 2021-03-09

## 2021-03-07 RX ORDER — ARIPIPRAZOLE 15 MG/1
10 TABLET ORAL DAILY
Refills: 0 | Status: DISCONTINUED | OUTPATIENT
Start: 2021-03-07 | End: 2021-03-09

## 2021-03-07 RX ORDER — OXYBUTYNIN CHLORIDE 5 MG
5 TABLET ORAL AT BEDTIME
Refills: 0 | Status: DISCONTINUED | OUTPATIENT
Start: 2021-03-07 | End: 2021-03-09

## 2021-03-07 RX ORDER — DILTIAZEM HCL 120 MG
360 CAPSULE, EXT RELEASE 24 HR ORAL DAILY
Refills: 0 | Status: DISCONTINUED | OUTPATIENT
Start: 2021-03-07 | End: 2021-03-09

## 2021-03-07 RX ORDER — CARBIDOPA, LEVODOPA, AND ENTACAPONE 50; 200; 200 MG/1; MG/1; MG/1
1 TABLET, FILM COATED ORAL
Refills: 0 | Status: DISCONTINUED | OUTPATIENT
Start: 2021-03-07 | End: 2021-03-07

## 2021-03-07 RX ORDER — PANTOPRAZOLE SODIUM 20 MG/1
40 TABLET, DELAYED RELEASE ORAL
Refills: 0 | Status: DISCONTINUED | OUTPATIENT
Start: 2021-03-07 | End: 2021-03-09

## 2021-03-07 RX ORDER — PRAMIPEXOLE DIHYDROCHLORIDE 0.12 MG/1
1 TABLET ORAL
Refills: 0 | Status: DISCONTINUED | OUTPATIENT
Start: 2021-03-07 | End: 2021-03-09

## 2021-03-07 RX ADMIN — Medication 400 MILLIGRAM(S): at 14:04

## 2021-03-07 RX ADMIN — CARBIDOPA AND LEVODOPA 2 TABLET(S): 25; 100 TABLET ORAL at 11:18

## 2021-03-07 RX ADMIN — PANTOPRAZOLE SODIUM 40 MILLIGRAM(S): 20 TABLET, DELAYED RELEASE ORAL at 05:34

## 2021-03-07 RX ADMIN — ENTACAPONE 200 MILLIGRAM(S): 200 TABLET, FILM COATED ORAL at 05:40

## 2021-03-07 RX ADMIN — ATORVASTATIN CALCIUM 80 MILLIGRAM(S): 80 TABLET, FILM COATED ORAL at 21:45

## 2021-03-07 RX ADMIN — Medication 5 MILLIGRAM(S): at 21:45

## 2021-03-07 RX ADMIN — CARBIDOPA AND LEVODOPA 2 TABLET(S): 25; 100 TABLET ORAL at 05:34

## 2021-03-07 RX ADMIN — Medication 81 MILLIGRAM(S): at 11:18

## 2021-03-07 RX ADMIN — ARIPIPRAZOLE 10 MILLIGRAM(S): 15 TABLET ORAL at 11:18

## 2021-03-07 RX ADMIN — PRAMIPEXOLE DIHYDROCHLORIDE 1 MILLIGRAM(S): 0.12 TABLET ORAL at 05:34

## 2021-03-07 RX ADMIN — CARBIDOPA AND LEVODOPA 2 TABLET(S): 25; 100 TABLET ORAL at 17:09

## 2021-03-07 RX ADMIN — HEPARIN SODIUM 5000 UNIT(S): 5000 INJECTION INTRAVENOUS; SUBCUTANEOUS at 05:40

## 2021-03-07 RX ADMIN — PRAMIPEXOLE DIHYDROCHLORIDE 1 MILLIGRAM(S): 0.12 TABLET ORAL at 11:18

## 2021-03-07 RX ADMIN — Medication 400 MILLIGRAM(S): at 14:34

## 2021-03-07 RX ADMIN — ENTACAPONE 200 MILLIGRAM(S): 200 TABLET, FILM COATED ORAL at 17:08

## 2021-03-07 RX ADMIN — ENTACAPONE 200 MILLIGRAM(S): 200 TABLET, FILM COATED ORAL at 11:18

## 2021-03-07 RX ADMIN — PRAMIPEXOLE DIHYDROCHLORIDE 1 MILLIGRAM(S): 0.12 TABLET ORAL at 17:08

## 2021-03-07 RX ADMIN — HEPARIN SODIUM 5000 UNIT(S): 5000 INJECTION INTRAVENOUS; SUBCUTANEOUS at 17:09

## 2021-03-07 RX ADMIN — Medication 360 MILLIGRAM(S): at 05:39

## 2021-03-07 RX ADMIN — CLOPIDOGREL BISULFATE 75 MILLIGRAM(S): 75 TABLET, FILM COATED ORAL at 11:18

## 2021-03-07 NOTE — PROGRESS NOTE ADULT - ASSESSMENT
patient with decreased oral intake and nausea     1) Nausea  -advance diet as tolerated   -zofran prn     2) last admission for Sepsis/Lactic acidosis/UTI/Descending Colitis --- did well     3) CKD stage III  -monitor kidney function while on abx     4) Debility  -would need rehab/pt     5) HTN  -monitor bp on current meds     6) Parkinson's Disease   -home meds and outpatient follow up with Neurology     7) hx of cryptogenic strokes/ptosis/Left eye vision loss    -outpatient follow up with Ophthalmologist     8) urinary bladder calculus   -will pass    dvt and gi ppx     # Progress Note Handoff  PENDING as follows  consults: rehab/pt   Test: noted   Family discussion: discussed with patient who comprehends her medical care and is agreeable for inpatient care  Disposition: ACUTE; not ready     Attending Physician Dr. Lorenza Maloney # [id number] .

## 2021-03-07 NOTE — PROGRESS NOTE ADULT - SUBJECTIVE AND OBJECTIVE BOX
JON GARCÍA  88y  Female      Patient is a 88y old  Female who presents with a chief complaint of     INTERVAL HPI/OVERNIGHT EVENTS:    pt seen and examined at bedside   -admitted for nausea; advance diet as tolerated   -rehab/pt as tolerated     REVIEW OF SYSTEMS:  -nausea resolving and requesting food     Vital Signs Last 24 Hrs  T(C): 35.7 (07 Mar 2021 14:24), Max: 36.9 (06 Mar 2021 21:37)  T(F): 96.2 (07 Mar 2021 14:24), Max: 98.5 (06 Mar 2021 21:37)  HR: 60 (07 Mar 2021 14:24) (60 - 88)  BP: 96/54 (07 Mar 2021 14:24) (96/54 - 195/83)  RR: 18 (07 Mar 2021 14:24) (16 - 20)  SpO2: 97% (06 Mar 2021 20:52) (97% - 98%)    PHYSICAL EXAM:  GENERAL: nauseous but speaking appropriately   HEAD:  Atraumatic, Normocephalic  EYES: EOMI, PERRLA, conjunctiva and sclera clear  NERVOUS SYSTEM:  Alert & Oriented X 3  CHEST/LUNG: Clear to percussion bilaterally; No rales, rhonchi, wheezing, or rubs  CV/HEART: Regular rate and rhythm; No murmurs, rubs, or gallops  GI/ABDOMEN: Soft, Nontender, Nondistended; Bowel sounds present  EXTREMITIES:  2+ Peripheral Pulses, No clubbing, cyanosis, or edema  SKIN: No rashes or lesions    LAB:                        11.5   4.45  )-----------( 288      ( 07 Mar 2021 07:52 )             35.8     03-07    140  |  104  |  9<L>  ----------------------------<  97  4.1   |  28  |  0.8    Ca    8.9      07 Mar 2021 07:52  Mg     2.3     03-06    TPro  5.5<L>  /  Alb  3.7  /  TBili  0.2  /  DBili  x   /  AST  12  /  ALT  <5  /  AlkPhos  110  03-07    CARDIAC MARKERS ( 06 Mar 2021 16:50 )  x     / <0.01 ng/mL / x     / x     / x        Daily Height in cm: 162.56 (06 Mar 2021 21:37)    Daily   CAPILLARY BLOOD GLUCOSE    Urinalysis Basic - ( 06 Mar 2021 16:06 )    Color: Yellow / Appearance: Clear / S.015 / pH: x  Gluc: x / Ketone: Negative  / Bili: Negative / Urobili: 0.2 mg/dL   Blood: x / Protein: Negative mg/dL / Nitrite: Negative   Leuk Esterase: Negative / RBC: x / WBC x   Sq Epi: x / Non Sq Epi: x / Bacteria: x    LIVER FUNCTIONS - ( 07 Mar 2021 07:52 )  Alb: 3.7 g/dL / Pro: 5.5 g/dL / ALK PHOS: 110 U/L / ALT: <5 U/L / AST: 12 U/L / GGT: x           RADIOLOGY:    Imaging Personally Reviewed:  [ Y ] YES  [ ] NO    CT Abdomen and Pelvis w/ IV Cont (21 @ 18:45)     IMPRESSION:    Calculus measuring 0.3 cm layering laterally within urinary bladder, new, compatible with passing calculus; no hydronephrosis bilaterally.    Otherwise, no evidence of acute intra-abdominal pathology.    HEALTH ISSUES - PROBLEM Dx:  Hyperlipidemia  Hyperlipidemia    Rheumatoid arthritis  Rheumatoid arthritis    Parkinson disease  Parkinson disease    Urinary bladder calculus  Urinary bladder calculus    HTN (hypertension)  HTN (hypertension)    Nausea  Nausea    Unable to eat  Unable to eat    MEDS:  ARIPiprazole 10 milliGRAM(s) Oral daily  aspirin  chewable 81 milliGRAM(s) Oral daily  atorvastatin 80 milliGRAM(s) Oral at bedtime  carbidopa/levodopa  25/100 2 Tablet(s) Oral four times a day  clopidogrel Tablet 75 milliGRAM(s) Oral daily  diltiazem    milliGRAM(s) Oral daily  entacapone 200 milliGRAM(s) Oral four times a day  heparin   Injectable 5000 Unit(s) SubCutaneous two times a day  ibuprofen  Tablet. 400 milliGRAM(s) Oral every 12 hours PRN  ondansetron Injectable 4 milliGRAM(s) IV Push every 4 hours PRN  oxybutynin 5 milliGRAM(s) Oral at bedtime  pantoprazole    Tablet 40 milliGRAM(s) Oral before breakfast  pramipexole 1 milliGRAM(s) Oral four times a day

## 2021-03-08 LAB
SARS-COV-2 IGG SERPL QL IA: NEGATIVE — SIGNIFICANT CHANGE UP
SARS-COV-2 IGM SERPL IA-ACNC: 0.3 INDEX — SIGNIFICANT CHANGE UP

## 2021-03-08 PROCEDURE — 99233 SBSQ HOSP IP/OBS HIGH 50: CPT

## 2021-03-08 RX ORDER — HYDROXYZINE HCL 10 MG
25 TABLET ORAL ONCE
Refills: 0 | Status: DISCONTINUED | OUTPATIENT
Start: 2021-03-08 | End: 2021-03-08

## 2021-03-08 RX ORDER — HYDROXYZINE HCL 10 MG
25 TABLET ORAL ONCE
Refills: 0 | Status: COMPLETED | OUTPATIENT
Start: 2021-03-08 | End: 2021-03-08

## 2021-03-08 RX ADMIN — HEPARIN SODIUM 5000 UNIT(S): 5000 INJECTION INTRAVENOUS; SUBCUTANEOUS at 17:15

## 2021-03-08 RX ADMIN — CARBIDOPA AND LEVODOPA 2 TABLET(S): 25; 100 TABLET ORAL at 11:03

## 2021-03-08 RX ADMIN — ATORVASTATIN CALCIUM 80 MILLIGRAM(S): 80 TABLET, FILM COATED ORAL at 21:20

## 2021-03-08 RX ADMIN — ENTACAPONE 200 MILLIGRAM(S): 200 TABLET, FILM COATED ORAL at 11:03

## 2021-03-08 RX ADMIN — Medication 81 MILLIGRAM(S): at 11:03

## 2021-03-08 RX ADMIN — PRAMIPEXOLE DIHYDROCHLORIDE 1 MILLIGRAM(S): 0.12 TABLET ORAL at 17:15

## 2021-03-08 RX ADMIN — ARIPIPRAZOLE 10 MILLIGRAM(S): 15 TABLET ORAL at 11:03

## 2021-03-08 RX ADMIN — Medication 5 MILLIGRAM(S): at 21:20

## 2021-03-08 RX ADMIN — PRAMIPEXOLE DIHYDROCHLORIDE 1 MILLIGRAM(S): 0.12 TABLET ORAL at 00:20

## 2021-03-08 RX ADMIN — HEPARIN SODIUM 5000 UNIT(S): 5000 INJECTION INTRAVENOUS; SUBCUTANEOUS at 05:38

## 2021-03-08 RX ADMIN — Medication 360 MILLIGRAM(S): at 05:39

## 2021-03-08 RX ADMIN — PRAMIPEXOLE DIHYDROCHLORIDE 1 MILLIGRAM(S): 0.12 TABLET ORAL at 11:03

## 2021-03-08 RX ADMIN — CARBIDOPA AND LEVODOPA 2 TABLET(S): 25; 100 TABLET ORAL at 05:39

## 2021-03-08 RX ADMIN — Medication 25 MILLIGRAM(S): at 21:19

## 2021-03-08 RX ADMIN — ENTACAPONE 200 MILLIGRAM(S): 200 TABLET, FILM COATED ORAL at 17:15

## 2021-03-08 RX ADMIN — CLOPIDOGREL BISULFATE 75 MILLIGRAM(S): 75 TABLET, FILM COATED ORAL at 11:03

## 2021-03-08 RX ADMIN — CARBIDOPA AND LEVODOPA 2 TABLET(S): 25; 100 TABLET ORAL at 17:15

## 2021-03-08 RX ADMIN — CARBIDOPA AND LEVODOPA 2 TABLET(S): 25; 100 TABLET ORAL at 00:20

## 2021-03-08 RX ADMIN — ENTACAPONE 200 MILLIGRAM(S): 200 TABLET, FILM COATED ORAL at 05:39

## 2021-03-08 RX ADMIN — PANTOPRAZOLE SODIUM 40 MILLIGRAM(S): 20 TABLET, DELAYED RELEASE ORAL at 05:39

## 2021-03-08 RX ADMIN — ENTACAPONE 200 MILLIGRAM(S): 200 TABLET, FILM COATED ORAL at 00:20

## 2021-03-08 RX ADMIN — PRAMIPEXOLE DIHYDROCHLORIDE 1 MILLIGRAM(S): 0.12 TABLET ORAL at 05:40

## 2021-03-08 NOTE — PROGRESS NOTE ADULT - ASSESSMENT
patient with decreased oral intake and nausea     1) Nausea --- resolved   -advanced diet and tolerated   -zofran prn given inpatient     2) last admission for Sepsis/Lactic acidosis/UTI/Descending Colitis --- did well     3) CKD stage III  -monitor kidney function while on abx     4) Debility  -would need rehab/pt     5) HTN  -monitor bp on current meds     6) Parkinson's Disease   -home meds and outpatient follow up with Neurology     7) hx of cryptogenic strokes/ptosis/Left eye vision loss    -outpatient follow up with Ophthalmologist     8) urinary bladder calculus   -will pass    dvt and gi ppx     # Progress Note Handoff  PENDING as follows  consults: rehab/pt   Test: noted   Family discussion: discussed with patient who comprehends her medical care and is agreeable for inpatient care and requesting home d/c today; awaiting Veronica/daughter's call back  Disposition: home with home care today     Attending Physician Dr. Lorenza Maloney # [id number] .

## 2021-03-08 NOTE — PHYSICAL THERAPY INITIAL EVALUATION ADULT - GAIT DEVIATIONS NOTED, PT EVAL
forward flexed trunk, dec heel strike and push off/decreased glenn/decreased step length/decreased stride length/decreased weight-shifting ability forward flexed trunk, dec heel strike and push off; ambulated on plantarflexed R ankle/foot/decreased glenn/decreased step length/decreased stride length/decreased weight-shifting ability

## 2021-03-08 NOTE — PROGRESS NOTE ADULT - SUBJECTIVE AND OBJECTIVE BOX
JON GARCÍA  88y  Female      Patient is a 88y old  Female who presents with a chief complaint of     INTERVAL HPI/OVERNIGHT EVENTS:    pt seen and examined at bedside   -admitted for nausea; advance diet and patient tolerated   -sitting up in chair and doing   -wants to go home; will speak to her daughter Veronica   -rehab/pt as tolerated     REVIEW OF SYSTEMS:  -nausea resolving and requesting food     Vital Signs Last 24 Hrs  T(C): 36.4 (08 Mar 2021 05:31), Max: 36.4 (08 Mar 2021 05:31)  T(F): 97.5 (08 Mar 2021 05:31), Max: 97.5 (08 Mar 2021 05:31)  HR: 71 (08 Mar 2021 05:31) (60 - 71)  BP: 160/72 (08 Mar 2021 05:31) (96/54 - 160/72)  RR: 16 (08 Mar 2021 05:31) (16 - 18)    PHYSICAL EXAM:  GENERAL: nauseous but speaking appropriately   HEAD:  Atraumatic, Normocephalic  EYES: EOMI, PERRLA, conjunctiva and sclera clear  NERVOUS SYSTEM:  Alert & Oriented X 3  CHEST/LUNG: Clear to percussion bilaterally; No rales, rhonchi, wheezing, or rubs  CV/HEART: Regular rate and rhythm; No murmurs, rubs, or gallops  GI/ABDOMEN: Soft, Nontender, Nondistended; Bowel sounds present  EXTREMITIES:  2+ Peripheral Pulses, No clubbing, cyanosis, or edema  SKIN: No rashes or lesions    LAB:  no new labs              11.5   4.45  )-----------( 288      ( 07 Mar 2021 07:52 )             35.8     03-07    140  |  104  |  9<L>  ----------------------------<  97  4.1   |  28  |  0.8    Ca    8.9      07 Mar 2021 07:52  Mg     2.3     03-06    TPro  5.5<L>  /  Alb  3.7  /  TBili  0.2  /  DBili  x   /  AST  12  /  ALT  <5  /  AlkPhos  110  03-07    CARDIAC MARKERS ( 06 Mar 2021 16:50 )  x     / <0.01 ng/mL / x     / x     / x        Daily Height in cm: 162.56 (06 Mar 2021 21:37)    Daily   CAPILLARY BLOOD GLUCOSE    Urinalysis Basic - ( 06 Mar 2021 16:06 )    Color: Yellow / Appearance: Clear / S.015 / pH: x  Gluc: x / Ketone: Negative  / Bili: Negative / Urobili: 0.2 mg/dL   Blood: x / Protein: Negative mg/dL / Nitrite: Negative   Leuk Esterase: Negative / RBC: x / WBC x   Sq Epi: x / Non Sq Epi: x / Bacteria: x    LIVER FUNCTIONS - ( 07 Mar 2021 07:52 )  Alb: 3.7 g/dL / Pro: 5.5 g/dL / ALK PHOS: 110 U/L / ALT: <5 U/L / AST: 12 U/L / GGT: x           RADIOLOGY:    Imaging Personally Reviewed:  [ Y ] YES  [ ] NO    CT Abdomen and Pelvis w/ IV Cont (21 @ 18:45)     IMPRESSION:    Calculus measuring 0.3 cm layering laterally within urinary bladder, new, compatible with passing calculus; no hydronephrosis bilaterally.    Otherwise, no evidence of acute intra-abdominal pathology.    HEALTH ISSUES - PROBLEM Dx:  Hyperlipidemia  Hyperlipidemia    Rheumatoid arthritis  Rheumatoid arthritis    Parkinson disease  Parkinson disease    Urinary bladder calculus  Urinary bladder calculus    HTN (hypertension)  HTN (hypertension)    Nausea  Nausea    Unable to eat  Unable to eat    MEDICATIONS  (STANDING):  ARIPiprazole 10 milliGRAM(s) Oral daily  aspirin  chewable 81 milliGRAM(s) Oral daily  atorvastatin 80 milliGRAM(s) Oral at bedtime  carbidopa/levodopa  25/100 2 Tablet(s) Oral four times a day  clopidogrel Tablet 75 milliGRAM(s) Oral daily  diltiazem    milliGRAM(s) Oral daily  entacapone 200 milliGRAM(s) Oral four times a day  heparin   Injectable 5000 Unit(s) SubCutaneous two times a day  oxybutynin 5 milliGRAM(s) Oral at bedtime  pantoprazole    Tablet 40 milliGRAM(s) Oral before breakfast  pramipexole 1 milliGRAM(s) Oral four times a day    MEDICATIONS  (PRN):  ibuprofen  Tablet. 400 milliGRAM(s) Oral every 12 hours PRN Moderate Pain (4 - 6)  ondansetron Injectable 4 milliGRAM(s) IV Push every 4 hours PRN Nausea and/or Vomiting

## 2021-03-08 NOTE — PHYSICAL THERAPY INITIAL EVALUATION ADULT - GENERAL OBSERVATIONS, REHAB EVAL
Pt encountered sitting in recliner bedside, NAD, +primafit, ok to be seen by PT as confirmed by RN and pt agreeable. +chart reviewed

## 2021-03-08 NOTE — PHYSICAL THERAPY INITIAL EVALUATION ADULT - ADDITIONAL COMMENTS
Pt reports she lives in a private house with her daughter - 1 step on outside to enter, then 2 steps, then 10 steps up to her bedroom/bathroom - all stairs with rail. Pt ambulates with RW at baseline.

## 2021-03-09 ENCOUNTER — TRANSCRIPTION ENCOUNTER (OUTPATIENT)
Age: 86
End: 2021-03-09

## 2021-03-09 VITALS
HEART RATE: 81 BPM | TEMPERATURE: 98 F | SYSTOLIC BLOOD PRESSURE: 117 MMHG | OXYGEN SATURATION: 98 % | RESPIRATION RATE: 16 BRPM | DIASTOLIC BLOOD PRESSURE: 58 MMHG

## 2021-03-09 PROCEDURE — 99239 HOSP IP/OBS DSCHRG MGMT >30: CPT

## 2021-03-09 RX ORDER — ONDANSETRON 8 MG/1
1 TABLET, FILM COATED ORAL
Qty: 12 | Refills: 0
Start: 2021-03-09 | End: 2021-03-12

## 2021-03-09 RX ORDER — ENTACAPONE 200 MG/1
1 TABLET, FILM COATED ORAL
Qty: 0 | Refills: 0 | DISCHARGE
Start: 2021-03-09

## 2021-03-09 RX ADMIN — CARBIDOPA AND LEVODOPA 2 TABLET(S): 25; 100 TABLET ORAL at 05:47

## 2021-03-09 RX ADMIN — ENTACAPONE 200 MILLIGRAM(S): 200 TABLET, FILM COATED ORAL at 12:06

## 2021-03-09 RX ADMIN — Medication 400 MILLIGRAM(S): at 07:14

## 2021-03-09 RX ADMIN — ARIPIPRAZOLE 10 MILLIGRAM(S): 15 TABLET ORAL at 12:07

## 2021-03-09 RX ADMIN — ENTACAPONE 200 MILLIGRAM(S): 200 TABLET, FILM COATED ORAL at 00:00

## 2021-03-09 RX ADMIN — Medication 81 MILLIGRAM(S): at 12:07

## 2021-03-09 RX ADMIN — PANTOPRAZOLE SODIUM 40 MILLIGRAM(S): 20 TABLET, DELAYED RELEASE ORAL at 05:47

## 2021-03-09 RX ADMIN — PRAMIPEXOLE DIHYDROCHLORIDE 1 MILLIGRAM(S): 0.12 TABLET ORAL at 05:48

## 2021-03-09 RX ADMIN — CLOPIDOGREL BISULFATE 75 MILLIGRAM(S): 75 TABLET, FILM COATED ORAL at 12:06

## 2021-03-09 RX ADMIN — PRAMIPEXOLE DIHYDROCHLORIDE 1 MILLIGRAM(S): 0.12 TABLET ORAL at 00:00

## 2021-03-09 RX ADMIN — ENTACAPONE 200 MILLIGRAM(S): 200 TABLET, FILM COATED ORAL at 05:47

## 2021-03-09 RX ADMIN — Medication 360 MILLIGRAM(S): at 05:47

## 2021-03-09 RX ADMIN — HEPARIN SODIUM 5000 UNIT(S): 5000 INJECTION INTRAVENOUS; SUBCUTANEOUS at 05:46

## 2021-03-09 RX ADMIN — CARBIDOPA AND LEVODOPA 2 TABLET(S): 25; 100 TABLET ORAL at 00:00

## 2021-03-09 RX ADMIN — PRAMIPEXOLE DIHYDROCHLORIDE 1 MILLIGRAM(S): 0.12 TABLET ORAL at 12:06

## 2021-03-09 RX ADMIN — Medication 400 MILLIGRAM(S): at 06:21

## 2021-03-09 RX ADMIN — CARBIDOPA AND LEVODOPA 2 TABLET(S): 25; 100 TABLET ORAL at 12:07

## 2021-03-09 NOTE — DISCHARGE NOTE NURSING/CASE MANAGEMENT/SOCIAL WORK - PATIENT PORTAL LINK FT
You can access the FollowMyHealth Patient Portal offered by [facility] by registering at the following website: [email or web address]. By joining [employer]’s FollowMyHealth portal, you will also be able to view your health information using other applications (apps) compatible with our system.

## 2021-03-09 NOTE — DISCHARGE NOTE PROVIDER - HOSPITAL COURSE
89yo female whose PMH includes HLD, RA, Parkinson's, and hypertension presents to the ER due to nausea over 2 days with associated poor oral intake.   ER treatment Zofran IV fluids) did not improve symptoms and she still has no appetite.    Actually had similar complaints 2 weeks ago but at that time was found to have a UTI, there is no evidence for active infection.    1) Nausea --- resolved   -advanced diet and tolerated reg diet   -zofran prn given inpatient     2) CKD stage III  - creat 0.9  - kidney function NL    3) Debility  -d/c home with services     4) HTN  -c/w home meds   - low salt diet     5) Parkinson's Disease   -home meds and outpatient follow up with Neurology     6) hx of cryptogenic strokes/ptosis/Left eye vision loss    -outpatient follow up with Ophthalmologist      89yo female whose PMH includes HLD, RA, Parkinson's, and hypertension presents to the ER due to nausea over 2 days with associated poor oral intake.   ER treatment Zofran IV fluids) did not improve symptoms and she still has no appetite.    Actually had similar complaints 2 weeks ago but at that time was found to have a UTI, there is no evidence for active infection.    1) Nausea --- resolved   -advanced diet and tolerated reg diet   -zofran prn given inpatient     2) CKD stage III  - creat 0.9  - kidney function NL    3) Debility  -d/c home with services     4) HTN  -c/w home meds   - low salt diet     5) Parkinson's Disease   -home meds and outpatient follow up with Neurology     6) hx of cryptogenic strokes/ptosis/Left eye vision loss    -outpatient follow up with Ophthalmologist     patient seen and examined this morning, doing well; and stable for home d/c today; zofran script sent to her pharmacy; advised to eat small meals (pt prob with gastroparesis)

## 2021-03-09 NOTE — DISCHARGE NOTE PROVIDER - CARE PROVIDER_API CALL
[facility] Kam[facility]  PEDIATRICS  [address] [place] [address]  Phone: [phone]  Fax: [phone]  Established Patient  Follow Up Time: 1 week

## 2021-03-09 NOTE — DISCHARGE NOTE PROVIDER - NSDCCPCAREPLAN_GEN_ALL_CORE_FT
PRINCIPAL DISCHARGE DIAGNOSIS  Diagnosis: Nausea  Assessment and Plan of Treatment: -advanced diet and tolerated reg diet   -zofran prn given inpatient      SECONDARY DISCHARGE DIAGNOSES  Diagnosis: Debility  Assessment and Plan of Treatment: -d/c home with services    Diagnosis: Chronic kidney disease  Assessment and Plan of Treatment: - kidney function NL     PRINCIPAL DISCHARGE DIAGNOSIS  Diagnosis: Nausea  Assessment and Plan of Treatment: -advanced diet and tolerated reg diet   -zofran prn given inpatient and script sent to her pharmacy      SECONDARY DISCHARGE DIAGNOSES  Diagnosis: Debility  Assessment and Plan of Treatment: -d/c home with services

## 2021-03-09 NOTE — DISCHARGE NOTE PROVIDER - NSDCMRMEDTOKEN_GEN_ALL_CORE_FT
ARIPiprazole 10 mg oral tablet: 1 tab(s) orally once a day  aspirin 81 mg oral tablet, chewable: 1 tab(s) orally once a day  atorvastatin 80 mg oral tablet: 1 tab(s) orally once a day (at bedtime)  carbidopa/levodopa/entacapone 50 mg-200 mg-200 mg oral tablet: 1 tab(s) orally 4 times a day  dilTIAZem 360 mg/24 hours oral capsule, extended release: 1 cap(s) orally once a day  ibuprofen 400 mg oral tablet: 1 tab(s) orally 3 times a day, As Needed for pain relief  ondansetron 4 mg oral tablet, disintegratin tab(s) orally 3 times a day as needed for nausea  oxybutynin 5 mg/24 hours oral tablet, extended release: 1 tab(s) orally once a day  Plavix 75 mg oral tablet: 1 tab(s) orally once a day  pramipexole 1 mg oral tablet: 1 tab(s) orally 4 times a day  Protonix 40 mg oral delayed release tablet: 1 tab(s) orally once a day   ARIPiprazole 10 mg oral tablet: 1 tab(s) orally once a day  aspirin 81 mg oral tablet, chewable: 1 tab(s) orally once a day  atorvastatin 80 mg oral tablet: 1 tab(s) orally once a day (at bedtime)  carbidopa/levodopa/entacapone 50 mg-200 mg-200 mg oral tablet: 1 tab(s) orally 4 times a day  dilTIAZem 360 mg/24 hours oral capsule, extended release: 1 cap(s) orally once a day  entacapone 200 mg oral tablet: 1 tab(s) orally 4 times a day  ibuprofen 400 mg oral tablet: 1 tab(s) orally 3 times a day, As Needed for pain relief  ondansetron 4 mg oral tablet, disintegratin tab(s) orally 3 times a day as needed for nausea  oxybutynin 5 mg/24 hours oral tablet, extended release: 1 tab(s) orally once a day  Plavix 75 mg oral tablet: 1 tab(s) orally once a day  pramipexole 1 mg oral tablet: 1 tab(s) orally 4 times a day  Protonix 40 mg oral delayed release tablet: 1 tab(s) orally once a day

## 2021-03-10 ENCOUNTER — NON-APPOINTMENT (OUTPATIENT)
Age: 86
End: 2021-03-10

## 2021-03-10 ENCOUNTER — APPOINTMENT (OUTPATIENT)
Dept: CARDIOLOGY | Facility: CLINIC | Age: 86
End: 2021-03-10
Payer: COMMERCIAL

## 2021-03-10 PROCEDURE — G2066: CPT

## 2021-03-10 PROCEDURE — 93298 REM INTERROG DEV EVAL SCRMS: CPT

## 2021-03-26 ENCOUNTER — INPATIENT (INPATIENT)
Facility: HOSPITAL | Age: 86
LOS: 7 days | Discharge: NURSING, REHAB OR LONG-TERM CARE | End: 2021-04-03
Attending: STUDENT IN AN ORGANIZED HEALTH CARE EDUCATION/TRAINING PROGRAM | Admitting: STUDENT IN AN ORGANIZED HEALTH CARE EDUCATION/TRAINING PROGRAM
Payer: MEDICARE

## 2021-03-26 VITALS — TEMPERATURE: 101 F

## 2021-03-26 DIAGNOSIS — R32 UNSPECIFIED URINARY INCONTINENCE: ICD-10-CM

## 2021-03-26 DIAGNOSIS — I10 ESSENTIAL (PRIMARY) HYPERTENSION: ICD-10-CM

## 2021-03-26 DIAGNOSIS — I25.10 ATHEROSCLEROTIC HEART DISEASE OF NATIVE CORONARY ARTERY WITHOUT ANGINA PECTORIS: ICD-10-CM

## 2021-03-26 DIAGNOSIS — G20 PARKINSON'S DISEASE: ICD-10-CM

## 2021-03-26 DIAGNOSIS — Z95.818 PRESENCE OF OTHER CARDIAC IMPLANTS AND GRAFTS: Chronic | ICD-10-CM

## 2021-03-26 DIAGNOSIS — K52.9 NONINFECTIVE GASTROENTERITIS AND COLITIS, UNSPECIFIED: ICD-10-CM

## 2021-03-26 DIAGNOSIS — Z90.710 ACQUIRED ABSENCE OF BOTH CERVIX AND UTERUS: Chronic | ICD-10-CM

## 2021-03-26 DIAGNOSIS — Z98.890 OTHER SPECIFIED POSTPROCEDURAL STATES: Chronic | ICD-10-CM

## 2021-03-26 LAB
ALBUMIN SERPL ELPH-MCNC: 3.8 G/DL — SIGNIFICANT CHANGE UP (ref 3.5–5.2)
ALP SERPL-CCNC: 232 U/L — HIGH (ref 30–115)
ALT FLD-CCNC: <5 U/L — SIGNIFICANT CHANGE UP (ref 0–41)
ANION GAP SERPL CALC-SCNC: 13 MMOL/L — SIGNIFICANT CHANGE UP (ref 7–14)
AST SERPL-CCNC: 14 U/L — SIGNIFICANT CHANGE UP (ref 0–41)
BASOPHILS # BLD AUTO: 0 K/UL — SIGNIFICANT CHANGE UP (ref 0–0.2)
BASOPHILS NFR BLD AUTO: 0 % — SIGNIFICANT CHANGE UP (ref 0–1)
BILIRUB SERPL-MCNC: 0.4 MG/DL — SIGNIFICANT CHANGE UP (ref 0.2–1.2)
BUN SERPL-MCNC: 33 MG/DL — HIGH (ref 10–20)
CALCIUM SERPL-MCNC: 9.4 MG/DL — SIGNIFICANT CHANGE UP (ref 8.5–10.1)
CHLORIDE SERPL-SCNC: 105 MMOL/L — SIGNIFICANT CHANGE UP (ref 98–110)
CO2 SERPL-SCNC: 22 MMOL/L — SIGNIFICANT CHANGE UP (ref 17–32)
CREAT SERPL-MCNC: 1.1 MG/DL — SIGNIFICANT CHANGE UP (ref 0.7–1.5)
EOSINOPHIL NFR BLD AUTO: 0 % — SIGNIFICANT CHANGE UP (ref 0–8)
GLUCOSE SERPL-MCNC: 135 MG/DL — HIGH (ref 70–99)
HCOV PNL SPEC NAA+PROBE: DETECTED
HCT VFR BLD CALC: 35.3 % — LOW (ref 37–47)
HGB BLD-MCNC: 11.4 G/DL — LOW (ref 12–16)
LACTATE SERPL-SCNC: 1.5 MMOL/L — SIGNIFICANT CHANGE UP (ref 0.7–2)
LACTATE SERPL-SCNC: 2.7 MMOL/L — HIGH (ref 0.7–2)
LIDOCAIN IGE QN: 13 U/L — SIGNIFICANT CHANGE UP (ref 7–60)
LYMPHOCYTES # BLD AUTO: 0.35 K/UL — LOW (ref 1.2–3.4)
LYMPHOCYTES # BLD AUTO: 2 % — LOW (ref 20.5–51.1)
MCHC RBC-ENTMCNC: 28 PG — SIGNIFICANT CHANGE UP (ref 27–31)
MCHC RBC-ENTMCNC: 32.3 G/DL — SIGNIFICANT CHANGE UP (ref 32–37)
MCV RBC AUTO: 86.7 FL — SIGNIFICANT CHANGE UP (ref 81–99)
MONOCYTES # BLD AUTO: 1.04 K/UL — HIGH (ref 0.1–0.6)
MONOCYTES NFR BLD AUTO: 6 % — SIGNIFICANT CHANGE UP (ref 1.7–9.3)
NEUTROPHILS # BLD AUTO: 15.88 K/UL — HIGH (ref 1.4–6.5)
NEUTROPHILS NFR BLD AUTO: 84 % — HIGH (ref 42.2–75.2)
NEUTS BAND # BLD: 8 % — HIGH (ref 0–6)
NRBC # BLD: 0 /100 — SIGNIFICANT CHANGE UP (ref 0–0)
NRBC # BLD: SIGNIFICANT CHANGE UP /100 WBCS (ref 0–0)
PLAT MORPH BLD: NORMAL — SIGNIFICANT CHANGE UP
PLATELET # BLD AUTO: 330 K/UL — SIGNIFICANT CHANGE UP (ref 130–400)
POTASSIUM SERPL-MCNC: 3.8 MMOL/L — SIGNIFICANT CHANGE UP (ref 3.5–5)
POTASSIUM SERPL-SCNC: 3.8 MMOL/L — SIGNIFICANT CHANGE UP (ref 3.5–5)
PROT SERPL-MCNC: 6.1 G/DL — SIGNIFICANT CHANGE UP (ref 6–8)
RAPID RVP RESULT: DETECTED
RBC # BLD: 4.07 M/UL — LOW (ref 4.2–5.4)
RBC # FLD: 13.9 % — SIGNIFICANT CHANGE UP (ref 11.5–14.5)
RBC BLD AUTO: NORMAL — SIGNIFICANT CHANGE UP
SARS-COV-2 RNA SPEC QL NAA+PROBE: SIGNIFICANT CHANGE UP
SODIUM SERPL-SCNC: 140 MMOL/L — SIGNIFICANT CHANGE UP (ref 135–146)
WBC # BLD: 17.26 K/UL — HIGH (ref 4.8–10.8)
WBC # FLD AUTO: 17.26 K/UL — HIGH (ref 4.8–10.8)

## 2021-03-26 PROCEDURE — 99285 EMERGENCY DEPT VISIT HI MDM: CPT

## 2021-03-26 PROCEDURE — 74177 CT ABD & PELVIS W/CONTRAST: CPT | Mod: 26

## 2021-03-26 PROCEDURE — 99233 SBSQ HOSP IP/OBS HIGH 50: CPT

## 2021-03-26 RX ORDER — OXYBUTYNIN CHLORIDE 5 MG
5 TABLET ORAL DAILY
Refills: 0 | Status: DISCONTINUED | OUTPATIENT
Start: 2021-03-26 | End: 2021-04-03

## 2021-03-26 RX ORDER — PANTOPRAZOLE SODIUM 20 MG/1
40 TABLET, DELAYED RELEASE ORAL
Refills: 0 | Status: DISCONTINUED | OUTPATIENT
Start: 2021-03-26 | End: 2021-03-29

## 2021-03-26 RX ORDER — SODIUM CHLORIDE 9 MG/ML
1000 INJECTION INTRAMUSCULAR; INTRAVENOUS; SUBCUTANEOUS
Refills: 0 | Status: DISCONTINUED | OUTPATIENT
Start: 2021-03-26 | End: 2021-04-03

## 2021-03-26 RX ORDER — HYDROCHLOROTHIAZIDE 25 MG
12.5 TABLET ORAL DAILY
Refills: 0 | Status: DISCONTINUED | OUTPATIENT
Start: 2021-03-26 | End: 2021-03-29

## 2021-03-26 RX ORDER — CIPROFLOXACIN LACTATE 400MG/40ML
400 VIAL (ML) INTRAVENOUS EVERY 12 HOURS
Refills: 0 | Status: DISCONTINUED | OUTPATIENT
Start: 2021-03-26 | End: 2021-03-29

## 2021-03-26 RX ORDER — ONDANSETRON 8 MG/1
4 TABLET, FILM COATED ORAL ONCE
Refills: 0 | Status: COMPLETED | OUTPATIENT
Start: 2021-03-26 | End: 2021-03-26

## 2021-03-26 RX ORDER — ACETAMINOPHEN 500 MG
650 TABLET ORAL EVERY 6 HOURS
Refills: 0 | Status: DISCONTINUED | OUTPATIENT
Start: 2021-03-26 | End: 2021-04-03

## 2021-03-26 RX ORDER — METRONIDAZOLE 500 MG
500 TABLET ORAL ONCE
Refills: 0 | Status: COMPLETED | OUTPATIENT
Start: 2021-03-26 | End: 2021-03-26

## 2021-03-26 RX ORDER — ACETAMINOPHEN 500 MG
900 TABLET ORAL ONCE
Refills: 0 | Status: COMPLETED | OUTPATIENT
Start: 2021-03-26 | End: 2021-03-26

## 2021-03-26 RX ORDER — PRAMIPEXOLE DIHYDROCHLORIDE 0.12 MG/1
1 TABLET ORAL
Refills: 0 | Status: DISCONTINUED | OUTPATIENT
Start: 2021-03-26 | End: 2021-04-03

## 2021-03-26 RX ORDER — CARBIDOPA AND LEVODOPA 25; 100 MG/1; MG/1
2 TABLET ORAL
Refills: 0 | Status: DISCONTINUED | OUTPATIENT
Start: 2021-03-26 | End: 2021-04-03

## 2021-03-26 RX ORDER — METRONIDAZOLE 500 MG
500 TABLET ORAL EVERY 8 HOURS
Refills: 0 | Status: DISCONTINUED | OUTPATIENT
Start: 2021-03-26 | End: 2021-03-30

## 2021-03-26 RX ORDER — DILTIAZEM HCL 120 MG
360 CAPSULE, EXT RELEASE 24 HR ORAL DAILY
Refills: 0 | Status: DISCONTINUED | OUTPATIENT
Start: 2021-03-26 | End: 2021-04-03

## 2021-03-26 RX ORDER — ASPIRIN/CALCIUM CARB/MAGNESIUM 324 MG
81 TABLET ORAL DAILY
Refills: 0 | Status: DISCONTINUED | OUTPATIENT
Start: 2021-03-26 | End: 2021-03-28

## 2021-03-26 RX ORDER — DICLOFENAC SODIUM 75 MG/1
1 TABLET, DELAYED RELEASE ORAL
Qty: 0 | Refills: 0 | DISCHARGE

## 2021-03-26 RX ORDER — CHLORHEXIDINE GLUCONATE 213 G/1000ML
1 SOLUTION TOPICAL
Refills: 0 | Status: DISCONTINUED | OUTPATIENT
Start: 2021-03-26 | End: 2021-04-03

## 2021-03-26 RX ORDER — ONDANSETRON 8 MG/1
4 TABLET, FILM COATED ORAL THREE TIMES A DAY
Refills: 0 | Status: DISCONTINUED | OUTPATIENT
Start: 2021-03-26 | End: 2021-04-03

## 2021-03-26 RX ORDER — CIPROFLOXACIN LACTATE 400MG/40ML
400 VIAL (ML) INTRAVENOUS ONCE
Refills: 0 | Status: COMPLETED | OUTPATIENT
Start: 2021-03-26 | End: 2021-03-26

## 2021-03-26 RX ORDER — OXYBUTYNIN CHLORIDE 5 MG
5 TABLET ORAL DAILY
Refills: 0 | Status: DISCONTINUED | OUTPATIENT
Start: 2021-03-26 | End: 2021-03-26

## 2021-03-26 RX ORDER — CLOPIDOGREL BISULFATE 75 MG/1
75 TABLET, FILM COATED ORAL DAILY
Refills: 0 | Status: DISCONTINUED | OUTPATIENT
Start: 2021-03-26 | End: 2021-03-28

## 2021-03-26 RX ORDER — ARIPIPRAZOLE 15 MG/1
10 TABLET ORAL DAILY
Refills: 0 | Status: DISCONTINUED | OUTPATIENT
Start: 2021-03-26 | End: 2021-04-03

## 2021-03-26 RX ORDER — MORPHINE SULFATE 50 MG/1
4 CAPSULE, EXTENDED RELEASE ORAL ONCE
Refills: 0 | Status: DISCONTINUED | OUTPATIENT
Start: 2021-03-26 | End: 2021-03-26

## 2021-03-26 RX ORDER — TRAZODONE HCL 50 MG
50 TABLET ORAL AT BEDTIME
Refills: 0 | Status: DISCONTINUED | OUTPATIENT
Start: 2021-03-26 | End: 2021-04-03

## 2021-03-26 RX ORDER — IBUPROFEN 200 MG
400 TABLET ORAL THREE TIMES A DAY
Refills: 0 | Status: DISCONTINUED | OUTPATIENT
Start: 2021-03-26 | End: 2021-03-29

## 2021-03-26 RX ORDER — CARBIDOPA, LEVODOPA, AND ENTACAPONE 50; 200; 200 MG/1; MG/1; MG/1
1 TABLET, FILM COATED ORAL
Refills: 0 | Status: DISCONTINUED | OUTPATIENT
Start: 2021-03-26 | End: 2021-04-03

## 2021-03-26 RX ORDER — LANOLIN ALCOHOL/MO/W.PET/CERES
5 CREAM (GRAM) TOPICAL AT BEDTIME
Refills: 0 | Status: DISCONTINUED | OUTPATIENT
Start: 2021-03-26 | End: 2021-03-31

## 2021-03-26 RX ORDER — HEPARIN SODIUM 5000 [USP'U]/ML
5000 INJECTION INTRAVENOUS; SUBCUTANEOUS EVERY 8 HOURS
Refills: 0 | Status: DISCONTINUED | OUTPATIENT
Start: 2021-03-26 | End: 2021-03-28

## 2021-03-26 RX ORDER — MORPHINE SULFATE 50 MG/1
2 CAPSULE, EXTENDED RELEASE ORAL EVERY 6 HOURS
Refills: 0 | Status: DISCONTINUED | OUTPATIENT
Start: 2021-03-26 | End: 2021-03-29

## 2021-03-26 RX ORDER — SODIUM CHLORIDE 9 MG/ML
1000 INJECTION INTRAMUSCULAR; INTRAVENOUS; SUBCUTANEOUS ONCE
Refills: 0 | Status: COMPLETED | OUTPATIENT
Start: 2021-03-26 | End: 2021-03-26

## 2021-03-26 RX ORDER — ATORVASTATIN CALCIUM 80 MG/1
80 TABLET, FILM COATED ORAL AT BEDTIME
Refills: 0 | Status: DISCONTINUED | OUTPATIENT
Start: 2021-03-26 | End: 2021-04-03

## 2021-03-26 RX ADMIN — PRAMIPEXOLE DIHYDROCHLORIDE 1 MILLIGRAM(S): 0.12 TABLET ORAL at 18:05

## 2021-03-26 RX ADMIN — Medication 200 MILLIGRAM(S): at 11:45

## 2021-03-26 RX ADMIN — Medication 900 MILLIGRAM(S): at 11:43

## 2021-03-26 RX ADMIN — MORPHINE SULFATE 2 MILLIGRAM(S): 50 CAPSULE, EXTENDED RELEASE ORAL at 21:11

## 2021-03-26 RX ADMIN — ARIPIPRAZOLE 10 MILLIGRAM(S): 15 TABLET ORAL at 13:13

## 2021-03-26 RX ADMIN — ATORVASTATIN CALCIUM 80 MILLIGRAM(S): 80 TABLET, FILM COATED ORAL at 22:09

## 2021-03-26 RX ADMIN — Medication 100 MILLIGRAM(S): at 22:09

## 2021-03-26 RX ADMIN — PANTOPRAZOLE SODIUM 40 MILLIGRAM(S): 20 TABLET, DELAYED RELEASE ORAL at 13:16

## 2021-03-26 RX ADMIN — CARBIDOPA AND LEVODOPA 2 TABLET(S): 25; 100 TABLET ORAL at 22:12

## 2021-03-26 RX ADMIN — ONDANSETRON 4 MILLIGRAM(S): 8 TABLET, FILM COATED ORAL at 09:04

## 2021-03-26 RX ADMIN — Medication 360 MILLIGRAM(S): at 13:12

## 2021-03-26 RX ADMIN — SODIUM CHLORIDE 1000 MILLILITER(S): 9 INJECTION INTRAMUSCULAR; INTRAVENOUS; SUBCUTANEOUS at 11:58

## 2021-03-26 RX ADMIN — MORPHINE SULFATE 4 MILLIGRAM(S): 50 CAPSULE, EXTENDED RELEASE ORAL at 09:00

## 2021-03-26 RX ADMIN — MORPHINE SULFATE 2 MILLIGRAM(S): 50 CAPSULE, EXTENDED RELEASE ORAL at 20:56

## 2021-03-26 RX ADMIN — Medication 200 MILLIGRAM(S): at 18:05

## 2021-03-26 RX ADMIN — Medication 5 MILLIGRAM(S): at 13:14

## 2021-03-26 RX ADMIN — Medication 12.5 MILLIGRAM(S): at 13:13

## 2021-03-26 RX ADMIN — PRAMIPEXOLE DIHYDROCHLORIDE 1 MILLIGRAM(S): 0.12 TABLET ORAL at 13:14

## 2021-03-26 RX ADMIN — HEPARIN SODIUM 5000 UNIT(S): 5000 INJECTION INTRAVENOUS; SUBCUTANEOUS at 22:09

## 2021-03-26 RX ADMIN — SODIUM CHLORIDE 1000 MILLILITER(S): 9 INJECTION INTRAMUSCULAR; INTRAVENOUS; SUBCUTANEOUS at 09:00

## 2021-03-26 RX ADMIN — MORPHINE SULFATE 4 MILLIGRAM(S): 50 CAPSULE, EXTENDED RELEASE ORAL at 09:15

## 2021-03-26 RX ADMIN — HEPARIN SODIUM 5000 UNIT(S): 5000 INJECTION INTRAVENOUS; SUBCUTANEOUS at 14:50

## 2021-03-26 RX ADMIN — Medication 100 MILLIGRAM(S): at 11:00

## 2021-03-26 RX ADMIN — SODIUM CHLORIDE 75 MILLILITER(S): 9 INJECTION INTRAMUSCULAR; INTRAVENOUS; SUBCUTANEOUS at 14:50

## 2021-03-26 RX ADMIN — CLOPIDOGREL BISULFATE 75 MILLIGRAM(S): 75 TABLET, FILM COATED ORAL at 13:16

## 2021-03-26 RX ADMIN — ONDANSETRON 4 MILLIGRAM(S): 8 TABLET, FILM COATED ORAL at 20:59

## 2021-03-26 RX ADMIN — Medication 81 MILLIGRAM(S): at 13:16

## 2021-03-26 RX ADMIN — SODIUM CHLORIDE 75 MILLILITER(S): 9 INJECTION INTRAMUSCULAR; INTRAVENOUS; SUBCUTANEOUS at 20:57

## 2021-03-26 RX ADMIN — Medication 900 MILLIGRAM(S): at 09:00

## 2021-03-26 NOTE — ED PROVIDER NOTE - NS ED ROS FT
Review of Systems:  •	CONSTITUTIONAL - + fever, No diaphoresis, No weight change  •	SKIN - No rash  •	HEMATOLOGIC - No abnormal bleeding or bruising  •	EYES - No eye pain, No blurred vision  •	ENT - No change in hearing, No sore throat, No neck pain, No rhinorrhea, No ear pain  •	RESPIRATORY - No shortness of breath, No cough  •	CARDIAC -No chest pain, No palpitations  •	GI - + left sided abdominal pain, + nausea, + vomiting, No diarrhea, No constipation, No bright red blood per rectum or melena. No flank pain  •                 - No dysuria, frequency, hematuria.   •	ENDO - No polydypsia, No polyuria, No heat/cold intolerance  •	MUSCULOSKELETAL - No joint paint, No swelling, No back pain  •	NEUROLOGIC - No numbness, No focal weakness, No headache, No dizziness  All other systems negative, unless specified in HPI

## 2021-03-26 NOTE — ED PROVIDER NOTE - OBJECTIVE STATEMENT
Patient is a 87 yo female BIBEMS from home with PMHx of HLD, HTN, RA, Parkinson's, CKD c/o abdominal pain and nausea/vomiting this morning. Patient had gradual onset of LLQ abdominal pain, sharp, constant, moderate. Associated nausea and 2 x NBNB emesis. Febrile in the ED. Denies chills, chest pain, SOB, cough, diarrhea, dysuria.

## 2021-03-26 NOTE — ED PROVIDER NOTE - CLINICAL SUMMARY MEDICAL DECISION MAKING FREE TEXT BOX
dx testing reviewed.  In my opinion, in patient treatment is medically justifiable and appropriate.  needs IV abx.

## 2021-03-26 NOTE — H&P ADULT - HISTORY OF PRESENT ILLNESS
Pt is an 88F w/ PMH HTN, HLD, Parkinson's disease being admitted for acute colitis and +coronavirus (not COVID-19). Pt reports onset of LLQ pain overnight last night as well as +nausea. Denies diarrhea and vomiting. Last BM yesterday - normal color and consistency. +incontinence. Pt also reports rhinorrhea and PND starting yesterday. Denies HA, dizziness, cough, sore throat, SOB, chest pain, change in urination and change in BM.     In ED, pt had tmax: 101.3 w/ WBC: 17.2, BP: 146/63, HR: 98. BUN/cr: 33/1.1. Lactate: 2.7. CT abdomen: colitis of left colon. Pt was given IV cipro 400mg, IV flagyl 500mg, 1L NS, tylenol 900mg, Morpine 4mg and zofran.

## 2021-03-26 NOTE — H&P ADULT - ATTENDING COMMENTS
Pt is an 88F w/ PMH HTN, HLD, Parkinson's disease being admitted for acute colitis and +coronavirus (not COVID-19). Febrile w/ leukocytosis and slight LA on adm c/w sepsis. CT Abdomen w/ evidence of L sided colitis. Patient given IV Cipro/ Flagyl and 1L NS bolus. Morphine and zofran for symptomatic control. CLD for now.     #L sided Colitis - cont cipro/ flagyl  and maintenance fluids. Pain and nausea control.     #HTN- continue diltiazem and HCTZ     #Parkinsons- Continue pramipexole, aripiprazole, trazodone and sinemet     #CAD - cont asa/plavix/statin       Remainder as above.       Lynn Packer, DO

## 2021-03-26 NOTE — H&P ADULT - ASSESSMENT
ASSESSMENT: Pt is an 88F w/ PMH HTN, HLD, Parkinson's disease being admitted for acute colitis and +coronavirus (not COVID-19).  In ED, pt had tmax: 101.3 w/ WBC: 17.2, BP: 146/63, HR: 98. BUN/cr: 33/1.1. Lactate: 2.7. CT abdomen: colitis of left colon. Pt was given IV cipro 400mg, IV flagyl 500mg, 1L NS, tylenol 900mg, Morpine 4mg and zofran.         PLAN: Case d/w Dr. Packer   - Admit to inpatient level of care - medicine  - Continue IV abx  - Zofran PRN  - Pain management   - Tylenol PRN for fevers  - Cdiff pcr  - NS @ 75cc/hr  - AM labs  - Clear liquid diet   - VTE: SQ heprain  - GI: continue protonix  - Continue home medications

## 2021-03-26 NOTE — H&P ADULT - NSHPPHYSICALEXAM_GEN_ALL_CORE
T(C): 36.6 (03-26-21 @ 11:00), Max: 38.5 (03-26-21 @ 08:19)  HR: 74 (03-26-21 @ 11:00) (74 - 98)  BP: 124/58 (03-26-21 @ 11:00) (124/58 - 146/63)  RR: 18 (03-26-21 @ 11:00) (18 - 18)  SpO2: 96% (03-26-21 @ 11:00) (95% - 96%)    PHYSICAL EXAM:  GENERAL: NAD, AOx3  CHEST/LUNG: Clear to auscultation bilaterally; No rales, rhonchi or wheezing, no dullness to percussion  HEART: S1,S2 Regular rate and rhythm; No murmurs, rubs, or gallops  ABDOMEN: Soft, tenderness to palpation of LLQ and RLQ, nondistended, no rebound tenderness; No palpable masses, no hernias, +BS  EXTREMITIES:  2+ peripheral pulses bilaterally and symmetrically, no clubbing or cyanosis, mild LE edema

## 2021-03-26 NOTE — ED ADULT NURSE NOTE - NSIMPLEMENTINTERV_GEN_ALL_ED
Implemented All Fall with Harm Risk Interventions:  [facility] to call system. Call bell, personal items and telephone within reach. Instruct patient to call for assistance. Room bathroom lighting operational. Non-slip footwear when patient is off stretcher. Physically safe environment: no spills, clutter or unnecessary equipment. Stretcher in lowest position, wheels locked, appropriate side rails in place. Provide visual cue, wrist band, yellow gown, etc. Monitor gait and stability. Monitor for mental status changes and reorient to person, place, and time. Review medications for side effects contributing to fall risk. Reinforce activity limits and safety measures with patient and family. Provide visual clues: red socks.

## 2021-03-26 NOTE — ED ADULT NURSE NOTE - CHIEF COMPLAINT QUOTE
BIBA from home as per patient "It hurts right here (LLQ radiating to Left lower back) it started around eight this morning".

## 2021-03-26 NOTE — ED PROVIDER NOTE - PHYSICAL EXAMINATION
CONSTITUTIONAL: Frail, old female  SKIN: warm, dry  HEAD: Normocephalic; atraumatic.  EYES: no conjunctival injection. PERRL.   ENT: No nasal discharge; airway clear.  NECK: Supple; non tender.  CARD: S1, S2 normal; no murmurs, gallops, or rubs. Regular rate and rhythm.   RESP: No wheezes, rales or rhonchi.  ABD: soft, tender in LLQ, no rebound tenderness or guarding, no CVAT b/l  EXT: Normal ROM.  No clubbing, cyanosis or edema.   LYMPH: No acute cervical adenopathy.  NEURO: Alert, oriented, grossly unremarkable  PSYCH: Cooperative, appropriate. CONSTITUTIONAL: Frail, old female  SKIN: warm, dry  HEAD: Normocephalic; atraumatic.  EYES: no conjunctival injection. PERRL.   ENT: No nasal discharge; airway clear.  NECK: Supple; non tender.  CARD: S1, S2 normal; no murmurs, gallops, or rubs. Regular rate and rhythm.   RESP: No wheezes, rales or rhonchi.  ABD: soft, tender in LLQ, no rebound tenderness, no CVAT b/l  EXT: Normal ROM.  No clubbing, cyanosis or edema.   LYMPH: No acute cervical adenopathy.  NEURO: Alert, oriented, grossly unremarkable  PSYCH: Cooperative, appropriate.

## 2021-03-26 NOTE — ED PROVIDER NOTE - PROGRESS NOTE DETAILS
MQ: Labs, CT abdomen/pelvis, tylenol, zofran, pain control, and reassess ATTENDING NOTE: 89 y/o F presents c/o  ABD pain. There is (+)  LLQ TTP with guarding. CT ordered. MQ: CT shows colitis, elevated WBC, will give IV abx, repeat lactate and admit

## 2021-03-26 NOTE — H&P ADULT - PROBLEM SELECTOR PLAN 3
Continue pramipexole, aripiprazole, trazodone and carbidopa/levodopa/entacapone (non-formulary) Continue pramipexole, aripiprazole, trazodone  Will order regular sinemet for now as carbidopa/levodopa/entacapone is non-formulary. Pharmacy requesting this formulation from [facility]

## 2021-03-27 LAB
ALBUMIN SERPL ELPH-MCNC: 2.8 G/DL — LOW (ref 3.5–5.2)
ALP SERPL-CCNC: 193 U/L — HIGH (ref 30–115)
ALT FLD-CCNC: <5 U/L — SIGNIFICANT CHANGE UP (ref 0–41)
ANION GAP SERPL CALC-SCNC: 12 MMOL/L — SIGNIFICANT CHANGE UP (ref 7–14)
AST SERPL-CCNC: 11 U/L — SIGNIFICANT CHANGE UP (ref 0–41)
BILIRUB SERPL-MCNC: 0.4 MG/DL — SIGNIFICANT CHANGE UP (ref 0.2–1.2)
BUN SERPL-MCNC: 33 MG/DL — HIGH (ref 10–20)
CALCIUM SERPL-MCNC: 8 MG/DL — LOW (ref 8.5–10.1)
CHLORIDE SERPL-SCNC: 108 MMOL/L — SIGNIFICANT CHANGE UP (ref 98–110)
CO2 SERPL-SCNC: 19 MMOL/L — SIGNIFICANT CHANGE UP (ref 17–32)
COVID-19 SPIKE DOMAIN AB INTERP: NEGATIVE — SIGNIFICANT CHANGE UP
COVID-19 SPIKE DOMAIN ANTIBODY RESULT: 0.4 U/ML — SIGNIFICANT CHANGE UP
CREAT SERPL-MCNC: 1.1 MG/DL — SIGNIFICANT CHANGE UP (ref 0.7–1.5)
GLUCOSE SERPL-MCNC: 114 MG/DL — HIGH (ref 70–99)
HCT VFR BLD CALC: 29.6 % — LOW (ref 37–47)
HCT VFR BLD CALC: 29.6 % — LOW (ref 37–47)
HGB BLD-MCNC: 9.4 G/DL — LOW (ref 12–16)
HGB BLD-MCNC: 9.5 G/DL — LOW (ref 12–16)
LACTATE SERPL-SCNC: 1.2 MMOL/L — SIGNIFICANT CHANGE UP (ref 0.7–2)
MCHC RBC-ENTMCNC: 27.8 PG — SIGNIFICANT CHANGE UP (ref 27–31)
MCHC RBC-ENTMCNC: 28.4 PG — SIGNIFICANT CHANGE UP (ref 27–31)
MCHC RBC-ENTMCNC: 31.8 G/DL — LOW (ref 32–37)
MCHC RBC-ENTMCNC: 32.1 G/DL — SIGNIFICANT CHANGE UP (ref 32–37)
MCV RBC AUTO: 87.6 FL — SIGNIFICANT CHANGE UP (ref 81–99)
MCV RBC AUTO: 88.4 FL — SIGNIFICANT CHANGE UP (ref 81–99)
NRBC # BLD: 0 /100 WBCS — SIGNIFICANT CHANGE UP (ref 0–0)
NRBC # BLD: 0 /100 WBCS — SIGNIFICANT CHANGE UP (ref 0–0)
PLATELET # BLD AUTO: 212 K/UL — SIGNIFICANT CHANGE UP (ref 130–400)
PLATELET # BLD AUTO: 235 K/UL — SIGNIFICANT CHANGE UP (ref 130–400)
POTASSIUM SERPL-MCNC: 3.5 MMOL/L — SIGNIFICANT CHANGE UP (ref 3.5–5)
POTASSIUM SERPL-SCNC: 3.5 MMOL/L — SIGNIFICANT CHANGE UP (ref 3.5–5)
PROT SERPL-MCNC: 4.3 G/DL — LOW (ref 6–8)
RBC # BLD: 3.35 M/UL — LOW (ref 4.2–5.4)
RBC # BLD: 3.38 M/UL — LOW (ref 4.2–5.4)
RBC # FLD: 14.4 % — SIGNIFICANT CHANGE UP (ref 11.5–14.5)
RBC # FLD: 14.6 % — HIGH (ref 11.5–14.5)
SARS-COV-2 IGG+IGM SERPL QL IA: 0.4 U/ML — SIGNIFICANT CHANGE UP
SARS-COV-2 IGG+IGM SERPL QL IA: NEGATIVE — SIGNIFICANT CHANGE UP
SODIUM SERPL-SCNC: 139 MMOL/L — SIGNIFICANT CHANGE UP (ref 135–146)
WBC # BLD: 12.66 K/UL — HIGH (ref 4.8–10.8)
WBC # BLD: 9.46 K/UL — SIGNIFICANT CHANGE UP (ref 4.8–10.8)
WBC # FLD AUTO: 12.66 K/UL — HIGH (ref 4.8–10.8)
WBC # FLD AUTO: 9.46 K/UL — SIGNIFICANT CHANGE UP (ref 4.8–10.8)

## 2021-03-27 PROCEDURE — 99233 SBSQ HOSP IP/OBS HIGH 50: CPT

## 2021-03-27 RX ADMIN — HEPARIN SODIUM 5000 UNIT(S): 5000 INJECTION INTRAVENOUS; SUBCUTANEOUS at 13:37

## 2021-03-27 RX ADMIN — HEPARIN SODIUM 5000 UNIT(S): 5000 INJECTION INTRAVENOUS; SUBCUTANEOUS at 05:07

## 2021-03-27 RX ADMIN — PRAMIPEXOLE DIHYDROCHLORIDE 1 MILLIGRAM(S): 0.12 TABLET ORAL at 11:17

## 2021-03-27 RX ADMIN — PRAMIPEXOLE DIHYDROCHLORIDE 1 MILLIGRAM(S): 0.12 TABLET ORAL at 00:04

## 2021-03-27 RX ADMIN — PANTOPRAZOLE SODIUM 40 MILLIGRAM(S): 20 TABLET, DELAYED RELEASE ORAL at 05:07

## 2021-03-27 RX ADMIN — Medication 400 MILLIGRAM(S): at 21:20

## 2021-03-27 RX ADMIN — Medication 100 MILLIGRAM(S): at 21:20

## 2021-03-27 RX ADMIN — Medication 200 MILLIGRAM(S): at 17:39

## 2021-03-27 RX ADMIN — PRAMIPEXOLE DIHYDROCHLORIDE 1 MILLIGRAM(S): 0.12 TABLET ORAL at 17:37

## 2021-03-27 RX ADMIN — Medication 400 MILLIGRAM(S): at 21:23

## 2021-03-27 RX ADMIN — Medication 12.5 MILLIGRAM(S): at 08:53

## 2021-03-27 RX ADMIN — CLOPIDOGREL BISULFATE 75 MILLIGRAM(S): 75 TABLET, FILM COATED ORAL at 11:17

## 2021-03-27 RX ADMIN — HEPARIN SODIUM 5000 UNIT(S): 5000 INJECTION INTRAVENOUS; SUBCUTANEOUS at 21:14

## 2021-03-27 RX ADMIN — Medication 5 MILLIGRAM(S): at 00:02

## 2021-03-27 RX ADMIN — Medication 81 MILLIGRAM(S): at 11:17

## 2021-03-27 RX ADMIN — CHLORHEXIDINE GLUCONATE 1 APPLICATION(S): 213 SOLUTION TOPICAL at 05:08

## 2021-03-27 RX ADMIN — MORPHINE SULFATE 2 MILLIGRAM(S): 50 CAPSULE, EXTENDED RELEASE ORAL at 07:57

## 2021-03-27 RX ADMIN — PRAMIPEXOLE DIHYDROCHLORIDE 1 MILLIGRAM(S): 0.12 TABLET ORAL at 21:14

## 2021-03-27 RX ADMIN — Medication 50 MILLIGRAM(S): at 21:14

## 2021-03-27 RX ADMIN — CARBIDOPA AND LEVODOPA 2 TABLET(S): 25; 100 TABLET ORAL at 11:17

## 2021-03-27 RX ADMIN — SODIUM CHLORIDE 75 MILLILITER(S): 9 INJECTION INTRAMUSCULAR; INTRAVENOUS; SUBCUTANEOUS at 11:17

## 2021-03-27 RX ADMIN — Medication 100 MILLIGRAM(S): at 05:10

## 2021-03-27 RX ADMIN — Medication 360 MILLIGRAM(S): at 05:07

## 2021-03-27 RX ADMIN — Medication 650 MILLIGRAM(S): at 01:12

## 2021-03-27 RX ADMIN — PRAMIPEXOLE DIHYDROCHLORIDE 1 MILLIGRAM(S): 0.12 TABLET ORAL at 05:07

## 2021-03-27 RX ADMIN — MORPHINE SULFATE 2 MILLIGRAM(S): 50 CAPSULE, EXTENDED RELEASE ORAL at 08:15

## 2021-03-27 RX ADMIN — ARIPIPRAZOLE 10 MILLIGRAM(S): 15 TABLET ORAL at 11:17

## 2021-03-27 RX ADMIN — CARBIDOPA AND LEVODOPA 2 TABLET(S): 25; 100 TABLET ORAL at 05:07

## 2021-03-27 RX ADMIN — CARBIDOPA AND LEVODOPA 2 TABLET(S): 25; 100 TABLET ORAL at 21:13

## 2021-03-27 RX ADMIN — Medication 100 MILLIGRAM(S): at 13:36

## 2021-03-27 RX ADMIN — ATORVASTATIN CALCIUM 80 MILLIGRAM(S): 80 TABLET, FILM COATED ORAL at 21:15

## 2021-03-27 RX ADMIN — CARBIDOPA AND LEVODOPA 2 TABLET(S): 25; 100 TABLET ORAL at 17:37

## 2021-03-27 RX ADMIN — Medication 5 MILLIGRAM(S): at 11:17

## 2021-03-27 RX ADMIN — Medication 200 MILLIGRAM(S): at 05:07

## 2021-03-27 RX ADMIN — Medication 650 MILLIGRAM(S): at 01:13

## 2021-03-27 NOTE — PROGRESS NOTE ADULT - ASSESSMENT
Pt is an 88F w/ PMH HTN, HLD, Parkinson's disease being admitted for acute colitis and +coronavirus (not COVID-19). Febrile w/ leukocytosis and slight LA on adm c/w sepsis. CT Abdomen w/ evidence of L sided colitis.     #L sided Colitis  - cont cipro/ flagyl  - continue IVF   - morphine and zofran prn     # Normocytic Anemia   - drop from admission 11--> 9.5 but looking back at prior hgb, she is typically 9-10   - monitor closely     #HTN- continue diltiazem and HCTZ     #Parkinsons- Continue pramipexole, aripiprazole, trazodone and sinemet     #CAD - cont asa/plavix/statin       heparin sq       #Progress Note Handoff:  Pending (specify): improvement in colitis/ PO intake   Family discussion: d/w daughter Veronica, and patient at bedside   Disposition: Home___/SNF___/Other________/Unknown at this time_x_______      Lynn Packre DO

## 2021-03-27 NOTE — PROGRESS NOTE ADULT - SUBJECTIVE AND OBJECTIVE BOX
JON GARCÍA  88y, Female  Allergy: No Known Allergies    Hospital Day: 1d    Patient seen and examined earlier today. Abdominal pain is improving, patient has been tolerating CLD.    PMH/PSH:  PAST MEDICAL & SURGICAL HISTORY:  CRAO (central retinal artery occlusion)  Left    Hyperlipidemia    Rheumatoid arthritis    HTN (hypertension)    Parkinson disease    S/P hernia repair    Status post placement of implantable loop recorder  01/2021    History of hysterectomy        LAST 24-Hr EVENTS:    VITALS:  T(F): 99 (03-27-21 @ 13:29), Max: 100.9 (03-27-21 @ 01:10)  HR: 73 (03-27-21 @ 13:29)  BP: 102/51 (03-27-21 @ 13:29) (101/50 - 132/62)  RR: 16 (03-27-21 @ 13:29)  SpO2: --        TESTS & MEASUREMENTS:  Weight (Kg): 61.2 (03-26-21 @ 08:23)  BMI (kg/m2): 23.1 (03-26)                          9.5    12.66 )-----------( 212      ( 27 Mar 2021 15:16 )             29.6       03-27    139  |  108  |  33<H>  ----------------------------<  114<H>  3.5   |  19  |  1.1    Ca    8.0<L>      27 Mar 2021 06:43    TPro  4.3<L>  /  Alb  2.8<L>  /  TBili  0.4  /  DBili  x   /  AST  11  /  ALT  <5  /  AlkPhos  193<H>  03-27    LIVER FUNCTIONS - ( 27 Mar 2021 06:43 )  Alb: 2.8 g/dL / Pro: 4.3 g/dL / ALK PHOS: 193 U/L / ALT: <5 U/L / AST: 11 U/L / GGT: x                               RADIOLOGY, ECG, & ADDITIONAL TESTS:  12 Lead ECG:   Ventricular Rate 85 BPM    Atrial Rate 85 BPM    P-R Interval 170 ms    QRS Duration 70 ms    Q-T Interval 362 ms    QTC Calculation(Bazett) 430 ms    P Axis 108 degrees    R Axis 35 degrees    T Axis 17 degrees    Diagnosis Line Normal sinus rhythm  Normal ECG    Confirmed by JON ABERNATHY MD ([id number]) on 3/26/2021 11:18:21 AM (03-26-21 @ 09:02)      RECENT DIAGNOSTIC ORDERS:  Diet, Full Liquid (03-27-21 @ 12:38)  GI PCR Panel, Stool: Routine  Specimen Source: Feces (03-26-21 @ 19:45)      MEDICATIONS:  MEDICATIONS  (STANDING):  ARIPiprazole 10 milliGRAM(s) Oral daily  aspirin  chewable 81 milliGRAM(s) Oral daily  atorvastatin 80 milliGRAM(s) Oral at bedtime  carbidopa/levodopa  25/100 2 Tablet(s) Oral four times a day  carbidopa/levodopa/entacapone 50/200/200 1 Tablet(s) Oral four times a day  chlorhexidine 4% Liquid 1 Application(s) Topical <User Schedule>  ciprofloxacin   IVPB 400 milliGRAM(s) IV Intermittent every 12 hours  clopidogrel Tablet 75 milliGRAM(s) Oral daily  diltiazem    milliGRAM(s) Oral daily  heparin   Injectable 5000 Unit(s) SubCutaneous every 8 hours  hydrochlorothiazide 12.5 milliGRAM(s) Oral daily  metroNIDAZOLE  IVPB 500 milliGRAM(s) IV Intermittent every 8 hours  oxybutynin XL 5 milliGRAM(s) Oral daily  pantoprazole    Tablet 40 milliGRAM(s) Oral before breakfast  pramipexole 1 milliGRAM(s) Oral four times a day  sodium chloride 0.9%. 1000 milliLiter(s) (75 mL/Hr) IV Continuous <Continuous>  traZODone 50 milliGRAM(s) Oral at bedtime    MEDICATIONS  (PRN):  acetaminophen   Tablet .. 650 milliGRAM(s) Oral every 6 hours PRN Temp greater or equal to 38C (100.4F)  ibuprofen  Tablet. 400 milliGRAM(s) Oral three times a day PRN Mild Pain (1 - 3)  melatonin 5 milliGRAM(s) Oral at bedtime PRN Insomnia  morphine  - Injectable 2 milliGRAM(s) IV Push every 6 hours PRN Moderate Pain (4 - 6)  ondansetron Injectable 4 milliGRAM(s) IV Push three times a day PRN Nausea and/or Vomiting      HOME MEDICATIONS:  ARIPiprazole 10 mg oral tablet (03-26)  atorvastatin 80 mg oral tablet (03-26)  carbidopa/levodopa/entacapone 50 mg-200 mg-200 mg oral tablet (03-26)  dilTIAZem 360 mg/24 hours oral capsule, extended release (03-26)  hydroCHLOROthiazide 12.5 mg oral capsule (03-26)  ibuprofen 400 mg oral tablet (03-26)  oxybutynin 5 mg/24 hours oral tablet, extended release (03-26)  Plavix 75 mg oral tablet (03-26)  pramipexole 1 mg oral tablet (03-26)  Protonix 40 mg oral delayed release tablet (03-26)  traZODone 50 mg oral tablet (03-26)      PHYSICAL EXAM:  GENERAL: NAD, AOx3  Lung: Clear to auscultation bilaterally; No rales, rhonchi or wheezing, no dullness to percussion  HEART: S1,S2 Regular rate and rhythm; No murmurs, rubs, or gallops  ABDOMEN: Soft, tenderness to palpation of LLQ and RLQ, nondistended, no rebound tenderness; No palpable masses, no hernias, +BS  EXTREMITIES:  2+ peripheral pulses bilaterally and symmetrically, no clubbing or cyanosis, mild LE edema  Skin: warm well perfused   Neuro: no focal defecits

## 2021-03-28 LAB
ANION GAP SERPL CALC-SCNC: 8 MMOL/L — SIGNIFICANT CHANGE UP (ref 7–14)
APPEARANCE UR: CLEAR — SIGNIFICANT CHANGE UP
BACTERIA # UR AUTO: ABNORMAL /HPF
BILIRUB UR-MCNC: ABNORMAL
BUN SERPL-MCNC: 34 MG/DL — HIGH (ref 10–20)
C DIFF BY PCR RESULT: POSITIVE
C DIFF TOX GENS STL QL NAA+PROBE: SIGNIFICANT CHANGE UP
CALCIUM SERPL-MCNC: 8 MG/DL — LOW (ref 8.5–10.1)
CHLORIDE SERPL-SCNC: 105 MMOL/L — SIGNIFICANT CHANGE UP (ref 98–110)
CO2 SERPL-SCNC: 20 MMOL/L — SIGNIFICANT CHANGE UP (ref 17–32)
COLOR SPEC: YELLOW — SIGNIFICANT CHANGE UP
CREAT SERPL-MCNC: 1.4 MG/DL — SIGNIFICANT CHANGE UP (ref 0.7–1.5)
CULTURE RESULTS: SIGNIFICANT CHANGE UP
DIFF PNL FLD: ABNORMAL
EPI CELLS # UR: ABNORMAL /HPF
GLUCOSE SERPL-MCNC: 122 MG/DL — HIGH (ref 70–99)
GLUCOSE UR QL: NEGATIVE MG/DL — SIGNIFICANT CHANGE UP
HCT VFR BLD CALC: 31.2 % — LOW (ref 37–47)
HGB BLD-MCNC: 10 G/DL — LOW (ref 12–16)
KETONES UR-MCNC: NEGATIVE — SIGNIFICANT CHANGE UP
LEUKOCYTE ESTERASE UR-ACNC: ABNORMAL
MCHC RBC-ENTMCNC: 27.8 PG — SIGNIFICANT CHANGE UP (ref 27–31)
MCHC RBC-ENTMCNC: 32.1 G/DL — SIGNIFICANT CHANGE UP (ref 32–37)
MCV RBC AUTO: 86.7 FL — SIGNIFICANT CHANGE UP (ref 81–99)
NITRITE UR-MCNC: POSITIVE
NRBC # BLD: 0 /100 WBCS — SIGNIFICANT CHANGE UP (ref 0–0)
PH UR: 5.5 — SIGNIFICANT CHANGE UP (ref 5–8)
PLATELET # BLD AUTO: 247 K/UL — SIGNIFICANT CHANGE UP (ref 130–400)
POTASSIUM SERPL-MCNC: 3.3 MMOL/L — LOW (ref 3.5–5)
POTASSIUM SERPL-SCNC: 3.3 MMOL/L — LOW (ref 3.5–5)
PROT UR-MCNC: 30 MG/DL
RBC # BLD: 3.6 M/UL — LOW (ref 4.2–5.4)
RBC # FLD: 14.3 % — SIGNIFICANT CHANGE UP (ref 11.5–14.5)
SODIUM SERPL-SCNC: 133 MMOL/L — LOW (ref 135–146)
SP GR SPEC: 1.02 — SIGNIFICANT CHANGE UP (ref 1.01–1.03)
SPECIMEN SOURCE: SIGNIFICANT CHANGE UP
UROBILINOGEN FLD QL: 0.2 MG/DL — SIGNIFICANT CHANGE UP (ref 0.2–0.2)
WBC # BLD: 11.11 K/UL — HIGH (ref 4.8–10.8)
WBC # FLD AUTO: 11.11 K/UL — HIGH (ref 4.8–10.8)
WBC UR QL: SIGNIFICANT CHANGE UP /HPF

## 2021-03-28 PROCEDURE — 99233 SBSQ HOSP IP/OBS HIGH 50: CPT

## 2021-03-28 PROCEDURE — 71045 X-RAY EXAM CHEST 1 VIEW: CPT | Mod: 26

## 2021-03-28 PROCEDURE — 99223 1ST HOSP IP/OBS HIGH 75: CPT

## 2021-03-28 RX ORDER — PANTOPRAZOLE SODIUM 20 MG/1
40 TABLET, DELAYED RELEASE ORAL
Refills: 0 | Status: DISCONTINUED | OUTPATIENT
Start: 2021-03-28 | End: 2021-04-03

## 2021-03-28 RX ORDER — POTASSIUM CHLORIDE 20 MEQ
20 PACKET (EA) ORAL ONCE
Refills: 0 | Status: COMPLETED | OUTPATIENT
Start: 2021-03-28 | End: 2021-03-28

## 2021-03-28 RX ORDER — POTASSIUM CHLORIDE 20 MEQ
20 PACKET (EA) ORAL ONCE
Refills: 0 | Status: DISCONTINUED | OUTPATIENT
Start: 2021-03-28 | End: 2021-03-28

## 2021-03-28 RX ORDER — POTASSIUM CHLORIDE 20 MEQ
20 PACKET (EA) ORAL
Refills: 0 | Status: COMPLETED | OUTPATIENT
Start: 2021-03-28 | End: 2021-03-28

## 2021-03-28 RX ORDER — VANCOMYCIN HCL 1 G
125 VIAL (EA) INTRAVENOUS EVERY 6 HOURS
Refills: 0 | Status: DISCONTINUED | OUTPATIENT
Start: 2021-03-28 | End: 2021-04-03

## 2021-03-28 RX ADMIN — PANTOPRAZOLE SODIUM 40 MILLIGRAM(S): 20 TABLET, DELAYED RELEASE ORAL at 06:19

## 2021-03-28 RX ADMIN — Medication 100 MILLIGRAM(S): at 06:16

## 2021-03-28 RX ADMIN — Medication 360 MILLIGRAM(S): at 06:19

## 2021-03-28 RX ADMIN — Medication 50 MILLIEQUIVALENT(S): at 14:02

## 2021-03-28 RX ADMIN — CARBIDOPA AND LEVODOPA 2 TABLET(S): 25; 100 TABLET ORAL at 19:59

## 2021-03-28 RX ADMIN — HEPARIN SODIUM 5000 UNIT(S): 5000 INJECTION INTRAVENOUS; SUBCUTANEOUS at 06:19

## 2021-03-28 RX ADMIN — Medication 100 MILLIGRAM(S): at 14:02

## 2021-03-28 RX ADMIN — PRAMIPEXOLE DIHYDROCHLORIDE 1 MILLIGRAM(S): 0.12 TABLET ORAL at 19:59

## 2021-03-28 RX ADMIN — CARBIDOPA AND LEVODOPA 2 TABLET(S): 25; 100 TABLET ORAL at 06:17

## 2021-03-28 RX ADMIN — Medication 125 MILLIGRAM(S): at 17:46

## 2021-03-28 RX ADMIN — Medication 200 MILLIGRAM(S): at 17:46

## 2021-03-28 RX ADMIN — Medication 200 MILLIGRAM(S): at 06:19

## 2021-03-28 RX ADMIN — PRAMIPEXOLE DIHYDROCHLORIDE 1 MILLIGRAM(S): 0.12 TABLET ORAL at 17:46

## 2021-03-28 RX ADMIN — Medication 5 MILLIGRAM(S): at 11:20

## 2021-03-28 RX ADMIN — Medication 12.5 MILLIGRAM(S): at 06:20

## 2021-03-28 RX ADMIN — PRAMIPEXOLE DIHYDROCHLORIDE 1 MILLIGRAM(S): 0.12 TABLET ORAL at 06:20

## 2021-03-28 RX ADMIN — ARIPIPRAZOLE 10 MILLIGRAM(S): 15 TABLET ORAL at 11:21

## 2021-03-28 RX ADMIN — PRAMIPEXOLE DIHYDROCHLORIDE 1 MILLIGRAM(S): 0.12 TABLET ORAL at 11:20

## 2021-03-28 RX ADMIN — Medication 125 MILLIGRAM(S): at 20:00

## 2021-03-28 RX ADMIN — Medication 100 MILLIGRAM(S): at 20:08

## 2021-03-28 RX ADMIN — ONDANSETRON 4 MILLIGRAM(S): 8 TABLET, FILM COATED ORAL at 08:05

## 2021-03-28 RX ADMIN — CHLORHEXIDINE GLUCONATE 1 APPLICATION(S): 213 SOLUTION TOPICAL at 06:17

## 2021-03-28 RX ADMIN — Medication 50 MILLIGRAM(S): at 20:00

## 2021-03-28 RX ADMIN — Medication 20 MILLIEQUIVALENT(S): at 18:24

## 2021-03-28 RX ADMIN — ATORVASTATIN CALCIUM 80 MILLIGRAM(S): 80 TABLET, FILM COATED ORAL at 20:00

## 2021-03-28 RX ADMIN — SODIUM CHLORIDE 75 MILLILITER(S): 9 INJECTION INTRAMUSCULAR; INTRAVENOUS; SUBCUTANEOUS at 06:22

## 2021-03-28 RX ADMIN — ONDANSETRON 4 MILLIGRAM(S): 8 TABLET, FILM COATED ORAL at 02:53

## 2021-03-28 RX ADMIN — CARBIDOPA AND LEVODOPA 2 TABLET(S): 25; 100 TABLET ORAL at 17:45

## 2021-03-28 RX ADMIN — PANTOPRAZOLE SODIUM 40 MILLIGRAM(S): 20 TABLET, DELAYED RELEASE ORAL at 17:46

## 2021-03-28 RX ADMIN — CARBIDOPA AND LEVODOPA 2 TABLET(S): 25; 100 TABLET ORAL at 11:20

## 2021-03-28 NOTE — CONSULT NOTE ADULT - ASSESSMENT
89 y/o F with HTN, HL, Parkinson's so presents with abd pain, found to have colitis and is coronavirus positive.     Colitis - likely 2/2 coronavirus; may also be other infectious colitis, less likely ischemic given pt was not hypotensive before, less likely inflammatory given pt has not had previous episodes of this  - please send C.dif and GI PCR to r/o other causes given pt is now having new diarrhea  - cont cipro/flagyl for now  - may need colonoscopy vs. repeat CT as outpt for resolution (pt does not want colonoscopy)  - diet as tolerated

## 2021-03-28 NOTE — PROGRESS NOTE ADULT - ASSESSMENT
Pt is an 88F w/ PMH HTN, HLD, Parkinson's disease being admitted for acute colitis and +coronavirus (not COVID-19). Febrile w/ leukocytosis and slight LA on adm c/w sepsis. CT Abdomen w/ evidence of L sided colitis.     #L sided Colitis  #C. Diff Colitis   #Gastroenteritis 2/2 Coronavirus   - likely 2/2 coronavirus vs c.diff  - cont cipro/ flagyl and PO Vanc  - continue IVF   - morphine and zofran prn   GI consult:   - may need colonoscopy vs. repeat CT as outpt for resolution (pt does not want colonoscopy)  - diet as tolerated    #c/f Coffee Ground Emesis   - dark vomitus this am, c/f hematemesis   - NPO for now, IV PPI   - CBC BID   - GI following   - holding home ASA/plavix for  now     # Normocytic Anemia   - drop from admission 11--> 9.5 but looking back at prior hgb, she is typically 9-10   - monitor closely     #HTN- continue diltiazem and HCTZ     #Parkinsons- Continue pramipexole, aripiprazole, trazodone and sinemet     #CAD - holding asa/plavix for now, cont statin         #Progress Note Handoff:  Pending (specify): improvement in colitis/ PO intake   Family discussion: d/w daughter Veronica regarding cdiff and GI reccomendations. Daughter is immunocompromised, so will need to help make arrangements for rehab vs safe home plan since pt is now diagnosed w/ cdiff.   Disposition: Home___/SNF___/Other________/Unknown at this time_x_______      Lynn Packer, DO

## 2021-03-28 NOTE — PROGRESS NOTE ADULT - SUBJECTIVE AND OBJECTIVE BOX
JON GARCÍA  88y, Female  Allergy: No Known Allergies    Hospital Day: 2d    Patient seen and examined earlier today. Patient was nausea/ dark coffee ground emesis overnight and this morning, I personally saw the sample at bedside.     PMH/PSH:  PAST MEDICAL & SURGICAL HISTORY:  CRAO (central retinal artery occlusion)  Left    Hyperlipidemia    Rheumatoid arthritis    HTN (hypertension)    Parkinson disease    S/P hernia repair    Status post placement of implantable loop recorder  2021    History of hysterectomy        LAST 24-Hr EVENTS:    VITALS:  T(F): 98.4 (21 @ 14:14), Max: 99.5 (21 @ 21:56)  HR: 64 (21 @ 14:14)  BP: 109/51 (21 @ 14:14) (109/51 - 118/56)  RR: 16 (21 @ 14:14)  SpO2: --        TESTS & MEASUREMENTS:  Weight (Kg):   BMI (kg/m2): 23.1 ()                          10.0   11.11 )-----------( 247      ( 28 Mar 2021 06:45 )             31.2           133<L>  |  105  |  34<H>  ----------------------------<  122<H>  3.3<L>   |  20  |  1.4    Ca    8.0<L>      28 Mar 2021 06:45    TPro  4.3<L>  /  Alb  2.8<L>  /  TBili  0.4  /  DBili  x   /  AST  11  /  ALT  <5  /  AlkPhos  193<H>      LIVER FUNCTIONS - ( 27 Mar 2021 06:43 )  Alb: 2.8 g/dL / Pro: 4.3 g/dL / ALK PHOS: 193 U/L / ALT: <5 U/L / AST: 11 U/L / GGT: x                 Urinalysis Basic - ( 28 Mar 2021 09:45 )    Color: Yellow / Appearance: Clear / S.025 / pH: x  Gluc: x / Ketone: Negative  / Bili: Small / Urobili: 0.2 mg/dL   Blood: x / Protein: 30 mg/dL / Nitrite: Positive   Leuk Esterase: Trace / RBC: x / WBC 1-2 /HPF   Sq Epi: x / Non Sq Epi: Moderate /HPF / Bacteria: TNTC /HPF                  RADIOLOGY, ECG, & ADDITIONAL TESTS:      RECENT DIAGNOSTIC ORDERS:  Diet, NPO (21 @ 08:30)      MEDICATIONS:  MEDICATIONS  (STANDING):  ARIPiprazole 10 milliGRAM(s) Oral daily  atorvastatin 80 milliGRAM(s) Oral at bedtime  carbidopa/levodopa  25/100 2 Tablet(s) Oral four times a day  carbidopa/levodopa/entacapone 50/200/200 1 Tablet(s) Oral four times a day  chlorhexidine 4% Liquid 1 Application(s) Topical <User Schedule>  ciprofloxacin   IVPB 400 milliGRAM(s) IV Intermittent every 12 hours  diltiazem    milliGRAM(s) Oral daily  hydrochlorothiazide 12.5 milliGRAM(s) Oral daily  metroNIDAZOLE  IVPB 500 milliGRAM(s) IV Intermittent every 8 hours  oxybutynin XL 5 milliGRAM(s) Oral daily  pantoprazole    Tablet 40 milliGRAM(s) Oral before breakfast  pantoprazole  Injectable 40 milliGRAM(s) IV Push two times a day  potassium chloride  20 mEq/100 mL IVPB 20 milliEquivalent(s) IV Intermittent every 2 hours  pramipexole 1 milliGRAM(s) Oral four times a day  sodium chloride 0.9%. 1000 milliLiter(s) (75 mL/Hr) IV Continuous <Continuous>  traZODone 50 milliGRAM(s) Oral at bedtime    MEDICATIONS  (PRN):  acetaminophen   Tablet .. 650 milliGRAM(s) Oral every 6 hours PRN Temp greater or equal to 38C (100.4F)  ibuprofen  Tablet. 400 milliGRAM(s) Oral three times a day PRN Mild Pain (1 - 3)  melatonin 5 milliGRAM(s) Oral at bedtime PRN Insomnia  morphine  - Injectable 2 milliGRAM(s) IV Push every 6 hours PRN Moderate Pain (4 - 6)  ondansetron Injectable 4 milliGRAM(s) IV Push three times a day PRN Nausea and/or Vomiting      HOME MEDICATIONS:  ARIPiprazole 10 mg oral tablet ()  atorvastatin 80 mg oral tablet ()  carbidopa/levodopa/entacapone 50 mg-200 mg-200 mg oral tablet ()  dilTIAZem 360 mg/24 hours oral capsule, extended release ()  hydroCHLOROthiazide 12.5 mg oral capsule ()  ibuprofen 400 mg oral tablet ()  oxybutynin 5 mg/24 hours oral tablet, extended release ()  Plavix 75 mg oral tablet ()  pramipexole 1 mg oral tablet ()  Protonix 40 mg oral delayed release tablet ()  traZODone 50 mg oral tablet ()      PHYSICAL EXAM:  GENERAL: NAD, AOx3  HNENT: NCAT MMM  Lung: Clear to auscultation bilaterally; No rales, rhonchi or wheezing, no dullness to percussion  HEART: S1,S2 Regular rate and rhythm; No murmurs, rubs, or gallops  ABDOMEN: Soft, tenderness to palpation of LLQ and RLQ, nondistended, no rebound tenderness; No palpable masses, no hernias, +BS  EXTREMITIES:  2+ peripheral pulses bilaterally and symmetrically, no clubbing or cyanosis, mild LE edema  Skin: warm well perfused   Neuro: no focal defecits

## 2021-03-28 NOTE — CONSULT NOTE ADULT - SUBJECTIVE AND OBJECTIVE BOX
Gastroenterology Consultation:    Patient is a 88y old  Female who presents with a chief complaint of Acute Colitis (27 Mar 2021 18:05)      Admitted on: 03-26-21  HPI:  89 y/o with HTN, HL, Parkinson's who presents with abd pain.    Pt reports acute onset of LLQ pain the night of admission, with associated with nausea. Denies any diarrhea, blood in stool, episodes of hypotension prior to the pain starting. She reports she was having regular brown bowel movements. Also reports rhinorrhea that started the same day. Has not had colonoscopy in the past.     In the ER pt was febrile to 101.3, had WBC 17.2, CT showed colitis of left colon, found to be positive for coronavirus (not COVID-19). Reports overnight after admission she had a few episodes of diarrhea and is still feeling nauseous.         PAST MEDICAL & SURGICAL HISTORY:  CRAO (central retinal artery occlusion)  Left    Hyperlipidemia    Rheumatoid arthritis    HTN (hypertension)    Parkinson disease    S/P hernia repair    Status post placement of implantable loop recorder  01/2021    History of hysterectomy        FAMILY HISTORY:  FH: hypertension (Mother)        Social History:  Tobacco: denies  Alcohol: denies  Drugs: denies    Home Medications:  ARIPiprazole 10 mg oral tablet: 1 tab(s) orally once a day (26 Mar 2021 11:38)  atorvastatin 80 mg oral tablet: 1 tab(s) orally once a day (at bedtime) (26 Mar 2021 11:38)  carbidopa/levodopa/entacapone 50 mg-200 mg-200 mg oral tablet: 1 tab(s) orally 4 times a day (26 Mar 2021 11:38)  dilTIAZem 360 mg/24 hours oral capsule, extended release: 1 cap(s) orally once a day (26 Mar 2021 11:38)  hydroCHLOROthiazide 12.5 mg oral capsule: 1 cap(s) orally once a day (26 Mar 2021 11:38)  ibuprofen 400 mg oral tablet: 1 tab(s) orally 3 times a day, As Needed for pain relief (26 Mar 2021 11:38)  oxybutynin 5 mg/24 hours oral tablet, extended release: 1 tab(s) orally once a day (26 Mar 2021 11:38)  Plavix 75 mg oral tablet: 1 tab(s) orally once a day (26 Mar 2021 11:38)  pramipexole 1 mg oral tablet: 1 tab(s) orally 4 times a day (26 Mar 2021 11:38)  Protonix 40 mg oral delayed release tablet: 1 tab(s) orally once a day (26 Mar 2021 11:38)  traZODone 50 mg oral tablet: 1 tab(s) orally once a day (at bedtime) (26 Mar 2021 11:38)    MEDICATIONS  (STANDING):  ARIPiprazole 10 milliGRAM(s) Oral daily  atorvastatin 80 milliGRAM(s) Oral at bedtime  carbidopa/levodopa  25/100 2 Tablet(s) Oral four times a day  carbidopa/levodopa/entacapone 50/200/200 1 Tablet(s) Oral four times a day  chlorhexidine 4% Liquid 1 Application(s) Topical <User Schedule>  ciprofloxacin   IVPB 400 milliGRAM(s) IV Intermittent every 12 hours  diltiazem    milliGRAM(s) Oral daily  hydrochlorothiazide 12.5 milliGRAM(s) Oral daily  metroNIDAZOLE  IVPB 500 milliGRAM(s) IV Intermittent every 8 hours  oxybutynin XL 5 milliGRAM(s) Oral daily  pantoprazole    Tablet 40 milliGRAM(s) Oral before breakfast  pantoprazole  Injectable 40 milliGRAM(s) IV Push two times a day  potassium chloride  20 mEq/100 mL IVPB 20 milliEquivalent(s) IV Intermittent every 2 hours  pramipexole 1 milliGRAM(s) Oral four times a day  sodium chloride 0.9%. 1000 milliLiter(s) (75 mL/Hr) IV Continuous <Continuous>  traZODone 50 milliGRAM(s) Oral at bedtime    MEDICATIONS  (PRN):  acetaminophen   Tablet .. 650 milliGRAM(s) Oral every 6 hours PRN Temp greater or equal to 38C (100.4F)  ibuprofen  Tablet. 400 milliGRAM(s) Oral three times a day PRN Mild Pain (1 - 3)  melatonin 5 milliGRAM(s) Oral at bedtime PRN Insomnia  morphine  - Injectable 2 milliGRAM(s) IV Push every 6 hours PRN Moderate Pain (4 - 6)  ondansetron Injectable 4 milliGRAM(s) IV Push three times a day PRN Nausea and/or Vomiting      Allergies  No Known Allergies      Review of Systems:   Constitutional:  No Fever, No Chills  ENT/Mouth:  No Hearing Changes,  No Difficulty Swallowing  Eyes:  No Eye Pain, No Vision Changes  Cardiovascular:  No Chest Pain, No Palpitations  Respiratory:  No Cough, No Dyspnea  Gastrointestinal:  As described in HPI  Musculoskeletal:  No Joint Swelling, No Back Pain  Skin:  No Skin Lesions, No Jaundice  Neuro:  No Syncope, No Dizziness  Heme/Lymph:  No Bruising, No Bleeding.          Physical Examination:  T(C): 36.6 (03-28-21 @ 05:52), Max: 37.5 (03-27-21 @ 21:56)  HR: 101 (03-28-21 @ 05:52) (73 - 101)  BP: 111/56 (03-28-21 @ 05:52) (102/51 - 118/56)  RR: 16 (03-28-21 @ 05:52) (16 - 16)  SpO2: --        Constitutional: No acute distress.  Eyes:. Conjunctivae are clear, Sclera is non-icteric.  Ears Nose and Throat: The external ears are normal appearing,  Oral mucosa is pink and moist.  Respiratory:  No signs of respiratory distress. Lung sounds are clear bilaterally.  Cardiovascular:  S1 S2, Regular rate and rhythm.  GI: Abdomen is soft, symmetric, and non-tender without distention. There are no visible lesions or scars. Bowel sounds are present and normoactive in all four quadrants. No masses, hepatomegaly, or splenomegaly are noted.   Neuro: No Tremor, No involuntary movements  Skin: No rashes, No Jaundice.          Data: (reviewed by attending)                        10.0   11.11 )-----------( 247      ( 28 Mar 2021 06:45 )             31.2     Hgb Trend:  10.0  03-28-21 @ 06:45  9.5  03-27-21 @ 15:16  9.4  03-27-21 @ 06:43  11.4  03-26-21 @ 08:37      03-28    133<L>  |  105  |  34<H>  ----------------------------<  122<H>  3.3<L>   |  20  |  1.4    Ca    8.0<L>      28 Mar 2021 06:45    TPro  4.3<L>  /  Alb  2.8<L>  /  TBili  0.4  /  DBili  x   /  AST  11  /  ALT  <5  /  AlkPhos  193<H>  03-27    Liver panel trend:  TBili 0.4   /   AST 11   /   ALT <5   /   AlkP 193   /   Tptn 4.3   /   Alb 2.8    /   DBili --      03-27  TBili 0.4   /   AST 14   /   ALT <5   /   AlkP 232   /   Tptn 6.1   /   Alb 3.8    /   DBili --      03-26              Radiology:(reviewed by attending)

## 2021-03-29 LAB
ANION GAP SERPL CALC-SCNC: 11 MMOL/L — SIGNIFICANT CHANGE UP (ref 7–14)
BUN SERPL-MCNC: 42 MG/DL — HIGH (ref 10–20)
CALCIUM SERPL-MCNC: 7.9 MG/DL — LOW (ref 8.5–10.1)
CHLORIDE SERPL-SCNC: 105 MMOL/L — SIGNIFICANT CHANGE UP (ref 98–110)
CO2 SERPL-SCNC: 21 MMOL/L — SIGNIFICANT CHANGE UP (ref 17–32)
CREAT SERPL-MCNC: 1.8 MG/DL — HIGH (ref 0.7–1.5)
CULTURE RESULTS: SIGNIFICANT CHANGE UP
GLUCOSE SERPL-MCNC: 131 MG/DL — HIGH (ref 70–99)
HCT VFR BLD CALC: 30.4 % — LOW (ref 37–47)
HGB BLD-MCNC: 9.8 G/DL — LOW (ref 12–16)
MCHC RBC-ENTMCNC: 27.9 PG — SIGNIFICANT CHANGE UP (ref 27–31)
MCHC RBC-ENTMCNC: 32.2 G/DL — SIGNIFICANT CHANGE UP (ref 32–37)
MCV RBC AUTO: 86.6 FL — SIGNIFICANT CHANGE UP (ref 81–99)
NRBC # BLD: 0 /100 WBCS — SIGNIFICANT CHANGE UP (ref 0–0)
PLATELET # BLD AUTO: 258 K/UL — SIGNIFICANT CHANGE UP (ref 130–400)
POTASSIUM SERPL-MCNC: 3.8 MMOL/L — SIGNIFICANT CHANGE UP (ref 3.5–5)
POTASSIUM SERPL-SCNC: 3.8 MMOL/L — SIGNIFICANT CHANGE UP (ref 3.5–5)
RBC # BLD: 3.51 M/UL — LOW (ref 4.2–5.4)
RBC # FLD: 14.5 % — SIGNIFICANT CHANGE UP (ref 11.5–14.5)
SODIUM SERPL-SCNC: 137 MMOL/L — SIGNIFICANT CHANGE UP (ref 135–146)
SPECIMEN SOURCE: SIGNIFICANT CHANGE UP
WBC # BLD: 10.95 K/UL — HIGH (ref 4.8–10.8)
WBC # FLD AUTO: 10.95 K/UL — HIGH (ref 4.8–10.8)

## 2021-03-29 PROCEDURE — 99233 SBSQ HOSP IP/OBS HIGH 50: CPT

## 2021-03-29 RX ORDER — ASPIRIN/CALCIUM CARB/MAGNESIUM 324 MG
81 TABLET ORAL DAILY
Refills: 0 | Status: DISCONTINUED | OUTPATIENT
Start: 2021-03-29 | End: 2021-04-03

## 2021-03-29 RX ORDER — CLOPIDOGREL BISULFATE 75 MG/1
75 TABLET, FILM COATED ORAL DAILY
Refills: 0 | Status: DISCONTINUED | OUTPATIENT
Start: 2021-03-29 | End: 2021-04-03

## 2021-03-29 RX ORDER — HEPARIN SODIUM 5000 [USP'U]/ML
5000 INJECTION INTRAVENOUS; SUBCUTANEOUS EVERY 12 HOURS
Refills: 0 | Status: DISCONTINUED | OUTPATIENT
Start: 2021-03-29 | End: 2021-04-03

## 2021-03-29 RX ADMIN — Medication 200 MILLIGRAM(S): at 17:52

## 2021-03-29 RX ADMIN — Medication 100 MILLIGRAM(S): at 13:51

## 2021-03-29 RX ADMIN — CHLORHEXIDINE GLUCONATE 1 APPLICATION(S): 213 SOLUTION TOPICAL at 06:03

## 2021-03-29 RX ADMIN — ARIPIPRAZOLE 10 MILLIGRAM(S): 15 TABLET ORAL at 12:07

## 2021-03-29 RX ADMIN — SODIUM CHLORIDE 75 MILLILITER(S): 9 INJECTION INTRAMUSCULAR; INTRAVENOUS; SUBCUTANEOUS at 06:02

## 2021-03-29 RX ADMIN — Medication 360 MILLIGRAM(S): at 06:03

## 2021-03-29 RX ADMIN — Medication 125 MILLIGRAM(S): at 17:53

## 2021-03-29 RX ADMIN — PRAMIPEXOLE DIHYDROCHLORIDE 1 MILLIGRAM(S): 0.12 TABLET ORAL at 23:55

## 2021-03-29 RX ADMIN — PANTOPRAZOLE SODIUM 40 MILLIGRAM(S): 20 TABLET, DELAYED RELEASE ORAL at 17:52

## 2021-03-29 RX ADMIN — CARBIDOPA AND LEVODOPA 2 TABLET(S): 25; 100 TABLET ORAL at 17:52

## 2021-03-29 RX ADMIN — Medication 125 MILLIGRAM(S): at 06:04

## 2021-03-29 RX ADMIN — PANTOPRAZOLE SODIUM 40 MILLIGRAM(S): 20 TABLET, DELAYED RELEASE ORAL at 06:53

## 2021-03-29 RX ADMIN — Medication 100 MILLIGRAM(S): at 21:04

## 2021-03-29 RX ADMIN — PANTOPRAZOLE SODIUM 40 MILLIGRAM(S): 20 TABLET, DELAYED RELEASE ORAL at 06:04

## 2021-03-29 RX ADMIN — Medication 125 MILLIGRAM(S): at 12:08

## 2021-03-29 RX ADMIN — Medication 125 MILLIGRAM(S): at 23:55

## 2021-03-29 RX ADMIN — CARBIDOPA AND LEVODOPA 2 TABLET(S): 25; 100 TABLET ORAL at 23:55

## 2021-03-29 RX ADMIN — Medication 5 MILLIGRAM(S): at 12:08

## 2021-03-29 RX ADMIN — MORPHINE SULFATE 2 MILLIGRAM(S): 50 CAPSULE, EXTENDED RELEASE ORAL at 18:11

## 2021-03-29 RX ADMIN — Medication 100 MILLIGRAM(S): at 06:03

## 2021-03-29 RX ADMIN — CARBIDOPA AND LEVODOPA 2 TABLET(S): 25; 100 TABLET ORAL at 12:07

## 2021-03-29 RX ADMIN — CARBIDOPA AND LEVODOPA 2 TABLET(S): 25; 100 TABLET ORAL at 06:03

## 2021-03-29 RX ADMIN — ATORVASTATIN CALCIUM 80 MILLIGRAM(S): 80 TABLET, FILM COATED ORAL at 21:04

## 2021-03-29 RX ADMIN — Medication 12.5 MILLIGRAM(S): at 06:04

## 2021-03-29 RX ADMIN — Medication 50 MILLIGRAM(S): at 21:05

## 2021-03-29 RX ADMIN — PRAMIPEXOLE DIHYDROCHLORIDE 1 MILLIGRAM(S): 0.12 TABLET ORAL at 06:04

## 2021-03-29 RX ADMIN — PRAMIPEXOLE DIHYDROCHLORIDE 1 MILLIGRAM(S): 0.12 TABLET ORAL at 12:08

## 2021-03-29 RX ADMIN — Medication 200 MILLIGRAM(S): at 06:03

## 2021-03-29 RX ADMIN — SODIUM CHLORIDE 75 MILLILITER(S): 9 INJECTION INTRAMUSCULAR; INTRAVENOUS; SUBCUTANEOUS at 21:03

## 2021-03-29 RX ADMIN — MORPHINE SULFATE 2 MILLIGRAM(S): 50 CAPSULE, EXTENDED RELEASE ORAL at 18:41

## 2021-03-29 RX ADMIN — PRAMIPEXOLE DIHYDROCHLORIDE 1 MILLIGRAM(S): 0.12 TABLET ORAL at 17:53

## 2021-03-29 NOTE — PROGRESS NOTE ADULT - SUBJECTIVE AND OBJECTIVE BOX
JON GARCÍA  88y, Female  Allergy: No Known Allergies    Hospital Day: 3d    Patient seen and examined earlier today. Continues to have some dark emesis this morning. Will continue to hold off on PO intake for now, pt agreeable.     PMH/PSH:  PAST MEDICAL & SURGICAL HISTORY:  CRAO (central retinal artery occlusion)  Left    Hyperlipidemia    Rheumatoid arthritis    HTN (hypertension)    Parkinson disease    S/P hernia repair    Status post placement of implantable loop recorder  2021    History of hysterectomy        LAST 24-Hr EVENTS:    VITALS:  T(F): 99 (21 @ 14:47), Max: 99 (21 @ 14:47)  HR: 82 (21 @ 14:47)  BP: 137/63 (21 @ 14:47) (118/55 - 137/63)  RR: 16 (21 @ 14:47)  SpO2: --        TESTS & MEASUREMENTS:  Weight (Kg):   BMI (kg/m2): 23.1 ()    21 @ 07:01  -  21 @ 07:00  --------------------------------------------------------  IN: 0 mL / OUT: 300 mL / NET: -300 mL    21 @ 07:01  -  21 @ 18:11  --------------------------------------------------------  IN: 0 mL / OUT: 400 mL / NET: -400 mL                            9.8    10.95 )-----------( 258      ( 29 Mar 2021 06:49 )             30.4           137  |  105  |  42<H>  ----------------------------<  131<H>  3.8   |  21  |  1.8<H>    Ca    7.9<L>      29 Mar 2021 06:49              Culture - Urine (collected 21 @ 09:45)  Source: .Urine Clean Catch (Midstream)  Final Report (21 @ 11:31):    >=3 organisms. Probable collection contamination.    GI PCR Panel, Stool (collected 21 @ 09:44)  Source: .Stool Feces  Final Report (21 @ 19:57):    GI PCR Results: NOT detected    *******Please Note:*******    GI panel PCR evaluates for:    Campylobacter, Plesiomonas shigelloides, Salmonella,    Vibrio, Yersinia enterocolitica, Enteroaggregative    Escherichia coli (EAEC), Enteropathogenic E.coli (EPEC),    Enterotoxigenic E. coli (ETEC) lt/st, Shiga-like    toxin-producing E. coli (STEC) stx1/stx2,    Shigella/ Enteroinvasive E. coli (EIEC), Cryptosporidium,    Cyclospora cayetanensis, Entamoeba histolytica,    Giardia lamblia, Adenovirus F 40/41, Astrovirus,    Norovirus GI/GII, Rotavirus A, Sapovirus      Urinalysis Basic - ( 28 Mar 2021 09:45 )    Color: Yellow / Appearance: Clear / S.025 / pH: x  Gluc: x / Ketone: Negative  / Bili: Small / Urobili: 0.2 mg/dL   Blood: x / Protein: 30 mg/dL / Nitrite: Positive   Leuk Esterase: Trace / RBC: x / WBC 1-2 /HPF   Sq Epi: x / Non Sq Epi: Moderate /HPF / Bacteria: TNTC /HPF                  RADIOLOGY, ECG, & ADDITIONAL TESTS:      RECENT DIAGNOSTIC ORDERS:      MEDICATIONS:  MEDICATIONS  (STANDING):  ARIPiprazole 10 milliGRAM(s) Oral daily  atorvastatin 80 milliGRAM(s) Oral at bedtime  carbidopa/levodopa  25/100 2 Tablet(s) Oral four times a day  carbidopa/levodopa/entacapone 50/200/200 1 Tablet(s) Oral four times a day  chlorhexidine 4% Liquid 1 Application(s) Topical <User Schedule>  ciprofloxacin   IVPB 400 milliGRAM(s) IV Intermittent every 12 hours  diltiazem    milliGRAM(s) Oral daily  hydrochlorothiazide 12.5 milliGRAM(s) Oral daily  metroNIDAZOLE  IVPB 500 milliGRAM(s) IV Intermittent every 8 hours  oxybutynin XL 5 milliGRAM(s) Oral daily  pantoprazole  Injectable 40 milliGRAM(s) IV Push two times a day  pramipexole 1 milliGRAM(s) Oral four times a day  sodium chloride 0.9%. 1000 milliLiter(s) (75 mL/Hr) IV Continuous <Continuous>  traZODone 50 milliGRAM(s) Oral at bedtime  vancomycin    Solution 125 milliGRAM(s) Oral every 6 hours    MEDICATIONS  (PRN):  acetaminophen   Tablet .. 650 milliGRAM(s) Oral every 6 hours PRN Temp greater or equal to 38C (100.4F)  ibuprofen  Tablet. 400 milliGRAM(s) Oral three times a day PRN Mild Pain (1 - 3)  melatonin 5 milliGRAM(s) Oral at bedtime PRN Insomnia  morphine  - Injectable 2 milliGRAM(s) IV Push every 6 hours PRN Moderate Pain (4 - 6)  ondansetron Injectable 4 milliGRAM(s) IV Push three times a day PRN Nausea and/or Vomiting      HOME MEDICATIONS:  ARIPiprazole 10 mg oral tablet ()  atorvastatin 80 mg oral tablet ()  carbidopa/levodopa/entacapone 50 mg-200 mg-200 mg oral tablet ()  dilTIAZem 360 mg/24 hours oral capsule, extended release ()  hydroCHLOROthiazide 12.5 mg oral capsule ()  ibuprofen 400 mg oral tablet ()  oxybutynin 5 mg/24 hours oral tablet, extended release ()  Plavix 75 mg oral tablet ()  pramipexole 1 mg oral tablet ()  Protonix 40 mg oral delayed release tablet ()  traZODone 50 mg oral tablet ()      PHYSICAL EXAM:  GENERAL: NAD, AOx3  HNENT: NCAT MMM  Lung: Clear to auscultation bilaterally; No rales, rhonchi or wheezing, no dullness to percussion  HEART: S1,S2 Regular rate and rhythm; No murmurs, rubs, or gallops  ABDOMEN: Soft, tenderness to palpation of LLQ and RLQ, nondistended, no rebound tenderness; No palpable masses, no hernias, +BS  EXTREMITIES:  2+ peripheral pulses bilaterally and symmetrically, no clubbing or cyanosis, mild LE edema  Skin: warm well perfused   Neuro: no focal defecits

## 2021-03-29 NOTE — SWALLOW BEDSIDE ASSESSMENT ADULT - COMMENTS
Case discussed with MD Murray, pt currently made NPO 2' coffee ground emesis. SLP unable to provide PO at this time, will f/u when medically cleared.

## 2021-03-29 NOTE — PROGRESS NOTE ADULT - ASSESSMENT
87 y/o F with HTN, HL, Parkinson's so presents with abd pain, found to have colitis and is coronavirus positive.     C-diff Colitis   Rec  -Vanco po 125mg q6h for ten days  -Monitor CBC  -Probiotics BID daily for remainder of life to prevent recurrence   -Monitor stool for frequency, consistency blood  - may need colonoscopy vs. repeat CT as outpt for resolution (pt does not want colonoscopy)  - diet as tolerated  - Follow up with our [facility] located at [address] [place] [address]. Phone Number: [phone]     Problem 2-Intermittent vomiting   Rec  -Zofran premedicate before meals   -Diet as tolerated   -PPI IV BID 87 y/o F with HTN, HL, Parkinson's so presents with abd pain, found to have colitis and is coronavirus positive.     Problem 1-C-diff Colitis   Rec  -Vanco po 125mg q6h for ten days  -Monitor CBC  -Probiotics BID daily for remainder of life to prevent recurrence   -Monitor stool for frequency, consistency blood  - may need colonoscopy vs. repeat CT as outpt for resolution (pt does not want colonoscopy)  - diet as tolerated  - Follow up with our [facility] located at [address] [place] [address]. Phone Number: [phone]     Problem 2-Intermittent vomiting   Rec  -Zofran premedicate before meals   -Diet as tolerated   -PPI IV BID

## 2021-03-29 NOTE — PROGRESS NOTE ADULT - ASSESSMENT
Pt is an 88F w/ PMH HTN, HLD, Parkinson's disease being admitted for acute colitis and +coronavirus (not COVID-19). Febrile w/ leukocytosis and slight LA on adm c/w sepsis. CT Abdomen w/ evidence of L sided colitis.     #L sided Colitis  #C. Diff Colitis   #Gastroenteritis 2/2 Coronavirus   - likely 2/2 coronavirus vs c.diff  - cont cipro/ flagyl and PO Vanc  - continue IVF   - morphine and zofran prn   GI consult:   - may need colonoscopy vs. repeat CT as outpt for resolution (pt does not want colonoscopy)  - NPO for now due to persistent N/V  - will consult Nutrition, consider PPN     #c/f Coffee Ground Emesis   - dark vomitus this am, c/f hematemesis   - NPO for now, IV PPI   - hb stable   - GI following   - resume asa plavix       # Normocytic Anemia   - drop from admission 11--> 9.5 but looking back at prior hgb, she is typically 9-10   - monitor closely     #HTN- continue diltiazem and HCTZ     #Parkinsons- Continue pramipexole, aripiprazole, trazodone and sinemet     #CAD - cont asa/plavix/statin         #Progress Note Handoff:  Pending (specify): improvement in colitis/ PO intake   Family discussion: d/w daughter Veronica on 3/29 regarding plan for NPO and nutrition consult. Daughter is immunocompromised, so will need to help make arrangements for rehab vs safe home plan since pt is now diagnosed w/ cdiff.   Disposition: Home___/SNF___/Other________/Unknown at this time_x_______      Lynn Packer, DO    Pt is an 88F w/ PMH HTN, HLD, Parkinson's disease being admitted for acute colitis and +coronavirus (not COVID-19). Febrile w/ leukocytosis and slight LA on adm c/w sepsis. CT Abdomen w/ evidence of L sided colitis.     #L sided Colitis  #C. Diff Colitis   #Gastroenteritis 2/2 Coronavirus   - likely 2/2 coronavirus vs c.diff  - cont cipro/ flagyl and PO Vanc  - continue IVF   - morphine and zofran prn   GI consult:   - may need colonoscopy vs. repeat CT as outpt for resolution (pt does not want colonoscopy)  - NPO for now due to persistent N/V  - will consult Nutrition, consider PPN     #c/f Coffee Ground Emesis   - dark vomitus this am, c/f hematemesis   - NPO for now, IV PPI   - hb stable   - GI following   - resume asa plavix     #JOHN   - likely prerenal from n/v and colitis   - continue IVF , monitor closely   - most recent TTE 12/2020 w/ normal LV size and function       # Normocytic Anemia   - drop from admission 11--> 9.5 but looking back at prior hgb, she is typically 9-10   - monitor closely     #HTN- continue diltiazem and HCTZ     #Parkinsons- Continue pramipexole, aripiprazole, trazodone and sinemet     #CAD - cont asa/plavix/statin         #Progress Note Handoff:  Pending (specify): improvement in colitis/ PO intake   Family discussion: d/w daughter Veronica on 3/29 regarding plan for NPO and nutrition consult. Daughter is immunocompromised, so will need to help make arrangements for rehab vs safe home plan since pt is now diagnosed w/ cdiff.   Disposition: Home___/SNF___/Other________/Unknown at this time_x_______      Lynn Packer, DO

## 2021-03-29 NOTE — PROGRESS NOTE ADULT - SUBJECTIVE AND OBJECTIVE BOX
88yFemale  Being followed for c-diff colitis   Interval history: Patient denies nausea,hematemesis, melena, blood in stool, diarrhea, constipation, abdominal pain. Patient reports just one small loose bowel movement today. Patient hungry but had a vomiting episode.      PAST MEDICAL & SURGICAL HISTORY:   CRAO (central retinal artery occlusion)  Left    Hyperlipidemia    Rheumatoid arthritis    HTN (hypertension)    Parkinson disease    S/P hernia repair    Status post placement of implantable loop recorder  01/2021    History of hysterectomy            Social History: No smoking. No alcohol. No illegal drug use.            MEDICATIONS  (STANDING):  ARIPiprazole 10 milliGRAM(s) Oral daily  atorvastatin 80 milliGRAM(s) Oral at bedtime  carbidopa/levodopa  25/100 2 Tablet(s) Oral four times a day  carbidopa/levodopa/entacapone 50/200/200 1 Tablet(s) Oral four times a day  chlorhexidine 4% Liquid 1 Application(s) Topical <User Schedule>  ciprofloxacin   IVPB 400 milliGRAM(s) IV Intermittent every 12 hours  diltiazem    milliGRAM(s) Oral daily  hydrochlorothiazide 12.5 milliGRAM(s) Oral daily  metroNIDAZOLE  IVPB 500 milliGRAM(s) IV Intermittent every 8 hours  oxybutynin XL 5 milliGRAM(s) Oral daily  pantoprazole  Injectable 40 milliGRAM(s) IV Push two times a day  pramipexole 1 milliGRAM(s) Oral four times a day  sodium chloride 0.9%. 1000 milliLiter(s) (75 mL/Hr) IV Continuous <Continuous>  traZODone 50 milliGRAM(s) Oral at bedtime  vancomycin    Solution 125 milliGRAM(s) Oral every 6 hours    MEDICATIONS  (PRN):  acetaminophen   Tablet .. 650 milliGRAM(s) Oral every 6 hours PRN Temp greater or equal to 38C (100.4F)  ibuprofen  Tablet. 400 milliGRAM(s) Oral three times a day PRN Mild Pain (1 - 3)  melatonin 5 milliGRAM(s) Oral at bedtime PRN Insomnia  morphine  - Injectable 2 milliGRAM(s) IV Push every 6 hours PRN Moderate Pain (4 - 6)  ondansetron Injectable 4 milliGRAM(s) IV Push three times a day PRN Nausea and/or Vomiting      Allergies:   No Known Allergies               REVIEW OF SYSTEMS:  General:  No weight loss, fevers, or chills.  Eyes:  No reported pain or visual changes  ENT:  No sore throat or runny nose.  NECK: No stiffness   CV:  No chest pain or palpitations.  Resp:  No shortness of breath, cough  GI:  No abdominal pain, nausea, +vomiting, No dysphagia, +diarrhea No constipation. No rectal bleeding, melena, or hematemesis.  Neuro:  No tingling, numbness         VITAL SIGNS:   T(F): 98.9 (03-29-21 @ 05:00), Max: 98.9 (03-29-21 @ 05:00)  HR: 77 (03-29-21 @ 05:00) (64 - 77)  BP: 118/56 (03-29-21 @ 05:00) (109/51 - 118/56)  RR: 16 (03-29-21 @ 05:00) (16 - 16)  SpO2: --    PHYSICAL EXAM:  GENERAL: AAOx3, no acute distress.  HEAD:  Atraumatic, Normocephalic  EYES: conjunctiva and sclera clear  NECK: Supple, no JVD or thyromegaly  CHEST/LUNG: Clear to auscultation bilaterally; No wheeze, rhonchi, or rales  HEART: Regular rate and rhythm; normal S1, S2, No murmurs.  ABDOMEN: Soft, nontender, nondistended; Bowel sounds present  NEUROLOGY: No asterixis or tremor.   SKIN: Intact, no jaundice            LABS:                        9.8    10.95 )-----------( 258      ( 29 Mar 2021 06:49 )             30.4     03-29    137  |  105  |  42<H>  ----------------------------<  131<H>  3.8   |  21  |  1.8<H>    Ca    7.9<L>      29 Mar 2021 06:49            IMAGING:      < from: CT Abdomen and Pelvis w/ IV Cont (03.26.21 @ 10:08) >  EXAM:  CT ABDOMEN AND PELVIS IC            PROCEDURE DATE:  03/26/2021            INTERPRETATION:  CLINICAL HISTORY: Left lower quadrant pain.    TECHNIQUE: Contiguous axial CT images were obtained from the lower chest to the pubic symphysis following administration of Optiray intravenous contrast. Oral contrast was not administered. Reformatted images in the coronal and sagittal planes were acquired.    COMPARISON: CT abdomen pelvis dated 3/6/2021.      FINDINGS:    LOWER CHEST: Bibasilar atelectasis.    HEPATOBILIARY: Unremarkable.    SPLEEN: Unremarkable.    PANCREAS: Unremarkable.    ADRENAL GLANDS: Unremarkable.    KIDNEYS: Punctate nonobstructing bilateral renal calculi. No hydronephrosis. Right renal cysts and additional bilateral renal hypodensities too small to characterize.    ABDOMINOPELVIC NODES: Unremarkable.    PELVIC ORGANS: Unremarkable urinary bladder. Status post hysterectomy.    PERITONEUM/MESENTERY/BOWEL: Segmental mural thickening involving the proximal to mid left colon with surrounding inflammatory change, compatible with colitis. No free air or fluid collection. No evidence of bowel obstruction. Colonic diverticulosis. Moderate colonic stool burden. Normal caliber appendix.    BONES/SOFT TISSUES: Degenerativechanges of the spine and hips. Postsurgical changes in the anterior abdominal wall.    OTHER: Extensive atherosclerotic calcifications of the aorta and its branches.      IMPRESSION:    Colitis involving the left colon - infectious, inflammatory, or ischemic in etiology.              MIR MORROW MD; Attending Radiologist  This document has been electronically signed. Mar 26 2021 10:28AM    < end of copied text >

## 2021-03-30 LAB
ANION GAP SERPL CALC-SCNC: 12 MMOL/L — SIGNIFICANT CHANGE UP (ref 7–14)
BUN SERPL-MCNC: 36 MG/DL — HIGH (ref 10–20)
CALCIUM SERPL-MCNC: 7.6 MG/DL — LOW (ref 8.5–10.1)
CHLORIDE SERPL-SCNC: 107 MMOL/L — SIGNIFICANT CHANGE UP (ref 98–110)
CO2 SERPL-SCNC: 20 MMOL/L — SIGNIFICANT CHANGE UP (ref 17–32)
CREAT SERPL-MCNC: 1.3 MG/DL — SIGNIFICANT CHANGE UP (ref 0.7–1.5)
GLUCOSE SERPL-MCNC: 100 MG/DL — HIGH (ref 70–99)
HCT VFR BLD CALC: 32.2 % — LOW (ref 37–47)
HGB BLD-MCNC: 10.1 G/DL — LOW (ref 12–16)
MCHC RBC-ENTMCNC: 27.4 PG — SIGNIFICANT CHANGE UP (ref 27–31)
MCHC RBC-ENTMCNC: 31.4 G/DL — LOW (ref 32–37)
MCV RBC AUTO: 87.3 FL — SIGNIFICANT CHANGE UP (ref 81–99)
NRBC # BLD: 0 /100 WBCS — SIGNIFICANT CHANGE UP (ref 0–0)
PLATELET # BLD AUTO: 271 K/UL — SIGNIFICANT CHANGE UP (ref 130–400)
POTASSIUM SERPL-MCNC: 3.6 MMOL/L — SIGNIFICANT CHANGE UP (ref 3.5–5)
POTASSIUM SERPL-SCNC: 3.6 MMOL/L — SIGNIFICANT CHANGE UP (ref 3.5–5)
RBC # BLD: 3.69 M/UL — LOW (ref 4.2–5.4)
RBC # FLD: 14.4 % — SIGNIFICANT CHANGE UP (ref 11.5–14.5)
SODIUM SERPL-SCNC: 139 MMOL/L — SIGNIFICANT CHANGE UP (ref 135–146)
WBC # BLD: 8.89 K/UL — SIGNIFICANT CHANGE UP (ref 4.8–10.8)
WBC # FLD AUTO: 8.89 K/UL — SIGNIFICANT CHANGE UP (ref 4.8–10.8)

## 2021-03-30 PROCEDURE — 99232 SBSQ HOSP IP/OBS MODERATE 35: CPT

## 2021-03-30 PROCEDURE — 99233 SBSQ HOSP IP/OBS HIGH 50: CPT

## 2021-03-30 RX ORDER — IBUPROFEN 200 MG
400 TABLET ORAL THREE TIMES A DAY
Refills: 0 | Status: DISCONTINUED | OUTPATIENT
Start: 2021-03-30 | End: 2021-04-03

## 2021-03-30 RX ORDER — ALBUTEROL 90 UG/1
2.5 AEROSOL, METERED ORAL EVERY 6 HOURS
Refills: 0 | Status: DISCONTINUED | OUTPATIENT
Start: 2021-03-30 | End: 2021-04-03

## 2021-03-30 RX ORDER — AMPICILLIN SODIUM AND SULBACTAM SODIUM 250; 125 MG/ML; MG/ML
3 INJECTION, POWDER, FOR SUSPENSION INTRAMUSCULAR; INTRAVENOUS EVERY 12 HOURS
Refills: 0 | Status: DISCONTINUED | OUTPATIENT
Start: 2021-03-31 | End: 2021-04-03

## 2021-03-30 RX ORDER — AMPICILLIN SODIUM AND SULBACTAM SODIUM 250; 125 MG/ML; MG/ML
INJECTION, POWDER, FOR SUSPENSION INTRAMUSCULAR; INTRAVENOUS
Refills: 0 | Status: DISCONTINUED | OUTPATIENT
Start: 2021-03-30 | End: 2021-04-03

## 2021-03-30 RX ORDER — HYDROXYZINE HCL 10 MG
25 TABLET ORAL ONCE
Refills: 0 | Status: COMPLETED | OUTPATIENT
Start: 2021-03-30 | End: 2021-03-30

## 2021-03-30 RX ORDER — AMPICILLIN SODIUM AND SULBACTAM SODIUM 250; 125 MG/ML; MG/ML
3 INJECTION, POWDER, FOR SUSPENSION INTRAMUSCULAR; INTRAVENOUS ONCE
Refills: 0 | Status: COMPLETED | OUTPATIENT
Start: 2021-03-30 | End: 2021-03-30

## 2021-03-30 RX ORDER — GUAIFENESIN/DEXTROMETHORPHAN 600MG-30MG
5 TABLET, EXTENDED RELEASE 12 HR ORAL EVERY 6 HOURS
Refills: 0 | Status: DISCONTINUED | OUTPATIENT
Start: 2021-03-30 | End: 2021-04-03

## 2021-03-30 RX ADMIN — Medication 360 MILLIGRAM(S): at 12:35

## 2021-03-30 RX ADMIN — Medication 100 MILLIGRAM(S): at 05:37

## 2021-03-30 RX ADMIN — CARBIDOPA AND LEVODOPA 2 TABLET(S): 25; 100 TABLET ORAL at 12:35

## 2021-03-30 RX ADMIN — Medication 50 MILLIGRAM(S): at 22:06

## 2021-03-30 RX ADMIN — Medication 25 MILLIGRAM(S): at 22:06

## 2021-03-30 RX ADMIN — CLOPIDOGREL BISULFATE 75 MILLIGRAM(S): 75 TABLET, FILM COATED ORAL at 12:36

## 2021-03-30 RX ADMIN — CARBIDOPA AND LEVODOPA 2 TABLET(S): 25; 100 TABLET ORAL at 05:34

## 2021-03-30 RX ADMIN — AMPICILLIN SODIUM AND SULBACTAM SODIUM 200 GRAM(S): 250; 125 INJECTION, POWDER, FOR SUSPENSION INTRAMUSCULAR; INTRAVENOUS at 20:19

## 2021-03-30 RX ADMIN — PANTOPRAZOLE SODIUM 40 MILLIGRAM(S): 20 TABLET, DELAYED RELEASE ORAL at 05:37

## 2021-03-30 RX ADMIN — Medication 400 MILLIGRAM(S): at 23:07

## 2021-03-30 RX ADMIN — SODIUM CHLORIDE 75 MILLILITER(S): 9 INJECTION INTRAMUSCULAR; INTRAVENOUS; SUBCUTANEOUS at 12:38

## 2021-03-30 RX ADMIN — Medication 5 MILLILITER(S): at 17:38

## 2021-03-30 RX ADMIN — PANTOPRAZOLE SODIUM 40 MILLIGRAM(S): 20 TABLET, DELAYED RELEASE ORAL at 17:40

## 2021-03-30 RX ADMIN — Medication 125 MILLIGRAM(S): at 17:38

## 2021-03-30 RX ADMIN — HEPARIN SODIUM 5000 UNIT(S): 5000 INJECTION INTRAVENOUS; SUBCUTANEOUS at 17:38

## 2021-03-30 RX ADMIN — PRAMIPEXOLE DIHYDROCHLORIDE 1 MILLIGRAM(S): 0.12 TABLET ORAL at 12:37

## 2021-03-30 RX ADMIN — PRAMIPEXOLE DIHYDROCHLORIDE 1 MILLIGRAM(S): 0.12 TABLET ORAL at 17:45

## 2021-03-30 RX ADMIN — PRAMIPEXOLE DIHYDROCHLORIDE 1 MILLIGRAM(S): 0.12 TABLET ORAL at 05:35

## 2021-03-30 RX ADMIN — ATORVASTATIN CALCIUM 80 MILLIGRAM(S): 80 TABLET, FILM COATED ORAL at 22:06

## 2021-03-30 RX ADMIN — Medication 81 MILLIGRAM(S): at 12:36

## 2021-03-30 RX ADMIN — Medication 5 MILLIGRAM(S): at 12:36

## 2021-03-30 RX ADMIN — Medication 125 MILLIGRAM(S): at 12:56

## 2021-03-30 RX ADMIN — Medication 400 MILLIGRAM(S): at 22:06

## 2021-03-30 RX ADMIN — CARBIDOPA AND LEVODOPA 2 TABLET(S): 25; 100 TABLET ORAL at 17:38

## 2021-03-30 RX ADMIN — Medication 100 MILLIGRAM(S): at 14:02

## 2021-03-30 RX ADMIN — HEPARIN SODIUM 5000 UNIT(S): 5000 INJECTION INTRAVENOUS; SUBCUTANEOUS at 05:35

## 2021-03-30 RX ADMIN — Medication 125 MILLIGRAM(S): at 05:34

## 2021-03-30 RX ADMIN — ARIPIPRAZOLE 10 MILLIGRAM(S): 15 TABLET ORAL at 12:36

## 2021-03-30 NOTE — PROGRESS NOTE ADULT - SUBJECTIVE AND OBJECTIVE BOX
88yFemale  Being followed for c-diff colitis, vomiting   Interval history: Patient denies nausea, vomiting, hematemesis, melena, blood in stool, diarrhea, constipation, abdominal pain. Patient reports diarrhea resolved and vomiting resolved, patient hungry.       PAST MEDICAL & SURGICAL HISTORY:   Parkinson disease    HTN (hypertension)    Rheumatoid arthritis    Hyperlipidemia    CRAO (central retinal artery occlusion)  Left    History of hysterectomy    Status post placement of implantable loop recorder  01/2021    S/P hernia repair            Social History: No smoking. No alcohol. No illegal drug use.          MEDICATIONS:  MEDICATIONS  (STANDING):  ARIPiprazole 10 milliGRAM(s) Oral daily  aspirin  chewable 81 milliGRAM(s) Oral daily  atorvastatin 80 milliGRAM(s) Oral at bedtime  carbidopa/levodopa  25/100 2 Tablet(s) Oral four times a day  carbidopa/levodopa/entacapone 50/200/200 1 Tablet(s) Oral four times a day  chlorhexidine 4% Liquid 1 Application(s) Topical <User Schedule>  clopidogrel Tablet 75 milliGRAM(s) Oral daily  diltiazem    milliGRAM(s) Oral daily  heparin   Injectable 5000 Unit(s) SubCutaneous every 12 hours  levoFLOXacin IVPB 750 milliGRAM(s) IV Intermittent every 48 hours  metroNIDAZOLE  IVPB 500 milliGRAM(s) IV Intermittent every 8 hours  oxybutynin XL 5 milliGRAM(s) Oral daily  pantoprazole  Injectable 40 milliGRAM(s) IV Push two times a day  pramipexole 1 milliGRAM(s) Oral four times a day  sodium chloride 0.9%. 1000 milliLiter(s) (75 mL/Hr) IV Continuous <Continuous>  traZODone 50 milliGRAM(s) Oral at bedtime  vancomycin    Solution 125 milliGRAM(s) Oral every 6 hours    MEDICATIONS  (PRN):  acetaminophen   Tablet .. 650 milliGRAM(s) Oral every 6 hours PRN Temp greater or equal to 38C (100.4F)  melatonin 5 milliGRAM(s) Oral at bedtime PRN Insomnia  morphine  - Injectable 2 milliGRAM(s) IV Push every 6 hours PRN Moderate Pain (4 - 6)  ondansetron Injectable 4 milliGRAM(s) IV Push three times a day PRN Nausea and/or Vomiting      Allergies:   No Known Allergies            REVIEW OF SYSTEMS:  General:  No weight loss, fevers, or chills.  Eyes:  No reported pain or visual changes  ENT:  No sore throat or runny nose.  NECK: No stiffness   CV:  No chest pain or palpitations.  Resp:  No shortness of breath, cough  GI:  No abdominal pain, nausea, vomiting, dysphagia, diarrhea or constipation. No rectal bleeding, melena, or hematemesis.  Neuro:  No tingling, numbness         VITAL SIGNS:   T(F): 96.2 (03-30-21 @ 05:00), Max: 99 (03-29-21 @ 14:47)  HR: 60 (03-30-21 @ 05:00) (60 - 82)  BP: 111/55 (03-30-21 @ 05:00) (108/54 - 137/63)  RR: 16 (03-30-21 @ 05:00) (16 - 16)  SpO2: --    PHYSICAL EXAM:  GENERAL: AAOx3, no acute distress.  HEAD:  Atraumatic, Normocephalic  EYES: conjunctiva and sclera clear  NECK: Supple, no JVD or thyromegaly  CHEST/LUNG: Clear to auscultation bilaterally; No wheeze, rhonchi, or rales  HEART: Regular rate and rhythm; normal S1, S2, No murmurs.  ABDOMEN: Soft, nontender, nondistended; Bowel sounds present  NEUROLOGY: No asterixis or tremor.   SKIN: Intact, no jaundice            LABS:                        10.1   8.89  )-----------( 271      ( 30 Mar 2021 06:23 )             32.2     03-30    139  |  107  |  36<H>  ----------------------------<  100<H>  3.6   |  20  |  1.3    Ca    7.6<L>      30 Mar 2021 06:23            IMAGING:    < from: CT Abdomen and Pelvis w/ IV Cont (03.26.21 @ 10:08) >    EXAM:  CT ABDOMEN AND PELVIS IC            PROCEDURE DATE:  03/26/2021            INTERPRETATION:  CLINICAL HISTORY: Left lower quadrant pain.    TECHNIQUE: Contiguous axial CT images were obtained from the lower chest to the pubic symphysis following administration of Optiray intravenous contrast. Oral contrast was not administered. Reformatted images in the coronal and sagittal planes were acquired.    COMPARISON: CT abdomen pelvis dated 3/6/2021.      FINDINGS:    LOWER CHEST: Bibasilar atelectasis.    HEPATOBILIARY: Unremarkable.    SPLEEN: Unremarkable.    PANCREAS: Unremarkable.    ADRENAL GLANDS: Unremarkable.    KIDNEYS: Punctate nonobstructing bilateral renal calculi. No hydronephrosis. Right renal cysts and additional bilateral renal hypodensities too small to characterize.    ABDOMINOPELVIC NODES: Unremarkable.    PELVIC ORGANS: Unremarkable urinary bladder. Status post hysterectomy.    PERITONEUM/MESENTERY/BOWEL: Segmental mural thickening involving the proximal to mid left colon with surrounding inflammatory change, compatible with colitis. No free air or fluid collection. No evidence of bowel obstruction. Colonic diverticulosis. Moderate colonic stool burden. Normal caliber appendix.    BONES/SOFT TISSUES: Degenerativechanges of the spine and hips. Postsurgical changes in the anterior abdominal wall.    OTHER: Extensive atherosclerotic calcifications of the aorta and its branches.      IMPRESSION:    Colitis involving the left colon - infectious, inflammatory, or ischemic in etiology.              MIR MORROW MD; Attending Radiologist  This document has been electronically signed. Mar 26 2021 10:28AM    < end of copied text >         88yFemale  Being followed for c-diff colitis, vomiting   Interval history: Patient denies nausea, vomiting, hematemesis, melena, blood in stool, diarrhea, constipation, abdominal pain. Patient reports diarrhea resolved and vomiting resolved, patient hungry.       PAST MEDICAL & SURGICAL HISTORY:   Parkinson disease    HTN (hypertension)    Rheumatoid arthritis    Hyperlipidemia    CRAO (central retinal artery occlusion)  Left    History of hysterectomy    Status post placement of implantable loop recorder  01/2021    S/P hernia repair            Social History: No smoking. No alcohol. No illegal drug use.            MEDICATIONS  (STANDING):  ARIPiprazole 10 milliGRAM(s) Oral daily  aspirin  chewable 81 milliGRAM(s) Oral daily  atorvastatin 80 milliGRAM(s) Oral at bedtime  carbidopa/levodopa  25/100 2 Tablet(s) Oral four times a day  carbidopa/levodopa/entacapone 50/200/200 1 Tablet(s) Oral four times a day  chlorhexidine 4% Liquid 1 Application(s) Topical <User Schedule>  clopidogrel Tablet 75 milliGRAM(s) Oral daily  diltiazem    milliGRAM(s) Oral daily  heparin   Injectable 5000 Unit(s) SubCutaneous every 12 hours  levoFLOXacin IVPB 750 milliGRAM(s) IV Intermittent every 48 hours  metroNIDAZOLE  IVPB 500 milliGRAM(s) IV Intermittent every 8 hours  oxybutynin XL 5 milliGRAM(s) Oral daily  pantoprazole  Injectable 40 milliGRAM(s) IV Push two times a day  pramipexole 1 milliGRAM(s) Oral four times a day  sodium chloride 0.9%. 1000 milliLiter(s) (75 mL/Hr) IV Continuous <Continuous>  traZODone 50 milliGRAM(s) Oral at bedtime  vancomycin    Solution 125 milliGRAM(s) Oral every 6 hours    MEDICATIONS  (PRN):  acetaminophen   Tablet .. 650 milliGRAM(s) Oral every 6 hours PRN Temp greater or equal to 38C (100.4F)  melatonin 5 milliGRAM(s) Oral at bedtime PRN Insomnia  morphine  - Injectable 2 milliGRAM(s) IV Push every 6 hours PRN Moderate Pain (4 - 6)  ondansetron Injectable 4 milliGRAM(s) IV Push three times a day PRN Nausea and/or Vomiting      Allergies:   No Known Allergies            REVIEW OF SYSTEMS:  General:  No weight loss, fevers, or chills.  Eyes:  No reported pain or visual changes  ENT:  No sore throat or runny nose.  NECK: No stiffness   CV:  No chest pain or palpitations.  Resp:  No shortness of breath, cough  GI:  No abdominal pain, nausea, vomiting, dysphagia, diarrhea or constipation. No rectal bleeding, melena, or hematemesis.  Neuro:  No tingling, numbness         VITAL SIGNS:   T(F): 96.2 (03-30-21 @ 05:00), Max: 99 (03-29-21 @ 14:47)  HR: 60 (03-30-21 @ 05:00) (60 - 82)  BP: 111/55 (03-30-21 @ 05:00) (108/54 - 137/63)  RR: 16 (03-30-21 @ 05:00) (16 - 16)  SpO2: --    PHYSICAL EXAM:  GENERAL: AAOx3, no acute distress.  HEAD:  Atraumatic, Normocephalic  EYES: conjunctiva and sclera clear  NECK: Supple, no JVD or thyromegaly  CHEST/LUNG: Clear to auscultation bilaterally; No wheeze, rhonchi, or rales  HEART: Regular rate and rhythm; normal S1, S2, No murmurs.  ABDOMEN: Soft, nontender, nondistended; Bowel sounds present  NEUROLOGY: No asterixis or tremor.   SKIN: Intact, no jaundice            LABS:                        10.1   8.89  )-----------( 271      ( 30 Mar 2021 06:23 )             32.2     03-30    139  |  107  |  36<H>  ----------------------------<  100<H>  3.6   |  20  |  1.3    Ca    7.6<L>      30 Mar 2021 06:23            IMAGING:    < from: CT Abdomen and Pelvis w/ IV Cont (03.26.21 @ 10:08) >    EXAM:  CT ABDOMEN AND PELVIS IC            PROCEDURE DATE:  03/26/2021            INTERPRETATION:  CLINICAL HISTORY: Left lower quadrant pain.    TECHNIQUE: Contiguous axial CT images were obtained from the lower chest to the pubic symphysis following administration of Optiray intravenous contrast. Oral contrast was not administered. Reformatted images in the coronal and sagittal planes were acquired.    COMPARISON: CT abdomen pelvis dated 3/6/2021.      FINDINGS:    LOWER CHEST: Bibasilar atelectasis.    HEPATOBILIARY: Unremarkable.    SPLEEN: Unremarkable.    PANCREAS: Unremarkable.    ADRENAL GLANDS: Unremarkable.    KIDNEYS: Punctate nonobstructing bilateral renal calculi. No hydronephrosis. Right renal cysts and additional bilateral renal hypodensities too small to characterize.    ABDOMINOPELVIC NODES: Unremarkable.    PELVIC ORGANS: Unremarkable urinary bladder. Status post hysterectomy.    PERITONEUM/MESENTERY/BOWEL: Segmental mural thickening involving the proximal to mid left colon with surrounding inflammatory change, compatible with colitis. No free air or fluid collection. No evidence of bowel obstruction. Colonic diverticulosis. Moderate colonic stool burden. Normal caliber appendix.    BONES/SOFT TISSUES: Degenerativechanges of the spine and hips. Postsurgical changes in the anterior abdominal wall.    OTHER: Extensive atherosclerotic calcifications of the aorta and its branches.      IMPRESSION:    Colitis involving the left colon - infectious, inflammatory, or ischemic in etiology.              MIR MORROW MD; Attending Radiologist  This document has been electronically signed. Mar 26 2021 10:28AM    < end of copied text >

## 2021-03-30 NOTE — PROGRESS NOTE ADULT - ASSESSMENT
87 y/o F with HTN, HL, Parkinson's so presents with abd pain, found to have colitis and is coronavirus positive.     Problem 1-C-diff Colitis   Rec  -Vanco po 125mg q6h for ten days  -Monitor CBC  -Probiotics BID daily for remainder of life to prevent recurrence   -Monitor stool for frequency, consistency blood  -may need colonoscopy vs. repeat CT as outpt for resolution (pt does not want colonoscopy)  -clear liquids then diet as tolerated for dinner  - Follow up with our [facility] located at [address] [place] [address]. Phone Number: [phone]     Problem 2-Intermittent vomiting--->resolved  Rec  -Zofran prn  -clear liquids then diet as tolerated for dinner   -PPI IV BID

## 2021-03-30 NOTE — CONSULT NOTE ADULT - SUBJECTIVE AND OBJECTIVE BOX
HPI:  Pt is an 88F w/ PMH HTN, HLD, Parkinson's disease being admitted for acute colitis and +coronavirus (not COVID-19). Pt reports onset of LLQ pain overnight last night as well as +nausea. Denies diarrhea and vomiting. Last BM yesterday - normal color and consistency. +incontinence. Pt also reports rhinorrhea and PND starting yesterday. Denies HA, dizziness, cough, sore throat, SOB, chest pain, change in urination and change in BM.   GI  f/u noted  PAST MEDICAL & SURGICAL HISTORY:  Parkinson disease  HTN (hypertension)  Rheumatoid arthritis  Hyperlipidemia  CRAO (central retinal artery occlusion)  Left  History of hysterectomy  Status post placement of implantable loop recorder  01/2021  S/P hernia repair  FAMILY HISTORY:  FH: hypertension (Mother)    ICU Vital Signs Last 24 Hrs  T(C): 35.7 (30 Mar 2021 05:00), Max: 37.2 (29 Mar 2021 14:47)  T(F): 96.2 (30 Mar 2021 05:00), Max: 99 (29 Mar 2021 14:47)  HR: 60 (30 Mar 2021 05:00) (60 - 82)  BP: 111/55 (30 Mar 2021 05:00) (108/54 - 137/63)  RR: 16 (30 Mar 2021 05:00) (16 - 16)  weight (cm): 162.6 (03-26-21 @ 08:23), 162.6 (03-06-21 @ 21:37), 154.9 (02-21-21 @ 20:24)  Weight (kg): 61.2 (03-26-21 @ 08:23), 63.1 (03-06-21 @ 21:37), 63.5 (02-21-21 @ 20:24)  BMI (kg/m2): 23.1 (03-26-21 @ 08:23), 23.9 (03-06-21 @ 21:37), 26.5 (02-21-21 @ 20:24)  BSA (m2): 1.66 (03-26-21 @ 08:23), 1.68 (03-06-21 @ 21:37), 1.62 (02-21-21 @ 20:24)      29 Mar 2021 07:01  -  30 Mar 2021 07:00  --------------------------------------------------------  IN:  Total IN: 0 mL    OUT:    Voided (mL): 1300 mL  Total OUT: 1300 mL    Total NET: -1300 mL    PHYSICAL EXAM:  GENERAL:  Alert & Oriented X3, talkative  HEENT:   Moist mucous membranes  ABDOMEN: Soft, n/t, n/d  EXTREMITIES:no edema  SKIN: No lesions  IV ACCESS: peripheral IV  FEEDING ACCESS: NPO    MEDICATIONS  (STANDING):  ARIPiprazole 10 milliGRAM(s) Oral daily  aspirin  chewable 81 milliGRAM(s) Oral daily  atorvastatin 80 milliGRAM(s) Oral at bedtime  carbidopa/levodopa  25/100 2 Tablet(s) Oral four times a day  carbidopa/levodopa/entacapone 50/200/200 1 Tablet(s) Oral four times a day  chlorhexidine 4% Liquid 1 Application(s) Topical <User Schedule>  clopidogrel Tablet 75 milliGRAM(s) Oral daily  diltiazem    milliGRAM(s) Oral daily  heparin   Injectable 5000 Unit(s) SubCutaneous every 12 hours  levoFLOXacin IVPB 750 milliGRAM(s) IV Intermittent every 48 hours  metroNIDAZOLE  IVPB 500 milliGRAM(s) IV Intermittent every 8 hours  oxybutynin XL 5 milliGRAM(s) Oral daily  pantoprazole  Injectable 40 milliGRAM(s) IV Push two times a day  pramipexole 1 milliGRAM(s) Oral four times a day  sodium chloride 0.9%. 1000 milliLiter(s) (75 mL/Hr) IV Continuous <Continuous>  traZODone 50 milliGRAM(s) Oral at bedtime  vancomycin    Solution 125 milliGRAM(s) Oral every 6 hours    MEDICATIONS  (PRN):  acetaminophen   Tablet .. 650 milliGRAM(s) Oral every 6 hours PRN Temp greater or equal to 38C (100.4F)  melatonin 5 milliGRAM(s) Oral at bedtime PRN Insomnia  morphine  - Injectable 2 milliGRAM(s) IV Push every 6 hours PRN Moderate Pain (4 - 6)  ondansetron Injectable 4 milliGRAM(s) IV Push three times a day PRN Nausea and/or Vomiting    AllergiesNKDA  LABS:    03-30    139  |  107  |  36<H>  ----------------------------<  100<H>  3.6   |  20  |  1.3    Ca    7.6<L>      30 Mar 2021 06:23                          10.1   8.89  )-----------( 271      ( 30 Mar 2021 06:23 )             32.2     Clostridium difficile Toxin by PCR (03.28.21 @ 09:44)   Clostridium difficile Toxin by PCR: RESULT INTERPRETATION:   Detected - Clostridium difficile toxin B detected by amplified DNA PCR     RADIOLOGY:  < from: Xray Chest 1 View- PORTABLE-Urgent (Xray Chest 1 View- PORTABLE-Urgent .) (03.28.21 @ 14:51) >  Impression:  Low lung volumes. New bibasilar opacities/effusions, left greater than right. No pneumothorax.  < from: CT Abdomen and Pelvis w/ IV Cont (03.26.21 @ 10:08) >  IMPRESSION:  Colitis involving the left colon - infectious, inflammatory, or ischemic in etiology.    DIET  Diet, NPO (03-28-21 @ 08:30) HPI:  Pt is an 88F w/ PMH HTN, HLD, Parkinson's disease being admitted for acute colitis and +coronavirus (not COVID-19). Pt reports onset of LLQ pain overnight last night as well as +nausea. Denies diarrhea and vomiting. Last BM yesterday - normal color and consistency. +incontinence. Pt also reports rhinorrhea and PND starting yesterday. Denies HA, dizziness, cough, sore throat, SOB, chest pain, change in urination and change in BM.   GI  f/u noted  PAST MEDICAL & SURGICAL HISTORY:  Parkinson disease  HTN (hypertension)  Rheumatoid arthritis  Hyperlipidemia  CRAO (central retinal artery occlusion)  Left  History of hysterectomy  Status post placement of implantable loop recorder  01/2021  S/P hernia repair  FAMILY HISTORY:  FH: hypertension (Mother)    ICU Vital Signs Last 24 Hrs  T(C): 35.7 (30 Mar 2021 05:00), Max: 37.2 (29 Mar 2021 14:47)  T(F): 96.2 (30 Mar 2021 05:00), Max: 99 (29 Mar 2021 14:47)  HR: 60 (30 Mar 2021 05:00) (60 - 82)  BP: 111/55 (30 Mar 2021 05:00) (108/54 - 137/63)  RR: 16 (30 Mar 2021 05:00) (16 - 16)  weight (cm): 162.6 (03-26-21 @ 08:23), 162.6 (03-06-21 @ 21:37), 154.9 (02-21-21 @ 20:24)  Weight (kg): 61.2 (03-26-21 @ 08:23), 63.1 (03-06-21 @ 21:37), 63.5 (02-21-21 @ 20:24)  BMI (kg/m2): 23.1 (03-26-21 @ 08:23), 23.9 (03-06-21 @ 21:37), 26.5 (02-21-21 @ 20:24)  BSA (m2): 1.66 (03-26-21 @ 08:23), 1.68 (03-06-21 @ 21:37), 1.62 (02-21-21 @ 20:24)    PHYSICAL EXAM:  GENERAL:  Alert & Oriented X3, verbal  HEENT:   Moist mucous membranes  ABDOMEN: Soft, n/t, n/d + loose BM x 1 this morning  EXTREMITIES: no edema  SKIN: No lesions  IV ACCESS: peripheral IV  FEEDING ACCESS: NPO    MEDICATIONS  (STANDING):  ARIPiprazole 10 milliGRAM(s) Oral daily  aspirin  chewable 81 milliGRAM(s) Oral daily  atorvastatin 80 milliGRAM(s) Oral at bedtime  carbidopa/levodopa  25/100 2 Tablet(s) Oral four times a day  carbidopa/levodopa/entacapone 50/200/200 1 Tablet(s) Oral four times a day  chlorhexidine 4% Liquid 1 Application(s) Topical <User Schedule>  clopidogrel Tablet 75 milliGRAM(s) Oral daily  diltiazem    milliGRAM(s) Oral daily  heparin   Injectable 5000 Unit(s) SubCutaneous every 12 hours  levoFLOXacin IVPB 750 milliGRAM(s) IV Intermittent every 48 hours  metroNIDAZOLE  IVPB 500 milliGRAM(s) IV Intermittent every 8 hours  oxybutynin XL 5 milliGRAM(s) Oral daily  pantoprazole  Injectable 40 milliGRAM(s) IV Push two times a day  pramipexole 1 milliGRAM(s) Oral four times a day  sodium chloride 0.9%. 1000 milliLiter(s) (75 mL/Hr) IV Continuous <Continuous>  traZODone 50 milliGRAM(s) Oral at bedtime  vancomycin    Solution 125 milliGRAM(s) Oral every 6 hours    MEDICATIONS  (PRN):  acetaminophen   Tablet .. 650 milliGRAM(s) Oral every 6 hours PRN Temp greater or equal to 38C (100.4F)  melatonin 5 milliGRAM(s) Oral at bedtime PRN Insomnia  morphine  - Injectable 2 milliGRAM(s) IV Push every 6 hours PRN Moderate Pain (4 - 6)  ondansetron Injectable 4 milliGRAM(s) IV Push three times a day PRN Nausea and/or Vomiting    AllergiesNKDA  LABS:    03-30    139  |  107  |  36<H>  ----------------------------<  100<H>  3.6   |  20  |  1.3    Ca    7.6<L>      30 Mar 2021 06:23                          10.1   8.89  )-----------( 271      ( 30 Mar 2021 06:23 )             32.2     Clostridium difficile Toxin by PCR (03.28.21 @ 09:44)   Clostridium difficile Toxin by PCR: RESULT INTERPRETATION:   Detected - Clostridium difficile toxin B detected by amplified DNA PCR     RADIOLOGY:  < from: Xray Chest 1 View- PORTABLE-Urgent (Xray Chest 1 View- PORTABLE-Urgent .) (03.28.21 @ 14:51) >  Impression:  Low lung volumes. New bibasilar opacities/effusions, left greater than right. No pneumothorax.  < from: CT Abdomen and Pelvis w/ IV Cont (03.26.21 @ 10:08) >  IMPRESSION:  Colitis involving the left colon - infectious, inflammatory, or ischemic in etiology.    DIET  Diet, NPO (03-28-21 @ 08:30)

## 2021-03-30 NOTE — PROGRESS NOTE ADULT - SUBJECTIVE AND OBJECTIVE BOX
JON GARCÍA  88y, Female  Allergy: No Known Allergies    Hospital Day: 4d    Patient seen and examined earlier today. Feeling better, no longer vomiting, wants to eat.     PMH/PSH:  PAST MEDICAL & SURGICAL HISTORY:  Parkinson disease    HTN (hypertension)    Rheumatoid arthritis    Hyperlipidemia    CRAO (central retinal artery occlusion)  Left    History of hysterectomy    Status post placement of implantable loop recorder  01/2021    S/P hernia repair        LAST 24-Hr EVENTS:    VITALS:  T(F): 97.2 (03-30-21 @ 14:32), Max: 98 (03-29-21 @ 21:06)  HR: 86 (03-30-21 @ 14:32)  BP: 131/61 (03-30-21 @ 14:32) (108/54 - 148/63)  RR: 16 (03-30-21 @ 14:32)  SpO2: 96% (03-30-21 @ 13:00)        TESTS & MEASUREMENTS:  Weight (Kg):   BMI (kg/m2): 23.1 (03-26)    03-28-21 @ 07:01  -  03-29-21 @ 07:00  --------------------------------------------------------  IN: 0 mL / OUT: 300 mL / NET: -300 mL    03-29-21 @ 07:01  -  03-30-21 @ 07:00  --------------------------------------------------------  IN: 0 mL / OUT: 1300 mL / NET: -1300 mL    03-30-21 @ 07:01  -  03-30-21 @ 17:50  --------------------------------------------------------  IN: 0 mL / OUT: 300 mL / NET: -300 mL                            10.1   8.89  )-----------( 271      ( 30 Mar 2021 06:23 )             32.2       03-30    139  |  107  |  36<H>  ----------------------------<  100<H>  3.6   |  20  |  1.3    Ca    7.6<L>      30 Mar 2021 06:23              Culture - Urine (collected 03-28-21 @ 09:45)  Source: .Urine Clean Catch (Midstream)  Final Report (03-29-21 @ 11:31):    >=3 organisms. Probable collection contamination.    GI PCR Panel, Stool (collected 03-28-21 @ 09:44)  Source: .Stool Feces  Final Report (03-28-21 @ 19:57):    GI PCR Results: NOT detected    *******Please Note:*******    GI panel PCR evaluates for:    Campylobacter, Plesiomonas shigelloides, Salmonella,    Vibrio, Yersinia enterocolitica, Enteroaggregative    Escherichia coli (EAEC), Enteropathogenic E.coli (EPEC),    Enterotoxigenic E. coli (ETEC) lt/st, Shiga-like    toxin-producing E. coli (STEC) stx1/stx2,    Shigella/ Enteroinvasive E. coli (EIEC), Cryptosporidium,    Cyclospora cayetanensis, Entamoeba histolytica,    Giardia lamblia, Adenovirus F 40/41, Astrovirus,    Norovirus GI/GII, Rotavirus A, Sapovirus                    RADIOLOGY, ECG, & ADDITIONAL TESTS:      RECENT DIAGNOSTIC ORDERS:  Diet, Full Liquid (03-30-21 @ 17:26)      MEDICATIONS:  MEDICATIONS  (STANDING):  ampicillin/sulbactam  IVPB      ARIPiprazole 10 milliGRAM(s) Oral daily  aspirin  chewable 81 milliGRAM(s) Oral daily  atorvastatin 80 milliGRAM(s) Oral at bedtime  carbidopa/levodopa  25/100 2 Tablet(s) Oral four times a day  carbidopa/levodopa/entacapone 50/200/200 1 Tablet(s) Oral four times a day  chlorhexidine 4% Liquid 1 Application(s) Topical <User Schedule>  clopidogrel Tablet 75 milliGRAM(s) Oral daily  diltiazem    milliGRAM(s) Oral daily  heparin   Injectable 5000 Unit(s) SubCutaneous every 12 hours  oxybutynin XL 5 milliGRAM(s) Oral daily  pantoprazole  Injectable 40 milliGRAM(s) IV Push two times a day  pramipexole 1 milliGRAM(s) Oral four times a day  sodium chloride 0.9%. 1000 milliLiter(s) (75 mL/Hr) IV Continuous <Continuous>  traZODone 50 milliGRAM(s) Oral at bedtime  vancomycin    Solution 125 milliGRAM(s) Oral every 6 hours    MEDICATIONS  (PRN):  acetaminophen   Tablet .. 650 milliGRAM(s) Oral every 6 hours PRN Temp greater or equal to 38C (100.4F)  ALBUTerol    0.083% 2.5 milliGRAM(s) Nebulizer every 6 hours PRN Shortness of Breath and/or Wheezing  guaifenesin/dextromethorphan  Syrup 5 milliLiter(s) Oral every 6 hours PRN Cough  melatonin 5 milliGRAM(s) Oral at bedtime PRN Insomnia  morphine  - Injectable 2 milliGRAM(s) IV Push every 6 hours PRN Moderate Pain (4 - 6)  ondansetron Injectable 4 milliGRAM(s) IV Push three times a day PRN Nausea and/or Vomiting      HOME MEDICATIONS:  ARIPiprazole 10 mg oral tablet (03-26)  atorvastatin 80 mg oral tablet (03-26)  carbidopa/levodopa/entacapone 50 mg-200 mg-200 mg oral tablet (03-26)  dilTIAZem 360 mg/24 hours oral capsule, extended release (03-26)  hydroCHLOROthiazide 12.5 mg oral capsule (03-26)  ibuprofen 400 mg oral tablet (03-26)  oxybutynin 5 mg/24 hours oral tablet, extended release (03-26)  Plavix 75 mg oral tablet (03-26)  pramipexole 1 mg oral tablet (03-26)  Protonix 40 mg oral delayed release tablet (03-26)  traZODone 50 mg oral tablet (03-26)      PHYSICAL EXAM:  GENERAL: NAD, AOx3  HNENT: NCAT MMM  Lung: Clear to auscultation bilaterally; No rales, rhonchi or wheezing, no dullness to percussion  HEART: S1,S2 Regular rate and rhythm; No murmurs, rubs, or gallops  ABDOMEN: Soft, tenderness to palpation of LLQ and RLQ, nondistended, no rebound tenderness; No palpable masses, no hernias, +BS  EXTREMITIES:  2+ peripheral pulses bilaterally and symmetrically, no clubbing or cyanosis, mild LE edema  Skin: warm well perfused   Neuro: no focal defecits

## 2021-03-30 NOTE — CONSULT NOTE ADULT - ASSESSMENT
ASSESSMENT   88F w/ PMH HTN, HLD, Parkinson's disease being admitted for acute colitis and +coronavirus (not COVID-19). Febrile w/ leukocytosis and slight LA on adm c/w sepsis. CT Abdomen w/ evidence of L sided colitis.     L sided Colitis  C. Diff Colitis   Gastroenteritis 2/2 Coronavirus   - likely 2/2 coronavirus vs c.diff   Coffee Ground Emesis   - dark vomitus this am, c/f hematemesis   -JOHN   - Anemia   -HTN  Parkinsons  CAD   -hypokalemia  PLAN  check triglyceride   check vitamin D level  PPN ordered for tonight   if pt be NPO for longer will need a central access for TPN  check bmp/phos/mg and correct lytes ASSESSMENT   88F w/ PMH HTN, HLD, Parkinson's disease being admitted for acute colitis and +coronavirus (not COVID-19). Febrile w/ leukocytosis and slight LA on adm c/w sepsis. CT Abdomen w/ evidence of L sided colitis.   - C dif colitis +  - parkinson's disease  - JOHN improving  - hypokalemia, hyponatremia    PLAN  check triglyceride level  check 25 oh vitamin D level  spoke with GI this morning - they have d/w hospitalist & intend to advance diet today, possible to regular diet tomorrow  f/u K and Mg levels, as r/t diarrhea  consider adding ZnSO4 220 mg p daily x 3-5 days  once no concern for sepsis or hypotension should consider 10-14 day course of FloraStor twice daily po  re-evaluate need for PPI, thu in light of + C Diff  if pt does not tolerate po diet, or if needs to be NPO again, will re-evaluate for PN

## 2021-03-30 NOTE — PROGRESS NOTE ADULT - ASSESSMENT
Pt is an 88F w/ PMH HTN, HLD, Parkinson's disease being admitted for acute colitis and +coronavirus (not COVID-19). Febrile w/ leukocytosis and slight LA on adm c/w sepsis. CT Abdomen w/ evidence of L sided colitis.     #L sided Colitis  #C. Diff Colitis   #Gastroenteritis 2/2 Coronavirus   - likely 2/2 coronavirus vs c.diff  - cont PO Vancomycin   - continue IVF   - morphine and zofran prn   GI consult: - may need colonoscopy vs. repeat CT as outpt for resolution (pt does not want colonoscopy)  - advance diet as tolerated     #Emesis  #Aspiration Pneumonia vs Pneumonitis   - pt w/ chest discomfort, wheezing, new infiltrates on XR after emesis   - treat w/ Unasyn, renally dosed   - swallow eval pending     #JOHN - improving   - likely prerenal from n/v and colitis   - continue IVF , monitor closely   - most recent TTE 12/2020 w/ normal LV size and function       # Normocytic Anemia   - drop from admission 11--> 9.5 but looking back at prior hgb, she is typically 9-10   - monitor closely     #HTN- continue diltiazem and HCTZ     #Parkinsons- Continue pramipexole, aripiprazole, trazodone and sinemet     #CAD - cont asa/plavix/statin         #Progress Note Handoff:  Pending (specify): improvement in colitis/ PO intake   Family discussion: d/w daughter Veronica on 3/29, unable to reach on 3/30. Daughter is immunocompromised, so will need to help make arrangements for rehab vs safe home plan since pt is now diagnosed w/ cdiff.   Disposition: Home___/SNF___/Other________/Unknown at this time_x_______      Lynn Packer, DO

## 2021-03-31 ENCOUNTER — TRANSCRIPTION ENCOUNTER (OUTPATIENT)
Age: 86
End: 2021-03-31

## 2021-03-31 LAB
ANION GAP SERPL CALC-SCNC: 9 MMOL/L — SIGNIFICANT CHANGE UP (ref 7–14)
BLD GP AB SCN SERPL QL: SIGNIFICANT CHANGE UP
BUN SERPL-MCNC: 27 MG/DL — HIGH (ref 10–20)
CALCIUM SERPL-MCNC: 7.5 MG/DL — LOW (ref 8.5–10.1)
CHLORIDE SERPL-SCNC: 111 MMOL/L — HIGH (ref 98–110)
CO2 SERPL-SCNC: 20 MMOL/L — SIGNIFICANT CHANGE UP (ref 17–32)
CREAT SERPL-MCNC: 1.1 MG/DL — SIGNIFICANT CHANGE UP (ref 0.7–1.5)
GLUCOSE SERPL-MCNC: 106 MG/DL — HIGH (ref 70–99)
HCT VFR BLD CALC: 27 % — LOW (ref 37–47)
HGB BLD-MCNC: 8.7 G/DL — LOW (ref 12–16)
MCHC RBC-ENTMCNC: 27.8 PG — SIGNIFICANT CHANGE UP (ref 27–31)
MCHC RBC-ENTMCNC: 32.2 G/DL — SIGNIFICANT CHANGE UP (ref 32–37)
MCV RBC AUTO: 86.3 FL — SIGNIFICANT CHANGE UP (ref 81–99)
NRBC # BLD: 0 /100 WBCS — SIGNIFICANT CHANGE UP (ref 0–0)
PLATELET # BLD AUTO: 227 K/UL — SIGNIFICANT CHANGE UP (ref 130–400)
POTASSIUM SERPL-MCNC: 3.8 MMOL/L — SIGNIFICANT CHANGE UP (ref 3.5–5)
POTASSIUM SERPL-SCNC: 3.8 MMOL/L — SIGNIFICANT CHANGE UP (ref 3.5–5)
RBC # BLD: 3.13 M/UL — LOW (ref 4.2–5.4)
RBC # FLD: 14.6 % — HIGH (ref 11.5–14.5)
SODIUM SERPL-SCNC: 140 MMOL/L — SIGNIFICANT CHANGE UP (ref 135–146)
WBC # BLD: 6.2 K/UL — SIGNIFICANT CHANGE UP (ref 4.8–10.8)
WBC # FLD AUTO: 6.2 K/UL — SIGNIFICANT CHANGE UP (ref 4.8–10.8)

## 2021-03-31 PROCEDURE — 71045 X-RAY EXAM CHEST 1 VIEW: CPT | Mod: 26

## 2021-03-31 PROCEDURE — 99233 SBSQ HOSP IP/OBS HIGH 50: CPT

## 2021-03-31 RX ORDER — IBUPROFEN 200 MG
1 TABLET ORAL
Qty: 0 | Refills: 0 | DISCHARGE

## 2021-03-31 RX ORDER — HYDROXYZINE HCL 10 MG
25 TABLET ORAL ONCE
Refills: 0 | Status: COMPLETED | OUTPATIENT
Start: 2021-03-31 | End: 2021-03-31

## 2021-03-31 RX ORDER — LANOLIN ALCOHOL/MO/W.PET/CERES
3 CREAM (GRAM) TOPICAL AT BEDTIME
Refills: 0 | Status: DISCONTINUED | OUTPATIENT
Start: 2021-03-31 | End: 2021-03-31

## 2021-03-31 RX ADMIN — HEPARIN SODIUM 5000 UNIT(S): 5000 INJECTION INTRAVENOUS; SUBCUTANEOUS at 17:31

## 2021-03-31 RX ADMIN — PRAMIPEXOLE DIHYDROCHLORIDE 1 MILLIGRAM(S): 0.12 TABLET ORAL at 12:36

## 2021-03-31 RX ADMIN — PANTOPRAZOLE SODIUM 40 MILLIGRAM(S): 20 TABLET, DELAYED RELEASE ORAL at 05:29

## 2021-03-31 RX ADMIN — PRAMIPEXOLE DIHYDROCHLORIDE 1 MILLIGRAM(S): 0.12 TABLET ORAL at 05:30

## 2021-03-31 RX ADMIN — Medication 125 MILLIGRAM(S): at 12:36

## 2021-03-31 RX ADMIN — Medication 125 MILLIGRAM(S): at 17:31

## 2021-03-31 RX ADMIN — CARBIDOPA AND LEVODOPA 2 TABLET(S): 25; 100 TABLET ORAL at 12:36

## 2021-03-31 RX ADMIN — CARBIDOPA AND LEVODOPA 2 TABLET(S): 25; 100 TABLET ORAL at 05:29

## 2021-03-31 RX ADMIN — Medication 5 MILLIGRAM(S): at 12:36

## 2021-03-31 RX ADMIN — Medication 125 MILLIGRAM(S): at 05:29

## 2021-03-31 RX ADMIN — Medication 125 MILLIGRAM(S): at 00:42

## 2021-03-31 RX ADMIN — ATORVASTATIN CALCIUM 80 MILLIGRAM(S): 80 TABLET, FILM COATED ORAL at 21:21

## 2021-03-31 RX ADMIN — PRAMIPEXOLE DIHYDROCHLORIDE 1 MILLIGRAM(S): 0.12 TABLET ORAL at 17:32

## 2021-03-31 RX ADMIN — HEPARIN SODIUM 5000 UNIT(S): 5000 INJECTION INTRAVENOUS; SUBCUTANEOUS at 05:29

## 2021-03-31 RX ADMIN — Medication 81 MILLIGRAM(S): at 12:36

## 2021-03-31 RX ADMIN — CARBIDOPA AND LEVODOPA 2 TABLET(S): 25; 100 TABLET ORAL at 00:42

## 2021-03-31 RX ADMIN — ARIPIPRAZOLE 10 MILLIGRAM(S): 15 TABLET ORAL at 12:36

## 2021-03-31 RX ADMIN — PRAMIPEXOLE DIHYDROCHLORIDE 1 MILLIGRAM(S): 0.12 TABLET ORAL at 00:42

## 2021-03-31 RX ADMIN — Medication 5 MILLILITER(S): at 00:42

## 2021-03-31 RX ADMIN — SODIUM CHLORIDE 75 MILLILITER(S): 9 INJECTION INTRAMUSCULAR; INTRAVENOUS; SUBCUTANEOUS at 05:33

## 2021-03-31 RX ADMIN — Medication 50 MILLIGRAM(S): at 21:20

## 2021-03-31 RX ADMIN — CARBIDOPA AND LEVODOPA 2 TABLET(S): 25; 100 TABLET ORAL at 21:21

## 2021-03-31 RX ADMIN — Medication 125 MILLIGRAM(S): at 21:22

## 2021-03-31 RX ADMIN — AMPICILLIN SODIUM AND SULBACTAM SODIUM 200 GRAM(S): 250; 125 INJECTION, POWDER, FOR SUSPENSION INTRAMUSCULAR; INTRAVENOUS at 05:29

## 2021-03-31 RX ADMIN — CLOPIDOGREL BISULFATE 75 MILLIGRAM(S): 75 TABLET, FILM COATED ORAL at 12:36

## 2021-03-31 RX ADMIN — PRAMIPEXOLE DIHYDROCHLORIDE 1 MILLIGRAM(S): 0.12 TABLET ORAL at 21:22

## 2021-03-31 RX ADMIN — CARBIDOPA AND LEVODOPA 2 TABLET(S): 25; 100 TABLET ORAL at 17:30

## 2021-03-31 RX ADMIN — Medication 360 MILLIGRAM(S): at 05:29

## 2021-03-31 RX ADMIN — AMPICILLIN SODIUM AND SULBACTAM SODIUM 200 GRAM(S): 250; 125 INJECTION, POWDER, FOR SUSPENSION INTRAMUSCULAR; INTRAVENOUS at 17:30

## 2021-03-31 RX ADMIN — Medication 25 MILLIGRAM(S): at 21:20

## 2021-03-31 RX ADMIN — PANTOPRAZOLE SODIUM 40 MILLIGRAM(S): 20 TABLET, DELAYED RELEASE ORAL at 17:32

## 2021-03-31 NOTE — SWALLOW BEDSIDE ASSESSMENT ADULT - SLP PERTINENT HISTORY OF CURRENT PROBLEM
Pt admitted with nausea. Acute colitis, Sepsis 2' coronavirus (not COVID-19), gastroenteritis. Pt s/p coffee ground emesis during admission. Per GI 3/31 Confirmed with nursing team as well, patient without any further diarrhea or vomiting. PMHx: Parkinsons
Pt admitted with nausea. Acute colitis, Sepsis 2' coronavirus (not COVID-19), gastroenteritis. Pt s/p coffee ground emesis last night and this AM, pt currently made NPO. PMHx: Parkinsons

## 2021-03-31 NOTE — DISCHARGE NOTE PROVIDER - NSDCCPCAREPLAN_GEN_ALL_CORE_FT
PRINCIPAL DISCHARGE DIAGNOSIS  Diagnosis: PNA (pneumonia)  Assessment and Plan of Treatment: -treated with antibiotics  -follow up with your PCP       PRINCIPAL DISCHARGE DIAGNOSIS  Diagnosis: Colitis  Assessment and Plan of Treatment: due to C.Diff  take medication as prescribed  outpatient GI follow up

## 2021-03-31 NOTE — DISCHARGE NOTE PROVIDER - HOSPITAL COURSE
Pt is an 88F w/ PMH HTN, HLD, Parkinson's disease being admitted for acute colitis and +coronavirus (not COVID-19). Febrile w/ leukocytosis and slight LA on adm c/w sepsis. CT Abdomen w/ evidence of L sided colitis.   PT with gastroenteritis  likely 2/2 coronavirus vs c.diff. Pt c. diff positive on PO Vancomycin - BM's improved.   Pt seen by gastroenterology pt may need colonoscopy vs. repeat CT as outpt for resolution (pt does not want colonoscopy)  Pt seen by speech and swallow eval recommended Reg diet with thin liquids.   Pt with Aspiration Pneumonia vs Pneumonitis ,  new infiltrates on XR after emesis , treated with Unasyn, renally dosed   Pt with JOHN - improving ,  likely prerenal from n/v and colitis , treated with IV fluids.   Plan for pt to go home with homecare- has a bed, commode etc..  Pt to follow up with PCP and Gastroenterology as outpt.    Pt is an 88F w/ PMH HTN, HLD, Parkinson's disease being admitted for acute colitis and +coronavirus (not COVID-19). Febrile w/ leukocytosis and slight LA on adm c/w sepsis. CT Abdomen w/ evidence of L sided colitis.   PT with gastroenteritis  likely 2/2 coronavirus vs c.diff. Pt c. diff positive on PO Vancomycin - BM's improved.   Pt seen by gastroenterology pt may need colonoscopy vs. repeat CT as outpt for resolution (pt does not want colonoscopy)  Pt seen by speech and swallow eval recommended Reg diet with thin liquids.   Pt with Aspiration Pneumonia vs Pneumonitis ,  new infiltrates on XR after emesis , treated with Unasyn, renally dosed   Pt with JOHN - improving ,  likely prerenal from n/v and colitis , treated with IV fluids.   Plan for pt to go to SNF  Pt to follow up with PCP and Gastroenterology as outpt.     #C. Diff Colitis   #Gastroenteritis 2/2 Coronavirus   - likely 2/2 coronavirus vs c.diff  - cont PO Vancomycin - BM's improving  - continue IVF   - morphine and zofran prn   - GI consult: - may need colonoscopy vs. repeat CT as outpt for resolution (pt does not want colonoscopy)  - advance diet as tolerated   - speech and swallow eval appreciated     #Emesis  #Aspiration Pneumonia vs Pneumonitis   - pt w/ chest discomfort, wheezing, new infiltrates on XR after emesis   - treat w/ Unasyn, renally dosed   - swallow eval appreciated  - follow up repeat cxr - stable       #JOHN - resolved  - likely prerenal from n/v and colitis   - continue IVF, monitor closely   - most recent TTE 12/2020 w/ normal LV size and function       # Normocytic Anemia - stable  - baseline is 9-10   - monitor closely   - type and screen active    #HTN  - well controlled on diltiazem    #Parkinsons  - Continue pramipexole, aripiprazole, trazodone and sinemet     #CAD   - cont asa/plavix/statin     #arthritis  -tylenol prn    #suspected magnesium deficiency  -repelete PO    d/c planning took over 45 min  dc papers doen by me

## 2021-03-31 NOTE — PHYSICAL THERAPY INITIAL EVALUATION ADULT - GENERAL OBSERVATIONS, REHAB EVAL
10:00-10:30 Chart reviewed. Patient available to be seen for physical therapy, denies pain, confirmed with RN.  Pt rec'd in bed +IV, +Primafit in NAD

## 2021-03-31 NOTE — DISCHARGE NOTE PROVIDER - PROVIDER TOKENS
PROVIDER:[TOKEN:[[id number]],FOLLOWUP:[1 week]],PROVIDER:[TOKEN:[[id number]],FOLLOWUP:[1 week]]

## 2021-03-31 NOTE — PROGRESS NOTE ADULT - SUBJECTIVE AND OBJECTIVE BOX
JON GARCÍA  88y, Female  Allergy: No Known Allergies    Patient seen and examined earlier this morning  Lying comfortably in bed  Complaints of pain in b/l knees due to arthritis and cough      PMH/PSH:  PAST MEDICAL & SURGICAL HISTORY:  Parkinson disease  HTN (hypertension)  Rheumatoid arthritis  Hyperlipidemia  CRAO (central retinal artery occlusion)  Left  History of hysterectomy  Status post placement of implantable loop recorder  01/2021  S/P hernia repair          VITALS:  Vital Signs Last 24 Hrs  T(C): 37.1 (31 Mar 2021 14:29), Max: 37.1 (31 Mar 2021 14:29)  T(F): 98.7 (31 Mar 2021 14:29), Max: 98.7 (31 Mar 2021 14:29)  HR: 77 (31 Mar 2021 14:29) (71 - 79)  BP: 125/56 (31 Mar 2021 14:29) (117/58 - 155/63)  BP(mean): --  RR: 16 (31 Mar 2021 14:29) (16 - 16)  SpO2: --    PHYSICAL EXAM:  GENERAL: NAD, well-groomed, well-developed  HEAD:  Atraumatic, Normocephalic  EYES: EOMI, PERRLA, conjunctiva and sclera clear  NERVOUS SYSTEM:  Alert & Oriented X 4, Good concentration; Motor Strength 5/5 B/L upper and lower extremities; DTRs 2+ intact and symmetric  CHEST/LUNG: Clear to percussion bilaterally; No rales, rhonchi, wheezing, or rubs  HEART: Regular rate and rhythm; No murmurs, rubs, or gallops  ABDOMEN: Soft, Nontender, Nondistended; Bowel sounds present  EXTREMITIES:  2+ Peripheral Pulses, No clubbing, cyanosis, or edema  SKIN: No rashes or lesions        TESTS & MEASUREMENTS:  Weight (Kg):   BMI (kg/m2): 23.1 (03-26)                          8.7    6.20  )-----------( 227      ( 31 Mar 2021 07:07 )             27.0       03-31    140  |  111<H>  |  27<H>  ----------------------------<  106<H>  3.8   |  20  |  1.1    Ca    7.5<L>      31 Mar 2021 07:07        Culture - Urine (collected 03-28-21 @ 09:45)  Source: .Urine Clean Catch (Midstream)  Final Report (03-29-21 @ 11:31):    >=3 organisms. Probable collection contamination.    GI PCR Panel, Stool (collected 03-28-21 @ 09:44)  Source: .Stool Feces  Final Report (03-28-21 @ 19:57):    GI PCR Results: NOT detected    *******Please Note:*******    GI panel PCR evaluates for:    Campylobacter, Plesiomonas shigelloides, Salmonella,    Vibrio, Yersinia enterocolitica, Enteroaggregative    Escherichia coli (EAEC), Enteropathogenic E.coli (EPEC),    Enterotoxigenic E. coli (ETEC) lt/st, Shiga-like    toxin-producing E. coli (STEC) stx1/stx2,    Shigella/ Enteroinvasive E. coli (EIEC), Cryptosporidium,    Cyclospora cayetanensis, Entamoeba histolytica,    Giardia lamblia, Adenovirus F 40/41, Astrovirus,    Norovirus GI/GII, Rotavirus A, Sapovirus        RADIOLOGY, ECG, & ADDITIONAL TESTS:      RECENT DIAGNOSTIC ORDERS:  Diet, Full Liquid (03-30-21 @ 17:26)      MEDICATIONS:  MEDICATIONS  (STANDING):  ampicillin/sulbactam  IVPB      ampicillin/sulbactam  IVPB 3 Gram(s) IV Intermittent every 12 hours  ARIPiprazole 10 milliGRAM(s) Oral daily  aspirin  chewable 81 milliGRAM(s) Oral daily  atorvastatin 80 milliGRAM(s) Oral at bedtime  carbidopa/levodopa  25/100 2 Tablet(s) Oral four times a day  carbidopa/levodopa/entacapone 50/200/200 1 Tablet(s) Oral four times a day  chlorhexidine 4% Liquid 1 Application(s) Topical <User Schedule>  clopidogrel Tablet 75 milliGRAM(s) Oral daily  diltiazem    milliGRAM(s) Oral daily  heparin   Injectable 5000 Unit(s) SubCutaneous every 12 hours  oxybutynin XL 5 milliGRAM(s) Oral daily  pantoprazole  Injectable 40 milliGRAM(s) IV Push two times a day  pramipexole 1 milliGRAM(s) Oral four times a day  sodium chloride 0.9%. 1000 milliLiter(s) (75 mL/Hr) IV Continuous <Continuous>  traZODone 50 milliGRAM(s) Oral at bedtime  vancomycin    Solution 125 milliGRAM(s) Oral every 6 hours    MEDICATIONS  (PRN):  acetaminophen   Tablet .. 650 milliGRAM(s) Oral every 6 hours PRN Temp greater or equal to 38C (100.4F)  ALBUTerol    0.083% 2.5 milliGRAM(s) Nebulizer every 6 hours PRN Shortness of Breath and/or Wheezing  guaifenesin/dextromethorphan  Syrup 5 milliLiter(s) Oral every 6 hours PRN Cough  ibuprofen  Tablet. 400 milliGRAM(s) Oral three times a day PRN Mild Pain (1 - 3)  melatonin 5 milliGRAM(s) Oral at bedtime PRN Insomnia  morphine  - Injectable 2 milliGRAM(s) IV Push every 6 hours PRN Moderate Pain (4 - 6)  ondansetron Injectable 4 milliGRAM(s) IV Push three times a day PRN Nausea and/or Vomiting

## 2021-03-31 NOTE — PHYSICAL THERAPY INITIAL EVALUATION ADULT - ADDITIONAL COMMENTS
Pt reports she lives with daughter, she has RW for amb.  Pt says there is 1 step into her house, flight to daughter's house.

## 2021-03-31 NOTE — PROGRESS NOTE ADULT - ASSESSMENT
Pt is an 88F w/ PMH HTN, HLD, Parkinson's disease being admitted for acute colitis and +coronavirus (not COVID-19). Febrile w/ leukocytosis and slight LA on adm c/w sepsis. CT Abdomen w/ evidence of L sided colitis.     #C. Diff Colitis   #Gastroenteritis 2/2 Coronavirus   - likely 2/2 coronavirus vs c.diff  - cont PO Vancomycin - BM's improved  - continue IVF   - morphine and zofran prn   - GI consult: - may need colonoscopy vs. repeat CT as outpt for resolution (pt does not want colonoscopy)  - advance diet as tolerated   - speech and swallow eval appreciated     #Emesis  #Aspiration Pneumonia vs Pneumonitis   - pt w/ chest discomfort, wheezing, new infiltrates on XR after emesis   - treat w/ Unasyn, renally dosed   - swallow eval appreciated  - follow up repeat cxr    #JOHN - improving   - likely prerenal from n/v and colitis   - continue IVF , monitor closely   - most recent TTE 12/2020 w/ normal LV size and function       # Normocytic Anemia - dilutional this morning?  - drop from admission 11--> 8.7   - baseline is 9-10   - monitor closely   - type and screen ordered for the morning    #HTN  - well controlled on diltiazem    #Parkinsons  - Continue pramipexole, aripiprazole, trazodone and sinemet     #CAD   - cont asa/plavix/statin     #arthritis  -tylenol prn      Progress Note Handoff  Pending Consults: none  Pending Tests:cxr  Pending Results: cxr, labs  Family Discussion: discussed cough, diarrhea, knee pain, C.diff and treatment plan with patient; left message for pt's daughter.  Anticipate d/c in am- as per case management the patient may go home with homecare- has a bed, commode etc... if cxr improved and patient medically stable   Disposition: Home___x__/SNF______/Other_____/Unknown at this time_____    spent >40 min on medical management

## 2021-03-31 NOTE — PROGRESS NOTE ADULT - SUBJECTIVE AND OBJECTIVE BOX
88yFemale  Being followed for c-diff colitis  Interval history: Patient denies nausea, vomiting, hematemesis, melena, blood in stool, diarrhea, constipation, abdominal pain. Patient comfortable hungry for food. Confirmed with nursing team as well, patient without nay further diarrhea or vomiting.      PAST MEDICAL & SURGICAL HISTORY:   Parkinson disease    HTN (hypertension)    Rheumatoid arthritis    Hyperlipidemia    CRAO (central retinal artery occlusion)  Left    History of hysterectomy    Status post placement of implantable loop recorder  01/2021    S/P hernia repair            Social History: No smoking. No alcohol. No illegal drug use.          MEDICATIONS  (STANDING):  ampicillin/sulbactam  IVPB      ampicillin/sulbactam  IVPB 3 Gram(s) IV Intermittent every 12 hours  ARIPiprazole 10 milliGRAM(s) Oral daily  aspirin  chewable 81 milliGRAM(s) Oral daily  atorvastatin 80 milliGRAM(s) Oral at bedtime  carbidopa/levodopa  25/100 2 Tablet(s) Oral four times a day  carbidopa/levodopa/entacapone 50/200/200 1 Tablet(s) Oral four times a day  chlorhexidine 4% Liquid 1 Application(s) Topical <User Schedule>  clopidogrel Tablet 75 milliGRAM(s) Oral daily  diltiazem    milliGRAM(s) Oral daily  heparin   Injectable 5000 Unit(s) SubCutaneous every 12 hours  oxybutynin XL 5 milliGRAM(s) Oral daily  pantoprazole  Injectable 40 milliGRAM(s) IV Push two times a day  pramipexole 1 milliGRAM(s) Oral four times a day  sodium chloride 0.9%. 1000 milliLiter(s) (75 mL/Hr) IV Continuous <Continuous>  traZODone 50 milliGRAM(s) Oral at bedtime  vancomycin    Solution 125 milliGRAM(s) Oral every 6 hours    MEDICATIONS  (PRN):  acetaminophen   Tablet .. 650 milliGRAM(s) Oral every 6 hours PRN Temp greater or equal to 38C (100.4F)  ALBUTerol    0.083% 2.5 milliGRAM(s) Nebulizer every 6 hours PRN Shortness of Breath and/or Wheezing  guaifenesin/dextromethorphan  Syrup 5 milliLiter(s) Oral every 6 hours PRN Cough  ibuprofen  Tablet. 400 milliGRAM(s) Oral three times a day PRN Mild Pain (1 - 3)  melatonin 5 milliGRAM(s) Oral at bedtime PRN Insomnia  morphine  - Injectable 2 milliGRAM(s) IV Push every 6 hours PRN Moderate Pain (4 - 6)  ondansetron Injectable 4 milliGRAM(s) IV Push three times a day PRN Nausea and/or Vomiting      Allergies:  No Known Allergies          REVIEW OF SYSTEMS:  General:  No weight loss, fevers, or chills.  Eyes:  No reported pain or visual changes  ENT:  No sore throat or runny nose.  NECK: No stiffness   CV:  No chest pain or palpitations.  Resp:  No shortness of breath, cough  GI:  No abdominal pain, nausea, vomiting, dysphagia, diarrhea or constipation. No rectal bleeding, melena, or hematemesis.  Neuro:  No tingling, numbness       VITAL SIGNS:   T(F): 98.4 (03-31-21 @ 05:00), Max: 98.4 (03-31-21 @ 05:00)  HR: 79 (03-31-21 @ 05:00) (71 - 86)  BP: 117/58 (03-31-21 @ 05:00) (117/58 - 155/63)  RR: 16 (03-31-21 @ 05:00) (16 - 16)  SpO2: 96% (03-30-21 @ 13:00) (96% - 97%)    PHYSICAL EXAM:  GENERAL: AAOx3, no acute distress.  HEAD:  Atraumatic, Normocephalic  EYES: conjunctiva and sclera clear  NECK: Supple, no JVD or thyromegaly  CHEST/LUNG: Clear to auscultation bilaterally; No wheeze, rhonchi, or rales  HEART: Regular rate and rhythm; normal S1, S2, No murmurs.  ABDOMEN: Soft, nontender, nondistended; Bowel sounds present  NEUROLOGY: No asterixis or tremor.   SKIN: Intact, no jaundice            LABS:                        8.7    6.20  )-----------( 227      ( 31 Mar 2021 07:07 )             27.0     03-31    140  |  111<H>  |  27<H>  ----------------------------<  106<H>  3.8   |  20  |  1.1    Ca    7.5<L>      31 Mar 2021 07:07            IMAGING:    < from: CT Abdomen and Pelvis w/ IV Cont (03.26.21 @ 10:08) >    EXAM:  CT ABDOMEN AND PELVIS IC            PROCEDURE DATE:  03/26/2021            INTERPRETATION:  CLINICAL HISTORY: Left lower quadrant pain.    TECHNIQUE: Contiguous axial CT images were obtained from the lower chest to the pubic symphysis following administration of Optiray intravenous contrast. Oral contrast was not administered. Reformatted images in the coronal and sagittal planes were acquired.    COMPARISON: CT abdomen pelvis dated 3/6/2021.      FINDINGS:    LOWER CHEST: Bibasilar atelectasis.    HEPATOBILIARY: Unremarkable.    SPLEEN: Unremarkable.    PANCREAS: Unremarkable.    ADRENAL GLANDS: Unremarkable.    KIDNEYS: Punctate nonobstructing bilateral renal calculi. No hydronephrosis. Right renal cysts and additional bilateral renal hypodensities too small to characterize.    ABDOMINOPELVIC NODES: Unremarkable.    PELVIC ORGANS: Unremarkable urinary bladder. Status post hysterectomy.    PERITONEUM/MESENTERY/BOWEL: Segmental mural thickening involving the proximal to mid left colon with surrounding inflammatory change, compatible with colitis. No free air or fluid collection. No evidence of bowel obstruction. Colonic diverticulosis. Moderate colonic stool burden. Normal caliber appendix.    BONES/SOFT TISSUES: Degenerativechanges of the spine and hips. Postsurgical changes in the anterior abdominal wall.    OTHER: Extensive atherosclerotic calcifications of the aorta and its branches.      IMPRESSION:    Colitis involving the left colon - infectious, inflammatory, or ischemic in etiology.              MIR MORROW MD; Attending Radiologist  This document has been electronically signed. Mar 26 2021 10:28AM    < end of copied text >

## 2021-03-31 NOTE — PROGRESS NOTE ADULT - ASSESSMENT
87 y/o F with HTN, HL, Parkinson's so presents with abd pain, found to have colitis and is coronavirus positive.     Problem 1-C-diff Colitis   Rec  -Vanco po 125mg q6h for ten days  -Monitor CBC  -Probiotics BID daily for remainder of life to prevent recurrence   -Monitor stool for frequency, consistency blood  -may need colonoscopy vs. repeat CT as outpt for resolution (pt does not want colonoscopy)  -diet as tolerated   - Follow up with our [facility] located at [address] [place] [address]. Phone Number: [phone]     Problem 2-Intermittent vomiting--->resolved  Rec  -diet as tolerated   -PPI IV BID      Recall GI if needed  87 y/o F with HTN, HL, Parkinson's so presents with abd pain, found to have colitis and is coronavirus positive.     Problem 1-C-diff Colitis   Rec  -Vanco po 125mg q6h for ten days  -Monitor CBC  -Probiotics BID daily for remainder of life to prevent recurrence   -Monitor stool for frequency, consistency blood  -may need colonoscopy vs. repeat CT as outpt for resolution (pt does not want colonoscopy)  -diet as tolerated   - Follow up with our [facility] located at [address] [place] [address]. Phone Number: [phone]     Problem 2-Intermittent vomiting--->resolved  Rec  -diet as tolerated   -PPI IV BID    Recall GI if needed     Recall GI if needed

## 2021-03-31 NOTE — DISCHARGE NOTE PROVIDER - NSDCHHNEEDSERVICE_GEN_ALL_CORE
Observation and assessment/Ostomy care and management/Rehabilitation services/Teaching and training/Wound care and assessment

## 2021-03-31 NOTE — SWALLOW BEDSIDE ASSESSMENT ADULT - SWALLOW EVAL: DIAGNOSIS
Pt noted with baseline cough prior to PO trials. No s/s aspiration/penetration for thin liquids, puree, regular consistency

## 2021-03-31 NOTE — DISCHARGE NOTE PROVIDER - CARE PROVIDER_API CALL
Gucci Kamara  PEDIATRICS  [address] [place] [address]  Phone: [phone]  Fax: [phone]  Follow Up Time: 1 week    Slime Li)  Gastroenterology  [address] [place] [address]  Phone: [phone]  Fax: [phone]  Follow Up Time: 1 week

## 2021-03-31 NOTE — DISCHARGE NOTE PROVIDER - NSDCMRMEDTOKEN_GEN_ALL_CORE_FT
ARIPiprazole 10 mg oral tablet: 1 tab(s) orally once a day  aspirin 81 mg oral tablet, chewable: 1 tab(s) orally once a day  atorvastatin 80 mg oral tablet: 1 tab(s) orally once a day (at bedtime)  carbidopa/levodopa/entacapone 50 mg-200 mg-200 mg oral tablet: 1 tab(s) orally 4 times a day  dilTIAZem 360 mg/24 hours oral capsule, extended release: 1 cap(s) orally once a day  hydroCHLOROthiazide 12.5 mg oral capsule: 1 cap(s) orally once a day  ibuprofen 400 mg oral tablet: 1 tab(s) orally 3 times a day, As Needed for pain relief  ondansetron 4 mg oral tablet, disintegratin tab(s) orally 3 times a day as needed for nausea  oxybutynin 5 mg/24 hours oral tablet, extended release: 1 tab(s) orally once a day  Plavix 75 mg oral tablet: 1 tab(s) orally once a day  pramipexole 1 mg oral tablet: 1 tab(s) orally 4 times a day  Protonix 40 mg oral delayed release tablet: 1 tab(s) orally once a day  traZODone 50 mg oral tablet: 1 tab(s) orally once a day (at bedtime)   Acidophilus oral tablet: 2 tab(s) orally once a day  ARIPiprazole 10 mg oral tablet: 1 tab(s) orally once a day  aspirin 81 mg oral tablet, chewable: 1 tab(s) orally once a day  atorvastatin 80 mg oral tablet: 1 tab(s) orally once a day (at bedtime)  carbidopa/levodopa/entacapone 50 mg-200 mg-200 mg oral tablet: 1 tab(s) orally 4 times a day  dilTIAZem 360 mg/24 hours oral capsule, extended release: 1 cap(s) orally once a day  ondansetron 4 mg oral tablet, disintegratin tab(s) orally 3 times a day as needed for nausea  oxybutynin 5 mg/24 hours oral tablet, extended release: 1 tab(s) orally once a day  Plavix 75 mg oral tablet: 1 tab(s) orally once a day  pramipexole 1 mg oral tablet: 1 tab(s) orally 4 times a day  Protonix 40 mg oral delayed release tablet: 1 tab(s) orally once a day  traZODone 50 mg oral tablet: 1 tab(s) orally once a day (at bedtime)  vancomycin 125 mg oral capsule: 1 cap(s) orally every 6 hours for 7 days

## 2021-04-01 PROCEDURE — 99233 SBSQ HOSP IP/OBS HIGH 50: CPT

## 2021-04-01 RX ADMIN — ARIPIPRAZOLE 10 MILLIGRAM(S): 15 TABLET ORAL at 11:19

## 2021-04-01 RX ADMIN — CLOPIDOGREL BISULFATE 75 MILLIGRAM(S): 75 TABLET, FILM COATED ORAL at 11:19

## 2021-04-01 RX ADMIN — PRAMIPEXOLE DIHYDROCHLORIDE 1 MILLIGRAM(S): 0.12 TABLET ORAL at 17:07

## 2021-04-01 RX ADMIN — Medication 50 MILLIGRAM(S): at 22:30

## 2021-04-01 RX ADMIN — CARBIDOPA AND LEVODOPA 2 TABLET(S): 25; 100 TABLET ORAL at 05:54

## 2021-04-01 RX ADMIN — AMPICILLIN SODIUM AND SULBACTAM SODIUM 200 GRAM(S): 250; 125 INJECTION, POWDER, FOR SUSPENSION INTRAMUSCULAR; INTRAVENOUS at 06:00

## 2021-04-01 RX ADMIN — Medication 125 MILLIGRAM(S): at 22:30

## 2021-04-01 RX ADMIN — PRAMIPEXOLE DIHYDROCHLORIDE 1 MILLIGRAM(S): 0.12 TABLET ORAL at 22:30

## 2021-04-01 RX ADMIN — Medication 81 MILLIGRAM(S): at 11:19

## 2021-04-01 RX ADMIN — PANTOPRAZOLE SODIUM 40 MILLIGRAM(S): 20 TABLET, DELAYED RELEASE ORAL at 17:07

## 2021-04-01 RX ADMIN — HEPARIN SODIUM 5000 UNIT(S): 5000 INJECTION INTRAVENOUS; SUBCUTANEOUS at 17:07

## 2021-04-01 RX ADMIN — HEPARIN SODIUM 5000 UNIT(S): 5000 INJECTION INTRAVENOUS; SUBCUTANEOUS at 05:57

## 2021-04-01 RX ADMIN — CARBIDOPA AND LEVODOPA 2 TABLET(S): 25; 100 TABLET ORAL at 22:30

## 2021-04-01 RX ADMIN — PANTOPRAZOLE SODIUM 40 MILLIGRAM(S): 20 TABLET, DELAYED RELEASE ORAL at 05:56

## 2021-04-01 RX ADMIN — Medication 400 MILLIGRAM(S): at 23:00

## 2021-04-01 RX ADMIN — PRAMIPEXOLE DIHYDROCHLORIDE 1 MILLIGRAM(S): 0.12 TABLET ORAL at 05:54

## 2021-04-01 RX ADMIN — CHLORHEXIDINE GLUCONATE 1 APPLICATION(S): 213 SOLUTION TOPICAL at 05:54

## 2021-04-01 RX ADMIN — Medication 360 MILLIGRAM(S): at 05:54

## 2021-04-01 RX ADMIN — Medication 125 MILLIGRAM(S): at 06:13

## 2021-04-01 RX ADMIN — AMPICILLIN SODIUM AND SULBACTAM SODIUM 200 GRAM(S): 250; 125 INJECTION, POWDER, FOR SUSPENSION INTRAMUSCULAR; INTRAVENOUS at 17:06

## 2021-04-01 RX ADMIN — CARBIDOPA AND LEVODOPA 2 TABLET(S): 25; 100 TABLET ORAL at 17:07

## 2021-04-01 RX ADMIN — CARBIDOPA AND LEVODOPA 2 TABLET(S): 25; 100 TABLET ORAL at 11:19

## 2021-04-01 RX ADMIN — ATORVASTATIN CALCIUM 80 MILLIGRAM(S): 80 TABLET, FILM COATED ORAL at 22:30

## 2021-04-01 RX ADMIN — Medication 125 MILLIGRAM(S): at 11:19

## 2021-04-01 RX ADMIN — Medication 400 MILLIGRAM(S): at 22:30

## 2021-04-01 RX ADMIN — Medication 5 MILLIGRAM(S): at 11:19

## 2021-04-01 RX ADMIN — PRAMIPEXOLE DIHYDROCHLORIDE 1 MILLIGRAM(S): 0.12 TABLET ORAL at 11:19

## 2021-04-01 RX ADMIN — Medication 5 MILLILITER(S): at 22:37

## 2021-04-01 RX ADMIN — Medication 125 MILLIGRAM(S): at 17:06

## 2021-04-01 NOTE — PROGRESS NOTE ADULT - SUBJECTIVE AND OBJECTIVE BOX
JON GARCÍA  88y, Female  Allergy: No Known Allergies    Patient seen and examined earlier this morning  Lying comfortably in bed  multiple episodes >3 diarrhea       PMH/PSH:  PAST MEDICAL & SURGICAL HISTORY:  Parkinson disease  HTN (hypertension)  Rheumatoid arthritis  Hyperlipidemia  CRAO (central retinal artery occlusion)  Left  History of hysterectomy  Status post placement of implantable loop recorder  01/2021  S/P hernia repair          VITALS:  ICU Vital Signs Last 24 Hrs  T(C): 37.1 (01 Apr 2021 13:25), Max: 37.4 (31 Mar 2021 21:52)  T(F): 98.8 (01 Apr 2021 13:25), Max: 99.4 (31 Mar 2021 21:52)  HR: 62 (01 Apr 2021 13:25) (62 - 87)  BP: 130/60 (01 Apr 2021 13:25) (123/62 - 147/69)  BP(mean): --  ABP: --  ABP(mean): --  RR: 16 (01 Apr 2021 13:25) (16 - 16)  SpO2: 97% (31 Mar 2021 17:45) (97% - 97%)    PHYSICAL EXAM:  GENERAL: NAD, well-groomed, well-developed  HEAD:  Atraumatic, Normocephalic  EYES: EOMI, PERRLA, conjunctiva and sclera clear  NERVOUS SYSTEM:  Alert & Oriented X 4, Good concentration; Motor Strength 5/5 B/L upper and lower extremities; DTRs 2+ intact and symmetric  CHEST/LUNG: Clear to percussion bilaterally; No rales, rhonchi, wheezing, or rubs  HEART: Regular rate and rhythm; No murmurs, rubs, or gallops  ABDOMEN: Soft, Nontender, Nondistended; Bowel sounds present  EXTREMITIES:  2+ Peripheral Pulses, No clubbing, cyanosis, or edema  SKIN: No rashes or lesions        TESTS & MEASUREMENTS:  Weight (Kg):   BMI (kg/m2): 23.1 (03-26)                          8.7    6.20  )-----------( 227      ( 31 Mar 2021 07:07 )             27.0       03-31    140  |  111<H>  |  27<H>  ----------------------------<  106<H>  3.8   |  20  |  1.1    Ca    7.5<L>      31 Mar 2021 07:07        Culture - Urine (collected 03-28-21 @ 09:45)  Source: .Urine Clean Catch (Midstream)  Final Report (03-29-21 @ 11:31):    >=3 organisms. Probable collection contamination.    GI PCR Panel, Stool (collected 03-28-21 @ 09:44)  Source: .Stool Feces  Final Report (03-28-21 @ 19:57):    GI PCR Results: NOT detected    *******Please Note:*******    GI panel PCR evaluates for:    Campylobacter, Plesiomonas shigelloides, Salmonella,    Vibrio, Yersinia enterocolitica, Enteroaggregative    Escherichia coli (EAEC), Enteropathogenic E.coli (EPEC),    Enterotoxigenic E. coli (ETEC) lt/st, Shiga-like    toxin-producing E. coli (STEC) stx1/stx2,    Shigella/ Enteroinvasive E. coli (EIEC), Cryptosporidium,    Cyclospora cayetanensis, Entamoeba histolytica,    Giardia lamblia, Adenovirus F 40/41, Astrovirus,    Norovirus GI/GII, Rotavirus A, Sapovirus        RADIOLOGY, ECG, & ADDITIONAL TESTS:      RECENT DIAGNOSTIC ORDERS:  Diet, Full Liquid (03-30-21 @ 17:26)      MEDICATIONS:  MEDICATIONS  (STANDING):  ampicillin/sulbactam  IVPB      ampicillin/sulbactam  IVPB 3 Gram(s) IV Intermittent every 12 hours  ARIPiprazole 10 milliGRAM(s) Oral daily  aspirin  chewable 81 milliGRAM(s) Oral daily  atorvastatin 80 milliGRAM(s) Oral at bedtime  carbidopa/levodopa  25/100 2 Tablet(s) Oral four times a day  carbidopa/levodopa/entacapone 50/200/200 1 Tablet(s) Oral four times a day  chlorhexidine 4% Liquid 1 Application(s) Topical <User Schedule>  clopidogrel Tablet 75 milliGRAM(s) Oral daily  diltiazem    milliGRAM(s) Oral daily  heparin   Injectable 5000 Unit(s) SubCutaneous every 12 hours  oxybutynin XL 5 milliGRAM(s) Oral daily  pantoprazole  Injectable 40 milliGRAM(s) IV Push two times a day  pramipexole 1 milliGRAM(s) Oral four times a day  sodium chloride 0.9%. 1000 milliLiter(s) (75 mL/Hr) IV Continuous <Continuous>  traZODone 50 milliGRAM(s) Oral at bedtime  vancomycin    Solution 125 milliGRAM(s) Oral every 6 hours    MEDICATIONS  (PRN):  acetaminophen   Tablet .. 650 milliGRAM(s) Oral every 6 hours PRN Temp greater or equal to 38C (100.4F)  ALBUTerol    0.083% 2.5 milliGRAM(s) Nebulizer every 6 hours PRN Shortness of Breath and/or Wheezing  guaifenesin/dextromethorphan  Syrup 5 milliLiter(s) Oral every 6 hours PRN Cough  ibuprofen  Tablet. 400 milliGRAM(s) Oral three times a day PRN Mild Pain (1 - 3)  melatonin 5 milliGRAM(s) Oral at bedtime PRN Insomnia  morphine  - Injectable 2 milliGRAM(s) IV Push every 6 hours PRN Moderate Pain (4 - 6)  ondansetron Injectable 4 milliGRAM(s) IV Push three times a day PRN Nausea and/or Vomiting

## 2021-04-01 NOTE — PROGRESS NOTE ADULT - ASSESSMENT
Pt is an 88F w/ PMH HTN, HLD, Parkinson's disease being admitted for acute colitis and +coronavirus (not COVID-19). Febrile w/ leukocytosis and slight LA on adm c/w sepsis. CT Abdomen w/ evidence of L sided colitis.     #C. Diff Colitis - cont po vanco - still with active diarrhea   #Gastroenteritis 2/2 Coronavirus   - likely 2/2 coronavirus vs c.diff  - cont PO Vancomycin - BM's improved  - continue IVF   - morphine and zofran prn   - GI consult: - may need colonoscopy vs. repeat CT as outpt for resolution (pt does not want colonoscopy)  - advance diet as tolerated   - speech and swallow eval appreciated     #Emesis  #Aspiration Pneumonia vs Pneumonitis   - pt w/ chest discomfort, wheezing, new infiltrates on XR after emesis   - treat w/ Unasyn, renally dosed   - swallow eval appreciated  repeat CXr - will dc unsayn     #JOHN - improving   - likely prerenal from n/v and colitis   - continue IVF , monitor closely   - most recent TTE 12/2020 w/ normal LV size and function       # Normocytic Anemia - dilutional this morning?  - drop from admission 11--> 8.7   - baseline is 9-10   - monitor closely   - type and screen ordered for the morning    #HTN  - well controlled on diltiazem    #Parkinsons  - Continue pramipexole, aripiprazole, trazodone and sinemet     #CAD   - cont asa/plavix/statin     #arthritis  -tylenol prn      Progress Note Handoff  Pending Consults: none  Pending Tests:cxr  Pending Results: cxr, labs  Family Discussion: discussed cough, diarrhea, knee pain, C.diff and treatment plan with patient;   plan is for SNf - PT to work with pt again - only walked a few steps      Disposition: Home____/SNF___x___/Other_____/Unknown at this time_____    spent >40 min on medical management    Pt is an 88F w/ PMH HTN, HLD, Parkinson's disease being admitted for acute colitis and +coronavirus (not COVID-19). Febrile w/ leukocytosis and slight LA on adm c/w sepsis. CT Abdomen w/ evidence of L sided colitis.     #C. Diff Colitis - cont po vanco - still with active diarrhea   #Gastroenteritis 2/2 Coronavirus   - likely 2/2 coronavirus vs c.diff  - cont PO Vancomycin - BM's improved  - continue IVF   - morphine and zofran prn   - GI consult: - may need colonoscopy vs. repeat CT as outpt for resolution (pt does not want colonoscopy)  - advance diet as tolerated   - speech and swallow eval appreciated     #Emesis  #Aspiration Pneumonia vs Pneumonitis   - pt w/ chest discomfort, wheezing, new infiltrates on XR after emesis   - treat w/ Unasyn, renally dosed   - swallow eval appreciated  -opacity/effusion still on CXR stable      #JOHN - improving   - likely prerenal from n/v and colitis   - continue IVF , monitor closely   - most recent TTE 12/2020 w/ normal LV size and function       # Normocytic Anemia - dilutional this morning?  - drop from admission 11--> 8.7   - baseline is 9-10   - monitor closely   - type and screen ordered for the morning    #HTN  - well controlled on diltiazem    #Parkinsons  - Continue pramipexole, aripiprazole, trazodone and sinemet     #CAD   - cont asa/plavix/statin     #arthritis  -tylenol prn      Progress Note Handoff  Pending Consults: none  Pending Tests:cxr  Pending Results: cxr, labs  Family Discussion: discussed cough, diarrhea, knee pain, C.diff and treatment plan with patient;   plan is for SNf - PT to work with pt again - only walked a few steps      Disposition: Home____/SNF___x___/Other_____/Unknown at this time_____    spent >40 min on medical management

## 2021-04-02 LAB
ANION GAP SERPL CALC-SCNC: 8 MMOL/L — SIGNIFICANT CHANGE UP (ref 7–14)
BUN SERPL-MCNC: 8 MG/DL — LOW (ref 10–20)
CALCIUM SERPL-MCNC: 7.7 MG/DL — LOW (ref 8.5–10.1)
CHLORIDE SERPL-SCNC: 108 MMOL/L — SIGNIFICANT CHANGE UP (ref 98–110)
CO2 SERPL-SCNC: 23 MMOL/L — SIGNIFICANT CHANGE UP (ref 17–32)
CREAT SERPL-MCNC: 0.6 MG/DL — LOW (ref 0.7–1.5)
GLUCOSE SERPL-MCNC: 100 MG/DL — HIGH (ref 70–99)
HCT VFR BLD CALC: 31.8 % — LOW (ref 37–47)
HGB BLD-MCNC: 10.3 G/DL — LOW (ref 12–16)
MAGNESIUM SERPL-MCNC: 1.7 MG/DL — LOW (ref 1.8–2.4)
MCHC RBC-ENTMCNC: 27.5 PG — SIGNIFICANT CHANGE UP (ref 27–31)
MCHC RBC-ENTMCNC: 32.4 G/DL — SIGNIFICANT CHANGE UP (ref 32–37)
MCV RBC AUTO: 84.8 FL — SIGNIFICANT CHANGE UP (ref 81–99)
NRBC # BLD: 0 /100 WBCS — SIGNIFICANT CHANGE UP (ref 0–0)
PLATELET # BLD AUTO: 282 K/UL — SIGNIFICANT CHANGE UP (ref 130–400)
POTASSIUM SERPL-MCNC: 4 MMOL/L — SIGNIFICANT CHANGE UP (ref 3.5–5)
POTASSIUM SERPL-SCNC: 4 MMOL/L — SIGNIFICANT CHANGE UP (ref 3.5–5)
RBC # BLD: 3.75 M/UL — LOW (ref 4.2–5.4)
RBC # FLD: 14.9 % — HIGH (ref 11.5–14.5)
SARS-COV-2 RNA SPEC QL NAA+PROBE: SIGNIFICANT CHANGE UP
SODIUM SERPL-SCNC: 139 MMOL/L — SIGNIFICANT CHANGE UP (ref 135–146)
WBC # BLD: 10.14 K/UL — SIGNIFICANT CHANGE UP (ref 4.8–10.8)
WBC # FLD AUTO: 10.14 K/UL — SIGNIFICANT CHANGE UP (ref 4.8–10.8)

## 2021-04-02 PROCEDURE — 99232 SBSQ HOSP IP/OBS MODERATE 35: CPT

## 2021-04-02 PROCEDURE — 99233 SBSQ HOSP IP/OBS HIGH 50: CPT

## 2021-04-02 RX ORDER — MAGNESIUM OXIDE 400 MG ORAL TABLET 241.3 MG
400 TABLET ORAL
Refills: 0 | Status: DISCONTINUED | OUTPATIENT
Start: 2021-04-02 | End: 2021-04-03

## 2021-04-02 RX ORDER — DILTIAZEM HCL 120 MG
30 CAPSULE, EXT RELEASE 24 HR ORAL ONCE
Refills: 0 | Status: COMPLETED | OUTPATIENT
Start: 2021-04-02 | End: 2021-04-02

## 2021-04-02 RX ORDER — HYDROXYZINE HCL 10 MG
50 TABLET ORAL ONCE
Refills: 0 | Status: COMPLETED | OUTPATIENT
Start: 2021-04-02 | End: 2021-04-02

## 2021-04-02 RX ADMIN — PRAMIPEXOLE DIHYDROCHLORIDE 1 MILLIGRAM(S): 0.12 TABLET ORAL at 05:07

## 2021-04-02 RX ADMIN — Medication 125 MILLIGRAM(S): at 05:07

## 2021-04-02 RX ADMIN — Medication 50 MILLIGRAM(S): at 21:59

## 2021-04-02 RX ADMIN — Medication 125 MILLIGRAM(S): at 23:30

## 2021-04-02 RX ADMIN — Medication 5 MILLIGRAM(S): at 11:18

## 2021-04-02 RX ADMIN — AMPICILLIN SODIUM AND SULBACTAM SODIUM 200 GRAM(S): 250; 125 INJECTION, POWDER, FOR SUSPENSION INTRAMUSCULAR; INTRAVENOUS at 18:04

## 2021-04-02 RX ADMIN — Medication 360 MILLIGRAM(S): at 05:07

## 2021-04-02 RX ADMIN — PRAMIPEXOLE DIHYDROCHLORIDE 1 MILLIGRAM(S): 0.12 TABLET ORAL at 23:29

## 2021-04-02 RX ADMIN — CARBIDOPA AND LEVODOPA 2 TABLET(S): 25; 100 TABLET ORAL at 05:07

## 2021-04-02 RX ADMIN — CARBIDOPA AND LEVODOPA 2 TABLET(S): 25; 100 TABLET ORAL at 23:29

## 2021-04-02 RX ADMIN — HEPARIN SODIUM 5000 UNIT(S): 5000 INJECTION INTRAVENOUS; SUBCUTANEOUS at 17:29

## 2021-04-02 RX ADMIN — Medication 125 MILLIGRAM(S): at 17:29

## 2021-04-02 RX ADMIN — Medication 5 MILLILITER(S): at 17:29

## 2021-04-02 RX ADMIN — CARBIDOPA AND LEVODOPA 2 TABLET(S): 25; 100 TABLET ORAL at 17:29

## 2021-04-02 RX ADMIN — AMPICILLIN SODIUM AND SULBACTAM SODIUM 200 GRAM(S): 250; 125 INJECTION, POWDER, FOR SUSPENSION INTRAMUSCULAR; INTRAVENOUS at 05:07

## 2021-04-02 RX ADMIN — HEPARIN SODIUM 5000 UNIT(S): 5000 INJECTION INTRAVENOUS; SUBCUTANEOUS at 05:07

## 2021-04-02 RX ADMIN — PANTOPRAZOLE SODIUM 40 MILLIGRAM(S): 20 TABLET, DELAYED RELEASE ORAL at 17:30

## 2021-04-02 RX ADMIN — PANTOPRAZOLE SODIUM 40 MILLIGRAM(S): 20 TABLET, DELAYED RELEASE ORAL at 05:08

## 2021-04-02 RX ADMIN — CARBIDOPA AND LEVODOPA 2 TABLET(S): 25; 100 TABLET ORAL at 11:18

## 2021-04-02 RX ADMIN — Medication 125 MILLIGRAM(S): at 11:18

## 2021-04-02 RX ADMIN — PRAMIPEXOLE DIHYDROCHLORIDE 1 MILLIGRAM(S): 0.12 TABLET ORAL at 17:29

## 2021-04-02 RX ADMIN — MAGNESIUM OXIDE 400 MG ORAL TABLET 400 MILLIGRAM(S): 241.3 TABLET ORAL at 17:29

## 2021-04-02 RX ADMIN — Medication 5 MILLILITER(S): at 11:24

## 2021-04-02 RX ADMIN — PRAMIPEXOLE DIHYDROCHLORIDE 1 MILLIGRAM(S): 0.12 TABLET ORAL at 11:18

## 2021-04-02 RX ADMIN — Medication 50 MILLIGRAM(S): at 22:00

## 2021-04-02 RX ADMIN — CLOPIDOGREL BISULFATE 75 MILLIGRAM(S): 75 TABLET, FILM COATED ORAL at 11:18

## 2021-04-02 RX ADMIN — ARIPIPRAZOLE 10 MILLIGRAM(S): 15 TABLET ORAL at 11:18

## 2021-04-02 RX ADMIN — ATORVASTATIN CALCIUM 80 MILLIGRAM(S): 80 TABLET, FILM COATED ORAL at 21:59

## 2021-04-02 RX ADMIN — Medication 30 MILLIGRAM(S): at 21:58

## 2021-04-02 RX ADMIN — Medication 81 MILLIGRAM(S): at 11:18

## 2021-04-02 NOTE — PROGRESS NOTE ADULT - SUBJECTIVE AND OBJECTIVE BOX
Gastroenterology progress note:     Patient is a 88y old  Female who presents with a chief complaint of Acute Colitis (01 Apr 2021 16:05)       Admitted on: 03-26-21    We are following the patient for: colitis     Interval History:  Pt reports no further episodes of diarrhea today (had three episodes yesterday). Feels well.     PAST MEDICAL & SURGICAL HISTORY:  Parkinson disease    HTN (hypertension)    Rheumatoid arthritis    Hyperlipidemia    CRAO (central retinal artery occlusion)  Left    History of hysterectomy    Status post placement of implantable loop recorder  01/2021    S/P hernia repair        MEDICATIONS  (STANDING):  ampicillin/sulbactam  IVPB      ampicillin/sulbactam  IVPB 3 Gram(s) IV Intermittent every 12 hours  ARIPiprazole 10 milliGRAM(s) Oral daily  aspirin  chewable 81 milliGRAM(s) Oral daily  atorvastatin 80 milliGRAM(s) Oral at bedtime  carbidopa/levodopa  25/100 2 Tablet(s) Oral four times a day  carbidopa/levodopa/entacapone 50/200/200 1 Tablet(s) Oral four times a day  chlorhexidine 4% Liquid 1 Application(s) Topical <User Schedule>  clopidogrel Tablet 75 milliGRAM(s) Oral daily  diltiazem    milliGRAM(s) Oral daily  heparin   Injectable 5000 Unit(s) SubCutaneous every 12 hours  oxybutynin XL 5 milliGRAM(s) Oral daily  pantoprazole  Injectable 40 milliGRAM(s) IV Push two times a day  pramipexole 1 milliGRAM(s) Oral four times a day  sodium chloride 0.9%. 1000 milliLiter(s) (75 mL/Hr) IV Continuous <Continuous>  traZODone 50 milliGRAM(s) Oral at bedtime  vancomycin    Solution 125 milliGRAM(s) Oral every 6 hours    MEDICATIONS  (PRN):  acetaminophen   Tablet .. 650 milliGRAM(s) Oral every 6 hours PRN Temp greater or equal to 38C (100.4F)  ALBUTerol    0.083% 2.5 milliGRAM(s) Nebulizer every 6 hours PRN Shortness of Breath and/or Wheezing  guaifenesin/dextromethorphan  Syrup 5 milliLiter(s) Oral every 6 hours PRN Cough  ibuprofen  Tablet. 400 milliGRAM(s) Oral three times a day PRN Mild Pain (1 - 3)  morphine  - Injectable 2 milliGRAM(s) IV Push every 6 hours PRN Moderate Pain (4 - 6)  ondansetron Injectable 4 milliGRAM(s) IV Push three times a day PRN Nausea and/or Vomiting      Allergies  No Known Allergies      Review of Systems:   Cardiovascular:  No Chest Pain, No Palpitations  Respiratory:  No Cough, No Dyspnea  Gastrointestinal:  As described in HPI    Physical Examination:  T(C): 36.8 (04-02-21 @ 05:45), Max: 37.1 (04-01-21 @ 13:25)  HR: 82 (04-02-21 @ 05:45) (62 - 82)  BP: 127/89 (04-02-21 @ 05:45) (127/89 - 130/60)  RR: 16 (04-02-21 @ 05:45) (16 - 16)  SpO2: --      04-01-21 @ 07:01  -  04-02-21 @ 07:00  --------------------------------------------------------  IN: 0 mL / OUT: 1800 mL / NET: -1800 mL      Constitutional: No acute distress.  Respiratory:  No signs of respiratory distress. Lung sounds are clear bilaterally.  Cardiovascular:  S1 S2, Regular rate and rhythm.  Abdominal: Abdomen is soft, symmetric, and non-tender without distention. There are no visible lesions or scars. Bowel sounds are present and normoactive in all four quadrants. No masses, hepatomegaly, or splenomegaly are noted.   Skin: No rashes, No Jaundice.        Data: (reviewed by attending)                        10.3   10.14 )-----------( 282      ( 02 Apr 2021 11:15 )             31.8     Hgb trend:  10.3  04-02-21 @ 11:15  8.6  04-01-21 @ 07:59  8.7  03-31-21 @ 07:07        04-02    139  |  108  |  8<L>  ----------------------------<  100<H>  4.0   |  23  |  0.6<L>    Ca    7.7<L>      02 Apr 2021 11:15  Mg     1.7     04-02    TPro  4.0<L>  /  Alb  2.2<L>  /  TBili  <0.2  /  DBili  x   /  AST  16  /  ALT  <5  /  AlkPhos  75  04-01    Liver panel trend:  TBili <0.2   /   AST 16   /   ALT <5   /   AlkP 75   /   Tptn 4.0   /   Alb 2.2    /   DBili --      04-01  TBili 0.4   /   AST 11   /   ALT <5   /   AlkP 193   /   Tptn 4.3   /   Alb 2.8    /   DBili --      03-27  TBili 0.4   /   AST 14   /   ALT <5   /   AlkP 232   /   Tptn 6.1   /   Alb 3.8    /   DBili --      03-26             Radiology: (reviewed by attending)

## 2021-04-02 NOTE — PROGRESS NOTE ADULT - ASSESSMENT
89 y/o F with HTN, HL, Parkinson's so presents with abd pain, found to have colitis and is coronavirus positive.     Problem 1-C-diff Colitis   Rec  -Vanco po 125mg q6h for ten days  -Monitor CBC  -Probiotics BID daily for remainder of life to prevent recurrence   -Monitor stool for frequency, consistency blood  -may need colonoscopy vs. repeat CT as outpt for resolution (pt does not want colonoscopy)  -diet as tolerated   - Follow up with our [facility] located at [address] [place] [address]. Phone Number: [phone]     Problem 2-Intermittent vomiting--->resolved  Rec  -diet as tolerated   -PPI IV BID    Recall GI if needed

## 2021-04-02 NOTE — PROGRESS NOTE ADULT - SUBJECTIVE AND OBJECTIVE BOX
JON GARCÍA  88y, Female  Allergy: No Known Allergies    Patient seen and examined earlier this morning  Lying comfortably in bed  Patient states that pain in b/l knees due to arthritis and cough have improved  Did have 3-4 BM's over the past 24hrs      PMH/PSH:  PAST MEDICAL & SURGICAL HISTORY:  Parkinson disease  HTN (hypertension)  Rheumatoid arthritis  Hyperlipidemia  CRAO (central retinal artery occlusion)  Left  History of hysterectomy  Status post placement of implantable loop recorder  01/2021  S/P hernia repair          VITALS:  Vital Signs Last 24 Hrs  T(C): 36.8 (02 Apr 2021 05:45), Max: 37.1 (01 Apr 2021 21:07)  T(F): 98.2 (02 Apr 2021 05:45), Max: 98.8 (01 Apr 2021 21:07)  HR: 82 (02 Apr 2021 05:45) (76 - 82)  BP: 127/89 (02 Apr 2021 05:45) (127/89 - 128/64)  BP(mean): --  RR: 16 (02 Apr 2021 05:45) (16 - 16)  SpO2: --        PHYSICAL EXAM:  GENERAL: NAD, well-groomed, well-developed  HEAD:  Atraumatic, Normocephalic  EYES: EOMI, PERRLA, conjunctiva and sclera clear  NERVOUS SYSTEM:  Alert & Oriented X 4, Good concentration; Motor Strength 5/5 B/L upper and lower extremities; DTRs 2+ intact and symmetric  CHEST/LUNG: Clear to percussion bilaterally; No rales, rhonchi, wheezing, or rubs  HEART: Regular rate and rhythm; No murmurs, rubs, or gallops  ABDOMEN: Soft, Nontender, Nondistended; Bowel sounds present  EXTREMITIES:  2+ Peripheral Pulses, No clubbing, cyanosis, or edema  SKIN: No rashes or lesions        TESTS & MEASUREMENTS:  Weight (Kg):   BMI (kg/m2): 23.1 (03-26)                          10.3   10.14 )-----------( 282      ( 02 Apr 2021 11:15 )             31.8       04-02    139  |  108  |  8<L>  ----------------------------<  100<H>  4.0   |  23  |  0.6<L>    Ca    7.7<L>      02 Apr 2021 11:15  Mg     1.7     04-02    TPro  4.0<L>  /  Alb  2.2<L>  /  TBili  <0.2  /  DBili  x   /  AST  16  /  ALT  <5  /  AlkPhos  75  04-01        Culture - Urine (collected 03-28-21 @ 09:45)  Source: .Urine Clean Catch (Midstream)  Final Report (03-29-21 @ 11:31):    >=3 organisms. Probable collection contamination.    GI PCR Panel, Stool (collected 03-28-21 @ 09:44)  Source: .Stool Feces  Final Report (03-28-21 @ 19:57):    GI PCR Results: NOT detected    *******Please Note:*******    GI panel PCR evaluates for:    Campylobacter, Plesiomonas shigelloides, Salmonella,    Vibrio, Yersinia enterocolitica, Enteroaggregative    Escherichia coli (EAEC), Enteropathogenic E.coli (EPEC),    Enterotoxigenic E. coli (ETEC) lt/st, Shiga-like    toxin-producing E. coli (STEC) stx1/stx2,    Shigella/ Enteroinvasive E. coli (EIEC), Cryptosporidium,    Cyclospora cayetanensis, Entamoeba histolytica,    Giardia lamblia, Adenovirus F 40/41, Astrovirus,    Norovirus GI/GII, Rotavirus A, Sapovirus        RADIOLOGY, ECG, & ADDITIONAL TESTS:      RECENT DIAGNOSTIC ORDERS:  Diet, Full Liquid (03-30-21 @ 17:26)      MEDICATIONS:  MEDICATIONS  (STANDING):  ampicillin/sulbactam  IVPB      ampicillin/sulbactam  IVPB 3 Gram(s) IV Intermittent every 12 hours  ARIPiprazole 10 milliGRAM(s) Oral daily  aspirin  chewable 81 milliGRAM(s) Oral daily  atorvastatin 80 milliGRAM(s) Oral at bedtime  carbidopa/levodopa  25/100 2 Tablet(s) Oral four times a day  carbidopa/levodopa/entacapone 50/200/200 1 Tablet(s) Oral four times a day  chlorhexidine 4% Liquid 1 Application(s) Topical <User Schedule>  clopidogrel Tablet 75 milliGRAM(s) Oral daily  diltiazem    milliGRAM(s) Oral daily  heparin   Injectable 5000 Unit(s) SubCutaneous every 12 hours  magnesium oxide 400 milliGRAM(s) Oral three times a day with meals  oxybutynin XL 5 milliGRAM(s) Oral daily  pantoprazole  Injectable 40 milliGRAM(s) IV Push two times a day  pramipexole 1 milliGRAM(s) Oral four times a day  sodium chloride 0.9%. 1000 milliLiter(s) (75 mL/Hr) IV Continuous <Continuous>  traZODone 50 milliGRAM(s) Oral at bedtime  vancomycin    Solution 125 milliGRAM(s) Oral every 6 hours    MEDICATIONS  (PRN):  acetaminophen   Tablet .. 650 milliGRAM(s) Oral every 6 hours PRN Temp greater or equal to 38C (100.4F)  ALBUTerol    0.083% 2.5 milliGRAM(s) Nebulizer every 6 hours PRN Shortness of Breath and/or Wheezing  guaifenesin/dextromethorphan  Syrup 5 milliLiter(s) Oral every 6 hours PRN Cough  ibuprofen  Tablet. 400 milliGRAM(s) Oral three times a day PRN Mild Pain (1 - 3)  morphine  - Injectable 2 milliGRAM(s) IV Push every 6 hours PRN Moderate Pain (4 - 6)  ondansetron Injectable 4 milliGRAM(s) IV Push three times a day PRN Nausea and/or Vomiting

## 2021-04-02 NOTE — PROGRESS NOTE ADULT - PROVIDER SPECIALTY LIST ADULT
Gastroenterology
Internal Medicine
Gastroenterology
Internal Medicine
Internal Medicine
Gastroenterology
Internal Medicine
Gastroenterology
Internal Medicine

## 2021-04-02 NOTE — PROGRESS NOTE ADULT - ASSESSMENT
Pt is an 88F w/ PMH HTN, HLD, Parkinson's disease being admitted for acute colitis and +coronavirus (not COVID-19). Febrile w/ leukocytosis and slight LA on adm c/w sepsis. CT Abdomen w/ evidence of L sided colitis.     #C. Diff Colitis   #Gastroenteritis 2/2 Coronavirus   - likely 2/2 coronavirus vs c.diff  - cont PO Vancomycin - BM's improving  - continue IVF   - morphine and zofran prn   - GI consult: - may need colonoscopy vs. repeat CT as outpt for resolution (pt does not want colonoscopy)  - advance diet as tolerated   - speech and swallow eval appreciated     #Emesis  #Aspiration Pneumonia vs Pneumonitis   - pt w/ chest discomfort, wheezing, new infiltrates on XR after emesis   - treat w/ Unasyn, renally dosed   - swallow eval appreciated  - follow up repeat cxr - stable       #JOHN - resolved  - likely prerenal from n/v and colitis   - continue IVF, monitor closely   - most recent TTE 12/2020 w/ normal LV size and function       # Normocytic Anemia - stable  - baseline is 9-10   - monitor closely   - type and screen active    #HTN  - well controlled on diltiazem    #Parkinsons  - Continue pramipexole, aripiprazole, trazodone and sinemet     #CAD   - cont asa/plavix/statin     #arthritis  -tylenol prn    #suspected magnesium deficiency  -repelete PO    Progress Note Handoff  Pending Consults: none  Pending Tests:none  Pending Results: none   Family discussion: discussed cough, diarrhea, knee pain, C.diff and treatment plan with patient and her daughter  d/c to SNF in am    spent >40 min on medical management

## 2021-04-02 NOTE — PROGRESS NOTE ADULT - REASON FOR ADMISSION
Acute Colitis

## 2021-04-02 NOTE — CHART NOTE - NSCHARTNOTEFT_GEN_A_CORE
BP high, will try small dose of Cardizem (she takes daily long acting dose of this) but may be near dose limit

## 2021-04-03 ENCOUNTER — TRANSCRIPTION ENCOUNTER (OUTPATIENT)
Age: 86
End: 2021-04-03

## 2021-04-03 VITALS
TEMPERATURE: 98 F | HEART RATE: 88 BPM | RESPIRATION RATE: 16 BRPM | SYSTOLIC BLOOD PRESSURE: 147 MMHG | DIASTOLIC BLOOD PRESSURE: 71 MMHG

## 2021-04-03 PROCEDURE — 99239 HOSP IP/OBS DSCHRG MGMT >30: CPT

## 2021-04-03 RX ADMIN — CARBIDOPA AND LEVODOPA 2 TABLET(S): 25; 100 TABLET ORAL at 11:22

## 2021-04-03 RX ADMIN — HEPARIN SODIUM 5000 UNIT(S): 5000 INJECTION INTRAVENOUS; SUBCUTANEOUS at 05:32

## 2021-04-03 RX ADMIN — PRAMIPEXOLE DIHYDROCHLORIDE 1 MILLIGRAM(S): 0.12 TABLET ORAL at 05:32

## 2021-04-03 RX ADMIN — Medication 81 MILLIGRAM(S): at 11:22

## 2021-04-03 RX ADMIN — PRAMIPEXOLE DIHYDROCHLORIDE 1 MILLIGRAM(S): 0.12 TABLET ORAL at 11:22

## 2021-04-03 RX ADMIN — SODIUM CHLORIDE 75 MILLILITER(S): 9 INJECTION INTRAMUSCULAR; INTRAVENOUS; SUBCUTANEOUS at 05:31

## 2021-04-03 RX ADMIN — Medication 5 MILLIGRAM(S): at 11:22

## 2021-04-03 RX ADMIN — Medication 360 MILLIGRAM(S): at 05:31

## 2021-04-03 RX ADMIN — CARBIDOPA AND LEVODOPA 2 TABLET(S): 25; 100 TABLET ORAL at 05:31

## 2021-04-03 RX ADMIN — PANTOPRAZOLE SODIUM 40 MILLIGRAM(S): 20 TABLET, DELAYED RELEASE ORAL at 05:32

## 2021-04-03 RX ADMIN — MAGNESIUM OXIDE 400 MG ORAL TABLET 400 MILLIGRAM(S): 241.3 TABLET ORAL at 11:23

## 2021-04-03 RX ADMIN — CLOPIDOGREL BISULFATE 75 MILLIGRAM(S): 75 TABLET, FILM COATED ORAL at 11:22

## 2021-04-03 RX ADMIN — MAGNESIUM OXIDE 400 MG ORAL TABLET 400 MILLIGRAM(S): 241.3 TABLET ORAL at 10:01

## 2021-04-03 RX ADMIN — Medication 125 MILLIGRAM(S): at 11:22

## 2021-04-03 RX ADMIN — Medication 125 MILLIGRAM(S): at 05:31

## 2021-04-03 RX ADMIN — AMPICILLIN SODIUM AND SULBACTAM SODIUM 200 GRAM(S): 250; 125 INJECTION, POWDER, FOR SUSPENSION INTRAMUSCULAR; INTRAVENOUS at 05:34

## 2021-04-03 RX ADMIN — Medication 5 MILLILITER(S): at 11:29

## 2021-04-03 RX ADMIN — ARIPIPRAZOLE 10 MILLIGRAM(S): 15 TABLET ORAL at 11:22

## 2021-04-03 NOTE — DISCHARGE NOTE NURSING/CASE MANAGEMENT/SOCIAL WORK - PATIENT PORTAL LINK FT
You can access the FollowMyHealth Patient Portal offered by [facility] by registering at the following website: [email or web address]. By joining "[employer]"’s FollowMyHealth portal, you will also be able to view your health information using other applications (apps) compatible with our system.

## 2021-04-03 NOTE — PHARMACOTHERAPY INTERVENTION NOTE - OUTCOME
She does have a history of [address]  and previously underwent ablation  Despite treatment her liver function seems to be pre poor  If no significant improvement in encephalopathy may have to discuss palliative care  accepted

## 2021-04-12 DIAGNOSIS — Z79.82 LONG TERM (CURRENT) USE OF ASPIRIN: ICD-10-CM

## 2021-04-12 DIAGNOSIS — G20 PARKINSON'S DISEASE: ICD-10-CM

## 2021-04-12 DIAGNOSIS — A04.72 ENTEROCOLITIS DUE TO CLOSTRIDIUM DIFFICILE, NOT SPECIFIED AS RECURRENT: ICD-10-CM

## 2021-04-12 DIAGNOSIS — Z90.710 ACQUIRED ABSENCE OF BOTH CERVIX AND UTERUS: ICD-10-CM

## 2021-04-12 DIAGNOSIS — A08.39 OTHER VIRAL ENTERITIS: ICD-10-CM

## 2021-04-12 DIAGNOSIS — B97.29 OTHER CORONAVIRUS AS THE CAUSE OF DISEASES CLASSIFIED ELSEWHERE: ICD-10-CM

## 2021-04-12 DIAGNOSIS — K52.9 NONINFECTIVE GASTROENTERITIS AND COLITIS, UNSPECIFIED: ICD-10-CM

## 2021-04-12 DIAGNOSIS — E87.6 HYPOKALEMIA: ICD-10-CM

## 2021-04-12 DIAGNOSIS — J69.0 PNEUMONITIS DUE TO INHALATION OF FOOD AND VOMIT: ICD-10-CM

## 2021-04-12 DIAGNOSIS — D64.9 ANEMIA, UNSPECIFIED: ICD-10-CM

## 2021-04-12 DIAGNOSIS — R32 UNSPECIFIED URINARY INCONTINENCE: ICD-10-CM

## 2021-04-12 DIAGNOSIS — Z95.818 PRESENCE OF OTHER CARDIAC IMPLANTS AND GRAFTS: ICD-10-CM

## 2021-04-12 DIAGNOSIS — E87.1 HYPO-OSMOLALITY AND HYPONATREMIA: ICD-10-CM

## 2021-04-12 DIAGNOSIS — M06.9 RHEUMATOID ARTHRITIS, UNSPECIFIED: ICD-10-CM

## 2021-04-12 DIAGNOSIS — A41.9 SEPSIS, UNSPECIFIED ORGANISM: ICD-10-CM

## 2021-04-12 DIAGNOSIS — K92.0 HEMATEMESIS: ICD-10-CM

## 2021-04-12 DIAGNOSIS — E61.2 MAGNESIUM DEFICIENCY: ICD-10-CM

## 2021-04-12 DIAGNOSIS — E78.5 HYPERLIPIDEMIA, UNSPECIFIED: ICD-10-CM

## 2021-04-12 DIAGNOSIS — I12.9 HYPERTENSIVE CHRONIC KIDNEY DISEASE WITH STAGE 1 THROUGH STAGE 4 CHRONIC KIDNEY DISEASE, OR UNSPECIFIED CHRONIC KIDNEY DISEASE: ICD-10-CM

## 2021-04-12 DIAGNOSIS — M19.90 UNSPECIFIED OSTEOARTHRITIS, UNSPECIFIED SITE: ICD-10-CM

## 2021-04-12 DIAGNOSIS — Z79.02 LONG TERM (CURRENT) USE OF ANTITHROMBOTICS/ANTIPLATELETS: ICD-10-CM

## 2021-04-12 DIAGNOSIS — N17.9 ACUTE KIDNEY FAILURE, UNSPECIFIED: ICD-10-CM

## 2021-04-12 DIAGNOSIS — N18.9 CHRONIC KIDNEY DISEASE, UNSPECIFIED: ICD-10-CM

## 2021-04-12 DIAGNOSIS — I25.10 ATHEROSCLEROTIC HEART DISEASE OF NATIVE CORONARY ARTERY WITHOUT ANGINA PECTORIS: ICD-10-CM

## 2021-04-14 ENCOUNTER — APPOINTMENT (OUTPATIENT)
Dept: CARDIOLOGY | Facility: CLINIC | Age: 86
End: 2021-04-14
Payer: MEDICARE

## 2021-04-14 ENCOUNTER — NON-APPOINTMENT (OUTPATIENT)
Age: 86
End: 2021-04-14

## 2021-04-14 PROCEDURE — G2066: CPT

## 2021-04-14 PROCEDURE — 93298 REM INTERROG DEV EVAL SCRMS: CPT

## 2021-04-16 NOTE — DISCHARGE NOTE NURSING/CASE MANAGEMENT/SOCIAL WORK - CAREGIVER PHONE NUMBER
Group Topic:  Education    Date: 4/16/2021  Start Time: 12:30 PM  End Time:  1:15 PM  Facilitators: ALEX Montano; Gaby Ronquillo; Tavia Matias LCSW; Yocasta Magana    Focus: Interpersonal Relationships: Forgiveness  Number in attendance: 5    Presentation on forgiveness and unforgiveness. Unforgiveness is discussed as a form of stress and the negative impact this can be to one’s well-being. Clients are shown benefits of forgiving which includes self-forgiveness. Information is also based on Dr. Jose Valdez’s research on forgiveness therapy.     Attendance: Present  Participation: Active  Patient Response: Appropriate feedback, Attentive and Good eye contact    Kimberley shared her difficulty with with not only forgiving others, but also forgiving herself, for conflict within interpersonal relationships in her past and the present. Kimberley discussed how holding on to unforgiveness has given her a sense of control in the past and how she is now realizing it may not be helpful or effective.    Licensed Provider Directing Treatment: Aldo Givens MA, [facility]    Documentation Completed By (Under Supervision of Licensed Provider): Yocasta Magana, student intern    I was present and agree with the content of the note.      [phone]

## 2021-05-11 ENCOUNTER — EMERGENCY (EMERGENCY)
Facility: HOSPITAL | Age: 86
LOS: 0 days | Discharge: HOME | End: 2021-05-12
Attending: EMERGENCY MEDICINE | Admitting: EMERGENCY MEDICINE
Payer: MEDICARE

## 2021-05-11 VITALS
HEIGHT: 64 IN | HEART RATE: 77 BPM | WEIGHT: 130.07 LBS | OXYGEN SATURATION: 95 % | RESPIRATION RATE: 20 BRPM | DIASTOLIC BLOOD PRESSURE: 74 MMHG | TEMPERATURE: 97 F | SYSTOLIC BLOOD PRESSURE: 173 MMHG

## 2021-05-11 DIAGNOSIS — R11.0 NAUSEA: ICD-10-CM

## 2021-05-11 DIAGNOSIS — Z86.79 PERSONAL HISTORY OF OTHER DISEASES OF THE CIRCULATORY SYSTEM: ICD-10-CM

## 2021-05-11 DIAGNOSIS — Z98.890 OTHER SPECIFIED POSTPROCEDURAL STATES: Chronic | ICD-10-CM

## 2021-05-11 DIAGNOSIS — Z79.02 LONG TERM (CURRENT) USE OF ANTITHROMBOTICS/ANTIPLATELETS: ICD-10-CM

## 2021-05-11 DIAGNOSIS — N39.0 URINARY TRACT INFECTION, SITE NOT SPECIFIED: ICD-10-CM

## 2021-05-11 DIAGNOSIS — R10.9 UNSPECIFIED ABDOMINAL PAIN: ICD-10-CM

## 2021-05-11 DIAGNOSIS — Z90.710 ACQUIRED ABSENCE OF BOTH CERVIX AND UTERUS: Chronic | ICD-10-CM

## 2021-05-11 DIAGNOSIS — E78.5 HYPERLIPIDEMIA, UNSPECIFIED: ICD-10-CM

## 2021-05-11 DIAGNOSIS — Z95.818 PRESENCE OF OTHER CARDIAC IMPLANTS AND GRAFTS: Chronic | ICD-10-CM

## 2021-05-11 DIAGNOSIS — I10 ESSENTIAL (PRIMARY) HYPERTENSION: ICD-10-CM

## 2021-05-11 DIAGNOSIS — Z87.39 PERSONAL HISTORY OF OTHER DISEASES OF THE MUSCULOSKELETAL SYSTEM AND CONNECTIVE TISSUE: ICD-10-CM

## 2021-05-11 DIAGNOSIS — Z86.69 PERSONAL HISTORY OF OTHER DISEASES OF THE NERVOUS SYSTEM AND SENSE ORGANS: ICD-10-CM

## 2021-05-11 DIAGNOSIS — Z79.899 OTHER LONG TERM (CURRENT) DRUG THERAPY: ICD-10-CM

## 2021-05-11 LAB
ALBUMIN SERPL ELPH-MCNC: 3.8 G/DL — SIGNIFICANT CHANGE UP (ref 3.5–5.2)
ALP SERPL-CCNC: 112 U/L — SIGNIFICANT CHANGE UP (ref 30–115)
ALT FLD-CCNC: <5 U/L — SIGNIFICANT CHANGE UP (ref 0–41)
ANION GAP SERPL CALC-SCNC: 10 MMOL/L — SIGNIFICANT CHANGE UP (ref 7–14)
APPEARANCE UR: CLEAR — SIGNIFICANT CHANGE UP
AST SERPL-CCNC: 15 U/L — SIGNIFICANT CHANGE UP (ref 0–41)
BACTERIA # UR AUTO: ABNORMAL /HPF
BASOPHILS # BLD AUTO: 0.04 K/UL — SIGNIFICANT CHANGE UP (ref 0–0.2)
BASOPHILS NFR BLD AUTO: 0.8 % — SIGNIFICANT CHANGE UP (ref 0–1)
BILIRUB SERPL-MCNC: 0.2 MG/DL — SIGNIFICANT CHANGE UP (ref 0.2–1.2)
BILIRUB UR-MCNC: NEGATIVE — SIGNIFICANT CHANGE UP
BUN SERPL-MCNC: 22 MG/DL — HIGH (ref 10–20)
CALCIUM SERPL-MCNC: 9.3 MG/DL — SIGNIFICANT CHANGE UP (ref 8.5–10.1)
CHLORIDE SERPL-SCNC: 102 MMOL/L — SIGNIFICANT CHANGE UP (ref 98–110)
CO2 SERPL-SCNC: 24 MMOL/L — SIGNIFICANT CHANGE UP (ref 17–32)
COLOR SPEC: YELLOW — SIGNIFICANT CHANGE UP
CREAT SERPL-MCNC: 0.9 MG/DL — SIGNIFICANT CHANGE UP (ref 0.7–1.5)
DIFF PNL FLD: NEGATIVE — SIGNIFICANT CHANGE UP
EOSINOPHIL # BLD AUTO: 0.23 K/UL — SIGNIFICANT CHANGE UP (ref 0–0.7)
EOSINOPHIL NFR BLD AUTO: 4.6 % — SIGNIFICANT CHANGE UP (ref 0–8)
EPI CELLS # UR: ABNORMAL /HPF
GLUCOSE SERPL-MCNC: 105 MG/DL — HIGH (ref 70–99)
GLUCOSE UR QL: NEGATIVE MG/DL — SIGNIFICANT CHANGE UP
HCT VFR BLD CALC: 37 % — SIGNIFICANT CHANGE UP (ref 37–47)
HGB BLD-MCNC: 11.4 G/DL — LOW (ref 12–16)
IMM GRANULOCYTES NFR BLD AUTO: 0.4 % — HIGH (ref 0.1–0.3)
KETONES UR-MCNC: NEGATIVE — SIGNIFICANT CHANGE UP
LEUKOCYTE ESTERASE UR-ACNC: NEGATIVE — SIGNIFICANT CHANGE UP
LIDOCAIN IGE QN: 22 U/L — SIGNIFICANT CHANGE UP (ref 7–60)
LYMPHOCYTES # BLD AUTO: 1.12 K/UL — LOW (ref 1.2–3.4)
LYMPHOCYTES # BLD AUTO: 22.6 % — SIGNIFICANT CHANGE UP (ref 20.5–51.1)
MAGNESIUM SERPL-MCNC: 2.4 MG/DL — SIGNIFICANT CHANGE UP (ref 1.8–2.4)
MCHC RBC-ENTMCNC: 26 PG — LOW (ref 27–31)
MCHC RBC-ENTMCNC: 30.8 G/DL — LOW (ref 32–37)
MCV RBC AUTO: 84.5 FL — SIGNIFICANT CHANGE UP (ref 81–99)
MONOCYTES # BLD AUTO: 0.45 K/UL — SIGNIFICANT CHANGE UP (ref 0.1–0.6)
MONOCYTES NFR BLD AUTO: 9.1 % — SIGNIFICANT CHANGE UP (ref 1.7–9.3)
NEUTROPHILS # BLD AUTO: 3.1 K/UL — SIGNIFICANT CHANGE UP (ref 1.4–6.5)
NEUTROPHILS NFR BLD AUTO: 62.5 % — SIGNIFICANT CHANGE UP (ref 42.2–75.2)
NITRITE UR-MCNC: POSITIVE
NRBC # BLD: 0 /100 WBCS — SIGNIFICANT CHANGE UP (ref 0–0)
PH UR: 7 — SIGNIFICANT CHANGE UP (ref 5–8)
PLATELET # BLD AUTO: 264 K/UL — SIGNIFICANT CHANGE UP (ref 130–400)
POTASSIUM SERPL-MCNC: 5.5 MMOL/L — HIGH (ref 3.5–5)
POTASSIUM SERPL-SCNC: 5.5 MMOL/L — HIGH (ref 3.5–5)
PROT SERPL-MCNC: 6.7 G/DL — SIGNIFICANT CHANGE UP (ref 6–8)
PROT UR-MCNC: NEGATIVE MG/DL — SIGNIFICANT CHANGE UP
RBC # BLD: 4.38 M/UL — SIGNIFICANT CHANGE UP (ref 4.2–5.4)
RBC # FLD: 14.8 % — HIGH (ref 11.5–14.5)
SODIUM SERPL-SCNC: 136 MMOL/L — SIGNIFICANT CHANGE UP (ref 135–146)
SP GR SPEC: 1.02 — SIGNIFICANT CHANGE UP (ref 1.01–1.03)
TROPONIN T SERPL-MCNC: <0.01 NG/ML — SIGNIFICANT CHANGE UP
UROBILINOGEN FLD QL: 0.2 MG/DL — SIGNIFICANT CHANGE UP (ref 0.2–0.2)
WBC # BLD: 4.96 K/UL — SIGNIFICANT CHANGE UP (ref 4.8–10.8)
WBC # FLD AUTO: 4.96 K/UL — SIGNIFICANT CHANGE UP (ref 4.8–10.8)
WBC UR QL: SIGNIFICANT CHANGE UP /HPF

## 2021-05-11 PROCEDURE — 99285 EMERGENCY DEPT VISIT HI MDM: CPT

## 2021-05-11 RX ORDER — HALOPERIDOL DECANOATE 100 MG/ML
5 INJECTION INTRAMUSCULAR ONCE
Refills: 0 | Status: DISCONTINUED | OUTPATIENT
Start: 2021-05-11 | End: 2021-05-11

## 2021-05-11 RX ORDER — SODIUM CHLORIDE 9 MG/ML
1000 INJECTION INTRAMUSCULAR; INTRAVENOUS; SUBCUTANEOUS ONCE
Refills: 0 | Status: COMPLETED | OUTPATIENT
Start: 2021-05-11 | End: 2021-05-11

## 2021-05-11 RX ORDER — ONDANSETRON 8 MG/1
4 TABLET, FILM COATED ORAL ONCE
Refills: 0 | Status: COMPLETED | OUTPATIENT
Start: 2021-05-11 | End: 2021-05-11

## 2021-05-11 RX ADMIN — ONDANSETRON 4 MILLIGRAM(S): 8 TABLET, FILM COATED ORAL at 19:47

## 2021-05-11 RX ADMIN — SODIUM CHLORIDE 1000 MILLILITER(S): 9 INJECTION INTRAMUSCULAR; INTRAVENOUS; SUBCUTANEOUS at 19:49

## 2021-05-11 RX ADMIN — ONDANSETRON 4 MILLIGRAM(S): 8 TABLET, FILM COATED ORAL at 22:59

## 2021-05-11 RX ADMIN — Medication 30 MILLILITER(S): at 20:45

## 2021-05-11 NOTE — ED PROVIDER NOTE - PATIENT PORTAL LINK FT
You can access the FollowMyHealth Patient Portal offered by [facility] by registering at the following website: [email or web address]. By joining [employer]’s FollowMyHealth portal, you will also be able to view your health information using other applications (apps) compatible with our system.

## 2021-05-11 NOTE — ED PROVIDER NOTE - CLINICAL SUMMARY MEDICAL DECISION MAKING FREE TEXT BOX
Pt presents with persistent nausea -  labs  wnl , ekg no ischemia trop negative abd soft nontender  CT  no acute pathology  + uti - abx given - Patient to be discharged from ED in well appearing condition. Any available test results were discussed with and printed  for patient.  Verbal instructions given, including instructions to return to ED immediately for any new, worsening, or concerning symptoms. Limitations of ED work up discussed.  Patient reports understanding of above with capacity and insight. Written discharge instructions additionally given, including follow-up plan with GI ,  and PMD -     zofran and abx sent to pharmacy

## 2021-05-11 NOTE — ED PROVIDER NOTE - PROGRESS NOTE DETAILS
EK - no relief w/ zofran - will attempt another medication.  Labs reassuring.  Will proceed to CT given advanced age, though suspect chronic GI condition that will require o/p f/u.

## 2021-05-11 NOTE — ED PROVIDER NOTE - ATTENDING CONTRIBUTION TO CARE
88yF HTN Parkinson's AS p/w nausea after eating chicken parm earlier today.  Pt denies any fever, abd pain, vomiting or diarrhea.  Says she had similar episode ~2wk ago in the ED w/o clear diagnosis and was discharged w/ antiemetics. She tried taking the antiemetics last night when she got nauseous but it was 6hr before she felt relief.  When sx happened again tonight, she tried the medicine then came to the ED when it did not work.  No CP or SOB.

## 2021-05-11 NOTE — ED PROVIDER NOTE - CARE PROVIDER_API CALL
Inocencio Toth  GASTROENTEROLOGY  [address] [place] [address]  Phone: [phone]  Fax: [phone]  Follow Up Time:     Slime Li)  Gastroenterology  [address] [place] [address]  Phone: [phone]  Fax: [phone]  Follow Up Time:

## 2021-05-11 NOTE — ED ADULT NURSE REASSESSMENT NOTE - NS ED NURSE REASSESS COMMENT FT1
Patient c/o  feeling  nausea  Zofran 4mg  was given  with a liter of fluid normal saline  patient still  feel nauseated. Patient receive maylox @1311

## 2021-05-11 NOTE — ED PROVIDER NOTE - MDM PATIENT STATEMENT FOR ADDL TREATMENT
Established pt. FOB/ LMP-5/17. Interested in seeing MATT Reyes for ins   Patient with one or more new problems requiring additional work-up/treatment.

## 2021-05-11 NOTE — ED PROVIDER NOTE - NSFOLLOWUPINSTRUCTIONS_ED_ALL_ED_FT
Follow up with your primary care doctor in 1-2 days    Urinary Tract Infection, Adult  ImageA urinary tract infection (UTI) is an infection of any part of the urinary tract, which includes the kidneys, ureters, bladder, and urethra. These organs make, store, and get rid of urine in the body. UTI can be a bladder infection (cystitis) or kidney infection (pyelonephritis).    What are the causes?  This infection may be caused by fungi, viruses, or bacteria. Bacteria are the most common cause of UTIs. This condition can also be caused by repeated incomplete emptying of the bladder during urination.    What increases the risk?  This condition is more likely to develop if:    You ignore your need to urinate or hold urine for long periods of time.  You do not empty your bladder completely during urination.  You wipe back to front after urinating or having a bowel movement, if you are female.  You are uncircumcised, if you are male.  You are constipated.  You have a urinary catheter that stays in place (indwelling).  You have a weak defense (immune) system.  You have a medical condition that affects your bowels, kidneys, or bladder.  You have diabetes.  You take antibiotic medicines frequently or for long periods of time, and the antibiotics no longer work well against certain types of infections (antibiotic resistance).  You take medicines that irritate your urinary tract.  You are exposed to chemicals that irritate your urinary tract.  You are female.    What are the signs or symptoms?  Symptoms of this condition include:    Fever.  Frequent urination or passing small amounts of urine frequently.  Needing to urinate urgently.  Pain or burning with urination.  Urine that smells bad or unusual.  Cloudy urine.  Pain in the lower abdomen or back.  Trouble urinating.  Blood in the urine.  Vomiting or being less hungry than normal.  Diarrhea or abdominal pain.  Vaginal discharge, if you are female.    How is this diagnosed?  This condition is diagnosed with a medical history and physical exam. You will also need to provide a urine sample to test your urine. Other tests may be done, including:    Blood tests.  Sexually transmitted disease (STD) testing.    If you have had more than one UTI, a cystoscopy or imaging studies may be done to determine the cause of the infections.    How is this treated?  Treatment for this condition often includes a combination of two or more of the following:    Antibiotic medicine.  Other medicines to treat less common causes of UTI.  Over-the-counter medicines to treat pain.  Drinking enough water to stay hydrated.    Follow these instructions at home:  Take over-the-counter and prescription medicines only as told by your health care provider.  If you were prescribed an antibiotic, take it as told by your health care provider. Do not stop taking the antibiotic even if you start to feel better.  Avoid alcohol, caffeine, tea, and carbonated beverages. They can irritate your bladder.  Drink enough fluid to keep your urine clear or pale yellow.  Keep all follow-up visits as told by your health care provider. This is important.  ImageMake sure to:    Empty your bladder often and completely. Do not hold urine for long periods of time.  Empty your bladder before and after sex.  Wipe from front to back after a bowel movement if you are female. Use each tissue one time when you wipe.    Contact a health care provider if:  You have back pain.  You have a fever.  You feel nauseous or vomit.  Your symptoms do not get better after 3 days.  Your symptoms go away and then return.  Get help right away if:  You have severe back pain or lower abdominal pain.  You are vomiting and cannot keep down any medicines or water.  This information is not intended to replace advice given to you by your health care provider. Make sure you discuss any questions you have with your health care provider.        Acute Abdominal Pain    WHAT YOU NEED TO KNOW:    The cause of your abdominal pain may not be found. If a cause is found, treatment will depend on what the cause is.     DISCHARGE INSTRUCTIONS:    Return to the emergency department if:     You vomit blood or cannot stop vomiting.      You have blood in your bowel movement or it looks like tar.       You have bleeding from your rectum.       Your abdomen is larger than usual, more painful, and hard.       You have severe pain in your abdomen.       You stop passing gas and having bowel movements.       You feel weak, dizzy, or faint.    Contact your healthcare provider if:     You have a fever.      You have new signs and symptoms.      Your symptoms do not get better with treatment.       You have questions or concerns about your condition or care.    Medicines may be given to decrease pain, treat an infection, and manage your symptoms. Take your medicine as directed. Call your healthcare provider if you think your medicine is not helping or if you have side effects. Tell him if you are allergic to any medicine. Keep a list of the medicines, vitamins, and herbs you take. Include the amounts, and when and why you take them. Bring the list or the pill bottles to follow-up visits. Carry your medicine list with you in case of an emergency.    Manage your symptoms:     Apply heat on your abdomen for 20 to 30 minutes every 2 hours for as many days as directed. Heat helps decrease pain and muscle spasms.       Manage your stress. Stress may cause abdominal pain. Your healthcare provider may recommend relaxation techniques and deep breathing exercises to help decrease your stress. Your healthcare provider may recommend you talk to someone about your stress or anxiety, such as a counselor or a trusted friend. Get plenty of sleep and exercise regularly.       Limit or do not drink alcohol. Alcohol can make your abdominal pain worse. Ask your healthcare provider if it is safe for you to drink alcohol. Also ask how much is safe for you to drink.       Do not smoke. Nicotine and other chemicals in cigarettes can damage your esophagus and stomach. Ask your healthcare provider for information if you currently smoke and need help to quit. E-cigarettes or smokeless tobacco still contain nicotine. Talk to your healthcare provider before you use these products.     Make changes to the food you eat as directed: Do not eat foods that cause abdominal pain or other symptoms. Eat small meals more often.     Eat more high-fiber foods if you are constipated. High-fiber foods include fruits, vegetables, whole-grain foods, and legumes.       Do not eat foods that cause gas if you have bloating. Examples include broccoli, cabbage, and cauliflower. Do not drink soda or carbonated drinks, because these may also cause gas.       Do not eat foods or drinks that contain sorbitol or fructose if you have diarrhea and bloating. Some examples are fruit juices, candy, jelly, and sugar-free gum.       Do not eat high-fat foods, such as fried foods, cheeseburgers, hot dogs, and desserts.      Limit or do not drink caffeine. Caffeine may make symptoms, such as heart burn or nausea, worse.       Drink plenty of liquids to prevent dehydration from diarrhea or vomiting. Ask your healthcare provider how much liquid to drink each day and which liquids are best for you.     Follow up with your healthcare provider as directed: Write down your questions so you remember to ask them during your visits.       © Copyright [employer] 2019 All illustrations and images included in CareNotes are the copyrighted property of [employer] or [employer]

## 2021-05-11 NOTE — ED PROVIDER NOTE - PHYSICAL EXAMINATION
Vital Signs: I have reviewed the initial vital signs.  Constitutional: elderly and frail  Eyes: PERRLA, EOMI, clear conjunctiva  ENT: MMM,   Cardiovascular: regular rate, regular rhythm, no murmur appreciated  Respiratory: unlabored respiratory effort, clear to auscultation bilaterally  Gastrointestinal: soft, non-tender, non-distended  abdomen, no pulsatile mass, light brown stool on rectal examination   Musculoskeletal: supple neck, no lower extremity edema, no bony tenderness  Integumentary: warm, dry, no rash  Neurologic: awake, alert, cranial nerves II-XII grossly intact, extremities’ motor and sensory functions grossly intact, no focal deficits,

## 2021-05-12 VITALS
DIASTOLIC BLOOD PRESSURE: 78 MMHG | RESPIRATION RATE: 20 BRPM | HEART RATE: 78 BPM | SYSTOLIC BLOOD PRESSURE: 178 MMHG | OXYGEN SATURATION: 98 % | TEMPERATURE: 97 F

## 2021-05-12 PROCEDURE — 74177 CT ABD & PELVIS W/CONTRAST: CPT | Mod: 26,MA

## 2021-05-12 RX ORDER — CEFPODOXIME PROXETIL 100 MG
1 TABLET ORAL
Qty: 14 | Refills: 0
Start: 2021-05-12 | End: 2021-05-18

## 2021-05-12 RX ORDER — ONDANSETRON 8 MG/1
1 TABLET, FILM COATED ORAL
Qty: 12 | Refills: 0
Start: 2021-05-12 | End: 2021-05-15

## 2021-05-12 NOTE — ED ADULT NURSE NOTE - NSIMPLEMENTINTERV_GEN_ALL_ED
Implemented All Universal Safety Interventions:  [place] to call system. Call bell, personal items and telephone within reach. Instruct patient to call for assistance. Room bathroom lighting operational. Non-slip footwear when patient is off stretcher. Physically safe environment: no spills, clutter or unnecessary equipment. Stretcher in lowest position, wheels locked, appropriate side rails in place.

## 2021-05-13 ENCOUNTER — EMERGENCY (EMERGENCY)
Facility: HOSPITAL | Age: 86
LOS: 0 days | Discharge: HOME | End: 2021-05-14
Attending: EMERGENCY MEDICINE | Admitting: EMERGENCY MEDICINE
Payer: MEDICARE

## 2021-05-13 VITALS
SYSTOLIC BLOOD PRESSURE: 174 MMHG | TEMPERATURE: 97 F | RESPIRATION RATE: 20 BRPM | OXYGEN SATURATION: 98 % | HEART RATE: 82 BPM | DIASTOLIC BLOOD PRESSURE: 61 MMHG | HEIGHT: 64 IN | WEIGHT: 134.92 LBS

## 2021-05-13 DIAGNOSIS — Z86.69 PERSONAL HISTORY OF OTHER DISEASES OF THE NERVOUS SYSTEM AND SENSE ORGANS: ICD-10-CM

## 2021-05-13 DIAGNOSIS — Z86.79 PERSONAL HISTORY OF OTHER DISEASES OF THE CIRCULATORY SYSTEM: ICD-10-CM

## 2021-05-13 DIAGNOSIS — E86.0 DEHYDRATION: ICD-10-CM

## 2021-05-13 DIAGNOSIS — R10.9 UNSPECIFIED ABDOMINAL PAIN: ICD-10-CM

## 2021-05-13 DIAGNOSIS — Z87.39 PERSONAL HISTORY OF OTHER DISEASES OF THE MUSCULOSKELETAL SYSTEM AND CONNECTIVE TISSUE: ICD-10-CM

## 2021-05-13 DIAGNOSIS — Z90.710 ACQUIRED ABSENCE OF BOTH CERVIX AND UTERUS: Chronic | ICD-10-CM

## 2021-05-13 DIAGNOSIS — Z79.899 OTHER LONG TERM (CURRENT) DRUG THERAPY: ICD-10-CM

## 2021-05-13 DIAGNOSIS — I10 ESSENTIAL (PRIMARY) HYPERTENSION: ICD-10-CM

## 2021-05-13 DIAGNOSIS — Z95.818 PRESENCE OF OTHER CARDIAC IMPLANTS AND GRAFTS: Chronic | ICD-10-CM

## 2021-05-13 DIAGNOSIS — R11.0 NAUSEA: ICD-10-CM

## 2021-05-13 DIAGNOSIS — Z98.890 OTHER SPECIFIED POSTPROCEDURAL STATES: Chronic | ICD-10-CM

## 2021-05-13 DIAGNOSIS — Z79.02 LONG TERM (CURRENT) USE OF ANTITHROMBOTICS/ANTIPLATELETS: ICD-10-CM

## 2021-05-13 DIAGNOSIS — Z90.710 ACQUIRED ABSENCE OF BOTH CERVIX AND UTERUS: ICD-10-CM

## 2021-05-13 DIAGNOSIS — E78.5 HYPERLIPIDEMIA, UNSPECIFIED: ICD-10-CM

## 2021-05-13 LAB
ALBUMIN SERPL ELPH-MCNC: 4 G/DL — SIGNIFICANT CHANGE UP (ref 3.5–5.2)
ALP SERPL-CCNC: 118 U/L — HIGH (ref 30–115)
ALT FLD-CCNC: <5 U/L — SIGNIFICANT CHANGE UP (ref 0–41)
ANION GAP SERPL CALC-SCNC: 12 MMOL/L — SIGNIFICANT CHANGE UP (ref 7–14)
APPEARANCE UR: CLEAR — SIGNIFICANT CHANGE UP
AST SERPL-CCNC: 10 U/L — SIGNIFICANT CHANGE UP (ref 0–41)
BACTERIA # UR AUTO: ABNORMAL /HPF
BASOPHILS # BLD AUTO: 0.04 K/UL — SIGNIFICANT CHANGE UP (ref 0–0.2)
BASOPHILS NFR BLD AUTO: 0.7 % — SIGNIFICANT CHANGE UP (ref 0–1)
BILIRUB SERPL-MCNC: <0.2 MG/DL — SIGNIFICANT CHANGE UP (ref 0.2–1.2)
BILIRUB UR-MCNC: NEGATIVE — SIGNIFICANT CHANGE UP
BUN SERPL-MCNC: 24 MG/DL — HIGH (ref 10–20)
CALCIUM SERPL-MCNC: 9 MG/DL — SIGNIFICANT CHANGE UP (ref 8.5–10.1)
CHLORIDE SERPL-SCNC: 103 MMOL/L — SIGNIFICANT CHANGE UP (ref 98–110)
CO2 SERPL-SCNC: 26 MMOL/L — SIGNIFICANT CHANGE UP (ref 17–32)
COLOR SPEC: YELLOW — SIGNIFICANT CHANGE UP
CREAT SERPL-MCNC: 1 MG/DL — SIGNIFICANT CHANGE UP (ref 0.7–1.5)
DIFF PNL FLD: NEGATIVE — SIGNIFICANT CHANGE UP
EOSINOPHIL # BLD AUTO: 0.14 K/UL — SIGNIFICANT CHANGE UP (ref 0–0.7)
EOSINOPHIL NFR BLD AUTO: 2.6 % — SIGNIFICANT CHANGE UP (ref 0–8)
EPI CELLS # UR: ABNORMAL /HPF
GLUCOSE SERPL-MCNC: 113 MG/DL — HIGH (ref 70–99)
GLUCOSE UR QL: NEGATIVE MG/DL — SIGNIFICANT CHANGE UP
HCT VFR BLD CALC: 37.1 % — SIGNIFICANT CHANGE UP (ref 37–47)
HGB BLD-MCNC: 11.3 G/DL — LOW (ref 12–16)
IMM GRANULOCYTES NFR BLD AUTO: 0.5 % — HIGH (ref 0.1–0.3)
KETONES UR-MCNC: ABNORMAL
LACTATE SERPL-SCNC: 1.6 MMOL/L — SIGNIFICANT CHANGE UP (ref 0.7–2)
LEUKOCYTE ESTERASE UR-ACNC: ABNORMAL
LYMPHOCYTES # BLD AUTO: 1.26 K/UL — SIGNIFICANT CHANGE UP (ref 1.2–3.4)
LYMPHOCYTES # BLD AUTO: 23.1 % — SIGNIFICANT CHANGE UP (ref 20.5–51.1)
MAGNESIUM SERPL-MCNC: 2.5 MG/DL — HIGH (ref 1.8–2.4)
MCHC RBC-ENTMCNC: 25.9 PG — LOW (ref 27–31)
MCHC RBC-ENTMCNC: 30.5 G/DL — LOW (ref 32–37)
MCV RBC AUTO: 85.1 FL — SIGNIFICANT CHANGE UP (ref 81–99)
MONOCYTES # BLD AUTO: 0.44 K/UL — SIGNIFICANT CHANGE UP (ref 0.1–0.6)
MONOCYTES NFR BLD AUTO: 8.1 % — SIGNIFICANT CHANGE UP (ref 1.7–9.3)
NEUTROPHILS # BLD AUTO: 3.55 K/UL — SIGNIFICANT CHANGE UP (ref 1.4–6.5)
NEUTROPHILS NFR BLD AUTO: 65 % — SIGNIFICANT CHANGE UP (ref 42.2–75.2)
NITRITE UR-MCNC: NEGATIVE — SIGNIFICANT CHANGE UP
NRBC # BLD: 0 /100 WBCS — SIGNIFICANT CHANGE UP (ref 0–0)
PH UR: 6 — SIGNIFICANT CHANGE UP (ref 5–8)
PLATELET # BLD AUTO: 272 K/UL — SIGNIFICANT CHANGE UP (ref 130–400)
POTASSIUM SERPL-MCNC: 3.9 MMOL/L — SIGNIFICANT CHANGE UP (ref 3.5–5)
POTASSIUM SERPL-SCNC: 3.9 MMOL/L — SIGNIFICANT CHANGE UP (ref 3.5–5)
PROT SERPL-MCNC: 6.3 G/DL — SIGNIFICANT CHANGE UP (ref 6–8)
PROT UR-MCNC: ABNORMAL MG/DL
RBC # BLD: 4.36 M/UL — SIGNIFICANT CHANGE UP (ref 4.2–5.4)
RBC # FLD: 14.7 % — HIGH (ref 11.5–14.5)
SODIUM SERPL-SCNC: 141 MMOL/L — SIGNIFICANT CHANGE UP (ref 135–146)
SP GR SPEC: >=1.03 (ref 1.01–1.03)
UROBILINOGEN FLD QL: 0.2 MG/DL — SIGNIFICANT CHANGE UP (ref 0.2–0.2)
WBC # BLD: 5.46 K/UL — SIGNIFICANT CHANGE UP (ref 4.8–10.8)
WBC # FLD AUTO: 5.46 K/UL — SIGNIFICANT CHANGE UP (ref 4.8–10.8)
WBC UR QL: SIGNIFICANT CHANGE UP /HPF

## 2021-05-13 PROCEDURE — 99285 EMERGENCY DEPT VISIT HI MDM: CPT

## 2021-05-13 RX ORDER — SODIUM CHLORIDE 9 MG/ML
1000 INJECTION INTRAMUSCULAR; INTRAVENOUS; SUBCUTANEOUS ONCE
Refills: 0 | Status: COMPLETED | OUTPATIENT
Start: 2021-05-13 | End: 2021-05-13

## 2021-05-13 RX ORDER — METOCLOPRAMIDE HCL 10 MG
10 TABLET ORAL ONCE
Refills: 0 | Status: COMPLETED | OUTPATIENT
Start: 2021-05-13 | End: 2021-05-13

## 2021-05-13 RX ORDER — ONDANSETRON 8 MG/1
4 TABLET, FILM COATED ORAL ONCE
Refills: 0 | Status: COMPLETED | OUTPATIENT
Start: 2021-05-13 | End: 2021-05-13

## 2021-05-13 RX ORDER — FAMOTIDINE 10 MG/ML
20 INJECTION INTRAVENOUS ONCE
Refills: 0 | Status: COMPLETED | OUTPATIENT
Start: 2021-05-13 | End: 2021-05-13

## 2021-05-13 RX ADMIN — SODIUM CHLORIDE 1000 MILLILITER(S): 9 INJECTION INTRAMUSCULAR; INTRAVENOUS; SUBCUTANEOUS at 21:37

## 2021-05-13 RX ADMIN — FAMOTIDINE 20 MILLIGRAM(S): 10 INJECTION INTRAVENOUS at 22:39

## 2021-05-13 RX ADMIN — SODIUM CHLORIDE 1000 MILLILITER(S): 9 INJECTION INTRAMUSCULAR; INTRAVENOUS; SUBCUTANEOUS at 22:52

## 2021-05-13 RX ADMIN — ONDANSETRON 4 MILLIGRAM(S): 8 TABLET, FILM COATED ORAL at 21:37

## 2021-05-13 RX ADMIN — ONDANSETRON 4 MILLIGRAM(S): 8 TABLET, FILM COATED ORAL at 21:51

## 2021-05-13 RX ADMIN — Medication 104 MILLIGRAM(S): at 22:39

## 2021-05-13 NOTE — ED PROVIDER NOTE - NS ED ROS FT
Constitutional: (-) fever  Eyes/ENT: (-) blurry vision, (-) epistaxis  Cardiovascular: (-) chest pain, (-) syncope  Respiratory: (-) cough, (-) shortness of breath  Gastrointestinal: (-) vomiting, (-) diarrhea (+) nausea   Musculoskeletal: (-) neck pain, (-) back pain, (-) joint pain  Integumentary: (-) rash, (-) edema  Neurological: (-) headache, (-) altered mental status  Psychiatric: (-) hallucinations  Allergic/Immunologic: (-) pruritus

## 2021-05-13 NOTE — ED PROVIDER NOTE - OBJECTIVE STATEMENT
87 y/o female with hx of aortic stenosis, loop recorder, RA, left eye central retinal artery occlusion, HTN , parkinsons presents to the ED with nausea. Pt states nausea has been for past few days since discharge from ED patient denies any abdominal pain, vomiting or diarrhea. no back pain. Pt does attest to some dysuria. patient denies any fever or chills

## 2021-05-13 NOTE — ED PROVIDER NOTE - DISCHARGE DATE
13-May-2021 Intermediate Repair Preamble Text (Leave Blank If You Do Not Want): Undermining was performed with blunt dissection.

## 2021-05-13 NOTE — ED PROVIDER NOTE - ATTENDING CONTRIBUTION TO CARE
88yF p/w nausea similar to prior - pt seen 2d ago and last week for the same - now reporting dysuria and says zofran has not been helping.  No vomiting, abd pain or fevers.

## 2021-05-13 NOTE — ED PROVIDER NOTE - CLINICAL SUMMARY MEDICAL DECISION MAKING FREE TEXT BOX
88yF p/w nausea w/o abd pain or vomiting/diarrhea, similar to recent prior ED visits.  Labs reassuring.  UA w/ bactiuria but no leukemoid reaction to necessitate treatment for UTI.  EKG w/o ischemia or arrhythmia.  Pt treated with IV LR (BUN/CR at upper limit of pt's baseline c/w dehydration) and zofran (without relief) and then reglan (with improvement in her nausea).  Pt had reassuring CT A/P 2d ago - without abd pain and with abd exam soft/benign, no need to repeat. Recommend supportive care, attention to PO hydration, o/p f/u with PCP and GI, return precautions.

## 2021-05-13 NOTE — ED PROVIDER NOTE - CARE PROVIDER_API CALL
Slime Li (MD)  Gastroenterology  [address] [place] [address]  Phone: [phone]  Fax: [phone]  Follow Up Time:

## 2021-05-13 NOTE — ED PROVIDER NOTE - NSFOLLOWUPINSTRUCTIONS_ED_ALL_ED_FT
Follow up with PMD and GI in 1-2 days.    Nausea     Nausea is the feeling that you have to vomit. As nausea gets worse, it can lead to vomiting. Vomiting puts you at an increased risk for dehydration. Older adults and people with other diseases or a weak immune system are at higher risk for dehydration. Drink clear fluids in small but frequent amounts as tolerated. Eat bland, easy-to-digest foods in small amounts as tolerated.    SEEK IMMEDIATE MEDICAL CARE IF YOU HAVE ANY OF THE FOLLOWING SYMPTOMS: fever, inability to keep sufficient fluids down, black or bloody vomitus, black or bloody stools, lightheadedness/dizziness, chest pain, severe headache, rash, shortness of breath, cold or clammy skin, confusion, pain with urination, or any signs of dehydration.

## 2021-05-15 ENCOUNTER — EMERGENCY (EMERGENCY)
Facility: HOSPITAL | Age: 86
LOS: 0 days | Discharge: HOME | End: 2021-05-16
Attending: EMERGENCY MEDICINE | Admitting: EMERGENCY MEDICINE
Payer: MEDICARE

## 2021-05-15 VITALS
DIASTOLIC BLOOD PRESSURE: 88 MMHG | WEIGHT: 134.92 LBS | SYSTOLIC BLOOD PRESSURE: 194 MMHG | TEMPERATURE: 99 F | RESPIRATION RATE: 18 BRPM | OXYGEN SATURATION: 98 % | HEIGHT: 64 IN | HEART RATE: 91 BPM

## 2021-05-15 DIAGNOSIS — R11.0 NAUSEA: ICD-10-CM

## 2021-05-15 DIAGNOSIS — Z79.899 OTHER LONG TERM (CURRENT) DRUG THERAPY: ICD-10-CM

## 2021-05-15 DIAGNOSIS — Z90.710 ACQUIRED ABSENCE OF BOTH CERVIX AND UTERUS: Chronic | ICD-10-CM

## 2021-05-15 DIAGNOSIS — Z98.890 OTHER SPECIFIED POSTPROCEDURAL STATES: Chronic | ICD-10-CM

## 2021-05-15 DIAGNOSIS — Z95.818 PRESENCE OF OTHER CARDIAC IMPLANTS AND GRAFTS: Chronic | ICD-10-CM

## 2021-05-15 DIAGNOSIS — R10.9 UNSPECIFIED ABDOMINAL PAIN: ICD-10-CM

## 2021-05-15 DIAGNOSIS — I10 ESSENTIAL (PRIMARY) HYPERTENSION: ICD-10-CM

## 2021-05-15 DIAGNOSIS — G20 PARKINSON'S DISEASE: ICD-10-CM

## 2021-05-15 DIAGNOSIS — E78.5 HYPERLIPIDEMIA, UNSPECIFIED: ICD-10-CM

## 2021-05-15 DIAGNOSIS — M06.9 RHEUMATOID ARTHRITIS, UNSPECIFIED: ICD-10-CM

## 2021-05-15 DIAGNOSIS — Z79.02 LONG TERM (CURRENT) USE OF ANTITHROMBOTICS/ANTIPLATELETS: ICD-10-CM

## 2021-05-15 DIAGNOSIS — Z90.710 ACQUIRED ABSENCE OF BOTH CERVIX AND UTERUS: ICD-10-CM

## 2021-05-15 PROCEDURE — 99283 EMERGENCY DEPT VISIT LOW MDM: CPT

## 2021-05-15 RX ORDER — FAMOTIDINE 10 MG/ML
20 INJECTION INTRAVENOUS ONCE
Refills: 0 | Status: COMPLETED | OUTPATIENT
Start: 2021-05-15 | End: 2021-05-15

## 2021-05-15 RX ORDER — ONDANSETRON 8 MG/1
4 TABLET, FILM COATED ORAL ONCE
Refills: 0 | Status: COMPLETED | OUTPATIENT
Start: 2021-05-15 | End: 2021-05-15

## 2021-05-15 RX ADMIN — ONDANSETRON 4 MILLIGRAM(S): 8 TABLET, FILM COATED ORAL at 23:50

## 2021-05-15 RX ADMIN — FAMOTIDINE 20 MILLIGRAM(S): 10 INJECTION INTRAVENOUS at 23:49

## 2021-05-16 LAB
CULTURE RESULTS: SIGNIFICANT CHANGE UP
SPECIMEN SOURCE: SIGNIFICANT CHANGE UP

## 2021-05-16 NOTE — ED PROVIDER NOTE - NS ED ROS FT
Constitutional: (-) fever  Eyes/ENT: (-) blurry vision, (-) epistaxis  Cardiovascular: (-) chest pain, (-) syncope  Respiratory: (-) cough, (-) shortness of breath  Gastrointestinal: (+) nausea, (-) vomiting, (-) diarrhea  : (-) dysuria, (-) hematuria  Musculoskeletal: (-) neck pain, (-) back pain, (-) joint pain  Integumentary: (-) rash, (-) edema  Neurological: (-) headache, (-) altered mental status  Allergic/Immunologic: (-) pruritus

## 2021-05-16 NOTE — ED PROVIDER NOTE - ATTENDING CONTRIBUTION TO CARE
I personally evaluated the patient. I reviewed the Resident’s or Physician Assistant’s note (as assigned above), and agree with the findings and plan except as documented in my note.  Chart reviewed. H/O HTN, HLD, RA, hysterectomy, hernia repair, presents with abdominal pain and nausea.  Has been in ED multiple times for same symptoms, work-up negative.  Exam shows alert patient in no distress, HEENT NCAT, lungs clear, RR S1S2, abdomen soft NT +BS, no CCE.

## 2021-05-16 NOTE — ED PROVIDER NOTE - DOMESTIC TRAVEL HIGH RISK QUESTION
LOV 8/8/16, due for f/u in one year, no appt scheduled at this time  Looking to fill Clobetasol solution  Last filled 4/7/16, 2 with 5 refills    Will e scribe at this time with a note that pt needs to be seen
No

## 2021-05-16 NOTE — ED PROVIDER NOTE - OBJECTIVE STATEMENT
88y F pmh as listed presents for nausea. Pt has intermittent episodes of nausea for extended period of time. Now presents with mild nausea. Pt is eating and drinking. Denies vomiting, abd pain, cp, sob, weakness, numbness, dysuria, hematuria

## 2021-05-16 NOTE — ED PROVIDER NOTE - PATIENT PORTAL LINK FT
You can access the FollowMyHealth Patient Portal offered by [facility] by registering at the following website: [email or web address]. By joining [employer]’s FollowMyHealth portal, you will also be able to view your health information using other applications (apps) compatible with our system.

## 2021-05-16 NOTE — ED PROVIDER NOTE - CARE PROVIDER_API CALL
Slime Li (MD)  Gastroenterology  [address] [place] [address]  Phone: [phone]  Fax: [phone]  Follow Up Time:

## 2021-05-19 ENCOUNTER — NON-APPOINTMENT (OUTPATIENT)
Age: 86
End: 2021-05-19

## 2021-05-19 ENCOUNTER — APPOINTMENT (OUTPATIENT)
Dept: CARDIOLOGY | Facility: CLINIC | Age: 86
End: 2021-05-19
Payer: MEDICARE

## 2021-05-19 PROCEDURE — G2066: CPT

## 2021-05-19 PROCEDURE — 93298 REM INTERROG DEV EVAL SCRMS: CPT

## 2021-05-24 ENCOUNTER — EMERGENCY (EMERGENCY)
Facility: HOSPITAL | Age: 86
LOS: 0 days | Discharge: HOME | End: 2021-05-24
Attending: EMERGENCY MEDICINE | Admitting: EMERGENCY MEDICINE
Payer: MEDICARE

## 2021-05-24 VITALS
HEIGHT: 64 IN | TEMPERATURE: 96 F | RESPIRATION RATE: 17 BRPM | OXYGEN SATURATION: 97 % | SYSTOLIC BLOOD PRESSURE: 172 MMHG | HEART RATE: 70 BPM | DIASTOLIC BLOOD PRESSURE: 78 MMHG | WEIGHT: 95.02 LBS

## 2021-05-24 VITALS
HEART RATE: 66 BPM | RESPIRATION RATE: 16 BRPM | SYSTOLIC BLOOD PRESSURE: 194 MMHG | OXYGEN SATURATION: 99 % | DIASTOLIC BLOOD PRESSURE: 84 MMHG

## 2021-05-24 DIAGNOSIS — Z79.899 OTHER LONG TERM (CURRENT) DRUG THERAPY: ICD-10-CM

## 2021-05-24 DIAGNOSIS — Z95.818 PRESENCE OF OTHER CARDIAC IMPLANTS AND GRAFTS: Chronic | ICD-10-CM

## 2021-05-24 DIAGNOSIS — G20 PARKINSON'S DISEASE: ICD-10-CM

## 2021-05-24 DIAGNOSIS — R11.2 NAUSEA WITH VOMITING, UNSPECIFIED: ICD-10-CM

## 2021-05-24 DIAGNOSIS — Z98.890 OTHER SPECIFIED POSTPROCEDURAL STATES: Chronic | ICD-10-CM

## 2021-05-24 DIAGNOSIS — Z79.82 LONG TERM (CURRENT) USE OF ASPIRIN: ICD-10-CM

## 2021-05-24 DIAGNOSIS — E78.5 HYPERLIPIDEMIA, UNSPECIFIED: ICD-10-CM

## 2021-05-24 DIAGNOSIS — R10.9 UNSPECIFIED ABDOMINAL PAIN: ICD-10-CM

## 2021-05-24 DIAGNOSIS — Z90.710 ACQUIRED ABSENCE OF BOTH CERVIX AND UTERUS: ICD-10-CM

## 2021-05-24 DIAGNOSIS — Z79.01 LONG TERM (CURRENT) USE OF ANTICOAGULANTS: ICD-10-CM

## 2021-05-24 DIAGNOSIS — Z90.710 ACQUIRED ABSENCE OF BOTH CERVIX AND UTERUS: Chronic | ICD-10-CM

## 2021-05-24 DIAGNOSIS — I10 ESSENTIAL (PRIMARY) HYPERTENSION: ICD-10-CM

## 2021-05-24 LAB
ALBUMIN SERPL ELPH-MCNC: 4.1 G/DL — SIGNIFICANT CHANGE UP (ref 3.5–5.2)
ALP SERPL-CCNC: 111 U/L — SIGNIFICANT CHANGE UP (ref 30–115)
ALT FLD-CCNC: <5 U/L — SIGNIFICANT CHANGE UP (ref 0–41)
ANION GAP SERPL CALC-SCNC: 11 MMOL/L — SIGNIFICANT CHANGE UP (ref 7–14)
AST SERPL-CCNC: 9 U/L — SIGNIFICANT CHANGE UP (ref 0–41)
BASOPHILS # BLD AUTO: 0.03 K/UL — SIGNIFICANT CHANGE UP (ref 0–0.2)
BASOPHILS NFR BLD AUTO: 0.5 % — SIGNIFICANT CHANGE UP (ref 0–1)
BILIRUB SERPL-MCNC: 0.3 MG/DL — SIGNIFICANT CHANGE UP (ref 0.2–1.2)
BUN SERPL-MCNC: 17 MG/DL — SIGNIFICANT CHANGE UP (ref 10–20)
CALCIUM SERPL-MCNC: 9.4 MG/DL — SIGNIFICANT CHANGE UP (ref 8.5–10.1)
CHLORIDE SERPL-SCNC: 105 MMOL/L — SIGNIFICANT CHANGE UP (ref 98–110)
CO2 SERPL-SCNC: 24 MMOL/L — SIGNIFICANT CHANGE UP (ref 17–32)
CREAT SERPL-MCNC: 0.8 MG/DL — SIGNIFICANT CHANGE UP (ref 0.7–1.5)
EOSINOPHIL # BLD AUTO: 0.25 K/UL — SIGNIFICANT CHANGE UP (ref 0–0.7)
EOSINOPHIL NFR BLD AUTO: 4.3 % — SIGNIFICANT CHANGE UP (ref 0–8)
GLUCOSE SERPL-MCNC: 107 MG/DL — HIGH (ref 70–99)
HCT VFR BLD CALC: 35.2 % — LOW (ref 37–47)
HGB BLD-MCNC: 11.2 G/DL — LOW (ref 12–16)
IMM GRANULOCYTES NFR BLD AUTO: 0.2 % — SIGNIFICANT CHANGE UP (ref 0.1–0.3)
LIDOCAIN IGE QN: 16 U/L — SIGNIFICANT CHANGE UP (ref 7–60)
LYMPHOCYTES # BLD AUTO: 1.05 K/UL — LOW (ref 1.2–3.4)
LYMPHOCYTES # BLD AUTO: 18.2 % — LOW (ref 20.5–51.1)
MCHC RBC-ENTMCNC: 26.4 PG — LOW (ref 27–31)
MCHC RBC-ENTMCNC: 31.8 G/DL — LOW (ref 32–37)
MCV RBC AUTO: 83 FL — SIGNIFICANT CHANGE UP (ref 81–99)
MONOCYTES # BLD AUTO: 0.45 K/UL — SIGNIFICANT CHANGE UP (ref 0.1–0.6)
MONOCYTES NFR BLD AUTO: 7.8 % — SIGNIFICANT CHANGE UP (ref 1.7–9.3)
NEUTROPHILS # BLD AUTO: 3.98 K/UL — SIGNIFICANT CHANGE UP (ref 1.4–6.5)
NEUTROPHILS NFR BLD AUTO: 69 % — SIGNIFICANT CHANGE UP (ref 42.2–75.2)
NRBC # BLD: 0 /100 WBCS — SIGNIFICANT CHANGE UP (ref 0–0)
PLATELET # BLD AUTO: 305 K/UL — SIGNIFICANT CHANGE UP (ref 130–400)
POTASSIUM SERPL-MCNC: 3.5 MMOL/L — SIGNIFICANT CHANGE UP (ref 3.5–5)
POTASSIUM SERPL-SCNC: 3.5 MMOL/L — SIGNIFICANT CHANGE UP (ref 3.5–5)
PROT SERPL-MCNC: 6.4 G/DL — SIGNIFICANT CHANGE UP (ref 6–8)
RBC # BLD: 4.24 M/UL — SIGNIFICANT CHANGE UP (ref 4.2–5.4)
RBC # FLD: 14.3 % — SIGNIFICANT CHANGE UP (ref 11.5–14.5)
SODIUM SERPL-SCNC: 140 MMOL/L — SIGNIFICANT CHANGE UP (ref 135–146)
TROPONIN T SERPL-MCNC: <0.01 NG/ML — SIGNIFICANT CHANGE UP
WBC # BLD: 5.77 K/UL — SIGNIFICANT CHANGE UP (ref 4.8–10.8)
WBC # FLD AUTO: 5.77 K/UL — SIGNIFICANT CHANGE UP (ref 4.8–10.8)

## 2021-05-24 PROCEDURE — 93010 ELECTROCARDIOGRAM REPORT: CPT

## 2021-05-24 PROCEDURE — 99285 EMERGENCY DEPT VISIT HI MDM: CPT

## 2021-05-24 RX ORDER — ONDANSETRON 8 MG/1
4 TABLET, FILM COATED ORAL ONCE
Refills: 0 | Status: COMPLETED | OUTPATIENT
Start: 2021-05-24 | End: 2021-05-24

## 2021-05-24 RX ORDER — SODIUM CHLORIDE 9 MG/ML
1000 INJECTION INTRAMUSCULAR; INTRAVENOUS; SUBCUTANEOUS ONCE
Refills: 0 | Status: COMPLETED | OUTPATIENT
Start: 2021-05-24 | End: 2021-05-24

## 2021-05-24 RX ORDER — SODIUM CHLORIDE 9 MG/ML
2000 INJECTION INTRAMUSCULAR; INTRAVENOUS; SUBCUTANEOUS ONCE
Refills: 0 | Status: DISCONTINUED | OUTPATIENT
Start: 2021-05-24 | End: 2021-05-24

## 2021-05-24 RX ORDER — FAMOTIDINE 10 MG/ML
20 INJECTION INTRAVENOUS ONCE
Refills: 0 | Status: COMPLETED | OUTPATIENT
Start: 2021-05-24 | End: 2021-05-24

## 2021-05-24 RX ORDER — METOCLOPRAMIDE HCL 10 MG
10 TABLET ORAL ONCE
Refills: 0 | Status: COMPLETED | OUTPATIENT
Start: 2021-05-24 | End: 2021-05-24

## 2021-05-24 RX ADMIN — ONDANSETRON 4 MILLIGRAM(S): 8 TABLET, FILM COATED ORAL at 06:55

## 2021-05-24 RX ADMIN — Medication 10 MILLIGRAM(S): at 06:56

## 2021-05-24 RX ADMIN — SODIUM CHLORIDE 500 MILLILITER(S): 9 INJECTION INTRAMUSCULAR; INTRAVENOUS; SUBCUTANEOUS at 07:30

## 2021-05-24 RX ADMIN — FAMOTIDINE 20 MILLIGRAM(S): 10 INJECTION INTRAVENOUS at 06:55

## 2021-05-24 NOTE — ED PROVIDER NOTE - PROVIDER TOKENS
Detail Level: Detailed Quality 226: Preventive Care And Screening: Tobacco Use: Screening And Cessation Intervention: Patient screened for tobacco use and is an ex/non-smoker PROVIDER:[TOKEN:[[id number]],SCHEDULEDAPPT:[05/27/2021],ESTABLISHEDPATIENT:[T]]

## 2021-05-24 NOTE — ED PROVIDER NOTE - PATIENT PORTAL LINK FT
You can access the FollowMyHealth Patient Portal offered by [facility] by registering at the following website: [email or web address]. By joining [employer]’s FollowMyHealth portal, you will also be able to view your health information using other applications (apps) compatible with our system.

## 2021-05-24 NOTE — ED PROVIDER NOTE - DATE/TIME 4
27-Dec-2020 11:52 Retention Suture Text: Retention sutures were placed to support the closure and prevent dehiscence.

## 2021-05-24 NOTE — ED PROVIDER NOTE - NSFOLLOWUPINSTRUCTIONS_ED_ALL_ED_FT
Please follow up with Dr Singer as scheduled for Thursday for further evaluation. Return to the emergency department sooner for any new or worsening symptoms.    Acute Nausea and Vomiting    WHAT YOU NEED TO KNOW:    Acute nausea and vomiting start suddenly, worsen quickly, and last a short time.    DISCHARGE INSTRUCTIONS:    Return to the emergency department if:     You see blood in your vomit or your bowel movements.      You have sudden, severe pain in your chest and upper abdomen after hard vomiting or retching.      You have swelling in your neck and chest.       You are dizzy, cold, and thirsty and your eyes and mouth are dry.      You are urinating very little or not at all.      You have muscle weakness, leg cramps, and trouble breathing.       Your heart is beating much faster than normal.       You continue to vomit for more than 48 hours.     Contact your healthcare provider if:     You have frequent dry heaves (vomiting but nothing comes out).      Your nausea and vomiting does not get better or go away after you use medicine.      You have questions or concerns about your condition or treatment.    Medicines: You may need any of the following:     Medicines may be given to calm your stomach and stop your vomiting. You may also need medicines to help you feel more relaxed or to stop nausea and vomiting caused by motion sickness.      Gastrointestinal stimulants are used to help empty your stomach and bowels. This may help decrease nausea and vomiting.      Take your medicine as directed. Contact your healthcare provider if you think your medicine is not helping or if you have side effects. Tell him or her if you are allergic to any medicine. Keep a list of the medicines, vitamins, and herbs you take. Include the amounts, and when and why you take them. Bring the list or the pill bottles to follow-up visits. Carry your medicine list with you in case of an emergency.    Prevent or manage acute nausea and vomiting:     Do not drink alcohol. Alcohol may upset or irritate your stomach. Too much alcohol can also cause acute nausea and vomiting.      Control stress. Headaches due to stress may cause nausea and vomiting. Find ways to relax and manage your stress. Get more rest and sleep.      Drink more liquids as directed. Vomiting can lead to dehydration. It is important to drink more liquids to help replace lost body fluids. Ask your healthcare provider how much liquid to drink each day and which liquids are best for you. Your provider may recommend that you drink an oral rehydration solution (ORS). ORS contains water, salts, and sugar that are needed to replace the lost body fluids. Ask what kind of ORS to use, how much to drink, and where to get it.      Eat smaller meals, more often. Eat small amounts of food every 2 to 3 hours, even if you are not hungry. Food in your stomach may decrease your nausea.      Talk to your healthcare provider before you take over-the-counter (OTC) medicines. These medicines can cause serious problems if you use certain other medicines, or you have a medical condition. You may have problems if you use too much or use them for longer than the label says. Follow directions on the label carefully.     Follow up with your healthcare provider as directed: Write down your questions so you remember to ask them during your follow-up visits.         Abdominal Pain, Adult  Abdominal pain can be caused by many things. Often, abdominal pain is not serious and it gets better with no treatment or by being treated at home. However, sometimes abdominal pain is serious. Your health care provider will do a medical history and a physical exam to try to determine the cause of your abdominal pain.    Follow these instructions at home:  Take over-the-counter and prescription medicines only as told by your health care provider. Do not take a laxative unless told by your health care provider.  ImageDrink enough fluid to keep your urine clear or pale yellow.  Watch your condition for any changes.  Keep all follow-up visits as told by your health care provider. This is important.  Contact a health care provider if:  Your abdominal pain changes or gets worse.  You are not hungry or you lose weight without trying.  You are constipated or have diarrhea for more than 2–3 days.  You have pain when you urinate or have a bowel movement.  Your abdominal pain wakes you up at night.  Your pain gets worse with meals, after eating, or with certain foods.  You are throwing up and cannot keep anything down.  You have a fever.  Get help right away if:  Your pain does not go away as soon as your health care provider told you to expect.  You cannot stop throwing up.  Your pain is only in areas of the abdomen, such as the right side or the left lower portion of the abdomen.  You have bloody or black stools, or stools that look like tar.  You have severe pain, cramping, or bloating in your abdomen.  You have signs of dehydration, such as:    Dark urine, very little urine, or no urine.  Cracked lips.  Dry mouth.  Sunken eyes.  Sleepiness.  Weakness.    This information is not intended to replace advice given to you by your health care provider. Make sure you discuss any questions you have with your health care provider

## 2021-05-24 NOTE — ED PROVIDER NOTE - OBJECTIVE STATEMENT
89 y/o F with PMH HTN, AS, HLD, parkinson's disease, loop recorder, RA, hx of complete hysterectomy presents with constant moderate persistent nausea today.   no palliating/provoking factors.  no cp/sobs, fever, sick contacts, abdominal pain, back pain

## 2021-05-24 NOTE — ED PROVIDER NOTE - CARE PROVIDER_API CALL
Victor Hugo Singer J  GASTROENTEROLOGY  [address] [place] [address]  Phone: [phone]  Fax: [phone]  Established Patient  Scheduled Appointment: 05/27/2021

## 2021-05-24 NOTE — ED PROVIDER NOTE - NS ED ROS FT
Review of Systems    Constitutional: (-) fever   Eyes/ENT: (-) vision changes,   Cardiovascular: (-) chest pain, (-) syncope (-) palpitations  Respiratory: (-) cough, (-) shortness of breath  Gastrointestinal: (-) vomiting, (-) diarrhea (-)black/bloody stools (-) abdominal pain  Genitourinary:  (-) dysuria   Musculoskeletal: (-) neck pain, (-) back pain, (-) leg pain/swelling  Integumentary: (-) rash, (-) edema  Neurological: (-) headache,  Hematologic: (-) easy bruising   Allergic/Immunologic: (-) pruritus

## 2021-05-24 NOTE — ED PROVIDER NOTE - ATTENDING CONTRIBUTION TO CARE
I personally evaluated the patient. I reviewed the Resident’s or Physician Assistant’s note (as assigned above), and agree with the findings and plan except as documented in my note.  Chart reviewed. H/O Parkinson's, RA, HTN, HLD, hysterectomy, presents with severe nausea for 1 day.  No abdominal pain.  Last BM yesterday.  Has had several ED visits for same complaints. Exam shows alert patient in no distress, HEENT NCAT, lungs clear, RR S1S2, abdomen soft NT +BS, no CCE.  Will order IVF, labs, Zofran and Pepcid.

## 2021-05-24 NOTE — ED PROVIDER NOTE - PHYSICAL EXAMINATION
PHYSICAL EXAM:    GENERAL: Alert, appears stated age, well appearing, non-toxic  SKIN: Warm, pink and dry.   HEAD: NC                                                                                                                                                      EYE: Normal lids/conjunctiva  ENT: Normal hearing, patent oropharynx  NECK: +supple. No meningismus, or JVD   Pulm: Bilateral BS, normal resp effort, no wheezes, stridor, or retractions  CV: RRR, no M/R/G, 2+and = radial pulses  Abd: soft, non-tender, non-distended,  no rebound/guarding no CVA tenderness.   Mskel: no erythema, cyanosis, edema. no calf tenderness  Neuro: AAOx3,  normal gait.

## 2021-05-24 NOTE — ED PROVIDER NOTE - PROGRESS NOTE DETAILS
Racquel martinez to Dr. Spain.  Check labs and re-assess. Signed out to Dr. Spain.  Check labs and re-assess. AG: received signout from NATASHA Krueger. Pt seen at bedside lying comfortably, reports nausea has improved. Abd soft non-tender no guarding no rebound. Tolerating PO food. Has f/u w/ GI Dr Singer in 3 days. Strict return precautions given.

## 2021-05-24 NOTE — ED PROVIDER NOTE - CLINICAL SUMMARY MEDICAL DECISION MAKING FREE TEXT BOX
Good response to ED tx.  sx improved.  has GI f/u scheduled.    In my opinion, out patient treatment and follow up are appropriate.

## 2021-05-29 ENCOUNTER — EMERGENCY (EMERGENCY)
Facility: HOSPITAL | Age: 86
LOS: 0 days | Discharge: HOME | End: 2021-05-29
Attending: EMERGENCY MEDICINE | Admitting: EMERGENCY MEDICINE
Payer: MEDICARE

## 2021-05-29 VITALS
HEART RATE: 72 BPM | OXYGEN SATURATION: 98 % | DIASTOLIC BLOOD PRESSURE: 71 MMHG | TEMPERATURE: 98 F | RESPIRATION RATE: 17 BRPM | SYSTOLIC BLOOD PRESSURE: 140 MMHG

## 2021-05-29 VITALS — HEIGHT: 64 IN | WEIGHT: 119.93 LBS

## 2021-05-29 DIAGNOSIS — N39.0 URINARY TRACT INFECTION, SITE NOT SPECIFIED: ICD-10-CM

## 2021-05-29 DIAGNOSIS — Z79.899 OTHER LONG TERM (CURRENT) DRUG THERAPY: ICD-10-CM

## 2021-05-29 DIAGNOSIS — Z90.710 ACQUIRED ABSENCE OF BOTH CERVIX AND UTERUS: Chronic | ICD-10-CM

## 2021-05-29 DIAGNOSIS — I10 ESSENTIAL (PRIMARY) HYPERTENSION: ICD-10-CM

## 2021-05-29 DIAGNOSIS — Z86.69 PERSONAL HISTORY OF OTHER DISEASES OF THE NERVOUS SYSTEM AND SENSE ORGANS: ICD-10-CM

## 2021-05-29 DIAGNOSIS — E78.5 HYPERLIPIDEMIA, UNSPECIFIED: ICD-10-CM

## 2021-05-29 DIAGNOSIS — Z98.890 OTHER SPECIFIED POSTPROCEDURAL STATES: Chronic | ICD-10-CM

## 2021-05-29 DIAGNOSIS — Z95.818 PRESENCE OF OTHER CARDIAC IMPLANTS AND GRAFTS: Chronic | ICD-10-CM

## 2021-05-29 DIAGNOSIS — Z87.39 PERSONAL HISTORY OF OTHER DISEASES OF THE MUSCULOSKELETAL SYSTEM AND CONNECTIVE TISSUE: ICD-10-CM

## 2021-05-29 DIAGNOSIS — R11.0 NAUSEA: ICD-10-CM

## 2021-05-29 DIAGNOSIS — Z79.02 LONG TERM (CURRENT) USE OF ANTITHROMBOTICS/ANTIPLATELETS: ICD-10-CM

## 2021-05-29 LAB
ALBUMIN SERPL ELPH-MCNC: 4.3 G/DL — SIGNIFICANT CHANGE UP (ref 3.5–5.2)
ALP SERPL-CCNC: 114 U/L — SIGNIFICANT CHANGE UP (ref 30–115)
ALT FLD-CCNC: <5 U/L — SIGNIFICANT CHANGE UP (ref 0–41)
ANION GAP SERPL CALC-SCNC: 12 MMOL/L — SIGNIFICANT CHANGE UP (ref 7–14)
APPEARANCE UR: CLEAR — SIGNIFICANT CHANGE UP
AST SERPL-CCNC: 9 U/L — SIGNIFICANT CHANGE UP (ref 0–41)
BACTERIA # UR AUTO: ABNORMAL
BASOPHILS # BLD AUTO: 0.03 K/UL — SIGNIFICANT CHANGE UP (ref 0–0.2)
BASOPHILS NFR BLD AUTO: 0.6 % — SIGNIFICANT CHANGE UP (ref 0–1)
BILIRUB SERPL-MCNC: 0.3 MG/DL — SIGNIFICANT CHANGE UP (ref 0.2–1.2)
BILIRUB UR-MCNC: NEGATIVE — SIGNIFICANT CHANGE UP
BUN SERPL-MCNC: 18 MG/DL — SIGNIFICANT CHANGE UP (ref 10–20)
CALCIUM SERPL-MCNC: 9.1 MG/DL — SIGNIFICANT CHANGE UP (ref 8.5–10.1)
CHLORIDE SERPL-SCNC: 105 MMOL/L — SIGNIFICANT CHANGE UP (ref 98–110)
CO2 SERPL-SCNC: 26 MMOL/L — SIGNIFICANT CHANGE UP (ref 17–32)
COLOR SPEC: YELLOW — SIGNIFICANT CHANGE UP
CREAT SERPL-MCNC: 0.9 MG/DL — SIGNIFICANT CHANGE UP (ref 0.7–1.5)
DIFF PNL FLD: NEGATIVE — SIGNIFICANT CHANGE UP
EOSINOPHIL # BLD AUTO: 0.19 K/UL — SIGNIFICANT CHANGE UP (ref 0–0.7)
EOSINOPHIL NFR BLD AUTO: 3.6 % — SIGNIFICANT CHANGE UP (ref 0–8)
EPI CELLS # UR: 1 /HPF — SIGNIFICANT CHANGE UP (ref 0–5)
GLUCOSE SERPL-MCNC: 99 MG/DL — SIGNIFICANT CHANGE UP (ref 70–99)
GLUCOSE UR QL: NEGATIVE — SIGNIFICANT CHANGE UP
HCT VFR BLD CALC: 36.5 % — LOW (ref 37–47)
HGB BLD-MCNC: 11.5 G/DL — LOW (ref 12–16)
HYALINE CASTS # UR AUTO: 2 /LPF — SIGNIFICANT CHANGE UP (ref 0–7)
IMM GRANULOCYTES NFR BLD AUTO: 0.4 % — HIGH (ref 0.1–0.3)
KETONES UR-MCNC: NEGATIVE — SIGNIFICANT CHANGE UP
LEUKOCYTE ESTERASE UR-ACNC: ABNORMAL
LIDOCAIN IGE QN: 12 U/L — SIGNIFICANT CHANGE UP (ref 7–60)
LYMPHOCYTES # BLD AUTO: 0.92 K/UL — LOW (ref 1.2–3.4)
LYMPHOCYTES # BLD AUTO: 17.3 % — LOW (ref 20.5–51.1)
MCHC RBC-ENTMCNC: 25.7 PG — LOW (ref 27–31)
MCHC RBC-ENTMCNC: 31.5 G/DL — LOW (ref 32–37)
MCV RBC AUTO: 81.5 FL — SIGNIFICANT CHANGE UP (ref 81–99)
MONOCYTES # BLD AUTO: 0.43 K/UL — SIGNIFICANT CHANGE UP (ref 0.1–0.6)
MONOCYTES NFR BLD AUTO: 8.1 % — SIGNIFICANT CHANGE UP (ref 1.7–9.3)
NEUTROPHILS # BLD AUTO: 3.72 K/UL — SIGNIFICANT CHANGE UP (ref 1.4–6.5)
NEUTROPHILS NFR BLD AUTO: 70 % — SIGNIFICANT CHANGE UP (ref 42.2–75.2)
NITRITE UR-MCNC: POSITIVE
NRBC # BLD: 0 /100 WBCS — SIGNIFICANT CHANGE UP (ref 0–0)
PH UR: 6.5 — SIGNIFICANT CHANGE UP (ref 5–8)
PLATELET # BLD AUTO: 311 K/UL — SIGNIFICANT CHANGE UP (ref 130–400)
POTASSIUM SERPL-MCNC: 3.2 MMOL/L — LOW (ref 3.5–5)
POTASSIUM SERPL-SCNC: 3.2 MMOL/L — LOW (ref 3.5–5)
PROT SERPL-MCNC: 6.6 G/DL — SIGNIFICANT CHANGE UP (ref 6–8)
PROT UR-MCNC: NEGATIVE — SIGNIFICANT CHANGE UP
RBC # BLD: 4.48 M/UL — SIGNIFICANT CHANGE UP (ref 4.2–5.4)
RBC # FLD: 14.3 % — SIGNIFICANT CHANGE UP (ref 11.5–14.5)
RBC CASTS # UR COMP ASSIST: 0 /HPF — SIGNIFICANT CHANGE UP (ref 0–4)
SODIUM SERPL-SCNC: 143 MMOL/L — SIGNIFICANT CHANGE UP (ref 135–146)
SP GR SPEC: 1.01 — SIGNIFICANT CHANGE UP (ref 1.01–1.03)
UROBILINOGEN FLD QL: SIGNIFICANT CHANGE UP
WBC # BLD: 5.31 K/UL — SIGNIFICANT CHANGE UP (ref 4.8–10.8)
WBC # FLD AUTO: 5.31 K/UL — SIGNIFICANT CHANGE UP (ref 4.8–10.8)
WBC UR QL: 7 /HPF — HIGH (ref 0–5)

## 2021-05-29 PROCEDURE — 99284 EMERGENCY DEPT VISIT MOD MDM: CPT

## 2021-05-29 RX ORDER — FAMOTIDINE 10 MG/ML
20 INJECTION INTRAVENOUS ONCE
Refills: 0 | Status: COMPLETED | OUTPATIENT
Start: 2021-05-29 | End: 2021-05-29

## 2021-05-29 RX ORDER — POTASSIUM CHLORIDE 20 MEQ
40 PACKET (EA) ORAL ONCE
Refills: 0 | Status: COMPLETED | OUTPATIENT
Start: 2021-05-29 | End: 2021-05-29

## 2021-05-29 RX ORDER — SODIUM CHLORIDE 9 MG/ML
1000 INJECTION INTRAMUSCULAR; INTRAVENOUS; SUBCUTANEOUS ONCE
Refills: 0 | Status: COMPLETED | OUTPATIENT
Start: 2021-05-29 | End: 2021-05-29

## 2021-05-29 RX ORDER — NITROFURANTOIN MACROCRYSTAL 50 MG
1 CAPSULE ORAL
Qty: 10 | Refills: 0
Start: 2021-05-29 | End: 2021-06-02

## 2021-05-29 RX ORDER — ONDANSETRON 8 MG/1
4 TABLET, FILM COATED ORAL ONCE
Refills: 0 | Status: COMPLETED | OUTPATIENT
Start: 2021-05-29 | End: 2021-05-29

## 2021-05-29 RX ADMIN — Medication 40 MILLIEQUIVALENT(S): at 06:30

## 2021-05-29 RX ADMIN — ONDANSETRON 4 MILLIGRAM(S): 8 TABLET, FILM COATED ORAL at 04:27

## 2021-05-29 RX ADMIN — FAMOTIDINE 20 MILLIGRAM(S): 10 INJECTION INTRAVENOUS at 05:19

## 2021-05-29 RX ADMIN — SODIUM CHLORIDE 1000 MILLILITER(S): 9 INJECTION INTRAMUSCULAR; INTRAVENOUS; SUBCUTANEOUS at 05:19

## 2021-05-29 NOTE — ED ADULT NURSE NOTE - OBJECTIVE STATEMENT
Patient complaining of persistent nausea without vomiting xweeks and worsening tonight. Patient has a scheduled appointment with GI on Wednesday but patient states she cant wait. Denies abdominal pain, chest pain, passing gas but reports BM this morning.

## 2021-05-29 NOTE — ED PROVIDER NOTE - CLINICAL SUMMARY MEDICAL DECISION MAKING FREE TEXT BOX
nausea. labs, fluids, ekg, supportive care nausea. labs, fluids, ekg, supportive care. +UTI.  abx, outpt f/u.

## 2021-05-29 NOTE — ED PROVIDER NOTE - ATTENDING CONTRIBUTION TO CARE
pt co nsauea. no v, d. no ab pain. no cp or sob. no fever or chills. here frequently for gi symptoms including nausea and pain. no pain this time.     pt elderly, frail. in nad. moist mm. perrl, eomi, ncat, ctab, rrr, ab soft, tn, nd. nml bs. no focal def.    check labs, hydrate, anti nausea meds, ekg, reassess.

## 2021-05-29 NOTE — ED PROVIDER NOTE - OBJECTIVE STATEMENT
88 y f, pmh of htn and parkinson pw nausea. Endorses nausea has been going on for months and pt has an endoscopy scheduled w/ Dr. Singer next wednesday. Yesterday, pt. had another episode of nausea that did not resolve with medications. Pt. felt very uncomfortable and came to the ED. Denies vomiting, abd pain, dysuria, f/c, cp, sob.

## 2021-05-29 NOTE — ED PROVIDER NOTE - NS ED ROS FT
Eyes:  No visual changes, eye pain or discharge.  ENMT:  No hearing changes, pain, no sore throat or runny nose, no difficulty swallowing  Cardiac:  No chest pain, SOB or edema. No chest pain with exertion.  Respiratory:  No cough or respiratory distress. No hemoptysis. No history of asthma or RAD.  GI:  +nausea, no vomiting  :  No dysuria, frequency or burning.  MS:  No myalgia, muscle weakness, joint pain or back pain.  Neuro:  No headache or weakness.  No LOC.  Skin:  No skin rash.   Endocrine: No history of thyroid disease or diabetes.

## 2021-05-29 NOTE — ED ADULT TRIAGE NOTE - AS HEIGHT TYPE
Pre-Visit Chart Review  For Appointment Scheduled on 06/19/2018    There are no preventive care reminders to display for this patient.                  
actual/stated

## 2021-05-29 NOTE — ED PROVIDER NOTE - PATIENT PORTAL LINK FT
You can access the FollowMyHealth Patient Portal offered by [facility] by registering at the following website: [email or web address]. By joining [employer]’s FollowMyHealth portal, you will also be able to view your health information using other applications (apps) compatible with our system.

## 2021-05-29 NOTE — ED ADULT NURSE NOTE - NSIMPLEMENTINTERV_GEN_ALL_ED
Implemented All Universal Safety Interventions:  [place] to call system. Call bell, personal items and telephone within reach. Instruct patient to call for assistance. Room bathroom lighting operational. Non-slip footwear when patient is off stretcher. Physically safe environment: no spills, clutter or unnecessary equipment. Stretcher in lowest position, wheels locked, appropriate side rails in place.

## 2021-05-30 ENCOUNTER — OUTPATIENT (OUTPATIENT)
Dept: OUTPATIENT SERVICES | Facility: HOSPITAL | Age: 86
LOS: 1 days | Discharge: HOME | End: 2021-05-30

## 2021-05-30 DIAGNOSIS — Z90.710 ACQUIRED ABSENCE OF BOTH CERVIX AND UTERUS: Chronic | ICD-10-CM

## 2021-05-30 DIAGNOSIS — Z11.59 ENCOUNTER FOR SCREENING FOR OTHER VIRAL DISEASES: ICD-10-CM

## 2021-05-30 DIAGNOSIS — Z98.890 OTHER SPECIFIED POSTPROCEDURAL STATES: Chronic | ICD-10-CM

## 2021-05-30 DIAGNOSIS — Z95.818 PRESENCE OF OTHER CARDIAC IMPLANTS AND GRAFTS: Chronic | ICD-10-CM

## 2021-06-02 ENCOUNTER — RESULT REVIEW (OUTPATIENT)
Age: 86
End: 2021-06-02

## 2021-06-02 ENCOUNTER — TRANSCRIPTION ENCOUNTER (OUTPATIENT)
Age: 86
End: 2021-06-02

## 2021-06-02 ENCOUNTER — OUTPATIENT (OUTPATIENT)
Dept: OUTPATIENT SERVICES | Facility: HOSPITAL | Age: 86
LOS: 1 days | Discharge: HOME | End: 2021-06-02
Payer: MEDICARE

## 2021-06-02 VITALS
HEIGHT: 64 IN | DIASTOLIC BLOOD PRESSURE: 76 MMHG | WEIGHT: 134.92 LBS | TEMPERATURE: 99 F | HEART RATE: 73 BPM | RESPIRATION RATE: 20 BRPM | SYSTOLIC BLOOD PRESSURE: 175 MMHG

## 2021-06-02 VITALS — HEART RATE: 62 BPM | RESPIRATION RATE: 18 BRPM | DIASTOLIC BLOOD PRESSURE: 68 MMHG | SYSTOLIC BLOOD PRESSURE: 152 MMHG

## 2021-06-02 DIAGNOSIS — Z90.710 ACQUIRED ABSENCE OF BOTH CERVIX AND UTERUS: Chronic | ICD-10-CM

## 2021-06-02 DIAGNOSIS — Z98.890 OTHER SPECIFIED POSTPROCEDURAL STATES: Chronic | ICD-10-CM

## 2021-06-02 DIAGNOSIS — Z95.818 PRESENCE OF OTHER CARDIAC IMPLANTS AND GRAFTS: Chronic | ICD-10-CM

## 2021-06-02 PROCEDURE — 88312 SPECIAL STAINS GROUP 1: CPT | Mod: 26

## 2021-06-02 PROCEDURE — 88305 TISSUE EXAM BY PATHOLOGIST: CPT | Mod: 26

## 2021-06-02 RX ORDER — LACTOBACILLUS ACIDOPHILUS 100MM CELL
2 CAPSULE ORAL
Qty: 0 | Refills: 0 | DISCHARGE

## 2021-06-02 RX ORDER — VANCOMYCIN HCL 1 G
1 VIAL (EA) INTRAVENOUS
Qty: 0 | Refills: 0 | DISCHARGE

## 2021-06-02 RX ORDER — ARIPIPRAZOLE 15 MG/1
1 TABLET ORAL
Qty: 0 | Refills: 0 | DISCHARGE

## 2021-06-03 ENCOUNTER — NON-APPOINTMENT (OUTPATIENT)
Age: 86
End: 2021-06-03

## 2021-06-03 ENCOUNTER — EMERGENCY (EMERGENCY)
Facility: HOSPITAL | Age: 86
LOS: 0 days | Discharge: HOME | End: 2021-06-04
Attending: EMERGENCY MEDICINE | Admitting: EMERGENCY MEDICINE
Payer: MEDICARE

## 2021-06-03 DIAGNOSIS — Z87.39 PERSONAL HISTORY OF OTHER DISEASES OF THE MUSCULOSKELETAL SYSTEM AND CONNECTIVE TISSUE: ICD-10-CM

## 2021-06-03 DIAGNOSIS — Z98.890 OTHER SPECIFIED POSTPROCEDURAL STATES: Chronic | ICD-10-CM

## 2021-06-03 DIAGNOSIS — Z86.69 PERSONAL HISTORY OF OTHER DISEASES OF THE NERVOUS SYSTEM AND SENSE ORGANS: ICD-10-CM

## 2021-06-03 DIAGNOSIS — R11.2 NAUSEA WITH VOMITING, UNSPECIFIED: ICD-10-CM

## 2021-06-03 DIAGNOSIS — Z95.818 PRESENCE OF OTHER CARDIAC IMPLANTS AND GRAFTS: Chronic | ICD-10-CM

## 2021-06-03 DIAGNOSIS — Z87.891 PERSONAL HISTORY OF NICOTINE DEPENDENCE: ICD-10-CM

## 2021-06-03 DIAGNOSIS — R10.9 UNSPECIFIED ABDOMINAL PAIN: ICD-10-CM

## 2021-06-03 DIAGNOSIS — E78.5 HYPERLIPIDEMIA, UNSPECIFIED: ICD-10-CM

## 2021-06-03 DIAGNOSIS — Z90.710 ACQUIRED ABSENCE OF BOTH CERVIX AND UTERUS: ICD-10-CM

## 2021-06-03 DIAGNOSIS — I10 ESSENTIAL (PRIMARY) HYPERTENSION: ICD-10-CM

## 2021-06-03 DIAGNOSIS — Z79.899 OTHER LONG TERM (CURRENT) DRUG THERAPY: ICD-10-CM

## 2021-06-03 DIAGNOSIS — Z90.710 ACQUIRED ABSENCE OF BOTH CERVIX AND UTERUS: Chronic | ICD-10-CM

## 2021-06-03 DIAGNOSIS — Z87.19 PERSONAL HISTORY OF OTHER DISEASES OF THE DIGESTIVE SYSTEM: ICD-10-CM

## 2021-06-03 DIAGNOSIS — K21.9 GASTRO-ESOPHAGEAL REFLUX DISEASE WITHOUT ESOPHAGITIS: ICD-10-CM

## 2021-06-03 DIAGNOSIS — Z79.02 LONG TERM (CURRENT) USE OF ANTITHROMBOTICS/ANTIPLATELETS: ICD-10-CM

## 2021-06-03 DIAGNOSIS — R10.13 EPIGASTRIC PAIN: ICD-10-CM

## 2021-06-03 PROCEDURE — 99284 EMERGENCY DEPT VISIT MOD MDM: CPT

## 2021-06-04 VITALS
OXYGEN SATURATION: 97 % | DIASTOLIC BLOOD PRESSURE: 74 MMHG | SYSTOLIC BLOOD PRESSURE: 178 MMHG | RESPIRATION RATE: 18 BRPM | HEART RATE: 70 BPM | TEMPERATURE: 98 F

## 2021-06-04 VITALS
SYSTOLIC BLOOD PRESSURE: 171 MMHG | HEIGHT: 64 IN | OXYGEN SATURATION: 95 % | HEART RATE: 68 BPM | WEIGHT: 110.01 LBS | DIASTOLIC BLOOD PRESSURE: 74 MMHG | TEMPERATURE: 98 F | RESPIRATION RATE: 18 BRPM

## 2021-06-04 LAB
ALBUMIN SERPL ELPH-MCNC: 4 G/DL — SIGNIFICANT CHANGE UP (ref 3.5–5.2)
ALP SERPL-CCNC: 112 U/L — SIGNIFICANT CHANGE UP (ref 30–115)
ALT FLD-CCNC: <5 U/L — SIGNIFICANT CHANGE UP (ref 0–41)
ANION GAP SERPL CALC-SCNC: 11 MMOL/L — SIGNIFICANT CHANGE UP (ref 7–14)
AST SERPL-CCNC: 12 U/L — SIGNIFICANT CHANGE UP (ref 0–41)
BASOPHILS # BLD AUTO: 0.04 K/UL — SIGNIFICANT CHANGE UP (ref 0–0.2)
BASOPHILS NFR BLD AUTO: 0.8 % — SIGNIFICANT CHANGE UP (ref 0–1)
BILIRUB SERPL-MCNC: 0.2 MG/DL — SIGNIFICANT CHANGE UP (ref 0.2–1.2)
BUN SERPL-MCNC: 17 MG/DL — SIGNIFICANT CHANGE UP (ref 10–20)
CALCIUM SERPL-MCNC: 9.5 MG/DL — SIGNIFICANT CHANGE UP (ref 8.5–10.1)
CHLORIDE SERPL-SCNC: 101 MMOL/L — SIGNIFICANT CHANGE UP (ref 98–110)
CO2 SERPL-SCNC: 26 MMOL/L — SIGNIFICANT CHANGE UP (ref 17–32)
CREAT SERPL-MCNC: 0.9 MG/DL — SIGNIFICANT CHANGE UP (ref 0.7–1.5)
EOSINOPHIL # BLD AUTO: 0.1 K/UL — SIGNIFICANT CHANGE UP (ref 0–0.7)
EOSINOPHIL NFR BLD AUTO: 2 % — SIGNIFICANT CHANGE UP (ref 0–8)
GLUCOSE SERPL-MCNC: 123 MG/DL — HIGH (ref 70–99)
HCT VFR BLD CALC: 35.9 % — LOW (ref 37–47)
HGB BLD-MCNC: 11.1 G/DL — LOW (ref 12–16)
IMM GRANULOCYTES NFR BLD AUTO: 0.4 % — HIGH (ref 0.1–0.3)
LIDOCAIN IGE QN: 32 U/L — SIGNIFICANT CHANGE UP (ref 7–60)
LYMPHOCYTES # BLD AUTO: 1.12 K/UL — LOW (ref 1.2–3.4)
LYMPHOCYTES # BLD AUTO: 22.4 % — SIGNIFICANT CHANGE UP (ref 20.5–51.1)
MCHC RBC-ENTMCNC: 25.9 PG — LOW (ref 27–31)
MCHC RBC-ENTMCNC: 30.9 G/DL — LOW (ref 32–37)
MCV RBC AUTO: 83.9 FL — SIGNIFICANT CHANGE UP (ref 81–99)
MONOCYTES # BLD AUTO: 0.59 K/UL — SIGNIFICANT CHANGE UP (ref 0.1–0.6)
MONOCYTES NFR BLD AUTO: 11.8 % — HIGH (ref 1.7–9.3)
NEUTROPHILS # BLD AUTO: 3.13 K/UL — SIGNIFICANT CHANGE UP (ref 1.4–6.5)
NEUTROPHILS NFR BLD AUTO: 62.6 % — SIGNIFICANT CHANGE UP (ref 42.2–75.2)
NRBC # BLD: 0 /100 WBCS — SIGNIFICANT CHANGE UP (ref 0–0)
PLATELET # BLD AUTO: 335 K/UL — SIGNIFICANT CHANGE UP (ref 130–400)
POTASSIUM SERPL-MCNC: 4 MMOL/L — SIGNIFICANT CHANGE UP (ref 3.5–5)
POTASSIUM SERPL-SCNC: 4 MMOL/L — SIGNIFICANT CHANGE UP (ref 3.5–5)
PROT SERPL-MCNC: 6.5 G/DL — SIGNIFICANT CHANGE UP (ref 6–8)
RBC # BLD: 4.28 M/UL — SIGNIFICANT CHANGE UP (ref 4.2–5.4)
RBC # FLD: 14.3 % — SIGNIFICANT CHANGE UP (ref 11.5–14.5)
SODIUM SERPL-SCNC: 138 MMOL/L — SIGNIFICANT CHANGE UP (ref 135–146)
SURGICAL PATHOLOGY STUDY: SIGNIFICANT CHANGE UP
WBC # BLD: 5 K/UL — SIGNIFICANT CHANGE UP (ref 4.8–10.8)
WBC # FLD AUTO: 5 K/UL — SIGNIFICANT CHANGE UP (ref 4.8–10.8)

## 2021-06-04 PROCEDURE — 93010 ELECTROCARDIOGRAM REPORT: CPT | Mod: NC

## 2021-06-04 PROCEDURE — 71045 X-RAY EXAM CHEST 1 VIEW: CPT | Mod: 26

## 2021-06-04 RX ORDER — ONDANSETRON 8 MG/1
4 TABLET, FILM COATED ORAL ONCE
Refills: 0 | Status: COMPLETED | OUTPATIENT
Start: 2021-06-04 | End: 2021-06-04

## 2021-06-04 RX ORDER — SODIUM CHLORIDE 9 MG/ML
250 INJECTION INTRAMUSCULAR; INTRAVENOUS; SUBCUTANEOUS
Refills: 0 | Status: DISCONTINUED | OUTPATIENT
Start: 2021-06-04 | End: 2021-06-04

## 2021-06-04 RX ORDER — FAMOTIDINE 10 MG/ML
20 INJECTION INTRAVENOUS ONCE
Refills: 0 | Status: COMPLETED | OUTPATIENT
Start: 2021-06-04 | End: 2021-06-04

## 2021-06-04 RX ADMIN — SODIUM CHLORIDE 50 MILLILITER(S): 9 INJECTION INTRAMUSCULAR; INTRAVENOUS; SUBCUTANEOUS at 01:09

## 2021-06-04 RX ADMIN — ONDANSETRON 4 MILLIGRAM(S): 8 TABLET, FILM COATED ORAL at 01:08

## 2021-06-04 RX ADMIN — SODIUM CHLORIDE 250 MILLILITER(S): 9 INJECTION INTRAMUSCULAR; INTRAVENOUS; SUBCUTANEOUS at 07:00

## 2021-06-04 RX ADMIN — FAMOTIDINE 20 MILLIGRAM(S): 10 INJECTION INTRAVENOUS at 01:52

## 2021-06-04 NOTE — ED PROVIDER NOTE - ATTENDING CONTRIBUTION TO CARE
pt co nausea. since tonight. has chornic hx of nausea and dyspepsia.  had EGD done yest showing esophagitis. denies cp, sob. mild epig discomfort.     Pt in nad, anict, neck sup,c tab, rrr, ab soft, nt, nd, nml bs. no focal def.     will hydrate, zofran, reassess.

## 2021-06-04 NOTE — ED PROVIDER NOTE - PATIENT PORTAL LINK FT
You can access the FollowMyHealth Patient Portal offered by [facility] by registering at the following website: [email or web address]. By joining [employer]’s FollowMyHealth portal, you will also be able to view your health information using other applications (apps) compatible with our system.

## 2021-06-04 NOTE — ED ADULT NURSE REASSESSMENT NOTE - NS ED NURSE REASSESS COMMENT FT1
patient is alert and compliant, denied pain, voided large amount of urine, incontinent of bladder, skin care rendered  , noted minimal broken skin approx less than  0.5 cm with redness on rt buttock, , area was cleansed with NS and barrier cream applied . Patient tolerated light breakfast well. Awaiting transfer home.

## 2021-06-04 NOTE — ED PROVIDER NOTE - CARE PROVIDER_API CALL
Victor Hugo Singer J  GASTROENTEROLOGY  [address] [place] [id number]  Phone: [phone]  Fax: [phone]  Follow Up Time:

## 2021-06-04 NOTE — ED ADULT NURSE NOTE - NSIMPLEMENTINTERV_GEN_ALL_ED
Implemented All Fall with Harm Risk Interventions:  [place] to call system. Call bell, personal items and telephone within reach. Instruct patient to call for assistance. Room bathroom lighting operational. Non-slip footwear when patient is off stretcher. Physically safe environment: no spills, clutter or unnecessary equipment. Stretcher in lowest position, wheels locked, appropriate side rails in place. Provide visual cue, wrist band, yellow gown, etc. Monitor gait and stability. Monitor for mental status changes and reorient to person, place, and time. Review medications for side effects contributing to fall risk. Reinforce activity limits and safety measures with patient and family. Provide visual clues: red socks.

## 2021-06-04 NOTE — ED PROVIDER NOTE - OBJECTIVE STATEMENT
89 yo female hx of gastritis/GERD/ hiatal hernia/ HLD/HTN present c/o nausea and vomiting. patient had EGD on 06/02 and found esophagitis.  Denies fever/chill/diarrhea/change of appetite/constipation and urinary sxs. Denies HA/dizziness/chest pain/sob/palpitations and pain to extremities and ambulatory difficulty.

## 2021-06-10 ENCOUNTER — APPOINTMENT (OUTPATIENT)
Dept: CARDIOLOGY | Facility: CLINIC | Age: 86
End: 2021-06-10
Payer: MEDICARE

## 2021-06-10 VITALS
HEIGHT: 64 IN | HEART RATE: 66 BPM | WEIGHT: 132 LBS | TEMPERATURE: 97.2 F | SYSTOLIC BLOOD PRESSURE: 131 MMHG | BODY MASS INDEX: 22.53 KG/M2 | DIASTOLIC BLOOD PRESSURE: 86 MMHG

## 2021-06-10 DIAGNOSIS — K44.9 DIAPHRAGMATIC HERNIA WITHOUT OBSTRUCTION OR GANGRENE: ICD-10-CM

## 2021-06-10 DIAGNOSIS — R11.2 NAUSEA WITH VOMITING, UNSPECIFIED: ICD-10-CM

## 2021-06-10 DIAGNOSIS — Z45.09 ENCOUNTER FOR ADJUSTMENT AND MANAGEMENT OF OTHER CARDIAC DEVICE: ICD-10-CM

## 2021-06-10 DIAGNOSIS — K20.80 OTHER ESOPHAGITIS WITHOUT BLEEDING: ICD-10-CM

## 2021-06-10 DIAGNOSIS — I45.5 OTHER SPECIFIED HEART BLOCK: ICD-10-CM

## 2021-06-10 DIAGNOSIS — K29.50 UNSPECIFIED CHRONIC GASTRITIS WITHOUT BLEEDING: ICD-10-CM

## 2021-06-10 DIAGNOSIS — I48.92 UNSPECIFIED ATRIAL FLUTTER: ICD-10-CM

## 2021-06-10 PROCEDURE — 93291 INTERROG DEV EVAL SCRMS IP: CPT

## 2021-06-10 PROCEDURE — 99214 OFFICE O/P EST MOD 30 MIN: CPT | Mod: 25

## 2021-06-10 PROCEDURE — 99072 ADDL SUPL MATRL&STAF TM PHE: CPT

## 2021-06-10 PROCEDURE — 93000 ELECTROCARDIOGRAM COMPLETE: CPT | Mod: 59

## 2021-06-10 RX ORDER — CARBIDOPA AND LEVODOPA 25; 100 MG/1; MG/1
25-100 TABLET ORAL 3 TIMES DAILY
Refills: 0 | Status: ACTIVE | COMMUNITY
Start: 2021-02-02

## 2021-06-10 RX ORDER — FAMOTIDINE 40 MG/1
40 TABLET, FILM COATED ORAL TWICE DAILY
Refills: 0 | Status: ACTIVE | COMMUNITY

## 2021-06-10 RX ORDER — PANTOPRAZOLE 40 MG/1
40 TABLET, DELAYED RELEASE ORAL DAILY
Refills: 0 | Status: ACTIVE | COMMUNITY

## 2021-06-10 RX ORDER — ATORVASTATIN CALCIUM 80 MG/1
80 TABLET, FILM COATED ORAL
Refills: 0 | Status: COMPLETED | COMMUNITY
Start: 2021-02-02 | End: 2021-06-10

## 2021-06-10 RX ORDER — ASPIRIN 81 MG/1
81 TABLET, CHEWABLE ORAL
Qty: 30 | Refills: 0 | Status: DISCONTINUED | COMMUNITY
Start: 2021-02-02 | End: 2021-06-10

## 2021-06-10 RX ORDER — CLOPIDOGREL BISULFATE 75 MG/1
75 TABLET, FILM COATED ORAL DAILY
Refills: 0 | Status: DISCONTINUED | COMMUNITY
Start: 2021-02-02 | End: 2021-06-10

## 2021-06-10 RX ORDER — APIXABAN 5 MG/1
5 TABLET, FILM COATED ORAL
Qty: 90 | Refills: 3 | Status: ACTIVE | COMMUNITY
Start: 2021-06-10 | End: 1900-01-01

## 2021-06-10 NOTE — REASON FOR VISIT
[___ Device Check] : is here today for a [unfilled] device check for [Family Member] : family member [Arrhythmias (seen on stored data)] : arrhythmias seen on stored data

## 2021-06-10 NOTE — PROCEDURE
[See Scanned Paceart Report] : See scanned paceart report [See Device Printout] : See device printout [de-identified] : SHERRELL [de-identified] : [id number]  [de-identified] : [id number] [de-identified] : 12/30/2020 [de-identified] : Good [de-identified] : 443 AF events, some events indeterminate, some sinus rhythm with PACs. 1 event consistent with atrial flutter #441\par 1 episode of pause on 6/9/21

## 2021-06-10 NOTE — ASSESSMENT
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 However, manual examination of shows most events are NOT AF, but are sinus with PACs. Some are indeterminate.\par - 1 episode is atrial flutter for which patient is asymptomatic\par -  Cha2 DS2 Vasc score 6 (age1, HTN, CVA, female). HAS-BLED score 3 (age, CVA, NSAIDs)\par  I recommend patient start Eliquis 5mg twice daily. Patient was assessed for bleeding risks  based on HAS-BLED score and educated on modifiable bleeding risk factors such as correct dosage, frequency, adherence, to avoid  concomitant use of over-the -counter nonsteroidal anti-inflammatory medications, excessive alcohol intake, and compliance with antihypertensive meds to control blood pressure.\par - I have stopped patient's clopidogrel at this time. Patient was no longer taking ASA. \par \par Potential complications associated with AC such as bleeding,  signs and symptoms, when to call office and when to seek immediate medical attention/ ER visit  discussed in great  details. \par \par - 5 second pause seen on remote transmission. Event happened while patient was awake and in bed. She was asymptomatic. I spoke with patient and daughter and possible need for PPM. \par \par Plan:\par - Follow up in 3-4 weeks to reassess DOAC (patient cannot come in sooner)\par - Reassess for need for PPM\par - Continue cardizem\par - f/u Cardiology for moderate AS.

## 2021-06-10 NOTE — HISTORY OF PRESENT ILLNESS
[de-identified] : Ms. GARCÍA is a 88 year-year old female with history of HTN, DL, Parkinsons disease, recurrent CRAO admitted with an episode of CRAO. Had an ILR placed. She is here for in office interrogation due to arrythmias seen on remote transmissions.\par \par Denies chest pain, shortness of breath, palpitation, dizziness or LOC.\par \par EKG (6/10/2021) Sinus rhythm at 66 bpm, 1AVB, CA 214ms, QRS 90ms, QTc 410ms\par \par

## 2021-06-10 NOTE — PHYSICAL EXAM
[General Appearance - Well Developed] : well developed [Normal Appearance] : normal appearance [Well Groomed] : well groomed [General Appearance - Well Nourished] : well nourished [No Deformities] : no deformities [General Appearance - In No Acute Distress] : no acute distress [Heart Rate And Rhythm] : heart rate and rhythm were normal [Heart Sounds] : normal S1 and S2 [Murmurs] : no murmurs present [Respiration, Rhythm And Depth] : normal respiratory rhythm and effort [Exaggerated Use Of Accessory Muscles For Inspiration] : no accessory muscle use [Auscultation Breath Sounds / Voice Sounds] : lungs were clear to auscultation bilaterally [Clean] : clean [Dry] : dry [Well-Healed] : well-healed [Abdomen Soft] : soft [Nail Clubbing] : no clubbing of the fingernails [Cyanosis, Localized] : no localized cyanosis [Petechial Hemorrhages (___cm)] : no petechial hemorrhages [] : no ischemic changes [FreeTextEntry1] : Parasternal

## 2021-06-23 ENCOUNTER — APPOINTMENT (OUTPATIENT)
Dept: CARDIOLOGY | Facility: CLINIC | Age: 86
End: 2021-06-23
Payer: MEDICARE

## 2021-06-23 ENCOUNTER — NON-APPOINTMENT (OUTPATIENT)
Age: 86
End: 2021-06-23

## 2021-06-23 PROCEDURE — 93298 REM INTERROG DEV EVAL SCRMS: CPT

## 2021-06-23 PROCEDURE — G2066: CPT

## 2021-06-27 NOTE — H&P ADULT - NSHPPOADEEPVENOUSTHROMB_GEN_A_CORE
Alarcon DO: 86F hx htn, hep C (treated), urinary incontinence, known right inguinal hernia, who presents w/ right lower abd pain x 1 day, onset last evening with assoc nbnb emesis. Concerned due to persistent discomfort in that area and delayed outpt elective hernia repair which is now scheduled for 7/8/21.  Reports she is on day 3 of abx for a UTI. Denies fevers, chest pain, sob, numbness/ weakness, dizziness/ back pain. On exam, aox3, nad, well appearing, heart s1s2 no murmurs, lungs cta b/l, abd soft with mild TTP LLQ without rebound/ guarding, active bowel sounds, extremities warm and well perfused, distal pulse symmetrical, no cva tenderness elicited. plan: eval for incarcerated hernia, labs, rule out diverticulitis/ colitis, pt w/ hx appendectomy in remote past, check ua to eval for unresolved infcn, labs, reassess.
no

## 2021-07-08 ENCOUNTER — APPOINTMENT (OUTPATIENT)
Dept: CARDIOLOGY | Facility: CLINIC | Age: 86
End: 2021-07-08

## 2021-07-19 NOTE — ED ADULT NURSE NOTE - PMH
HTN (hypertension)    Hyperlipidemia    Parkinson disease    Rheumatoid arthritis    
wrist pain/injury

## 2021-07-28 ENCOUNTER — NON-APPOINTMENT (OUTPATIENT)
Age: 86
End: 2021-07-28

## 2021-07-28 ENCOUNTER — APPOINTMENT (OUTPATIENT)
Dept: CARDIOLOGY | Facility: CLINIC | Age: 86
End: 2021-07-28
Payer: MEDICARE

## 2021-07-28 PROCEDURE — 93298 REM INTERROG DEV EVAL SCRMS: CPT

## 2021-07-28 PROCEDURE — G2066: CPT

## 2021-08-06 NOTE — ED ADULT TRIAGE NOTE - TEMPERATURE IN CELSIUS (DEGREES C)
Spoke with pt.  Verbalized understanding.  No other questions.  He will call to let us know he completed the labs.  (done)   37.1

## 2021-08-08 ENCOUNTER — INPATIENT (INPATIENT)
Facility: HOSPITAL | Age: 86
LOS: 2 days | Discharge: HOME | End: 2021-08-11
Attending: HOSPITALIST | Admitting: HOSPITALIST
Payer: MEDICARE

## 2021-08-08 VITALS
OXYGEN SATURATION: 96 % | DIASTOLIC BLOOD PRESSURE: 84 MMHG | SYSTOLIC BLOOD PRESSURE: 188 MMHG | WEIGHT: 134.92 LBS | RESPIRATION RATE: 20 BRPM | HEART RATE: 79 BPM | HEIGHT: 64 IN

## 2021-08-08 DIAGNOSIS — Z95.818 PRESENCE OF OTHER CARDIAC IMPLANTS AND GRAFTS: Chronic | ICD-10-CM

## 2021-08-08 DIAGNOSIS — Z98.890 OTHER SPECIFIED POSTPROCEDURAL STATES: Chronic | ICD-10-CM

## 2021-08-08 DIAGNOSIS — R42 DIZZINESS AND GIDDINESS: ICD-10-CM

## 2021-08-08 DIAGNOSIS — I48.91 UNSPECIFIED ATRIAL FIBRILLATION: ICD-10-CM

## 2021-08-08 DIAGNOSIS — G20 PARKINSON'S DISEASE: ICD-10-CM

## 2021-08-08 DIAGNOSIS — R11.0 NAUSEA: ICD-10-CM

## 2021-08-08 DIAGNOSIS — E78.5 HYPERLIPIDEMIA, UNSPECIFIED: ICD-10-CM

## 2021-08-08 DIAGNOSIS — Z90.710 ACQUIRED ABSENCE OF BOTH CERVIX AND UTERUS: Chronic | ICD-10-CM

## 2021-08-08 LAB
ALBUMIN SERPL ELPH-MCNC: 4.3 G/DL — SIGNIFICANT CHANGE UP (ref 3.5–5.2)
ALP SERPL-CCNC: 88 U/L — SIGNIFICANT CHANGE UP (ref 30–115)
ALT FLD-CCNC: <5 U/L — SIGNIFICANT CHANGE UP (ref 0–41)
ANION GAP SERPL CALC-SCNC: 14 MMOL/L — SIGNIFICANT CHANGE UP (ref 7–14)
AST SERPL-CCNC: 14 U/L — SIGNIFICANT CHANGE UP (ref 0–41)
BASOPHILS # BLD AUTO: 0.04 K/UL — SIGNIFICANT CHANGE UP (ref 0–0.2)
BASOPHILS NFR BLD AUTO: 0.7 % — SIGNIFICANT CHANGE UP (ref 0–1)
BILIRUB SERPL-MCNC: 0.3 MG/DL — SIGNIFICANT CHANGE UP (ref 0.2–1.2)
BUN SERPL-MCNC: 10 MG/DL — SIGNIFICANT CHANGE UP (ref 10–20)
CALCIUM SERPL-MCNC: 9.7 MG/DL — SIGNIFICANT CHANGE UP (ref 8.5–10.1)
CHLORIDE SERPL-SCNC: 104 MMOL/L — SIGNIFICANT CHANGE UP (ref 98–110)
CO2 SERPL-SCNC: 24 MMOL/L — SIGNIFICANT CHANGE UP (ref 17–32)
CREAT SERPL-MCNC: 0.8 MG/DL — SIGNIFICANT CHANGE UP (ref 0.7–1.5)
EOSINOPHIL # BLD AUTO: 0.05 K/UL — SIGNIFICANT CHANGE UP (ref 0–0.7)
EOSINOPHIL NFR BLD AUTO: 0.9 % — SIGNIFICANT CHANGE UP (ref 0–8)
GLUCOSE SERPL-MCNC: 117 MG/DL — HIGH (ref 70–99)
HCT VFR BLD CALC: 42.8 % — SIGNIFICANT CHANGE UP (ref 37–47)
HGB BLD-MCNC: 13.3 G/DL — SIGNIFICANT CHANGE UP (ref 12–16)
IMM GRANULOCYTES NFR BLD AUTO: 0.5 % — HIGH (ref 0.1–0.3)
LYMPHOCYTES # BLD AUTO: 0.97 K/UL — LOW (ref 1.2–3.4)
LYMPHOCYTES # BLD AUTO: 17.6 % — LOW (ref 20.5–51.1)
MAGNESIUM SERPL-MCNC: 1.9 MG/DL — SIGNIFICANT CHANGE UP (ref 1.8–2.4)
MCHC RBC-ENTMCNC: 25 PG — LOW (ref 27–31)
MCHC RBC-ENTMCNC: 31.1 G/DL — LOW (ref 32–37)
MCV RBC AUTO: 80.3 FL — LOW (ref 81–99)
MONOCYTES # BLD AUTO: 0.58 K/UL — SIGNIFICANT CHANGE UP (ref 0.1–0.6)
MONOCYTES NFR BLD AUTO: 10.5 % — HIGH (ref 1.7–9.3)
NEUTROPHILS # BLD AUTO: 3.83 K/UL — SIGNIFICANT CHANGE UP (ref 1.4–6.5)
NEUTROPHILS NFR BLD AUTO: 69.8 % — SIGNIFICANT CHANGE UP (ref 42.2–75.2)
NRBC # BLD: 0 /100 WBCS — SIGNIFICANT CHANGE UP (ref 0–0)
PLATELET # BLD AUTO: 373 K/UL — SIGNIFICANT CHANGE UP (ref 130–400)
POTASSIUM SERPL-MCNC: 3.8 MMOL/L — SIGNIFICANT CHANGE UP (ref 3.5–5)
POTASSIUM SERPL-SCNC: 3.8 MMOL/L — SIGNIFICANT CHANGE UP (ref 3.5–5)
PROT SERPL-MCNC: 7 G/DL — SIGNIFICANT CHANGE UP (ref 6–8)
RBC # BLD: 5.33 M/UL — SIGNIFICANT CHANGE UP (ref 4.2–5.4)
RBC # FLD: 14.7 % — HIGH (ref 11.5–14.5)
SARS-COV-2 RNA SPEC QL NAA+PROBE: SIGNIFICANT CHANGE UP
SODIUM SERPL-SCNC: 142 MMOL/L — SIGNIFICANT CHANGE UP (ref 135–146)
TROPONIN T SERPL-MCNC: <0.01 NG/ML — SIGNIFICANT CHANGE UP
WBC # BLD: 5.5 K/UL — SIGNIFICANT CHANGE UP (ref 4.8–10.8)
WBC # FLD AUTO: 5.5 K/UL — SIGNIFICANT CHANGE UP (ref 4.8–10.8)

## 2021-08-08 PROCEDURE — 99285 EMERGENCY DEPT VISIT HI MDM: CPT

## 2021-08-08 PROCEDURE — 93010 ELECTROCARDIOGRAM REPORT: CPT

## 2021-08-08 PROCEDURE — 70450 CT HEAD/BRAIN W/O DYE: CPT | Mod: 26,MA

## 2021-08-08 PROCEDURE — 99223 1ST HOSP IP/OBS HIGH 75: CPT

## 2021-08-08 RX ORDER — OXYBUTYNIN CHLORIDE 5 MG
1 TABLET ORAL
Qty: 0 | Refills: 0 | DISCHARGE

## 2021-08-08 RX ORDER — FAMOTIDINE 10 MG/ML
0 INJECTION INTRAVENOUS
Qty: 0 | Refills: 0 | DISCHARGE

## 2021-08-08 RX ORDER — ACETAMINOPHEN 500 MG
650 TABLET ORAL ONCE
Refills: 0 | Status: COMPLETED | OUTPATIENT
Start: 2021-08-08 | End: 2021-08-08

## 2021-08-08 RX ORDER — SODIUM CHLORIDE 9 MG/ML
500 INJECTION INTRAMUSCULAR; INTRAVENOUS; SUBCUTANEOUS ONCE
Refills: 0 | Status: COMPLETED | OUTPATIENT
Start: 2021-08-08 | End: 2021-08-08

## 2021-08-08 RX ORDER — ASPIRIN/CALCIUM CARB/MAGNESIUM 324 MG
81 TABLET ORAL DAILY
Refills: 0 | Status: DISCONTINUED | OUTPATIENT
Start: 2021-08-08 | End: 2021-08-11

## 2021-08-08 RX ORDER — CHLORHEXIDINE GLUCONATE 213 G/1000ML
1 SOLUTION TOPICAL DAILY
Refills: 0 | Status: DISCONTINUED | OUTPATIENT
Start: 2021-08-08 | End: 2021-08-11

## 2021-08-08 RX ORDER — TRAZODONE HCL 50 MG
1 TABLET ORAL
Qty: 0 | Refills: 0 | DISCHARGE

## 2021-08-08 RX ORDER — PANTOPRAZOLE SODIUM 20 MG/1
1 TABLET, DELAYED RELEASE ORAL
Qty: 0 | Refills: 0 | DISCHARGE

## 2021-08-08 RX ORDER — OXYBUTYNIN CHLORIDE 5 MG
5 TABLET ORAL DAILY
Refills: 0 | Status: DISCONTINUED | OUTPATIENT
Start: 2021-08-08 | End: 2021-08-11

## 2021-08-08 RX ORDER — HEPARIN SODIUM 5000 [USP'U]/ML
5000 INJECTION INTRAVENOUS; SUBCUTANEOUS EVERY 8 HOURS
Refills: 0 | Status: DISCONTINUED | OUTPATIENT
Start: 2021-08-08 | End: 2021-08-11

## 2021-08-08 RX ORDER — DILTIAZEM HCL 120 MG
360 CAPSULE, EXT RELEASE 24 HR ORAL DAILY
Refills: 0 | Status: DISCONTINUED | OUTPATIENT
Start: 2021-08-08 | End: 2021-08-11

## 2021-08-08 RX ORDER — CARBIDOPA, LEVODOPA, AND ENTACAPONE 50; 200; 200 MG/1; MG/1; MG/1
1 TABLET, FILM COATED ORAL
Refills: 0 | Status: DISCONTINUED | OUTPATIENT
Start: 2021-08-08 | End: 2021-08-09

## 2021-08-08 RX ORDER — CLOPIDOGREL BISULFATE 75 MG/1
75 TABLET, FILM COATED ORAL DAILY
Refills: 0 | Status: DISCONTINUED | OUTPATIENT
Start: 2021-08-08 | End: 2021-08-11

## 2021-08-08 RX ORDER — MORPHINE SULFATE 50 MG/1
2 CAPSULE, EXTENDED RELEASE ORAL ONCE
Refills: 0 | Status: DISCONTINUED | OUTPATIENT
Start: 2021-08-08 | End: 2021-08-08

## 2021-08-08 RX ORDER — CARBIDOPA, LEVODOPA, AND ENTACAPONE 50; 200; 200 MG/1; MG/1; MG/1
1 TABLET, FILM COATED ORAL
Qty: 0 | Refills: 0 | DISCHARGE

## 2021-08-08 RX ORDER — CLOPIDOGREL BISULFATE 75 MG/1
1 TABLET, FILM COATED ORAL
Qty: 0 | Refills: 0 | DISCHARGE

## 2021-08-08 RX ORDER — ATORVASTATIN CALCIUM 80 MG/1
1 TABLET, FILM COATED ORAL
Qty: 0 | Refills: 0 | DISCHARGE

## 2021-08-08 RX ORDER — ONDANSETRON 8 MG/1
4 TABLET, FILM COATED ORAL THREE TIMES A DAY
Refills: 0 | Status: DISCONTINUED | OUTPATIENT
Start: 2021-08-08 | End: 2021-08-11

## 2021-08-08 RX ORDER — ONDANSETRON 8 MG/1
4 TABLET, FILM COATED ORAL ONCE
Refills: 0 | Status: COMPLETED | OUTPATIENT
Start: 2021-08-08 | End: 2021-08-08

## 2021-08-08 RX ORDER — MECLIZINE HCL 12.5 MG
25 TABLET ORAL ONCE
Refills: 0 | Status: COMPLETED | OUTPATIENT
Start: 2021-08-08 | End: 2021-08-08

## 2021-08-08 RX ORDER — PRAMIPEXOLE DIHYDROCHLORIDE 0.12 MG/1
1 TABLET ORAL
Qty: 0 | Refills: 0 | DISCHARGE

## 2021-08-08 RX ORDER — APIXABAN 2.5 MG/1
0 TABLET, FILM COATED ORAL
Qty: 0 | Refills: 0 | DISCHARGE

## 2021-08-08 RX ORDER — PANTOPRAZOLE SODIUM 20 MG/1
40 TABLET, DELAYED RELEASE ORAL
Refills: 0 | Status: DISCONTINUED | OUTPATIENT
Start: 2021-08-08 | End: 2021-08-11

## 2021-08-08 RX ORDER — DILTIAZEM HCL 120 MG
1 CAPSULE, EXT RELEASE 24 HR ORAL
Qty: 0 | Refills: 0 | DISCHARGE

## 2021-08-08 RX ORDER — ATORVASTATIN CALCIUM 80 MG/1
80 TABLET, FILM COATED ORAL AT BEDTIME
Refills: 0 | Status: DISCONTINUED | OUTPATIENT
Start: 2021-08-08 | End: 2021-08-11

## 2021-08-08 RX ADMIN — MORPHINE SULFATE 2 MILLIGRAM(S): 50 CAPSULE, EXTENDED RELEASE ORAL at 22:24

## 2021-08-08 RX ADMIN — MORPHINE SULFATE 2 MILLIGRAM(S): 50 CAPSULE, EXTENDED RELEASE ORAL at 22:21

## 2021-08-08 RX ADMIN — ATORVASTATIN CALCIUM 80 MILLIGRAM(S): 80 TABLET, FILM COATED ORAL at 23:26

## 2021-08-08 RX ADMIN — Medication 25 MILLIGRAM(S): at 21:23

## 2021-08-08 RX ADMIN — ONDANSETRON 4 MILLIGRAM(S): 8 TABLET, FILM COATED ORAL at 20:27

## 2021-08-08 RX ADMIN — Medication 25 MILLIGRAM(S): at 18:27

## 2021-08-08 RX ADMIN — ONDANSETRON 4 MILLIGRAM(S): 8 TABLET, FILM COATED ORAL at 18:27

## 2021-08-08 RX ADMIN — SODIUM CHLORIDE 500 MILLILITER(S): 9 INJECTION INTRAMUSCULAR; INTRAVENOUS; SUBCUTANEOUS at 18:27

## 2021-08-08 RX ADMIN — ONDANSETRON 4 MILLIGRAM(S): 8 TABLET, FILM COATED ORAL at 22:21

## 2021-08-08 RX ADMIN — Medication 650 MILLIGRAM(S): at 23:27

## 2021-08-08 RX ADMIN — HEPARIN SODIUM 5000 UNIT(S): 5000 INJECTION INTRAVENOUS; SUBCUTANEOUS at 23:27

## 2021-08-08 RX ADMIN — Medication 650 MILLIGRAM(S): at 19:12

## 2021-08-08 NOTE — ED ADULT NURSE NOTE - NSIMPLEMENTINTERV_GEN_ALL_ED
Implemented All Fall with Harm Risk Interventions:  [place] to call system. Call bell, personal items and telephone within reach. Instruct patient to call for assistance. Room bathroom lighting operational. Non-slip footwear when patient is off stretcher. Physically safe environment: no spills, clutter or unnecessary equipment. Stretcher in lowest position, wheels locked, appropriate side rails in place. Provide visual cue, wrist band, yellow gown, etc. Monitor gait and stability. Monitor for mental status changes and reorient to person, place, and time. Review medications for side effects contributing to fall risk. Reinforce activity limits and safety measures with patient and family. Provide visual clues: red socks.

## 2021-08-08 NOTE — ED PROVIDER NOTE - PROGRESS NOTE DETAILS
endorsed to Dr Fine patient with persistent symptoms, will admit for further symptomatic management.

## 2021-08-08 NOTE — H&P ADULT - NSICDXPASTMEDICALHX_GEN_ALL_CORE_FT
PAST MEDICAL HISTORY:  Atrial fibrillation     CRAO (central retinal artery occlusion) Left    HTN (hypertension)     Hyperlipidemia     Parkinson disease     Rheumatoid arthritis

## 2021-08-08 NOTE — H&P ADULT - NSHPLABSRESULTS_GEN_ALL_CORE
13.3   5.50  )-----------( 373      ( 08 Aug 2021 18:00 )             42.8       08-08    142  |  104  |  10  ----------------------------<  117<H>  3.8   |  24  |  0.8    Ca    9.7      08 Aug 2021 18:00  Mg     1.9     08-08    TPro  7.0  /  Alb  4.3  /  TBili  0.3  /  DBili  x   /  AST  14  /  ALT  <5  /  AlkPhos  88  08-08                      Lactate Trend      CARDIAC MARKERS ( 08 Aug 2021 18:00 )  x     / <0.01 ng/mL / x     / x     / x            CAPILLARY BLOOD GLUCOSE

## 2021-08-08 NOTE — ED PROVIDER NOTE - ATTENDING CONTRIBUTION TO CARE
88y female above PMH c/o nausea, has been seen in ED multiple times for same, however now with dizziness prescribed as spinning only when she goes to sit up/stand, no h/a, no speech or visual change, no numbness/weakness, no abd pain, on exam vital signs appreciated, well appearing, head nc/at, perrla, EOMI no nystagmus, conj pink op clear +cerumen impaction AU neck supple cor rrr lungs cta abd snt no c/c/e pulses equal calves nontender neuro intact, likely peripheral vertigo, given age will check imaging, symptomatic relief, reassess 88y female above PMH c/o nausea, has been seen in ED multiple times for same, however now with dizziness prescribed as spinning only when she goes to sit up/stand, no h/a, no speech or visual change, no numbness/weakness, no abd pain, on exam vital signs appreciated, well appearing, head nc/at, right pupil 3.5mm rrl, left pupil 3mm nr (baseline/blind),, EOMI no nystagmus, conj pink op clear +cerumen impaction AU neck supple cor rrr lungs cta abd snt no c/c/e pulses equal calves nontender neuro intact, likely peripheral vertigo, given age will check imaging, symptomatic relief, reassess

## 2021-08-08 NOTE — ED PROVIDER NOTE - OBJECTIVE STATEMENT
88 year old female, past medical history afib, htn, hdl, parkinsons, who presents with dizziness. patient reports intermittent episodes of dizziness described as room spinning that began at 9pm last night, patient reports symptoms exacerbated with movement of head/standing up. patient reports worsening nausea, no vomiting. denies fever, chills, headache, vision changes, neck pain, chest pain, SOB, abd pain, numbness/weakness.

## 2021-08-08 NOTE — H&P ADULT - ATTENDING COMMENTS
P Patient seen at bedside independent from medical PA. In addition to above Patient seen at bedside independent from medical PA, still with periods of nausea. For now symptomatic relief but if not improving, especially in view of PMH (including Parkinson's), would consider neurology consult. A Fib (chronic) is noted.

## 2021-08-08 NOTE — ED PROVIDER NOTE - NS ED ROS FT
Review of Systems:  	•	CONSTITUTIONAL: no fever, no chills  	•	SKIN: no rash  	•	EYES: no eye pain, no blurry vision  	•	ENT: no sore throat  	•	RESPIRATORY: no shortness of breath, no cough  	•	CARDIAC: no chest pain, no palpitations  	•	GI: no abd pain, no nausea, +vomiting, no diarrhea  	•	MUSCULOSKELETAL: no joint paint, no swelling, no redness  	•	NEUROLOGIC: +dizziness, no headache, no syncope, no loc   	•	PSYCH: no anxiety, non suicidal, non homicidal, no hallucination, no depression

## 2021-08-08 NOTE — ED ADULT NURSE NOTE - PMH
Atrial fibrillation    CRAO (central retinal artery occlusion)  Left  HTN (hypertension)    Hyperlipidemia    Parkinson disease    Rheumatoid arthritis

## 2021-08-08 NOTE — ED PROVIDER NOTE - PHYSICAL EXAMINATION
CONSTITUTIONAL: Well-developed; well-nourished; in no acute distress, nontoxic appearing  SKIN: skin exam is warm and dry  HEAD: Normocephalic; atraumatic  EYES: PERRL, EOMI; conjunctiva and sclera clear  ENT: MMM,  NECK: ROM intact.  CARD: S1, S2 normal, no murmur  RESP: No wheezes, rales or rhonchi. Good air movement bilaterally  ABD: soft; non-distended; non-tender. No Rebound, No guarding  EXT: Normal ROM. No cyanosis or edema.   NEURO: awake, alert, following commands, oriented, grossly unremarkable. No Focal deficits. GCS 15. CN 2-12 intact. Negative pronator drift. Normal finger to nose.   PSYCH: Cooperative, appropriate.

## 2021-08-08 NOTE — ED PROVIDER NOTE - CLINICAL SUMMARY MEDICAL DECISION MAKING FREE TEXT BOX
Labs unremarkable.  EKG NSR no acute changes.  Head CT negative.  Given IVF, meclizine, Zofran without improvement.  Still dizzy and unable to ambulate.  Will admit.

## 2021-08-09 LAB
ANION GAP SERPL CALC-SCNC: 12 MMOL/L — SIGNIFICANT CHANGE UP (ref 7–14)
BUN SERPL-MCNC: 8 MG/DL — LOW (ref 10–20)
CALCIUM SERPL-MCNC: 9 MG/DL — SIGNIFICANT CHANGE UP (ref 8.5–10.1)
CHLORIDE SERPL-SCNC: 102 MMOL/L — SIGNIFICANT CHANGE UP (ref 98–110)
CO2 SERPL-SCNC: 25 MMOL/L — SIGNIFICANT CHANGE UP (ref 17–32)
COVID-19 SPIKE DOMAIN AB INTERP: NEGATIVE — SIGNIFICANT CHANGE UP
COVID-19 SPIKE DOMAIN ANTIBODY RESULT: 0.4 U/ML — SIGNIFICANT CHANGE UP
CREAT SERPL-MCNC: 0.7 MG/DL — SIGNIFICANT CHANGE UP (ref 0.7–1.5)
GLUCOSE SERPL-MCNC: 98 MG/DL — SIGNIFICANT CHANGE UP (ref 70–99)
HCT VFR BLD CALC: 38.7 % — SIGNIFICANT CHANGE UP (ref 37–47)
HGB BLD-MCNC: 12.1 G/DL — SIGNIFICANT CHANGE UP (ref 12–16)
MCHC RBC-ENTMCNC: 25.1 PG — LOW (ref 27–31)
MCHC RBC-ENTMCNC: 31.3 G/DL — LOW (ref 32–37)
MCV RBC AUTO: 80.1 FL — LOW (ref 81–99)
NRBC # BLD: 0 /100 WBCS — SIGNIFICANT CHANGE UP (ref 0–0)
PLATELET # BLD AUTO: 351 K/UL — SIGNIFICANT CHANGE UP (ref 130–400)
POTASSIUM SERPL-MCNC: 3.6 MMOL/L — SIGNIFICANT CHANGE UP (ref 3.5–5)
POTASSIUM SERPL-SCNC: 3.6 MMOL/L — SIGNIFICANT CHANGE UP (ref 3.5–5)
RBC # BLD: 4.83 M/UL — SIGNIFICANT CHANGE UP (ref 4.2–5.4)
RBC # FLD: 14.6 % — HIGH (ref 11.5–14.5)
SARS-COV-2 IGG+IGM SERPL QL IA: 0.4 U/ML — SIGNIFICANT CHANGE UP
SARS-COV-2 IGG+IGM SERPL QL IA: NEGATIVE — SIGNIFICANT CHANGE UP
SODIUM SERPL-SCNC: 139 MMOL/L — SIGNIFICANT CHANGE UP (ref 135–146)
WBC # BLD: 5.97 K/UL — SIGNIFICANT CHANGE UP (ref 4.8–10.8)
WBC # FLD AUTO: 5.97 K/UL — SIGNIFICANT CHANGE UP (ref 4.8–10.8)

## 2021-08-09 PROCEDURE — 99232 SBSQ HOSP IP/OBS MODERATE 35: CPT

## 2021-08-09 RX ORDER — ENTACAPONE 200 MG/1
200 TABLET, FILM COATED ORAL
Refills: 0 | Status: DISCONTINUED | OUTPATIENT
Start: 2021-08-09 | End: 2021-08-11

## 2021-08-09 RX ORDER — SODIUM CHLORIDE 9 MG/ML
1000 INJECTION INTRAMUSCULAR; INTRAVENOUS; SUBCUTANEOUS
Refills: 0 | Status: DISCONTINUED | OUTPATIENT
Start: 2021-08-09 | End: 2021-08-11

## 2021-08-09 RX ORDER — MECLIZINE HCL 12.5 MG
25 TABLET ORAL
Refills: 0 | Status: DISCONTINUED | OUTPATIENT
Start: 2021-08-09 | End: 2021-08-09

## 2021-08-09 RX ORDER — MECLIZINE HCL 12.5 MG
25 TABLET ORAL EVERY 6 HOURS
Refills: 0 | Status: DISCONTINUED | OUTPATIENT
Start: 2021-08-09 | End: 2021-08-11

## 2021-08-09 RX ORDER — ONDANSETRON 8 MG/1
4 TABLET, FILM COATED ORAL ONCE
Refills: 0 | Status: COMPLETED | OUTPATIENT
Start: 2021-08-09 | End: 2021-08-09

## 2021-08-09 RX ORDER — CARBIDOPA AND LEVODOPA 25; 100 MG/1; MG/1
1 TABLET ORAL
Refills: 0 | Status: DISCONTINUED | OUTPATIENT
Start: 2021-08-09 | End: 2021-08-11

## 2021-08-09 RX ADMIN — CLOPIDOGREL BISULFATE 75 MILLIGRAM(S): 75 TABLET, FILM COATED ORAL at 11:49

## 2021-08-09 RX ADMIN — PANTOPRAZOLE SODIUM 40 MILLIGRAM(S): 20 TABLET, DELAYED RELEASE ORAL at 06:12

## 2021-08-09 RX ADMIN — ENTACAPONE 200 MILLIGRAM(S): 200 TABLET, FILM COATED ORAL at 18:52

## 2021-08-09 RX ADMIN — HEPARIN SODIUM 5000 UNIT(S): 5000 INJECTION INTRAVENOUS; SUBCUTANEOUS at 21:22

## 2021-08-09 RX ADMIN — ATORVASTATIN CALCIUM 80 MILLIGRAM(S): 80 TABLET, FILM COATED ORAL at 21:22

## 2021-08-09 RX ADMIN — HEPARIN SODIUM 5000 UNIT(S): 5000 INJECTION INTRAVENOUS; SUBCUTANEOUS at 06:12

## 2021-08-09 RX ADMIN — CARBIDOPA AND LEVODOPA 1 TABLET(S): 25; 100 TABLET ORAL at 18:50

## 2021-08-09 RX ADMIN — ONDANSETRON 4 MILLIGRAM(S): 8 TABLET, FILM COATED ORAL at 02:12

## 2021-08-09 RX ADMIN — HEPARIN SODIUM 5000 UNIT(S): 5000 INJECTION INTRAVENOUS; SUBCUTANEOUS at 14:50

## 2021-08-09 RX ADMIN — Medication 5 MILLIGRAM(S): at 11:52

## 2021-08-09 RX ADMIN — Medication 81 MILLIGRAM(S): at 11:49

## 2021-08-09 RX ADMIN — SODIUM CHLORIDE 75 MILLILITER(S): 9 INJECTION INTRAMUSCULAR; INTRAVENOUS; SUBCUTANEOUS at 21:22

## 2021-08-09 RX ADMIN — SODIUM CHLORIDE 75 MILLILITER(S): 9 INJECTION INTRAMUSCULAR; INTRAVENOUS; SUBCUTANEOUS at 11:15

## 2021-08-09 RX ADMIN — Medication 25 MILLIGRAM(S): at 18:51

## 2021-08-09 RX ADMIN — ENTACAPONE 200 MILLIGRAM(S): 200 TABLET, FILM COATED ORAL at 11:52

## 2021-08-09 RX ADMIN — Medication 360 MILLIGRAM(S): at 06:12

## 2021-08-09 RX ADMIN — CHLORHEXIDINE GLUCONATE 1 APPLICATION(S): 213 SOLUTION TOPICAL at 16:32

## 2021-08-09 RX ADMIN — Medication 25 MILLIGRAM(S): at 23:49

## 2021-08-09 RX ADMIN — CARBIDOPA AND LEVODOPA 1 TABLET(S): 25; 100 TABLET ORAL at 06:54

## 2021-08-09 NOTE — PHYSICAL THERAPY INITIAL EVALUATION ADULT - GENERAL OBSERVATIONS, REHAB EVAL
14:55-15:25 Chart reviewed. Patient available to be seen for physical therapy, denies pain, confirmed with RN. Pt rec'd in bed +IV in NAD.

## 2021-08-09 NOTE — PROGRESS NOTE ADULT - ATTENDING COMMENTS
Patient seen and examined independently. I agree with the resident's note, physical exam, and plan except as below.  Orthostatic VS    08-09-21 @ 12:02  Lying BP: 157/70 HR: 74   Sitting BP: 142/64 HR: 80  Standing BP: 123/52 HR: 80  Site: upper right arm   Mode: electronic      #dizziness due to orthostatic hypotension  likely autonomic dysfunction from PD  trial of IVFS and reacheck   PT eval    #PD - cont sinemet   #chronic Afib not on AC  #CRAO - on DAPT      pt has loop recorder - will try to get interrogated    lives with family at home, has walker  dc planning in 2 4- 48hrs Patient seen and examined independently. I agree with the resident's note, physical exam, and plan except as below.  Orthostatic VS    08-09-21 @ 12:02  Lying BP: 157/70 HR: 74   Sitting BP: 142/64 HR: 80  Standing BP: 123/52 HR: 80  Site: upper right arm   Mode: electronic      #dizziness due to orthostatic hypotension  likely autonomic dysfunction from PD  trial of IVFS and reacheck   PT eval    #PD - cont sinemet   #chronic Afib not on AC  #CRAO - on DAPT    spoke with daughter alyx on phone       pt has loop recorder - will try to get interrogated    lives with family at home, has walker  dc planning in 2 4- 48hrs Patient seen and examined independently. I agree with the resident's note, physical exam, and plan except as below.  Orthostatic VS    08-09-21 @ 12:02  Lying BP: 157/70 HR: 74   Sitting BP: 142/64 HR: 80  Standing BP: 123/52 HR: 80  Site: upper right arm   Mode: electronic      #dizziness due to orthostatic hypotension  likely autonomic dysfunction from PD  trial of IVFS and reacheck   PT eval    #PD - cont sinemet   #??? unclear if chronic Afib not on AC  #CRAO - on DAPT    spoke with daughter alyx on phone       pt has loop recorder - will try to get interrogated  [employer]  Implantable Loop Recorder Implant    Indication: Palpitations  Attending:	Leah Keith, PAC    EQUIPMENT IMPLANTED      [employer] Linq  Serial Number  [id number]    lives with family at home, has walker  dc planning in 2 4- 48hrs

## 2021-08-09 NOTE — CHART NOTE - NSCHARTNOTEFT_GEN_A_CORE
spoke with Chitra from  [employer] @ [phone]   requested for rep to come and interrogate pts loop recorder

## 2021-08-09 NOTE — PATIENT PROFILE ADULT - BILL PAYMENT
Spoke with caregiver: last BM 36 hrs ago. Has vomited 2 times. Since vomiting has not tolerated po intake. + nausea.   Decreased emptied in colon bag- none today yesterday last released was in am. Usually has 2-3 times a day. Has had nausea. Having abd cramping--lower right; no fever cramping--intermittent-- mild. No urine changes, is hydrating. No behavior changes. No speech changes. No change in strength. Had leg cramping yesterday but improved today. + belching and hiccups. No hx of obstruction.prior to vomiting was hydrating well.     protocol instructions given and caregiver verbalizes understanding. Does not want to use anywhere care. instructed other option is ED.     Reason for Disposition   [1] Vomiting AND [2] abdomen looks much more swollen than usual    Additional Information   Negative: Shock suspected (e.g., cold/pale/clammy skin, too weak to stand, low BP, rapid pulse)   Negative: Sounds like a life-threatening emergency to the triager   Negative: [1] Post-op AND [2] and general post-operative symptoms or questions, unreated to ostomy   Negative: [1] SEVERE pain (e.g., excruciating) AND [2] present > 1 hour   Negative: Bloody, tarry, or jet black-colored stool   Negative: [1] Bleeding from stoma tissue (stoma is moist, pink to red-colored tissue) AND [2] won't stop after 10 minutes of direct pressure   Negative: Stoma separates from the surrounding skin at the suture line (e.g. gaping wound next to stoma)   Negative: Stoma turns purple or black   Negative: [1] No stool or gas from ILEOSTOMY > 6 hours AND [2] abdominal pain or vomiting   Negative: [1] No stool or gas from ILEOSTOMY > 6 hours AND [2] no improvement after using CARE ADVICE   Negative: [1] No stool or gas from COLOSTOMY > 48 hours (2 days) AND [2] abdominal pain or vomiting   Negative: [1] Drinking very little AND [2] dehydration suspected (e.g., no urine > 12 hours, very dry mouth, very lightheaded)   Negative: Patient 
sounds very sick or weak to the triager   Negative: [1] MILD-MODERATE pain AND [2] constant AND [3] present > 2 hours    Protocols used: OSTOMY SYMPTOMS AND BOREPTISE-V-JX      
Patient underwent single incision vertical sleeve gastrectomy over 32 [place] bougie without any complications.
no

## 2021-08-10 ENCOUNTER — APPOINTMENT (OUTPATIENT)
Dept: GASTROENTEROLOGY | Facility: CLINIC | Age: 86
End: 2021-08-10

## 2021-08-10 LAB
ANION GAP SERPL CALC-SCNC: 9 MMOL/L — SIGNIFICANT CHANGE UP (ref 7–14)
BASOPHILS # BLD AUTO: 0.04 K/UL — SIGNIFICANT CHANGE UP (ref 0–0.2)
BASOPHILS NFR BLD AUTO: 0.6 % — SIGNIFICANT CHANGE UP (ref 0–1)
BUN SERPL-MCNC: 18 MG/DL — SIGNIFICANT CHANGE UP (ref 10–20)
CALCIUM SERPL-MCNC: 8.5 MG/DL — SIGNIFICANT CHANGE UP (ref 8.5–10.1)
CHLORIDE SERPL-SCNC: 105 MMOL/L — SIGNIFICANT CHANGE UP (ref 98–110)
CO2 SERPL-SCNC: 25 MMOL/L — SIGNIFICANT CHANGE UP (ref 17–32)
CREAT SERPL-MCNC: 0.9 MG/DL — SIGNIFICANT CHANGE UP (ref 0.7–1.5)
EOSINOPHIL # BLD AUTO: 0.15 K/UL — SIGNIFICANT CHANGE UP (ref 0–0.7)
EOSINOPHIL NFR BLD AUTO: 2.2 % — SIGNIFICANT CHANGE UP (ref 0–8)
GLUCOSE SERPL-MCNC: 99 MG/DL — SIGNIFICANT CHANGE UP (ref 70–99)
HCT VFR BLD CALC: 35 % — LOW (ref 37–47)
HGB BLD-MCNC: 10.7 G/DL — LOW (ref 12–16)
IMM GRANULOCYTES NFR BLD AUTO: 0.9 % — HIGH (ref 0.1–0.3)
LYMPHOCYTES # BLD AUTO: 1.32 K/UL — SIGNIFICANT CHANGE UP (ref 1.2–3.4)
LYMPHOCYTES # BLD AUTO: 19.7 % — LOW (ref 20.5–51.1)
MAGNESIUM SERPL-MCNC: 1.8 MG/DL — SIGNIFICANT CHANGE UP (ref 1.8–2.4)
MCHC RBC-ENTMCNC: 25 PG — LOW (ref 27–31)
MCHC RBC-ENTMCNC: 30.6 G/DL — LOW (ref 32–37)
MCV RBC AUTO: 81.8 FL — SIGNIFICANT CHANGE UP (ref 81–99)
MONOCYTES # BLD AUTO: 0.85 K/UL — HIGH (ref 0.1–0.6)
MONOCYTES NFR BLD AUTO: 12.7 % — HIGH (ref 1.7–9.3)
NEUTROPHILS # BLD AUTO: 4.27 K/UL — SIGNIFICANT CHANGE UP (ref 1.4–6.5)
NEUTROPHILS NFR BLD AUTO: 63.9 % — SIGNIFICANT CHANGE UP (ref 42.2–75.2)
NRBC # BLD: 0 /100 WBCS — SIGNIFICANT CHANGE UP (ref 0–0)
PLATELET # BLD AUTO: 319 K/UL — SIGNIFICANT CHANGE UP (ref 130–400)
POTASSIUM SERPL-MCNC: 3.9 MMOL/L — SIGNIFICANT CHANGE UP (ref 3.5–5)
POTASSIUM SERPL-SCNC: 3.9 MMOL/L — SIGNIFICANT CHANGE UP (ref 3.5–5)
RBC # BLD: 4.28 M/UL — SIGNIFICANT CHANGE UP (ref 4.2–5.4)
RBC # FLD: 14.9 % — HIGH (ref 11.5–14.5)
SODIUM SERPL-SCNC: 139 MMOL/L — SIGNIFICANT CHANGE UP (ref 135–146)
WBC # BLD: 6.69 K/UL — SIGNIFICANT CHANGE UP (ref 4.8–10.8)
WBC # FLD AUTO: 6.69 K/UL — SIGNIFICANT CHANGE UP (ref 4.8–10.8)

## 2021-08-10 PROCEDURE — 99232 SBSQ HOSP IP/OBS MODERATE 35: CPT

## 2021-08-10 RX ORDER — HYDROXYZINE HCL 10 MG
25 TABLET ORAL AT BEDTIME
Refills: 0 | Status: DISCONTINUED | OUTPATIENT
Start: 2021-08-10 | End: 2021-08-11

## 2021-08-10 RX ADMIN — Medication 25 MILLIGRAM(S): at 05:19

## 2021-08-10 RX ADMIN — Medication 360 MILLIGRAM(S): at 05:19

## 2021-08-10 RX ADMIN — CHLORHEXIDINE GLUCONATE 1 APPLICATION(S): 213 SOLUTION TOPICAL at 11:31

## 2021-08-10 RX ADMIN — ENTACAPONE 200 MILLIGRAM(S): 200 TABLET, FILM COATED ORAL at 17:58

## 2021-08-10 RX ADMIN — CARBIDOPA AND LEVODOPA 1 TABLET(S): 25; 100 TABLET ORAL at 05:19

## 2021-08-10 RX ADMIN — Medication 25 MILLIGRAM(S): at 17:58

## 2021-08-10 RX ADMIN — ATORVASTATIN CALCIUM 80 MILLIGRAM(S): 80 TABLET, FILM COATED ORAL at 21:09

## 2021-08-10 RX ADMIN — Medication 5 MILLIGRAM(S): at 11:31

## 2021-08-10 RX ADMIN — Medication 25 MILLIGRAM(S): at 22:27

## 2021-08-10 RX ADMIN — Medication 25 MILLIGRAM(S): at 11:31

## 2021-08-10 RX ADMIN — ENTACAPONE 200 MILLIGRAM(S): 200 TABLET, FILM COATED ORAL at 05:19

## 2021-08-10 RX ADMIN — PANTOPRAZOLE SODIUM 40 MILLIGRAM(S): 20 TABLET, DELAYED RELEASE ORAL at 05:19

## 2021-08-10 RX ADMIN — CARBIDOPA AND LEVODOPA 1 TABLET(S): 25; 100 TABLET ORAL at 17:58

## 2021-08-10 RX ADMIN — HEPARIN SODIUM 5000 UNIT(S): 5000 INJECTION INTRAVENOUS; SUBCUTANEOUS at 05:19

## 2021-08-10 RX ADMIN — Medication 81 MILLIGRAM(S): at 11:31

## 2021-08-10 RX ADMIN — HEPARIN SODIUM 5000 UNIT(S): 5000 INJECTION INTRAVENOUS; SUBCUTANEOUS at 13:39

## 2021-08-10 RX ADMIN — HEPARIN SODIUM 5000 UNIT(S): 5000 INJECTION INTRAVENOUS; SUBCUTANEOUS at 21:09

## 2021-08-10 RX ADMIN — CLOPIDOGREL BISULFATE 75 MILLIGRAM(S): 75 TABLET, FILM COATED ORAL at 11:31

## 2021-08-10 NOTE — PROGRESS NOTE ADULT - ASSESSMENT
#dizziness - orthostatic positive on ivfs  ambulating with PT - pt states dizziness as improved  #Pd- autonomic dysfunction   #[employer] loop interrogation done toluann - spoke with Dr Kamara - has hx of afib - unclear wy? n    dc planning in 24-48
Patient is an 88 year old female with hx of CRAO, Parkinson's disease, RA, chronic atrial fibrillation, HTN, HLD admitted with dizziness.    # dizziness  # nausea  - room spinning dizziness and nausea resolved per patient, now with lightheadedness when sitting up  - orthostatic vs  - meclizine prn  - zofran prn  - IVF  - PT consult    # chronic atrial fibrillation  - patient on aspirin and plavix, no AC  - rate controlled on cardizem PO    # CRAO  - continue asa, plavix    # Hyperlipidemia  - continue atorvastatin 80 mg  - DASH diet     # Parkinson disease  - continue sinemet, entacapone     Provider Handoff: pending PT eval, orthostatics, clinical improvement    Diet: DASH  Activity: OOB  DVT PPX: hep sq  GI PPX: not indicated  Code status: FULL CODE  Dispo: acute from home, lives with daughter

## 2021-08-10 NOTE — PROGRESS NOTE ADULT - SUBJECTIVE AND OBJECTIVE BOX
JON GARCÍA  88y, Female  Allergy: No Known Allergies      CHIEF COMPLAINT: 88 YEAR OLD FEMALE C/O DIZZINESS . (09 Aug 2021 10:03)      HPI:   88 year old female, past medical history afib, htn, hdl, parkinsons, who presents with dizziness. patient reports intermittent episodes of dizziness described as room spinning that began at 9pm last night, patient reports symptoms exacerbated with movement of head/standing up. patient reports worsening nausea, no vomiting. denies fever, chills, headache, vision changes, neck pain, chest pain, SOB, abd pain, numbness/weakness. (08 Aug 2021 21:35)    HPI:    FAMILY HISTORY:  FH: hypertension (Mother)      PAST MEDICAL & SURGICAL HISTORY:  Parkinson disease    HTN (hypertension)    Rheumatoid arthritis    Hyperlipidemia    CRAO (central retinal artery occlusion)  Left    Atrial fibrillation    History of hysterectomy    Status post placement of implantable loop recorder  01/2021    S/P hernia repair        SOCIAL HISTORY  Social History:  PATIENT IS LIVE WITH HER DAUGHTER AT HOME NOT SMOKING OR DRINKING ALCOHOL   PATIENT IS FULL CODE (08 Aug 2021 21:35)        ROS  General: Denies fevers, chills, nightsweats, weight loss  HEENT: Denies headache, rhinorrhea, sore throat, eye pain  CV: Denies CP, palpitations  PULM: Denies SOB, cough  GI: Denies abdominal pain, diarrhea  : Denies dysuria, hematuria  MSK: Denies arthralgias  SKIN: Denies rash   NEURO: Denies paresthesias, weakness  PSYCH: Denies depression    VITALS:  T(F): 98.3, Max: 98.3 (08-10-21 @ 21:00)  HR: 69  BP: 174/74  RR: 16Vital Signs Last 24 Hrs  T(C): 36.8 (10 Aug 2021 21:00), Max: 36.8 (10 Aug 2021 21:00)  T(F): 98.3 (10 Aug 2021 21:00), Max: 98.3 (10 Aug 2021 21:00)  HR: 69 (10 Aug 2021 21:00) (63 - 69)  BP: 174/74 (10 Aug 2021 21:00) (127/60 - 174/74)  BP(mean): --  RR: 16 (10 Aug 2021 21:00) (16 - 18)  SpO2: --    PHYSICAL EXAM:  Gen: NAD, resting in bed elderly  HEENT: Normocephalic, atraumatic  Neck: supple, no lymphadenopathy  CV: Regular rate & regular rhythm +loop  Lungs: decreased BS at bases, no fremitus  Abdomen: Soft, BS present  Ext: Warm, well perfused  Neuro: non focal, awake, mild tremor  Skin: no rash, no erythema  Psych: no SI, HI, Hallucination     TESTS & MEASUREMENTS:                        10.7   6.69  )-----------( 319      ( 10 Aug 2021 07:15 )             35.0     08-10    139  |  105  |  18  ----------------------------<  99  3.9   |  25  |  0.9    Ca    8.5      10 Aug 2021 07:15  Mg     1.8     08-10      eGFR if Non African American: 57 mL/min/1.73M2 (08-10-21 @ 07:15)  eGFR if African American: 66 mL/min/1.73M2 (08-10-21 @ 07:15)              QRS axis to [] ° and NSR at a rate of [] BPM. There was no atrial enlargement. There was no ventricular hypertrophy. There were no ST-T changes and all intervals were normal.      INFECTIOUS DISEASES TESTING      RADIOLOGY & ADDITIONAL TESTS:  I have personally reviewed the last Chest xray  CXR      CT      CARDIOLOGY TESTING  12 Lead ECG:   Ventricular Rate 72 BPM    Atrial Rate 72 BPM    P-R Interval 158 ms    QRS Duration 88 ms    Q-T Interval 398 ms    QTC Calculation(Bazett) 435 ms    P Axis 84 degrees    R Axis 26 degrees    T Axis 24 degrees    Diagnosis Line Sinus rhythm with Premature supraventricular complexes  Nonspecific ST-T changes  Confirmed by JON ABERNATHY MD ([id number]) on 8/9/2021 12:57:47 PM (08-08-21 @ 17:55)      MEDICATIONS  aspirin  chewable 81  atorvastatin 80  carbidopa/levodopa CR 50/200 1  chlorhexidine 4% Liquid 1  clopidogrel Tablet 75  diltiazem     entacapone 200  heparin   Injectable 5000  meclizine 25  oxybutynin 5  pantoprazole    Tablet 40  sodium chloride 0.9%. 1000      ANTIBIOTICS:      All available historical data has been reviewed    ASSESSMENT  88y F admitted with DIZZINESS;VERTIGO;NAUSEA        PROBLEMS  
Name: JON GARCÍA  Age: 88y  Gender: Female    Patient is a 88y old  Female who presents with a chief complaint of dizziness.    Interval events:  Pt was seen and examined. States she no longer has room spinning dizziness, but feels lightheaded when sitting up. States she feels she drinks adequate fluids. No other complaints.    Allergies:  No Known Allergies      PHYSICAL EXAM:    Vitals:  Vital Signs Last 24 Hrs  T(C): 36.5 (09 Aug 2021 08:49), Max: 36.6 (08 Aug 2021 21:21)  T(F): 97.7 (09 Aug 2021 08:49), Max: 97.9 (08 Aug 2021 21:21)  HR: 77 (09 Aug 2021 08:49) (68 - 80)  BP: 132/64 (09 Aug 2021 08:49) (132/64 - 191/88)  BP(mean): --  RR: 17 (09 Aug 2021 08:49) (17 - 20)  SpO2: 98% (09 Aug 2021 08:49) (96% - 98%)    GENERAL: NAD, A&Ox3  HEENT: AT/NC, EOMI, sclera clear, no nystagmus  CHEST/LUNG: CTAB, no wheezing or crackles  HEART: S1,S2 RRR, no murmur  ABDOMEN: Soft, NT/ND, +BS  EXTREMITIES:  2+ DP, no LE edema      LABS:                        12.1   5.97  )-----------( 351      ( 09 Aug 2021 05:48 )             38.7     08-09    139  |  102  |  8<L>  ----------------------------<  98  3.6   |  25  |  0.7    Ca    9.0      09 Aug 2021 05:48  Mg     1.9     08-08    TPro  7.0  /  Alb  4.3  /  TBili  0.3  /  DBili  x   /  AST  14  /  ALT  <5  /  AlkPhos  88  08-08    LIVER FUNCTIONS - ( 08 Aug 2021 18:00 )  Alb: 4.3 g/dL / Pro: 7.0 g/dL / ALK PHOS: 88 U/L / ALT: <5 U/L / AST: 14 U/L / GGT: x           CARDIAC MARKERS ( 08 Aug 2021 18:00 )  x     / <0.01 ng/mL / x     / x     / x            MEDICATIONS  (STANDING):  aspirin  chewable 81 milliGRAM(s) Oral daily  atorvastatin 80 milliGRAM(s) Oral at bedtime  carbidopa/levodopa CR 50/200 1 Tablet(s) Oral two times a day  chlorhexidine 4% Liquid 1 Application(s) Topical daily  clopidogrel Tablet 75 milliGRAM(s) Oral daily  diltiazem    milliGRAM(s) Oral daily  entacapone 200 milliGRAM(s) Oral two times a day  heparin   Injectable 5000 Unit(s) SubCutaneous every 8 hours  meclizine 25 milliGRAM(s) Oral every 6 hours  oxybutynin 5 milliGRAM(s) Oral daily  pantoprazole    Tablet 40 milliGRAM(s) Oral before breakfast  sodium chloride 0.9%. 1000 milliLiter(s) (75 mL/Hr) IV Continuous <Continuous>        RADIOLOGY & ADDITIONAL TESTS:    Imaging Personally Reviewed:  [x] YES  [ ] NO

## 2021-08-11 ENCOUNTER — TRANSCRIPTION ENCOUNTER (OUTPATIENT)
Age: 86
End: 2021-08-11

## 2021-08-11 VITALS
TEMPERATURE: 97 F | DIASTOLIC BLOOD PRESSURE: 66 MMHG | SYSTOLIC BLOOD PRESSURE: 147 MMHG | RESPIRATION RATE: 16 BRPM | HEART RATE: 73 BPM

## 2021-08-11 LAB
APPEARANCE UR: CLEAR — SIGNIFICANT CHANGE UP
BACTERIA # UR AUTO: ABNORMAL /HPF
BILIRUB UR-MCNC: NEGATIVE — SIGNIFICANT CHANGE UP
COD CRY URNS QL: NEGATIVE — SIGNIFICANT CHANGE UP
COLOR SPEC: YELLOW — SIGNIFICANT CHANGE UP
DIFF PNL FLD: NEGATIVE — SIGNIFICANT CHANGE UP
EPI CELLS # UR: ABNORMAL /HPF
GLUCOSE UR QL: NEGATIVE MG/DL — SIGNIFICANT CHANGE UP
GRAN CASTS # UR COMP ASSIST: NEGATIVE — SIGNIFICANT CHANGE UP
HYALINE CASTS # UR AUTO: NEGATIVE — SIGNIFICANT CHANGE UP
KETONES UR-MCNC: NEGATIVE — SIGNIFICANT CHANGE UP
LEUKOCYTE ESTERASE UR-ACNC: ABNORMAL
NITRITE UR-MCNC: NEGATIVE — SIGNIFICANT CHANGE UP
PH UR: 7 — SIGNIFICANT CHANGE UP (ref 5–8)
PROT UR-MCNC: NEGATIVE MG/DL — SIGNIFICANT CHANGE UP
RBC CASTS # UR COMP ASSIST: NEGATIVE — SIGNIFICANT CHANGE UP
SP GR SPEC: 1.01 — SIGNIFICANT CHANGE UP (ref 1.01–1.03)
TRI-PHOS CRY UR QL COMP ASSIST: NEGATIVE — SIGNIFICANT CHANGE UP
URATE CRY FLD QL MICRO: NEGATIVE — SIGNIFICANT CHANGE UP
UROBILINOGEN FLD QL: 0.2 MG/DL — SIGNIFICANT CHANGE UP (ref 0.2–0.2)
WBC UR QL: ABNORMAL /HPF

## 2021-08-11 RX ORDER — APIXABAN 2.5 MG/1
1 TABLET, FILM COATED ORAL
Qty: 60 | Refills: 0
Start: 2021-08-11 | End: 2021-09-09

## 2021-08-11 RX ORDER — CLOPIDOGREL BISULFATE 75 MG/1
1 TABLET, FILM COATED ORAL
Qty: 0 | Refills: 0 | DISCHARGE

## 2021-08-11 RX ORDER — CIPROFLOXACIN LACTATE 400MG/40ML
1 VIAL (ML) INTRAVENOUS
Qty: 10 | Refills: 0
Start: 2021-08-11 | End: 2021-08-15

## 2021-08-11 RX ORDER — ASPIRIN/CALCIUM CARB/MAGNESIUM 324 MG
0 TABLET ORAL
Qty: 0 | Refills: 0 | DISCHARGE

## 2021-08-11 RX ORDER — ACETAMINOPHEN 500 MG
650 TABLET ORAL EVERY 4 HOURS
Refills: 0 | Status: DISCONTINUED | OUTPATIENT
Start: 2021-08-11 | End: 2021-08-11

## 2021-08-11 RX ADMIN — Medication 25 MILLIGRAM(S): at 05:06

## 2021-08-11 RX ADMIN — HEPARIN SODIUM 5000 UNIT(S): 5000 INJECTION INTRAVENOUS; SUBCUTANEOUS at 13:05

## 2021-08-11 RX ADMIN — Medication 25 MILLIGRAM(S): at 17:13

## 2021-08-11 RX ADMIN — HEPARIN SODIUM 5000 UNIT(S): 5000 INJECTION INTRAVENOUS; SUBCUTANEOUS at 05:06

## 2021-08-11 RX ADMIN — PANTOPRAZOLE SODIUM 40 MILLIGRAM(S): 20 TABLET, DELAYED RELEASE ORAL at 05:07

## 2021-08-11 RX ADMIN — CARBIDOPA AND LEVODOPA 1 TABLET(S): 25; 100 TABLET ORAL at 05:06

## 2021-08-11 RX ADMIN — Medication 650 MILLIGRAM(S): at 05:05

## 2021-08-11 RX ADMIN — CARBIDOPA AND LEVODOPA 1 TABLET(S): 25; 100 TABLET ORAL at 17:13

## 2021-08-11 RX ADMIN — CHLORHEXIDINE GLUCONATE 1 APPLICATION(S): 213 SOLUTION TOPICAL at 11:13

## 2021-08-11 RX ADMIN — Medication 360 MILLIGRAM(S): at 05:06

## 2021-08-11 RX ADMIN — ENTACAPONE 200 MILLIGRAM(S): 200 TABLET, FILM COATED ORAL at 17:13

## 2021-08-11 RX ADMIN — Medication 5 MILLIGRAM(S): at 11:13

## 2021-08-11 RX ADMIN — ENTACAPONE 200 MILLIGRAM(S): 200 TABLET, FILM COATED ORAL at 05:06

## 2021-08-11 RX ADMIN — Medication 650 MILLIGRAM(S): at 05:35

## 2021-08-11 RX ADMIN — Medication 25 MILLIGRAM(S): at 11:12

## 2021-08-11 RX ADMIN — CLOPIDOGREL BISULFATE 75 MILLIGRAM(S): 75 TABLET, FILM COATED ORAL at 11:12

## 2021-08-11 RX ADMIN — Medication 81 MILLIGRAM(S): at 11:12

## 2021-08-11 RX ADMIN — SODIUM CHLORIDE 75 MILLILITER(S): 9 INJECTION INTRAMUSCULAR; INTRAVENOUS; SUBCUTANEOUS at 17:16

## 2021-08-11 NOTE — DISCHARGE NOTE NURSING/CASE MANAGEMENT/SOCIAL WORK - NSDCVIVACCINE_GEN_ALL_CORE_FT
influenza, injectable, quadrivalent, preservative free; 01-Jan-2021 15:19; Shanta Correa (RN); [facility]; 33BN3 (Exp. Date: 30-Jun-2021); IntraMuscular; Deltoid Right.; 0.5 milliLiter(s); VIS (VIS Published: 15-Aug-2019, VIS Presented: 01-Jan-2021);

## 2021-08-11 NOTE — DISCHARGE NOTE PROVIDER - NSDCCPCAREPLAN_GEN_ALL_CORE_FT
PRINCIPAL DISCHARGE DIAGNOSIS  Diagnosis: Dizziness  Assessment and Plan of Treatment: improved  please stay hydrated, continue your home medications and follow up with EPS Dr. Leah Monzon and your PCP in 1 week      SECONDARY DISCHARGE DIAGNOSES  Diagnosis: Vertigo  Assessment and Plan of Treatment: improved    Diagnosis: Nausea  Assessment and Plan of Treatment: improved     PRINCIPAL DISCHARGE DIAGNOSIS  Diagnosis: Dizziness  Assessment and Plan of Treatment: improved  please stay hydrated, continue your home medications and follow up with EPS Dr. Leah Monzon and your PCP in 1 week      SECONDARY DISCHARGE DIAGNOSES  Diagnosis: Vertigo  Assessment and Plan of Treatment: improved    Diagnosis: Nausea  Assessment and Plan of Treatment: improved    Diagnosis: Acute UTI  Assessment and Plan of Treatment: suspected UTI  please increase oral hydration and  take antibiotic as directed. Prescription sent to your pharmacy

## 2021-08-11 NOTE — DISCHARGE NOTE PROVIDER - NSDCMRMEDTOKEN_GEN_ALL_CORE_FT
aspirin 81 mg oral tablet:   atorvastatin 80 mg oral tablet: 1 tab(s) orally once a day (at bedtime)  carbidopa/levodopa/entacapone 50 mg-200 mg-200 mg oral tablet: 1 tab(s) orally 4 times a day  dilTIAZem 360 mg/24 hours oral capsule, extended release: 1 cap(s) orally once a day  famotidine:   oxybutynin 5 mg/24 hours oral tablet, extended release: 1 tab(s) orally once a day  Plavix 75 mg oral tablet: 1 tab(s) orally once a day  Protonix 40 mg oral delayed release tablet: 1 tab(s) orally once a day   atorvastatin 80 mg oral tablet: 1 tab(s) orally once a day (at bedtime)  carbidopa/levodopa/entacapone 50 mg-200 mg-200 mg oral tablet: 1 tab(s) orally 4 times a day  dilTIAZem 360 mg/24 hours oral capsule, extended release: 1 cap(s) orally once a day  Eliquis 5 mg oral tablet: 1 tab(s) orally 2 times a day   famotidine:   oxybutynin 5 mg/24 hours oral tablet, extended release: 1 tab(s) orally once a day  Protonix 40 mg oral delayed release tablet: 1 tab(s) orally once a day   atorvastatin 80 mg oral tablet: 1 tab(s) orally once a day (at bedtime)  carbidopa/levodopa/entacapone 50 mg-200 mg-200 mg oral tablet: 1 tab(s) orally 4 times a day  ciprofloxacin 250 mg oral tablet: 1 tab(s) orally 2 times a day   dilTIAZem 360 mg/24 hours oral capsule, extended release: 1 cap(s) orally once a day  Eliquis 5 mg oral tablet: 1 tab(s) orally 2 times a day   famotidine:   oxybutynin 5 mg/24 hours oral tablet, extended release: 1 tab(s) orally once a day  Protonix 40 mg oral delayed release tablet: 1 tab(s) orally once a day

## 2021-08-11 NOTE — DISCHARGE NOTE NURSING/CASE MANAGEMENT/SOCIAL WORK - PATIENT PORTAL LINK FT
You can access the FollowMyHealth Patient Portal offered by [facility] by registering at the following website: [email or web address]. By joining [employer]’s FollowMyHealth portal, you will also be able to view your health information using other applications (apps) compatible with our system.

## 2021-08-11 NOTE — DISCHARGE NOTE PROVIDER - CARE PROVIDER_API CALL
Gucci Kamara  PEDIATRICS  [address] [place] [address]  Phone: [phone]  Fax: [phone]  Follow Up Time: 1 week    Leah Monzon)  Cardiac Electrophysiology; Cardiology; Internal Medicine  [address] [place] [address]  Phone: [phone]  Fax: [phone]  Follow Up Time: 1 week

## 2021-08-11 NOTE — DISCHARGE NOTE PROVIDER - HOSPITAL COURSE
88 year old female with past medical history afib, htn, hdl, parkinsons, who presents with dizziness. patient reports intermittent episodes of dizziness described as room spinning that began at 9pm last night, patient reports symptoms exacerbated with movement of head/standing up. patient reports worsening nausea, no vomiting. denies fever, chills, headache, vision changes, neck pain, chest pain, SOB, abd pain, numbness/weakness.   pt was admitted to medicine and started on IVF hydration. Orthostatics positive. Pt s/p medtronic loop interrogation on 8/10.  She ambulated with PT 25ft. Dizziness improved. Pt is medically cleared for a hospital dc with outpatient follow up with EPS Dr. Monzon.    88 year old female with past medical history afib, htn, hdl, parkinsons, who presents with dizziness. patient reports intermittent episodes of dizziness described as room spinning that began at 9pm last night, patient reports symptoms exacerbated with movement of head/standing up. patient reports worsening nausea, no vomiting. denies fever, chills, headache, vision changes, neck pain, chest pain, SOB, abd pain, numbness/weakness.   pt was admitted to medicine and started on IVF hydration. Orthostatics positive. Pt s/p medtronic loop interrogation on 8/10.  She ambulated with PT 25ft.   Dizziness improved. Pt's vitals stable. Pt is medically cleared for a hospital dc with outpatient follow up with EPS Dr. Monzon and Cipro 250 BID x 5 d for suspected UTI. Will f/u ucx and adjust abx if needed.

## 2021-08-11 NOTE — DISCHARGE NOTE PROVIDER - NSDCPNSUBOBJ_GEN_ALL_CORE
Patient seen and examined independently. I agree with the resident's note, physical exam, and plan except as below.  ICU Vital Signs Last 24 Hrs  T(C): 36.3 (11 Aug 2021 14:13), Max: 36.8 (10 Aug 2021 21:00)  T(F): 97.4 (11 Aug 2021 14:13), Max: 98.3 (10 Aug 2021 21:00)  HR: 73 (11 Aug 2021 14:13) (66 - 73)  BP: 147/66 (11 Aug 2021 14:13) (144/65 - 174/74)  BP(mean): --  ABP: --  ABP(mean): --  RR: 16 (11 Aug 2021 14:13) (16 - 16)  SpO2: --    nad  aox3  tremor  [id number]  ctabl  softnt+bs  nocce    syncope - orthostatic - improved with ivfs  Pd - automnomic dysfunction   aflutter - spoke with Dr watkins eps - over phone - restart eliquis (was rxxed as outpt) and dc DAPT  follow up in office   discussed with daughter over phone  stable for dc   time sepnt 35mins

## 2021-08-17 DIAGNOSIS — N39.0 URINARY TRACT INFECTION, SITE NOT SPECIFIED: ICD-10-CM

## 2021-08-17 DIAGNOSIS — M06.9 RHEUMATOID ARTHRITIS, UNSPECIFIED: ICD-10-CM

## 2021-08-17 DIAGNOSIS — E78.5 HYPERLIPIDEMIA, UNSPECIFIED: ICD-10-CM

## 2021-08-17 DIAGNOSIS — I48.20 CHRONIC ATRIAL FIBRILLATION, UNSPECIFIED: ICD-10-CM

## 2021-08-17 DIAGNOSIS — R42 DIZZINESS AND GIDDINESS: ICD-10-CM

## 2021-08-17 DIAGNOSIS — I48.92 UNSPECIFIED ATRIAL FLUTTER: ICD-10-CM

## 2021-08-17 DIAGNOSIS — I10 ESSENTIAL (PRIMARY) HYPERTENSION: ICD-10-CM

## 2021-08-17 DIAGNOSIS — I95.1 ORTHOSTATIC HYPOTENSION: ICD-10-CM

## 2021-08-17 DIAGNOSIS — R11.0 NAUSEA: ICD-10-CM

## 2021-08-17 DIAGNOSIS — G20 PARKINSON'S DISEASE: ICD-10-CM

## 2021-08-17 DIAGNOSIS — H34.12 CENTRAL RETINAL ARTERY OCCLUSION, LEFT EYE: ICD-10-CM

## 2021-08-17 DIAGNOSIS — Z79.02 LONG TERM (CURRENT) USE OF ANTITHROMBOTICS/ANTIPLATELETS: ICD-10-CM

## 2021-08-17 DIAGNOSIS — Z95.818 PRESENCE OF OTHER CARDIAC IMPLANTS AND GRAFTS: ICD-10-CM

## 2021-08-20 ENCOUNTER — EMERGENCY (EMERGENCY)
Facility: HOSPITAL | Age: 86
LOS: 0 days | Discharge: HOME | End: 2021-08-20
Attending: EMERGENCY MEDICINE | Admitting: EMERGENCY MEDICINE
Payer: MEDICARE

## 2021-08-20 VITALS
OXYGEN SATURATION: 99 % | HEIGHT: 64 IN | RESPIRATION RATE: 20 BRPM | HEART RATE: 80 BPM | DIASTOLIC BLOOD PRESSURE: 94 MMHG | TEMPERATURE: 99 F | SYSTOLIC BLOOD PRESSURE: 188 MMHG

## 2021-08-20 VITALS
SYSTOLIC BLOOD PRESSURE: 154 MMHG | HEART RATE: 71 BPM | RESPIRATION RATE: 18 BRPM | OXYGEN SATURATION: 97 % | TEMPERATURE: 98 F | DIASTOLIC BLOOD PRESSURE: 78 MMHG

## 2021-08-20 DIAGNOSIS — R11.2 NAUSEA WITH VOMITING, UNSPECIFIED: ICD-10-CM

## 2021-08-20 DIAGNOSIS — Z79.01 LONG TERM (CURRENT) USE OF ANTICOAGULANTS: ICD-10-CM

## 2021-08-20 DIAGNOSIS — Z79.899 OTHER LONG TERM (CURRENT) DRUG THERAPY: ICD-10-CM

## 2021-08-20 DIAGNOSIS — I48.91 UNSPECIFIED ATRIAL FIBRILLATION: ICD-10-CM

## 2021-08-20 DIAGNOSIS — Z20.822 CONTACT WITH AND (SUSPECTED) EXPOSURE TO COVID-19: ICD-10-CM

## 2021-08-20 DIAGNOSIS — E78.5 HYPERLIPIDEMIA, UNSPECIFIED: ICD-10-CM

## 2021-08-20 DIAGNOSIS — Z86.69 PERSONAL HISTORY OF OTHER DISEASES OF THE NERVOUS SYSTEM AND SENSE ORGANS: ICD-10-CM

## 2021-08-20 DIAGNOSIS — Z90.710 ACQUIRED ABSENCE OF BOTH CERVIX AND UTERUS: Chronic | ICD-10-CM

## 2021-08-20 DIAGNOSIS — I10 ESSENTIAL (PRIMARY) HYPERTENSION: ICD-10-CM

## 2021-08-20 DIAGNOSIS — Z95.818 PRESENCE OF OTHER CARDIAC IMPLANTS AND GRAFTS: Chronic | ICD-10-CM

## 2021-08-20 DIAGNOSIS — Z79.02 LONG TERM (CURRENT) USE OF ANTITHROMBOTICS/ANTIPLATELETS: ICD-10-CM

## 2021-08-20 DIAGNOSIS — Z98.890 OTHER SPECIFIED POSTPROCEDURAL STATES: Chronic | ICD-10-CM

## 2021-08-20 DIAGNOSIS — N39.0 URINARY TRACT INFECTION, SITE NOT SPECIFIED: ICD-10-CM

## 2021-08-20 DIAGNOSIS — R11.0 NAUSEA: ICD-10-CM

## 2021-08-20 LAB
ALBUMIN SERPL ELPH-MCNC: 4.1 G/DL — SIGNIFICANT CHANGE UP (ref 3.5–5.2)
ALP SERPL-CCNC: 80 U/L — SIGNIFICANT CHANGE UP (ref 30–115)
ALT FLD-CCNC: <5 U/L — SIGNIFICANT CHANGE UP (ref 0–41)
ANION GAP SERPL CALC-SCNC: 9 MMOL/L — SIGNIFICANT CHANGE UP (ref 7–14)
APPEARANCE UR: CLEAR — SIGNIFICANT CHANGE UP
AST SERPL-CCNC: 15 U/L — SIGNIFICANT CHANGE UP (ref 0–41)
BACTERIA # UR AUTO: ABNORMAL
BASOPHILS # BLD AUTO: 0.03 K/UL — SIGNIFICANT CHANGE UP (ref 0–0.2)
BASOPHILS NFR BLD AUTO: 0.5 % — SIGNIFICANT CHANGE UP (ref 0–1)
BILIRUB SERPL-MCNC: 0.3 MG/DL — SIGNIFICANT CHANGE UP (ref 0.2–1.2)
BILIRUB UR-MCNC: NEGATIVE — SIGNIFICANT CHANGE UP
BUN SERPL-MCNC: 14 MG/DL — SIGNIFICANT CHANGE UP (ref 10–20)
CALCIUM SERPL-MCNC: 9.2 MG/DL — SIGNIFICANT CHANGE UP (ref 8.5–10.1)
CHLORIDE SERPL-SCNC: 102 MMOL/L — SIGNIFICANT CHANGE UP (ref 98–110)
CO2 SERPL-SCNC: 24 MMOL/L — SIGNIFICANT CHANGE UP (ref 17–32)
COLOR SPEC: ABNORMAL
CREAT SERPL-MCNC: 0.9 MG/DL — SIGNIFICANT CHANGE UP (ref 0.7–1.5)
DIFF PNL FLD: NEGATIVE — SIGNIFICANT CHANGE UP
EOSINOPHIL # BLD AUTO: 0.04 K/UL — SIGNIFICANT CHANGE UP (ref 0–0.7)
EOSINOPHIL NFR BLD AUTO: 0.7 % — SIGNIFICANT CHANGE UP (ref 0–8)
EPI CELLS # UR: ABNORMAL /HPF
GLUCOSE SERPL-MCNC: 108 MG/DL — HIGH (ref 70–99)
GLUCOSE UR QL: NEGATIVE MG/DL — SIGNIFICANT CHANGE UP
GRAN CASTS # UR COMP ASSIST: NEGATIVE — SIGNIFICANT CHANGE UP
HCT VFR BLD CALC: 37.4 % — SIGNIFICANT CHANGE UP (ref 37–47)
HGB BLD-MCNC: 11.6 G/DL — LOW (ref 12–16)
IMM GRANULOCYTES NFR BLD AUTO: 0.4 % — HIGH (ref 0.1–0.3)
KETONES UR-MCNC: ABNORMAL
LEUKOCYTE ESTERASE UR-ACNC: ABNORMAL
LYMPHOCYTES # BLD AUTO: 0.86 K/UL — LOW (ref 1.2–3.4)
LYMPHOCYTES # BLD AUTO: 15.1 % — LOW (ref 20.5–51.1)
MCHC RBC-ENTMCNC: 25.3 PG — LOW (ref 27–31)
MCHC RBC-ENTMCNC: 31 G/DL — LOW (ref 32–37)
MCV RBC AUTO: 81.7 FL — SIGNIFICANT CHANGE UP (ref 81–99)
MONOCYTES # BLD AUTO: 0.49 K/UL — SIGNIFICANT CHANGE UP (ref 0.1–0.6)
MONOCYTES NFR BLD AUTO: 8.6 % — SIGNIFICANT CHANGE UP (ref 1.7–9.3)
NEUTROPHILS # BLD AUTO: 4.24 K/UL — SIGNIFICANT CHANGE UP (ref 1.4–6.5)
NEUTROPHILS NFR BLD AUTO: 74.7 % — SIGNIFICANT CHANGE UP (ref 42.2–75.2)
NITRITE UR-MCNC: NEGATIVE — SIGNIFICANT CHANGE UP
NRBC # BLD: 0 /100 WBCS — SIGNIFICANT CHANGE UP (ref 0–0)
PH UR: 6 — SIGNIFICANT CHANGE UP (ref 5–8)
PLATELET # BLD AUTO: 348 K/UL — SIGNIFICANT CHANGE UP (ref 130–400)
POTASSIUM SERPL-MCNC: 5.2 MMOL/L — HIGH (ref 3.5–5)
POTASSIUM SERPL-SCNC: 5.2 MMOL/L — HIGH (ref 3.5–5)
PROT SERPL-MCNC: 6.6 G/DL — SIGNIFICANT CHANGE UP (ref 6–8)
PROT UR-MCNC: ABNORMAL MG/DL
RBC # BLD: 4.58 M/UL — SIGNIFICANT CHANGE UP (ref 4.2–5.4)
RBC # FLD: 15.9 % — HIGH (ref 11.5–14.5)
RBC CASTS # UR COMP ASSIST: SIGNIFICANT CHANGE UP /HPF
SARS-COV-2 RNA SPEC QL NAA+PROBE: SIGNIFICANT CHANGE UP
SODIUM SERPL-SCNC: 135 MMOL/L — SIGNIFICANT CHANGE UP (ref 135–146)
SP GR SPEC: 1.02 — SIGNIFICANT CHANGE UP (ref 1.01–1.03)
UROBILINOGEN FLD QL: 0.2 MG/DL — SIGNIFICANT CHANGE UP (ref 0.2–0.2)
WBC # BLD: 5.68 K/UL — SIGNIFICANT CHANGE UP (ref 4.8–10.8)
WBC # FLD AUTO: 5.68 K/UL — SIGNIFICANT CHANGE UP (ref 4.8–10.8)
WBC UR QL: ABNORMAL /HPF

## 2021-08-20 PROCEDURE — 93010 ELECTROCARDIOGRAM REPORT: CPT

## 2021-08-20 PROCEDURE — 99284 EMERGENCY DEPT VISIT MOD MDM: CPT

## 2021-08-20 RX ORDER — FAMOTIDINE 10 MG/ML
20 INJECTION INTRAVENOUS ONCE
Refills: 0 | Status: COMPLETED | OUTPATIENT
Start: 2021-08-20 | End: 2021-08-20

## 2021-08-20 RX ORDER — ONDANSETRON 8 MG/1
4 TABLET, FILM COATED ORAL ONCE
Refills: 0 | Status: COMPLETED | OUTPATIENT
Start: 2021-08-20 | End: 2021-08-20

## 2021-08-20 RX ORDER — SODIUM CHLORIDE 9 MG/ML
1000 INJECTION, SOLUTION INTRAVENOUS ONCE
Refills: 0 | Status: COMPLETED | OUTPATIENT
Start: 2021-08-20 | End: 2021-08-20

## 2021-08-20 RX ORDER — NITROFURANTOIN MACROCRYSTAL 50 MG
1 CAPSULE ORAL
Qty: 14 | Refills: 0
Start: 2021-08-20 | End: 2021-08-26

## 2021-08-20 RX ADMIN — ONDANSETRON 4 MILLIGRAM(S): 8 TABLET, FILM COATED ORAL at 06:36

## 2021-08-20 RX ADMIN — SODIUM CHLORIDE 1000 MILLILITER(S): 9 INJECTION, SOLUTION INTRAVENOUS at 06:35

## 2021-08-20 RX ADMIN — FAMOTIDINE 100 MILLIGRAM(S): 10 INJECTION INTRAVENOUS at 06:39

## 2021-08-20 NOTE — ED PROVIDER NOTE - PHYSICAL EXAMINATION
CONSTITUTIONAL: Well-developed; well-nourished; in no acute distress.   SKIN: warm, dry  HEAD: Normocephalic; atraumatic.  EYES: no conjunctival injection. PERRLA. EOMI.   ENT: No nasal discharge; airway clear.  NECK: Supple; non tender.  CARD: S1, S2 normal; no murmurs, gallops, or rubs. Regular rate and rhythm.   RESP: No wheezes, rales or rhonchi.  ABD: soft ntnd. BS+ in all 4 quadrants.  NEURO: Alert, oriented, grossly unremarkable. Generalized parkinsonian twitching.  PSYCH: Cooperative, appropriate.

## 2021-08-20 NOTE — ED PROVIDER NOTE - ATTENDING CONTRIBUTION TO CARE
88y female above PMH known to me seen for 88y female above PMH known to me seen for nausea on multiple occasions s/p unremarkable workup returns with same, under stress regarding move, on exam vital signs appreciated, nontoxic, anxious, head nc/at, perrla, EOMI, conj pink op clear dry mm neck supple cor rrr lungs cta abd snt no c/c/e pulses equal calves nontender neuro intact, will cehck labs, ivf, antiemetic, reassess

## 2021-08-20 NOTE — ED PROVIDER NOTE - NSFOLLOWUPINSTRUCTIONS_ED_ALL_ED_FT
Urinary Tract Infection, Adult  ImageA urinary tract infection (UTI) is an infection of any part of the urinary tract, which includes the kidneys, ureters, bladder, and urethra. These organs make, store, and get rid of urine in the body. UTI can be a bladder infection (cystitis) or kidney infection (pyelonephritis).    What are the causes?  This infection may be caused by fungi, viruses, or bacteria. Bacteria are the most common cause of UTIs. This condition can also be caused by repeated incomplete emptying of the bladder during urination.    What increases the risk?  This condition is more likely to develop if:    You ignore your need to urinate or hold urine for long periods of time.  You do not empty your bladder completely during urination.  You wipe back to front after urinating or having a bowel movement, if you are female.  You are uncircumcised, if you are male.  You are constipated.  You have a urinary catheter that stays in place (indwelling).  You have a weak defense (immune) system.  You have a medical condition that affects your bowels, kidneys, or bladder.  You have diabetes.  You take antibiotic medicines frequently or for long periods of time, and the antibiotics no longer work well against certain types of infections (antibiotic resistance).  You take medicines that irritate your urinary tract.  You are exposed to chemicals that irritate your urinary tract.  You are female.    What are the signs or symptoms?  Symptoms of this condition include:    Fever.  Frequent urination or passing small amounts of urine frequently.  Needing to urinate urgently.  Pain or burning with urination.  Urine that smells bad or unusual.  Cloudy urine.  Pain in the lower abdomen or back.  Trouble urinating.  Blood in the urine.  Vomiting or being less hungry than normal.  Diarrhea or abdominal pain.  Vaginal discharge, if you are female.    How is this diagnosed?  This condition is diagnosed with a medical history and physical exam. You will also need to provide a urine sample to test your urine. Other tests may be done, including:    Blood tests.  Sexually transmitted disease (STD) testing.    If you have had more than one UTI, a cystoscopy or imaging studies may be done to determine the cause of the infections.    How is this treated?  Treatment for this condition often includes a combination of two or more of the following:    Antibiotic medicine.  Other medicines to treat less common causes of UTI.  Over-the-counter medicines to treat pain.  Drinking enough water to stay hydrated.    Follow these instructions at home:  Take over-the-counter and prescription medicines only as told by your health care provider.  If you were prescribed an antibiotic, take it as told by your health care provider. Do not stop taking the antibiotic even if you start to feel better.  Avoid alcohol, caffeine, tea, and carbonated beverages. They can irritate your bladder.  Drink enough fluid to keep your urine clear or pale yellow.  Keep all follow-up visits as told by your health care provider. This is important.  ImageMake sure to:    Empty your bladder often and completely. Do not hold urine for long periods of time.  Empty your bladder before and after sex.  Wipe from front to back after a bowel movement if you are female. Use each tissue one time when you wipe.    Contact a health care provider if:  You have back pain.  You have a fever.  You feel nauseous or vomit.  Your symptoms do not get better after 3 days.  Your symptoms go away and then return.  Get help right away if:  You have severe back pain or lower abdominal pain.  You are vomiting and cannot keep down any medicines or water.  This information is not intended to replace advice given to you by your health care provider. Make sure you discuss any questions you have with your health care provider.    Acute Nausea and Vomiting    WHAT YOU NEED TO KNOW:    Acute nausea and vomiting start suddenly, worsen quickly, and last a short time.    DISCHARGE INSTRUCTIONS:    Return to the emergency department if:     You see blood in your vomit or your bowel movements.      You have sudden, severe pain in your chest and upper abdomen after hard vomiting or retching.      You have swelling in your neck and chest.       You are dizzy, cold, and thirsty and your eyes and mouth are dry.      You are urinating very little or not at all.      You have muscle weakness, leg cramps, and trouble breathing.       Your heart is beating much faster than normal.       You continue to vomit for more than 48 hours.     Contact your healthcare provider if:     You have frequent dry heaves (vomiting but nothing comes out).      Your nausea and vomiting does not get better or go away after you use medicine.      You have questions or concerns about your condition or treatment.    Medicines: You may need any of the following:     Medicines may be given to calm your stomach and stop your vomiting. You may also need medicines to help you feel more relaxed or to stop nausea and vomiting caused by motion sickness.      Gastrointestinal stimulants are used to help empty your stomach and bowels. This may help decrease nausea and vomiting.      Take your medicine as directed. Contact your healthcare provider if you think your medicine is not helping or if you have side effects. Tell him or her if you are allergic to any medicine. Keep a list of the medicines, vitamins, and herbs you take. Include the amounts, and when and why you take them. Bring the list or the pill bottles to follow-up visits. Carry your medicine list with you in case of an emergency.    Prevent or manage acute nausea and vomiting:     Do not drink alcohol. Alcohol may upset or irritate your stomach. Too much alcohol can also cause acute nausea and vomiting.      Control stress. Headaches due to stress may cause nausea and vomiting. Find ways to relax and manage your stress. Get more rest and sleep.      Drink more liquids as directed. Vomiting can lead to dehydration. It is important to drink more liquids to help replace lost body fluids. Ask your healthcare provider how much liquid to drink each day and which liquids are best for you. Your provider may recommend that you drink an oral rehydration solution (ORS). ORS contains water, salts, and sugar that are needed to replace the lost body fluids. Ask what kind of ORS to use, how much to drink, and where to get it.      Eat smaller meals, more often. Eat small amounts of food every 2 to 3 hours, even if you are not hungry. Food in your stomach may decrease your nausea.      Talk to your healthcare provider before you take over-the-counter (OTC) medicines. These medicines can cause serious problems if you use certain other medicines, or you have a medical condition. You may have problems if you use too much or use them for longer than the label says. Follow directions on the label carefully.     Follow up with your healthcare provider as directed: Write down your questions so you remember to ask them during your follow-up visits.       © Copyright [employer] 2019 All illustrations and images included in CareNotes are the copyrighted property of [employer].A[employer]M., Inc. or [employer].

## 2021-08-20 NOTE — ED PROVIDER NOTE - CARE PROVIDER_API CALL
Gucci Kamara  PEDIATRICS  [address] [place] [id number]  Phone: [phone]  Fax: [phone]  Follow Up Time:

## 2021-08-20 NOTE — ED PROVIDER NOTE - OBJECTIVE STATEMENT
87 yo female, PMHx of afib, HTN, HLD, Parkinsons, presents with nausea since yesterday afternoon, no aggravating or alleviating factors, no other associated symptoms. Denies dizziness, headache, abdominal pain, vomiting, fevers.

## 2021-08-20 NOTE — ED PROVIDER NOTE - NS ED ROS FT
GEN:  no fever, no chills, no generalized weakness  NEURO:  no headache, no dizziness  ENT: no sore throat, no runny nose  CV:  no chest pain, no palpitations  RESP:  no sob, no cough  GI:  +nausea, no vomiting, no abdominal pain, no diarrhea  :  no dysuria, no urinary frequency, no hematuria  MSK:  no joint pain, no edema  SKIN:  no rash, no bruising  HEME: no hematochezia, no melena

## 2021-08-20 NOTE — ED ADULT NURSE NOTE - CAS ELECT INFOMATION PROVIDED
Awaiting ambulance. Daughter Veronica aware and agreeable with plan of care and transport home via ambulance./DC instructions

## 2021-08-20 NOTE — ED PROVIDER NOTE - CARE PLAN
1 Principal Discharge DX:	UTI (urinary tract infection)  Secondary Diagnosis:	Mild nausea and vomiting

## 2021-08-20 NOTE — ED PROVIDER NOTE - PATIENT PORTAL LINK FT
You can access the FollowMyHealth Patient Portal offered by [facility] by registering at the following website: [email or web address]. By joining [employer]’s FollowMyHealth portal, you will also be able to view your health information using other applications (apps) compatible with our system.

## 2021-08-20 NOTE — ED ADULT NURSE NOTE - CINV DISCH TEACH PARTICIP
Called patient daughter Veronica Broussard, daughter aware of pt d/c instructions, medications, and follow up care. Patient verbalized understanding of plan of care as well, pt a/o x 4/Patient/Family

## 2021-08-26 ENCOUNTER — INPATIENT (INPATIENT)
Facility: HOSPITAL | Age: 86
LOS: 3 days | Discharge: NURSING, REHAB OR LONG-TERM CARE | End: 2021-08-30
Attending: INTERNAL MEDICINE | Admitting: INTERNAL MEDICINE
Payer: MEDICARE

## 2021-08-26 VITALS
OXYGEN SATURATION: 100 % | TEMPERATURE: 97 F | HEIGHT: 64 IN | WEIGHT: 134.92 LBS | SYSTOLIC BLOOD PRESSURE: 198 MMHG | HEART RATE: 71 BPM | RESPIRATION RATE: 18 BRPM | DIASTOLIC BLOOD PRESSURE: 87 MMHG

## 2021-08-26 DIAGNOSIS — Z90.710 ACQUIRED ABSENCE OF BOTH CERVIX AND UTERUS: Chronic | ICD-10-CM

## 2021-08-26 DIAGNOSIS — Z98.890 OTHER SPECIFIED POSTPROCEDURAL STATES: Chronic | ICD-10-CM

## 2021-08-26 DIAGNOSIS — R42 DIZZINESS AND GIDDINESS: ICD-10-CM

## 2021-08-26 DIAGNOSIS — G20 PARKINSON'S DISEASE: ICD-10-CM

## 2021-08-26 DIAGNOSIS — R11.2 NAUSEA WITH VOMITING, UNSPECIFIED: ICD-10-CM

## 2021-08-26 DIAGNOSIS — Z95.818 PRESENCE OF OTHER CARDIAC IMPLANTS AND GRAFTS: Chronic | ICD-10-CM

## 2021-08-26 DIAGNOSIS — I10 ESSENTIAL (PRIMARY) HYPERTENSION: ICD-10-CM

## 2021-08-26 LAB
ALBUMIN SERPL ELPH-MCNC: 4 G/DL — SIGNIFICANT CHANGE UP (ref 3.5–5.2)
ALP SERPL-CCNC: 88 U/L — SIGNIFICANT CHANGE UP (ref 30–115)
ALT FLD-CCNC: <5 U/L — SIGNIFICANT CHANGE UP (ref 0–41)
ANION GAP SERPL CALC-SCNC: 9 MMOL/L — SIGNIFICANT CHANGE UP (ref 7–14)
APPEARANCE UR: CLEAR — SIGNIFICANT CHANGE UP
AST SERPL-CCNC: 9 U/L — SIGNIFICANT CHANGE UP (ref 0–41)
BASOPHILS # BLD AUTO: 0.05 K/UL — SIGNIFICANT CHANGE UP (ref 0–0.2)
BASOPHILS NFR BLD AUTO: 0.8 % — SIGNIFICANT CHANGE UP (ref 0–1)
BILIRUB SERPL-MCNC: 0.3 MG/DL — SIGNIFICANT CHANGE UP (ref 0.2–1.2)
BILIRUB UR-MCNC: NEGATIVE — SIGNIFICANT CHANGE UP
BUN SERPL-MCNC: 12 MG/DL — SIGNIFICANT CHANGE UP (ref 10–20)
CALCIUM SERPL-MCNC: 9.4 MG/DL — SIGNIFICANT CHANGE UP (ref 8.5–10.1)
CHLORIDE SERPL-SCNC: 104 MMOL/L — SIGNIFICANT CHANGE UP (ref 98–110)
CO2 SERPL-SCNC: 26 MMOL/L — SIGNIFICANT CHANGE UP (ref 17–32)
COLOR SPEC: YELLOW — SIGNIFICANT CHANGE UP
CREAT SERPL-MCNC: 0.7 MG/DL — SIGNIFICANT CHANGE UP (ref 0.7–1.5)
DIFF PNL FLD: NEGATIVE — SIGNIFICANT CHANGE UP
EOSINOPHIL # BLD AUTO: 0.03 K/UL — SIGNIFICANT CHANGE UP (ref 0–0.7)
EOSINOPHIL NFR BLD AUTO: 0.5 % — SIGNIFICANT CHANGE UP (ref 0–8)
GLUCOSE SERPL-MCNC: 113 MG/DL — HIGH (ref 70–99)
GLUCOSE UR QL: NEGATIVE MG/DL — SIGNIFICANT CHANGE UP
HCT VFR BLD CALC: 38.2 % — SIGNIFICANT CHANGE UP (ref 37–47)
HGB BLD-MCNC: 12 G/DL — SIGNIFICANT CHANGE UP (ref 12–16)
IMM GRANULOCYTES NFR BLD AUTO: 0.3 % — SIGNIFICANT CHANGE UP (ref 0.1–0.3)
KETONES UR-MCNC: 15
LEUKOCYTE ESTERASE UR-ACNC: NEGATIVE — SIGNIFICANT CHANGE UP
LYMPHOCYTES # BLD AUTO: 0.75 K/UL — LOW (ref 1.2–3.4)
LYMPHOCYTES # BLD AUTO: 12.3 % — LOW (ref 20.5–51.1)
MAGNESIUM SERPL-MCNC: 2.1 MG/DL — SIGNIFICANT CHANGE UP (ref 1.8–2.4)
MCHC RBC-ENTMCNC: 25.6 PG — LOW (ref 27–31)
MCHC RBC-ENTMCNC: 31.4 G/DL — LOW (ref 32–37)
MCV RBC AUTO: 81.4 FL — SIGNIFICANT CHANGE UP (ref 81–99)
MONOCYTES # BLD AUTO: 0.46 K/UL — SIGNIFICANT CHANGE UP (ref 0.1–0.6)
MONOCYTES NFR BLD AUTO: 7.5 % — SIGNIFICANT CHANGE UP (ref 1.7–9.3)
NEUTROPHILS # BLD AUTO: 4.8 K/UL — SIGNIFICANT CHANGE UP (ref 1.4–6.5)
NEUTROPHILS NFR BLD AUTO: 78.6 % — HIGH (ref 42.2–75.2)
NITRITE UR-MCNC: NEGATIVE — SIGNIFICANT CHANGE UP
NRBC # BLD: 0 /100 WBCS — SIGNIFICANT CHANGE UP (ref 0–0)
PH UR: 7.5 — SIGNIFICANT CHANGE UP (ref 5–8)
PLATELET # BLD AUTO: 361 K/UL — SIGNIFICANT CHANGE UP (ref 130–400)
POTASSIUM SERPL-MCNC: 3.9 MMOL/L — SIGNIFICANT CHANGE UP (ref 3.5–5)
POTASSIUM SERPL-SCNC: 3.9 MMOL/L — SIGNIFICANT CHANGE UP (ref 3.5–5)
PROT SERPL-MCNC: 6.5 G/DL — SIGNIFICANT CHANGE UP (ref 6–8)
PROT UR-MCNC: NEGATIVE MG/DL — SIGNIFICANT CHANGE UP
RBC # BLD: 4.69 M/UL — SIGNIFICANT CHANGE UP (ref 4.2–5.4)
RBC # FLD: 15.7 % — HIGH (ref 11.5–14.5)
SARS-COV-2 RNA SPEC QL NAA+PROBE: SIGNIFICANT CHANGE UP
SODIUM SERPL-SCNC: 139 MMOL/L — SIGNIFICANT CHANGE UP (ref 135–146)
SP GR SPEC: 1.02 — SIGNIFICANT CHANGE UP (ref 1.01–1.03)
TROPONIN T SERPL-MCNC: <0.01 NG/ML — SIGNIFICANT CHANGE UP
UROBILINOGEN FLD QL: 0.2 MG/DL — SIGNIFICANT CHANGE UP (ref 0.2–0.2)
WBC # BLD: 6.11 K/UL — SIGNIFICANT CHANGE UP (ref 4.8–10.8)
WBC # FLD AUTO: 6.11 K/UL — SIGNIFICANT CHANGE UP (ref 4.8–10.8)

## 2021-08-26 PROCEDURE — 71045 X-RAY EXAM CHEST 1 VIEW: CPT | Mod: 26

## 2021-08-26 PROCEDURE — 93010 ELECTROCARDIOGRAM REPORT: CPT

## 2021-08-26 PROCEDURE — 99285 EMERGENCY DEPT VISIT HI MDM: CPT

## 2021-08-26 PROCEDURE — 70450 CT HEAD/BRAIN W/O DYE: CPT | Mod: 26,MA

## 2021-08-26 PROCEDURE — 99223 1ST HOSP IP/OBS HIGH 75: CPT

## 2021-08-26 RX ORDER — MECLIZINE HCL 12.5 MG
25 TABLET ORAL THREE TIMES A DAY
Refills: 0 | Status: DISCONTINUED | OUTPATIENT
Start: 2021-08-26 | End: 2021-08-26

## 2021-08-26 RX ORDER — KETOROLAC TROMETHAMINE 30 MG/ML
15 SYRINGE (ML) INJECTION ONCE
Refills: 0 | Status: DISCONTINUED | OUTPATIENT
Start: 2021-08-26 | End: 2021-08-26

## 2021-08-26 RX ORDER — ONDANSETRON 8 MG/1
4 TABLET, FILM COATED ORAL EVERY 8 HOURS
Refills: 0 | Status: DISCONTINUED | OUTPATIENT
Start: 2021-08-26 | End: 2021-08-30

## 2021-08-26 RX ORDER — PANTOPRAZOLE SODIUM 20 MG/1
40 TABLET, DELAYED RELEASE ORAL
Refills: 0 | Status: DISCONTINUED | OUTPATIENT
Start: 2021-08-26 | End: 2021-08-30

## 2021-08-26 RX ORDER — ENTACAPONE 200 MG/1
200 TABLET, FILM COATED ORAL
Refills: 0 | Status: DISCONTINUED | OUTPATIENT
Start: 2021-08-26 | End: 2021-08-30

## 2021-08-26 RX ORDER — ATORVASTATIN CALCIUM 80 MG/1
80 TABLET, FILM COATED ORAL AT BEDTIME
Refills: 0 | Status: DISCONTINUED | OUTPATIENT
Start: 2021-08-26 | End: 2021-08-30

## 2021-08-26 RX ORDER — SODIUM CHLORIDE 9 MG/ML
1000 INJECTION, SOLUTION INTRAVENOUS
Refills: 0 | Status: DISCONTINUED | OUTPATIENT
Start: 2021-08-26 | End: 2021-08-28

## 2021-08-26 RX ORDER — ACETAMINOPHEN 500 MG
650 TABLET ORAL EVERY 6 HOURS
Refills: 0 | Status: DISCONTINUED | OUTPATIENT
Start: 2021-08-26 | End: 2021-08-30

## 2021-08-26 RX ORDER — MECLIZINE HCL 12.5 MG
25 TABLET ORAL THREE TIMES A DAY
Refills: 0 | Status: DISCONTINUED | OUTPATIENT
Start: 2021-08-26 | End: 2021-08-30

## 2021-08-26 RX ORDER — CARBIDOPA AND LEVODOPA 25; 100 MG/1; MG/1
2 TABLET ORAL
Refills: 0 | Status: DISCONTINUED | OUTPATIENT
Start: 2021-08-26 | End: 2021-08-30

## 2021-08-26 RX ORDER — MECLIZINE HCL 12.5 MG
25 TABLET ORAL ONCE
Refills: 0 | Status: COMPLETED | OUTPATIENT
Start: 2021-08-26 | End: 2021-08-26

## 2021-08-26 RX ORDER — CARBIDOPA, LEVODOPA, AND ENTACAPONE 50; 200; 200 MG/1; MG/1; MG/1
1 TABLET, FILM COATED ORAL
Refills: 0 | Status: DISCONTINUED | OUTPATIENT
Start: 2021-08-26 | End: 2021-08-30

## 2021-08-26 RX ORDER — IBUPROFEN 200 MG
600 TABLET ORAL EVERY 6 HOURS
Refills: 0 | Status: DISCONTINUED | OUTPATIENT
Start: 2021-08-26 | End: 2021-08-30

## 2021-08-26 RX ORDER — OXYBUTYNIN CHLORIDE 5 MG
5 TABLET ORAL DAILY
Refills: 0 | Status: DISCONTINUED | OUTPATIENT
Start: 2021-08-26 | End: 2021-08-30

## 2021-08-26 RX ORDER — APIXABAN 2.5 MG/1
5 TABLET, FILM COATED ORAL
Refills: 0 | Status: DISCONTINUED | OUTPATIENT
Start: 2021-08-26 | End: 2021-08-30

## 2021-08-26 RX ORDER — DILTIAZEM HCL 120 MG
360 CAPSULE, EXT RELEASE 24 HR ORAL DAILY
Refills: 0 | Status: DISCONTINUED | OUTPATIENT
Start: 2021-08-26 | End: 2021-08-30

## 2021-08-26 RX ADMIN — ATORVASTATIN CALCIUM 80 MILLIGRAM(S): 80 TABLET, FILM COATED ORAL at 22:28

## 2021-08-26 RX ADMIN — SODIUM CHLORIDE 60 MILLILITER(S): 9 INJECTION, SOLUTION INTRAVENOUS at 22:23

## 2021-08-26 RX ADMIN — Medication 15 MILLIGRAM(S): at 13:41

## 2021-08-26 RX ADMIN — Medication 25 MILLIGRAM(S): at 10:57

## 2021-08-26 RX ADMIN — CARBIDOPA AND LEVODOPA 2 TABLET(S): 25; 100 TABLET ORAL at 22:29

## 2021-08-26 RX ADMIN — ENTACAPONE 200 MILLIGRAM(S): 200 TABLET, FILM COATED ORAL at 22:29

## 2021-08-26 NOTE — H&P ADULT - PROBLEM SELECTOR PLAN 1
Admit inpatient medicine  Meclizine 25 TID  OT consult  Monitor orthostatic BP  Fall Precautions  Repeat labs in am

## 2021-08-26 NOTE — H&P ADULT - NSHPLABSRESULTS_GEN_ALL_CORE
12.0   6.11  )-----------( 361      ( 26 Aug 2021 10:30 )             38.2           139  |  104  |  12  ----------------------------<  113<H>  3.9   |  26  |  0.7    Ca    9.4      26 Aug 2021 10:30  Mg     2.1         TPro  6.5  /  Alb  4.0  /  TBili  0.3  /  DBili  x   /  AST  9   /  ALT  <5  /  AlkPhos  88                Urinalysis Basic - ( 26 Aug 2021 10:45 )    Color: Yellow / Appearance: Clear / S.020 / pH: x  Gluc: x / Ketone: 15  / Bili: Negative / Urobili: 0.2 mg/dL   Blood: x / Protein: Negative mg/dL / Nitrite: Negative   Leuk Esterase: Negative / RBC: x / WBC x   Sq Epi: x / Non Sq Epi: x / Bacteria: x      Lactate Trend      CARDIAC MARKERS ( 26 Aug 2021 10:30 )  x     / <0.01 ng/mL / x     / x     / x            CAPILLARY BLOOD GLUCOSE        Culture Results:   >100,000 CFU/ml Escherichia coli ( @ 06:07)  Culture Results:   >100,000 CFU/ml Escherichia coli ESBL ( @ 16:40)    < from: CT Head No Cont (21 @ 09:41) >    IMPRESSION:  No significant change since recent head CT of 2021.    No acute intracranial pathology. No evidence of midline shift, mass effect or intracranial hemorrhage.    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    --- End of Report ---              MORGAN BELLAMY MD; Attending Radiologist  This document has been electronically signed. Aug 26 2021  9:59AM    < end of copied text >

## 2021-08-26 NOTE — ED PROVIDER NOTE - ATTENDING CONTRIBUTION TO CARE
88 yo F pmh of Afib on eliquis, CRAO, HTN, HLD, RA, Parkinson's disease presents with room spinning dizziness x 1 week. States that she has been having dizziness worse with movement. has been getting worse. + n/v with 2 episodes of vomiting today, non bilious, non bloody. no headache, no numbness, tingling or weakness. Feels unsteady when she walks. baseline uses walker. no abdominal pain. no fevers. no recent illness. pmd is Dr. Han.     CONSTITUTIONAL: Well-developed; well-nourished; in no acute distress.   SKIN: warm, dry  HEAD: Normocephalic; atraumatic.  EYES: PERRL, EOMI, no conjunctival erythema  ENT: No nasal discharge; airway clear.  NECK: Supple; non tender.  CARD: S1, S2 normal;  Regular rate and rhythm.   RESP: No wheezes, rales or rhonchi.  ABD: soft non tender, non distended, no rebound or guarding  EXT: Normal ROM.  5/5 strength in all 4 extremities   LYMPH: No acute cervical adenopathy.  NEURO: A&Ox3, CN 2-11 intact, moving all extremities, 5/5 strength, equal sensation bilaterally, normal finger to nose exam.   PSYCH: Cooperative, appropriate.

## 2021-08-26 NOTE — ED PROVIDER NOTE - OBJECTIVE STATEMENT
89y F Afib on eliquis, CRAO, HTN, HLD, RA, Parkinson's disease presents for eval of lightheadedness. Pt has worsening generalized weakness for the past couple weeks with associated dizziness, aggravated with ambulation, no relieving factors. Associated nausea. Denies fever, ha, change in vision, cp, sob, numbness, dysuria, hematuria, diarrhea

## 2021-08-26 NOTE — ED PROVIDER NOTE - CARE PLAN
Ms Garcia is a 77 yo woman with atrial fibrillation, diabetes, hypertension, who has been diagnosed on this hospital admission with metastatic biliary cancer.    1.  Goals of care:  The patient's daughter (Tari) would like for her mother to return home with follow-up in the Oncology Clinic to discuss what palliative treatment options might be available.  -- Primary team has placed referral to outpt Palliative Medicine visit with Dr Laura Richey at [facility], date to be coordinated with daughter.  -- I have placed a referral to [facility] to follow up with the patient at home for Home-Based Palliative Care. I have notified their office, and provided the patient's information and MRN; case discussed with representative. Dtr and pt are aware of the referral.    2.  Pain:  Over last 2 days has used oxycodone 5 mg x3 every 24 hours for abdominal pain. Possibly related to recent procedure, but may be chronic due to cancer. Recommend discharge with oral oxycodone PRN with follow up with Home-Based Palliative Care to determine if transition to long-acting regimen with breakthrough will be needed.   1 Principal Discharge DX:	Dizziness

## 2021-08-26 NOTE — PATIENT PROFILE ADULT - BILL PAYMENT
Pt axox3 c/o left sided head pain x 1 day. Pt denies recent fevers or injury to site. Pt has tbi from 2007. Pt motor and sensory skills intact. no

## 2021-08-26 NOTE — H&P ADULT - ATTENDING SUPERVISION STATEMENT
Request for amlodipine - med pended. Please fill if appropriate. Next Visit Date:  No future appointments.     Health Maintenance   Topic Date Due    Hepatitis B vaccine (1 of 3 - Risk 3-dose series) 03/25/1975    Hepatitis A vaccine (2 of 2 - Risk 2-dose series) 11/14/2019    Lipid screen  04/03/2020    Flu vaccine (1) 09/01/2020    Colon cancer screen colonoscopy  04/22/2025    DTaP/Tdap/Td vaccine (2 - Td) 04/11/2029    Shingles Vaccine  Completed    Pneumococcal 0-64 years Vaccine  Completed    Hepatitis C screen  Completed    HIV screen  Completed    Hib vaccine  Aged Out    Meningococcal (ACWY) vaccine  Aged Out       No results found for: LABA1C          ( goal A1C is < 7)   No results found for: LABMICR  LDL Cholesterol (mg/dL)   Date Value   04/03/2019 52       (goal LDL is <100)   AST (U/L)   Date Value   04/03/2019 58 (H)     ALT (U/L)   Date Value   04/03/2019 41     BUN (mg/dL)   Date Value   04/03/2019 10     BP Readings from Last 3 Encounters:   06/16/20 130/76   04/02/20 139/83   09/04/19 135/85          (goal 120/80)    All Future Testing planned in [facility]  Lab Frequency Next Occurrence   CBC Auto Differential Once 09/04/2019   Comprehensive Metabolic Panel Once 68/65/6176   Ethanol Once 09/04/2019   Hepatitis A Antibody, Total Once 09/04/2019   Hepatitis B Surface Antibody Once 09/04/2019   Hepatitis C Antibody Once 09/04/2019   Hepatitis C RNA, quantitative, PCR Once 09/04/2019   Liver Fibrosis, Chronic Viral Hepatitis Once 09/04/2019   Urine Drug Screen Once 09/04/2019   HIV Screen Once 09/04/2019   Hepatitis A Antibody, Total Once 12/05/2019   Hepatitis B Core Antibody, Total Once 12/05/2019   Hepatitis B Surface Antibody Once 12/05/2019   Hepatitis B Surface Antigen Once 12/05/2019   LALITO Once 12/05/2019   Anti-smooth muscle antibody Once 12/05/2019   Antimitochondrial antibody Once 12/05/2019   Alpha-1-Antitrypsin w Phenotype Once 12/05/2019   Protime-INR Once 12/05/2019   IgM Once 12/03/2019   IgG Once 12/03/2019   Iron Profile Once 12/03/2019   Ferritin Once 12/03/2019   Liver Fibrosis, Chronic Viral Hepatitis Once 09/04/2019   H.  Pylori Breath Test Once 09/04/2019   Surgical Pathology Once 06/16/2020         Patient Active Problem List:     Hx of tuberculosis     Alcohol induced acute pancreatitis     HTN (hypertension)     Alcohol abuse     Tobacco abuse     Hypertriglyceridemia     Macrocytic anemia     Cirrhosis, alcoholic (HCC)     PAD (peripheral artery disease) ([facility] [address]     Diverticulosis     Internal hemorrhoids     Hypercholesteremia     Amputation finger     Claudication of left lower extremity ([address] ACP

## 2021-08-26 NOTE — H&P ADULT - ATTENDING COMMENTS
Patient seen and examined. Plan of care d/w ACP, patient and family    Patient presenting with vertigo and episode of nausea and vomiting 2 days before and another episode today. In between patient was apparently feeling okay  denies any dysarthria, dysphagia , extremity weakness or paresthesia  denies any urinary complaints    O/e: no cerebellar signs  ROM of LE limited secondary to arthritic pain  no nystagmus    A&P    # BPPV  meclizine PRN, zofran PRN  OT consult, PT consult  check orthostatic vitals  patient denies any urinary complaints or other symptoms of infection    # h/o A fib; on loop recorder now  # currently EKG showing sinus with PVC    Discharge plan in AM

## 2021-08-26 NOTE — ED PROVIDER NOTE - CLINICAL SUMMARY MEDICAL DECISION MAKING FREE TEXT BOX
Patient presents with 1 week of dizziness and weakness. Patient was in the ED on the 20th. Symptoms have persisted an no longer feels comfortable ambulating. labs, ekg, cxr, ct done. no acute findings. patient admitted for further management.

## 2021-08-26 NOTE — ED PROVIDER NOTE - NS ED ROS FT
Constitutional: (+) weakness, (-) fever  Eyes/ENT: (-) blurry vision, (-) epistaxis  Cardiovascular: (-) chest pain, (-) syncope  Respiratory: (-) cough, (-) shortness of breath  Gastrointestinal: (+) nausea, (-) vomiting, (-) diarrhea  : (-) dysuria, (-) hematuria  Musculoskeletal: (-) neck pain, (-) back pain, (-) joint pain  Integumentary: (-) rash, (-) edema  Neurological: (+) dizzy, (-) headache, (-) altered mental status  Allergic/Immunologic: (-) pruritus

## 2021-08-26 NOTE — H&P ADULT - NSHPREVIEWOFSYSTEMS_GEN_ALL_CORE
REVIEW OF SYSTEMS:    CONSTITUTIONAL: see HPI  EYES/ENT: see HPI  NECK: No pain or stiffness  RESPIRATORY: No cough, wheezing, hemoptysis; No shortness of breath  CARDIOVASCULAR: No chest pain or palpitations  GASTROINTESTINAL: No abdominal or epigastric pain. No nausea, vomiting, or hematemesis; No diarrhea or constipation. No melena or hematochezia.  GENITOURINARY: No dysuria, frequency or hematuria  NEUROLOGICAL: No numbness or weakness  SKIN: No itching, rashes

## 2021-08-26 NOTE — H&P ADULT - ASSESSMENT
Patient is a 89y/o Female w/ PMHX of Afib on eliquis, CRAO, HTN, HLD, RA, Parkinson's disease presents for dizziness and vomiting with prior hx of vertigo from last admission.

## 2021-08-26 NOTE — H&P ADULT - NSHPPHYSICALEXAM_GEN_ALL_CORE
GENERAL:  89y/o Female NAD, resting comfortably.  HEAD:  Atraumatic, Normocephalic  EYES: EOMI, PERRLA, conjunctiva and sclera clear  NECK: Supple, No JVD, no cervical lymphadenopathy, non-tender  CHEST/LUNG: Clear to auscultation bilaterally; No wheeze, rhonchi, or rales  HEART: Regular rate and rhythm; S1&S2  ABDOMEN: Soft, Nontender, Nondistended x 4 quadrants; Bowel sounds present  EXTREMITIES:   Peripheral Pulses Present, No clubbing, no cyanosis, or no edema, no calf tenderness  PSYCH: AAOx3, cooperative, appropriate  NEUROLOGY: WNL - no focal defecits  SKIN: WNL

## 2021-08-26 NOTE — H&P ADULT - HISTORY OF PRESENT ILLNESS
89y Female w/ PMHX of Afib on eliquis, CRAO, HTN, HLD, RA, Parkinson's disease presents for eval of lightheadedness. Pt has worsening generalized weakness for the past two days with associated dizziness and vomiting. Pt states become significantly worse with standing. Pt describes dizziness as room spinning and sensation of being pulled down- worsening enough to induce vomiting.. Associated nausea. Denies fever, ha, change in vision, cp, sob, numbness, dysuria, hematuria, diarrhea  Pt was recently admitted two weeks ago for similar condition w/ associated UTI.  Pt states meclizine oral tablet was working until a few days ago

## 2021-08-27 LAB
ANION GAP SERPL CALC-SCNC: 12 MMOL/L — SIGNIFICANT CHANGE UP (ref 7–14)
BUN SERPL-MCNC: 19 MG/DL — SIGNIFICANT CHANGE UP (ref 10–20)
CALCIUM SERPL-MCNC: 8.9 MG/DL — SIGNIFICANT CHANGE UP (ref 8.5–10.1)
CHLORIDE SERPL-SCNC: 106 MMOL/L — SIGNIFICANT CHANGE UP (ref 98–110)
CO2 SERPL-SCNC: 25 MMOL/L — SIGNIFICANT CHANGE UP (ref 17–32)
COVID-19 SPIKE DOMAIN AB INTERP: NEGATIVE — SIGNIFICANT CHANGE UP
COVID-19 SPIKE DOMAIN ANTIBODY RESULT: 0.4 U/ML — SIGNIFICANT CHANGE UP
CREAT SERPL-MCNC: 0.8 MG/DL — SIGNIFICANT CHANGE UP (ref 0.7–1.5)
GLUCOSE SERPL-MCNC: 90 MG/DL — SIGNIFICANT CHANGE UP (ref 70–99)
HCT VFR BLD CALC: 36.6 % — LOW (ref 37–47)
HGB BLD-MCNC: 11 G/DL — LOW (ref 12–16)
MCHC RBC-ENTMCNC: 25.3 PG — LOW (ref 27–31)
MCHC RBC-ENTMCNC: 30.1 G/DL — LOW (ref 32–37)
MCV RBC AUTO: 84.3 FL — SIGNIFICANT CHANGE UP (ref 81–99)
NRBC # BLD: 0 /100 WBCS — SIGNIFICANT CHANGE UP (ref 0–0)
PLATELET # BLD AUTO: 365 K/UL — SIGNIFICANT CHANGE UP (ref 130–400)
POTASSIUM SERPL-MCNC: 4.2 MMOL/L — SIGNIFICANT CHANGE UP (ref 3.5–5)
POTASSIUM SERPL-SCNC: 4.2 MMOL/L — SIGNIFICANT CHANGE UP (ref 3.5–5)
RBC # BLD: 4.34 M/UL — SIGNIFICANT CHANGE UP (ref 4.2–5.4)
RBC # FLD: 15.9 % — HIGH (ref 11.5–14.5)
SARS-COV-2 IGG+IGM SERPL QL IA: 0.4 U/ML — SIGNIFICANT CHANGE UP
SARS-COV-2 IGG+IGM SERPL QL IA: NEGATIVE — SIGNIFICANT CHANGE UP
SODIUM SERPL-SCNC: 143 MMOL/L — SIGNIFICANT CHANGE UP (ref 135–146)
WBC # BLD: 4.94 K/UL — SIGNIFICANT CHANGE UP (ref 4.8–10.8)
WBC # FLD AUTO: 4.94 K/UL — SIGNIFICANT CHANGE UP (ref 4.8–10.8)

## 2021-08-27 PROCEDURE — 99233 SBSQ HOSP IP/OBS HIGH 50: CPT

## 2021-08-27 RX ADMIN — Medication 360 MILLIGRAM(S): at 05:27

## 2021-08-27 RX ADMIN — APIXABAN 5 MILLIGRAM(S): 2.5 TABLET, FILM COATED ORAL at 17:44

## 2021-08-27 RX ADMIN — Medication 5 MILLIGRAM(S): at 11:59

## 2021-08-27 RX ADMIN — PANTOPRAZOLE SODIUM 40 MILLIGRAM(S): 20 TABLET, DELAYED RELEASE ORAL at 05:26

## 2021-08-27 RX ADMIN — ATORVASTATIN CALCIUM 80 MILLIGRAM(S): 80 TABLET, FILM COATED ORAL at 22:03

## 2021-08-27 RX ADMIN — CARBIDOPA AND LEVODOPA 2 TABLET(S): 25; 100 TABLET ORAL at 17:44

## 2021-08-27 RX ADMIN — CARBIDOPA AND LEVODOPA 2 TABLET(S): 25; 100 TABLET ORAL at 05:27

## 2021-08-27 RX ADMIN — APIXABAN 5 MILLIGRAM(S): 2.5 TABLET, FILM COATED ORAL at 05:26

## 2021-08-27 RX ADMIN — CARBIDOPA AND LEVODOPA 2 TABLET(S): 25; 100 TABLET ORAL at 11:59

## 2021-08-27 RX ADMIN — ENTACAPONE 200 MILLIGRAM(S): 200 TABLET, FILM COATED ORAL at 05:27

## 2021-08-27 RX ADMIN — ENTACAPONE 200 MILLIGRAM(S): 200 TABLET, FILM COATED ORAL at 17:45

## 2021-08-27 RX ADMIN — ENTACAPONE 200 MILLIGRAM(S): 200 TABLET, FILM COATED ORAL at 11:59

## 2021-08-27 NOTE — PHYSICAL THERAPY INITIAL EVALUATION ADULT - MODALITIES TREATMENT COMMENTS
No Room spinning dizziness noted during tx session, with R upward rotation of head dizziness noted that resolved but without nystagmus. Dr. Michi tavarez.

## 2021-08-27 NOTE — PHYSICAL THERAPY INITIAL EVALUATION ADULT - GAIT DEVIATIONS NOTED, PT EVAL
forward flexed trunk, dec heel strike and push off, ambulating on flexed knees - plantarflexed/decreased glenn/decreased step length/decreased stride length/decreased weight-shifting ability

## 2021-08-27 NOTE — OCCUPATIONAL THERAPY INITIAL EVALUATION ADULT - GENERAL OBSERVATIONS, REHAB EVAL
13:00-13:20 chart reviewed, ok to treat by Occupational Therapist as confirmed by RN, Pt received seated in bedside chair, +IV in NAD.

## 2021-08-27 NOTE — PHYSICAL THERAPY INITIAL EVALUATION ADULT - ADDITIONAL COMMENTS
Pt reports she lives in a private house with her daughter - 12 steps with rail inside that she "crawls up" with assistance. Pt ambulates with RW at baseline - also has W/C. Pt states social concerns that her daughter is being evicted.

## 2021-08-27 NOTE — OCCUPATIONAL THERAPY INITIAL EVALUATION ADULT - BED MOBILITY/TRANSFERS, PREVIOUS LEVEL OF FUNCTION, OT EVAL
[facility] Hospitalist Progress Note      Subjective  Pt resting comfortably on 1l NC    Objective    Current Meds  Current Facility-Administered Medications   Medication Dose Route Frequency Provider Last Rate Last Admin   • potassium CHLORIDE (KLOR-CON M) russell ER tablet 40 mEq  40 mEq Oral Daily with breakfast Shashank Boss DO   40 mEq at 01/10/21 1000   • traMADol (ULTRAM) tablet 50 mg  50 mg Oral Q6H PRN Brinda Mckinley MD       • HYDROcodone-acetaminophen (NORCO) 5-325 MG per tablet 1 tablet  1 tablet Oral Q4H PRN Brinda Mckinley MD       • clopidogrel (PLAVIX) tablet 75 mg  75 mg Oral Daily Tristin Ba DO   75 mg at 01/10/21 1000   • cefepime (MAXIPIME) 1,000 mg in sodium chloride 0.9 % 100 mL IVPB  1,000 mg Intravenous Q24H Shashank Boss DO   Stopped at 01/09/21 1912   • sodium chloride 0.9 % flush bag 25 mL  25 mL Intravenous PRN Shashank Boss DO       • sodium chloride (PF) 0.9 % injection 2 mL  2 mL Intracatheter 2 times per day Shashank Bsos DO   2 mL at 01/10/21 1000   • sodium chloride (NORMAL SALINE) 0.9 % bolus 500 mL  500 mL Intravenous PRN Shashank Bsos DO       • heparin (porcine) injection 5,000 Units  5,000 Units Subcutaneous 3 times per day Shashank Boss DO   5,000 Units at 01/10/21 0519   • albuterol inhaler 2 puff  2 puff Inhalation Q4H Resp PRN Shashank Boss DO       • aspirin (ECOTRIN) enteric coated tablet 81 mg  81 mg Oral Daily Shashank Boss DO   81 mg at 01/10/21 1000   • atorvastatin (LIPITOR) tablet 40 mg  40 mg Oral Daily Shashank Boss DO   40 mg at 01/10/21 1000   • carvedilol (COREG) tablet 12.5 mg  12.5 mg Oral BID  Shashank Boss DO   12.5 mg at 01/10/21 1000   • furosemide (LASIX) tablet 40 mg  40 mg Oral BID Shashank Boss DO   40 mg at 01/10/21 1000   • hydrALAZINE (APRESOLINE) tablet 50 mg  50 mg Oral TID Shashank Boss DO   50 mg at 01/10/21 1000   • insulin glargine (LANTUS) injection 34 Units  34 Units Subcutaneous Daily Shashank Boss DO   34 Units at  01/10/21 1000   • isosorbide mononitrate (IMDUR) ER tablet 30 mg  30 mg Oral Daily Shashank Boss DO   30 mg at 01/10/21 1000   • levothyroxine (SYNTHROID, LEVOTHROID) tablet 112 mcg  112 mcg Oral QAM  Shashank Boss DO   112 mcg at 01/10/21 0519   • metOLazone (ZAROXOLYN) tablet 5 mg  5 mg Oral Daily Shashank Boss, DO   5 mg at 01/10/21 1000   • pantoprazole (PROTONIX) EC tablet 40 mg  40 mg Oral QAM  Shashank Boss DO   40 mg at 01/10/21 0519   • dextrose 50 % injection 25 g  25 g Intravenous PRN Shashank Boss DO       • dextrose 50 % injection 12.5 g  12.5 g Intravenous PRN Shashank Boss DO   12.5 g at 01/09/21 0635   • glucagon (GLUCAGEN) injection 1 mg  1 mg Intramuscular PRN Shashank Boss DO       • dextrose (GLUTOSE) 40 % gel 15 g  15 g Oral PRN Shashank Boss DO       • dextrose (GLUTOSE) 40 % gel 30 g  30 g Oral PRN Shashank Boss DO       • insulin lispro (HumaLOG) scheduled dose AND correction dose   Subcutaneous TID  Shashank Boss DO   1 Units at 01/09/21 1852   • acetaminophen (TYLENOL) tablet 650 mg  650 mg Oral Q4H PRN Tyron Scherer MD   650 mg at 01/10/21 0621        I/O's    Intake/Output Summary (Last 24 hours) at 1/10/2021 1100  Last data filed at 1/9/2021 1859  Gross per 24 hour   Intake 740.08 ml   Output --   Net 740.08 ml       Last Recorded Vitals  Vitals with min/max:    Vital Last Value 24 Hour Range   Temperature 98.4 °F (36.9 °C) (01/10/21 0830) Temp  Min: 97.9 °F (36.6 °C)  Max: 98.4 °F (36.9 °C)   Pulse 62 (01/10/21 1050) Pulse  Min: 62  Max: 75   Respiratory 18 (01/10/21 0830) Resp  Min: 16  Max: 18   Non-Invasive  Blood Pressure 137/66 (01/10/21 1050) BP  Min: 120/57  Max: 182/81   Pulse Oximetry 100 % (01/10/21 1050) SpO2  Min: 98 %  Max: 100 %   Arterial   Blood Pressure   No data recorded        Body mass index is 31.47 kg/m².    Physical Exam   GENERAL:  In no apparent distress  CHEST:  Chest with clear breath sounds bilaterally.   CARDIAC:  Regular rate and rhythm.   S1 and S2  VASCULAR:  No Edema.  Peripheral pulses normal and equal in all extremities  ABDOMEN:  Soft, without detectable tenderness.  No sign of distention.  No   rebound or guarding, and no masses palpated.  LLE  Left hallux gangrene  PSYCH:  no depression or anxiety     Labs   .  Recent Labs   Lab 01/10/21  0455 01/09/21  0446 01/08/21  1754   SODIUM 143 144 140   CHLORIDE 106 106 102   CO2 30 32 29   BUN 54* 50* 53*   CREATININE 2.87* 2.73* 2.88*   CALCIUM 8.2* 8.2* 8.6   ALBUMIN  --   --  3.0*   BILIRUBIN  --   --  0.3   ALKPT  --   --  97   GPT  --   --  21   AST  --   --  22   GLUCOSE 92 81 109*    .    Intake/Output Summary (Last 24 hours) at 1/10/2021 1100  Last data filed at 1/9/2021 1859  Gross per 24 hour   Intake 740.08 ml   Output --   Net 740.08 ml      WBC (K/mcL)   Date Value   01/10/2021 9.0   12/19/2019 6.4     RBC (mil/mcL)   Date Value   01/10/2021 3.80 (L)   12/19/2019 3.96 (L)     HCT (%)   Date Value   01/10/2021 33.3 (L)   12/19/2019 34.8 (L)     HGB (g/dL)   Date Value   01/10/2021 10.2 (L)   12/19/2019 10.7 (L)     PLT (K/mcL)   Date Value   01/10/2021 234   12/19/2019 217       Imaging  Xr Chest Pa Or Ap 1 View    Result Date: 1/8/2021  EXAM: XR CHEST PA OR AP 1 VIEW CLINICAL INDICATION: Shortness of breath COMPARISON: Chest radiograph 12/11/2019. FINDINGS: Interval placement of dual-lead ICD device with leads terminating overlying the region of the right ventricle and the right atrium.  Sternotomy changes are noted.  The heart is enlarged.  Aortic arch calcifications.  Interstitial opacities within the right lung and prominent vascular markings are along the right heart border suggestive for pulmonary congestion, or potentially a focal pneumonia.  No pleural effusion.  No pneumothorax.     Findings suggestive of mild pulmonary edema, though difficult to entirely exclude a right middle lobe pneumonia. Dictated by: SESRA ANA Dictated on: 1/8/2021 6:15 PM RAMOS MORTON M.D.,  have reviewed the images and report and concur with these findings interpreted by SESAR PEREZ. Electronically Signed by: RAMOS DICKERSON M.D. Signed on: 1/8/2021 6:43 PM     Xr Foot Min 3 Views Left    Result Date: 1/8/2021  EXAM: XR TIBIA FIBULA 2 VIEWS LEFT, XR FOOT MIN 3 VIEWS LEFT CLINICAL INDICATION: Dry gangrene, cellulitis COMPARISON: None. FINDINGS:  Left tibia/fibula: No no definite air is present within the soft tissue.  No osseous erosion or aggressive periosteal reaction.  Left foot: No air within the soft tissue.  No visible osseous erosion.  Please note on the frontal view, overlap of the 2nd digit is seen with the lesser digits.      No visible osteomyelitis or subcutaneous emphysema. Electronically Signed by: SOLANGE CLARK MD Signed on: 1/8/2021 6:18 PM     Us Vasc Extremity Lower Venous Duplex    Result Date: 1/8/2021  EXAM: US VASC EXTREMITY LOWER VENOUS DUPLEX LEFT CLINICAL INDICATION: Pain, edema COMPARISON: None. TECHNIQUE: Real-time grayscale, color flow and duplex Doppler sonographic evaluation of the left lower extremity deep venous system was performed, including ipsilateral evaluation of the common femoral, femoral, and popliteal veins, the origin of the greater saphenous vein, and the visible portions of the calf veins. The contralateral common femoral vein was also imaged.     FINDINGS/IMPRESSION: No evidence for left lower extremity DVT. Electronically Signed by: RAMOS DICKERSON M.D. Signed on: 1/8/2021 4:19 PM     Xr Tibia Fibula 4 Views Left    Result Date: 1/8/2021  EXAM: XR TIBIA FIBULA 2 VIEWS LEFT, XR FOOT MIN 3 VIEWS LEFT CLINICAL INDICATION: Dry gangrene, cellulitis COMPARISON: None. FINDINGS:  Left tibia/fibula: No no definite air is present within the soft tissue.  No osseous erosion or aggressive periosteal reaction.  Left foot: No air within the soft tissue.  No visible osseous erosion.  Please note on the frontal view, overlap of the 2nd digit is seen with the lesser  digits.      No visible osteomyelitis or subcutaneous emphysema. Electronically Signed by: SOLANGE CLARK MD Signed on: 1/8/2021 6:18 PM     Us [facility] Lower Extremity Arteries Duplex Bilateral    Result Date: 1/9/2021  EXAM: US [facility] LOWER EXTREMITY ARTERIES DUPLEX BILATERAL CLINICAL INDICATION: Peripheral arterial disease.  Left great toe wound.  History of left common femoral- PT trunk bypass graft. COMPARISON: Arterial duplex of the left lower extremity 05/01/2016. FINDINGS: RIGHT:  Common femoral artery: Monophasic flow with peak systolic velocity of 387.4 cm/sec.  Decreased caliber of the right common femoral artery although visualization is difficult. Profunda femoris: Monophasic flow with a peak systolic velocity of 290.5 cm/s. Superficial femoral artery: Biphasic flow with peak systolic velocity of 20.9 cm/sec proximally.  Echogenic material within the mid to distal right femoral artery without demonstrable color Doppler flow or waveform. Popliteal artery: Monophasic flow with peak systolic velocity of 34.2 cm/sec. Posterior tibial artery: Monophasic flow with peak systolic velocity of 42.7 cm/sec Peroneal artery: No color Doppler flow seen in the right peroneal artery. Anterior tibial artery: Monophasic flow with peak systolic velocity of 42.2 cm/sec Dorsalis pedis artery: Monophasic flow with peak systolic velocity of 62.9 cm/sec LEFT: Common femoral artery: Monophasic flow with peak systolic velocity of 118.8 cm/sec. The left common femoral-peroneal tibial trunk bypass graft is occluded. Posterior tibial artery: Monophasic waveforms with peak systolic velocity of 15.0 cm/s. Peroneal artery: No color Doppler flow or waveform. Anterior tibial artery: No color Doppler flow or waveform. Dorsalis pedis artery: No color Doppler flow or waveforms.     1.   The left common femoral-peroneal tibial trunk bypass graft is completely occluded.  Reconstitution of the left posterior tibial artery with monophasic waveforms  and low velocity flow. 2.   The mid to distal right superficial femoral artery is occluded.  Reconstitution of the right below-knee arteries with abnormal monophasic waveforms and low velocity flow.  No flow visualized in the right peroneal artery. 3.   Monophasic waveforms with increased velocity in the right common femoral and profunda femoris arteries with likely decreased caliber of the common femoral artery, suggestive of stenosis. Dr. Lawrence notified of findings via [employer] text message by Dr. Bardales with read receipt at 11:06 AM on 01/09/2021. Dictated by: TYRESE BARDALES MD Dictated on: 1/9/2021 10:45 AM I, NICK CLARK M.D., have reviewed the images and report and concur with these findings interpreted by TYRESE BARDALES MD. Electronically Signed by: NICK CLARK M.D. Signed on: 1/9/2021 11:17 AM          Assessment/Plan    71 years old lady with a past medical history of chronic systolic heart failure, CAD s/p CABG, hypertensive heart disease, paroxysmal atrial fibrillation on anticoagulation, PAD, dyslipidemia, GERD, hyperthyroidism s/p total thyroidectomy, CKD stage IV, and diabetes type 2 on insulin.  Who presented to the emergency room complaining of left foot swelling and pain has been going on for about 2 weeks.      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  -Left lower extremity Doppler negative for DVT.  -Elevated ESR and CRP.  -X-ray of the left foot, and leg without evidence of osteomyelitis.  -Given patient has leukocytosis, will start the patient on vancomycin and cefepime.  - vascular rec: recommend arterial duplex to further evaluate  - may benefit from angiogram, will discuss timing   - recommend improvement in leukocytosis prior to angiography  -Pain control  - CO2 angio w/ poss intervention   - cards recs hold eliquis x 48hrs prior to surg  - gave 300mg plavix then 75mg dialt  And when AC resume will DC asa and cont plavix        #Peripheral vascular disease  -S/p left leg  revascularization.  -Vascular study pending.  -Vascular surgery on board appreciate recommendation.  -Resume aspirin and statin     #Chronic systolic heart failure/hypertensive heart disease w/ CKD IV  -Continue aspirin, atorvastatin, hydralazine, and Coreg  -Continue Lasix     #CKD stage IV  - creat 2.73>>3.87  -Creatinine around baseline  -We will continue to monitor and consult nephro     #Paroxysmal atrial fibrillation  -Rate control  -Continue Coreg,        #Dyslipidemia  -Continue atorvastatin     #Diabetes type 2 with diabetic nephropathy  -We will start the patient on insulin sliding scale     #History of hyperthyroidism s/p resection  -Currently on Synthroid     #GERD  -Resume PPI     #Smoking addiction  -Patient currently smoke half a pack a day            Code Status  Code Status Information     Code Status    Full Resuscitation           PCP  MD Clifton Haney MD  1/10/2021 11:00 AM         independent

## 2021-08-27 NOTE — OCCUPATIONAL THERAPY INITIAL EVALUATION ADULT - HOME MANAGEMENT SKILLS, PREVIOUS LEVEL OF FUNCTION, OT EVAL
03/01/18 1450   Discharge Reassessment   Assessment Type Discharge Planning Reassessment   Discharge plan remains the same: Yes   Discharge Plan A Home with family;[facility];WIC   Discharge Plan B Home with family;[facility]     Brooks Glover LMSW  NICU   Phone [phone] Ext. [phone]  [email or web address]   needed assist

## 2021-08-27 NOTE — PHYSICAL THERAPY INITIAL EVALUATION ADULT - GENERAL OBSERVATIONS, REHAB EVAL
Pt encountered sitting in chair bedside, NAD, +IV, +primafit, ok to be seen by PT as confirmed by RN and pt agreeable. +chart reviewed

## 2021-08-28 LAB
ANION GAP SERPL CALC-SCNC: 13 MMOL/L — SIGNIFICANT CHANGE UP (ref 7–14)
APPEARANCE UR: CLEAR — SIGNIFICANT CHANGE UP
BILIRUB UR-MCNC: NEGATIVE — SIGNIFICANT CHANGE UP
BUN SERPL-MCNC: 20 MG/DL — SIGNIFICANT CHANGE UP (ref 10–20)
CALCIUM SERPL-MCNC: 9.2 MG/DL — SIGNIFICANT CHANGE UP (ref 8.5–10.1)
CHLORIDE SERPL-SCNC: 103 MMOL/L — SIGNIFICANT CHANGE UP (ref 98–110)
CO2 SERPL-SCNC: 18 MMOL/L — SIGNIFICANT CHANGE UP (ref 17–32)
COLOR SPEC: YELLOW — SIGNIFICANT CHANGE UP
CREAT SERPL-MCNC: 1.1 MG/DL — SIGNIFICANT CHANGE UP (ref 0.7–1.5)
CULTURE RESULTS: SIGNIFICANT CHANGE UP
DIFF PNL FLD: NEGATIVE — SIGNIFICANT CHANGE UP
GLUCOSE SERPL-MCNC: 92 MG/DL — SIGNIFICANT CHANGE UP (ref 70–99)
GLUCOSE UR QL: NEGATIVE MG/DL — SIGNIFICANT CHANGE UP
HCT VFR BLD CALC: 39.5 % — SIGNIFICANT CHANGE UP (ref 37–47)
HGB BLD-MCNC: 11.7 G/DL — LOW (ref 12–16)
KETONES UR-MCNC: ABNORMAL
LEUKOCYTE ESTERASE UR-ACNC: ABNORMAL
MCHC RBC-ENTMCNC: 25.3 PG — LOW (ref 27–31)
MCHC RBC-ENTMCNC: 29.6 G/DL — LOW (ref 32–37)
MCV RBC AUTO: 85.3 FL — SIGNIFICANT CHANGE UP (ref 81–99)
NITRITE UR-MCNC: POSITIVE
NRBC # BLD: 0 /100 WBCS — SIGNIFICANT CHANGE UP (ref 0–0)
PH UR: 5.5 — SIGNIFICANT CHANGE UP (ref 5–8)
PLATELET # BLD AUTO: 337 K/UL — SIGNIFICANT CHANGE UP (ref 130–400)
POTASSIUM SERPL-MCNC: 5.5 MMOL/L — HIGH (ref 3.5–5)
POTASSIUM SERPL-SCNC: 5.5 MMOL/L — HIGH (ref 3.5–5)
PROT UR-MCNC: NEGATIVE MG/DL — SIGNIFICANT CHANGE UP
RBC # BLD: 4.63 M/UL — SIGNIFICANT CHANGE UP (ref 4.2–5.4)
RBC # FLD: 16.1 % — HIGH (ref 11.5–14.5)
SODIUM SERPL-SCNC: 134 MMOL/L — LOW (ref 135–146)
SP GR SPEC: 1.02 — SIGNIFICANT CHANGE UP (ref 1.01–1.03)
SPECIMEN SOURCE: SIGNIFICANT CHANGE UP
UROBILINOGEN FLD QL: 0.2 MG/DL — SIGNIFICANT CHANGE UP (ref 0.2–0.2)
WBC # BLD: 5.65 K/UL — SIGNIFICANT CHANGE UP (ref 4.8–10.8)
WBC # FLD AUTO: 5.65 K/UL — SIGNIFICANT CHANGE UP (ref 4.8–10.8)

## 2021-08-28 PROCEDURE — 99232 SBSQ HOSP IP/OBS MODERATE 35: CPT

## 2021-08-28 RX ORDER — HYDROXYZINE HCL 10 MG
25 TABLET ORAL AT BEDTIME
Refills: 0 | Status: DISCONTINUED | OUTPATIENT
Start: 2021-08-28 | End: 2021-08-29

## 2021-08-28 RX ORDER — SODIUM CHLORIDE 9 MG/ML
1000 INJECTION INTRAMUSCULAR; INTRAVENOUS; SUBCUTANEOUS
Refills: 0 | Status: DISCONTINUED | OUTPATIENT
Start: 2021-08-28 | End: 2021-08-29

## 2021-08-28 RX ADMIN — Medication 360 MILLIGRAM(S): at 05:35

## 2021-08-28 RX ADMIN — CARBIDOPA AND LEVODOPA 2 TABLET(S): 25; 100 TABLET ORAL at 17:10

## 2021-08-28 RX ADMIN — Medication 650 MILLIGRAM(S): at 17:10

## 2021-08-28 RX ADMIN — APIXABAN 5 MILLIGRAM(S): 2.5 TABLET, FILM COATED ORAL at 05:35

## 2021-08-28 RX ADMIN — ENTACAPONE 200 MILLIGRAM(S): 200 TABLET, FILM COATED ORAL at 11:26

## 2021-08-28 RX ADMIN — APIXABAN 5 MILLIGRAM(S): 2.5 TABLET, FILM COATED ORAL at 17:09

## 2021-08-28 RX ADMIN — SODIUM CHLORIDE 75 MILLILITER(S): 9 INJECTION INTRAMUSCULAR; INTRAVENOUS; SUBCUTANEOUS at 17:58

## 2021-08-28 RX ADMIN — ATORVASTATIN CALCIUM 80 MILLIGRAM(S): 80 TABLET, FILM COATED ORAL at 21:28

## 2021-08-28 RX ADMIN — Medication 5 MILLIGRAM(S): at 11:26

## 2021-08-28 RX ADMIN — ENTACAPONE 200 MILLIGRAM(S): 200 TABLET, FILM COATED ORAL at 01:02

## 2021-08-28 RX ADMIN — Medication 25 MILLIGRAM(S): at 21:27

## 2021-08-28 RX ADMIN — PANTOPRAZOLE SODIUM 40 MILLIGRAM(S): 20 TABLET, DELAYED RELEASE ORAL at 05:35

## 2021-08-28 RX ADMIN — ENTACAPONE 200 MILLIGRAM(S): 200 TABLET, FILM COATED ORAL at 17:09

## 2021-08-28 RX ADMIN — CARBIDOPA AND LEVODOPA 2 TABLET(S): 25; 100 TABLET ORAL at 01:02

## 2021-08-28 RX ADMIN — CARBIDOPA AND LEVODOPA 2 TABLET(S): 25; 100 TABLET ORAL at 11:26

## 2021-08-28 RX ADMIN — CARBIDOPA AND LEVODOPA 2 TABLET(S): 25; 100 TABLET ORAL at 05:35

## 2021-08-28 RX ADMIN — ENTACAPONE 200 MILLIGRAM(S): 200 TABLET, FILM COATED ORAL at 05:35

## 2021-08-29 LAB
ANION GAP SERPL CALC-SCNC: 8 MMOL/L — SIGNIFICANT CHANGE UP (ref 7–14)
BUN SERPL-MCNC: 20 MG/DL — SIGNIFICANT CHANGE UP (ref 10–20)
CALCIUM SERPL-MCNC: 9 MG/DL — SIGNIFICANT CHANGE UP (ref 8.5–10.1)
CHLORIDE SERPL-SCNC: 105 MMOL/L — SIGNIFICANT CHANGE UP (ref 98–110)
CO2 SERPL-SCNC: 26 MMOL/L — SIGNIFICANT CHANGE UP (ref 17–32)
CREAT SERPL-MCNC: 0.9 MG/DL — SIGNIFICANT CHANGE UP (ref 0.7–1.5)
GLUCOSE SERPL-MCNC: 86 MG/DL — SIGNIFICANT CHANGE UP (ref 70–99)
HCT VFR BLD CALC: 34.8 % — LOW (ref 37–47)
HGB BLD-MCNC: 10.7 G/DL — LOW (ref 12–16)
MCHC RBC-ENTMCNC: 25.6 PG — LOW (ref 27–31)
MCHC RBC-ENTMCNC: 30.7 G/DL — LOW (ref 32–37)
MCV RBC AUTO: 83.3 FL — SIGNIFICANT CHANGE UP (ref 81–99)
NRBC # BLD: 0 /100 WBCS — SIGNIFICANT CHANGE UP (ref 0–0)
PLATELET # BLD AUTO: 333 K/UL — SIGNIFICANT CHANGE UP (ref 130–400)
POTASSIUM SERPL-MCNC: 4.8 MMOL/L — SIGNIFICANT CHANGE UP (ref 3.5–5)
POTASSIUM SERPL-SCNC: 4.8 MMOL/L — SIGNIFICANT CHANGE UP (ref 3.5–5)
RBC # BLD: 4.18 M/UL — LOW (ref 4.2–5.4)
RBC # FLD: 16.1 % — HIGH (ref 11.5–14.5)
SODIUM SERPL-SCNC: 139 MMOL/L — SIGNIFICANT CHANGE UP (ref 135–146)
WBC # BLD: 5.31 K/UL — SIGNIFICANT CHANGE UP (ref 4.8–10.8)
WBC # FLD AUTO: 5.31 K/UL — SIGNIFICANT CHANGE UP (ref 4.8–10.8)

## 2021-08-29 PROCEDURE — 99232 SBSQ HOSP IP/OBS MODERATE 35: CPT

## 2021-08-29 RX ORDER — HYDROXYZINE HCL 10 MG
25 TABLET ORAL ONCE
Refills: 0 | Status: COMPLETED | OUTPATIENT
Start: 2021-08-29 | End: 2021-08-29

## 2021-08-29 RX ADMIN — CARBIDOPA AND LEVODOPA 2 TABLET(S): 25; 100 TABLET ORAL at 17:12

## 2021-08-29 RX ADMIN — CARBIDOPA AND LEVODOPA 2 TABLET(S): 25; 100 TABLET ORAL at 01:33

## 2021-08-29 RX ADMIN — PANTOPRAZOLE SODIUM 40 MILLIGRAM(S): 20 TABLET, DELAYED RELEASE ORAL at 05:39

## 2021-08-29 RX ADMIN — Medication 600 MILLIGRAM(S): at 22:30

## 2021-08-29 RX ADMIN — CARBIDOPA AND LEVODOPA 2 TABLET(S): 25; 100 TABLET ORAL at 05:40

## 2021-08-29 RX ADMIN — ENTACAPONE 200 MILLIGRAM(S): 200 TABLET, FILM COATED ORAL at 17:12

## 2021-08-29 RX ADMIN — ENTACAPONE 200 MILLIGRAM(S): 200 TABLET, FILM COATED ORAL at 05:40

## 2021-08-29 RX ADMIN — Medication 25 MILLIGRAM(S): at 21:02

## 2021-08-29 RX ADMIN — CARBIDOPA AND LEVODOPA 2 TABLET(S): 25; 100 TABLET ORAL at 11:12

## 2021-08-29 RX ADMIN — ENTACAPONE 200 MILLIGRAM(S): 200 TABLET, FILM COATED ORAL at 11:12

## 2021-08-29 RX ADMIN — Medication 360 MILLIGRAM(S): at 05:39

## 2021-08-29 RX ADMIN — APIXABAN 5 MILLIGRAM(S): 2.5 TABLET, FILM COATED ORAL at 05:40

## 2021-08-29 RX ADMIN — Medication 5 MILLIGRAM(S): at 11:12

## 2021-08-29 RX ADMIN — APIXABAN 5 MILLIGRAM(S): 2.5 TABLET, FILM COATED ORAL at 17:12

## 2021-08-29 RX ADMIN — ENTACAPONE 200 MILLIGRAM(S): 200 TABLET, FILM COATED ORAL at 01:33

## 2021-08-29 RX ADMIN — ATORVASTATIN CALCIUM 80 MILLIGRAM(S): 80 TABLET, FILM COATED ORAL at 21:03

## 2021-08-29 NOTE — PROGRESS NOTE ADULT - SUBJECTIVE AND OBJECTIVE BOX
HPI  Patient is a 89y old Female who presents with a chief complaint of Dizziness (28 Aug 2021 14:22)    Currently admitted to medicine with the primary diagnosis of Dizziness       Today is hospital day 3d.     INTERVAL HPI / OVERNIGHT EVENTS:  Patient was examined and seen at bedside.   c/o weakness; have head fogginess  difficulty ambulating and needed assistance to get up  denies any SOB/CP /N/V  no abdominal pain        PAST MEDICAL & SURGICAL HISTORY  Parkinson disease    HTN (hypertension)    Rheumatoid arthritis    Hyperlipidemia    CRAO (central retinal artery occlusion)  Left    Atrial fibrillation    History of hysterectomy    Status post placement of implantable loop recorder  2021    S/P hernia repair      ALLERGIES  No Known Allergies    MEDICATIONS  STANDING MEDICATIONS  apixaban 5 milliGRAM(s) Oral two times a day  atorvastatin 80 milliGRAM(s) Oral at bedtime  carbidopa/levodopa  25/100 2 Tablet(s) Oral four times a day  carbidopa/levodopa/entacapone 50/200/200 1 Tablet(s) Oral four times a day  diltiazem    milliGRAM(s) Oral daily  entacapone 200 milliGRAM(s) Oral four times a day  oxybutynin 5 milliGRAM(s) Oral daily  pantoprazole    Tablet 40 milliGRAM(s) Oral before breakfast    PRN MEDICATIONS  acetaminophen   Tablet .. 650 milliGRAM(s) Oral every 6 hours PRN  hydrOXYzine hydrochloride 25 milliGRAM(s) Oral at bedtime PRN  ibuprofen  Tablet. 600 milliGRAM(s) Oral every 6 hours PRN  meclizine 25 milliGRAM(s) Oral three times a day PRN  ondansetron Injectable 4 milliGRAM(s) IV Push every 8 hours PRN    VITALS:  T(F): 96.8  HR: 65  BP: 130/60  RR: 16  SpO2: --    PHYSICAL EXAM  GEN: NAD, Resting comfortably in bed  blind in left eye  PULM: Clear to auscultation bilaterally, No wheezing  CVS: Regular rate and rhythm, S1-S2, no murmurs  ABD: Soft, non-tender, non-distended, no guarding  EXT: No edema  NEURO: A&Ox3, moving all extremities    LABS                        10.7   5.31  )-----------( 333      ( 29 Aug 2021 05:51 )             34.8     08-    139  |  105  |  20  ----------------------------<  86  4.8   |  26  |  0.9    Ca    9.0      29 Aug 2021 05:51        Urinalysis Basic - ( 28 Aug 2021 17:35 )    Color: Yellow / Appearance: Clear / S.020 / pH: x  Gluc: x / Ketone: Trace  / Bili: Negative / Urobili: 0.2 mg/dL   Blood: x / Protein: Negative mg/dL / Nitrite: Positive   Leuk Esterase: Small / RBC: Negative / WBC 6-10 /HPF   Sq Epi: x / Non Sq Epi: Few /HPF / Bacteria: TNTC /HPF                RADIOLOGY    
HPI  Patient is a 89y old Female who presents with a chief complaint of Dizziness (26 Aug 2021 16:01)    Currently admitted to medicine with the primary diagnosis of Dizziness       Today is hospital day 1d.     INTERVAL HPI / OVERNIGHT EVENTS:  Patient was examined and seen at bedside. feeling better  no n/v  no dizziness        PAST MEDICAL & SURGICAL HISTORY  Parkinson disease    HTN (hypertension)    Rheumatoid arthritis    Hyperlipidemia    CRAO (central retinal artery occlusion)  Left    Atrial fibrillation    History of hysterectomy    Status post placement of implantable loop recorder  2021    S/P hernia repair      ALLERGIES  No Known Allergies    MEDICATIONS  STANDING MEDICATIONS  apixaban 5 milliGRAM(s) Oral two times a day  atorvastatin 80 milliGRAM(s) Oral at bedtime  carbidopa/levodopa  25/100 2 Tablet(s) Oral four times a day  carbidopa/levodopa/entacapone 50/200/200 1 Tablet(s) Oral four times a day  diltiazem    milliGRAM(s) Oral daily  entacapone 200 milliGRAM(s) Oral four times a day  oxybutynin 5 milliGRAM(s) Oral daily  pantoprazole    Tablet 40 milliGRAM(s) Oral before breakfast  sodium chloride 0.45%. 1000 milliLiter(s) IV Continuous <Continuous>    PRN MEDICATIONS  acetaminophen   Tablet .. 650 milliGRAM(s) Oral every 6 hours PRN  ibuprofen  Tablet. 600 milliGRAM(s) Oral every 6 hours PRN  meclizine 25 milliGRAM(s) Oral three times a day PRN  ondansetron Injectable 4 milliGRAM(s) IV Push every 8 hours PRN    VITALS:  T(F): 97.5  HR: 68  BP: 125/60  RR: 16  SpO2: 100%    PHYSICAL EXAM  GEN: NAD, Resting comfortably in bed  PULM: Clear to auscultation bilaterally, No wheezing  CVS: Regular rate and rhythm, S1-S2  ABD: Soft, non-tender, non-distended, no guarding  NEURO: A&Ox3  LABS                        11.0   4.94  )-----------( 365      ( 27 Aug 2021 06:02 )             36.6         143  |  106  |  19  ----------------------------<  90  4.2   |  25  |  0.8    Ca    8.9      27 Aug 2021 06:02  Mg     2.1         TPro  6.5  /  Alb  4.0  /  TBili  0.3  /  DBili  x   /  AST  9   /  ALT  <5  /  AlkPhos  88        Urinalysis Basic - ( 26 Aug 2021 10:45 )    Color: Yellow / Appearance: Clear / S.020 / pH: x  Gluc: x / Ketone: 15  / Bili: Negative / Urobili: 0.2 mg/dL   Blood: x / Protein: Negative mg/dL / Nitrite: Negative   Leuk Esterase: Negative / RBC: x / WBC x   Sq Epi: x / Non Sq Epi: x / Bacteria: x            CARDIAC MARKERS ( 26 Aug 2021 10:30 )  x     / <0.01 ng/mL / x     / x     / x          RADIOLOGY    
HPI  Patient is a 89y old Female who presents with a chief complaint of Dizziness (27 Aug 2021 15:34)    Currently admitted to medicine with the primary diagnosis of Dizziness       Today is hospital day 2d.     INTERVAL HPI / OVERNIGHT EVENTS:  Patient was examined and seen at bedside.   feels okay; c/o lightheadedness  no N/V        PAST MEDICAL & SURGICAL HISTORY  Parkinson disease    HTN (hypertension)    Rheumatoid arthritis    Hyperlipidemia    CRAO (central retinal artery occlusion)  Left    Atrial fibrillation    History of hysterectomy    Status post placement of implantable loop recorder  01/2021    S/P hernia repair      ALLERGIES  No Known Allergies    MEDICATIONS  STANDING MEDICATIONS  apixaban 5 milliGRAM(s) Oral two times a day  atorvastatin 80 milliGRAM(s) Oral at bedtime  carbidopa/levodopa  25/100 2 Tablet(s) Oral four times a day  carbidopa/levodopa/entacapone 50/200/200 1 Tablet(s) Oral four times a day  diltiazem    milliGRAM(s) Oral daily  entacapone 200 milliGRAM(s) Oral four times a day  oxybutynin 5 milliGRAM(s) Oral daily  pantoprazole    Tablet 40 milliGRAM(s) Oral before breakfast  sodium chloride 0.45%. 1000 milliLiter(s) IV Continuous <Continuous>    PRN MEDICATIONS  acetaminophen   Tablet .. 650 milliGRAM(s) Oral every 6 hours PRN  ibuprofen  Tablet. 600 milliGRAM(s) Oral every 6 hours PRN  meclizine 25 milliGRAM(s) Oral three times a day PRN  ondansetron Injectable 4 milliGRAM(s) IV Push every 8 hours PRN    VITALS:  T(F): 97.3  HR: 62  BP: 135/62  RR: 16  SpO2: --    PHYSICAL EXAM  GEN: NAD, Resting comfortably in bed  PULM: Clear to auscultation bilaterally, No wheezing  CVS: Regular rate and rhythm, no rubs or gallops  ABD: Soft, non-tender, non-distended, no guarding  EXT: No edema  NEURO: A&Ox3, no focal deficits    LABS                        11.0   4.94  )-----------( 365      ( 27 Aug 2021 06:02 )             36.6     08-27    143  |  106  |  19  ----------------------------<  90  4.2   |  25  |  0.8    Ca    8.9      27 Aug 2021 06:02                Culture - Urine (collected 26 Aug 2021 10:45)  Source: Clean Catch Clean Catch (Midstream)  Final Report (28 Aug 2021 01:57):    >=3 organisms. Probable collection contamination.          RADIOLOGY

## 2021-08-29 NOTE — PROGRESS NOTE ADULT - ASSESSMENT
89y/o Female w/ PMHX of Afib on eliquis, CRAO, HTN, HLD, RA, Parkinson's disease presenting with vertigo and episode of nausea and vomiting 2 days before and another episode on day presenation.    # BPPV- improved  # positive orthostatic hypotension- possibly related to autonomic dysfunction from parkinson  continue IV fluids  meclizine PRN, zofran PRN  PT/OT following    # paroxysmal A fib; on loop recorder now  currently on sinus  cont cardziem and elizuis    # parkinsons disease- con catbidopa/l dopa/entacapone    # h/o CRAO- cont statin    pending: improvemert in orthostatic BP  disposition: to rehab  plan of care d/w patient; left voice message with daughter
89y/o Female w/ PMHX of Afib on eliquis, CRAO, HTN, HLD, RA, Parkinson's disease presenting with vertigo and episode of nausea and vomiting 2 days before and another episode on day presenation.    # BPPV- resolved  # positive orthostatic hypotension- possibly related to autonomic dysfunction from parkinson  # ambulatory deficits related to advanced arthritis, parkinsons along with orthostatic changes and visual deficits  meclizine PRN, zofran PRN  PT/OT following- case dsicussed  patient will benefit from rehab. As per case management her rehab auth was denied by insurance. Will resubmit updated PT and medical notes and will do peer-peer    # paroxysmal A fib; on loop recorder now  currently on sinus  cont cardziem and elizuis    # parkinsons disease- con catbidopa/l dopa/entacapone    # h/o left eye blindness CRAO- cont statin    pending: reubmitting of papers; peer- peer for insurance approval of rehab  disposition: to rehab ?  plan of care d/w patient; Also discussed with patient's daughter
89y/o Female w/ PMHX of Afib on eliquis, CRAO, HTN, HLD, RA, Parkinson's disease presenting with vertigo and episode of nausea and vomiting 2 days before and another episode on day presenation.    # BPPV- improved  # positive orthostatic hypotension- possibly related to autonomic dysfunction from parkinson  patient was treated with IV fluids recheck orthostatic; if ocntinue to be positive or symptomatic will try midodrine  meclizine PRN, zofran PRN  PT/OT following    # paroxysmal A fib; on loop recorder now  currently on sinus  cont cardziem and elizuis    # parkinsons disease- con catbidopa/l dopa/entacapone    # h/o CRAO- cont statin    pending: patient clinically improving; possible rehab placement  disposition: to rehab  plan of care d/w patient; Also discussed with patient's daughter

## 2021-08-30 ENCOUNTER — TRANSCRIPTION ENCOUNTER (OUTPATIENT)
Age: 86
End: 2021-08-30

## 2021-08-30 VITALS
DIASTOLIC BLOOD PRESSURE: 55 MMHG | RESPIRATION RATE: 16 BRPM | HEART RATE: 74 BPM | SYSTOLIC BLOOD PRESSURE: 118 MMHG | TEMPERATURE: 97 F

## 2021-08-30 LAB
RAPID RVP RESULT: SIGNIFICANT CHANGE UP
SARS-COV-2 RNA SPEC QL NAA+PROBE: SIGNIFICANT CHANGE UP

## 2021-08-30 PROCEDURE — 99238 HOSP IP/OBS DSCHRG MGMT 30/<: CPT

## 2021-08-30 RX ORDER — MECLIZINE HCL 12.5 MG
1 TABLET ORAL
Qty: 0 | Refills: 0 | DISCHARGE
Start: 2021-08-30

## 2021-08-30 RX ORDER — ACETAMINOPHEN 500 MG
2 TABLET ORAL
Qty: 0 | Refills: 0 | DISCHARGE
Start: 2021-08-30

## 2021-08-30 RX ADMIN — Medication 600 MILLIGRAM(S): at 00:12

## 2021-08-30 RX ADMIN — Medication 360 MILLIGRAM(S): at 05:22

## 2021-08-30 RX ADMIN — Medication 2 DROP(S): at 05:24

## 2021-08-30 RX ADMIN — Medication 600 MILLIGRAM(S): at 06:04

## 2021-08-30 RX ADMIN — PANTOPRAZOLE SODIUM 40 MILLIGRAM(S): 20 TABLET, DELAYED RELEASE ORAL at 05:22

## 2021-08-30 RX ADMIN — CARBIDOPA AND LEVODOPA 2 TABLET(S): 25; 100 TABLET ORAL at 11:32

## 2021-08-30 RX ADMIN — CARBIDOPA AND LEVODOPA 2 TABLET(S): 25; 100 TABLET ORAL at 05:24

## 2021-08-30 RX ADMIN — Medication 600 MILLIGRAM(S): at 05:22

## 2021-08-30 RX ADMIN — APIXABAN 5 MILLIGRAM(S): 2.5 TABLET, FILM COATED ORAL at 05:22

## 2021-08-30 RX ADMIN — ENTACAPONE 200 MILLIGRAM(S): 200 TABLET, FILM COATED ORAL at 00:36

## 2021-08-30 RX ADMIN — Medication 5 MILLIGRAM(S): at 11:33

## 2021-08-30 RX ADMIN — CARBIDOPA AND LEVODOPA 2 TABLET(S): 25; 100 TABLET ORAL at 00:36

## 2021-08-30 RX ADMIN — ENTACAPONE 200 MILLIGRAM(S): 200 TABLET, FILM COATED ORAL at 05:22

## 2021-08-30 RX ADMIN — ENTACAPONE 200 MILLIGRAM(S): 200 TABLET, FILM COATED ORAL at 11:31

## 2021-08-30 NOTE — DISCHARGE NOTE NURSING/CASE MANAGEMENT/SOCIAL WORK - NSDCVIVACCINE_GEN_ALL_CORE_FT
influenza, injectable, quadrivalent, preservative free; 01-Jan-2021 15:19; Shanta Correa (RN); [employer] 33BN3 (Exp. Date: 30-Jun-2021); IntraMuscular; Deltoid Right.; 0.5 milliLiter(s); VIS (VIS Published: 15-Aug-2019, VIS Presented: 01-Jan-2021);

## 2021-08-30 NOTE — DISCHARGE NOTE PROVIDER - NSDCCPCAREPLAN_GEN_ALL_CORE_FT
PRINCIPAL DISCHARGE DIAGNOSIS  Diagnosis: Dizziness  Assessment and Plan of Treatment: -  - You came in because of dizziness and was found to be orthostatic positive. You were treated with Intravenous Fluids. Your symptoms improved.       PRINCIPAL DISCHARGE DIAGNOSIS  Diagnosis: Dizziness  Assessment and Plan of Treatment: -  - You came in because of dizziness and was found to be orthostatic positive. You were treated with Intravenous Fluids. Your symptoms improved.      SECONDARY DISCHARGE DIAGNOSES  Diagnosis: BPPV (benign paroxysmal positional vertigo)  Assessment and Plan of Treatment: You had paroxysmal positional vertigo. It has resolved. If recurring take meclizine as needed and continue with occupational therapy for corrective manuvres as tolerated

## 2021-08-30 NOTE — DISCHARGE NOTE PROVIDER - HOSPITAL COURSE
The patient is an 89 year old Female with a past medical history of a-fib on Eliquis, CRAO, htn, hld, RA, and Parkinson's admitted for dizziness. Orthostatic vitals were positive for orthostatic hypotension possibly related to autonomic dysfunction of Parkinson. Patient was treated with IVF, meclizine, and Zofran. Patient has improved and denies dizziness, nausea, and vomiting at this time. Patient was also seen by PT and OT. Patient ambulated 20 feet x 2. Occupational therapy also saw the patient and recommends Home with OT or rehab services. Patient to be discharged to [facility].    The patient is an 89 year old Female with a past medical history of a-fib on Eliquis, CRAO, htn, hld, RA, and Parkinson's admitted for dizziness. Orthostatic vitals were positive for orthostatic hypotension possibly related to autonomic dysfunction of Parkinson. Patient was treated with IVF, meclizine, and Zofran. Patient has improved and denies dizziness, nausea, and vomiting at this time. Patient was also seen by PT and OT. Patient ambulated 20 feet x 2. Occupational therapy also saw the patient and recommends Home with OT or rehab services. Patient to be discharged to [facility].     Diagnosis    # BPPV- resolved  #  orthostatic hypotension- possibly related to autonomic dysfunction from parkinson  # ambulatory deficits related to advanced arthritis, parkinsons along with orthostatic changes and visual deficits  # paroxysmal A fib; on loop recorder now  # parkinsons disease- con catbidopa/l dopa/entacapone  # h/o left eye blindness CRAO- cont statin      PHYSICAL EXAM  GEN: NAD, Resting comfortably in bed  blind in left eye  PULM: Clear to auscultation bilaterally, No wheezing  CVS: Regular rate and rhythm, S1-S2, no murmurs  ABD: Soft, non-tender, non-distended, no guarding  EXT: No edema  NEURO: A&Ox3, moving all extremities

## 2021-08-30 NOTE — DISCHARGE NOTE PROVIDER - CARE PROVIDER_API CALL
Gucci Kamara  PEDIATRICS  [address] [place] [id number]  Phone: [phone]  Fax: [phone]  Follow Up Time: 1-3 days

## 2021-08-30 NOTE — DISCHARGE NOTE NURSING/CASE MANAGEMENT/SOCIAL WORK - PATIENT PORTAL LINK FT
You can access the FollowMyHealth Patient Portal offered by [facility] by registering at the following website: [email or web address]. By joining [employer]’s FollowMyHealth portal, you will also be able to view your health information using other applications (apps) compatible with our system.

## 2021-08-30 NOTE — DISCHARGE NOTE PROVIDER - NSDCMRMEDTOKEN_GEN_ALL_CORE_FT
atorvastatin 80 mg oral tablet: 1 tab(s) orally once a day (at bedtime)  carbidopa/levodopa/entacapone 50 mg-200 mg-200 mg oral tablet: 1 tab(s) orally 4 times a day  ciprofloxacin 250 mg oral tablet: 1 tab(s) orally 2 times a day   dilTIAZem 360 mg/24 hours oral capsule, extended release: 1 cap(s) orally once a day  Eliquis 5 mg oral tablet: 1 tab(s) orally 2 times a day   famotidine:   Macrobid 100 mg oral capsule: 1 cap(s) orally every 12 hours   oxybutynin 5 mg/24 hours oral tablet, extended release: 1 tab(s) orally once a day  Protonix 40 mg oral delayed release tablet: 1 tab(s) orally once a day   acetaminophen 325 mg oral tablet: 2 tab(s) orally every 6 hours, As needed, Temp greater or equal to 38.5C (101.3F), Mild Pain (1 - 3)  atorvastatin 80 mg oral tablet: 1 tab(s) orally once a day (at bedtime)  carbidopa/levodopa/entacapone 50 mg-200 mg-200 mg oral tablet: 1 tab(s) orally 4 times a day  dilTIAZem 360 mg/24 hours oral capsule, extended release: 1 cap(s) orally once a day  Eliquis 5 mg oral tablet: 1 tab(s) orally 2 times a day   famotidine:   meclizine 25 mg oral tablet: 1 tab(s) orally 3 times a day, As needed, Dizziness  oxybutynin 5 mg/24 hours oral tablet, extended release: 1 tab(s) orally once a day  Protonix 40 mg oral delayed release tablet: 1 tab(s) orally once a day

## 2021-09-02 ENCOUNTER — APPOINTMENT (OUTPATIENT)
Dept: CARDIOLOGY | Facility: CLINIC | Age: 86
End: 2021-09-02
Payer: MEDICARE

## 2021-09-02 ENCOUNTER — NON-APPOINTMENT (OUTPATIENT)
Age: 86
End: 2021-09-02

## 2021-09-02 DIAGNOSIS — R42 DIZZINESS AND GIDDINESS: ICD-10-CM

## 2021-09-02 DIAGNOSIS — E78.5 HYPERLIPIDEMIA, UNSPECIFIED: ICD-10-CM

## 2021-09-02 DIAGNOSIS — H81.10 BENIGN PAROXYSMAL VERTIGO, UNSPECIFIED EAR: ICD-10-CM

## 2021-09-02 DIAGNOSIS — Z95.811 PRESENCE OF HEART ASSIST DEVICE: ICD-10-CM

## 2021-09-02 DIAGNOSIS — I48.0 PAROXYSMAL ATRIAL FIBRILLATION: ICD-10-CM

## 2021-09-02 DIAGNOSIS — H54.7 UNSPECIFIED VISUAL LOSS: ICD-10-CM

## 2021-09-02 DIAGNOSIS — R11.2 NAUSEA WITH VOMITING, UNSPECIFIED: ICD-10-CM

## 2021-09-02 DIAGNOSIS — Z87.440 PERSONAL HISTORY OF URINARY (TRACT) INFECTIONS: ICD-10-CM

## 2021-09-02 DIAGNOSIS — Z79.01 LONG TERM (CURRENT) USE OF ANTICOAGULANTS: ICD-10-CM

## 2021-09-02 DIAGNOSIS — R26.2 DIFFICULTY IN WALKING, NOT ELSEWHERE CLASSIFIED: ICD-10-CM

## 2021-09-02 DIAGNOSIS — Z98.890 OTHER SPECIFIED POSTPROCEDURAL STATES: ICD-10-CM

## 2021-09-02 DIAGNOSIS — G90.3 MULTI-SYSTEM DEGENERATION OF THE AUTONOMIC NERVOUS SYSTEM: ICD-10-CM

## 2021-09-02 DIAGNOSIS — M06.9 RHEUMATOID ARTHRITIS, UNSPECIFIED: ICD-10-CM

## 2021-09-02 PROCEDURE — G2066: CPT

## 2021-09-02 PROCEDURE — 93298 REM INTERROG DEV EVAL SCRMS: CPT

## 2021-10-05 NOTE — DISCHARGE NOTE PROVIDER - EXTENDED VTE YES NO FOR MLM ENOXAPARIN
Occupational Therapy     Referred by: MUSA Kapoor; Medical Diagnosis (from order):    Diagnosis Information      Diagnosis    V58.89, 842.00 (ICD-9-CM) - S66.911D (ICD-10-CM) - Strain of right wrist, subsequent encounter    727.04 (ICD-9-CM) - M65.4 (ICD-10-CM) - Radial styloid tenosynovitis                Discharge Summary    Visit:  4   Next referring provider appointment: 10/6/2021    Patient alert and oriented X3.    SUBJECTIVE                                                                                                               Patient states \"I feel good\". She uses her comfort cool thumb spica splint, as needed.   Functional Change: She is able to use her hand for all normal daily activities. Patient states she is 100% better and ready to ready to return to work without restrictions.   Pain / Symptoms:  Pain rating (out of 10): Current: 0     OBJECTIVE                                                                                                                     Range of Motion (ROM)   (degrees unless noted; active unless noted; norms in ( ); negative=lacking to 0, positive=beyond 0)   Wrist:    - Flexion (60-80):        • Right:  75    - Extension (60-70):        • Right: 73    Strength  (out of 5 unless noted, standard test position unless noted, lbs tested with hand held dynamometer)   : (lbs)    - Neutral:        • Right: Trial(s): 76  Pinch: (lbs)    - Lateral:         • Right: Trial(s): 22    - 3 Point:         • Right: Trial(s): 18        Outcome Measures:   Quick Disabilities of the Arm, Shoulder and Hand: QuickDash Total Score: 0  (scored 0-100; a higher score indicates greater disability) see flowsheet for additional documentation  TREATMENT                                                                                                                  Therapeutic Exercise:  27# floor to waist box lift x 3 reps; with use of comfort cool thumb spica splint  50# floor to waist  box lift x 5 reps; with use of comfort cool thumb spica splint  Measurements taken for discharge       Therapeutic Activity:  Continue to wear [facility] comfort cool thumb spica splint, as needed    Skilled input: verbal instruction/cues    Writer verbally educated and received verbal consent for hand placement, positioning of patient, and techniques to be performed today from patient     Home Exercise Program/Education Materials: *above indicates provided as part of home exercise program  Access Code: [email or web address]  URL: [email or web address]  Date: 09/23/2021  Prepared by: Katelyn Mandujano     Exercises  Wrist Flexion Extension AROM - 3 x daily - 7 x weekly - 10 reps  Wrist Radial Ulnar Deviation AROM - 3 x daily - 7 x weekly - 10 reps  Thumb AROM Palmar Abduction to Radial Abduction - 3 x daily - 7 x weekly - 10 reps  Thumb AROM Opposition To All Fingers - 3 x daily - 7 x weekly - 5-10 reps    9/28/2021  Tan putty gripping and lateral pinch x 10-20 reps; 3 times/day    Ultrasound (65703)  Location: right 1st dorsal compartment  Position: sitting  Duty Cycle: 100%  Frequency: 3 Mhz  Intensity (w/cm2): 1.1  Duration: 8 minutes  Results: decreased pain and tissue softening  Reaction: no adverse reaction to treatment      ASSESSMENT                                                                                                             Patient has been seen for 4 occupational therapy visits with great progress. She has met all goals at this time. Patient is able to lift 40# from floor to waist without difficulty. Patient will continue to complete her home exercise program and will be discharged from skilled occupational therapy services.   Pain/symptoms after session (out of 10): 0    To date the patient has made gains as expected as reported.  Patient Education:   Results of above outlined education: Demonstrates understanding and Verbalizes understanding      PLAN                                                                                                                            Discharge from skilled therapy with instructions/recommendations to continue home exercise program follow up with referring provider    Suggestions for next session as indicated: Progress per plan of care  Discharge       GOALS                                                                                                                           Long Term Goals: to be met by end of plan of care  1. HEP: Patient will be independent with progressed and modified home exercise program. Status: met   2. Range of Motion: Patient will demonstrate 70 degrees active wrist flexion/extension for increased ease with bathing and dressing.  Status: met   3. Pain: Patient will verbalize 2/10 pain for with use for increased ease with completing home management tasks.  Status: met   4. Quick DASH: Patient will complete form to reflect an improved calculated score to less than or equal to 30 to indicate patient reported improvement in function/disability/impairment. (minimal clinically important difference = 15.91)  Status: met   5. Work Simulation: Patient will demonstrate 40# floor to waist box lift to simulate lifting at work.  Status: met       Therapy procedure time and total treatment time can be found documented on the Time Entry flowsheet   ,

## 2021-10-06 ENCOUNTER — APPOINTMENT (OUTPATIENT)
Dept: CARDIOLOGY | Facility: CLINIC | Age: 86
End: 2021-10-06

## 2021-10-14 ENCOUNTER — EMERGENCY (EMERGENCY)
Facility: HOSPITAL | Age: 86
LOS: 0 days | Discharge: HOME | End: 2021-10-15
Attending: EMERGENCY MEDICINE | Admitting: EMERGENCY MEDICINE
Payer: MEDICARE

## 2021-10-14 VITALS
SYSTOLIC BLOOD PRESSURE: 170 MMHG | HEART RATE: 72 BPM | RESPIRATION RATE: 18 BRPM | OXYGEN SATURATION: 98 % | TEMPERATURE: 99 F | DIASTOLIC BLOOD PRESSURE: 74 MMHG

## 2021-10-14 VITALS
TEMPERATURE: 97 F | HEART RATE: 74 BPM | HEIGHT: 64 IN | SYSTOLIC BLOOD PRESSURE: 150 MMHG | RESPIRATION RATE: 18 BRPM | DIASTOLIC BLOOD PRESSURE: 70 MMHG | OXYGEN SATURATION: 99 %

## 2021-10-14 DIAGNOSIS — M06.9 RHEUMATOID ARTHRITIS, UNSPECIFIED: ICD-10-CM

## 2021-10-14 DIAGNOSIS — R11.0 NAUSEA: ICD-10-CM

## 2021-10-14 DIAGNOSIS — Z90.710 ACQUIRED ABSENCE OF BOTH CERVIX AND UTERUS: ICD-10-CM

## 2021-10-14 DIAGNOSIS — Z98.890 OTHER SPECIFIED POSTPROCEDURAL STATES: Chronic | ICD-10-CM

## 2021-10-14 DIAGNOSIS — I10 ESSENTIAL (PRIMARY) HYPERTENSION: ICD-10-CM

## 2021-10-14 DIAGNOSIS — H53.9 UNSPECIFIED VISUAL DISTURBANCE: ICD-10-CM

## 2021-10-14 DIAGNOSIS — N39.0 URINARY TRACT INFECTION, SITE NOT SPECIFIED: ICD-10-CM

## 2021-10-14 DIAGNOSIS — E78.5 HYPERLIPIDEMIA, UNSPECIFIED: ICD-10-CM

## 2021-10-14 DIAGNOSIS — I48.91 UNSPECIFIED ATRIAL FIBRILLATION: ICD-10-CM

## 2021-10-14 DIAGNOSIS — Z95.818 PRESENCE OF OTHER CARDIAC IMPLANTS AND GRAFTS: Chronic | ICD-10-CM

## 2021-10-14 DIAGNOSIS — R10.9 UNSPECIFIED ABDOMINAL PAIN: ICD-10-CM

## 2021-10-14 DIAGNOSIS — Z79.899 OTHER LONG TERM (CURRENT) DRUG THERAPY: ICD-10-CM

## 2021-10-14 DIAGNOSIS — Z79.01 LONG TERM (CURRENT) USE OF ANTICOAGULANTS: ICD-10-CM

## 2021-10-14 DIAGNOSIS — G20 PARKINSON'S DISEASE: ICD-10-CM

## 2021-10-14 DIAGNOSIS — Z90.710 ACQUIRED ABSENCE OF BOTH CERVIX AND UTERUS: Chronic | ICD-10-CM

## 2021-10-14 LAB
ALBUMIN SERPL ELPH-MCNC: 3.6 G/DL — SIGNIFICANT CHANGE UP (ref 3.5–5.2)
ALP SERPL-CCNC: 77 U/L — SIGNIFICANT CHANGE UP (ref 30–115)
ALT FLD-CCNC: <5 U/L — SIGNIFICANT CHANGE UP (ref 0–41)
ANION GAP SERPL CALC-SCNC: 14 MMOL/L — SIGNIFICANT CHANGE UP (ref 7–14)
APPEARANCE UR: ABNORMAL
AST SERPL-CCNC: 20 U/L — SIGNIFICANT CHANGE UP (ref 0–41)
BACTERIA # UR AUTO: ABNORMAL
BASOPHILS # BLD AUTO: 0.03 K/UL — SIGNIFICANT CHANGE UP (ref 0–0.2)
BASOPHILS NFR BLD AUTO: 0.5 % — SIGNIFICANT CHANGE UP (ref 0–1)
BILIRUB SERPL-MCNC: 0.3 MG/DL — SIGNIFICANT CHANGE UP (ref 0.2–1.2)
BILIRUB UR-MCNC: NEGATIVE — SIGNIFICANT CHANGE UP
BUN SERPL-MCNC: 20 MG/DL — SIGNIFICANT CHANGE UP (ref 10–20)
CALCIUM SERPL-MCNC: 8.8 MG/DL — SIGNIFICANT CHANGE UP (ref 8.5–10.1)
CHLORIDE SERPL-SCNC: 105 MMOL/L — SIGNIFICANT CHANGE UP (ref 98–110)
CO2 SERPL-SCNC: 19 MMOL/L — SIGNIFICANT CHANGE UP (ref 17–32)
COLOR SPEC: YELLOW — SIGNIFICANT CHANGE UP
CREAT SERPL-MCNC: 0.7 MG/DL — SIGNIFICANT CHANGE UP (ref 0.7–1.5)
DIFF PNL FLD: SIGNIFICANT CHANGE UP
EOSINOPHIL # BLD AUTO: 0.11 K/UL — SIGNIFICANT CHANGE UP (ref 0–0.7)
EOSINOPHIL NFR BLD AUTO: 2 % — SIGNIFICANT CHANGE UP (ref 0–8)
EPI CELLS # UR: 2 /HPF — SIGNIFICANT CHANGE UP (ref 0–5)
GLUCOSE SERPL-MCNC: 99 MG/DL — SIGNIFICANT CHANGE UP (ref 70–99)
GLUCOSE UR QL: NEGATIVE — SIGNIFICANT CHANGE UP
HCT VFR BLD CALC: 33.6 % — LOW (ref 37–47)
HGB BLD-MCNC: 10.6 G/DL — LOW (ref 12–16)
HYALINE CASTS # UR AUTO: 3 /LPF — SIGNIFICANT CHANGE UP (ref 0–7)
IMM GRANULOCYTES NFR BLD AUTO: 0.4 % — HIGH (ref 0.1–0.3)
KETONES UR-MCNC: NEGATIVE — SIGNIFICANT CHANGE UP
LACTATE SERPL-SCNC: 0.7 MMOL/L — SIGNIFICANT CHANGE UP (ref 0.7–2)
LEUKOCYTE ESTERASE UR-ACNC: ABNORMAL
LIDOCAIN IGE QN: 17 U/L — SIGNIFICANT CHANGE UP (ref 7–60)
LYMPHOCYTES # BLD AUTO: 1.01 K/UL — LOW (ref 1.2–3.4)
LYMPHOCYTES # BLD AUTO: 18.2 % — LOW (ref 20.5–51.1)
MCHC RBC-ENTMCNC: 25.7 PG — LOW (ref 27–31)
MCHC RBC-ENTMCNC: 31.5 G/DL — LOW (ref 32–37)
MCV RBC AUTO: 81.6 FL — SIGNIFICANT CHANGE UP (ref 81–99)
MONOCYTES # BLD AUTO: 0.61 K/UL — HIGH (ref 0.1–0.6)
MONOCYTES NFR BLD AUTO: 11 % — HIGH (ref 1.7–9.3)
NEUTROPHILS # BLD AUTO: 3.76 K/UL — SIGNIFICANT CHANGE UP (ref 1.4–6.5)
NEUTROPHILS NFR BLD AUTO: 67.9 % — SIGNIFICANT CHANGE UP (ref 42.2–75.2)
NITRITE UR-MCNC: POSITIVE
NRBC # BLD: 0 /100 WBCS — SIGNIFICANT CHANGE UP (ref 0–0)
PH UR: 7 — SIGNIFICANT CHANGE UP (ref 5–8)
PLATELET # BLD AUTO: 356 K/UL — SIGNIFICANT CHANGE UP (ref 130–400)
POTASSIUM SERPL-MCNC: 4.3 MMOL/L — SIGNIFICANT CHANGE UP (ref 3.5–5)
POTASSIUM SERPL-SCNC: 4.3 MMOL/L — SIGNIFICANT CHANGE UP (ref 3.5–5)
PROT SERPL-MCNC: 6.2 G/DL — SIGNIFICANT CHANGE UP (ref 6–8)
PROT UR-MCNC: SIGNIFICANT CHANGE UP
RBC # BLD: 4.12 M/UL — LOW (ref 4.2–5.4)
RBC # FLD: 15.2 % — HIGH (ref 11.5–14.5)
RBC CASTS # UR COMP ASSIST: 1 /HPF — SIGNIFICANT CHANGE UP (ref 0–4)
SODIUM SERPL-SCNC: 138 MMOL/L — SIGNIFICANT CHANGE UP (ref 135–146)
SP GR SPEC: 1.02 — SIGNIFICANT CHANGE UP (ref 1.01–1.03)
TROPONIN T SERPL-MCNC: <0.01 NG/ML — SIGNIFICANT CHANGE UP
UROBILINOGEN FLD QL: SIGNIFICANT CHANGE UP
WBC # BLD: 5.54 K/UL — SIGNIFICANT CHANGE UP (ref 4.8–10.8)
WBC # FLD AUTO: 5.54 K/UL — SIGNIFICANT CHANGE UP (ref 4.8–10.8)
WBC UR QL: 90 /HPF — HIGH (ref 0–5)

## 2021-10-14 PROCEDURE — 70450 CT HEAD/BRAIN W/O DYE: CPT | Mod: 26,MA

## 2021-10-14 PROCEDURE — 93010 ELECTROCARDIOGRAM REPORT: CPT

## 2021-10-14 PROCEDURE — 71045 X-RAY EXAM CHEST 1 VIEW: CPT | Mod: 26

## 2021-10-14 PROCEDURE — 70496 CT ANGIOGRAPHY HEAD: CPT | Mod: 26,MA

## 2021-10-14 PROCEDURE — 74177 CT ABD & PELVIS W/CONTRAST: CPT | Mod: 26,MA

## 2021-10-14 PROCEDURE — 99285 EMERGENCY DEPT VISIT HI MDM: CPT

## 2021-10-14 RX ORDER — SODIUM CHLORIDE 9 MG/ML
1000 INJECTION INTRAMUSCULAR; INTRAVENOUS; SUBCUTANEOUS ONCE
Refills: 0 | Status: COMPLETED | OUTPATIENT
Start: 2021-10-14 | End: 2021-10-14

## 2021-10-14 RX ORDER — CEFTRIAXONE 500 MG/1
1000 INJECTION, POWDER, FOR SOLUTION INTRAMUSCULAR; INTRAVENOUS ONCE
Refills: 0 | Status: COMPLETED | OUTPATIENT
Start: 2021-10-14 | End: 2021-10-14

## 2021-10-14 RX ORDER — ONDANSETRON 8 MG/1
4 TABLET, FILM COATED ORAL ONCE
Refills: 0 | Status: COMPLETED | OUTPATIENT
Start: 2021-10-14 | End: 2021-10-14

## 2021-10-14 RX ORDER — METOCLOPRAMIDE HCL 10 MG
10 TABLET ORAL ONCE
Refills: 0 | Status: COMPLETED | OUTPATIENT
Start: 2021-10-14 | End: 2021-10-14

## 2021-10-14 RX ADMIN — ONDANSETRON 4 MILLIGRAM(S): 8 TABLET, FILM COATED ORAL at 21:09

## 2021-10-14 RX ADMIN — SODIUM CHLORIDE 1000 MILLILITER(S): 9 INJECTION INTRAMUSCULAR; INTRAVENOUS; SUBCUTANEOUS at 21:09

## 2021-10-14 RX ADMIN — CEFTRIAXONE 100 MILLIGRAM(S): 500 INJECTION, POWDER, FOR SOLUTION INTRAMUSCULAR; INTRAVENOUS at 23:30

## 2021-10-14 RX ADMIN — Medication 10 MILLIGRAM(S): at 23:30

## 2021-10-14 NOTE — ED PROVIDER NOTE - PATIENT PORTAL LINK FT
You can access the FollowMyHealth Patient Portal offered by [facility] by registering at the following website: [email or web address]. By joining [employer]’s FollowMyHealth portal, you will also be able to view your health information using other applications (apps) compatible with our system.

## 2021-10-14 NOTE — ED PROVIDER NOTE - NSFOLLOWUPCLINICS_GEN_ALL_ED_FT
[facility]  Ophthalmolgy  [address], [address]  [place] [address]  Phone: [phone]  Fax:   Follow Up Time: 1-3 Days

## 2021-10-14 NOTE — ED PROVIDER NOTE - PROGRESS NOTE DETAILS
NC: spoke to Dr Mendoza ophthalmologist, recommend CTA head at this time. aware of concern for crao.

## 2021-10-14 NOTE — ED ADULT NURSE NOTE - NSIMPLEMENTINTERV_GEN_ALL_ED
Implemented All Universal Safety Interventions:  [place] to call system. Call bell, personal items and telephone within reach. Instruct patient to call for assistance. Room bathroom lighting operational. Non-slip footwear when patient is off stretcher. Physically safe environment: no spills, clutter or unnecessary equipment. Stretcher in lowest position, wheels locked, appropriate side rails in place.

## 2021-10-14 NOTE — ED PROVIDER NOTE - NSFOLLOWUPINSTRUCTIONS_ED_ALL_ED_FT
**Follow up with primary care doctor and ophthalmologist within 1-3 days**    Urinary Tract Infection, Adult  A urinary tract infection (UTI) is an infection of any part of the urinary tract, which includes the kidneys, ureters, bladder, and urethra. These organs make, store, and get rid of urine in the body. UTI can be a bladder infection (cystitis) or kidney infection (pyelonephritis).    What are the causes?  This infection may be caused by fungi, viruses, or bacteria. Bacteria are the most common cause of UTIs. This condition can also be caused by repeated incomplete emptying of the bladder during urination.    What increases the risk?  This condition is more likely to develop if:    You ignore your need to urinate or hold urine for long periods of time.  You do not empty your bladder completely during urination.  You wipe back to front after urinating or having a bowel movement, if you are female.  You are uncircumcised, if you are male.  You are constipated.  You have a urinary catheter that stays in place (indwelling).  You have a weak defense (immune) system.  You have a medical condition that affects your bowels, kidneys, or bladder.  You have diabetes.  You take antibiotic medicines frequently or for long periods of time, and the antibiotics no longer work well against certain types of infections (antibiotic resistance).  You take medicines that irritate your urinary tract.  You are exposed to chemicals that irritate your urinary tract.  You are female.    What are the signs or symptoms?  Symptoms of this condition include:    Fever.  Frequent urination or passing small amounts of urine frequently.  Needing to urinate urgently.  Pain or burning with urination.  Urine that smells bad or unusual.  Cloudy urine.  Pain in the lower abdomen or back.  Trouble urinating.  Blood in the urine.  Vomiting or being less hungry than normal.  Diarrhea or abdominal pain.  Vaginal discharge, if you are female.    How is this diagnosed?  This condition is diagnosed with a medical history and physical exam. You will also need to provide a urine sample to test your urine. Other tests may be done, including:    Blood tests.  Sexually transmitted disease (STD) testing.    If you have had more than one UTI, a cystoscopy or imaging studies may be done to determine the cause of the infections.    How is this treated?  Treatment for this condition often includes a combination of two or more of the following:    Antibiotic medicine.  Other medicines to treat less common causes of UTI.  Over-the-counter medicines to treat pain.  Drinking enough water to stay hydrated.    Follow these instructions at home:  Take over-the-counter and prescription medicines only as told by your health care provider.  If you were prescribed an antibiotic, take it as told by your health care provider. Do not stop taking the antibiotic even if you start to feel better.  Avoid alcohol, caffeine, tea, and carbonated beverages. They can irritate your bladder.  Drink enough fluid to keep your urine clear or pale yellow.  Keep all follow-up visits as told by your health care provider. This is important.  ImageMake sure to:    Empty your bladder often and completely. Do not hold urine for long periods of time.  Empty your bladder before and after sex.  Wipe from front to back after a bowel movement if you are female. Use each tissue one time when you wipe.    Contact a health care provider if:  You have back pain.  You have a fever.  You feel nauseous or vomit.  Your symptoms do not get better after 3 days.  Your symptoms go away and then return.  Get help right away if:  You have severe back pain or lower abdominal pain.  You are vomiting and cannot keep down any medicines or water.  This information is not intended to replace advice given to you by your health care provider. Make sure you discuss any questions you have with your health care provider.        Nausea, Adult  Nausea is the feeling that you have an upset stomach or that you are about to vomit. Nausea on its own is not usually a serious concern, but it may be an early sign of a more serious medical problem. As nausea gets worse, it can lead to vomiting. If vomiting develops, or if you are not able to drink enough fluids, you are at risk of becoming dehydrated. Dehydration can make you tired and thirsty, cause you to have a dry mouth, and decrease how often you urinate. Older adults and people with other diseases or a weak disease-fighting system (immune system) are at higher risk for dehydration. The main goals of treating your nausea are:  To relieve your nausea.To limit repeated nausea episodes.To prevent vomiting and dehydration.Follow these instructions at home:  Watch your symptoms for any changes. Tell your health care provider about them. Follow these instructions as told by your health care provider.  Eating and drinking         Take an oral rehydration solution (ORS). This is a drink that is sold at pharmacies and retail stores.Drink clear fluids slowly and in small amounts as you are able. Clear fluids include water, ice chips, low-calorie sports drinks, and fruit juice that has water added (diluted fruit juice).Eat bland, easy-to-digest foods in small amounts as you are able. These foods include bananas, applesauce, rice, lean meats, toast, and crackers.Avoid drinking fluids that contain a lot of sugar or caffeine, such as energy drinks, sports drinks, and soda.Avoid alcohol.Avoid spicy or fatty foods.General instructions     Take over-the-counter and prescription medicines only as told by your health care provider.Rest at home while you recover.Drink enough fluid to keep your urine pale yellow.Breathe slowly and deeply when you feel nauseous.Avoid smelling things that have strong odors.Wash your hands often using soap and water. If soap and water are not available, use hand .Make sure that all people in your household wash their hands well and often.Keep all follow-up visits as told by your health care provider. This is important.Contact a health care provider if:  Your nausea gets worse.Your nausea does not go away after two days.You vomit.You cannot drink fluids without vomiting.You have any of the following:  New symptoms.A fever.A headache.Muscle cramps.A rash.Pain while urinating.You feel light-headed or dizzy.Get help right away if:  You have pain in your chest, neck, arm, or jaw.You feel extremely weak or you faint.You have vomit that is bright red or looks like coffee grounds.You have bloody or black stools or stools that look like tar.You have a severe headache, a stiff neck, or both.You have severe pain, cramping, or bloating in your abdomen.You have difficulty breathing or are breathing very quickly.Your heart is beating very quickly.Your skin feels cold and clammy.You feel confused.You have signs of dehydration, such as:  Dark urine, very little urine, or no urine.Cracked lips.Dry mouth.Sunken eyes.Sleepiness.Weakness.These symptoms may represent a serious problem that is an emergency. Do not wait to see if the symptoms will go away. Get medical help right away. Call your local emergency services (911 in the [place] Do not drive yourself to the hospital.   Summary  Nausea is the feeling that you have an upset stomach or that you are about to vomit. Nausea on its own is not usually a serious concern, but it may be an early sign of a more serious medical problem.If vomiting develops, or if you are not able to drink enough fluids, you are at risk of becoming dehydrated.Follow recommendations for eating and drinking and take over-the-counter and prescription medicines only as told by your health care provider.Contact a health care provider right away if your symptoms worsen or you have new symptoms.Keep all follow-up visits as told by your health care provider. This is important.This information is not intended to replace advice given to you by your health care provider. Make sure you discuss any questions you have with your health care provider.

## 2021-10-14 NOTE — ED PROVIDER NOTE - OBJECTIVE STATEMENT
88 yo F pmhx afib (on eliquis), HLD, HTN, CRAO (L eye loss of vision 1 year ago), parkinson's disease presenting to the ED with nausea intermittently x 1 year, worse today, no vomiting, no abd pain. Pt reports at NH they gave her PO zofran without relief. Pt also notes today she is seeing "shapes and spots" in her right eye, no pain. Denies fever, chills, vomiting, chest pain, sob, injury, dysuria, hematuria.

## 2021-10-14 NOTE — ED PROVIDER NOTE - NS ED ROS FT
Constitutional: (-) fever (-) malaise (-) diaphoresis (-) chills (-) wt. loss (-) bodyaches (-) night sweats  Eyes: (+) visual changes (-) eye pain (-) eye discharge (-) photophobia (-) FB sensation  ENMT: (-) nasal or chest congestion (-) runny nose (-) sore throat (-) hoarseness  (-) hearing changes (-) ear pain (-) ear discharge or infections (-) neck pain (-) neck stiffness  Cardiac: (-) chest pain  (-) palpitations (-) syncope (-) edema  Respiratory: (-) cough (-) SOB (-) ANDERSON  GI: (+) nausea (-) vomiting (-) diarrhea (-) abdominal pain  : (-) dysuria (-) increased frequency  (-) hematuria (-) incontinence  MS: (-) back pain (-) myalgia (-) muscle weakness (-)  joint pain  Neuro: (-) headache (-) dizziness (-) numbness/tingling to extremities B/L (-) weakness  Skin: (-) rash (-) laceration    Except as documented in the HPI, all other systems are negative.

## 2021-10-14 NOTE — ED ADULT TRIAGE NOTE - CHIEF COMPLAINT QUOTE
BIBA from [facility] for complaints of abdominal pain, N/V and difficulty seeing from right eye x 3 weeks.

## 2021-10-14 NOTE — ED PROVIDER NOTE - CLINICAL SUMMARY MEDICAL DECISION MAKING FREE TEXT BOX
89yF p/w worsening of her chronic nausea, unresponsive to zofran today.  Pt afebrile and in no resp distress.  EKG w/o ischemia or arrhythmia. Labs w/o JOHN or electrolye abnormality.  UA c/w UTI.  Imaging, including CT, without acute pathology.  Pt started on abx for UTI.  CT also obtained for episode of vision loss (after opthalmology consulted and recommend imaging, given low suspicion that episode is CRAO).  CT did show any acute intracranial pathology.  Recommend supportive care, o/p f/u, return precautions.

## 2021-10-14 NOTE — ED PROVIDER NOTE - ATTENDING CONTRIBUTION TO CARE
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  Pt reports taking her prescribed zofran PO w/o improvement.  No fever, vomiting or abd pain.  Pt also admits to vision loss in her R eye - seeing shapes and spots instead of true colors/patterns.  These vision sx worry her bc they feel very similar to the episode that led to the loss of her L eye vision and now she can only see out of the remaining eye.

## 2021-10-14 NOTE — ED PROVIDER NOTE - CARE PLAN
1 Principal Discharge DX:	Acute UTI  Secondary Diagnosis:	Nausea   Principal Discharge DX:	Acute UTI  Secondary Diagnosis:	Nausea  Secondary Diagnosis:	Vision changes

## 2021-10-14 NOTE — ED PROVIDER NOTE - PHYSICAL EXAMINATION
GENERAL: Well-nourished, Well-developed. NAD.  HEAD: No visible or palpable bumps or hematomas. No ecchymosis behind ears B/L.  Eyes: (+)L eye pupil sluggish (chronic), R eye pupil 3mm and reactive to light and accomodation. EOMI. No asymmetry. No nystagmus. No conjunctival injection. Non-icteric sclera. VA R eye: 20/70  ENMT: MMM.   Neck: Supple. FROM  CVS: Normal S1,S2. No murmurs appreciated on auscultation   RESP: No use of accessory muscles. Chest rise symmetrical with good expansion. Lungs clear to auscultation B/L. No wheezing, rales, or rhonchi auscultated.  GI: Normal auscultation of bowel sounds in all 4 quadrants. Soft, Nontender, Nondistended. No guarding or rebound tenderness. No CVAT B/L.  Skin: Warm, Dry. No rashes or lesions. Good cap refill < 2 sec B/L.  EXT: Radial and pedal pulses present B/L. No calf tenderness or swelling B/L. No palpable cords. No pedal edema B/L.  Neuro: AA&O x 3. Sensation grossly intact. Strength 5/5 B/L. Gait within normal limits.

## 2021-10-15 RX ORDER — CEFPODOXIME PROXETIL 100 MG
1 TABLET ORAL
Qty: 14 | Refills: 0
Start: 2021-10-15 | End: 2021-10-21

## 2021-10-15 RX ADMIN — SODIUM CHLORIDE 1000 MILLILITER(S): 9 INJECTION INTRAMUSCULAR; INTRAVENOUS; SUBCUTANEOUS at 01:00

## 2021-10-15 RX ADMIN — CEFTRIAXONE 1000 MILLIGRAM(S): 500 INJECTION, POWDER, FOR SOLUTION INTRAMUSCULAR; INTRAVENOUS at 01:00

## 2021-11-08 ENCOUNTER — APPOINTMENT (OUTPATIENT)
Dept: CARDIOLOGY | Facility: CLINIC | Age: 86
End: 2021-11-08
Payer: MEDICARE

## 2021-11-08 ENCOUNTER — NON-APPOINTMENT (OUTPATIENT)
Age: 86
End: 2021-11-08

## 2021-11-08 PROCEDURE — 93298 REM INTERROG DEV EVAL SCRMS: CPT | Mod: NC

## 2021-11-08 PROCEDURE — G2066: CPT | Mod: NC

## 2021-11-09 ENCOUNTER — EMERGENCY (EMERGENCY)
Facility: HOSPITAL | Age: 86
LOS: 0 days | Discharge: HOME | End: 2021-11-10
Attending: EMERGENCY MEDICINE | Admitting: EMERGENCY MEDICINE
Payer: MEDICARE

## 2021-11-09 VITALS
SYSTOLIC BLOOD PRESSURE: 125 MMHG | TEMPERATURE: 98 F | HEIGHT: 64 IN | WEIGHT: 110.01 LBS | HEART RATE: 84 BPM | OXYGEN SATURATION: 97 % | RESPIRATION RATE: 17 BRPM | DIASTOLIC BLOOD PRESSURE: 60 MMHG

## 2021-11-09 DIAGNOSIS — E78.5 HYPERLIPIDEMIA, UNSPECIFIED: ICD-10-CM

## 2021-11-09 DIAGNOSIS — I48.91 UNSPECIFIED ATRIAL FIBRILLATION: ICD-10-CM

## 2021-11-09 DIAGNOSIS — R11.0 NAUSEA: ICD-10-CM

## 2021-11-09 DIAGNOSIS — Z90.710 ACQUIRED ABSENCE OF BOTH CERVIX AND UTERUS: Chronic | ICD-10-CM

## 2021-11-09 DIAGNOSIS — I10 ESSENTIAL (PRIMARY) HYPERTENSION: ICD-10-CM

## 2021-11-09 DIAGNOSIS — Z95.818 PRESENCE OF OTHER CARDIAC IMPLANTS AND GRAFTS: Chronic | ICD-10-CM

## 2021-11-09 DIAGNOSIS — N39.0 URINARY TRACT INFECTION, SITE NOT SPECIFIED: ICD-10-CM

## 2021-11-09 DIAGNOSIS — Z98.890 OTHER SPECIFIED POSTPROCEDURAL STATES: Chronic | ICD-10-CM

## 2021-11-09 DIAGNOSIS — Z79.01 LONG TERM (CURRENT) USE OF ANTICOAGULANTS: ICD-10-CM

## 2021-11-09 LAB
ALBUMIN SERPL ELPH-MCNC: 3.8 G/DL — SIGNIFICANT CHANGE UP (ref 3.5–5.2)
ALP SERPL-CCNC: 86 U/L — SIGNIFICANT CHANGE UP (ref 30–115)
ANION GAP SERPL CALC-SCNC: 16 MMOL/L — HIGH (ref 7–14)
APTT BLD: 37.4 SEC — SIGNIFICANT CHANGE UP (ref 27–39.2)
AST SERPL-CCNC: 12 U/L — SIGNIFICANT CHANGE UP (ref 0–41)
BASOPHILS # BLD AUTO: 0.05 K/UL — SIGNIFICANT CHANGE UP (ref 0–0.2)
BASOPHILS NFR BLD AUTO: 0.9 % — SIGNIFICANT CHANGE UP (ref 0–1)
BILIRUB SERPL-MCNC: <0.2 MG/DL — SIGNIFICANT CHANGE UP (ref 0.2–1.2)
BUN SERPL-MCNC: 24 MG/DL — HIGH (ref 10–20)
CALCIUM SERPL-MCNC: 8.9 MG/DL — SIGNIFICANT CHANGE UP (ref 8.5–10.1)
CHLORIDE SERPL-SCNC: 104 MMOL/L — SIGNIFICANT CHANGE UP (ref 98–110)
CO2 SERPL-SCNC: 17 MMOL/L — SIGNIFICANT CHANGE UP (ref 17–32)
CREAT SERPL-MCNC: 1 MG/DL — SIGNIFICANT CHANGE UP (ref 0.7–1.5)
EOSINOPHIL # BLD AUTO: 0.03 K/UL — SIGNIFICANT CHANGE UP (ref 0–0.7)
EOSINOPHIL NFR BLD AUTO: 0.5 % — SIGNIFICANT CHANGE UP (ref 0–8)
GLUCOSE SERPL-MCNC: 96 MG/DL — SIGNIFICANT CHANGE UP (ref 70–99)
HCT VFR BLD CALC: 31.2 % — LOW (ref 37–47)
HGB BLD-MCNC: 9.8 G/DL — LOW (ref 12–16)
IMM GRANULOCYTES NFR BLD AUTO: 0.5 % — HIGH (ref 0.1–0.3)
INR BLD: 1.61 RATIO — HIGH (ref 0.65–1.3)
LIDOCAIN IGE QN: 16 U/L — SIGNIFICANT CHANGE UP (ref 7–60)
LYMPHOCYTES # BLD AUTO: 1.09 K/UL — LOW (ref 1.2–3.4)
LYMPHOCYTES # BLD AUTO: 18.8 % — LOW (ref 20.5–51.1)
MCHC RBC-ENTMCNC: 25.3 PG — LOW (ref 27–31)
MCHC RBC-ENTMCNC: 31.4 G/DL — LOW (ref 32–37)
MCV RBC AUTO: 80.6 FL — LOW (ref 81–99)
MONOCYTES # BLD AUTO: 0.71 K/UL — HIGH (ref 0.1–0.6)
MONOCYTES NFR BLD AUTO: 12.2 % — HIGH (ref 1.7–9.3)
NEUTROPHILS # BLD AUTO: 3.9 K/UL — SIGNIFICANT CHANGE UP (ref 1.4–6.5)
NEUTROPHILS NFR BLD AUTO: 67.1 % — SIGNIFICANT CHANGE UP (ref 42.2–75.2)
NRBC # BLD: 0 /100 WBCS — SIGNIFICANT CHANGE UP (ref 0–0)
PLATELET # BLD AUTO: 308 K/UL — SIGNIFICANT CHANGE UP (ref 130–400)
POTASSIUM SERPL-MCNC: 4.2 MMOL/L — SIGNIFICANT CHANGE UP (ref 3.5–5)
POTASSIUM SERPL-SCNC: 4.2 MMOL/L — SIGNIFICANT CHANGE UP (ref 3.5–5)
PROT SERPL-MCNC: 6.1 G/DL — SIGNIFICANT CHANGE UP (ref 6–8)
PROTHROM AB SERPL-ACNC: 18.4 SEC — HIGH (ref 9.95–12.87)
RBC # BLD: 3.87 M/UL — LOW (ref 4.2–5.4)
RBC # FLD: 14.8 % — HIGH (ref 11.5–14.5)
SODIUM SERPL-SCNC: 137 MMOL/L — SIGNIFICANT CHANGE UP (ref 135–146)
TROPONIN T SERPL-MCNC: <0.01 NG/ML — SIGNIFICANT CHANGE UP
WBC # BLD: 5.81 K/UL — SIGNIFICANT CHANGE UP (ref 4.8–10.8)
WBC # FLD AUTO: 5.81 K/UL — SIGNIFICANT CHANGE UP (ref 4.8–10.8)

## 2021-11-09 PROCEDURE — 93010 ELECTROCARDIOGRAM REPORT: CPT

## 2021-11-09 PROCEDURE — 99285 EMERGENCY DEPT VISIT HI MDM: CPT

## 2021-11-09 PROCEDURE — 71045 X-RAY EXAM CHEST 1 VIEW: CPT | Mod: 26

## 2021-11-09 RX ORDER — ONDANSETRON 8 MG/1
4 TABLET, FILM COATED ORAL ONCE
Refills: 0 | Status: COMPLETED | OUTPATIENT
Start: 2021-11-09 | End: 2021-11-09

## 2021-11-09 RX ORDER — SODIUM CHLORIDE 9 MG/ML
1000 INJECTION INTRAMUSCULAR; INTRAVENOUS; SUBCUTANEOUS ONCE
Refills: 0 | Status: COMPLETED | OUTPATIENT
Start: 2021-11-09 | End: 2021-11-09

## 2021-11-09 RX ADMIN — ONDANSETRON 4 MILLIGRAM(S): 8 TABLET, FILM COATED ORAL at 23:03

## 2021-11-09 RX ADMIN — SODIUM CHLORIDE 1000 MILLILITER(S): 9 INJECTION INTRAMUSCULAR; INTRAVENOUS; SUBCUTANEOUS at 23:03

## 2021-11-09 NOTE — ED PROVIDER NOTE - MDM PATIENT STATEMENT FOR ADDL TREATMENT
[FreeTextEntry1] : reviewed  Patient with one or more new problems requiring additional work-up/treatment.

## 2021-11-09 NOTE — ED PROVIDER NOTE - NSFOLLOWUPINSTRUCTIONS_ED_ALL_ED_FT
TAKE YOUR ANTIBIOTIC AS DIRECTED. RETURN IF YOU DEVELOP FEVER, CHILLS OR ABDOMINAL PAIN     Urinary Tract Infection    A urinary tract infection (UTI) is an infection of any part of the urinary tract, which includes the kidneys, ureters, bladder, and urethra. Risk factors include ignoring your need to urinate, wiping back to front if female, being an uncircumcised male, and having diabetes or a weak immune system. Symptoms include frequent urination, pain or burning with urination, foul smelling urine, cloudy urine, pain in the lower abdomen, blood in the urine, and fever. If you were prescribed an antibiotic medicine, take it as told by your health care provider. Do not stop taking the antibiotic even if you start to feel better.    SEEK IMMEDIATE MEDICAL CARE IF YOU HAVE THE FOLLOWING SYMPTOMS: severe back or abdominal pain, inability to keep fluids or medicine down, dizziness/lightheadedness, or a change in mental status.

## 2021-11-09 NOTE — ED PROVIDER NOTE - ATTENDING CONTRIBUTION TO CARE
88 yo F from [facility] with pmd of a-fib on Eliquis, CRAO, htn, hld, RA, and Parkinsons disease presents with nausea. State that she has had nausea for the last year that has been intermittent. Reports extensive work up for it which has been negative. States that 2 days ago her nausea has worsened, no vomiting or diarrhea. has not eaten since yesterday. no fevers. no abdominal pain. Feels similar to her previous issues.     CONSTITUTIONAL: Well-developed; well-nourished; in no acute distress.   SKIN: warm, dry  HEAD: Normocephalic; atraumatic.  EYES: PERRL, EOMI, no conjunctival erythema  ENT: No nasal discharge; airway clear.  NECK: Supple; non tender.  CARD: S1, S2 normal;  Regular rate and rhythm.   RESP: No wheezes, rales or rhonchi.  ABD: soft non tender, non distended, no rebound or guarding  EXT: Normal ROM.  5/5 strength in all 4 extremities   LYMPH: No acute cervical adenopathy.  NEURO: Alert, oriented, grossly unremarkable. neurovascularly intact  PSYCH: Cooperative, appropriate. 88 yo F from [facility] with pmd of a-fib on Eliquis, CRAO, htn, hld, RA, and Parkinsons disease presents with nausea. State that she has had nausea for the last year that has been intermittent. Reports extensive work up for it which has been negative. States that 2 days ago her nausea has worsened, no vomiting or diarrhea. has not eaten since yesterday. no fevers. no abdominal pain. Feels similar to her previous issues.     CONSTITUTIONAL: Well-developed; well-nourished; in no acute distress.   SKIN: warm, dry  HEAD: Normocephalic; atraumatic.  EYES: PERRL, EOMI, no conjunctival erythema  ENT: No nasal discharge; airway clear. dry mucus membranes  NECK: Supple; non tender.  CARD: S1, S2 normal;  Regular rate and rhythm.   RESP: No wheezes, rales or rhonchi.  ABD: soft non tender, non distended, no rebound or guarding  EXT: Normal ROM.  5/5 strength in all 4 extremities   LYMPH: No acute cervical adenopathy.  NEURO: Alert, oriented, grossly unremarkable. neurovascularly intact  PSYCH: Cooperative, appropriate.

## 2021-11-09 NOTE — ED PROVIDER NOTE - CLINICAL SUMMARY MEDICAL DECISION MAKING FREE TEXT BOX
Patient presents with nausea. Hx of similar episodes in the last year. non tender exam. labs, ekg, xray, urine done. Found to have UTI. abx given. Tolerating po. Discharged with pmd follow up and return precautions.

## 2021-11-09 NOTE — ED PROVIDER NOTE - PHYSICAL EXAMINATION
Physical Exam    Vital Signs: I have reviewed the initial vital signs.  Constitutional: well-nourished, appears stated age, no acute distress, dry appearing  Eyes: Conjunctiva pink, Sclera clear,  Cardiovascular: S1 and S2, regular rate, regular rhythm, well-perfused extremities, radial pulses equal and 2+, calves nonttp, equal in size  Respiratory: unlabored respiratory effort, speaking in full sentences, handling oral secretions, lear to auscultation bilaterally no wheezing, rales and rhonchi  Gastrointestinal: soft, non-tender abdomen, no pulsatile mass, normal bowl sounds  Musculoskeletal: supple neck, no lower extremity edema,   Integumentary: warm, dry, no rashes, lacerations, no pressure ulcers or erythema  Neurologic: awake, alert x 3, no facial droop or slurred speech.

## 2021-11-09 NOTE — ED PROVIDER NOTE - PATIENT PORTAL LINK FT
You can access the FollowMyHealth Patient Portal offered by [facility] by registering at the following website: [email or web address]. By joining [employer]’s FollowMyHealth portal, you will also be able to view your health information using other applications (apps) compatible with our system.

## 2021-11-09 NOTE — ED PROVIDER NOTE - OBJECTIVE STATEMENT
89 y.o. F pmh of isidroonsin  DZ, HTN, CVA, sent from [facility] for some nausea today. As per pt, she gets nausea every 2 days. Denies fever ,chills, cp, couhg, sob, dysuria or hematuria, or abdominal pain or diarrhea. Minimal relief with meds for nausea at nursing home

## 2021-11-10 LAB
ALT FLD-CCNC: <5 U/L — SIGNIFICANT CHANGE UP (ref 0–41)
APPEARANCE UR: CLEAR — SIGNIFICANT CHANGE UP
BACTERIA # UR AUTO: NEGATIVE — SIGNIFICANT CHANGE UP
BILIRUB UR-MCNC: NEGATIVE — SIGNIFICANT CHANGE UP
COLOR SPEC: YELLOW — SIGNIFICANT CHANGE UP
DIFF PNL FLD: NEGATIVE — SIGNIFICANT CHANGE UP
EPI CELLS # UR: 3 /HPF — SIGNIFICANT CHANGE UP (ref 0–5)
GLUCOSE UR QL: NEGATIVE — SIGNIFICANT CHANGE UP
HYALINE CASTS # UR AUTO: 3 /LPF — SIGNIFICANT CHANGE UP (ref 0–7)
KETONES UR-MCNC: NEGATIVE — SIGNIFICANT CHANGE UP
LEUKOCYTE ESTERASE UR-ACNC: NEGATIVE — SIGNIFICANT CHANGE UP
NITRITE UR-MCNC: POSITIVE
PH UR: 7 — SIGNIFICANT CHANGE UP (ref 5–8)
PROT UR-MCNC: NEGATIVE — SIGNIFICANT CHANGE UP
RBC CASTS # UR COMP ASSIST: 5 /HPF — HIGH (ref 0–4)
SP GR SPEC: 1.01 — SIGNIFICANT CHANGE UP (ref 1.01–1.03)
UROBILINOGEN FLD QL: SIGNIFICANT CHANGE UP
WBC UR QL: 2 /HPF — SIGNIFICANT CHANGE UP (ref 0–5)

## 2021-11-10 RX ORDER — CEFPODOXIME PROXETIL 100 MG
1 TABLET ORAL
Qty: 20 | Refills: 0
Start: 2021-11-10 | End: 2021-11-19

## 2021-11-12 LAB
CULTURE RESULTS: SIGNIFICANT CHANGE UP
SPECIMEN SOURCE: SIGNIFICANT CHANGE UP

## 2021-11-18 NOTE — PATIENT PROFILE ADULT - FALL HARM RISK TYPE OF ASSESSMENT
admission Information: Selecting Yes will display possible errors in your note based on the variables you have selected. This validation is only offered as a suggestion for you. PLEASE NOTE THAT THE VALIDATION TEXT WILL BE REMOVED WHEN YOU FINALIZE YOUR NOTE. IF YOU WANT TO FAX A PRELIMINARY NOTE YOU WILL NEED TO TOGGLE THIS TO 'NO' IF YOU DO NOT WANT IT IN YOUR FAXED NOTE.

## 2021-12-13 ENCOUNTER — NON-APPOINTMENT (OUTPATIENT)
Age: 86
End: 2021-12-13

## 2021-12-13 ENCOUNTER — APPOINTMENT (OUTPATIENT)
Dept: CARDIOLOGY | Facility: CLINIC | Age: 86
End: 2021-12-13
Payer: MEDICARE

## 2021-12-13 PROCEDURE — G2066: CPT

## 2021-12-13 PROCEDURE — 93298 REM INTERROG DEV EVAL SCRMS: CPT

## 2022-01-13 ENCOUNTER — NON-APPOINTMENT (OUTPATIENT)
Age: 87
End: 2022-01-13

## 2022-01-19 ENCOUNTER — NON-APPOINTMENT (OUTPATIENT)
Age: 87
End: 2022-01-19

## 2022-01-19 ENCOUNTER — APPOINTMENT (OUTPATIENT)
Dept: CARDIOLOGY | Facility: CLINIC | Age: 87
End: 2022-01-19
Payer: MEDICARE

## 2022-01-19 PROCEDURE — 93298 REM INTERROG DEV EVAL SCRMS: CPT

## 2022-01-19 PROCEDURE — G2066: CPT

## 2022-01-27 ENCOUNTER — APPOINTMENT (OUTPATIENT)
Dept: CARDIOLOGY | Facility: CLINIC | Age: 87
End: 2022-01-27

## 2022-02-28 ENCOUNTER — NON-APPOINTMENT (OUTPATIENT)
Age: 87
End: 2022-02-28

## 2022-02-28 ENCOUNTER — APPOINTMENT (OUTPATIENT)
Dept: CARDIOLOGY | Facility: CLINIC | Age: 87
End: 2022-02-28
Payer: MEDICARE

## 2022-02-28 PROCEDURE — G2066: CPT

## 2022-02-28 PROCEDURE — 93298 REM INTERROG DEV EVAL SCRMS: CPT

## 2022-04-01 NOTE — DISCHARGE NOTE NURSING/CASE MANAGEMENT/SOCIAL WORK - NSCORESITESY/N_GEN_A_CORE_RD
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          Physical Exam    Airway  Mallampati: II  TM Distance: > 6 cm  Neck ROM: normal range of motion   Mouth opening: Normal     Cardiovascular    Rhythm: regular  Rate: normal         Dental  No notable dental hx       Pulmonary  Breath sounds clear to auscultation               Abdominal         Other Findings            Anesthetic Plan    ASA: 2  Anesthesia type: spinal          Induction: Intravenous  Anesthetic plan and risks discussed with: Patient
No

## 2022-04-04 ENCOUNTER — APPOINTMENT (OUTPATIENT)
Dept: CARDIOLOGY | Facility: CLINIC | Age: 87
End: 2022-04-04
Payer: MEDICARE

## 2022-04-04 ENCOUNTER — NON-APPOINTMENT (OUTPATIENT)
Age: 87
End: 2022-04-04

## 2022-04-04 PROCEDURE — 93298 REM INTERROG DEV EVAL SCRMS: CPT

## 2022-04-04 PROCEDURE — G2066: CPT

## 2022-04-14 NOTE — PATIENT PROFILE ADULT - FUNCTIONAL SCREEN CURRENT LEVEL: COMMUNICATION, MLM
CALL RETURN FORM   Reason for patient call? Patient's daughter, Jeremiah Freire, called in on behalf of patient    Patient is currently in the hospital and wants to follow up with Dr Deja Castillo when discharged   Patient's primary oncologist?  Dr Deja Castilol   Name of person the patient was calling for? Dr Deja Castillo   Any additional information to add, if applicable? Scheduled patient for 4/25/22 at 8 with Dr Deja Castillo in [facility]  Jeremiah Freire wants to know if patient should be sooner     Prefers [employer]    Informed patient that the message will be forwarded to the team and someone will get back to them as soon as possible    Did you relay this information to the patient?  Yes
Noted, thank you 
Pt currently still admitted inpatient at [facility] and has f/u scheduled on 4/25 will keep that for now unless he is discharged sooner and then could reschedule for next week possibly
Spoke with [place], notified her that John Cummings wants to cancel Wally's treatment scheduled for this Friday 4/15  Pt is still inpatient, John Cummings would like to see the patient for a hospital f/u apt prior to resuming treatment  Pt was re scheduled for 4/20 9:00 AM QU  Daughter was agreeable to this apt but did ask for him to be placed on the wait list if a sooner apt was to open up  She also requested that recent CT scan from hospitalization be sent to his Dr Michael Irwin with [facility], are you able to place pt on cancellation list for sooner apt if we were to have an opening in [address]  In addition she is requesting CT scan from inpatient to be sent to Dr Michael Irwin with [facility]  Since this was performed in the hospital I believe it has to go through medical records  Are you able to clarify and reach out to the daughter if further instruction is needed 
Spoke with Pamela Ceballos and discussed her dads status  She stated he isn't doing as well as they hoped since his stent placement  He is able to eat some foods but all puree and soup and broth  Pt is still pretty fatigued, will minimal appetite  CT was performed in the hospital  She stated he should be discharged tomorrow or Wednesday  Stated I will discuss this with Jamila Tinsley tomorrow and get back to her in regards to f/u apt and potentially resuming treatment  She was appreciative of this 
0 = understands/communicates without difficulty

## 2022-05-09 ENCOUNTER — APPOINTMENT (OUTPATIENT)
Dept: CARDIOLOGY | Facility: CLINIC | Age: 87
End: 2022-05-09
Payer: MEDICARE

## 2022-05-09 ENCOUNTER — NON-APPOINTMENT (OUTPATIENT)
Age: 87
End: 2022-05-09

## 2022-05-09 PROCEDURE — 93298 REM INTERROG DEV EVAL SCRMS: CPT

## 2022-05-09 PROCEDURE — G2066: CPT

## 2022-05-17 ENCOUNTER — APPOINTMENT (OUTPATIENT)
Dept: UROLOGY | Facility: CLINIC | Age: 87
End: 2022-05-17

## 2022-05-17 NOTE — DISCHARGE NOTE PROVIDER - NSDCQMPCI_CARD_ALL_CORE
"-- DO NOT REPLY / DO NOT REPLY ALL --  -- Message is from the [facility]--      Patient is requesting a medication refill - medication is on active list    Was Medication Pended? Yes. Rx Name and Dose:    B-D U/F PEN NEEDLE 31G X 5 MM Misc    Note:  Patient called pharmacy and they still have not received request.    Duration: n/a days    Pharmacy  [id number]    Patient confirmed the above pharmacy as correct? Yes    Does this request need an existing or new prescription at a pharmacy to be sent to a new pharmacy location? No    Caller Information       Type Contact Phone    05/17/2022 09:32 AM CDT Phone (Incoming) Mariah Stallworth (Self) [phone] (H)          Alternative phone number: none    Turnaround time given to caller: ""This message will be sent to [facility] Provider's name]. The clinical team will fulfill your request as soon as they review your message. \""  "
No

## 2022-06-13 ENCOUNTER — NON-APPOINTMENT (OUTPATIENT)
Age: 87
End: 2022-06-13

## 2022-06-13 ENCOUNTER — APPOINTMENT (OUTPATIENT)
Dept: CARDIOLOGY | Facility: CLINIC | Age: 87
End: 2022-06-13
Payer: MEDICARE

## 2022-06-13 PROCEDURE — G2066: CPT

## 2022-06-13 PROCEDURE — 93298 REM INTERROG DEV EVAL SCRMS: CPT

## 2022-07-18 ENCOUNTER — APPOINTMENT (OUTPATIENT)
Dept: CARDIOLOGY | Facility: CLINIC | Age: 87
End: 2022-07-18

## 2022-07-31 NOTE — ED ADULT TRIAGE NOTE - MODE OF ARRIVAL
7/29/22: POD#0 s/p primary LTCS for NRFHT's.  Doing well  -routine post-op care and advances  -gallardo to be removed later this afternoon.    7/30/22:  POD#1  -pain improved  -encouraged ambulation  -shower and hand hygiene reviewed    7/31/22:  POD#2  -continue routine post-op care   Ambulance EMS

## 2022-09-02 NOTE — DISCHARGE NOTE PROVIDER - NSDCDCMDCOMP_GEN_ALL_CORE
This document is complete and the patient is ready for discharge. Inflammation Suggestive Of Cancer Camouflage Histology Text: There was a dense lymphocytic infiltrate which prevented adequate histologic evaluation of adjacent structures.

## 2022-10-03 NOTE — ED ADULT NURSE NOTE - CAS ELECT INFOMATION PROVIDED
OT BEDSIDE TREATMENT NOTE      Date:10/3/2022  Patient Name: Chas Crespo  MRN: [id number]  : 1952  Room: [address] [place] [address]       Evaluating OT: Rosalba Magallon OTR/L #[id number]      Referring Provider and Specific Provider Orders/Date:      22   OT eval and treat  Start:  22 1400,   End:  22 1400,   ONE TIME,   Standing Count:  1 Occurrences,   R         Alejandra Eldridge DO       Placement Recommendation:  Home Health Occupational Therapy with family supervision/assist if patient is able to meet goals         Diagnosis:   1. Acute on chronic anemia    2. Symptomatic anemia    3. Fatigue, unspecified type    4. Chronic kidney disease, unspecified CKD stage    5. Hyponatremia    6. Hypomagnesemia         Surgery: None       Pertinent Medical History:       Past Medical History        Past Medical History:   Diagnosis Date    Diverticulitis      Gout      Hypertension      Tobacco abuse              Past Surgical History         Past Surgical History:   Procedure Laterality Date    APPENDECTOMY        CATARACT REMOVAL WITH IMPLANT Right 2  15    CATARACT REMOVAL WITH IMPLANT Left 4/6/15    COLONOSCOPY   7 years ago     Dr Rome Seen Right 2022     RIGHT HIP HEMIARTHROPLASTY performed by Tao Malhotra DO at [address]         child         Precautions: Fall Risk, alarm, up with assistance, droplet plus isolation, COVID positive, R hip hemiarthroplasty 2022, hip precautions         MRI OF THE BRAIN WITHOUT AND WITH CONTRAST  8/15/2022 12:01 pm  1. Tiny acute infarcts are seen involving the parietal lobes bilaterally,   right more than left. A punctate acute infarct also seen in the right   occipital lobe. TWO XRAY VIEWS OF THE RIGHT HIP 2022 12:06 pm   Right hip arthroplasty with no unexpected findings. Unchanged left hip   osteoarthritis.        Assessment of current deficits    [x] Functional mobility            [x]ADLs           [x] Strength                   []Cognition    [x] Functional transfers          [x] IADLs          [x] Safety Awareness   [x]Endurance    [] Fine Coordination              [x] Balance      [] Vision/perception    []Sensation      []Gross Motor Coordination  [] ROM           [] Delirium                   [] Motor Control      OT PLAN OF CARE   OT POC based on physician orders, patient diagnosis and results of clinical assessment     Frequency/Duration 1-3 days/wk for 2 weeks  PRN      Specific OT Treatment Interventions to include:   * Instruction/training on adapted ADL techniques and AE recommendations to increase functional independence within precautions       * Training on energy conservation strategies, correct breathing pattern and techniques to improve independence/tolerance for self-care routine  * Functional transfer/mobility training/DME recommendations for increased independence, safety, and fall prevention  * Patient/Family education to increase follow through with safety techniques and functional independence  * Recommendation of environmental modifications for increased safety with functional transfers/mobility and ADLs  * Therapeutic exercise to improve motor endurance, ROM, and functional strength for ADLs/functional transfers  * Therapeutic activities to facilitate/challenge dynamic balance, stand tolerance for increased safety and independence with ADLs     Recommended Adaptive Equipment: TBD      Home Living: Lives alone, single family home, 1 story, 4 steps to enter with rail. Bathroom set-up: tub/shower, grab bars. Equipment owned: cane, wheeled walker, elevated commode, shower chair. Prior Level of Function: pt reports being independent with ADLs at baseline, has assist with IADLs; ambulated independently.       Pain Level: pt denied pain at this time                Cognition: A&O: 4/4; Follows 3 step directions              Memory: fair               Sequencing: fair Problem solving: fair               Judgement/safety: fair      [facility]   AM-PAC Daily Activity Inpatient   How much help for putting on and taking off regular lower body clothing?: A Lot  How much help for Bathing?: A Lot  How much help for Toileting?: A Little  How much help for putting on and taking off regular upper body clothing?: A Little  How much help for taking care of personal grooming?: A Little  How much help for eating meals?: None  AM-PAC Inpatient Daily Activity Raw Score: 17                Functional Assessment:     Initial Eval Status  Date: 9/30/22    Treatment Status  Date:10/3/22 STGs = LTGs  Time frame: 10-14 days   Feeding Supervision    Pt able to bring cup to mouth and eat grape at supervision  Independent    Grooming Minimal Assist    N/T; pt declined at this time, waiting to go home to get in shower  Moderate [place]    UB Dressing Moderate Assist    Min A to tie/adjust back of gown when seated EOB  Moderate [place]    LB Dressing Maximal Assist   Using sock aide to don sock over feet      N/T Moderate [place]    Bathing Moderate Assist    N/T; pt declined - wants to get in shower when he gets home Moderate [place]    Toileting Dependent   For rear hygiene while standing at at bedside. Pt would benefit from 3:1 commode 2/2 hip precautions. Nursing aware. Transfer to/from bedside commode with Min A; cuing for hand placement, walker safety  Moderate [place]    Bed Mobility  Supine to sit: Minimal Assist   Sit to supine: Not Assessed     Supervision for supine to sit; supervision for scooting; sit to supine at supervision with height of bed elevated to simulate height of bed at home; pt able to support B LE's to supine; HOB elevated Supine to sit: Independent   Sit to supine: Independent    Functional Transfers Sit to stand: Minimal Assist   Stand to sit: Minimal Assist      Transfer training with verbal cues for hand placement throughout session to improve safety. SBA for sit to stand transfers to/from EOB and to/from [facility] with FWW; cuing for safety, sequencing  Independent    Functional Mobility Minimal Assist with wheeled walker to improve balance to/from bathroom, verbal cues for walker sequence and safety. Min A progressing to SBA with FWW for household distances in and around room; no LOB noted  Moderate [place] with use of wheeled walker    Balance Sitting:     Static: good    Dynamic: fair plus   Standing: fair with walker     Sitting:     Static: good    Dynamic: fair plus   Standing: fair with walker   Sitting:     Static: good    Dynamic: good  Standing: good with walker    Activity Tolerance fair   fair  Increase standing tolerance >3 minutes for improved engagement with functional transfers and indep in ADLs      Visual/  Perceptual Glasses: Yes       NA       Hand Dominance:      Hearing: WFL   Sensation:  No c/o numbness or tingling  Tone: WFL   Edema: none noted     Comments: Patient cleared by nursing staff. Upon arrival pt supine in bed. Pt agreeable to OT tx session. Pt educated with regards to bed mobility, hand placement, safety awareness, static sitting balance,  standing balance, transfer training, functional mobility, BSC transfer, simulated bed mobility with height of bed elevated, ECT's. At end of session pt seated  in bed with HOB elevated with all lines and tubes intact, call light within reach. Overall, pt demonstrated decreased independence and safety during completion of ADL/functional transfers/mobility tasks. Pt would benefit from continued skilled OT to increase safety and independence with completion of ADL/IADL tasks for functional independence and quality of life. Pt required cues and education as noted above for safe facilitation and completion of tasks. Therapist provided skilled monitoring of patient's response during treatment session.  Prior to and at the end of session, environmental modifications / line management completed for patients safety and efficiency of treatment session. Overall, patient demonstrates moderate difficulties with completion of BADLs and IADLs. Factors contributing to these difficulties include hip precautions, decreased endurance, and generalized weakness. As noted above, patient likely to benefit from further OT intervention to increase independence, safety, and overall quality of life. Treatment:     Bed mobility: Facilitated bed mobility with cues for proper body mechanics and sequencing to prepare for ADL completion. Functional transfers: Facilitated transfers from various surfaces with cues for body alignment, safety and hand placement. Postural Balance: Sitting/standing balance retraining to improve righting reactions with postural changes during ADLs. Skilled positioning: Proper positioning to improve interaction with environment, overall functioning     Pt has made fair progress towards set goals    OT 1-3 days/wk for 2 weeks PRN     Treatment Time also includes thorough review of current medical information, gathering information on past medical history/social history and prior level of function, informal observation of tasks, assessment of data and education on plan of care and goals.     Treatment Time In: 12:13 PM     Treatment Time Out: 12:38 PM            Treatment Charges: Mins Units   ADL/Home Mgt     [address],[facility] [id number] 25 2   Ther Ex                 [id number]       Manual Therapy    [id number]     Neuro Re-ed         [id number]     Orthotic manage/training                               [id number]     Non Billable Time     Total Timed Treatment 25 520 [facility] BRITO/L #[id number] DC instructions

## 2023-01-01 NOTE — ED PROVIDER NOTE - NS ED MD EM SELECTION
NP swab performed/collected and sent to lab for processing.   Discharge instructions including the importance of hydration, the use of OTC medications, information on 1. Viral upper respiratory illness      2. Eczema, unspecified type     and the proper follow up recommendations have been provided. Verbalizes understanding.  Confirms all questions have been answered.  A copy of the discharge instructions have been provided.  A signed copy is in the chart.  All pertinent medications reviewed.   Child out of department; pt in NAD, awake, alert, interactive and age appropriate     
[address] Comprehensive

## 2023-01-13 NOTE — PATIENT PROFILE ADULT - DO YOU FEEL THREATENED BY OTHERS?
Requested Prescriptions     Pending Prescriptions Disp Refills    FLUoxetine (PROZAC) 10 MG capsule [Pharmacy Med Name: FLUoxetine HCl 10 MG Oral Capsule] 90 capsule 0     Sig: TAKE 1 CAPSULE BY MOUTH ONCE DAILY IN ADDITION TO 20MG CAPSULE FOR A TOTAL OF 30MG DAILY    Pt on last pill, pharmacy states they did not get the refill for medication
no

## 2023-01-17 NOTE — OCCUPATIONAL THERAPY INITIAL EVALUATION ADULT - TRANSFER SAFETY CONCERNS NOTED: TOILET, REHAB EVAL
Physical Therapy Visit    Visit Type: Daily Treatment Note  Visit: 10  Referring Provider: Germania Gr PA-C  Medical Diagnosis (from order): Diagnosis Information    Diagnosis  V58.89, 959.7 (ICD-9-CM) - S99.912D (ICD-10-CM) - Left ankle injury, subsequent encounter         SUBJECTIVE                                                                                                               Pt states that he is feeling pretty good: States he thinks he is over the worse of it    Pt states that he did get 2 new pairs of shoes and it is expensive       OBJECTIVE                                                                                                                                       Treatment     Therapeutic Exercise  AVOID EXTREME ROM-- do not expect ROM gains    Nu Step L5 X 7 minutes  Gentle heel cord stretch with toes only on stant board 20 sec X 3  Heel raises X 10 with rudolph UE support  Step ups 6 inch step X 10 with rudolph UE support alternating  lateral step up 6 X 10    step up and over 2 inch step X 10 (unable on 4 inch step due to limitations in DF ROM)    Active ankle pumps  Ankle circles with minimal excursion   Sit to stand X 5 with no UE support- cues for equal weight shift  Seated hip flexion 2x15 BTB  Seated hip abd blue band 2x15     Therapeutic Activity    Tandem stance 30 sec X 2 foam with intermittent UE support  Stance on foam X 30 sec, eyes open, eyes closed, head turns  gait over 6 onch cones- UE for forward, no UR lateral  Weight shift dynadisc 25 % with UE support  Balance board staggered stance weight shift with 2 inch step with left foot forward to block anterior X 10  Lateral weight shifts balance board    Gait Training  Ll bars no UE support X 6 lengths  With rudolph shoes  Gait in clinic with shoes  Slalom gait around cones, cone       Activities of Daily Living/Self Care        Skilled input: verbal instruction/cues and as detailed above    Writer verbally educated and  received verbal consent for hand placement, positioning of patient, and techniques to be performed today from patient for therapist position for techniques as described above and how they are pertinent to the patient's plan of care.    Home Exercise Program  Access Code: [email or web address]  URL: [email or web address]  Date: 01/05/2023  Prepared by: Mara Gonzalez    Exercises  ? Seated Long Arc Quad - 2 x daily - 2 sets - 10 reps - 5 hold  ? Long Sitting Quad Set - 2 x daily - 2 sets - 10 reps - 5 hold  ? Supine Straight Leg Raises - 2 x daily - 2 sets - 10 reps  ? Towel Scrunches - 2 x daily - 2 x weekly - 10 reps  ? Hooklying Clamshell with Resistance - 1-2 x daily - 2 sets - 10 reps  ? Seated March - 1-2 x daily - 2 sets - 10 reps  ? Hip Out to the side ONLY LEFT LEG - 1-2 x daily - 2 sets - 10 reps  ? LEFT LEG ONLY Hip Backwards - 1-2 x daily - 2 sets - 10 reps                   ASSESSMENT                                                                                                            Pt was able to perform current program this date  Pt was able to increase ease with ambulation but is still limited with full gait pattern due to ankle ROM and limitations  Pt is still having difficulty with increasing tolerance to being on LE at this time, but is progressing overall     PLAN                                                                                                                           Suggestions for next session as indicated: Progress per plan of care: Continue to focus on LE strength and endurance: Gait and balance       Therapy procedure time and total treatment time can be found documented on the Time Entry flowsheet     TBA

## 2023-05-01 NOTE — ED ADULT NURSE NOTE - NSIMPLEMENTINTERV_GEN_ALL_ED
Implemented All Fall with Harm Risk Interventions:  [place] to call system. Call bell, personal items and telephone within reach. Instruct patient to call for assistance. Room bathroom lighting operational. Non-slip footwear when patient is off stretcher. Physically safe environment: no spills, clutter or unnecessary equipment. Stretcher in lowest position, wheels locked, appropriate side rails in place. Provide visual cue, wrist band, yellow gown, etc. Monitor gait and stability. Monitor for mental status changes and reorient to person, place, and time. Review medications for side effects contributing to fall risk. Reinforce activity limits and safety measures with patient and family. Provide visual clues: red socks. DISPLAY PLAN FREE TEXT DISPLAY PLAN FREE TEXT DISPLAY PLAN FREE TEXT DISPLAY PLAN FREE TEXT DISPLAY PLAN FREE TEXT DISPLAY PLAN FREE TEXT DISPLAY PLAN FREE TEXT DISPLAY PLAN FREE TEXT DISPLAY PLAN FREE TEXT DISPLAY PLAN FREE TEXT DISPLAY PLAN FREE TEXT DISPLAY PLAN FREE TEXT DISPLAY PLAN FREE TEXT DISPLAY PLAN FREE TEXT DISPLAY PLAN FREE TEXT DISPLAY PLAN FREE TEXT DISPLAY PLAN FREE TEXT DISPLAY PLAN FREE TEXT DISPLAY PLAN FREE TEXT DISPLAY PLAN FREE TEXT DISPLAY PLAN FREE TEXT DISPLAY PLAN FREE TEXT DISPLAY PLAN FREE TEXT DISPLAY PLAN FREE TEXT DISPLAY PLAN FREE TEXT DISPLAY PLAN FREE TEXT DISPLAY PLAN FREE TEXT

## 2023-05-08 ENCOUNTER — EMERGENCY (EMERGENCY)
Facility: HOSPITAL | Age: 88
LOS: 0 days | Discharge: HOME | End: 2023-05-09
Attending: EMERGENCY MEDICINE
Payer: MEDICARE

## 2023-05-08 VITALS
DIASTOLIC BLOOD PRESSURE: 74 MMHG | OXYGEN SATURATION: 94 % | TEMPERATURE: 98 F | WEIGHT: 130.07 LBS | RESPIRATION RATE: 14 BRPM | HEIGHT: 64 IN | HEART RATE: 93 BPM | SYSTOLIC BLOOD PRESSURE: 144 MMHG

## 2023-05-08 DIAGNOSIS — Z95.818 PRESENCE OF OTHER CARDIAC IMPLANTS AND GRAFTS: Chronic | ICD-10-CM

## 2023-05-08 DIAGNOSIS — Z86.79 PERSONAL HISTORY OF OTHER DISEASES OF THE CIRCULATORY SYSTEM: ICD-10-CM

## 2023-05-08 DIAGNOSIS — Z79.01 LONG TERM (CURRENT) USE OF ANTICOAGULANTS: ICD-10-CM

## 2023-05-08 DIAGNOSIS — R11.0 NAUSEA: ICD-10-CM

## 2023-05-08 DIAGNOSIS — R50.9 FEVER, UNSPECIFIED: ICD-10-CM

## 2023-05-08 DIAGNOSIS — Z98.890 OTHER SPECIFIED POSTPROCEDURAL STATES: Chronic | ICD-10-CM

## 2023-05-08 DIAGNOSIS — I10 ESSENTIAL (PRIMARY) HYPERTENSION: ICD-10-CM

## 2023-05-08 DIAGNOSIS — Z90.710 ACQUIRED ABSENCE OF BOTH CERVIX AND UTERUS: Chronic | ICD-10-CM

## 2023-05-08 DIAGNOSIS — G20 PARKINSON'S DISEASE: ICD-10-CM

## 2023-05-08 DIAGNOSIS — I48.91 UNSPECIFIED ATRIAL FIBRILLATION: ICD-10-CM

## 2023-05-08 DIAGNOSIS — E78.00 PURE HYPERCHOLESTEROLEMIA, UNSPECIFIED: ICD-10-CM

## 2023-05-08 DIAGNOSIS — Z90.710 ACQUIRED ABSENCE OF BOTH CERVIX AND UTERUS: ICD-10-CM

## 2023-05-08 DIAGNOSIS — M06.9 RHEUMATOID ARTHRITIS, UNSPECIFIED: ICD-10-CM

## 2023-05-08 PROCEDURE — 93005 ELECTROCARDIOGRAM TRACING: CPT

## 2023-05-08 PROCEDURE — 80053 COMPREHEN METABOLIC PANEL: CPT

## 2023-05-08 PROCEDURE — 71045 X-RAY EXAM CHEST 1 VIEW: CPT

## 2023-05-08 PROCEDURE — 81003 URINALYSIS AUTO W/O SCOPE: CPT

## 2023-05-08 PROCEDURE — 83605 ASSAY OF LACTIC ACID: CPT

## 2023-05-08 PROCEDURE — 87086 URINE CULTURE/COLONY COUNT: CPT

## 2023-05-08 PROCEDURE — 83690 ASSAY OF LIPASE: CPT

## 2023-05-08 PROCEDURE — 87040 BLOOD CULTURE FOR BACTERIA: CPT

## 2023-05-08 PROCEDURE — 96374 THER/PROPH/DIAG INJ IV PUSH: CPT

## 2023-05-08 PROCEDURE — 99285 EMERGENCY DEPT VISIT HI MDM: CPT | Mod: 25

## 2023-05-08 PROCEDURE — 36415 COLL VENOUS BLD VENIPUNCTURE: CPT

## 2023-05-08 PROCEDURE — 85025 COMPLETE CBC W/AUTO DIFF WBC: CPT

## 2023-05-08 PROCEDURE — 84484 ASSAY OF TROPONIN QUANT: CPT

## 2023-05-08 PROCEDURE — 99285 EMERGENCY DEPT VISIT HI MDM: CPT

## 2023-05-08 PROCEDURE — 87186 SC STD MICRODIL/AGAR DIL: CPT

## 2023-05-08 PROCEDURE — 81001 URINALYSIS AUTO W/SCOPE: CPT

## 2023-05-08 PROCEDURE — 74177 CT ABD & PELVIS W/CONTRAST: CPT | Mod: MA

## 2023-05-08 NOTE — ED ADULT TRIAGE NOTE - GLASGOW COMA SCALE: BEST MOTOR RESPONSE, MLM
September 27, 2018     Scooby English MD  [address] [place] [id number]    Patient: Roger Handley   YOB: 1949   Date of Visit: 9/27/2018       Dear Dr Krzysztof Mills: Thank you for referring Emily Paredes to me for evaluation  Below are my notes for this consultation  If you have questions, please do not hesitate to call me  I look forward to following your patient along with you  Sincerely,        Nazanin Martinez MD        CC: No Recipients  Nazanin Martinez MD  9/27/2018  5:09 PM  Sign at close encounter  Follow-up Note - [facility]  Roger Handley 1949 71 y o  male     ASSESSMENT @ PLAN:   He is a 45-year-old male with gastroesophageal reflux disease and short-segment Atwood's esophagus who is here with improving abdominal pain which is related to his  Anxiety  1 he will continue on the pantoprazole 40 mg daily  2 I want him to take the famotidine right now twice a day and then taper off to as needed    3 we talked about coping strategies for his reflux and his anxiety    Reason:   Abdominal pain and reflux    HPI:   He is a 45-year-old male here with abdominal pain and reflux  The patient has known gastroesophageal reflux disease and Atwood's esophagus which is been under control for a long time with pantoprazole  He reports he had a break-up in his relationship and he has had severe reflux in the last few months with associated with anxiety  The reflux heartburn regurgitation is under control with the extra famotidine but he still has an ache in the abdomen after eating sometimes that he calls a when his stomach being upset  He reports at times he does not want to eat  He reports a pressure  He reports that he has not been eating as much she has a 10 lb weight loss  He reports that this is actually improving and it was much worse when he was per  over his anxiety symptoms    He has no melena hematochezia no fevers chills  We did the endoscopy and showed stable Atwood's esophagus  This was done a few weeks ago  REVIEW OF SYSTEMS:     CONSTITUTIONAL: Denies any fever, chills, or rigors  Good appetite, and no recent weight loss  HEENT: No earache or tinnitus  Denies hearing loss or visual disturbances  CARDIOVASCULAR: No chest pain or palpitations  RESPIRATORY: Denies any cough, hemoptysis, shortness of breath or dyspnea on exertion  GASTROINTESTINAL: As noted in the History of Present Illness  GENITOURINARY: No problems with urination  Denies any hematuria or dysuria  NEUROLOGIC: No dizziness or vertigo, denies headaches  MUSCULOSKELETAL: Denies any muscle or joint pain  SKIN: Denies skin rashes or itching  ENDOCRINE: Denies excessive thirst  Denies intolerance to heat or cold  PSYCHOSOCIAL: Denies depression or anxiety  Denies any recent memory loss  Past Medical History:   Diagnosis Date    Atwood's esophagus     Diverticulitis     Diverticulitis     GERD (gastroesophageal reflux disease)     Hiatal hernia     Hyperlipidemia     Hypertension     Spinal stenosis     Spinal stenosis     Vitamin B 12 deficiency       Past Surgical History:   Procedure Laterality Date    APPENDECTOMY      COLONOSCOPY      AK COLONOSCOPY FLX DX W/COLLJ SPEC WHEN PFRMD N/A 11/16/2017    Procedure: COLONOSCOPY;  Surgeon: Aris Tsang MD;  Location: [facility]; Service: Gastroenterology    AK ESOPHAGOGASTRODUODENOSCOPY TRANSORAL DIAGNOSTIC N/A 8/15/2018    Procedure: ESOPHAGOGASTRODUODENOSCOPY (EGD); Surgeon: Aris Tsang MD;  Location: [facility]; Service: Gastroenterology     Social History     Social History    Marital status: Single     Spouse name: N/A    Number of children: N/A    Years of education: N/A     Occupational History    Not on file       Social History Main Topics    Smoking status: Never Smoker    Smokeless tobacco: Former User Types: Chew    Alcohol use 3 0 oz/week     5 Cans of beer per week    Drug use: No    Sexual activity: Not on file     Other Topics Concern    Not on file     Social History Narrative    No narrative on file     Family History   Problem Relation Age of Onset    Cancer Mother     No Known Problems Father      Patient has no known allergies  Current Outpatient Prescriptions   Medication Sig Dispense Refill    aspirin 81 MG tablet Take 81 mg by mouth daily      atorvastatin (LIPITOR) 10 mg tablet Take 10 mg by mouth daily      Cyanocobalamin 1000 MCG/ML KIT 1000 mcg      famotidine (PEPCID) 40 MG tablet Take 1 tablet (40 mg total) by mouth daily 30 tablet 2    felodipine (PLENDIL) 5 mg 24 hr tablet Take 5 mg by mouth daily      hydrochlorothiazide (HYDRODIURIL) 12 5 mg tablet Take 12 5 mg by mouth daily      LORazepam (ATIVAN) 1 mg tablet Take 0 5 tablets (0 5 mg total) by mouth 3 (three) times a day as needed for anxiety for up to 10 days 30 tablet 0    metoprolol succinate (TOPROL-XL) 50 mg 24 hr tablet Take 25 mg by mouth daily      montelukast (SINGULAIR) 10 mg tablet Take 10 mg by mouth daily at bedtime      pantoprazole (PROTONIX) 40 mg tablet Take 40 mg by mouth daily      potassium chloride (K-DUR) 10 mEq tablet Take 10 mEq by mouth       No current facility-administered medications for this visit  Blood pressure 120/80, pulse 67, weight 69 2 kg (152 lb 8 oz)  PHYSICAL EXAM:     General Appearance:   Alert, cooperative, no distress, appears stated age    HEENT:   Normocephalic, atraumatic, anicteric      Neck:  Supple, symmetrical, trachea midline, no adenopathy;    thyroid: no enlargement/tenderness/nodules; no carotid  bruit or JVD    Lungs:   Clear to auscultation bilaterally; no rales, rhonchi or wheezing; respirations unlabored    Heart[de-identified]   S1 and S2 normal; regular rate and rhythm; no murmur, rub, or gallop     Abdomen:   Soft, non-tender, non-distended; normal bowel sounds; no masses, no organomegaly    Genitalia:   Deferred    Rectal:   Deferred    Extremities:  No cyanosis, clubbing or edema    Pulses:  2+ and symmetric all extremities    Skin:  Skin color, texture, turgor normal, no rashes or lesions    Lymph nodes:  No palpable cervical, axillary or inguinal lymphadenopathy        Lab Results   Component Value Date    WBC 7 15 02/11/2018    HGB 13 1 02/11/2018    HCT 38 4 02/11/2018    MCV 96 02/11/2018     02/11/2018     Lab Results   Component Value Date    CALCIUM 9 4 02/11/2018     02/11/2018    K 3 5 02/11/2018    CO2 32 02/11/2018     02/11/2018    BUN 15 02/11/2018    CREATININE 1 15 02/11/2018     Lab Results   Component Value Date    ALT 26 02/11/2018    AST 17 02/11/2018    ALKPHOS 65 02/11/2018     No results found for: INR, PROTIME (M6) obeys commands

## 2023-05-09 VITALS — TEMPERATURE: 98 F | DIASTOLIC BLOOD PRESSURE: 70 MMHG | HEART RATE: 81 BPM | SYSTOLIC BLOOD PRESSURE: 143 MMHG

## 2023-05-09 LAB
ALBUMIN SERPL ELPH-MCNC: 3.8 G/DL — SIGNIFICANT CHANGE UP (ref 3.5–5.2)
ALP SERPL-CCNC: 91 U/L — SIGNIFICANT CHANGE UP (ref 30–115)
ALT FLD-CCNC: <5 U/L — SIGNIFICANT CHANGE UP (ref 0–41)
ANION GAP SERPL CALC-SCNC: 15 MMOL/L — HIGH (ref 7–14)
APPEARANCE UR: CLEAR — SIGNIFICANT CHANGE UP
AST SERPL-CCNC: 24 U/L — SIGNIFICANT CHANGE UP (ref 0–41)
BACTERIA # UR AUTO: ABNORMAL
BASOPHILS # BLD AUTO: 0.05 K/UL — SIGNIFICANT CHANGE UP (ref 0–0.2)
BASOPHILS NFR BLD AUTO: 0.8 % — SIGNIFICANT CHANGE UP (ref 0–1)
BILIRUB SERPL-MCNC: 0.4 MG/DL — SIGNIFICANT CHANGE UP (ref 0.2–1.2)
BILIRUB UR-MCNC: NEGATIVE — SIGNIFICANT CHANGE UP
BUN SERPL-MCNC: 16 MG/DL — SIGNIFICANT CHANGE UP (ref 10–20)
CALCIUM SERPL-MCNC: 9.2 MG/DL — SIGNIFICANT CHANGE UP (ref 8.4–10.5)
CHLORIDE SERPL-SCNC: 103 MMOL/L — SIGNIFICANT CHANGE UP (ref 98–110)
CO2 SERPL-SCNC: 22 MMOL/L — SIGNIFICANT CHANGE UP (ref 17–32)
COLOR SPEC: ABNORMAL
CREAT SERPL-MCNC: 0.9 MG/DL — SIGNIFICANT CHANGE UP (ref 0.7–1.5)
DIFF PNL FLD: NEGATIVE — SIGNIFICANT CHANGE UP
EGFR: 61 ML/MIN/1.73M2 — SIGNIFICANT CHANGE UP
EOSINOPHIL # BLD AUTO: 0.04 K/UL — SIGNIFICANT CHANGE UP (ref 0–0.7)
EOSINOPHIL NFR BLD AUTO: 0.6 % — SIGNIFICANT CHANGE UP (ref 0–8)
EPI CELLS # UR: 5 /HPF — SIGNIFICANT CHANGE UP (ref 0–5)
GLUCOSE SERPL-MCNC: 126 MG/DL — HIGH (ref 70–99)
GLUCOSE UR QL: NEGATIVE — SIGNIFICANT CHANGE UP
HCT VFR BLD CALC: 44.2 % — SIGNIFICANT CHANGE UP (ref 37–47)
HGB BLD-MCNC: 14.5 G/DL — SIGNIFICANT CHANGE UP (ref 12–16)
HYALINE CASTS # UR AUTO: 1 /LPF — SIGNIFICANT CHANGE UP (ref 0–7)
IMM GRANULOCYTES NFR BLD AUTO: 1.6 % — HIGH (ref 0.1–0.3)
KETONES UR-MCNC: NEGATIVE — SIGNIFICANT CHANGE UP
LACTATE SERPL-SCNC: 1.4 MMOL/L — SIGNIFICANT CHANGE UP (ref 0.7–2)
LEUKOCYTE ESTERASE UR-ACNC: NEGATIVE — SIGNIFICANT CHANGE UP
LIDOCAIN IGE QN: 12 U/L — SIGNIFICANT CHANGE UP (ref 7–60)
LYMPHOCYTES # BLD AUTO: 0.6 K/UL — LOW (ref 1.2–3.4)
LYMPHOCYTES # BLD AUTO: 9.6 % — LOW (ref 20.5–51.1)
MCHC RBC-ENTMCNC: 29.1 PG — SIGNIFICANT CHANGE UP (ref 27–31)
MCHC RBC-ENTMCNC: 32.8 G/DL — SIGNIFICANT CHANGE UP (ref 32–37)
MCV RBC AUTO: 88.8 FL — SIGNIFICANT CHANGE UP (ref 81–99)
MONOCYTES # BLD AUTO: 0.6 K/UL — SIGNIFICANT CHANGE UP (ref 0.1–0.6)
MONOCYTES NFR BLD AUTO: 9.6 % — HIGH (ref 1.7–9.3)
NEUTROPHILS # BLD AUTO: 4.86 K/UL — SIGNIFICANT CHANGE UP (ref 1.4–6.5)
NEUTROPHILS NFR BLD AUTO: 77.8 % — HIGH (ref 42.2–75.2)
NITRITE UR-MCNC: NEGATIVE — SIGNIFICANT CHANGE UP
NRBC # BLD: 0 /100 WBCS — SIGNIFICANT CHANGE UP (ref 0–0)
PH UR: 6.5 — SIGNIFICANT CHANGE UP (ref 5–8)
PLATELET # BLD AUTO: 279 K/UL — SIGNIFICANT CHANGE UP (ref 130–400)
PMV BLD: 8.8 FL — SIGNIFICANT CHANGE UP (ref 7.4–10.4)
POTASSIUM SERPL-MCNC: 4 MMOL/L — SIGNIFICANT CHANGE UP (ref 3.5–5)
POTASSIUM SERPL-SCNC: 4 MMOL/L — SIGNIFICANT CHANGE UP (ref 3.5–5)
PROT SERPL-MCNC: 6.6 G/DL — SIGNIFICANT CHANGE UP (ref 6–8)
PROT UR-MCNC: NEGATIVE — SIGNIFICANT CHANGE UP
RBC # BLD: 4.98 M/UL — SIGNIFICANT CHANGE UP (ref 4.2–5.4)
RBC # FLD: 14.4 % — SIGNIFICANT CHANGE UP (ref 11.5–14.5)
RBC CASTS # UR COMP ASSIST: 6 /HPF — HIGH (ref 0–4)
SODIUM SERPL-SCNC: 140 MMOL/L — SIGNIFICANT CHANGE UP (ref 135–146)
SP GR SPEC: 1.03 — HIGH (ref 1.01–1.03)
TROPONIN T SERPL-MCNC: <0.01 NG/ML — SIGNIFICANT CHANGE UP
UROBILINOGEN FLD QL: SIGNIFICANT CHANGE UP
WBC # BLD: 6.25 K/UL — SIGNIFICANT CHANGE UP (ref 4.8–10.8)
WBC # FLD AUTO: 6.25 K/UL — SIGNIFICANT CHANGE UP (ref 4.8–10.8)
WBC UR QL: 3 /HPF — SIGNIFICANT CHANGE UP (ref 0–5)

## 2023-05-09 PROCEDURE — 74177 CT ABD & PELVIS W/CONTRAST: CPT | Mod: 26,MA

## 2023-05-09 PROCEDURE — 93010 ELECTROCARDIOGRAM REPORT: CPT

## 2023-05-09 PROCEDURE — 71045 X-RAY EXAM CHEST 1 VIEW: CPT | Mod: 26

## 2023-05-09 RX ORDER — SODIUM CHLORIDE 9 MG/ML
1000 INJECTION INTRAMUSCULAR; INTRAVENOUS; SUBCUTANEOUS ONCE
Refills: 0 | Status: COMPLETED | OUTPATIENT
Start: 2023-05-09 | End: 2023-05-09

## 2023-05-09 RX ORDER — ONDANSETRON 8 MG/1
4 TABLET, FILM COATED ORAL ONCE
Refills: 0 | Status: COMPLETED | OUTPATIENT
Start: 2023-05-09 | End: 2023-05-09

## 2023-05-09 RX ADMIN — SODIUM CHLORIDE 1000 MILLILITER(S): 9 INJECTION INTRAMUSCULAR; INTRAVENOUS; SUBCUTANEOUS at 02:35

## 2023-05-09 RX ADMIN — ONDANSETRON 4 MILLIGRAM(S): 8 TABLET, FILM COATED ORAL at 02:36

## 2023-05-09 NOTE — ED PROVIDER NOTE - PHYSICAL EXAMINATION
CONSTITUTIONAL: Elderly female; in no apparent distress.   EYES: PERRL; EOM intact.   ENT: Bilateral  CARDIOVASCULAR: Normal S1, S2; no murmurs, rubs, or gallops.   RESPIRATORY: Normal chest excursion with respiration; breath sounds clear and equal bilaterally; no wheezes, rhonchi, or rales.  GI/: round and soft abdomen; normal bowel sounds; non-distended; non-tender; no palpable organomegaly.   MS: No calf swelling and tenderness.  SKIN: Normal for age and race; warm; dry; good turgor; no apparent lesions or exudate.   NEURO/PSYCH: A & O x 4; grossly unremarkable.

## 2023-05-09 NOTE — ED PROVIDER NOTE - ATTENDING APP SHARED VISIT CONTRIBUTION OF CARE
p tco  nausea for 1 week. no vomiting. eating meals, keeping hydrated. no ha, cp, sob, ab pain, n, v, fever or chills no rash.      elderly, in nad  ent nml , neck sup, ctab, rrr, ab soft, nt, nd. no focal def. no rash.

## 2023-05-09 NOTE — ED PROVIDER NOTE - CARE PLAN
Assessment and plan of treatment:	nausea  labs, imaging, supportive care   1 Principal Discharge DX:	Nausea  Assessment and plan of treatment:	nausea  labs, imaging, supportive care

## 2023-05-09 NOTE — ED PROVIDER NOTE - OBJECTIVE STATEMENT
89 years old female history of hypertension, high cholesterol, Parkinson, atrial fibrillation sent from nursing home secondary to nausea for 1 week.  Patient reports she is still able to eat and drinks without vomiting.  Continue sensations with nausea so she comes to ED for evaluations.  Also reports subjective fever chills.  Otherwise denies headache, chest pain, abdominal pain, diarrhea and urinary symptoms.

## 2023-05-09 NOTE — ED PROVIDER NOTE - NS ED ATTENDING STATEMENT MOD
This was a shared visit with the ELISABETH. I reviewed and verified the documentation and independently performed the documented:

## 2023-05-09 NOTE — ED PROVIDER NOTE - PATIENT PORTAL LINK FT
You can access the FollowMyHealth Patient Portal offered by [facility] by registering at the following website: [email or web address]. By joining [employer]’s FollowMyHealth portal, you will also be able to view your health information using other applications (apps) compatible with our system.

## 2023-08-31 NOTE — ED PROVIDER NOTE - IV ALTEPLASE EXCL REL HIDDEN
08-24-23  Type of therapy: Cognitive behavior therapy, Coping skills, Creative arts therapy, Leisure development, Music therapy, Peer advocate, Psychoeducation, Spirituality  Type of session: Individual  Level of patient participation: Attentive, Engaged, Participates, slightly disorganized, however able to be redirected  Duration of participation: 30 minutes  Therapy conducted by: Psych rehab  Therapy Summary: Writer met with the pt to discuss and assess the pt's progress towards specified psychiatric rehabilitation goal of being able to identify 1 coping skill to assist with organization this week. On approach, the pt was receptive to meeting with writer and pt appeared bright with a big smile on her face. During discussion with writer, the pt shared that coming to the groups has been helpful to her. Per observations made by writer and other members of treatment team who lead groups, the pt has been engaged, however at times needed redirection when innapropriate. This week, the pt was able to engage meaningfully in positive affirmations group, however during coping skill group the pt placed 2 ping pong balls in her mouth and required redirection. Per psychology team, the pt was verbally provocative in the group context this week. Pt reported to writer that she does not want to be in the hospital, however also reported in the discussion that "I like being here more" when discussing her residence "[address] at "[place]." Pt reported that she struggles with "knowing what to say" to people on the grounds of her residence when they are innapropriate towards her. Pt reported, "I don't wanna get into it with them." Writer and pt engaged in meaningful discussion surrounding boundaries, worth, and coping skills to regulate emotions and mood in the moment. Pt receptive. Pt denied SI/HI/I/P and AH/VH/TH. Pt able to engage in discussion with writer, thought process illogical at times and somewhat childlike, however pt receptive to redirection and able to come back to topics being discussed.     Over the next 7 days, Psychiatric Rehabilitation staff will utilize group and individual psychotherapy, and psychoeducation to assist the patient in reaching established treatment goal.  
show

## 2023-09-11 NOTE — ED ADULT NURSE NOTE - NSHOSCREENINGQ1_ED_ALL_ED
- Return to the ED for any new or worsening symptoms.   - Follow up with your GI appointment as scheduled    Nausea / Vomiting    Nausea is the feeling that you have to vomit. As nausea gets worse, it can lead to vomiting. Vomiting puts you at an increased risk for dehydration. Older adults and people with other diseases or a weak immune system are at higher risk for dehydration. Drink clear fluids in small but frequent amounts as tolerated. Eat bland, easy-to-digest foods in small amounts as tolerated.    SEEK IMMEDIATE MEDICAL CARE IF YOU HAVE ANY OF THE FOLLOWING SYMPTOMS: fever, inability to keep sufficient fluids down, black or bloody vomitus, black or bloody stools, lightheadedness/dizziness, chest pain, severe headache, rash, shortness of breath, cold or clammy skin, confusion, pain with urination, or any signs of dehydration. No

## 2023-09-18 NOTE — PATIENT PROFILE ADULT - NSPROSPIRITUALVALUESFT_GEN_A_NUR
Pt w/ respiratory acidosis on VBG. pCO2 63->58.     -trend VBG   -iso HF exacerbation vs. JUSTINO, consider outpatient sleep study if no improvement Yazdanism

## 2023-11-28 NOTE — H&P ADULT - GUM GEN PE MLT EXAM PC
Normal genitalia; no lesions; no discharge MEDICATIONS  (STANDING):  benztropine 1 milliGRAM(s) Oral two times a day  bisacodyl 10 milliGRAM(s) Oral at bedtime  cholecalciferol 1000 Unit(s) Oral at bedtime  cloZAPine 25 milliGRAM(s) Oral at bedtime  ferrous    sulfate 325 milliGRAM(s) Oral daily  polyethylene glycol 3350 17 Gram(s) Oral two times a day  senna 2 Tablet(s) Oral at bedtime  valproic  acid Syrup 1000 milliGRAM(s) Oral at bedtime    MEDICATIONS  (PRN):  acetaminophen     Tablet .. 650 milliGRAM(s) Oral every 4 hours PRN Mild Pain (1 - 3), Moderate Pain (4 - 6)  OLANZapine Disintegrating Tablet 5 milliGRAM(s) Oral every 4 hours PRN agitation  OLANZapine Injectable 5 milliGRAM(s) IntraMuscular once PRN Agitation

## 2023-12-03 ENCOUNTER — EMERGENCY (EMERGENCY)
Facility: HOSPITAL | Age: 88
LOS: 0 days | Discharge: HOME | End: 2023-12-03
Attending: EMERGENCY MEDICINE
Payer: MEDICARE

## 2023-12-03 VITALS
SYSTOLIC BLOOD PRESSURE: 138 MMHG | OXYGEN SATURATION: 94 % | RESPIRATION RATE: 17 BRPM | TEMPERATURE: 100 F | HEART RATE: 96 BPM | DIASTOLIC BLOOD PRESSURE: 64 MMHG

## 2023-12-03 VITALS
OXYGEN SATURATION: 95 % | SYSTOLIC BLOOD PRESSURE: 122 MMHG | DIASTOLIC BLOOD PRESSURE: 64 MMHG | HEART RATE: 78 BPM | RESPIRATION RATE: 18 BRPM | TEMPERATURE: 98 F

## 2023-12-03 DIAGNOSIS — I10 ESSENTIAL (PRIMARY) HYPERTENSION: ICD-10-CM

## 2023-12-03 DIAGNOSIS — R51.9 HEADACHE, UNSPECIFIED: ICD-10-CM

## 2023-12-03 DIAGNOSIS — R11.0 NAUSEA: ICD-10-CM

## 2023-12-03 DIAGNOSIS — Z98.890 OTHER SPECIFIED POSTPROCEDURAL STATES: Chronic | ICD-10-CM

## 2023-12-03 DIAGNOSIS — Z95.818 PRESENCE OF OTHER CARDIAC IMPLANTS AND GRAFTS: Chronic | ICD-10-CM

## 2023-12-03 DIAGNOSIS — Z20.822 CONTACT WITH AND (SUSPECTED) EXPOSURE TO COVID-19: ICD-10-CM

## 2023-12-03 DIAGNOSIS — E78.5 HYPERLIPIDEMIA, UNSPECIFIED: ICD-10-CM

## 2023-12-03 DIAGNOSIS — I48.91 UNSPECIFIED ATRIAL FIBRILLATION: ICD-10-CM

## 2023-12-03 DIAGNOSIS — Z90.710 ACQUIRED ABSENCE OF BOTH CERVIX AND UTERUS: Chronic | ICD-10-CM

## 2023-12-03 LAB
ALBUMIN SERPL ELPH-MCNC: 3.7 G/DL — SIGNIFICANT CHANGE UP (ref 3.5–5.2)
ALBUMIN SERPL ELPH-MCNC: 3.7 G/DL — SIGNIFICANT CHANGE UP (ref 3.5–5.2)
ALP SERPL-CCNC: 85 U/L — SIGNIFICANT CHANGE UP (ref 30–115)
ALP SERPL-CCNC: 85 U/L — SIGNIFICANT CHANGE UP (ref 30–115)
ALT FLD-CCNC: <5 U/L — SIGNIFICANT CHANGE UP (ref 0–41)
ALT FLD-CCNC: <5 U/L — SIGNIFICANT CHANGE UP (ref 0–41)
ANION GAP SERPL CALC-SCNC: 10 MMOL/L — SIGNIFICANT CHANGE UP (ref 7–14)
ANION GAP SERPL CALC-SCNC: 10 MMOL/L — SIGNIFICANT CHANGE UP (ref 7–14)
APTT BLD: 35.4 SEC — SIGNIFICANT CHANGE UP (ref 27–39.2)
APTT BLD: 35.4 SEC — SIGNIFICANT CHANGE UP (ref 27–39.2)
AST SERPL-CCNC: 17 U/L — SIGNIFICANT CHANGE UP (ref 0–41)
AST SERPL-CCNC: 17 U/L — SIGNIFICANT CHANGE UP (ref 0–41)
BASOPHILS # BLD AUTO: 0.02 K/UL — SIGNIFICANT CHANGE UP (ref 0–0.2)
BASOPHILS # BLD AUTO: 0.02 K/UL — SIGNIFICANT CHANGE UP (ref 0–0.2)
BASOPHILS NFR BLD AUTO: 0.4 % — SIGNIFICANT CHANGE UP (ref 0–1)
BASOPHILS NFR BLD AUTO: 0.4 % — SIGNIFICANT CHANGE UP (ref 0–1)
BILIRUB SERPL-MCNC: 0.4 MG/DL — SIGNIFICANT CHANGE UP (ref 0.2–1.2)
BILIRUB SERPL-MCNC: 0.4 MG/DL — SIGNIFICANT CHANGE UP (ref 0.2–1.2)
BUN SERPL-MCNC: 17 MG/DL — SIGNIFICANT CHANGE UP (ref 10–20)
BUN SERPL-MCNC: 17 MG/DL — SIGNIFICANT CHANGE UP (ref 10–20)
CALCIUM SERPL-MCNC: 9.3 MG/DL — SIGNIFICANT CHANGE UP (ref 8.4–10.5)
CALCIUM SERPL-MCNC: 9.3 MG/DL — SIGNIFICANT CHANGE UP (ref 8.4–10.5)
CHLORIDE SERPL-SCNC: 103 MMOL/L — SIGNIFICANT CHANGE UP (ref 98–110)
CHLORIDE SERPL-SCNC: 103 MMOL/L — SIGNIFICANT CHANGE UP (ref 98–110)
CO2 SERPL-SCNC: 26 MMOL/L — SIGNIFICANT CHANGE UP (ref 17–32)
CO2 SERPL-SCNC: 26 MMOL/L — SIGNIFICANT CHANGE UP (ref 17–32)
CREAT SERPL-MCNC: 0.8 MG/DL — SIGNIFICANT CHANGE UP (ref 0.7–1.5)
CREAT SERPL-MCNC: 0.8 MG/DL — SIGNIFICANT CHANGE UP (ref 0.7–1.5)
EGFR: 70 ML/MIN/1.73M2 — SIGNIFICANT CHANGE UP
EGFR: 70 ML/MIN/1.73M2 — SIGNIFICANT CHANGE UP
EOSINOPHIL # BLD AUTO: 0.02 K/UL — SIGNIFICANT CHANGE UP (ref 0–0.7)
EOSINOPHIL # BLD AUTO: 0.02 K/UL — SIGNIFICANT CHANGE UP (ref 0–0.7)
EOSINOPHIL NFR BLD AUTO: 0.4 % — SIGNIFICANT CHANGE UP (ref 0–8)
EOSINOPHIL NFR BLD AUTO: 0.4 % — SIGNIFICANT CHANGE UP (ref 0–8)
FLUAV AG NPH QL: SIGNIFICANT CHANGE UP
FLUAV AG NPH QL: SIGNIFICANT CHANGE UP
FLUBV AG NPH QL: SIGNIFICANT CHANGE UP
FLUBV AG NPH QL: SIGNIFICANT CHANGE UP
GLUCOSE SERPL-MCNC: 100 MG/DL — HIGH (ref 70–99)
GLUCOSE SERPL-MCNC: 100 MG/DL — HIGH (ref 70–99)
HCT VFR BLD CALC: 41.8 % — SIGNIFICANT CHANGE UP (ref 37–47)
HCT VFR BLD CALC: 41.8 % — SIGNIFICANT CHANGE UP (ref 37–47)
HGB BLD-MCNC: 13.2 G/DL — SIGNIFICANT CHANGE UP (ref 12–16)
HGB BLD-MCNC: 13.2 G/DL — SIGNIFICANT CHANGE UP (ref 12–16)
IMM GRANULOCYTES NFR BLD AUTO: 2.1 % — HIGH (ref 0.1–0.3)
IMM GRANULOCYTES NFR BLD AUTO: 2.1 % — HIGH (ref 0.1–0.3)
INR BLD: 1.65 RATIO — HIGH (ref 0.65–1.3)
INR BLD: 1.65 RATIO — HIGH (ref 0.65–1.3)
LIDOCAIN IGE QN: 13 U/L — SIGNIFICANT CHANGE UP (ref 7–60)
LIDOCAIN IGE QN: 13 U/L — SIGNIFICANT CHANGE UP (ref 7–60)
LYMPHOCYTES # BLD AUTO: 0.41 K/UL — LOW (ref 1.2–3.4)
LYMPHOCYTES # BLD AUTO: 0.41 K/UL — LOW (ref 1.2–3.4)
LYMPHOCYTES # BLD AUTO: 7.7 % — LOW (ref 20.5–51.1)
LYMPHOCYTES # BLD AUTO: 7.7 % — LOW (ref 20.5–51.1)
MCHC RBC-ENTMCNC: 28.4 PG — SIGNIFICANT CHANGE UP (ref 27–31)
MCHC RBC-ENTMCNC: 28.4 PG — SIGNIFICANT CHANGE UP (ref 27–31)
MCHC RBC-ENTMCNC: 31.6 G/DL — LOW (ref 32–37)
MCHC RBC-ENTMCNC: 31.6 G/DL — LOW (ref 32–37)
MCV RBC AUTO: 90.1 FL — SIGNIFICANT CHANGE UP (ref 81–99)
MCV RBC AUTO: 90.1 FL — SIGNIFICANT CHANGE UP (ref 81–99)
MONOCYTES # BLD AUTO: 0.49 K/UL — SIGNIFICANT CHANGE UP (ref 0.1–0.6)
MONOCYTES # BLD AUTO: 0.49 K/UL — SIGNIFICANT CHANGE UP (ref 0.1–0.6)
MONOCYTES NFR BLD AUTO: 9.2 % — SIGNIFICANT CHANGE UP (ref 1.7–9.3)
MONOCYTES NFR BLD AUTO: 9.2 % — SIGNIFICANT CHANGE UP (ref 1.7–9.3)
NEUTROPHILS # BLD AUTO: 4.3 K/UL — SIGNIFICANT CHANGE UP (ref 1.4–6.5)
NEUTROPHILS # BLD AUTO: 4.3 K/UL — SIGNIFICANT CHANGE UP (ref 1.4–6.5)
NEUTROPHILS NFR BLD AUTO: 80.2 % — HIGH (ref 42.2–75.2)
NEUTROPHILS NFR BLD AUTO: 80.2 % — HIGH (ref 42.2–75.2)
NRBC # BLD: 0 /100 WBCS — SIGNIFICANT CHANGE UP (ref 0–0)
NRBC # BLD: 0 /100 WBCS — SIGNIFICANT CHANGE UP (ref 0–0)
PLATELET # BLD AUTO: 317 K/UL — SIGNIFICANT CHANGE UP (ref 130–400)
PLATELET # BLD AUTO: 317 K/UL — SIGNIFICANT CHANGE UP (ref 130–400)
PMV BLD: 9 FL — SIGNIFICANT CHANGE UP (ref 7.4–10.4)
PMV BLD: 9 FL — SIGNIFICANT CHANGE UP (ref 7.4–10.4)
POTASSIUM SERPL-MCNC: 4.1 MMOL/L — SIGNIFICANT CHANGE UP (ref 3.5–5)
POTASSIUM SERPL-MCNC: 4.1 MMOL/L — SIGNIFICANT CHANGE UP (ref 3.5–5)
POTASSIUM SERPL-SCNC: 4.1 MMOL/L — SIGNIFICANT CHANGE UP (ref 3.5–5)
POTASSIUM SERPL-SCNC: 4.1 MMOL/L — SIGNIFICANT CHANGE UP (ref 3.5–5)
PROT SERPL-MCNC: 6.8 G/DL — SIGNIFICANT CHANGE UP (ref 6–8)
PROT SERPL-MCNC: 6.8 G/DL — SIGNIFICANT CHANGE UP (ref 6–8)
PROTHROM AB SERPL-ACNC: 18.9 SEC — HIGH (ref 9.95–12.87)
PROTHROM AB SERPL-ACNC: 18.9 SEC — HIGH (ref 9.95–12.87)
RBC # BLD: 4.64 M/UL — SIGNIFICANT CHANGE UP (ref 4.2–5.4)
RBC # BLD: 4.64 M/UL — SIGNIFICANT CHANGE UP (ref 4.2–5.4)
RBC # FLD: 14.3 % — SIGNIFICANT CHANGE UP (ref 11.5–14.5)
RBC # FLD: 14.3 % — SIGNIFICANT CHANGE UP (ref 11.5–14.5)
RSV RNA NPH QL NAA+NON-PROBE: SIGNIFICANT CHANGE UP
RSV RNA NPH QL NAA+NON-PROBE: SIGNIFICANT CHANGE UP
SARS-COV-2 RNA SPEC QL NAA+PROBE: SIGNIFICANT CHANGE UP
SARS-COV-2 RNA SPEC QL NAA+PROBE: SIGNIFICANT CHANGE UP
SODIUM SERPL-SCNC: 139 MMOL/L — SIGNIFICANT CHANGE UP (ref 135–146)
SODIUM SERPL-SCNC: 139 MMOL/L — SIGNIFICANT CHANGE UP (ref 135–146)
TROPONIN T SERPL-MCNC: <0.01 NG/ML — SIGNIFICANT CHANGE UP
TROPONIN T SERPL-MCNC: <0.01 NG/ML — SIGNIFICANT CHANGE UP
WBC # BLD: 5.35 K/UL — SIGNIFICANT CHANGE UP (ref 4.8–10.8)
WBC # BLD: 5.35 K/UL — SIGNIFICANT CHANGE UP (ref 4.8–10.8)
WBC # FLD AUTO: 5.35 K/UL — SIGNIFICANT CHANGE UP (ref 4.8–10.8)
WBC # FLD AUTO: 5.35 K/UL — SIGNIFICANT CHANGE UP (ref 4.8–10.8)

## 2023-12-03 PROCEDURE — 93010 ELECTROCARDIOGRAM REPORT: CPT

## 2023-12-03 PROCEDURE — 80053 COMPREHEN METABOLIC PANEL: CPT

## 2023-12-03 PROCEDURE — 70450 CT HEAD/BRAIN W/O DYE: CPT | Mod: 26,MA

## 2023-12-03 PROCEDURE — 84484 ASSAY OF TROPONIN QUANT: CPT

## 2023-12-03 PROCEDURE — 85610 PROTHROMBIN TIME: CPT

## 2023-12-03 PROCEDURE — 99285 EMERGENCY DEPT VISIT HI MDM: CPT

## 2023-12-03 PROCEDURE — 85025 COMPLETE CBC W/AUTO DIFF WBC: CPT

## 2023-12-03 PROCEDURE — 93005 ELECTROCARDIOGRAM TRACING: CPT

## 2023-12-03 PROCEDURE — 96374 THER/PROPH/DIAG INJ IV PUSH: CPT

## 2023-12-03 PROCEDURE — 83690 ASSAY OF LIPASE: CPT

## 2023-12-03 PROCEDURE — 0241U: CPT

## 2023-12-03 PROCEDURE — 71045 X-RAY EXAM CHEST 1 VIEW: CPT

## 2023-12-03 PROCEDURE — 85730 THROMBOPLASTIN TIME PARTIAL: CPT

## 2023-12-03 PROCEDURE — 99285 EMERGENCY DEPT VISIT HI MDM: CPT | Mod: 25

## 2023-12-03 PROCEDURE — 71045 X-RAY EXAM CHEST 1 VIEW: CPT | Mod: 26

## 2023-12-03 PROCEDURE — 70450 CT HEAD/BRAIN W/O DYE: CPT | Mod: MA

## 2023-12-03 PROCEDURE — 36415 COLL VENOUS BLD VENIPUNCTURE: CPT

## 2023-12-03 RX ORDER — ONDANSETRON 8 MG/1
4 TABLET, FILM COATED ORAL ONCE
Refills: 0 | Status: COMPLETED | OUTPATIENT
Start: 2023-12-03 | End: 2023-12-03

## 2023-12-03 RX ORDER — ACETAMINOPHEN 500 MG
975 TABLET ORAL ONCE
Refills: 0 | Status: DISCONTINUED | OUTPATIENT
Start: 2023-12-03 | End: 2023-12-03

## 2023-12-03 RX ADMIN — ONDANSETRON 4 MILLIGRAM(S): 8 TABLET, FILM COATED ORAL at 13:11

## 2023-12-03 NOTE — ED PROVIDER NOTE - OBJECTIVE STATEMENT
89 yo female with a pmh of htn, hld, afib present c/o nausea for the past week. pt denies any vomiting and states she has been able to tolerate PO. pt mentions to have a dull frontal non-radiating headache that started this morning. pt denies any other symptoms including fevers, chill, recent illness/travel, cough, abdominal pain, chest pain, or SOB. 89 yo female with a pmh of htn, hld, afib present c/o nausea for the past week. pt denies any vomiting and states she has been able to tolerate PO. pt mentions to have a dull frontal non-radiating headache that started this morning. pt denies any other symptoms including fevers, chill, recent illness/travel, cough, abdominal pain, chest pain, or SOB.

## 2023-12-03 NOTE — ED PROVIDER NOTE - PATIENT PORTAL LINK FT
You can access the FollowMyHealth Patient Portal offered by [facility] by registering at the following website: [email or web address]. By joining [employer]’s FollowMyHealth portal, you will also be able to view your health information using other applications (apps) compatible with our system. You can access the FollowMyHealth Patient Portal offered by [facility] by registering at the following website: [email or web address]. By joining [employer]’s FollowMyHealth portal, you will also be able to view your health information using other applications (apps) compatible with our system. You can access the FollowMyHealth Patient Portal offered by [facility] by registering at the following website: [email or web address]. By joining [employer]’s FollowMyHealth portal, you will also be able to view your health information using other applications (apps) compatible with our system.

## 2023-12-03 NOTE — ED PROVIDER NOTE - NSFOLLOWUPINSTRUCTIONS_ED_ALL_ED_FT
Please follow up with your primary care physician within 24-72 hours and return immediately if symptoms worsen.    Nausea, Adult  Nausea is the feeling that you have an upset stomach or that you are about to vomit. Nausea on its own is not usually a serious concern, but it may be an early sign of a more serious medical problem. As nausea gets worse, it can lead to vomiting. If vomiting develops, or if you are not able to drink enough fluids, you are at risk of becoming dehydrated. Dehydration can make you tired and thirsty, cause you to have a dry mouth, and decrease how often you urinate. Older adults and people with other diseases or a weak disease-fighting system (immune system) are at higher risk for dehydration. The main goals of treating your nausea are:  To relieve your nausea.To limit repeated nausea episodes.To prevent vomiting and dehydration.Follow these instructions at home:  Watch your symptoms for any changes. Tell your health care provider about them. Follow these instructions as told by your health care provider.  Eating and drinking         Take an oral rehydration solution (ORS). This is a drink that is sold at pharmacies and retail stores.Drink clear fluids slowly and in small amounts as you are able. Clear fluids include water, ice chips, low-calorie sports drinks, and fruit juice that has water added (diluted fruit juice).Eat bland, easy-to-digest foods in small amounts as you are able. These foods include bananas, applesauce, rice, lean meats, toast, and crackers.Avoid drinking fluids that contain a lot of sugar or caffeine, such as energy drinks, sports drinks, and soda.Avoid alcohol.Avoid spicy or fatty foods.General instructions     Take over-the-counter and prescription medicines only as told by your health care provider.Rest at home while you recover.Drink enough fluid to keep your urine pale yellow.Breathe slowly and deeply when you feel nauseous.Avoid smelling things that have strong odors.Wash your hands often using soap and water. If soap and water are not available, use hand .Make sure that all people in your household wash their hands well and often.Keep all follow-up visits as told by your health care provider. This is important.Contact a health care provider if:  Your nausea gets worse.Your nausea does not go away after two days.You vomit.You cannot drink fluids without vomiting.You have any of the following:  New symptoms.A fever.A headache.Muscle cramps.A rash.Pain while urinating.You feel light-headed or dizzy.Get help right away if:  You have pain in your chest, neck, arm, or jaw.You feel extremely weak or you faint.You have vomit that is bright red or looks like coffee grounds.You have bloody or black stools or stools that look like tar.You have a severe headache, a stiff neck, or both.You have severe pain, cramping, or bloating in your abdomen.You have difficulty breathing or are breathing very quickly.Your heart is beating very quickly.Your skin feels cold and clammy.You feel confused.You have signs of dehydration, such as:  Dark urine, very little urine, or no urine.Cracked lips.Dry mouth.Sunken eyes.Sleepiness.Weakness.These symptoms may represent a serious problem that is an emergency. Do not wait to see if the symptoms will go away. Get medical help right away. Call your local emergency services (911 in the [place] Do not drive yourself to the hospital.   Summary  Nausea is the feeling that you have an upset stomach or that you are about to vomit. Nausea on its own is not usually a serious concern, but it may be an early sign of a more serious medical problem.If vomiting develops, or if you are not able to drink enough fluids, you are at risk of becoming dehydrated.Follow recommendations for eating and drinking and take over-the-counter and prescription medicines only as told by your health care provider.Contact a health care provider right away if your symptoms worsen or you have new symptoms.Keep all follow-up visits as told by your health care provider. This is important.This information is not intended to replace advice given to you by your health care provider. Make sure you discuss any questions you have with your health care provider.

## 2023-12-03 NOTE — ED PROVIDER NOTE - PHYSICAL EXAMINATION
Gen: NAD, AOx3  Head: NCAT  HEENT: PERRL, oral mucosa moist, normal conjunctiva, oropharynx clear without exudate or erythema  Lung: CTAB, no respiratory distress, no wheezing, rales, rhonchi  CV: normal s1/s2, rrr, Normal perfusion, pulses 2+ throughout  Abd: soft, NTND, no CVA tenderness  Genitourinary: no pelvic tenderness  MSK: No edema, no visible deformities, full range of motion in all 4 extremities  Neuro: CN II-XII grossly intact, No focal neurologic deficits, no meningeal signs, no nystagmus, coordination/sensation intact, strength 5/5 BUE/BLE  Skin: No rash   Psych: normal affect

## 2023-12-03 NOTE — ED PROVIDER NOTE - DIFFERENTIAL DIAGNOSIS
ACS, viral vs bacterial URI/bronchitis/PNA, gastroenteritis, biliary colic, renal colic, pancreatitis, PUD, colitis, diverticulitis, appendicitis, UTI, pyelonephritis Differential Diagnosis

## 2023-12-03 NOTE — ED PROVIDER NOTE - ATTENDING APP SHARED VISIT CONTRIBUTION OF CARE
89yF sent from [facility] for nausea x 1wk - pt reports feeling nauseous, not improved by PO zofran at SNF, requesting IV med.  Pt denies any vomiting or PO intolerance.  No fever, abd pain, diarrhea.  +dull headache.  Review of EMR notes similar presentation ~8mo ago, extensive workup neg for underlying cause and pt discharged after meds. 89yF sent from [facility] for nausea x 1wk - pt reports feeling nauseous, not improved by PO zofran at SNF, requesting IV med.  Pt denies any vomiting or PO intolerance.  No fever, abd pain, diarrhea.  +dull headache.  Review of EMR notes similar presentation ~8mo ago, extensive workup neg for underlying cause and pt discharged after meds. 89yF sent from [facility] for nausea x 1wk - pt reports feeling nauseous, not improved by PO zofran at SNF, requesting IV med.  Pt denies any vomiting or PO intolerance.  No fever, abd pain, diarrhea.  +dull headache.  Review of EMR notes similar presentation ~8mo ago, extensive workup neg for underlying cause and pt discharged after meds.

## 2023-12-20 NOTE — ED ADULT NURSE NOTE - NSSUHOSCREENINGYN_ED_ALL_ED
Medications:  - Take Doxycycline (antibiotic) twice daily with food. First dose this evening around 5pm  - Take Gabapentin 100mg nightly. If not getting adequate relief, can increase to twice daily with 100mg in the morning and 100mg at night  - You can take Tyelnol and Motrin as needed for headaches    Follow-up with PCP and neurology as scheduled.    Please return to the ED with any new fevers, worsening of headaches, or any other changes in symptoms.   
Yes - the patient is able to be screened

## 2024-03-14 NOTE — ASU PATIENT PROFILE, ADULT - NS PRO AD INFO GIVEN Y
edema to bilateral legs is +3 pitting.   Brief Hospital Course by Main Problems:   Acute on chronic diastolic heart failure  Patient was treated with iv bumex with improvement in symptoms and loss of weight  Appreciate nephrology and cardiology consult    CKD 3  Nephrology consult    Hypoglycemia:  Lantus changed from 30 units to 20 units sc daily  Lispro sliding scale    Home medications were resumed.     Discharge Exam:  Patient seen and examined by me on discharge day.  Pertinent Findings:  Patient Vitals for the past 24 hrs:   BP Temp Temp src Pulse Resp SpO2 Height Weight   03/14/24 1107 131/61 -- -- -- -- -- 1.651 m (5' 5\") 79.1 kg (174 lb 6.1 oz)   03/14/24 0945 131/61 97.9 °F (36.6 °C) Oral 67 20 96 % -- --   03/14/24 0600 -- -- -- -- -- -- -- 79.1 kg (174 lb 6.1 oz)   03/14/24 0224 114/61 98.2 °F (36.8 °C) Oral 64 16 95 % -- --   03/14/24 0144 (!) 91/51 -- -- 61 16 99 % -- --   03/13/24 2101 -- -- -- -- 18 -- -- --   03/13/24 2007 126/77 97.3 °F (36.3 °C) Oral 66 18 100 % -- --   03/13/24 1601 123/63 98.2 °F (36.8 °C) -- 68 -- 93 % -- --       Gen:    Not in distress  Chest: Clear lungs  CVS:   Regular rhythm.  No edema  Abd:  Soft, not distended, not tender  Neuro: awake, moving all exts    Discharge/Recent Laboratory Results:  Recent Labs     03/14/24  0129   *   K 5.1   CL 97   CO2 31   BUN 26*   CREATININE 1.38*   GLUCOSE 107*   CALCIUM 8.3*   PHOS 5.1*   MG 1.9     Recent Labs     03/14/24  0129   HGB 10.8*   HCT 33.7*   WBC 7.1          Discharge Medications:     Medication List        CHANGE how you take these medications      insulin glargine 100 UNIT/ML injection vial  Commonly known as: LANTUS  Inject 16 units in the AM when active and 20 units when not active--Dose change 02/19/24--updated med list--did not send prescription to the pharmacy  What changed:   how much to take  how to take this  when to take this  additional instructions     spironolactone 50 MG tablet  Commonly known  yes

## 2024-03-30 NOTE — H&P ADULT - NSHPLABSRESULTS_GEN_ALL_CORE
11.4   17.26 )-----------( 330      ( 26 Mar 2021 08:37 )             35.3       03-26    140  |  105  |  33<H>  ----------------------------<  135<H>  3.8   |  22  |  1.1    Ca    9.4      26 Mar 2021 08:37    TPro  6.1  /  Alb  3.8  /  TBili  0.4  /  DBili  x   /  AST  14  /  ALT  <5  /  AlkPhos  232<H>  03-26                Lactate Trend  03-26 @ 08:37 Lactate:2.7             Culture Results:   <10,000 CFU/mL Normal Urogenital Nimo (03-06 @ 16:06)      < from: CT Abdomen and Pelvis w/ IV Cont (03.26.21 @ 10:08) >      IMPRESSION:    Colitis involving the left colon - infectious, inflammatory, or ischemic in etiology.              MIR MORROW MD; Attending Radiologist  This document has been electronically signed. Mar 26 2021 10:28AM    < end of copied text > Yes

## 2024-05-14 NOTE — PATIENT PROFILE ADULT - ABILITY TO HEAR (WITH HEARING AID OR HEARING APPLIANCE IF NORMALLY USED):
Adult
Mildly to Moderately Impaired: difficulty hearing in some environments or speaker may need to increase volume or speak distinctly

## 2024-05-23 NOTE — ED ADULT NURSE NOTE - SUICIDE SCREENING QUESTION 3
Adult Annual Physical  Name: Joseph Miller      : 1963      MRN: [id number]  Encounter Provider: Mery Adam No

## 2024-07-30 NOTE — ED ADULT NURSE NOTE - ISOLATION TYPE:
Care Transitions Note    Initial Call - Call within 2 business days of discharge: Yes    Attempted to reach patient for transitions of care follow up. Unable to reach patient.    Outreach Attempts:   HIPAA compliant voicemail left for patient.     Patient: Lo Tirado    Patient : 1974   MRN: [id number]    Reason for Admission: Rhabdomyolysis   Discharge Date: 24  RURS: Readmission Risk Score: 35.2    Last Discharge Facility       Date Complaint Diagnosis Description Type Department Provider    24 Fall Traumatic rhabdomyolysis, initial encounter (HCC) ... ED to Hosp-Admission (Discharged) (ADMITTED) [id number] Helen Bauman, DO; Ahmet Fair D...            Was this an external facility discharge? No    Follow Up Appointment:   Patient has hospital follow up appointment scheduled within 7 days of discharge.    Future Appointments         Provider Specialty Dept Phone    2024 1:50 PM Jen Flores RN; Alexi Tabor DO Wound Ostomy [phone]            Plan for follow-up on next business day.      Cyn Davies RN       None

## 2024-11-07 NOTE — ED ADULT TRIAGE NOTE - ARRIVAL FROM
Pt arrives to triage ambulatory w/  endorsing as of two days ago began abdominal pain pretty dull and last night had a severe episode that went away and tonight another episode. Pt endorses pain is in right upper quadrant abdominal pain that radiates to the right flank and epigastrium as well as upper chest. No nausea/vomiting. Noted some feeling of constipation last night as well as bloating in addition. Last BM today normal color. Pain 10/10 pt endorses             Home

## 2025-01-01 ENCOUNTER — INPATIENT (INPATIENT)
Facility: HOSPITAL | Age: 89
LOS: 8 days | Discharge: NURSING, REHAB OR LONG-TERM CARE | DRG: 156 | End: 2025-01-10
Attending: INTERNAL MEDICINE | Admitting: STUDENT IN AN ORGANIZED HEALTH CARE EDUCATION/TRAINING PROGRAM
Payer: MEDICARE

## 2025-01-01 VITALS
SYSTOLIC BLOOD PRESSURE: 108 MMHG | OXYGEN SATURATION: 93 % | HEART RATE: 79 BPM | RESPIRATION RATE: 18 BRPM | DIASTOLIC BLOOD PRESSURE: 59 MMHG | TEMPERATURE: 100 F

## 2025-01-01 DIAGNOSIS — Z90.710 ACQUIRED ABSENCE OF BOTH CERVIX AND UTERUS: Chronic | ICD-10-CM

## 2025-01-01 DIAGNOSIS — Z95.818 PRESENCE OF OTHER CARDIAC IMPLANTS AND GRAFTS: Chronic | ICD-10-CM

## 2025-01-01 DIAGNOSIS — Z98.890 OTHER SPECIFIED POSTPROCEDURAL STATES: Chronic | ICD-10-CM

## 2025-01-01 DIAGNOSIS — K11.20 SIALOADENITIS, UNSPECIFIED: ICD-10-CM

## 2025-01-01 LAB
ALBUMIN SERPL ELPH-MCNC: 3.4 G/DL — LOW (ref 3.5–5.2)
ALP SERPL-CCNC: 88 U/L — SIGNIFICANT CHANGE UP (ref 30–115)
ALT FLD-CCNC: <5 U/L — SIGNIFICANT CHANGE UP (ref 0–41)
ANION GAP SERPL CALC-SCNC: 14 MMOL/L — SIGNIFICANT CHANGE UP (ref 7–14)
ANISOCYTOSIS BLD QL: SLIGHT — SIGNIFICANT CHANGE UP
APTT BLD: 37.7 SEC — SIGNIFICANT CHANGE UP (ref 27–39.2)
AST SERPL-CCNC: 21 U/L — SIGNIFICANT CHANGE UP (ref 0–41)
BASOPHILS # BLD AUTO: 0 K/UL — SIGNIFICANT CHANGE UP (ref 0–0.2)
BASOPHILS NFR BLD AUTO: 0 % — SIGNIFICANT CHANGE UP (ref 0–1)
BILIRUB SERPL-MCNC: 0.5 MG/DL — SIGNIFICANT CHANGE UP (ref 0.2–1.2)
BUN SERPL-MCNC: 20 MG/DL — SIGNIFICANT CHANGE UP (ref 10–20)
CALCIUM SERPL-MCNC: 9.5 MG/DL — SIGNIFICANT CHANGE UP (ref 8.4–10.4)
CHLORIDE SERPL-SCNC: 109 MMOL/L — SIGNIFICANT CHANGE UP (ref 98–110)
CO2 SERPL-SCNC: 23 MMOL/L — SIGNIFICANT CHANGE UP (ref 17–32)
CREAT SERPL-MCNC: 0.8 MG/DL — SIGNIFICANT CHANGE UP (ref 0.7–1.5)
EGFR: 69 ML/MIN/1.73M2 — SIGNIFICANT CHANGE UP
EOSINOPHIL # BLD AUTO: 0 K/UL — SIGNIFICANT CHANGE UP (ref 0–0.7)
EOSINOPHIL NFR BLD AUTO: 0 % — SIGNIFICANT CHANGE UP (ref 0–8)
GLUCOSE SERPL-MCNC: 111 MG/DL — HIGH (ref 70–99)
HCT VFR BLD CALC: 42.4 % — SIGNIFICANT CHANGE UP (ref 37–47)
HGB BLD-MCNC: 12.9 G/DL — SIGNIFICANT CHANGE UP (ref 12–16)
INR BLD: 1.98 RATIO — HIGH (ref 0.65–1.3)
LACTATE SERPL-SCNC: 1.4 MMOL/L — SIGNIFICANT CHANGE UP (ref 0.7–2)
LYMPHOCYTES # BLD AUTO: 0.16 K/UL — LOW (ref 1.2–3.4)
LYMPHOCYTES # BLD AUTO: 0.9 % — LOW (ref 20.5–51.1)
MANUAL SMEAR VERIFICATION: SIGNIFICANT CHANGE UP
MCHC RBC-ENTMCNC: 27.2 PG — SIGNIFICANT CHANGE UP (ref 27–31)
MCHC RBC-ENTMCNC: 30.4 G/DL — LOW (ref 32–37)
MCV RBC AUTO: 89.5 FL — SIGNIFICANT CHANGE UP (ref 81–99)
MICROCYTES BLD QL: SLIGHT — SIGNIFICANT CHANGE UP
MONOCYTES # BLD AUTO: 0.47 K/UL — SIGNIFICANT CHANGE UP (ref 0.1–0.6)
MONOCYTES NFR BLD AUTO: 2.6 % — SIGNIFICANT CHANGE UP (ref 1.7–9.3)
NEUTROPHILS # BLD AUTO: 17.54 K/UL — HIGH (ref 1.4–6.5)
NEUTROPHILS NFR BLD AUTO: 96.5 % — HIGH (ref 42.2–75.2)
OVALOCYTES BLD QL SMEAR: SLIGHT — SIGNIFICANT CHANGE UP
PLAT MORPH BLD: NORMAL — SIGNIFICANT CHANGE UP
PLATELET # BLD AUTO: 287 K/UL — SIGNIFICANT CHANGE UP (ref 130–400)
PMV BLD: 9.8 FL — SIGNIFICANT CHANGE UP (ref 7.4–10.4)
POIKILOCYTOSIS BLD QL AUTO: SLIGHT — SIGNIFICANT CHANGE UP
POLYCHROMASIA BLD QL SMEAR: SLIGHT — SIGNIFICANT CHANGE UP
POTASSIUM SERPL-MCNC: 4.2 MMOL/L — SIGNIFICANT CHANGE UP (ref 3.5–5)
POTASSIUM SERPL-SCNC: 4.2 MMOL/L — SIGNIFICANT CHANGE UP (ref 3.5–5)
PROT SERPL-MCNC: 6.5 G/DL — SIGNIFICANT CHANGE UP (ref 6–8)
PROTHROM AB SERPL-ACNC: 23.7 SEC — HIGH (ref 9.95–12.87)
RBC # BLD: 4.74 M/UL — SIGNIFICANT CHANGE UP (ref 4.2–5.4)
RBC # FLD: 16 % — HIGH (ref 11.5–14.5)
RBC BLD AUTO: ABNORMAL
SODIUM SERPL-SCNC: 146 MMOL/L — SIGNIFICANT CHANGE UP (ref 135–146)
WBC # BLD: 18.18 K/UL — HIGH (ref 4.8–10.8)
WBC # FLD AUTO: 18.18 K/UL — HIGH (ref 4.8–10.8)

## 2025-01-01 PROCEDURE — 80202 ASSAY OF VANCOMYCIN: CPT

## 2025-01-01 PROCEDURE — 92526 ORAL FUNCTION THERAPY: CPT | Mod: GN

## 2025-01-01 PROCEDURE — 83540 ASSAY OF IRON: CPT

## 2025-01-01 PROCEDURE — 70481 CT ORBIT/EAR/FOSSA W/DYE: CPT | Mod: 26,MC

## 2025-01-01 PROCEDURE — 93005 ELECTROCARDIOGRAM TRACING: CPT

## 2025-01-01 PROCEDURE — 82728 ASSAY OF FERRITIN: CPT

## 2025-01-01 PROCEDURE — 86900 BLOOD TYPING SEROLOGIC ABO: CPT

## 2025-01-01 PROCEDURE — 82607 VITAMIN B-12: CPT

## 2025-01-01 PROCEDURE — 85652 RBC SED RATE AUTOMATED: CPT

## 2025-01-01 PROCEDURE — 82962 GLUCOSE BLOOD TEST: CPT

## 2025-01-01 PROCEDURE — 83550 IRON BINDING TEST: CPT

## 2025-01-01 PROCEDURE — 87641 MR-STAPH DNA AMP PROBE: CPT

## 2025-01-01 PROCEDURE — 70491 CT SOFT TISSUE NECK W/DYE: CPT | Mod: 26,MC

## 2025-01-01 PROCEDURE — 87040 BLOOD CULTURE FOR BACTERIA: CPT

## 2025-01-01 PROCEDURE — 36573 INSJ PICC RS&I 5 YR+: CPT | Mod: RT

## 2025-01-01 PROCEDURE — 93306 TTE W/DOPPLER COMPLETE: CPT

## 2025-01-01 PROCEDURE — 99285 EMERGENCY DEPT VISIT HI MDM: CPT

## 2025-01-01 PROCEDURE — 86901 BLOOD TYPING SEROLOGIC RH(D): CPT

## 2025-01-01 PROCEDURE — 83735 ASSAY OF MAGNESIUM: CPT

## 2025-01-01 PROCEDURE — 85610 PROTHROMBIN TIME: CPT

## 2025-01-01 PROCEDURE — 86850 RBC ANTIBODY SCREEN: CPT

## 2025-01-01 PROCEDURE — 86140 C-REACTIVE PROTEIN: CPT

## 2025-01-01 PROCEDURE — 84443 ASSAY THYROID STIM HORMONE: CPT

## 2025-01-01 PROCEDURE — 70481 CT ORBIT/EAR/FOSSA W/DYE: CPT | Mod: MC

## 2025-01-01 PROCEDURE — 85730 THROMBOPLASTIN TIME PARTIAL: CPT

## 2025-01-01 PROCEDURE — 85025 COMPLETE CBC W/AUTO DIFF WBC: CPT

## 2025-01-01 PROCEDURE — 93010 ELECTROCARDIOGRAM REPORT: CPT

## 2025-01-01 PROCEDURE — 84466 ASSAY OF TRANSFERRIN: CPT

## 2025-01-01 PROCEDURE — 80048 BASIC METABOLIC PNL TOTAL CA: CPT

## 2025-01-01 PROCEDURE — 82746 ASSAY OF FOLIC ACID SERUM: CPT

## 2025-01-01 PROCEDURE — 85045 AUTOMATED RETICULOCYTE COUNT: CPT

## 2025-01-01 PROCEDURE — 36415 COLL VENOUS BLD VENIPUNCTURE: CPT

## 2025-01-01 PROCEDURE — 80053 COMPREHEN METABOLIC PANEL: CPT

## 2025-01-01 PROCEDURE — 87640 STAPH A DNA AMP PROBE: CPT

## 2025-01-01 PROCEDURE — 85027 COMPLETE CBC AUTOMATED: CPT

## 2025-01-01 PROCEDURE — 92610 EVALUATE SWALLOWING FUNCTION: CPT | Mod: GN

## 2025-01-01 PROCEDURE — C1751: CPT

## 2025-01-01 RX ORDER — APIXABAN 5 MG/1
5 TABLET, FILM COATED ORAL EVERY 12 HOURS
Refills: 0 | Status: DISCONTINUED | OUTPATIENT
Start: 2025-01-01 | End: 2025-01-08

## 2025-01-01 RX ORDER — PANTOPRAZOLE 40 MG/1
1 TABLET, DELAYED RELEASE ORAL
Refills: 0 | DISCHARGE

## 2025-01-01 RX ORDER — LIDOCAINE 50 MG/G
1 OINTMENT TOPICAL EVERY 24 HOURS
Refills: 0 | Status: DISCONTINUED | OUTPATIENT
Start: 2025-01-01 | End: 2025-01-10

## 2025-01-01 RX ORDER — SENNOSIDES 8.6 MG/1
2 TABLET, FILM COATED ORAL AT BEDTIME
Refills: 0 | Status: DISCONTINUED | OUTPATIENT
Start: 2025-01-01 | End: 2025-01-10

## 2025-01-01 RX ORDER — CARBIDOPA, LEVODOPA AND ENTACAPONE 100; 200; 25 MG/1; MG/1; MG/1
1 TABLET, FILM COATED ORAL
Refills: 0 | Status: DISCONTINUED | OUTPATIENT
Start: 2025-01-01 | End: 2025-01-02

## 2025-01-01 RX ORDER — BISACODYL 5 MG
1 TABLET, DELAYED RELEASE (ENTERIC COATED) ORAL
Refills: 0 | DISCHARGE

## 2025-01-01 RX ORDER — DILTIAZEM HYDROCHLORIDE 300 MG/1
360 CAPSULE, COATED, EXTENDED RELEASE ORAL DAILY
Refills: 0 | Status: DISCONTINUED | OUTPATIENT
Start: 2025-01-01 | End: 2025-01-10

## 2025-01-01 RX ORDER — FAMOTIDINE 20 MG/1
20 TABLET, FILM COATED ORAL
Refills: 0 | Status: DISCONTINUED | OUTPATIENT
Start: 2025-01-01 | End: 2025-01-10

## 2025-01-01 RX ORDER — FAMOTIDINE 20 MG/1
1 TABLET, FILM COATED ORAL
Refills: 0 | DISCHARGE

## 2025-01-01 RX ORDER — VANCOMYCIN HYDROCHLORIDE 5 G/100ML
1000 INJECTION, POWDER, LYOPHILIZED, FOR SOLUTION INTRAVENOUS ONCE
Refills: 0 | Status: COMPLETED | OUTPATIENT
Start: 2025-01-01 | End: 2025-01-01

## 2025-01-01 RX ORDER — VANCOMYCIN HYDROCHLORIDE 5 G/100ML
1000 INJECTION, POWDER, LYOPHILIZED, FOR SOLUTION INTRAVENOUS ONCE
Refills: 0 | Status: DISCONTINUED | OUTPATIENT
Start: 2025-01-01 | End: 2025-01-02

## 2025-01-01 RX ORDER — LIDOCAINE 50 MG/G
1 OINTMENT TOPICAL
Refills: 0 | DISCHARGE

## 2025-01-01 RX ORDER — LACTULOSE 10 G/15ML
1 SOLUTION ORAL; RECTAL
Refills: 0 | DISCHARGE

## 2025-01-01 RX ORDER — SODIUM CHLORIDE 9 MG/ML
1000 INJECTION, SOLUTION INTRAVENOUS
Refills: 0 | Status: DISCONTINUED | OUTPATIENT
Start: 2025-01-01 | End: 2025-01-03

## 2025-01-01 RX ORDER — PANTOPRAZOLE 40 MG/1
40 TABLET, DELAYED RELEASE ORAL
Refills: 0 | Status: DISCONTINUED | OUTPATIENT
Start: 2025-01-01 | End: 2025-01-10

## 2025-01-01 RX ORDER — GABAPENTIN 300 MG/1
1 CAPSULE ORAL
Refills: 0 | DISCHARGE

## 2025-01-01 RX ORDER — OXYBUTYNIN CHLORIDE 5 MG/1
1 TABLET ORAL
Refills: 0 | DISCHARGE

## 2025-01-01 RX ORDER — KETOROLAC TROMETHAMINE 30 MG/ML
15 INJECTION INTRAMUSCULAR; INTRAVENOUS ONCE
Refills: 0 | Status: DISCONTINUED | OUTPATIENT
Start: 2025-01-01 | End: 2025-01-01

## 2025-01-01 RX ORDER — ACETAMINOPHEN 80 MG/.8ML
650 SOLUTION/ DROPS ORAL EVERY 6 HOURS
Refills: 0 | Status: DISCONTINUED | OUTPATIENT
Start: 2025-01-01 | End: 2025-01-10

## 2025-01-01 RX ORDER — ALPRAZOLAM 0.25 MG/1
0.25 TABLET ORAL AT BEDTIME
Refills: 0 | Status: DISCONTINUED | OUTPATIENT
Start: 2025-01-01 | End: 2025-01-08

## 2025-01-01 RX ORDER — SENNOSIDES 8.6 MG/1
2 TABLET, FILM COATED ORAL
Refills: 0 | DISCHARGE

## 2025-01-01 RX ORDER — SUCRALFATE 1 G/10ML
0 SUSPENSION ORAL
Refills: 0 | DISCHARGE

## 2025-01-01 RX ORDER — OXYBUTYNIN CHLORIDE 5 MG/1
5 TABLET ORAL DAILY
Refills: 0 | Status: DISCONTINUED | OUTPATIENT
Start: 2025-01-01 | End: 2025-01-10

## 2025-01-01 RX ORDER — MECLIZINE HYDROCHLORIDE 25 MG/1
25 TABLET ORAL THREE TIMES A DAY
Refills: 0 | Status: DISCONTINUED | OUTPATIENT
Start: 2025-01-01 | End: 2025-01-10

## 2025-01-01 RX ORDER — ALPRAZOLAM 0.25 MG/1
1 TABLET ORAL
Refills: 0 | DISCHARGE

## 2025-01-01 RX ORDER — ZALEPLON 5 MG/1
1 CAPSULE ORAL
Refills: 0 | DISCHARGE

## 2025-01-01 RX ORDER — ZALEPLON 5 MG/1
10 CAPSULE ORAL AT BEDTIME
Refills: 0 | Status: DISCONTINUED | OUTPATIENT
Start: 2025-01-01 | End: 2025-01-02

## 2025-01-01 RX ORDER — BISACODYL 5 MG
5 TABLET, DELAYED RELEASE (ENTERIC COATED) ORAL EVERY 12 HOURS
Refills: 0 | Status: DISCONTINUED | OUTPATIENT
Start: 2025-01-01 | End: 2025-01-10

## 2025-01-01 RX ORDER — METHYLPREDNISOLONE 4 MG/1
125 TABLET ORAL ONCE
Refills: 0 | Status: COMPLETED | OUTPATIENT
Start: 2025-01-01 | End: 2025-01-01

## 2025-01-01 RX ORDER — MENTHOL 5 MG/1
0 LOZENGE ORAL
Refills: 0 | DISCHARGE

## 2025-01-01 RX ORDER — SODIUM CHLORIDE 9 MG/ML
2000 INJECTION, SOLUTION INTRAVENOUS ONCE
Refills: 0 | Status: COMPLETED | OUTPATIENT
Start: 2025-01-01 | End: 2025-01-01

## 2025-01-01 RX ORDER — GABAPENTIN 300 MG/1
100 CAPSULE ORAL
Refills: 0 | Status: DISCONTINUED | OUTPATIENT
Start: 2025-01-01 | End: 2025-01-10

## 2025-01-01 RX ORDER — MAGNESIUM HYDROXIDE 400 MG/5ML
0 SUSPENSION, ORAL (FINAL DOSE FORM) ORAL
Refills: 0 | DISCHARGE

## 2025-01-01 RX ADMIN — VANCOMYCIN HYDROCHLORIDE 250 MILLIGRAM(S): 5 INJECTION, POWDER, LYOPHILIZED, FOR SOLUTION INTRAVENOUS at 16:34

## 2025-01-01 RX ADMIN — METHYLPREDNISOLONE 125 MILLIGRAM(S): 4 TABLET ORAL at 15:37

## 2025-01-01 RX ADMIN — KETOROLAC TROMETHAMINE 15 MILLIGRAM(S): 30 INJECTION INTRAMUSCULAR; INTRAVENOUS at 17:25

## 2025-01-01 RX ADMIN — SODIUM CHLORIDE 2000 MILLILITER(S): 9 INJECTION, SOLUTION INTRAVENOUS at 19:00

## 2025-01-01 RX ADMIN — Medication 100 MILLIGRAM(S): at 16:34

## 2025-01-01 RX ADMIN — SODIUM CHLORIDE 2000 MILLILITER(S): 9 INJECTION, SOLUTION INTRAVENOUS at 15:15

## 2025-01-01 NOTE — ED ADULT TRIAGE NOTE - CHIEF COMPLAINT QUOTE
BIBEMS for mastoid infection. EMS states " hot to touch, and redness x today". Pt has facial swelling to L face, visibly swollen and red.

## 2025-01-01 NOTE — ED PROVIDER NOTE - PHYSICAL EXAMINATION
CONSTITUTIONAL: NAD  SKIN: Warm dry  HEAD: NCAT  EYES: NL inspection  ENT: MMM, Some trismus noted with poor dentition.Left jaw significantly swollen with erythema spanning from behind left ear all the way down to left neck.  Left-sided mastoid tenderness.  Bilateral EACs clear with normal TMs  CARD: RRR  RESP: CTAB  NEURO: AO x 2 (does not know the year)  PSYCH: Cooperative, appropriate. CONSTITUTIONAL: NAD  SKIN: Warm dry  HEAD: NCAT  EYES: NL inspection  ENT:  Some trismus noted with poor dentition.Left jaw significantly swollen with erythema spanning from behind left ear all the way down to left neck.  Left-sided mastoid tenderness.  Bilateral EACs clear with normal TMs  CARD: RRR  RESP: CTAB  NEURO: AO x 2 (does not know the year)  PSYCH: Cooperative, appropriate.

## 2025-01-01 NOTE — ED ADULT NURSE NOTE - NSFALLHARMRISKINTERV_ED_ALL_ED
Assistance OOB with selected safe patient handling equipment if applicable/Assistance with ambulation/Communicate risk of Fall with Harm to all staff, patient, and family/Encourage patient to sit up slowly, dangle for a short time, stand at bedside before walking/Monitor gait and stability/Monitor for mental status changes and reorient to person, place, and time, as needed/Move patient closer to nursing station/within visual sight of ED staff/Provide patient with walking aids/Provide visual cue: red socks, yellow wristband, yellow gown, etc/Reinforce activity limits and safety measures with patient and family/Toileting schedule using arm’s reach rule for commode and bathroom/Use of alarms - bed, stretcher, chair and/or video monitoring/Bed in lowest position, wheels locked, appropriate side rails in place/Call bell, personal items and telephone in reach/Instruct patient to call for assistance before getting out of bed/chair/stretcher/Non-slip footwear applied when patient is off stretcher/[place] to call system/Physically safe environment - no spills, clutter or unnecessary equipment/Purposeful Proactive Rounding/Room/bathroom lighting operational, light cord in reach

## 2025-01-01 NOTE — CONSULT NOTE ADULT - SUBJECTIVE AND OBJECTIVE BOX
ENT: Pt is a 89y/o F BIBEMS from NH for facial swelling. Unable to obtain hx from pt 2/2 AMS. ENT consulted to r/o naveedwigs angina.     PAST MEDICAL & SURGICAL HISTORY:  Parkinson disease  HTN (hypertension)  Rheumatoid arthritis  Hyperlipidemia  CRAO (central retinal artery occlusion)  Left  Atrial fibrillation  History of hysterectomy  Status post placement of implantable loop recorder  01/2021  S/P hernia repair    Allergies  No Known Allergies    FAMILY HISTORY:  FH: hypertension (Mother)    REVIEW OF SYSTEMS   [ x ] Due to altered mental status/intubation, subjective information were not able to be obtained from patient. History was obtained, to the extent possible, from review of the chart and collateral sources of information.    Vital Signs Last 24 Hrs  T(C): 38.1 (01 Jan 2025 17:01), Max: 38.1 (01 Jan 2025 17:01)  T(F): 100.5 (01 Jan 2025 17:01), Max: 100.5 (01 Jan 2025 17:01)  HR: 85 (01 Jan 2025 17:01) (79 - 85)  BP: 150/67 (01 Jan 2025 17:01) (108/59 - 157/84)  BP(mean): 97 (01 Jan 2025 17:01) (97 - 97)  RR: 18 (01 Jan 2025 17:01) (18 - 18)  SpO2: 97% (01 Jan 2025 17:01) (93% - 97%)    GEN: NAD, awake, confused. No drooling or pooling of secretions. No stridor or stertor. Good vocal quality, no hoarseness.   SKIN: Good color, non diaphoretic.  HEENT: LEFT facial edema, erythema, and tenderness to touch extending from LEFT TMJ down to LEFT SMG. Pt non cooperative with oral exam but noted very poor dentition.  NECK: Trachea midline  RESP: No dyspnea, non-labored breathing. No use of accessory muscles.   CARDIO: +S1/S2  ABDO: Soft, NT.  EXT: VILLAR x 4    Fiberoptic Laryngoscopy: pt did not cooperate with exam.    LABS:             12.9   18.18 )-----------( 287      ( 01 Jan 2025 15:52 )             42.4     146  |  109  |  20  ----------------------------<  111[H]  4.2   |  23  |  0.8    Ca    9.5      01 Jan 2025 15:52  TPro  6.5  /  Alb  3.4[L]  /  TBili  0.5  /  DBili  x   /  AST  21  /  ALT  <5  /  AlkPhos  88  01-01  PT/INR - ( 01 Jan 2025 15:52 )   PT: 23.70 sec;   INR: 1.98 ratio    PTT - ( 01 Jan 2025 15:52 )  PTT:37.7 sec    RADIOLOGY & ADDITIONAL STUDIES:  ACC: [id number] EXAM:  CT TEMPORAL BONES IC   ORDERED BY: [employer]     PROCEDURE DATE:  01/01/2025    INTERPRETATION:  CLINICAL INDICATION: Left facial swelling swelling  TECHNIQUE: CT images through the temporal bones were obtained with 100 mL   Omnipaque 350 IV contrast. Coronal reformatted images were obtained.  COMPARISON EXAMINATION:Correlation CT head 12/3/2023.    FINDINGS:  RIGHT EXTERNAL AUDITORY CANAL: Normal.  RIGHT TYMPANOMASTOID CAVITY:  Decreased right mastoid air cell effusion  RIGHT OSSICULAR CHAIN:  Intact.  RIGHT OTIC CAPSULE STRUCTURES:  Intact.  RIGHT FACIAL NERVE CANAL:  Intact.  LEFT EXTERNAL AUDITORY CANAL: Normal.  LEFT TYMPANOMASTOID CAVITY:  Decreased trace left mastoid air cell   effusion.  LEFT OSSICULAR CHAIN:  Intact.  LEFT OTIC CAPSULE STRUCTURES:  Intact.  LEFT FACIAL NERVE CANAL:  Intact.  VISUALIZED INTRACRANIAL STRUCTURES AND ORBITAL CONTENTS:  Unremarkable.  MISCELLANEOUS:  Partially visualized enlarged, hyperenhancing left   parotid gland.    IMPRESSION:  Findings compatible with left parotitis (please see CT soft tissue neck   for further evaluation).   No acute pathology of the temporal bones.  Decreased bilateral mastoid effusions, larger on right.    MARY MARIN MD; Resident Radiologist  This document has been electronically signed.  SLAVA ESPINOZA MD; Attending Radiologist  This document has been electronically signed. Jan 1 2025  5:26PM    ACC: [id number] EXAM:  CT NECK SOFT TISSUE IC   ORDERED BY: [employer]   PROCEDURE DATE:  01/01/2025    INTERPRETATION:  CLINICAL INDICATIONS:  Left facial swelling, evaluate   for abscess  TECHNIQUE:   Axial images of the neck wereobtained following the   intravenous administration of contrast material. Sagittal and coronal   reformatted images were obtained. 100cc Omnipaque 350 was administered. 0   cc was discarded  COMPARISON EXAMINATION:  CT angiography neck 10/17/2019    FINDINGS:  AERODIGESTIVE TRACT: There is no abnormal nodular or masslike enhancement   along the visualized aerodigestive tract.  LYMPH NODES:  No adenopathy.  PAROTID GLANDS:  The left parotid gland is enlarged, hyperenhancing, with   trace surrounding inflammatory changes  (series 4/82) with overlying mild   skin thickening. No drainable fluid.  SUBMANDIBULAR GLANDS:  Normal.  THYROID GLAND:  Mildly increased multinodular thyroid gland measuring up   to 6 x 4.5 cm (series 4/180), previously 5 cm.  VISUALIZED PARANASAL SINUSES:  Clear.  VISUALIZED TYMPANOMASTOID CAVITIES: Right greater than left mastoid air   cell effusions.  BONES:  Degenerative changes within the cervical spine.  MISCELLANEOUS:  Biapical scarring.    IMPRESSION:  Findings compatible with left parotitis. No drainable fluid collection.  Since 10/2019, mild increase in multinodular thyroid gland measuring up   to 6 cm, previously 5 cm. May consider nonemergent thyroid outpatient   thyroid ultrasound follow-up as clinically indicated.    MARY MARIN MD; Resident Radiologist  This document has been electronically signed.  SLAVA ESPINOZA MD; Attending Radiologist  This document has been electronically signed. Jan 1 2025 5:50PM

## 2025-01-01 NOTE — H&P ADULT - ATTENDING COMMENTS
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 iv unasyn and warm compresses  -continue baseline meds

## 2025-01-01 NOTE — H&P ADULT - HISTORY OF PRESENT ILLNESS
89 female past medical history Parkinson's, HLD, HTN, atrial fibrillation coming with complaints of swollen jaw.  Patient brought in from nursing home due to the concern of swollen Left check .Unable to obtain much history from patient as he is a poor historian. All the relative history from Ed Documentation . ED  spoke with daughter on the phone who reported that she heard the patient had a questionable fall due to the swelling of her left jaw.  On my exam patient is  AO x 2  and is slightly lethargic. poor oral dentition. Significant swelling and redness extend from behind the left ear down the left side of the neck, accompanied by tenderness over the left mastoid    on vitals in Ed :BP :108 /59 mm Hg  HR :79 /min , temp :100.1 Degrees F  on RA   on Labs WBC 18.18  e Neutrophil predom   on C T temporal and Neck soft tissues :   Findings compatible with left parotiti No acute pathology of the temporal bones.  Decreased bilateral mastoid effusions, larger on right.   No drainable fluid collection.    Since 10/2019, mild increase in multinodular thyroid gland measuring up   to 6 cm, previously 5 cm.     Rcvd cefepime , vanco , solumedrol , Toradol , 2L LR stat in Ed    admitted for further workup .   89 female past medical history Parkinson's, HLD, HTN, atrial fibrillation coming with complaints of swollen jaw.  Patient brought in from nursing home due to the concern of swollen Left check .Unable to obtain much history from patient as he is a poor historian. All the relative history from Ed Documentation . ED  spoke with daughter on the phone who reported that she heard the patient had a questionable fall due to the swelling of her left jaw.  On my exam patient is  AO x 1- 2  and is lethargic. poor oral dentition. Significant swelling and redness extend from behind the left ear down the left side of the neck, accompanied by tenderness over the left mastoid    on vitals in Ed :BP :108 /59 mm Hg  HR :79 /min , temp :100.1 Degrees F  on RA   on Labs WBC 18.18  e Neutrophil predom   on C T temporal and Neck soft tissues :   Findings compatible with left parotiti No acute pathology of the temporal bones.  Decreased bilateral mastoid effusions, larger on right.   No drainable fluid collection.    Since 10/2019, mild increase in multinodular thyroid gland measuring up   to 6 cm, previously 5 cm.     Rcvd cefepime , vanco , solumedrol , Toradol , 2L LR stat in Ed    admitted for further workup .

## 2025-01-01 NOTE — H&P ADULT - ASSESSMENT
89 female past medical history Parkinson's, HLD, HTN, atrial fibrillation coming with complaints of swollen jaw.  Patient brought in from nursing home due to the concern of swollen Left check .Unable to obtain much history from patient as he is a poor historian.    #left Parotitis :   -On vitals in Ed :BP :108 /59 mm Hg  HR :79 /min , temp :100.1 Degrees F  on RA   -WBC 18.18  e Neutrophil predom   -lactate 1.4  on C T temporal and Neck soft tissues :   Findings compatible with left parotitis No acute pathology of the temporal bones.  Decreased bilateral mastoid effusions, larger on right.   No drainable fluid collection.  -s/p cefepime , vanco , solumedrol , Toradol , 2L LR stat in Ed  -ENT on board , recc no intervention   PLAN :   -admit to SDU   -vitals Q4 hrs   -pain control   -gentle hydration avoid fluid overload   -c/w cefepime and Vancomycivin   - Warm compress to LEFT face   -f/u infectious workup i.e BCx   -f/u ID c/s   -s/s eval   -f/u labs in am       #multinodular thyroid gland  -on CT neck and soft tissues :   Since 10/2019, mild increase in multinodular thyroid gland measuring up   to 6 cm, previously 5 cm.   -f/u TSH   -outpt thyroid US      #Parkinson's  #HLD  # HTN  #atrial fibrillation  -c/w home meds      MISC:  GI ppx: NI  Diet: S/S eval   Activity:ATT   Dispo: SDU   full code     med rec done 89 female past medical history Parkinson's, HLD, HTN, atrial fibrillation coming with complaints of swollen jaw.  Patient brought in from nursing home due to the concern of swollen Left check .Unable to obtain much history from patient as he is a poor historian.    #left Parotitis :   -On vitals in Ed :BP :108 /59 mm Hg  HR :79 /min , temp :100.1 Degrees F  on RA   -WBC 18.18  e Neutrophil predom   -lactate 1.4  on C T temporal and Neck soft tissues :   Findings compatible with left parotitis No acute pathology of the temporal bones.  Decreased bilateral mastoid effusions, larger on right.   No drainable fluid collection.  -s/p cefepime , vanco , solumedrol , Toradol , 2L LR stat in Ed  -ENT on board , recc no intervention   PLAN :   -admit to SDU   -vitals Q4 hrs   -pain control   -gentle hydration avoid fluid overload   -c/w cefepime and Vancomycivin   - Warm compress to LEFT face   -f/u infectious workup i.e BCx   -f/u ID c/s   -s/s eval   -f/u labs in am       #multinodular thyroid gland  -on CT neck and soft tissues :   Since 10/2019, mild increase in multinodular thyroid gland measuring up   to 6 cm, previously 5 cm.   -f/u TSH   -outpt thyroid US      #Parkinson's  #HLD  # HTN  #atrial fibrillation on Eliquis   -c/w home meds      MISC:  GI ppx: NI  DVT prop: on eliquis 5 mg BID   Diet: S/S eval   Activity:ATT   Dispo: SDU   full code     med rec done 89 female past medical history Parkinson's, HLD, HTN, atrial fibrillation coming with complaints of swollen jaw.  Patient brought in from nursing home due to the concern of swollen Left check .Unable to obtain much history from patient as he is a poor historian.    #left Parotitis :   -On vitals in Ed :BP :108 /59 mm Hg  HR :79 /min , temp :100.1 Degrees F  on RA   -WBC 18.18  e Neutrophil predom   -lactate 1.4  on C T temporal and Neck soft tissues :   Findings compatible with left parotitis No acute pathology of the temporal bones.  Decreased bilateral mastoid effusions, larger on right.   No drainable fluid collection.  -s/p cefepime , vanco , solumedrol , Toradol , 2L LR stat in Ed  -ENT on board , recc no intervention   PLAN :   -admit to SDU   -vitals Q4 hrs   -pain control   -gentle hydration avoid fluid overload   -c/w cefepime and Vancomycivin   - Warm compress to LEFT face   -f/u infectious workup i.e BCx   -f/u ID c/s   -s/s eval   -f/u labs in am       #multinodular thyroid gland  -on CT neck and soft tissues :   Since 10/2019, mild increase in multinodular thyroid gland measuring up   to 6 cm, previously 5 cm.   -f/u TSH   -outpt thyroid US      #Parkinson's  #HLD  # HTN  #atrial fibrillation on Eliquis   -c/w home meds      MISC:  GI ppx: PPI  DVT prop: on eliquis 5 mg BID   Diet: S/S eval   Activity:ATT   Dispo: SDU   full code     med rec done 89 female past medical history Parkinson's, HLD, HTN, atrial fibrillation coming with complaints of swollen jaw.  Patient brought in from nursing home due to the concern of swollen Left check .Unable to obtain much history from patient as he is a poor historian.    #left Parotitis :   -On vitals in Ed :BP :108 /59 mm Hg  HR :79 /min , temp :100.1 Degrees F  on RA   -WBC 18.18  e Neutrophil predom   -lactate 1.4  on C T temporal and Neck soft tissues :   Findings compatible with left parotitis No acute pathology of the temporal bones.  Decreased bilateral mastoid effusions, larger on right.   No drainable fluid collection.  -s/p cefepime , vanco , solumedrol , Toradol , 2L LR stat in Ed  -ENT on board , recc no intervention   PLAN :   -admit to SDU   -vitals Q4 hrs   -pain control   -gentle hydration avoid fluid overload   -c/w cefepime and Vancomycivin   - Warm compress to LEFT face   -f/u infectious workup i.e BCx   -f/u ID c/s   -s/s eval   -f/u labs in am       #multinodular thyroid gland  -on CT neck and soft tissues :   Since 10/2019, mild increase in multinodular thyroid gland measuring up   to 6 cm, previously 5 cm.   -f/u TSH   -outpt thyroid US      #Parkinson's  #HLD  # HTN  #chronic atrial fibrillation on Eliquis   -c/w home meds      MISC:  GI ppx: PPI  DVT prop: on eliquis 5 mg BID   Diet: S/S eval   Activity:ATT   Dispo: SDU   full code     med rec done

## 2025-01-01 NOTE — CONSULT NOTE ADULT - ASSESSMENT
89y/o F with LEFT parotitis     - Case and imaging reviewed with Dr. Grijalva, plan as per attending  - No acute ENT intervention at this time   - Broad spectrum IV Abx   - Warm compress to LEFT face   - Will follow .

## 2025-01-01 NOTE — ED PROVIDER NOTE - CLINICAL SUMMARY MEDICAL DECISION MAKING FREE TEXT BOX
89-year-old female history of Parkinson's, hyperlipidemia, hypertension, A-fib, from [facility], ANO x 2, poor historian which limits history.  Per paperwork, patient was sent from facility to rule out mastoiditis.  Resident spoke with daughter over the phone who heard the patient had a questionable fall because of the swelling to her left jaw.     On exam, vitals reviewed.  Patient initially afebrile, however during her ED stay developed a fever.  She has got marked edema and erythema to the left face extending from the left mastoid area down to her left anterior neck.  It appears tender to touch.  Positive trismus.  Poor dentition.  Patient has very dry mucous membranes, not well-appearing.  No evident trauma.    Patient protecting airway.  ENT consulted upon patient arrival given concern for abscess vs ludwigs.  Septic workup sent.  CT shows parotitis.  Patient has a multinodular thyroid goiter which is similar to prior.  Airway not compressed on CT.  Seen by ENT who advised broad-spectrum antibiotics.  Patient admitted to stepdown unit for further treatment and monitoring.

## 2025-01-01 NOTE — ED PROVIDER NOTE - OBJECTIVE STATEMENT
89 female past medical history Parkinson's, HLD, HTN, atrial fibrillation coming with complaints of swollen jaw.  Patient brought in from nursing home to rule out mastoiditis.  Patient is AO x 2 but poor historian and slightly lethargic.  Spoke with daughter on the phone who reported that she heard the patient had a questionable fall due to the swelling of her left jaw.

## 2025-01-01 NOTE — ED ADULT NURSE REASSESSMENT NOTE - NS ED NURSE REASSESS COMMENT FT1
Multiple attempts at IV access attempted without success. IVF delayed until 1515 until IV access secured via ultrasound by MD Romero.

## 2025-01-01 NOTE — H&P ADULT - NSHPPHYSICALEXAM_GEN_ALL_CORE
CONSTITUTIONAL: not in any distress , on RA  SKIN: Warm dry  HEAD: NCAT  ENT:poor oral dentition. Significant swelling and redness extend from behind the left ear down the left side of the neck, accompanied by tenderness over the left mastoid. The ear canals are clear bilaterally, and the tympanic membranes appear normal.  CARD: Regular , rate and rhythm , s1, s2  RESP: Clear bilaterally   NEURO: AO x 2   Extremities : no edema CONSTITUTIONAL: not in any distress , on RA  SKIN: Warm dry  HEAD: NCAT  ENT:poor oral dentition. Significant swelling and redness extend from behind the left ear down the left side of the neck,   accompanied by tenderness over the left mastoid. The ear canals are clear bilaterally, and the tympanic membranes appear normal.  CARD: Regular , rate and rhythm , s1, s2  RESP: Clear bilaterally   NEURO: AO x 2   Extremities : no edema

## 2025-01-02 LAB
ALBUMIN SERPL ELPH-MCNC: 3.4 G/DL — LOW (ref 3.5–5.2)
ALP SERPL-CCNC: 85 U/L — SIGNIFICANT CHANGE UP (ref 30–115)
ALT FLD-CCNC: <5 U/L — SIGNIFICANT CHANGE UP (ref 0–41)
ANION GAP SERPL CALC-SCNC: 14 MMOL/L — SIGNIFICANT CHANGE UP (ref 7–14)
AST SERPL-CCNC: 13 U/L — SIGNIFICANT CHANGE UP (ref 0–41)
BASOPHILS # BLD AUTO: 0.03 K/UL — SIGNIFICANT CHANGE UP (ref 0–0.2)
BASOPHILS NFR BLD AUTO: 0.1 % — SIGNIFICANT CHANGE UP (ref 0–1)
BILIRUB SERPL-MCNC: 0.3 MG/DL — SIGNIFICANT CHANGE UP (ref 0.2–1.2)
BUN SERPL-MCNC: 26 MG/DL — HIGH (ref 10–20)
CALCIUM SERPL-MCNC: 9.2 MG/DL — SIGNIFICANT CHANGE UP (ref 8.4–10.5)
CHLORIDE SERPL-SCNC: 105 MMOL/L — SIGNIFICANT CHANGE UP (ref 98–110)
CO2 SERPL-SCNC: 19 MMOL/L — SIGNIFICANT CHANGE UP (ref 17–32)
CREAT SERPL-MCNC: 0.7 MG/DL — SIGNIFICANT CHANGE UP (ref 0.7–1.5)
EGFR: 81 ML/MIN/1.73M2 — SIGNIFICANT CHANGE UP
EOSINOPHIL # BLD AUTO: 0 K/UL — SIGNIFICANT CHANGE UP (ref 0–0.7)
EOSINOPHIL NFR BLD AUTO: 0 % — SIGNIFICANT CHANGE UP (ref 0–8)
GLUCOSE SERPL-MCNC: 151 MG/DL — HIGH (ref 70–99)
GRAM STN FLD: ABNORMAL
HCT VFR BLD CALC: 40.6 % — SIGNIFICANT CHANGE UP (ref 37–47)
HGB BLD-MCNC: 12.3 G/DL — SIGNIFICANT CHANGE UP (ref 12–16)
IMM GRANULOCYTES NFR BLD AUTO: 1.9 % — HIGH (ref 0.1–0.3)
LYMPHOCYTES # BLD AUTO: 0.99 K/UL — LOW (ref 1.2–3.4)
LYMPHOCYTES # BLD AUTO: 3.7 % — LOW (ref 20.5–51.1)
MAGNESIUM SERPL-MCNC: 1.9 MG/DL — SIGNIFICANT CHANGE UP (ref 1.8–2.4)
MCHC RBC-ENTMCNC: 27.3 PG — SIGNIFICANT CHANGE UP (ref 27–31)
MCHC RBC-ENTMCNC: 30.3 G/DL — LOW (ref 32–37)
MCV RBC AUTO: 90 FL — SIGNIFICANT CHANGE UP (ref 81–99)
METHOD TYPE: SIGNIFICANT CHANGE UP
MONOCYTES # BLD AUTO: 0.91 K/UL — HIGH (ref 0.1–0.6)
MONOCYTES NFR BLD AUTO: 3.4 % — SIGNIFICANT CHANGE UP (ref 1.7–9.3)
MRSA SPEC QL CULT: SIGNIFICANT CHANGE UP
NEUTROPHILS # BLD AUTO: 24.49 K/UL — HIGH (ref 1.4–6.5)
NEUTROPHILS NFR BLD AUTO: 90.9 % — HIGH (ref 42.2–75.2)
NRBC # BLD: 0 /100 WBCS — SIGNIFICANT CHANGE UP (ref 0–0)
PLATELET # BLD AUTO: 252 K/UL — SIGNIFICANT CHANGE UP (ref 130–400)
PMV BLD: 10.1 FL — SIGNIFICANT CHANGE UP (ref 7.4–10.4)
POTASSIUM SERPL-MCNC: 4.4 MMOL/L — SIGNIFICANT CHANGE UP (ref 3.5–5)
POTASSIUM SERPL-SCNC: 4.4 MMOL/L — SIGNIFICANT CHANGE UP (ref 3.5–5)
PROT SERPL-MCNC: 5.9 G/DL — LOW (ref 6–8)
RBC # BLD: 4.51 M/UL — SIGNIFICANT CHANGE UP (ref 4.2–5.4)
RBC # FLD: 16.4 % — HIGH (ref 11.5–14.5)
SODIUM SERPL-SCNC: 138 MMOL/L — SIGNIFICANT CHANGE UP (ref 135–146)
SPECIMEN SOURCE: SIGNIFICANT CHANGE UP
TSH SERPL-MCNC: 0.55 UIU/ML — SIGNIFICANT CHANGE UP (ref 0.27–4.2)
WBC # BLD: 26.92 K/UL — HIGH (ref 4.8–10.8)
WBC # FLD AUTO: 26.92 K/UL — HIGH (ref 4.8–10.8)

## 2025-01-02 PROCEDURE — 99222 1ST HOSP IP/OBS MODERATE 55: CPT

## 2025-01-02 PROCEDURE — 93010 ELECTROCARDIOGRAM REPORT: CPT

## 2025-01-02 PROCEDURE — 99291 CRITICAL CARE FIRST HOUR: CPT

## 2025-01-02 RX ORDER — AMPICILLIN SODIUM AND SULBACTAM SODIUM 100; 50 MG/ML; MG/ML
INJECTION, POWDER, FOR SOLUTION INTRAVENOUS
Refills: 0 | Status: DISCONTINUED | OUTPATIENT
Start: 2025-01-02 | End: 2025-01-04

## 2025-01-02 RX ORDER — AMPICILLIN SODIUM AND SULBACTAM SODIUM 100; 50 MG/ML; MG/ML
3 INJECTION, POWDER, FOR SOLUTION INTRAVENOUS EVERY 6 HOURS
Refills: 0 | Status: DISCONTINUED | OUTPATIENT
Start: 2025-01-02 | End: 2025-01-04

## 2025-01-02 RX ORDER — VANCOMYCIN HYDROCHLORIDE 5 G/100ML
1000 INJECTION, POWDER, LYOPHILIZED, FOR SOLUTION INTRAVENOUS
Refills: 0 | Status: DISCONTINUED | OUTPATIENT
Start: 2025-01-02 | End: 2025-01-03

## 2025-01-02 RX ORDER — AMPICILLIN SODIUM AND SULBACTAM SODIUM 100; 50 MG/ML; MG/ML
3 INJECTION, POWDER, FOR SOLUTION INTRAVENOUS ONCE
Refills: 0 | Status: COMPLETED | OUTPATIENT
Start: 2025-01-02 | End: 2025-01-02

## 2025-01-02 RX ORDER — ENTACAPONE 200 MG/1
200 TABLET, FILM COATED ORAL
Refills: 0 | Status: DISCONTINUED | OUTPATIENT
Start: 2025-01-02 | End: 2025-01-10

## 2025-01-02 RX ORDER — ZOLPIDEM TARTRATE 5 MG
5 TABLET ORAL AT BEDTIME
Refills: 0 | Status: DISCONTINUED | OUTPATIENT
Start: 2025-01-02 | End: 2025-01-02

## 2025-01-02 RX ORDER — LEVODOPA AND CARBIDOPA 145; 36.25 MG/1; MG/1
2 CAPSULE, EXTENDED RELEASE ORAL
Refills: 0 | Status: DISCONTINUED | OUTPATIENT
Start: 2025-01-02 | End: 2025-01-10

## 2025-01-02 RX ADMIN — APIXABAN 5 MILLIGRAM(S): 5 TABLET, FILM COATED ORAL at 06:02

## 2025-01-02 RX ADMIN — LEVODOPA AND CARBIDOPA 2 TABLET(S): 145; 36.25 CAPSULE, EXTENDED RELEASE ORAL at 13:46

## 2025-01-02 RX ADMIN — LEVODOPA AND CARBIDOPA 2 TABLET(S): 145; 36.25 CAPSULE, EXTENDED RELEASE ORAL at 06:02

## 2025-01-02 RX ADMIN — OXYBUTYNIN CHLORIDE 5 MILLIGRAM(S): 5 TABLET ORAL at 13:47

## 2025-01-02 RX ADMIN — GABAPENTIN 100 MILLIGRAM(S): 300 CAPSULE ORAL at 18:24

## 2025-01-02 RX ADMIN — GABAPENTIN 100 MILLIGRAM(S): 300 CAPSULE ORAL at 06:02

## 2025-01-02 RX ADMIN — ENTACAPONE 200 MILLIGRAM(S): 200 TABLET, FILM COATED ORAL at 23:22

## 2025-01-02 RX ADMIN — SODIUM CHLORIDE 75 MILLILITER(S): 9 INJECTION, SOLUTION INTRAVENOUS at 06:03

## 2025-01-02 RX ADMIN — SODIUM CHLORIDE 75 MILLILITER(S): 9 INJECTION, SOLUTION INTRAVENOUS at 20:37

## 2025-01-02 RX ADMIN — LEVODOPA AND CARBIDOPA 2 TABLET(S): 145; 36.25 CAPSULE, EXTENDED RELEASE ORAL at 23:22

## 2025-01-02 RX ADMIN — AMPICILLIN SODIUM AND SULBACTAM SODIUM 200 GRAM(S): 100; 50 INJECTION, POWDER, FOR SOLUTION INTRAVENOUS at 18:36

## 2025-01-02 RX ADMIN — SENNOSIDES 2 TABLET(S): 8.6 TABLET, FILM COATED ORAL at 21:49

## 2025-01-02 RX ADMIN — AMPICILLIN SODIUM AND SULBACTAM SODIUM 200 GRAM(S): 100; 50 INJECTION, POWDER, FOR SOLUTION INTRAVENOUS at 23:14

## 2025-01-02 RX ADMIN — DILTIAZEM HYDROCHLORIDE 360 MILLIGRAM(S): 300 CAPSULE, COATED, EXTENDED RELEASE ORAL at 06:02

## 2025-01-02 RX ADMIN — ENTACAPONE 200 MILLIGRAM(S): 200 TABLET, FILM COATED ORAL at 18:25

## 2025-01-02 RX ADMIN — FAMOTIDINE 20 MILLIGRAM(S): 20 TABLET, FILM COATED ORAL at 06:02

## 2025-01-02 RX ADMIN — LEVODOPA AND CARBIDOPA 2 TABLET(S): 145; 36.25 CAPSULE, EXTENDED RELEASE ORAL at 18:25

## 2025-01-02 RX ADMIN — APIXABAN 5 MILLIGRAM(S): 5 TABLET, FILM COATED ORAL at 18:25

## 2025-01-02 RX ADMIN — FAMOTIDINE 20 MILLIGRAM(S): 20 TABLET, FILM COATED ORAL at 18:25

## 2025-01-02 RX ADMIN — ALPRAZOLAM 0.25 MILLIGRAM(S): 0.25 TABLET ORAL at 21:49

## 2025-01-02 RX ADMIN — AMPICILLIN SODIUM AND SULBACTAM SODIUM 200 GRAM(S): 100; 50 INJECTION, POWDER, FOR SOLUTION INTRAVENOUS at 13:48

## 2025-01-02 RX ADMIN — ENTACAPONE 200 MILLIGRAM(S): 200 TABLET, FILM COATED ORAL at 13:48

## 2025-01-02 RX ADMIN — ENTACAPONE 200 MILLIGRAM(S): 200 TABLET, FILM COATED ORAL at 06:02

## 2025-01-02 RX ADMIN — VANCOMYCIN HYDROCHLORIDE 250 MILLIGRAM(S): 5 INJECTION, POWDER, LYOPHILIZED, FOR SOLUTION INTRAVENOUS at 19:19

## 2025-01-02 NOTE — CONSULT NOTE ADULT - SUBJECTIVE AND OBJECTIVE BOX
JON GARCÍA  89y, Female  Allergy: No Known Allergies      CHIEF COMPLAINT: left mastoid swelling (02 Jan 2025 06:44)      HPI:  89y Female from [facility], with a past medical history of Parkinsons, HLD, HTN, a. fib, RA. Patient brought in from nursing home to ED for left swollen jaw, to rule out mastoiditis. Patient is not sure when this swelling started. Patient is a poor historian and the ED spoke to patients daughter on the phone who reported that she heard the patient had a questionable fall due to the swelling of her left jaw. Patient has a 3" x 3" hard mass to her left mastoid area. There is edema, erythema, and tenderness to the left face extending from the left mastoid area down to her left anterior neck. Patient has poor dentition.  Patient has very dry mucous membranes, not well-appearing. There is no evident trauma.    FAMILY HISTORY:  FH: hypertension (Mother)      PAST MEDICAL & SURGICAL HISTORY:  Parkinson disease  HTN (hypertension)  Rheumatoid arthritis  Hyperlipidemia  Left CRAO (central retinal artery occlusion)  Atrial fibrillation    History of hysterectomy  Status post placement of implantable loop recorder (01/2021)  S/P hernia repair    SOCIAL HISTORY  Social History: n/a    Recent Travel: n/a      ROS  General: Denies fever, nightsweats  HEENT: Denies headache, rhinorrhea  CV: Denies CP, palpitations  PULM: Denies wheezing, hemoptysis  GI: Denies hematemesis, hematochezia, melena, abdominal pain  : Denies discharge, hematuria, dysuria  MSK: Denies arthralgias, myalgias  SKIN: Denies rash, lesions      VITALS:  T(F): 97.8, Max: 100.5 (01-01-25 @ 17:01)  HR: 144  BP: 156/100  RR: 18Vital Signs Last 24 Hrs  T(C): 36.6 (02 Jan 2025 07:34), Max: 38.1 (01 Jan 2025 17:01)  T(F): 97.8 (02 Jan 2025 07:34), Max: 100.5 (01 Jan 2025 17:01)  HR: 144 (02 Jan 2025 09:42) (76 - 144)  BP: 156/100 (02 Jan 2025 09:42) (108/59 - 157/84)  BP(mean): 77 (02 Jan 2025 07:34) (77 - 97)  RR: 18 (02 Jan 2025 09:42) (18 - 18)  SpO2: 96% (02 Jan 2025 09:42) (93% - 97%)    Parameters below as of 02 Jan 2025 09:42  Patient On (Oxygen Delivery Method): room air        PHYSICAL EXAM:  PHYSICAL EXAM:  GENERAL: NAD  HEAD:  Atraumatic, Normocephalic  EYES: EOMI, PERRLA, conjunctiva and sclera clear  ENMT: Poor dentation, dry mucosal membrane, unable to visualize the pharynx   NECK: Supple, No JVD   HEART: Regular rate and rhythm; No murmurs, rubs, or gallops  RESPIRATORY: CTA B/L, No W/R/R  ABDOMEN: Soft, Nontender, Nondistended;   NEUROLOGY: A&Ox2, bilateral upper extremity tremor        TESTS & MEASUREMENTS:                        12.3   26.92 )-----------( 252      ( 02 Jan 2025 06:27 )             40.6     01-02    138  |  105  |  26[H]  ----------------------------<  151[H]  4.4   |  19  |  0.7    Ca    9.2      02 Jan 2025 06:27  Mg     1.9     01-02    TPro  5.9[L]  /  Alb  3.4[L]  /  TBili  0.3  /  DBili  x   /  AST  13  /  ALT  <5  /  AlkPhos  85  01-02      LIVER FUNCTIONS - ( 02 Jan 2025 06:27 )  Alb: 3.4 g/dL / Pro: 5.9 g/dL / ALK PHOS: 85 U/L / ALT: <5 U/L / AST: 13 U/L / GGT: x           Urinalysis Basic - ( 02 Jan 2025 06:27 )    Color: x / Appearance: x / SG: x / pH: x  Gluc: 151 mg/dL / Ketone: x  / Bili: x / Urobili: x   Blood: x / Protein: x / Nitrite: x   Leuk Esterase: x / RBC: x / WBC x   Sq Epi: x / Non Sq Epi: x / Bacteria: x    Lactate, Blood: 1.4 mmol/L (01-01-25 @ 15:52)        RADIOLOGY & ADDITIONAL TESTS:    CT Temporal Bones w/ IV Cont (01.01.25 @ 16:14) >  ACC: [id number] EXAM:  CT TEMPORAL BONES IC   ORDERED BY: AYDEN HERNANDEZ   PROCEDURE DATE:  01/01/2025    INTERPRETATION:  CLINICAL INDICATION: Left facial swelling swelling  TECHNIQUE: CT images through the temporal bones were obtained with 100 mL   Omnipaque 350 IV contrast. Coronal reformatted images were obtained.  COMPARISON EXAMINATION:Correlation CT head 12/3/2023.  IMPRESSION:  Findings compatible with left parotitis (please see CT soft tissue neck   for further evaluation).  No acute pathology of the temporal bones.  Decreased bilateral mastoid effusions, larger on right.    CT Neck Soft Tissue w/ IV Cont (01.01.25 @ 16:14) >  ACC: [id number] EXAM:  CT NECK SOFT TISSUE IC   ORDERED BY: AYDEN HERNANDEZ   PROCEDURE DATE:  01/01/2025    INTERPRETATION:  CLINICAL INDICATIONS:  Left facial swelling, evaluate for abscess  IMPRESSION: Findings compatible with left parotitis. No drainable fluid collection.  Since 10/2019, mild increase in multinodular thyroid gland measuring up   to 6 cm, previously 5 cm. May consider nonemergent thyroid outpatient   thyroid ultrasound follow-up as clinically indicated.      CARDIOLOGY TESTING  12 Lead ECG:   Ventricular Rate 81 BPM    Atrial Rate 81 BPM    P-R Interval 160 ms    QRS Duration 72 ms    Q-T Interval 364 ms    QTC Calculation(Bazett) 422 ms    P Axis 37 degrees    R Axis 1 degrees    T Axis 15 degrees    Diagnosis Line Normal sinus rhythm  Inferior infarct , age undetermined  Abnormal ECG    Confirmed by Cruz Tinajero ([id number]) on 1/2/2025 7:45:56 AM (01-01-25 @ 15:24)      All available historical records have been reviewed    MEDICATIONS  ALPRAZolam 0.25  apixaban 5  carbidopa/levodopa  25/100 2  cefepime   IVPB 2000  diltiazem     entacapone 200  famotidine    Tablet 20  gabapentin 100  lactated ringers. 1000  lidocaine   4% Patch 1  oxybutynin 5  pantoprazole    Tablet 40  senna 2  vancomycin  IVPB. 1000  zaleplon 10      ANTIBIOTICS:  cefepime   IVPB 2000 milliGRAM(s) IV Intermittent once  vancomycin  IVPB. 1000 milliGRAM(s) IV Intermittent once      All available historical data has been reviewed   JON GARCÍA  89y, Female  Allergy: No Known Allergies      CHIEF COMPLAINT: left mastoid swelling (02 Jan 2025 06:44)      HPI:  89y Female from [facility], with a past medical history of Parkinsons, HLD, HTN, a. fib, RA. Patient brought in from nursing home to ED for left swollen jaw, to rule out mastoiditis. Patient is not sure when this swelling started. Patient is a poor historian and the ED spoke to patients daughter on the phone who reported that she heard the patient had a questionable fall due to the swelling of her left jaw. Patient has a 3" x 3" hard mass to her left parotitis area. There is edema, erythema, and tenderness to the left face extending from the left parotitis area down to her left anterior neck. Patient has poor dentition.  Patient has very dry mucous membranes, not well-appearing. There is no evident trauma.    FAMILY HISTORY:  FH: hypertension (Mother)      PAST MEDICAL & SURGICAL HISTORY:  Parkinson disease  HTN (hypertension)  Rheumatoid arthritis  Hyperlipidemia  Left CRAO (central retinal artery occlusion)  Atrial fibrillation    History of hysterectomy  Status post placement of implantable loop recorder (01/2021)  S/P hernia repair    SOCIAL HISTORY  Social History: n/a    Recent Travel: n/a      ROS  General: Denies fever, nightsweats  HEENT: Denies headache, rhinorrhea  CV: Denies CP, palpitations  PULM: Denies wheezing, hemoptysis  GI: Denies hematemesis, hematochezia, melena, abdominal pain  : Denies discharge, hematuria, dysuria  MSK: Denies arthralgias, myalgias  SKIN: Denies rash, lesions      VITALS:  T(F): 97.8, Max: 100.5 (01-01-25 @ 17:01)  HR: 144  BP: 156/100  RR: 18Vital Signs Last 24 Hrs  T(C): 36.6 (02 Jan 2025 07:34), Max: 38.1 (01 Jan 2025 17:01)  T(F): 97.8 (02 Jan 2025 07:34), Max: 100.5 (01 Jan 2025 17:01)  HR: 144 (02 Jan 2025 09:42) (76 - 144)  BP: 156/100 (02 Jan 2025 09:42) (108/59 - 157/84)  BP(mean): 77 (02 Jan 2025 07:34) (77 - 97)  RR: 18 (02 Jan 2025 09:42) (18 - 18)  SpO2: 96% (02 Jan 2025 09:42) (93% - 97%)    Parameters below as of 02 Jan 2025 09:42  Patient On (Oxygen Delivery Method): room air        PHYSICAL EXAM:  PHYSICAL EXAM:  GENERAL: NAD  HEAD:  Atraumatic, Normocephalic  EYES: EOMI, PERRLA, conjunctiva and sclera clear  ENMT: Poor dentation, dry mucosal membrane, unable to visualize the pharynx   NECK: Supple, No JVD   HEART: Regular rate and rhythm; No murmurs, rubs, or gallops  RESPIRATORY: CTA B/L, No W/R/R  ABDOMEN: Soft, Nontender, Nondistended;   NEUROLOGY: A&Ox2, bilateral upper extremity tremor        TESTS & MEASUREMENTS:                        12.3   26.92 )-----------( 252      ( 02 Jan 2025 06:27 )             40.6     01-02    138  |  105  |  26[H]  ----------------------------<  151[H]  4.4   |  19  |  0.7    Ca    9.2      02 Jan 2025 06:27  Mg     1.9     01-02    TPro  5.9[L]  /  Alb  3.4[L]  /  TBili  0.3  /  DBili  x   /  AST  13  /  ALT  <5  /  AlkPhos  85  01-02      LIVER FUNCTIONS - ( 02 Jan 2025 06:27 )  Alb: 3.4 g/dL / Pro: 5.9 g/dL / ALK PHOS: 85 U/L / ALT: <5 U/L / AST: 13 U/L / GGT: x           Urinalysis Basic - ( 02 Jan 2025 06:27 )    Color: x / Appearance: x / SG: x / pH: x  Gluc: 151 mg/dL / Ketone: x  / Bili: x / Urobili: x   Blood: x / Protein: x / Nitrite: x   Leuk Esterase: x / RBC: x / WBC x   Sq Epi: x / Non Sq Epi: x / Bacteria: x    Lactate, Blood: 1.4 mmol/L (01-01-25 @ 15:52)        RADIOLOGY & ADDITIONAL TESTS:    CT Temporal Bones w/ IV Cont (01.01.25 @ 16:14) >  ACC: [id number] EXAM:  CT TEMPORAL BONES IC   ORDERED BY: AYDEN HERNANDEZ   PROCEDURE DATE:  01/01/2025    INTERPRETATION:  CLINICAL INDICATION: Left facial swelling swelling  TECHNIQUE: CT images through the temporal bones were obtained with 100 mL   Omnipaque 350 IV contrast. Coronal reformatted images were obtained.  COMPARISON EXAMINATION:Correlation CT head 12/3/2023.  IMPRESSION:  Findings compatible with left parotitis (please see CT soft tissue neck   for further evaluation).  No acute pathology of the temporal bones.  Decreased bilateral mastoid effusions, larger on right.    CT Neck Soft Tissue w/ IV Cont (01.01.25 @ 16:14) >  ACC: [id number] EXAM:  CT NECK SOFT TISSUE IC   ORDERED BY: AYDEN HERNANDEZ   PROCEDURE DATE:  01/01/2025    INTERPRETATION:  CLINICAL INDICATIONS:  Left facial swelling, evaluate for abscess  IMPRESSION: Findings compatible with left parotitis. No drainable fluid collection.  Since 10/2019, mild increase in multinodular thyroid gland measuring up   to 6 cm, previously 5 cm. May consider nonemergent thyroid outpatient   thyroid ultrasound follow-up as clinically indicated.      CARDIOLOGY TESTING  12 Lead ECG:   Ventricular Rate 81 BPM    Atrial Rate 81 BPM    P-R Interval 160 ms    QRS Duration 72 ms    Q-T Interval 364 ms    QTC Calculation(Bazett) 422 ms    P Axis 37 degrees    R Axis 1 degrees    T Axis 15 degrees    Diagnosis Line Normal sinus rhythm  Inferior infarct , age undetermined  Abnormal ECG    Confirmed by Cruz Tinajero ([id number]) on 1/2/2025 7:45:56 AM (01-01-25 @ 15:24)      All available historical records have been reviewed    MEDICATIONS  ALPRAZolam 0.25  apixaban 5  carbidopa/levodopa  25/100 2  cefepime   IVPB 2000  diltiazem     entacapone 200  famotidine    Tablet 20  gabapentin 100  lactated ringers. 1000  lidocaine   4% Patch 1  oxybutynin 5  pantoprazole    Tablet 40  senna 2  vancomycin  IVPB. 1000  zaleplon 10      ANTIBIOTICS:  cefepime   IVPB 2000 milliGRAM(s) IV Intermittent once  vancomycin  IVPB. 1000 milliGRAM(s) IV Intermittent once      All available historical data has been reviewed   JON GARCÍA  89y, Female  Allergy: No Known Allergies      CHIEF COMPLAINT: left mastoid swelling (02 Jan 2025 06:44)      HPI:  89y Female from [facility], with a past medical history of Parkinsons, HLD, HTN, a. fib, RA. Patient brought in from nursing home to ED for left swollen jaw, to rule out mastoiditis. Patient is not sure when this swelling started. Patient is a poor historian and the ED spoke to patients daughter on the phone who reported that she heard the patient had a questionable fall due to the swelling of her left jaw. Patient has a 3" x 3" hard mass to her left parotitis area. There is edema, erythema, and tenderness to the left face extending from the left parotitis area down to her left anterior neck. Patient has poor dentition.  Patient has very dry mucous membranes, not well-appearing. There is no evident trauma.    FAMILY HISTORY:  FH: hypertension (Mother)      PAST MEDICAL & SURGICAL HISTORY:  Parkinson disease  HTN (hypertension)  Rheumatoid arthritis  Hyperlipidemia  Left CRAO (central retinal artery occlusion)  Atrial fibrillation    History of hysterectomy  Status post placement of implantable loop recorder (01/2021)  S/P hernia repair    SOCIAL HISTORY  Social History: n/a    Recent Travel: n/a      ROS  General: Denies fever, nightsweats  HEENT: Denies headache, rhinorrhea  CV: Denies CP, palpitations  PULM: Denies wheezing, hemoptysis  GI: Denies hematemesis, hematochezia, melena, abdominal pain  : Denies discharge, hematuria, dysuria  MSK: Denies arthralgias, myalgias  SKIN: Denies rash, lesions      VITALS:  T(F): 97.8, Max: 100.5 (01-01-25 @ 17:01)  HR: 144  BP: 156/100  RR: 18Vital Signs Last 24 Hrs  T(C): 36.6 (02 Jan 2025 07:34), Max: 38.1 (01 Jan 2025 17:01)  T(F): 97.8 (02 Jan 2025 07:34), Max: 100.5 (01 Jan 2025 17:01)  HR: 144 (02 Jan 2025 09:42) (76 - 144)  BP: 156/100 (02 Jan 2025 09:42) (108/59 - 157/84)  BP(mean): 77 (02 Jan 2025 07:34) (77 - 97)  RR: 18 (02 Jan 2025 09:42) (18 - 18)  SpO2: 96% (02 Jan 2025 09:42) (93% - 97%)    Parameters below as of 02 Jan 2025 09:42  Patient On (Oxygen Delivery Method): room air        PHYSICAL EXAM:  PHYSICAL EXAM:  GENERAL: NAD  HEAD:  Atraumatic, Normocephalic  EYES: EOMI, PERRLA, conjunctiva and sclera clear  ENMT: Poor dentition, dry mucosal membrane, unable to visualize the pharynx   NECK: Supple, No JVD , L erythema and edema of the parotid gland/neck  HEART: Regular rate and rhythm; No murmurs, rubs, or gallops  RESPIRATORY: CTA B/L, No W/R/R  ABDOMEN: Soft, Nontender, Nondistended;   NEUROLOGY: A&Ox2, bilateral upper extremity tremor        TESTS & MEASUREMENTS:                        12.3   26.92 )-----------( 252      ( 02 Jan 2025 06:27 )             40.6     01-02    138  |  105  |  26[H]  ----------------------------<  151[H]  4.4   |  19  |  0.7    Ca    9.2      02 Jan 2025 06:27  Mg     1.9     01-02    TPro  5.9[L]  /  Alb  3.4[L]  /  TBili  0.3  /  DBili  x   /  AST  13  /  ALT  <5  /  AlkPhos  85  01-02      LIVER FUNCTIONS - ( 02 Jan 2025 06:27 )  Alb: 3.4 g/dL / Pro: 5.9 g/dL / ALK PHOS: 85 U/L / ALT: <5 U/L / AST: 13 U/L / GGT: x           Urinalysis Basic - ( 02 Jan 2025 06:27 )    Color: x / Appearance: x / SG: x / pH: x  Gluc: 151 mg/dL / Ketone: x  / Bili: x / Urobili: x   Blood: x / Protein: x / Nitrite: x   Leuk Esterase: x / RBC: x / WBC x   Sq Epi: x / Non Sq Epi: x / Bacteria: x    Lactate, Blood: 1.4 mmol/L (01-01-25 @ 15:52)        RADIOLOGY & ADDITIONAL TESTS:    CT Temporal Bones w/ IV Cont (01.01.25 @ 16:14) >  ACC: [id number] EXAM:  CT TEMPORAL BONES IC   ORDERED BY: AYDEN HERNANDEZ   PROCEDURE DATE:  01/01/2025    INTERPRETATION:  CLINICAL INDICATION: Left facial swelling swelling  TECHNIQUE: CT images through the temporal bones were obtained with 100 mL   Omnipaque 350 IV contrast. Coronal reformatted images were obtained.  COMPARISON EXAMINATION:Correlation CT head 12/3/2023.  IMPRESSION:  Findings compatible with left parotitis (please see CT soft tissue neck   for further evaluation).  No acute pathology of the temporal bones.  Decreased bilateral mastoid effusions, larger on right.    CT Neck Soft Tissue w/ IV Cont (01.01.25 @ 16:14) >  ACC: [id number] EXAM:  CT NECK SOFT TISSUE IC   ORDERED BY: AYDEN HERNANDEZ   PROCEDURE DATE:  01/01/2025    INTERPRETATION:  CLINICAL INDICATIONS:  Left facial swelling, evaluate for abscess  IMPRESSION: Findings compatible with left parotitis. No drainable fluid collection.  Since 10/2019, mild increase in multinodular thyroid gland measuring up   to 6 cm, previously 5 cm. May consider nonemergent thyroid outpatient   thyroid ultrasound follow-up as clinically indicated.      CARDIOLOGY TESTING  12 Lead ECG:   Ventricular Rate 81 BPM    Atrial Rate 81 BPM    P-R Interval 160 ms    QRS Duration 72 ms    Q-T Interval 364 ms    QTC Calculation(Bazett) 422 ms    P Axis 37 degrees    R Axis 1 degrees    T Axis 15 degrees    Diagnosis Line Normal sinus rhythm  Inferior infarct , age undetermined  Abnormal ECG    Confirmed by Cruz Tinajero ([id number]) on 1/2/2025 7:45:56 AM (01-01-25 @ 15:24)      All available historical records have been reviewed    MEDICATIONS  ALPRAZolam 0.25  apixaban 5  carbidopa/levodopa  25/100 2  cefepime   IVPB 2000  diltiazem     entacapone 200  famotidine    Tablet 20  gabapentin 100  lactated ringers. 1000  lidocaine   4% Patch 1  oxybutynin 5  pantoprazole    Tablet 40  senna 2  vancomycin  IVPB. 1000  zaleplon 10      ANTIBIOTICS:  cefepime   IVPB 2000 milliGRAM(s) IV Intermittent once  vancomycin  IVPB. 1000 milliGRAM(s) IV Intermittent once      All available historical data has been reviewed

## 2025-01-02 NOTE — PATIENT PROFILE ADULT - FUNCTIONAL ASSESSMENT - BASIC MOBILITY 6.
1-calculated by average/Not able to assess (calculate score using [facility] averaging method)

## 2025-01-02 NOTE — CONSULT NOTE ADULT - SUBJECTIVE AND OBJECTIVE BOX
Patient is a 89y old  Female who presents with a chief complaint of left mastoid swelling (02 Jan 2025 04:49)    89 female past medical history Parkinson's, HLD, HTN, atrial fibrillation coming with complaints of swollen jaw.  Patient brought in from nursing home .Unable to obtain much history from patient as he is a poor historian. All the relative history from Ed Documentation . ED  spoke with daughter on the phone who reported that she heard the patient had a questionable fall due to the swelling of her left jaw.  On my exam patient is  AO x 1- 2  and is lethargic. poor oral dentition. Significant swelling and redness extend from behind the left ear down the left side of the neck, accompanied by tenderness over the left mastoid    on vitals in Ed :BP :108 /59 mm Hg  HR :79 /min , temp :100.1 Degrees F  on RA   on Labs WBC 18.18  e Neutrophil predom   on C T temporal and Neck soft tissues :   Findings compatible with left parotiti No acute pathology of the temporal bones.  Decreased bilateral mastoid effusions, larger on right.   No drainable fluid collection.    Since 10/2019, mild increase in multinodular thyroid gland measuring up   to 6 cm, previously 5 cm.     Rcvd cefepime , vanco , solumedrol , Toradol , 2L LR stat in Ed  admitted to SDU called to evaluate        PAST MEDICAL & SURGICAL HISTORY:  Parkinson disease      HTN (hypertension)      Rheumatoid arthritis      Hyperlipidemia      CRAO (central retinal artery occlusion)  Left      Atrial fibrillation      History of hysterectomy      Status post placement of implantable loop recorder  01/2021      S/P hernia repair          SOCIAL HX:   Smoking   -    FAMILY HISTORY:  FH: hypertension (Mother)        REVIEW OF SYSTEMS see HPI    Allergies    No Known Allergies    Intolerances        acetaminophen     Tablet .. 650 milliGRAM(s) Oral every 6 hours PRN  ALPRAZolam 0.25 milliGRAM(s) Oral at bedtime  apixaban 5 milliGRAM(s) Oral every 12 hours  bisacodyl 5 milliGRAM(s) Oral every 12 hours PRN  carbidopa/levodopa  25/100 2 Tablet(s) Oral four times a day  cefepime   IVPB 2000 milliGRAM(s) IV Intermittent once  diltiazem    milliGRAM(s) Oral daily  entacapone 200 milliGRAM(s) Oral four times a day  famotidine    Tablet 20 milliGRAM(s) Oral two times a day  gabapentin 100 milliGRAM(s) Oral two times a day  lactated ringers. 1000 milliLiter(s) IV Continuous <Continuous>  lidocaine   4% Patch 1 Patch Transdermal every 24 hours  meclizine 25 milliGRAM(s) Oral three times a day PRN  oxybutynin 5 milliGRAM(s) Oral daily  pantoprazole    Tablet 40 milliGRAM(s) Oral before breakfast  senna 2 Tablet(s) Oral at bedtime  vancomycin  IVPB. 1000 milliGRAM(s) IV Intermittent once  zaleplon 10 milliGRAM(s) Oral at bedtime  : Home Meds:      PHYSICAL EXAM    ICU Vital Signs Last 24 Hrs  T(C): 36.1 (01 Jan 2025 22:51), Max: 38.1 (01 Jan 2025 17:01)  T(F): 97 (01 Jan 2025 22:51), Max: 100.5 (01 Jan 2025 17:01)  HR: 88 (02 Jan 2025 06:01) (76 - 88)  BP: 150/78 (02 Jan 2025 06:01) (108/59 - 157/84)  BP(mean): 96 (01 Jan 2025 22:51) (96 - 97)  RR: 18 (02 Jan 2025 06:01) (18 - 18)  SpO2: 95% (02 Jan 2025 06:01) (93% - 97%)    O2 Parameters below as of 02 Jan 2025 06:01  Patient On (Oxygen Delivery Method): room air            General: ILL looking  HEENT:   left facial edema, erythema, and tenderness to touch extending from left TMJ down to left SMG.      Lungs: dec bs both bases  Cardiovascular: Regular  Abdomen: Soft, Positive BS  Extremities: No clubbing  Neurological: Non focal         LABS:                          12.9   18.18 )-----------( 287      ( 01 Jan 2025 15:52 )             42.4                                               01-01    146  |  109  |  20  ----------------------------<  111[H]  4.2   |  23  |  0.8    Ca    9.5      01 Jan 2025 15:52    TPro  6.5  /  Alb  3.4[L]  /  TBili  0.5  /  DBili  x   /  AST  21  /  ALT  <5  /  AlkPhos  88  01-01      PT/INR - ( 01 Jan 2025 15:52 )   PT: 23.70 sec;   INR: 1.98 ratio         PTT - ( 01 Jan 2025 15:52 )  PTT:37.7 sec                                       Urinalysis Basic - ( 01 Jan 2025 15:52 )    Color: x / Appearance: x / SG: x / pH: x  Gluc: 111 mg/dL / Ketone: x  / Bili: x / Urobili: x   Blood: x / Protein: x / Nitrite: x   Leuk Esterase: x / RBC: x / WBC x   Sq Epi: x / Non Sq Epi: x / Bacteria: x                                                  LIVER FUNCTIONS - ( 01 Jan 2025 15:52 )  Alb: 3.4 g/dL / Pro: 6.5 g/dL / ALK PHOS: 88 U/L / ALT: <5 U/L / AST: 21 U/L / GGT: x                                                                                                                                       MEDICATIONS  (STANDING):  ALPRAZolam 0.25 milliGRAM(s) Oral at bedtime  apixaban 5 milliGRAM(s) Oral every 12 hours  carbidopa/levodopa  25/100 2 Tablet(s) Oral four times a day  cefepime   IVPB 2000 milliGRAM(s) IV Intermittent once  diltiazem    milliGRAM(s) Oral daily  entacapone 200 milliGRAM(s) Oral four times a day  famotidine    Tablet 20 milliGRAM(s) Oral two times a day  gabapentin 100 milliGRAM(s) Oral two times a day  lactated ringers. 1000 milliLiter(s) (75 mL/Hr) IV Continuous <Continuous>  lidocaine   4% Patch 1 Patch Transdermal every 24 hours  oxybutynin 5 milliGRAM(s) Oral daily  pantoprazole    Tablet 40 milliGRAM(s) Oral before breakfast  senna 2 Tablet(s) Oral at bedtime  vancomycin  IVPB. 1000 milliGRAM(s) IV Intermittent once  zaleplon 10 milliGRAM(s) Oral at bedtime    MEDICATIONS  (PRN):  acetaminophen     Tablet .. 650 milliGRAM(s) Oral every 6 hours PRN Mild Pain (1 - 3), Moderate Pain (4 - 6), Severe Pain (7 - 10)  bisacodyl 5 milliGRAM(s) Oral every 12 hours PRN Constipation  meclizine 25 milliGRAM(s) Oral three times a day PRN Dizziness      ct neck/ MF noted

## 2025-01-02 NOTE — PROGRESS NOTE ADULT - SUBJECTIVE AND OBJECTIVE BOX
JON GARCÍA 89y Female  MRN#: [id number]   Hospital Day: 1d    HPI:  89 female past medical history Parkinson's, HLD, HTN, atrial fibrillation coming with complaints of swollen jaw.  Patient brought in from nursing home due to the concern of swollen Left check .Unable to obtain much history from patient as he is a poor historian. All the relative history from Ed Documentation . ED  spoke with daughter on the phone who reported that she heard the patient had a questionable fall due to the swelling of her left jaw.  On my exam patient is  AO x 1- 2  and is lethargic. poor oral dentition. Significant swelling and redness extend from behind the left ear down the left side of the neck, accompanied by tenderness over the left mastoid    on vitals in Ed :BP :108 /59 mm Hg  HR :79 /min , temp :100.1 Degrees F  on RA   on Labs WBC 18.18  e Neutrophil predom   on C T temporal and Neck soft tissues :   Findings compatible with left parotiti No acute pathology of the temporal bones.  Decreased bilateral mastoid effusions, larger on right.   No drainable fluid collection.    Since 10/2019, mild increase in multinodular thyroid gland measuring up   to 6 cm, previously 5 cm.     Rcvd cefepime , vanco , solumedrol , Toradol , 2L LR stat in Ed    admitted for further workup .   (01 Jan 2025 18:39)      SUBJECTIVE  Patient is a 89y old Female who presents with a chief complaint of left mastoid swelling (02 Jan 2025 09:49)  Currently admitted to medicine with the primary diagnosis of Parotitis      INTERVAL HPI AND OVERNIGHT EVENTS:  Patient was examined and seen at bedside.     OBJECTIVE  PAST MEDICAL & SURGICAL HISTORY  Parkinson disease    HTN (hypertension)    Rheumatoid arthritis    Hyperlipidemia    CRAO (central retinal artery occlusion)  Left    Atrial fibrillation    History of hysterectomy    Status post placement of implantable loop recorder  01/2021    S/P hernia repair      ALLERGIES:  No Known Allergies    MEDICATIONS:  STANDING MEDICATIONS  ALPRAZolam 0.25 milliGRAM(s) Oral at bedtime  apixaban 5 milliGRAM(s) Oral every 12 hours  carbidopa/levodopa  25/100 2 Tablet(s) Oral four times a day  cefepime   IVPB 2000 milliGRAM(s) IV Intermittent once  diltiazem    milliGRAM(s) Oral daily  entacapone 200 milliGRAM(s) Oral four times a day  famotidine    Tablet 20 milliGRAM(s) Oral two times a day  gabapentin 100 milliGRAM(s) Oral two times a day  lactated ringers. 1000 milliLiter(s) IV Continuous <Continuous>  lidocaine   4% Patch 1 Patch Transdermal every 24 hours  oxybutynin 5 milliGRAM(s) Oral daily  pantoprazole    Tablet 40 milliGRAM(s) Oral before breakfast  senna 2 Tablet(s) Oral at bedtime  vancomycin  IVPB. 1000 milliGRAM(s) IV Intermittent once  zaleplon 10 milliGRAM(s) Oral at bedtime    PRN MEDICATIONS  acetaminophen     Tablet .. 650 milliGRAM(s) Oral every 6 hours PRN  bisacodyl 5 milliGRAM(s) Oral every 12 hours PRN  meclizine 25 milliGRAM(s) Oral three times a day PRN      VITAL SIGNS: Last 24 Hours  T(C): 36.6 (02 Jan 2025 07:34), Max: 38.1 (01 Jan 2025 17:01)  T(F): 97.8 (02 Jan 2025 07:34), Max: 100.5 (01 Jan 2025 17:01)  HR: 144 (02 Jan 2025 09:42) (76 - 144)  BP: 156/100 (02 Jan 2025 09:42) (108/59 - 157/84)  BP(mean): 77 (02 Jan 2025 07:34) (77 - 97)  RR: 18 (02 Jan 2025 09:42) (18 - 18)  SpO2: 96% (02 Jan 2025 09:42) (93% - 97%)    LABS:                        12.3   26.92 )-----------( 252      ( 02 Jan 2025 06:27 )             40.6     01-02    138  |  105  |  26[H]  ----------------------------<  151[H]  4.4   |  19  |  0.7    Ca    9.2      02 Jan 2025 06:27  Mg     1.9     01-02    TPro  5.9[L]  /  Alb  3.4[L]  /  TBili  0.3  /  DBili  x   /  AST  13  /  ALT  <5  /  AlkPhos  85  01-02    PT/INR - ( 01 Jan 2025 15:52 )   PT: 23.70 sec;   INR: 1.98 ratio         PTT - ( 01 Jan 2025 15:52 )  PTT:37.7 sec  Urinalysis Basic - ( 02 Jan 2025 06:27 )    Color: x / Appearance: x / SG: x / pH: x  Gluc: 151 mg/dL / Ketone: x  / Bili: x / Urobili: x   Blood: x / Protein: x / Nitrite: x   Leuk Esterase: x / RBC: x / WBC x   Sq Epi: x / Non Sq Epi: x / Bacteria: x        Lactate, Blood: 1.4 mmol/L (01-01-25 @ 15:52)      PHYSICAL EXAM:  CONSTITUTIONAL: No acute distress  HEAD: Atraumatic, normocephalic  EYES: EOM intact, PERRLA, conjunctiva and sclera clear  ENT: swollen left cheek  PULMONARY: Clear to auscultation bilaterally; no wheezes, rales, or rhonchi  CARDIOVASCULAR: Regular rate and rhythm; no murmurs, rubs, or gallops  GASTROINTESTINAL: Soft, non-tender, non-distended; bowel sounds present  MUSCULOSKELETAL: 2+ peripheral pulses; no clubbing, no cyanosis, no edema  NEUROLOGY: non-focal    ASSESSMENT & PLAN    89 female past medical history Parkinson's, HLD, HTN, atrial fibrillation coming with complaints of swollen jaw.  Patient brought in from nursing home due to the concern of swollen Left check .Unable to obtain much history from patient as he is a poor historian.    #left Parotitis :   -On vitals in Ed :BP :108 /59 mm Hg  HR :79 /min , temp :100.1 Degrees F  on RA   -WBC 18.18  e Neutrophil predom   -lactate 1.4  on C T temporal and Neck soft tissues :   Findings compatible with left parotitis No acute pathology of the temporal bones.  Decreased bilateral mastoid effusions, larger on right.   No drainable fluid collection.  -s/p cefepime , vanco , solumedrol , Toradol , 2L LR stat in Ed  -ENT on board , recc no intervention   PLAN :   -admit to SDU   -vitals Q4 hrs   -pain control   -gentle hydration avoid fluid overload   -c/w cefepime and Vancomycivin   - Warm compress to LEFT face   -f/u infectious workup i.e BCx   -f/u ID c/s >>  -Hold Cefepime & Vancomycin  -Check Vancomycin level in the morning (01/03/25)  -Obtain MRSA swab  -Start Unasyn 3g Q6H. Watch until symptoms improve.   Once symptoms improve: Start Augmentin 875mg BID.  Total abx days for Unasyn & Augmentin should be 10 days.    #multinodular thyroid gland  -on CT neck and soft tissues :   Since 10/2019, mild increase in multinodular thyroid gland measuring up   to 6 cm, previously 5 cm.   -f/u TSH   -outpt thyroid US      #Parkinson's  #HLD  # HTN  #atrial fibrillation on Eliquis   -c/w home meds      MISC:  GI ppx: PPI  DVT prop: on eliquis 5 mg BID  Activity:ATT   Dispo: SDU   full code  JON GARCÍA 89y Female  MRN#: [id number]   Hospital Day: 1d    HPI:  89 female past medical history Parkinson's, HLD, HTN, chronic atrial fibrillation coming with complaints of swollen jaw.  Patient brought in from nursing home due to the concern of swollen Left check .Unable to obtain much history from patient as he is a poor historian. All the relative history from Ed Documentation . ED  spoke with daughter on the phone who reported that she heard the patient had a questionable fall due to the swelling of her left jaw.  On my exam patient is  AO x 1- 2  and is lethargic. poor oral dentition. Significant swelling and redness extend from behind the left ear down the left side of the neck, accompanied by tenderness over the left mastoid    on vitals in Ed :BP :108 /59 mm Hg  HR :79 /min , temp :100.1 Degrees F  on RA   on Labs WBC 18.18  e Neutrophil predom   on C T temporal and Neck soft tissues :   Findings compatible with left parotiti No acute pathology of the temporal bones.  Decreased bilateral mastoid effusions, larger on right.   No drainable fluid collection.    Since 10/2019, mild increase in multinodular thyroid gland measuring up   to 6 cm, previously 5 cm.     Rcvd cefepime , vanco , solumedrol , Toradol , 2L LR stat in Ed    admitted for further workup .   (01 Jan 2025 18:39)      SUBJECTIVE  Patient is a 89y old Female who presents with a chief complaint of left mastoid swelling (02 Jan 2025 09:49)  Currently admitted to medicine with the primary diagnosis of Parotitis      INTERVAL HPI AND OVERNIGHT EVENTS:  Patient was examined and seen at bedside.     OBJECTIVE  PAST MEDICAL & SURGICAL HISTORY  Parkinson disease    HTN (hypertension)    Rheumatoid arthritis    Hyperlipidemia    CRAO (central retinal artery occlusion)  Left    Atrial fibrillation    History of hysterectomy    Status post placement of implantable loop recorder  01/2021    S/P hernia repair      ALLERGIES:  No Known Allergies    MEDICATIONS:  STANDING MEDICATIONS  ALPRAZolam 0.25 milliGRAM(s) Oral at bedtime  apixaban 5 milliGRAM(s) Oral every 12 hours  carbidopa/levodopa  25/100 2 Tablet(s) Oral four times a day  cefepime   IVPB 2000 milliGRAM(s) IV Intermittent once  diltiazem    milliGRAM(s) Oral daily  entacapone 200 milliGRAM(s) Oral four times a day  famotidine    Tablet 20 milliGRAM(s) Oral two times a day  gabapentin 100 milliGRAM(s) Oral two times a day  lactated ringers. 1000 milliLiter(s) IV Continuous <Continuous>  lidocaine   4% Patch 1 Patch Transdermal every 24 hours  oxybutynin 5 milliGRAM(s) Oral daily  pantoprazole    Tablet 40 milliGRAM(s) Oral before breakfast  senna 2 Tablet(s) Oral at bedtime  vancomycin  IVPB. 1000 milliGRAM(s) IV Intermittent once  zaleplon 10 milliGRAM(s) Oral at bedtime    PRN MEDICATIONS  acetaminophen     Tablet .. 650 milliGRAM(s) Oral every 6 hours PRN  bisacodyl 5 milliGRAM(s) Oral every 12 hours PRN  meclizine 25 milliGRAM(s) Oral three times a day PRN      VITAL SIGNS: Last 24 Hours  T(C): 36.6 (02 Jan 2025 07:34), Max: 38.1 (01 Jan 2025 17:01)  T(F): 97.8 (02 Jan 2025 07:34), Max: 100.5 (01 Jan 2025 17:01)  HR: 144 (02 Jan 2025 09:42) (76 - 144)  BP: 156/100 (02 Jan 2025 09:42) (108/59 - 157/84)  BP(mean): 77 (02 Jan 2025 07:34) (77 - 97)  RR: 18 (02 Jan 2025 09:42) (18 - 18)  SpO2: 96% (02 Jan 2025 09:42) (93% - 97%)    LABS:                        12.3   26.92 )-----------( 252      ( 02 Jan 2025 06:27 )             40.6     01-02    138  |  105  |  26[H]  ----------------------------<  151[H]  4.4   |  19  |  0.7    Ca    9.2      02 Jan 2025 06:27  Mg     1.9     01-02    TPro  5.9[L]  /  Alb  3.4[L]  /  TBili  0.3  /  DBili  x   /  AST  13  /  ALT  <5  /  AlkPhos  85  01-02    PT/INR - ( 01 Jan 2025 15:52 )   PT: 23.70 sec;   INR: 1.98 ratio         PTT - ( 01 Jan 2025 15:52 )  PTT:37.7 sec  Urinalysis Basic - ( 02 Jan 2025 06:27 )    Color: x / Appearance: x / SG: x / pH: x  Gluc: 151 mg/dL / Ketone: x  / Bili: x / Urobili: x   Blood: x / Protein: x / Nitrite: x   Leuk Esterase: x / RBC: x / WBC x   Sq Epi: x / Non Sq Epi: x / Bacteria: x        Lactate, Blood: 1.4 mmol/L (01-01-25 @ 15:52)      PHYSICAL EXAM:  CONSTITUTIONAL: No acute distress  HEAD: Atraumatic, normocephalic  EYES: EOM intact, PERRLA, conjunctiva and sclera clear  ENT: swollen left cheek  PULMONARY: Clear to auscultation bilaterally; no wheezes, rales, or rhonchi  CARDIOVASCULAR: Regular rate and rhythm; no murmurs, rubs, or gallops  GASTROINTESTINAL: Soft, non-tender, non-distended; bowel sounds present  MUSCULOSKELETAL: 2+ peripheral pulses; no clubbing, no cyanosis, no edema  NEUROLOGY: non-focal    ASSESSMENT & PLAN    89 female past medical history Parkinson's, HLD, HTN, atrial fibrillation coming with complaints of swollen jaw.  Patient brought in from nursing home due to the concern of swollen Left check .Unable to obtain much history from patient as he is a poor historian.    #left Parotitis :   -On vitals in Ed :BP :108 /59 mm Hg  HR :79 /min , temp :100.1 Degrees F  on RA   -WBC 18.18  e Neutrophil predom   -lactate 1.4  on C T temporal and Neck soft tissues :   Findings compatible with left parotitis No acute pathology of the temporal bones.  Decreased bilateral mastoid effusions, larger on right.   No drainable fluid collection.  -s/p cefepime , vanco , solumedrol , Toradol , 2L LR stat in Ed  -ENT on board , recc no intervention   PLAN :   -admit to SDU   -vitals Q4 hrs   -pain control   -gentle hydration avoid fluid overload   -c/w cefepime and Vancomycivin   - Warm compress to LEFT face   -f/u infectious workup i.e BCx   -f/u ID c/s >>  -Hold Cefepime & Vancomycin  -Check Vancomycin level in the morning (01/03/25)  -Obtain MRSA swab  -Start Unasyn 3g Q6H. Watch until symptoms improve.   Once symptoms improve: Start Augmentin 875mg BID.  Total abx days for Unasyn & Augmentin should be 10 days.    #multinodular thyroid gland  -on CT neck and soft tissues :   Since 10/2019, mild increase in multinodular thyroid gland measuring up   to 6 cm, previously 5 cm.   -f/u TSH   -outpt thyroid US      #Parkinson's  #HLD  # HTN  #atrial fibrillation on Eliquis   -c/w home meds      MISC:  GI ppx: PPI  DVT prop: on eliquis 5 mg BID  Activity:ATT   Dispo: SDU   full code  JON GARCÍA 89y Female  MRN#: [id number]   Hospital Day: 1d    HPI:  89 female past medical history Parkinson's, HLD, HTN, chronic atrial fibrillation coming with complaints of swollen jaw.  Patient brought in from nursing home due to the concern of swollen Left check .Unable to obtain much history from patient as he is a poor historian. All the relative history from Ed Documentation . ED  spoke with daughter on the phone who reported that she heard the patient had a questionable fall due to the swelling of her left jaw.  On my exam patient is  AO x 1- 2  and is lethargic. poor oral dentition. Significant swelling and redness extend from behind the left ear down the left side of the neck, accompanied by tenderness over the left mastoid    on vitals in Ed :BP :108 /59 mm Hg  HR :79 /min , temp :100.1 Degrees F  on RA   on Labs WBC 18.18  e Neutrophil predom   on C T temporal and Neck soft tissues :   Findings compatible with left parotiti No acute pathology of the temporal bones.  Decreased bilateral mastoid effusions, larger on right.   No drainable fluid collection.    Since 10/2019, mild increase in multinodular thyroid gland measuring up   to 6 cm, previously 5 cm.     Rcvd cefepime , vanco , solumedrol , Toradol , 2L LR stat in Ed    admitted for further workup .   (01 Jan 2025 18:39)      SUBJECTIVE  Patient is a 89y old Female who presents with a chief complaint of left mastoid swelling (02 Jan 2025 09:49)  Currently admitted to medicine with the primary diagnosis of Parotitis      INTERVAL HPI AND OVERNIGHT EVENTS:  Patient was examined and seen at bedside.     OBJECTIVE  PAST MEDICAL & SURGICAL HISTORY  Parkinson disease    HTN (hypertension)    Rheumatoid arthritis    Hyperlipidemia    CRAO (central retinal artery occlusion)  Left    Atrial fibrillation    History of hysterectomy    Status post placement of implantable loop recorder  01/2021    S/P hernia repair      ALLERGIES:  No Known Allergies    MEDICATIONS:  STANDING MEDICATIONS  ALPRAZolam 0.25 milliGRAM(s) Oral at bedtime  apixaban 5 milliGRAM(s) Oral every 12 hours  carbidopa/levodopa  25/100 2 Tablet(s) Oral four times a day  cefepime   IVPB 2000 milliGRAM(s) IV Intermittent once  diltiazem    milliGRAM(s) Oral daily  entacapone 200 milliGRAM(s) Oral four times a day  famotidine    Tablet 20 milliGRAM(s) Oral two times a day  gabapentin 100 milliGRAM(s) Oral two times a day  lactated ringers. 1000 milliLiter(s) IV Continuous <Continuous>  lidocaine   4% Patch 1 Patch Transdermal every 24 hours  oxybutynin 5 milliGRAM(s) Oral daily  pantoprazole    Tablet 40 milliGRAM(s) Oral before breakfast  senna 2 Tablet(s) Oral at bedtime  vancomycin  IVPB. 1000 milliGRAM(s) IV Intermittent once  zaleplon 10 milliGRAM(s) Oral at bedtime    PRN MEDICATIONS  acetaminophen     Tablet .. 650 milliGRAM(s) Oral every 6 hours PRN  bisacodyl 5 milliGRAM(s) Oral every 12 hours PRN  meclizine 25 milliGRAM(s) Oral three times a day PRN      VITAL SIGNS: Last 24 Hours  T(C): 36.6 (02 Jan 2025 07:34), Max: 38.1 (01 Jan 2025 17:01)  T(F): 97.8 (02 Jan 2025 07:34), Max: 100.5 (01 Jan 2025 17:01)  HR: 144 (02 Jan 2025 09:42) (76 - 144)  BP: 156/100 (02 Jan 2025 09:42) (108/59 - 157/84)  BP(mean): 77 (02 Jan 2025 07:34) (77 - 97)  RR: 18 (02 Jan 2025 09:42) (18 - 18)  SpO2: 96% (02 Jan 2025 09:42) (93% - 97%)    LABS:                        12.3   26.92 )-----------( 252      ( 02 Jan 2025 06:27 )             40.6     01-02    138  |  105  |  26[H]  ----------------------------<  151[H]  4.4   |  19  |  0.7    Ca    9.2      02 Jan 2025 06:27  Mg     1.9     01-02    TPro  5.9[L]  /  Alb  3.4[L]  /  TBili  0.3  /  DBili  x   /  AST  13  /  ALT  <5  /  AlkPhos  85  01-02    PT/INR - ( 01 Jan 2025 15:52 )   PT: 23.70 sec;   INR: 1.98 ratio         PTT - ( 01 Jan 2025 15:52 )  PTT:37.7 sec  Urinalysis Basic - ( 02 Jan 2025 06:27 )    Color: x / Appearance: x / SG: x / pH: x  Gluc: 151 mg/dL / Ketone: x  / Bili: x / Urobili: x   Blood: x / Protein: x / Nitrite: x   Leuk Esterase: x / RBC: x / WBC x   Sq Epi: x / Non Sq Epi: x / Bacteria: x        Lactate, Blood: 1.4 mmol/L (01-01-25 @ 15:52)      PHYSICAL EXAM:  CONSTITUTIONAL: No acute distress  HEAD: Atraumatic, normocephalic  EYES: EOM intact, PERRLA, conjunctiva and sclera clear  ENT: swollen left cheek  PULMONARY: Clear to auscultation bilaterally; no wheezes, rales, or rhonchi  CARDIOVASCULAR: Regular rate and rhythm; no murmurs, rubs, or gallops  GASTROINTESTINAL: Soft, non-tender, non-distended; bowel sounds present  MUSCULOSKELETAL: 2+ peripheral pulses; no clubbing, no cyanosis, no edema  NEUROLOGY: non-focal    ASSESSMENT & PLAN    89 female past medical history Parkinson's, HLD, HTN, atrial fibrillation coming with complaints of swollen jaw.  Patient brought in from nursing home due to the concern of swollen Left check .Unable to obtain much history from patient as he is a poor historian.    #left Parotitis :   -On vitals in Ed :BP :108 /59 mm Hg  HR :79 /min , temp :100.1 Degrees F  on RA   -WBC 18.18  e Neutrophil predom   -lactate 1.4  on C T temporal and Neck soft tissues :   Findings compatible with left parotitis No acute pathology of the temporal bones.  Decreased bilateral mastoid effusions, larger on right.   No drainable fluid collection.  -s/p cefepime , vanco , solumedrol , Toradol , 2L LR stat in Ed  -ENT on board , recc no intervention   PLAN :   -admit to SDU   -vitals Q4 hrs   -pain control   -gentle hydration avoid fluid overload   - Warm compress to LEFT face   -f/u infectious workup i.e BCx   -f/u ID c/s >>  -Hold Cefepime & Vancomycin  -Check Vancomycin level in the morning (01/03/25)  -Obtain MRSA swab  -Start Unasyn 3g Q6H. Watch until symptoms improve.   Once symptoms improve: Start Augmentin 875mg BID.  Total abx days for Unasyn & Augmentin should be 10 days.    #multinodular thyroid gland  -on CT neck and soft tissues :   Since 10/2019, mild increase in multinodular thyroid gland measuring up   to 6 cm, previously 5 cm.   -f/u TSH   -outpt thyroid US      #Parkinson's  #HLD  # HTN  #atrial fibrillation on Eliquis   -c/w home meds      MISC:  GI ppx: PPI  DVT prop: on eliquis 5 mg BID  Activity:ATT   Dispo: SDU   full code

## 2025-01-02 NOTE — CONSULT NOTE ADULT - ASSESSMENT
IMPRESSION:      PLAN:    CNS:    HEENT: Oral care    PULMONARY:  HOB @ 45 degrees.  Aspiration precautions     CARDIOVASCULAR:    GI: GI prophylaxis.  Feeding.  Bowel regimen     RENAL:  Follow up lytes.  Correct as needed    INFECTIOUS DISEASE: Follow up cultures    HEMATOLOGICAL:  DVT prophylaxis.    ENDOCRINE:  Follow up FS.  Insulin protocol if needed.    MUSCULOSKELETAL:

## 2025-01-02 NOTE — CONSULT NOTE ADULT - ASSESSMENT
ASSESSMENT  89y Female from [facility], with a past medical history of Parkinsons, HLD, HTN, a. fib, RA. Patient brought in from nursing home to ED for left swollen jaw, to rule out mastoiditis. Patient is not sure when this swelling started. Patient is a poor historian and the ED spoke to patients daughter on the phone who reported that she heard the patient had a questionable fall due to the swelling of her left jaw. Patient has a 3" x 3" hard mass to her left mastoid area. There is edema, erythema, and tenderness to the left face extending from the left mastoid area down to her left anterior neck. Patient has poor dentition.  Patient has very dry mucous membranes, not well-appearing. There is no evident trauma.    IMPRESSION  #Left parotitis    RECOMMENDATIONS  -     This is a pended note. All final recommendations to follow pending discussion with ID Attending      ASSESSMENT  89y Female from [facility], with a past medical history of Parkinsons, HLD, HTN, a. fib, RA. Patient brought in from nursing home to ED for left swollen jaw, to rule out mastoiditis. Patient is not sure when this swelling started. Patient is a poor historian and the ED spoke to patients daughter on the phone who reported that she heard the patient had a questionable fall due to the swelling of her left jaw. Patient has a 3" x 3" hard mass to her left mastoid area. There is edema, erythema, and tenderness to the left face extending from the left mastoid area down to her left anterior neck. Patient has poor dentition.  Patient has very dry mucous membranes, not well-appearing. There is no evident trauma.    IMPRESSION  #Left parotitis    RECOMMENDATIONS  -Hold Cefepime & Vancomycin  -Check Vancomycin level in the morning (01/03/25)  -Obtain MRSA swab  -Start Unasyn 3g Q6H. Watch until symptoms improve.   Once symptoms improve: Start Augmentin 875mg BID.  Total abx days for Unasyn & Augmentin should be 10 days.    Discussed with ID Attending, Dr. Ritter     ASSESSMENT  89y Female from [facility], with a past medical history of Parkinsons, HLD, HTN, a. fib, RA. Patient brought in from nursing home to ED for left swollen jaw, to rule out mastoiditis. Patient is not sure when this swelling started. Patient is a poor historian and the ED spoke to patients daughter on the phone who reported that she heard the patient had a questionable fall due to the swelling of her left jaw. Patient has a 3" x 3" hard mass to her left parotitis area. There is edema, erythema, and tenderness to the left face extending from the left parotitis area down to her left anterior neck. Patient has poor dentition.  Patient has very dry mucous membranes, not well-appearing. There is no evident trauma.    IMPRESSION  #Left parotitis    RECOMMENDATIONS  -Hold Cefepime & Vancomycin  -Check Vancomycin level in the morning (01/03/25)  -Obtain MRSA swab  -Start Unasyn 3g Q6H. Watch until symptoms improve.   Once symptoms improve: Start Augmentin 875mg BID.  Total abx days for Unasyn & Augmentin should be 10 days.    Discussed with ID Attending, Dr. Ritter

## 2025-01-02 NOTE — CONSULT NOTE ADULT - NS ATTEST RISK PROBLEM GEN_ALL_CORE FT
- Patient has an illness which poses a threat to life or bodily function without treatment  - The doses of the antimicrobials will be adjusted according to the estimated creatinine clearance (using the Cockroft-Gault equation)  - I independently interpreted the most recent CT- L parotid with inflammation, no abscess  - I discussed my recommendations with the primary team housestaff / Attending

## 2025-01-02 NOTE — CONSULT NOTE ADULT - ATTENDING COMMENTS
I have personally seen and examined this patient.  I have fully participated in the care of this patient.  I have reviewed all pertinent clinical information, including history, physical exam, plan and note.  I have reviewed all pertinent clinical information and reviewed all relevant imaging and diagnostic studies personally.  My addendum includes the final recommendations and supersedes the resident's note.       #L parotitis  Tm 100.5  Admission WBC 18   < from: CT Neck Soft Tissue w/ IV Cont (01.01.25 @ 16:14) >  Findings compatible with left parotitis. No drainable fluid collection.  Since 10/2019, mild increase in multinodular thyroid gland measuring up   to 6 cm, previously 5 cm. May consider nonemergent thyroid outpatient   thyroid ultrasound follow-up as clinically indicated.    Drug Dosing Weight  Height (cm): 170.2 (01 Jan 2025 15:41)  Weight (kg): 66.224 (01 Jan 2025 15:41)  BMI (kg/m2): 22.9 (01 Jan 2025 15:41)    Creatinine: 0.7 mg/dL (01.02.25 @ 06:27) 50 mL/min  Creatinine clearance for normal weight patient, using ideal body weight of 61 kg (135 lbs).    - D/C Cefepime  - Unasyn 3g q6h IV  - Hold Vanc, check AM level  - MRSA nares  - f/u BCX  - ESR CRP  - Appreciate ENT consult    If any questions, please text or call on Microsoft Teams  Please continue to update ID with any pertinent new clinical, laboratory or radiographic findings

## 2025-01-02 NOTE — CONSULT NOTE ADULT - SUBJECTIVE AND OBJECTIVE BOX
Patient is a 89y old  Female who presents with a chief complaint of left mastoid swelling (01 Jan 2025 18:39)      HPI:  89 female past medical history Parkinson's, HLD, HTN, atrial fibrillation coming with complaints of swollen jaw.  Patient brought in from nursing home due to the concern of swollen Left cheeck .Unable to obtain much history from patient as he is a poor historian. All the relative history from Ed Documentation . ED  spoke with daughter on the phone who reported that she heard the patient had a questionable fall due to the swelling of her left jaw.  On my exam patient is  AO x 1- 2  and is lethargic. poor oral dentition. Significant swelling and redness extend from behind the left ear down the left side of the neck, accompanied by tenderness over the left mastoid    on vitals in Ed :BP :108 /59 mm Hg  HR :79 /min , temp :100.1 Degrees F  on RA   on Labs WBC 18.18  e Neutrophil predom   on C T temporal and Neck soft tissues :   Findings compatible with left parotiti No acute pathology of the temporal bones.  Decreased bilateral mastoid effusions, larger on right.   No drainable fluid collection.    Since 10/2019, mild increase in multinodular thyroid gland measuring up   to 6 cm, previously 5 cm.     Rcvd cefepime , vanco , solumedrol , Toradol , 2L LR stat in Ed    admitted for further workup .   (01 Jan 2025 18:39)      PAST MEDICAL & SURGICAL HISTORY:  Parkinson disease      HTN (hypertension)      Rheumatoid arthritis      Hyperlipidemia      CRAO (central retinal artery occlusion)  Left      Atrial fibrillation      History of hysterectomy      Status post placement of implantable loop recorder  01/2021      S/P hernia repair          SOCIAL HX:   Smoking                         ETOH                            Other    FAMILY HISTORY:  FH: hypertension (Mother)    .  No cardiovascular or pulmonary family history     REVIEW OF SYSTEMS:    All ROS are negative except per HPI     Allergies    No Known Allergies    Intolerances          PHYSICAL EXAM  Vital Signs Last 24 Hrs  T(C): 36.1 (01 Jan 2025 22:51), Max: 38.1 (01 Jan 2025 17:01)  T(F): 97 (01 Jan 2025 22:51), Max: 100.5 (01 Jan 2025 17:01)  HR: 76 (01 Jan 2025 22:51) (76 - 85)  BP: 128/80 (01 Jan 2025 22:51) (108/59 - 157/84)  BP(mean): 96 (01 Jan 2025 22:51) (96 - 97)  RR: 18 (01 Jan 2025 22:51) (18 - 18)  SpO2: 94% (01 Jan 2025 22:51) (93% - 97%)    Parameters below as of 01 Jan 2025 22:51  Patient On (Oxygen Delivery Method): room air        CONSTITUTIONAL:  Well nourished.  NAD    ENT:   Airway patent,   No thrush    EYES:   Clear bilaterally,   pupils equal,   round and reactive to light.    CARDIAC:   Normal rate,   regular rhythm.    no edema      CAROTID:   normal systolic impulse  no bruits    RESPIRATORY:   No wheezing  Nnormal chest expansion  Not tachypneic,  No use of accessory muscles    GASTROINTESTINAL:  Abdomen soft,   non-tender,   no guarding,   + BS    GENITOURINARY  normal genitalia for sex  no edema    MUSCULOSKELETAL:   range of motion is not limited,  no clubbing, cyanosis    NEUROLOGICAL:   Alert and oriented   no motor deficits.    SKIN:   Skin normal color for race,   No evidence of rash.    PSYCHIATRIC:   normal mood and affect.   no apparent risk to self or others.    HEME LYMPH:   No cervical  lymphadenopathy.  no inguinal lymphadenopathy          LABS:                          12.9   18.18 )-----------( 287      ( 01 Jan 2025 15:52 )             42.4                                               01-01    146  |  109  |  20  ----------------------------<  111[H]  4.2   |  23  |  0.8    Ca    9.5      01 Jan 2025 15:52    TPro  6.5  /  Alb  3.4[L]  /  TBili  0.5  /  DBili  x   /  AST  21  /  ALT  <5  /  AlkPhos  88  01-01      PT/INR - ( 01 Jan 2025 15:52 )   PT: 23.70 sec;   INR: 1.98 ratio         PTT - ( 01 Jan 2025 15:52 )  PTT:37.7 sec                                       Urinalysis Basic - ( 01 Jan 2025 15:52 )    Color: x / Appearance: x / SG: x / pH: x  Gluc: 111 mg/dL / Ketone: x  / Bili: x / Urobili: x   Blood: x / Protein: x / Nitrite: x   Leuk Esterase: x / RBC: x / WBC x   Sq Epi: x / Non Sq Epi: x / Bacteria: x                                                  LIVER FUNCTIONS - ( 01 Jan 2025 15:52 )  Alb: 3.4 g/dL / Pro: 6.5 g/dL / ALK PHOS: 88 U/L / ALT: <5 U/L / AST: 21 U/L / GGT: x                                                                                                MEDICATIONS  (STANDING):  ALPRAZolam 0.25 milliGRAM(s) Oral at bedtime  apixaban 5 milliGRAM(s) Oral every 12 hours  carbidopa/levodopa  25/100 2 Tablet(s) Oral four times a day  cefepime   IVPB 2000 milliGRAM(s) IV Intermittent once  diltiazem    milliGRAM(s) Oral daily  entacapone 200 milliGRAM(s) Oral four times a day  famotidine    Tablet 20 milliGRAM(s) Oral two times a day  gabapentin 100 milliGRAM(s) Oral two times a day  lactated ringers. 1000 milliLiter(s) (75 mL/Hr) IV Continuous <Continuous>  lidocaine   4% Patch 1 Patch Transdermal every 24 hours  oxybutynin 5 milliGRAM(s) Oral daily  pantoprazole    Tablet 40 milliGRAM(s) Oral before breakfast  senna 2 Tablet(s) Oral at bedtime  vancomycin  IVPB. 1000 milliGRAM(s) IV Intermittent once  zaleplon 10 milliGRAM(s) Oral at bedtime    MEDICATIONS  (PRN):  acetaminophen     Tablet .. 650 milliGRAM(s) Oral every 6 hours PRN Mild Pain (1 - 3), Moderate Pain (4 - 6), Severe Pain (7 - 10)  bisacodyl 5 milliGRAM(s) Oral every 12 hours PRN Constipation  meclizine 25 milliGRAM(s) Oral three times a day PRN Dizziness      X-Rays reviewed:

## 2025-01-02 NOTE — PATIENT PROFILE ADULT - FALL HARM RISK - HARM RISK INTERVENTIONS

## 2025-01-02 NOTE — CONSULT NOTE ADULT - ASSESSMENT
IMPRESSION:    Sepsis POA  L parotitis/ CT/ ENT noted  HO Parkinson's, HLD, HTN, atrial fibrillation    PLAN:    CNS: Avoid CNS depressant, keep OP meds    HEENT: Oral care, ENT fup    PULMONARY:  HOB @ 45 degrees.  Aspiration precautions , on RA, CXR    CARDIOVASCULAR: IVF, avoid overload, on eliquis, rate control    GI: GI prophylaxis.  Feeding/ speech eval    RENAL:  Follow up lytes.  Correct as needed    INFECTIOUS DISEASE: vanco. cefepime, procal    HEMATOLOGICAL:  DVT prophylaxis.     ENDOCRINE:  Follow up FS.  Insulin protocol if needed.    floor  GOC  poor prognosis

## 2025-01-03 ENCOUNTER — RESULT REVIEW (OUTPATIENT)
Age: 89
End: 2025-01-03

## 2025-01-03 LAB
ALBUMIN SERPL ELPH-MCNC: 3 G/DL — LOW (ref 3.5–5.2)
ALP SERPL-CCNC: 76 U/L — SIGNIFICANT CHANGE UP (ref 30–115)
ALT FLD-CCNC: <5 U/L — SIGNIFICANT CHANGE UP (ref 0–41)
ANION GAP SERPL CALC-SCNC: 14 MMOL/L — SIGNIFICANT CHANGE UP (ref 7–14)
AST SERPL-CCNC: 17 U/L — SIGNIFICANT CHANGE UP (ref 0–41)
BILIRUB SERPL-MCNC: 0.3 MG/DL — SIGNIFICANT CHANGE UP (ref 0.2–1.2)
BUN SERPL-MCNC: 31 MG/DL — HIGH (ref 10–20)
CALCIUM SERPL-MCNC: 8.7 MG/DL — SIGNIFICANT CHANGE UP (ref 8.4–10.4)
CHLORIDE SERPL-SCNC: 107 MMOL/L — SIGNIFICANT CHANGE UP (ref 98–110)
CO2 SERPL-SCNC: 23 MMOL/L — SIGNIFICANT CHANGE UP (ref 17–32)
CREAT SERPL-MCNC: 0.7 MG/DL — SIGNIFICANT CHANGE UP (ref 0.7–1.5)
EGFR: 81 ML/MIN/1.73M2 — SIGNIFICANT CHANGE UP
GLUCOSE SERPL-MCNC: 95 MG/DL — SIGNIFICANT CHANGE UP (ref 70–99)
GRAM STN FLD: ABNORMAL
HCT VFR BLD CALC: 34.2 % — LOW (ref 37–47)
HGB BLD-MCNC: 10.4 G/DL — LOW (ref 12–16)
MCHC RBC-ENTMCNC: 27.2 PG — SIGNIFICANT CHANGE UP (ref 27–31)
MCHC RBC-ENTMCNC: 30.4 G/DL — LOW (ref 32–37)
MCV RBC AUTO: 89.3 FL — SIGNIFICANT CHANGE UP (ref 81–99)
NRBC # BLD: 0 /100 WBCS — SIGNIFICANT CHANGE UP (ref 0–0)
PLATELET # BLD AUTO: 262 K/UL — SIGNIFICANT CHANGE UP (ref 130–400)
PMV BLD: 10.5 FL — HIGH (ref 7.4–10.4)
POTASSIUM SERPL-MCNC: 3.7 MMOL/L — SIGNIFICANT CHANGE UP (ref 3.5–5)
POTASSIUM SERPL-SCNC: 3.7 MMOL/L — SIGNIFICANT CHANGE UP (ref 3.5–5)
PROT SERPL-MCNC: 5.7 G/DL — LOW (ref 6–8)
RBC # BLD: 3.83 M/UL — LOW (ref 4.2–5.4)
RBC # FLD: 16.1 % — HIGH (ref 11.5–14.5)
SODIUM SERPL-SCNC: 144 MMOL/L — SIGNIFICANT CHANGE UP (ref 135–146)
VANCOMYCIN FLD-MCNC: 12.5 UG/ML — HIGH (ref 5–10)
WBC # BLD: 20.76 K/UL — HIGH (ref 4.8–10.8)
WBC # FLD AUTO: 20.76 K/UL — HIGH (ref 4.8–10.8)

## 2025-01-03 PROCEDURE — 93306 TTE W/DOPPLER COMPLETE: CPT | Mod: 26

## 2025-01-03 PROCEDURE — 99233 SBSQ HOSP IP/OBS HIGH 50: CPT

## 2025-01-03 RX ORDER — VANCOMYCIN HYDROCHLORIDE 5 G/100ML
1250 INJECTION, POWDER, LYOPHILIZED, FOR SOLUTION INTRAVENOUS ONCE
Refills: 0 | Status: DISCONTINUED | OUTPATIENT
Start: 2025-01-03 | End: 2025-01-03

## 2025-01-03 RX ORDER — VANCOMYCIN HYDROCHLORIDE 5 G/100ML
1250 INJECTION, POWDER, LYOPHILIZED, FOR SOLUTION INTRAVENOUS EVERY 24 HOURS
Refills: 0 | Status: DISCONTINUED | OUTPATIENT
Start: 2025-01-03 | End: 2025-01-10

## 2025-01-03 RX ORDER — SODIUM CHLORIDE 9 MG/ML
1000 INJECTION, SOLUTION INTRAVENOUS
Refills: 0 | Status: COMPLETED | OUTPATIENT
Start: 2025-01-03 | End: 2025-01-04

## 2025-01-03 RX ADMIN — ENTACAPONE 200 MILLIGRAM(S): 200 TABLET, FILM COATED ORAL at 12:31

## 2025-01-03 RX ADMIN — ENTACAPONE 200 MILLIGRAM(S): 200 TABLET, FILM COATED ORAL at 18:12

## 2025-01-03 RX ADMIN — AMPICILLIN SODIUM AND SULBACTAM SODIUM 200 GRAM(S): 100; 50 INJECTION, POWDER, FOR SOLUTION INTRAVENOUS at 18:12

## 2025-01-03 RX ADMIN — LEVODOPA AND CARBIDOPA 2 TABLET(S): 145; 36.25 CAPSULE, EXTENDED RELEASE ORAL at 23:24

## 2025-01-03 RX ADMIN — LEVODOPA AND CARBIDOPA 2 TABLET(S): 145; 36.25 CAPSULE, EXTENDED RELEASE ORAL at 05:51

## 2025-01-03 RX ADMIN — APIXABAN 5 MILLIGRAM(S): 5 TABLET, FILM COATED ORAL at 05:52

## 2025-01-03 RX ADMIN — APIXABAN 5 MILLIGRAM(S): 5 TABLET, FILM COATED ORAL at 18:10

## 2025-01-03 RX ADMIN — OXYBUTYNIN CHLORIDE 5 MILLIGRAM(S): 5 TABLET ORAL at 12:31

## 2025-01-03 RX ADMIN — VANCOMYCIN HYDROCHLORIDE 166.67 MILLIGRAM(S): 5 INJECTION, POWDER, LYOPHILIZED, FOR SOLUTION INTRAVENOUS at 21:08

## 2025-01-03 RX ADMIN — PANTOPRAZOLE 40 MILLIGRAM(S): 40 TABLET, DELAYED RELEASE ORAL at 05:51

## 2025-01-03 RX ADMIN — GABAPENTIN 100 MILLIGRAM(S): 300 CAPSULE ORAL at 05:51

## 2025-01-03 RX ADMIN — GABAPENTIN 100 MILLIGRAM(S): 300 CAPSULE ORAL at 18:11

## 2025-01-03 RX ADMIN — AMPICILLIN SODIUM AND SULBACTAM SODIUM 200 GRAM(S): 100; 50 INJECTION, POWDER, FOR SOLUTION INTRAVENOUS at 23:22

## 2025-01-03 RX ADMIN — SENNOSIDES 2 TABLET(S): 8.6 TABLET, FILM COATED ORAL at 21:08

## 2025-01-03 RX ADMIN — LEVODOPA AND CARBIDOPA 2 TABLET(S): 145; 36.25 CAPSULE, EXTENDED RELEASE ORAL at 18:10

## 2025-01-03 RX ADMIN — ENTACAPONE 200 MILLIGRAM(S): 200 TABLET, FILM COATED ORAL at 23:23

## 2025-01-03 RX ADMIN — AMPICILLIN SODIUM AND SULBACTAM SODIUM 200 GRAM(S): 100; 50 INJECTION, POWDER, FOR SOLUTION INTRAVENOUS at 12:31

## 2025-01-03 RX ADMIN — AMPICILLIN SODIUM AND SULBACTAM SODIUM 200 GRAM(S): 100; 50 INJECTION, POWDER, FOR SOLUTION INTRAVENOUS at 05:52

## 2025-01-03 RX ADMIN — ENTACAPONE 200 MILLIGRAM(S): 200 TABLET, FILM COATED ORAL at 05:52

## 2025-01-03 RX ADMIN — DILTIAZEM HYDROCHLORIDE 360 MILLIGRAM(S): 300 CAPSULE, COATED, EXTENDED RELEASE ORAL at 05:51

## 2025-01-03 RX ADMIN — FAMOTIDINE 20 MILLIGRAM(S): 20 TABLET, FILM COATED ORAL at 05:51

## 2025-01-03 RX ADMIN — LEVODOPA AND CARBIDOPA 2 TABLET(S): 145; 36.25 CAPSULE, EXTENDED RELEASE ORAL at 12:30

## 2025-01-03 RX ADMIN — ALPRAZOLAM 0.25 MILLIGRAM(S): 0.25 TABLET ORAL at 21:08

## 2025-01-03 RX ADMIN — FAMOTIDINE 20 MILLIGRAM(S): 20 TABLET, FILM COATED ORAL at 18:10

## 2025-01-03 NOTE — SWALLOW BEDSIDE ASSESSMENT ADULT - SWALLOW EVAL: DIAGNOSIS
Minimal ability to open oral cavity to accept PO solids. C/o of oral pain. Toleration of thin liquids w/o overt s/s of penetration/aspiration.
Oral dysphagia negatively impacted by resting tremors. Toleration observed for puree with thin liquids w/o overt s/s of penetration/aspiration.

## 2025-01-03 NOTE — SWALLOW BEDSIDE ASSESSMENT ADULT - COMMENTS
ENT note: patient found with Left parotitis. No acute ENT intervention at this time.     Pt is known to ST service, seen 3/31/21 with recs for regular solids with thin liquids

## 2025-01-03 NOTE — PROGRESS NOTE ADULT - SUBJECTIVE AND OBJECTIVE BOX
JON GARCÍA 89y Female  MRN#: [id number]     Hospital Day: 2d    SUBJECTIVE     No overnight events. Patient seen and evaluated at bedside, does not have any acute complaints.                                             ----------------------------------------------------------  OBJECTIVE  PAST MEDICAL & SURGICAL HISTORY  Parkinson disease    HTN (hypertension)    Rheumatoid arthritis    Hyperlipidemia    CRAO (central retinal artery occlusion)  Left    Atrial fibrillation    History of hysterectomy    Status post placement of implantable loop recorder  01/2021    S/P hernia repair                                              -----------------------------------------------------------  ALLERGIES:  No Known Allergies                                            ------------------------------------------------------------    HOME MEDICATIONS  Home Medications:  acetaminophen 325 mg oral tablet: 2 tab(s) orally every 6 hours, As needed, Temp greater or equal to 38.5C (101.3F), Mild Pain (1 - 3) (30 Aug 2021 13:13)  ALPRAZolam 0.25 mg oral tablet: 1 tab(s) orally once a day (at bedtime) (01 Jan 2025 21:55)  Aspercreme with Lidocaine 4% topical film: Apply topically to affected area once a day (01 Jan 2025 21:54)  bisacodyl 5 mg oral tablet: 1 tab(s) orally once a day (01 Jan 2025 21:55)  carbidopa/levodopa/entacapone 50 mg-200 mg-200 mg oral tablet: 1 tab(s) orally 4 times a day (08 Aug 2021 18:37)  DilTIAZem (Eqv-Cardizem CD) 360 mg/24 hours oral capsule, extended release: 1 cap(s) orally once a day (01 Jan 2025 21:55)  dilTIAZem 360 mg/24 hours oral capsule, extended release: 1 cap(s) orally once a day (08 Aug 2021 18:37)  famotidine:  (08 Aug 2021 18:37)  famotidine 40 mg oral tablet: 1 tab(s) orally once a day (at bedtime) (01 Jan 2025 21:55)  gabapentin 100 mg oral capsule: 1 cap(s) orally 2 times a day (01 Jan 2025 21:54)  lactulose 20 g oral powder for reconstitution: 1 packet(s) orally once a day as needed for  constipation (01 Jan 2025 21:55)  magnesium hydroxide: 4 times a day as needed for  constipation (01 Jan 2025 21:55)  meclizine 25 mg oral tablet: 1 tab(s) orally 3 times a day, As needed, Dizziness (30 Aug 2021 13:13)  menthol: apply topically for muscle rub (01 Jan 2025 21:55)  Mycostatin 100,000 units/mL oral suspension: 4 unit(s) orally 2 times a day (01 Jan 2025 21:55)  ondansetron 8 mg oral tablet: 1 tab(s) orally 3 times a day as needed for  nausea (01 Jan 2025 21:55)  oxyBUTYnin 5 mg oral tablet: 1 tab(s) orally once a day (01 Jan 2025 21:55)  preiguard: apply topically (01 Jan 2025 21:55)  Protonix 40 mg oral delayed release tablet: 1 tab(s) orally once a day (08 Aug 2021 18:37)  Reglan 5 mg oral tablet: 1 tab(s) orally 3 times a day as needed for  nausea (01 Jan 2025 21:55)  senna (sennosides) 17 mg oral tablet: 2 tab(s) orally once a day (at bedtime) (01 Jan 2025 21:55)  sucralfate: 3 times a day (01 Jan 2025 21:54)  zaleplon 10 mg oral capsule: 1 cap(s) orally once a day (at bedtime) (01 Jan 2025 21:55)                           MEDICATIONS:  STANDING MEDICATIONS  ALPRAZolam 0.25 milliGRAM(s) Oral at bedtime  ampicillin/sulbactam  IVPB      ampicillin/sulbactam  IVPB 3 Gram(s) IV Intermittent every 6 hours  apixaban 5 milliGRAM(s) Oral every 12 hours  carbidopa/levodopa  25/100 2 Tablet(s) Oral four times a day  diltiazem    milliGRAM(s) Oral daily  entacapone 200 milliGRAM(s) Oral four times a day  famotidine    Tablet 20 milliGRAM(s) Oral two times a day  gabapentin 100 milliGRAM(s) Oral two times a day  lactated ringers. 1000 milliLiter(s) IV Continuous <Continuous>  lactated ringers. 1000 milliLiter(s) IV Continuous <Continuous>  lidocaine   4% Patch 1 Patch Transdermal every 24 hours  oxybutynin 5 milliGRAM(s) Oral daily  pantoprazole    Tablet 40 milliGRAM(s) Oral before breakfast  senna 2 Tablet(s) Oral at bedtime  vancomycin  IVPB 1250 milliGRAM(s) IV Intermittent once    PRN MEDICATIONS  acetaminophen     Tablet .. 650 milliGRAM(s) Oral every 6 hours PRN  bisacodyl 5 milliGRAM(s) Oral every 12 hours PRN  meclizine 25 milliGRAM(s) Oral three times a day PRN  zolpidem 5 milliGRAM(s) Oral at bedtime PRN                                            ------------------------------------------------------------  VITAL SIGNS: Last 24 Hours  T(C): 36.9 (03 Jan 2025 07:00), Max: 36.9 (03 Jan 2025 07:00)  T(F): 98.4 (03 Jan 2025 07:00), Max: 98.4 (03 Jan 2025 07:00)  HR: 88 (03 Jan 2025 07:00) (70 - 88)  BP: 164/61 (03 Jan 2025 09:59) (131/54 - 164/61)  BP(mean): 80 (03 Jan 2025 00:28) (80 - 80)  RR: 17 (03 Jan 2025 07:00) (17 - 19)  SpO2: 95% (03 Jan 2025 00:28) (95% - 95%)      01-02-25 @ 07:01  -  01-03-25 @ 07:00  --------------------------------------------------------  IN: 0 mL / OUT: 200 mL / NET: -200 mL                                             --------------------------------------------------------------  LABS:                        10.4   20.76 )-----------( 262      ( 03 Jan 2025 06:16 )             34.2     01-03    144  |  107  |  31[H]  ----------------------------<  95  3.7   |  23  |  0.7    Ca    8.7      03 Jan 2025 06:16  Mg     1.9     01-02    TPro  5.7[L]  /  Alb  3.0[L]  /  TBili  0.3  /  DBili  x   /  AST  17  /  ALT  <5  /  AlkPhos  76  01-03    PT/INR - ( 01 Jan 2025 15:52 )   PT: 23.70 sec;   INR: 1.98 ratio         PTT - ( 01 Jan 2025 15:52 )  PTT:37.7 sec          Culture - Blood (collected 01 Jan 2025 15:52)  Source: .Blood BLOOD  Gram Stain (03 Jan 2025 00:12):    Growth in aerobic bottle: Gram Positive Cocci in Clusters    Growth in anaerobic bottle: Gram Positive Cocci in Clusters  Preliminary Report (03 Jan 2025 00:13):    Growth in aerobic bottle: Gram Positive Cocci in Clusters    Growth in anaerobic bottle: Gram Positive Cocci in Clusters    Direct identification is available within approximately 3-5    hours either by Blood Panel Multiplexed PCR or Direct    MALDI-TOF. Details: [email or web address]  Organism: Blood Culture PCR (02 Jan 2025 17:13)  Organism: Blood Culture PCR (02 Jan 2025 17:13)    Culture - Blood (collected 01 Jan 2025 15:52)  Source: .Blood BLOOD  Preliminary Report (03 Jan 2025 02:03):    No growth at 24 hours              PHYSICAL EXAM:  GENERAL: NAD  NECK: No JVD. Area of swelling at left-sided parotid area. Mildly tender to touch,   CHEST/LUNG: Clear to auscultation bilaterally. Unlabored respirations  HEART: regular rate and rhythm; No murmurs  ABDOMEN:  Soft, Nontender, Nondistended.    EXTREMITIES: Warm. No edema  NERVOUS SYSTEM:  Alert & Oriented X2. No focal deficits                                              --------------------------------------------------------------

## 2025-01-03 NOTE — SWALLOW BEDSIDE ASSESSMENT ADULT - ADDITIONAL RECOMMENDATIONS
SLP will f/u to assess PO tolerance
Consider nutrition support 2/2 concerns for feeding oral diet w/resting tremors.

## 2025-01-03 NOTE — SWALLOW BEDSIDE ASSESSMENT ADULT - SWALLOW EVAL: FEEDING ASSISTANCE
1:1 feeds/dependent
1:1 feed. Resting tremors negatively impacting feeding. Required head stabilization for feeding/dependent

## 2025-01-03 NOTE — SWALLOW BEDSIDE ASSESSMENT ADULT - NS SPL SWALLOW CLINIC TRIAL FT
Toleration observed for puree w/thin liquids.
PO trials of puree attempted however difficulty opening oral cavity and accepting PO intake 2/2 to left sided facial swelling

## 2025-01-03 NOTE — SWALLOW BEDSIDE ASSESSMENT ADULT - SLP GENERAL OBSERVATIONS
Pt. received in bed awake on RA. Oriented to person and place.
Pt. received awake on room air, Oriented to person and place, +hypokinetic dysarthria, +resting tremors

## 2025-01-03 NOTE — PROGRESS NOTE ADULT - SUBJECTIVE AND OBJECTIVE BOX
feels improved, no chills  ID f/u noted    MEDICATIONS:  MEDICATIONS  (STANDING):  ALPRAZolam 0.25 milliGRAM(s) Oral at bedtime  ampicillin/sulbactam  IVPB 3 Gram(s) IV Intermittent every 6 hours  apixaban 5 milliGRAM(s) Oral every 12 hours  carbidopa/levodopa  25/100 2 Tablet(s) Oral four times a day  diltiazem    milliGRAM(s) Oral daily  entacapone 200 milliGRAM(s) Oral four times a day  famotidine    Tablet 20 milliGRAM(s) Oral two times a day  gabapentin 100 milliGRAM(s) Oral two times a day  lactated ringers. 1000 milliLiter(s) (75 mL/Hr) IV Continuous <Continuous>  lidocaine   4% Patch 1 Patch Transdermal every 24 hours  oxybutynin 5 milliGRAM(s) Oral daily  pantoprazole    Tablet 40 milliGRAM(s) Oral before breakfast  senna 2 Tablet(s) Oral at bedtime  vancomycin  IVPB 1000 milliGRAM(s) IV Intermittent <User Schedule>    MEDICATIONS  (PRN):  acetaminophen     Tablet .. 650 milliGRAM(s) Oral every 6 hours PRN Mild Pain (1 - 3), Moderate Pain (4 - 6), Severe Pain (7 - 10)  bisacodyl 5 milliGRAM(s) Oral every 12 hours PRN Constipation  meclizine 25 milliGRAM(s) Oral three times a day PRN Dizziness  zolpidem 5 milliGRAM(s) Oral at bedtime PRN Insomnia    Allergy: No Known Allergies      PAST MEDICAL & SURGICAL HISTORY:  Parkinsons disease  HTN (hypertension)  Rheumatoid arthritis  Hyperlipidemia  CRAO (central retinal artery occlusion)  Left  Chronic Atrial fibrillation    History of hysterectomy  Status post placement of implantable loop recorder  S/P hernia repair      VITALS:  T(F): 98.4 (01-03-25 @ 07:00), Max: 98.4 (01-03-25 @ 07:00)  HR: 88 (01-03-25 @ 07:00)  BP: 164/61 (01-03-25 @ 09:59) (131/54 - 164/61)  RR: 17 (01-03-25 @ 07:00)  SpO2: 95% (01-03-25 @ 00:28)    left-sided facial edema with erythema  eomi  constant limb motion                          10.4   20.76 )-----------( 262      ( 03 Jan 2025 06:16 )             34.2     PT/INR - ( 01 Jan 2025 15:52 )   PT: 23.70 sec;   INR: 1.98 ratio         PTT - ( 01 Jan 2025 15:52 )  PTT:37.7 sec  01-03    144  |  107  |  31[H]  ----------------------------<  95  3.7   |  23  |  0.7    Ca    8.7      03 Jan 2025 06:16  Mg     1.9     01-02    TPro  5.7[L]  /  Alb  3.0[L]  /  TBili  0.3  /  DBili  x   /  AST  17  /  ALT  <5  /  AlkPhos  76  01-03    LIVER FUNCTIONS - ( 03 Jan 2025 06:16 )  Alb: 3.0 g/dL / Pro: 5.7 g/dL / ALK PHOS: 76 U/L / ALT: <5 U/L / AST: 17 U/L / GGT: x             Culture - Blood (collected 01-01-25 @ 15:52)  Methicillin resistant Staphylococcus aureus (MRSA): Detected    a/p    Left parotits  Azotemia ? 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 iv unasyn and iv vanco dose -adjusted to 1250 plus warm compresses  -start gentle IVF and recheck labs in am  -continue baseline meds                                feels improved, no chills  ID f/u noted    MEDICATIONS:  MEDICATIONS  (STANDING):  ALPRAZolam 0.25 milliGRAM(s) Oral at bedtime  ampicillin/sulbactam  IVPB 3 Gram(s) IV Intermittent every 6 hours  apixaban 5 milliGRAM(s) Oral every 12 hours  carbidopa/levodopa  25/100 2 Tablet(s) Oral four times a day  diltiazem    milliGRAM(s) Oral daily  entacapone 200 milliGRAM(s) Oral four times a day  famotidine    Tablet 20 milliGRAM(s) Oral two times a day  gabapentin 100 milliGRAM(s) Oral two times a day  lactated ringers. 1000 milliLiter(s) (75 mL/Hr) IV Continuous <Continuous>  lidocaine   4% Patch 1 Patch Transdermal every 24 hours  oxybutynin 5 milliGRAM(s) Oral daily  pantoprazole    Tablet 40 milliGRAM(s) Oral before breakfast  senna 2 Tablet(s) Oral at bedtime  vancomycin  IVPB 1000 milliGRAM(s) IV Intermittent <User Schedule>    MEDICATIONS  (PRN):  acetaminophen     Tablet .. 650 milliGRAM(s) Oral every 6 hours PRN Mild Pain (1 - 3), Moderate Pain (4 - 6), Severe Pain (7 - 10)  bisacodyl 5 milliGRAM(s) Oral every 12 hours PRN Constipation  meclizine 25 milliGRAM(s) Oral three times a day PRN Dizziness  zolpidem 5 milliGRAM(s) Oral at bedtime PRN Insomnia    Allergy: No Known Allergies      PAST MEDICAL & SURGICAL HISTORY:  Parkinsons disease  HTN (hypertension)  Rheumatoid arthritis  Hyperlipidemia  CRAO (central retinal artery occlusion)  Left  Chronic Atrial fibrillation    History of hysterectomy  Status post placement of implantable loop recorder  S/P hernia repair      VITALS:  T(F): 98.4 (01-03-25 @ 07:00), Max: 98.4 (01-03-25 @ 07:00)  HR: 88 (01-03-25 @ 07:00)  BP: 164/61 (01-03-25 @ 09:59) (131/54 - 164/61)  RR: 17 (01-03-25 @ 07:00)  SpO2: 95% (01-03-25 @ 00:28)    left-sided facial edema with erythema  eomi  constant limb motion                          10.4   20.76 )-----------( 262      ( 03 Jan 2025 06:16 )             34.2     PT/INR - ( 01 Jan 2025 15:52 )   PT: 23.70 sec;   INR: 1.98 ratio         PTT - ( 01 Jan 2025 15:52 )  PTT:37.7 sec  01-03    144  |  107  |  31[H]  ----------------------------<  95  3.7   |  23  |  0.7    Ca    8.7      03 Jan 2025 06:16  Mg     1.9     01-02    TPro  5.7[L]  /  Alb  3.0[L]  /  TBili  0.3  /  DBili  x   /  AST  17  /  ALT  <5  /  AlkPhos  76  01-03    LIVER FUNCTIONS - ( 03 Jan 2025 06:16 )  Alb: 3.0 g/dL / Pro: 5.7 g/dL / ALK PHOS: 76 U/L / ALT: <5 U/L / AST: 17 U/L / GGT: x             Culture - Blood (collected 01-01-25 @ 15:52)  Methicillin resistant Staphylococcus aureus (MRSA): Detected    a/p    Left parotitis with MRSA bacteremia  Sepsis resolved  Azotemia ? 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 iv unasyn and iv vanco dose -adjusted to 1250 plus warm compresses  -start gentle IVF and recheck labs in am  -continue baseline meds     I spent 50 minutes communicating, counseling, reviewing consults, recent labs, documenting, formulating plans

## 2025-01-03 NOTE — SWALLOW BEDSIDE ASSESSMENT ADULT - NS ASR SWALLOW FINDINGS DISCUS
RN and MD made aware/Physician/Nursing/Patient
Spoke with RN, messaged MD via teams/Physician/Nursing/Patient

## 2025-01-03 NOTE — PROGRESS NOTE ADULT - ASSESSMENT
Pt is a 89y/o F BIBEMS from NH for facial swelling. Unable to obtain hx from pt 2/2 AMS. ENT consulted to r/o ludwigs angina, found with L parotitis     Plan:   - No acute ENT intervention at this time   -  IV Abx per ID- - Unasyn 3g q6h IV; Repeat BCX AM 1/4   - Continue Warm compress to LEFT face   - Will d/w attending

## 2025-01-03 NOTE — PROGRESS NOTE ADULT - ASSESSMENT
ASSESSMENT  89y Female from [facility], with a past medical history of Parkinsons, HLD, HTN, a. fib, RA. Patient brought in from nursing home to ED for left swollen jaw, to rule out mastoiditis. Patient is not sure when this swelling started. Patient is a poor historian and the ED spoke to patients daughter on the phone who reported that she heard the patient had a questionable fall due to the swelling of her left jaw. Patient has a 3" x 3" hard mass to her left parotitis area. There is edema, erythema, and tenderness to the left face extending from the left parotitis area down to her left anterior neck. Patient has poor dentition.  Patient has very dry mucous membranes, not well-appearing. There is no evident trauma.    IMPRESSION  #MRSA bacteremia due to L parotitis  Tm 100.5  Admission WBC 18     1/1 2/4 BCX MRSA  < from: CT Neck Soft Tissue w/ IV Cont (01.01.25 @ 16:14) >  Findings compatible with left parotitis. No drainable fluid collection.  Since 10/2019, mild increase in multinodular thyroid gland measuring up   to 6 cm, previously 5 cm. May consider nonemergent thyroid outpatient   thyroid ultrasound follow-up as clinically indicated.    Drug Dosing Weight  Height (cm): 170.2 (01 Jan 2025 15:41)  Weight (kg): 66.224 (01 Jan 2025 15:41)  BMI (kg/m2): 22.9 (01 Jan 2025 15:41)    Creatinine: 0.7 mg/dL (01.02.25 @ 06:27) 50 mL/min  Creatinine clearance for normal weight patient, using ideal body weight of 61 kg (135 lbs).      RECOMMENDATIONS  - Vanc dosing AUC/ADAM per clinical pharmacist   - Unasyn 3g q6h IV  - Repeat BCX AM 1/4   - ESR CRP  - TTE  - Appreciate ENT consult  - Poor prognosis, GOC    If any questions, please text or call on [employer] Teams  Please continue to update ID with any pertinent new clinical, laboratory or radiographic findings.

## 2025-01-03 NOTE — PROGRESS NOTE ADULT - SUBJECTIVE AND OBJECTIVE BOX
RAQUEL JON  89y, Female  Allergy: No Known Allergies      LOS  2d    CHIEF COMPLAINT: left mastoid swelling (02 Jan 2025 12:04)      INTERVAL EVENTS/HPI  - T(F): , Max: 98 (01-02-25 @ 16:25), BCX MRSA   - WBC Count: 26.92 (01-02-25 @ 06:27) on steroids, pending AM   WBC Count: 18.18 (01-01-25 @ 15:52)     - Creatinine: 0.7 (01-02-25 @ 06:27)  Creatinine: 0.8 (01-01-25 @ 15:52)     -   -   -     ROS  cannot obtain secondary to patient's sedation and/or mental status    VITALS:  T(F): 97.5, Max: 98 (01-02-25 @ 16:25)  HR: 75  BP: 131/54  RR: 19Vital Signs Last 24 Hrs  T(C): 36.4 (03 Jan 2025 00:28), Max: 36.7 (02 Jan 2025 16:25)  T(F): 97.5 (03 Jan 2025 00:28), Max: 98 (02 Jan 2025 16:25)  HR: 75 (03 Jan 2025 00:28) (70 - 144)  BP: 131/54 (03 Jan 2025 00:28) (131/54 - 158/82)  BP(mean): 80 (03 Jan 2025 00:28) (80 - 80)  RR: 19 (03 Jan 2025 00:28) (18 - 19)  SpO2: 95% (03 Jan 2025 00:28) (95% - 96%)    Parameters below as of 02 Jan 2025 18:27  Patient On (Oxygen Delivery Method): room air        PHYSICAL EXAM:  ***    FH: Non-contributory  Social Hx: Non-contributory    TESTS & MEASUREMENTS:                        12.3   26.92 )-----------( 252      ( 02 Jan 2025 06:27 )             40.6     01-02    138  |  105  |  26[H]  ----------------------------<  151[H]  4.4   |  19  |  0.7    Ca    9.2      02 Jan 2025 06:27  Mg     1.9     01-02    TPro  5.9[L]  /  Alb  3.4[L]  /  TBili  0.3  /  DBili  x   /  AST  13  /  ALT  <5  /  AlkPhos  85  01-02      LIVER FUNCTIONS - ( 02 Jan 2025 06:27 )  Alb: 3.4 g/dL / Pro: 5.9 g/dL / ALK PHOS: 85 U/L / ALT: <5 U/L / AST: 13 U/L / GGT: x           Urinalysis Basic - ( 02 Jan 2025 06:27 )    Color: x / Appearance: x / SG: x / pH: x  Gluc: 151 mg/dL / Ketone: x  / Bili: x / Urobili: x   Blood: x / Protein: x / Nitrite: x   Leuk Esterase: x / RBC: x / WBC x   Sq Epi: x / Non Sq Epi: x / Bacteria: x        Culture - Blood (collected 01-01-25 @ 15:52)  Source: .Blood BLOOD  Gram Stain (01-03-25 @ 00:12):    Growth in aerobic bottle: Gram Positive Cocci in Clusters    Growth in anaerobic bottle: Gram Positive Cocci in Clusters  Preliminary Report (01-03-25 @ 00:13):    Growth in aerobic bottle: Gram Positive Cocci in Clusters    Growth in anaerobic bottle: Gram Positive Cocci in Clusters    Direct identification is available within approximately 3-5    hours either by Blood Panel Multiplexed PCR or Direct    MALDI-TOF. Details: [email or web address]  Organism: Blood Culture PCR (01-02-25 @ 17:13)  Organism: Blood Culture PCR (01-02-25 @ 17:13)      Method Type: PCR      -  Methicillin resistant Staphylococcus aureus (MRSA): Detec    Culture - Blood (collected 01-01-25 @ 15:52)  Source: .Blood BLOOD  Preliminary Report (01-03-25 @ 02:03):    No growth at 24 hours        Lactate, Blood: 1.4 mmol/L (01-01-25 @ 15:52)      INFECTIOUS DISEASES TESTING      INFLAMMATORY MARKERS      RADIOLOGY & ADDITIONAL TESTS:  I have personally reviewed the last available Chest xray  CXR      CT      CARDIOLOGY TESTING  12 Lead ECG:   Ventricular Rate 75 BPM    Atrial Rate 129 BPM    QRS Duration 80 ms    Q-T Interval 352 ms    QTC Calculation(Bazett) 393 ms    R Axis 6 degrees    T Axis 1 degrees    Diagnosis Line Sinus rhythm with frequent Premature atrial complexes  Inferior infarct , age undetermined  Cannot rule out Anterior infarct , age undetermined  Abnormal ECG    Confirmed by Cruz Tinajero ([id number]) on 1/2/2025 12:42:03 PM (01-02-25 @ 09:47)  12 Lead ECG:   Ventricular Rate 81 BPM    Atrial Rate 81 BPM    P-R Interval 160 ms    QRS Duration 72 ms    Q-T Interval 364 ms    QTC Calculation(Bazett) 422 ms    P Axis 37 degrees    R Axis 1 degrees    T Axis 15 degrees    Diagnosis Line Normal sinus rhythm  Inferior infarct , age undetermined  Abnormal ECG    Confirmed by Cruz Tinajero ([id number]) on 1/2/2025 7:45:56 AM (01-01-25 @ 15:24)      MEDICATIONS  ALPRAZolam 0.25  ampicillin/sulbactam  IVPB   ampicillin/sulbactam  IVPB 3  apixaban 5  carbidopa/levodopa  25/100 2  diltiazem     entacapone 200  famotidine    Tablet 20  gabapentin 100  lactated ringers. 1000  lidocaine   4% Patch 1  oxybutynin 5  pantoprazole    Tablet 40  senna 2  vancomycin  IVPB 1000      WEIGHT  Weight (kg): 66.224 (01-01-25 @ 15:41)      ANTIBIOTICS:  ampicillin/sulbactam  IVPB      ampicillin/sulbactam  IVPB 3 Gram(s) IV Intermittent every 6 hours  vancomycin  IVPB 1000 milliGRAM(s) IV Intermittent <User Schedule>      All available historical records have been reviewed   RAQUEL JON  89y, Female  Allergy: No Known Allergies      LOS  2d    CHIEF COMPLAINT: left mastoid swelling (02 Jan 2025 12:04)      INTERVAL EVENTS/HPI  - T(F): , Max: 98 (01-02-25 @ 16:25), BCX MRSA   - WBC Count: 26.92 (01-02-25 @ 06:27) on steroids, pending AM   WBC Count: 18.18 (01-01-25 @ 15:52)     - Creatinine: 0.7 (01-02-25 @ 06:27)  Creatinine: 0.8 (01-01-25 @ 15:52)     -   -   -     ROS  cannot obtain secondary to patient's sedation and/or mental status    VITALS:  T(F): 97.5, Max: 98 (01-02-25 @ 16:25)  HR: 75  BP: 131/54  RR: 19Vital Signs Last 24 Hrs  T(C): 36.4 (03 Jan 2025 00:28), Max: 36.7 (02 Jan 2025 16:25)  T(F): 97.5 (03 Jan 2025 00:28), Max: 98 (02 Jan 2025 16:25)  HR: 75 (03 Jan 2025 00:28) (70 - 144)  BP: 131/54 (03 Jan 2025 00:28) (131/54 - 158/82)  BP(mean): 80 (03 Jan 2025 00:28) (80 - 80)  RR: 19 (03 Jan 2025 00:28) (18 - 19)  SpO2: 95% (03 Jan 2025 00:28) (95% - 96%)    Parameters below as of 02 Jan 2025 18:27  Patient On (Oxygen Delivery Method): room air        PHYSICAL EXAM:  Gen: chronically ill appearing , writhing movements, answers some questions  HEENT: Normocephalic, atraumatic  Neck: supple, no lymphadenopathy, L neck decreased erythema/edema   CV: Regular rate & regular rhythm  Lungs: decreased BS at bases, no fremitus  Abdomen: Soft, BS present  Ext: Warm, well perfused  Neuro: non focal, not following commands  Skin: no rash, no erythema  Lines: no phlebitis     FH: Non-contributory  Social Hx: Non-contributory    TESTS & MEASUREMENTS:                        12.3   26.92 )-----------( 252      ( 02 Jan 2025 06:27 )             40.6     01-02    138  |  105  |  26[H]  ----------------------------<  151[H]  4.4   |  19  |  0.7    Ca    9.2      02 Jan 2025 06:27  Mg     1.9     01-02    TPro  5.9[L]  /  Alb  3.4[L]  /  TBili  0.3  /  DBili  x   /  AST  13  /  ALT  <5  /  AlkPhos  85  01-02      LIVER FUNCTIONS - ( 02 Jan 2025 06:27 )  Alb: 3.4 g/dL / Pro: 5.9 g/dL / ALK PHOS: 85 U/L / ALT: <5 U/L / AST: 13 U/L / GGT: x           Urinalysis Basic - ( 02 Jan 2025 06:27 )    Color: x / Appearance: x / SG: x / pH: x  Gluc: 151 mg/dL / Ketone: x  / Bili: x / Urobili: x   Blood: x / Protein: x / Nitrite: x   Leuk Esterase: x / RBC: x / WBC x   Sq Epi: x / Non Sq Epi: x / Bacteria: x        Culture - Blood (collected 01-01-25 @ 15:52)  Source: .Blood BLOOD  Gram Stain (01-03-25 @ 00:12):    Growth in aerobic bottle: Gram Positive Cocci in Clusters    Growth in anaerobic bottle: Gram Positive Cocci in Clusters  Preliminary Report (01-03-25 @ 00:13):    Growth in aerobic bottle: Gram Positive Cocci in Clusters    Growth in anaerobic bottle: Gram Positive Cocci in Clusters    Direct identification is available within approximately 3-5    hours either by Blood Panel Multiplexed PCR or Direct    MALDI-TOF. Details: [email or web address]  Organism: Blood Culture PCR (01-02-25 @ 17:13)  Organism: Blood Culture PCR (01-02-25 @ 17:13)      Method Type: PCR      -  Methicillin resistant Staphylococcus aureus (MRSA): Detec    Culture - Blood (collected 01-01-25 @ 15:52)  Source: .Blood BLOOD  Preliminary Report (01-03-25 @ 02:03):    No growth at 24 hours        Lactate, Blood: 1.4 mmol/L (01-01-25 @ 15:52)      INFECTIOUS DISEASES TESTING      INFLAMMATORY MARKERS      RADIOLOGY & ADDITIONAL TESTS:  I have personally reviewed the last available Chest xray  CXR      CT      CARDIOLOGY TESTING  12 Lead ECG:   Ventricular Rate 75 BPM    Atrial Rate 129 BPM    QRS Duration 80 ms    Q-T Interval 352 ms    QTC Calculation(Bazett) 393 ms    R Axis 6 degrees    T Axis 1 degrees    Diagnosis Line Sinus rhythm with frequent Premature atrial complexes  Inferior infarct , age undetermined  Cannot rule out Anterior infarct , age undetermined  Abnormal ECG    Confirmed by Cruz Tinajero ([id number]) on 1/2/2025 12:42:03 PM (01-02-25 @ 09:47)  12 Lead ECG:   Ventricular Rate 81 BPM    Atrial Rate 81 BPM    P-R Interval 160 ms    QRS Duration 72 ms    Q-T Interval 364 ms    QTC Calculation(Bazett) 422 ms    P Axis 37 degrees    R Axis 1 degrees    T Axis 15 degrees    Diagnosis Line Normal sinus rhythm  Inferior infarct , age undetermined  Abnormal ECG    Confirmed by Cruz Tinajero ([id number]) on 1/2/2025 7:45:56 AM (01-01-25 @ 15:24)      MEDICATIONS  ALPRAZolam 0.25  ampicillin/sulbactam  IVPB   ampicillin/sulbactam  IVPB 3  apixaban 5  carbidopa/levodopa  25/100 2  diltiazem     entacapone 200  famotidine    Tablet 20  gabapentin 100  lactated ringers. 1000  lidocaine   4% Patch 1  oxybutynin 5  pantoprazole    Tablet 40  senna 2  vancomycin  IVPB 1000      WEIGHT  Weight (kg): 66.224 (01-01-25 @ 15:41)      ANTIBIOTICS:  ampicillin/sulbactam  IVPB      ampicillin/sulbactam  IVPB 3 Gram(s) IV Intermittent every 6 hours  vancomycin  IVPB 1000 milliGRAM(s) IV Intermittent <User Schedule>      All available historical records have been reviewed

## 2025-01-03 NOTE — SWALLOW BEDSIDE ASSESSMENT ADULT - SWALLOW EVAL: FUNCTIONAL LEVEL AT TIME OF EVAL
left mastoid swelling, Left parotitis +resting tremors, confused, min verbalization
Significant swelling and redness extend from behind the left ear down the left side of the neck, accompanied by tenderness over the left mastoid

## 2025-01-03 NOTE — GOALS OF CARE CONVERSATION - ADVANCED CARE PLANNING - CONVERSATION DETAILS
I spoke with daughter ms Martin   who was identified as the health care surrogate / health care proxy and we had an extensive conversation about patient 's care and current condition.     Discussed full vs limited medical management. Extensively explained that full medical management would involve intubating the patient if medically indicated and performing chest compressions in an attempt to resuscitate if the patient were to arrest. Further explained the concept of limited medical management, such as DNR/DNI. Further explained that DNR/DNI would not preclude medical management up until the point of arrest/intubation (such as antibiotics, IV fluids, blood draws, etc.). family expressed understanding of above. Decision made, patient is now FULL CODE.

## 2025-01-03 NOTE — PROGRESS NOTE ADULT - SUBJECTIVE AND OBJECTIVE BOX
ENT DAILY PROGRESS NOTE    Pt is a 89y/o F BIBEMS from NH for facial swelling. Unable to obtain hx from pt 2/2 AMS. ENT consulted to r/o naveedwigs angina, found with L parotitis - seen and examined at bedside. Patient reports mild improvement of left facial pain.      REVIEW OF SYSTEMS   [x] A ten-point review of systems was otherwise negative except as noted.  [ ] Due to altered mental status/intubation, subjective information were not able to be obtained from patient. History was obtained, to the extent possible, from review of the chart and collateral sources of information.    Allergies    No Known Allergies    Intolerances        MEDICATIONS:  acetaminophen     Tablet .. 650 milliGRAM(s) Oral every 6 hours PRN  ALPRAZolam 0.25 milliGRAM(s) Oral at bedtime  ampicillin/sulbactam  IVPB      ampicillin/sulbactam  IVPB 3 Gram(s) IV Intermittent every 6 hours  apixaban 5 milliGRAM(s) Oral every 12 hours  bisacodyl 5 milliGRAM(s) Oral every 12 hours PRN  carbidopa/levodopa  25/100 2 Tablet(s) Oral four times a day  diltiazem    milliGRAM(s) Oral daily  entacapone 200 milliGRAM(s) Oral four times a day  famotidine    Tablet 20 milliGRAM(s) Oral two times a day  gabapentin 100 milliGRAM(s) Oral two times a day  lactated ringers. 1000 milliLiter(s) IV Continuous <Continuous>  lidocaine   4% Patch 1 Patch Transdermal every 24 hours  meclizine 25 milliGRAM(s) Oral three times a day PRN  oxybutynin 5 milliGRAM(s) Oral daily  pantoprazole    Tablet 40 milliGRAM(s) Oral before breakfast  senna 2 Tablet(s) Oral at bedtime  vancomycin  IVPB 1000 milliGRAM(s) IV Intermittent <User Schedule>  zolpidem 5 milliGRAM(s) Oral at bedtime PRN      Vital Signs Last 24 Hrs  T(C): 36.9 (03 Jan 2025 07:00), Max: 36.9 (03 Jan 2025 07:00)  T(F): 98.4 (03 Jan 2025 07:00), Max: 98.4 (03 Jan 2025 07:00)  HR: 88 (03 Jan 2025 07:00) (70 - 88)  BP: 164/61 (03 Jan 2025 09:59) (131/54 - 164/61)  BP(mean): 80 (03 Jan 2025 00:28) (80 - 80)  RR: 17 (03 Jan 2025 07:00) (17 - 19)  SpO2: 95% (03 Jan 2025 00:28) (95% - 95%)    Parameters below as of 02 Jan 2025 18:27  Patient On (Oxygen Delivery Method): room air          01-02 @ 07:01  -  01-03 @ 07:00  --------------------------------------------------------  IN:  Total IN: 0 mL    OUT:    Voided (mL): 200 mL  Total OUT: 200 mL    Total NET: -200 mL          PHYSICAL EXAM:    GEN: Well-developed, well-nourished. NAD, awake and alert. No drooling or pooling of secretions. No stridor or stertor. Good vocal quality, no hoarseness.   SKIN: Good color, non diaphoretic  HEENT: NC/AT; Oral mucosa pink and moist. No erythema or edema noted to buccal mucosa, tongue, FOM, uvula or posterior oropharynx. Uvula midline  NECK:  Trachea midline. Neck supple, no TTP to B/L lateral neck, no cervical LAD.  RESP: No dyspnea, non-labored breathing. No use of accessory muscles.  CARDIO: +S1/S2  ABDO: Soft, NT.  EXT: VILLAR x 4    LABS:  CBC-                        10.4   20.76 )-----------( 262      ( 03 Jan 2025 06:16 )             34.2     BMP/CMP-  03 Jan 2025 06:16    144    |  107    |  31     ----------------------------<  95     3.7     |  23     |  0.7      Ca    8.7        03 Jan 2025 06:16  Mg     1.9       02 Jan 2025 06:27    TPro  5.7    /  Alb  3.0    /  TBili  0.3    /  DBili  x      /  AST  17     /  ALT  <5     /  AlkPhos  76     03 Jan 2025 06:16    Coagulation Studies-  PT/INR - ( 01 Jan 2025 15:52 )   PT: 23.70 sec;   INR: 1.98 ratio         PTT - ( 01 Jan 2025 15:52 )  PTT:37.7 sec  Endocrine Panel-  Calcium: 8.7 mg/dL (01-03 @ 06:16)    Culture - Blood (01.01.25 @ 15:52)    Gram Stain:   Growth in aerobic bottle: Gram Positive Cocci in Clusters  Growth in anaerobic bottle: Gram Positive Cocci in Clusters   -  Methicillin resistant Staphylococcus aureus (MRSA): Detec   Specimen Source: .Blood BLOOD   Organism: Blood Culture PCR   Culture Results:   Growth in aerobic bottle: Gram Positive Cocci in Clusters  Growth in anaerobic bottle: Gram Positive Cocci in Clusters  Direct identification is available within approximately 3-5  hours either by Blood Panel Multiplexed PCR or Direct  MALDI-TOF. Details: [email or web address]   Organism Identification: Blood Culture PCR   Method Type: PCR        RADIOLOGY & ADDITIONAL STUDIES:

## 2025-01-03 NOTE — PROGRESS NOTE ADULT - ASSESSMENT
Assessment:     89-year-old female with a past medical history Parkinson's, HLD, HTN, atrial fibrillation coming with complaints of swollen jaw.  Patient brought in from nursing home due to the concern of swollen left cheek.     Plan:     #left Parotitis   - WBC 20.76 today   - lactate 1.4  - CT temporal and Neck soft tissues : Findings compatible with left parotitis N  - Per ENT: no acute interventions   - BCx 1/1/2025: MRSA   - ID following-> C/W Vancomycin & Unasyn     #multinodular thyroid gland  -CT neck and soft tissues Since 10/2019, mild increase in multinodular thyroid gland measuring up to 6 cm, previously 5 cm.   -TSH WNL   -outpt thyroid US     #Parkinson's  - C/W Sinemet 25/100 q6h    #atrial fibrillation on Eliquis   - C/W Eliquis 5mg BID   - C/W Cardizem   - Rate controlled       #GI ppx: PPI  #DVT ppx: on Eliquis 5 mg BID  #Activity: ATT   #Code: Full     #Hand-off: MRSA bacteremia. F/U TTE.

## 2025-01-03 NOTE — SWALLOW BEDSIDE ASSESSMENT ADULT - SLP PERTINENT HISTORY OF CURRENT PROBLEM
89 female past medical history Parkinson's, HLD, HTN, atrial fibrillation coming with complaints of swollen jaw.  Patient brought in from nursing home due to the concern of swollen Left check .Unable to obtain much history from patient as he is a poor historian. All the relative history from Ed Documentation . ED  spoke with daughter on the phone who reported that she heard the patient had a questionable fall due to the swelling of her left jaw.
89 female past medical history Parkinson's, HLD, HTN, atrial fibrillation. Brought in from nursing with complaints of swollen jaw and swollen Left check. CT Temporal bone and CT Neck soft tissue showed -->Findings compatible with left parotitis

## 2025-01-03 NOTE — SWALLOW BEDSIDE ASSESSMENT ADULT - SWALLOW EVAL: RECOMMENDED FEEDING/EATING TECHNIQUES
maintain upright posture during/after eating for 30 mins/oral hygiene/position upright (90 degrees)
maintain upright posture during/after eating for 30 mins/oral hygiene/position upright (90 degrees)/small sips/bites

## 2025-01-04 LAB
-  CLINDAMYCIN: SIGNIFICANT CHANGE UP
-  DAPTOMYCIN: SIGNIFICANT CHANGE UP
-  ERYTHROMYCIN: SIGNIFICANT CHANGE UP
-  GENTAMICIN: SIGNIFICANT CHANGE UP
-  LINEZOLID: SIGNIFICANT CHANGE UP
-  OXACILLIN: SIGNIFICANT CHANGE UP
-  PENICILLIN: SIGNIFICANT CHANGE UP
-  RIFAMPIN: SIGNIFICANT CHANGE UP
-  TETRACYCLINE: SIGNIFICANT CHANGE UP
-  TRIMETHOPRIM/SULFAMETHOXAZOLE: SIGNIFICANT CHANGE UP
-  VANCOMYCIN: SIGNIFICANT CHANGE UP
CRP SERPL-MCNC: 57.5 MG/L — HIGH
CULTURE RESULTS: ABNORMAL
ERYTHROCYTE [SEDIMENTATION RATE] IN BLOOD: 40 MM/HR — HIGH (ref 0–20)
GRAM STN FLD: ABNORMAL
METHOD TYPE: SIGNIFICANT CHANGE UP
ORGANISM # SPEC MICROSCOPIC CNT: ABNORMAL
ORGANISM # SPEC MICROSCOPIC CNT: ABNORMAL
ORGANISM # SPEC MICROSCOPIC CNT: SIGNIFICANT CHANGE UP
SPECIMEN SOURCE: SIGNIFICANT CHANGE UP

## 2025-01-04 PROCEDURE — 99232 SBSQ HOSP IP/OBS MODERATE 35: CPT

## 2025-01-04 PROCEDURE — 99233 SBSQ HOSP IP/OBS HIGH 50: CPT

## 2025-01-04 PROCEDURE — 99497 ADVNCD CARE PLAN 30 MIN: CPT | Mod: 25

## 2025-01-04 RX ORDER — AMPICILLIN SODIUM AND SULBACTAM SODIUM 100; 50 MG/ML; MG/ML
3 INJECTION, POWDER, FOR SOLUTION INTRAVENOUS EVERY 6 HOURS
Refills: 0 | Status: DISCONTINUED | OUTPATIENT
Start: 2025-01-04 | End: 2025-01-10

## 2025-01-04 RX ADMIN — AMPICILLIN SODIUM AND SULBACTAM SODIUM 200 GRAM(S): 100; 50 INJECTION, POWDER, FOR SOLUTION INTRAVENOUS at 17:31

## 2025-01-04 RX ADMIN — SENNOSIDES 2 TABLET(S): 8.6 TABLET, FILM COATED ORAL at 21:18

## 2025-01-04 RX ADMIN — APIXABAN 5 MILLIGRAM(S): 5 TABLET, FILM COATED ORAL at 05:11

## 2025-01-04 RX ADMIN — PANTOPRAZOLE 40 MILLIGRAM(S): 40 TABLET, DELAYED RELEASE ORAL at 05:17

## 2025-01-04 RX ADMIN — APIXABAN 5 MILLIGRAM(S): 5 TABLET, FILM COATED ORAL at 17:30

## 2025-01-04 RX ADMIN — AMPICILLIN SODIUM AND SULBACTAM SODIUM 200 GRAM(S): 100; 50 INJECTION, POWDER, FOR SOLUTION INTRAVENOUS at 05:10

## 2025-01-04 RX ADMIN — ALPRAZOLAM 0.25 MILLIGRAM(S): 0.25 TABLET ORAL at 21:23

## 2025-01-04 RX ADMIN — DILTIAZEM HYDROCHLORIDE 360 MILLIGRAM(S): 300 CAPSULE, COATED, EXTENDED RELEASE ORAL at 05:17

## 2025-01-04 RX ADMIN — GABAPENTIN 100 MILLIGRAM(S): 300 CAPSULE ORAL at 05:10

## 2025-01-04 RX ADMIN — ENTACAPONE 200 MILLIGRAM(S): 200 TABLET, FILM COATED ORAL at 05:11

## 2025-01-04 RX ADMIN — LEVODOPA AND CARBIDOPA 2 TABLET(S): 145; 36.25 CAPSULE, EXTENDED RELEASE ORAL at 17:30

## 2025-01-04 RX ADMIN — LEVODOPA AND CARBIDOPA 2 TABLET(S): 145; 36.25 CAPSULE, EXTENDED RELEASE ORAL at 11:47

## 2025-01-04 RX ADMIN — ENTACAPONE 200 MILLIGRAM(S): 200 TABLET, FILM COATED ORAL at 17:30

## 2025-01-04 RX ADMIN — ENTACAPONE 200 MILLIGRAM(S): 200 TABLET, FILM COATED ORAL at 11:46

## 2025-01-04 RX ADMIN — GABAPENTIN 100 MILLIGRAM(S): 300 CAPSULE ORAL at 17:30

## 2025-01-04 RX ADMIN — OXYBUTYNIN CHLORIDE 5 MILLIGRAM(S): 5 TABLET ORAL at 11:47

## 2025-01-04 RX ADMIN — LEVODOPA AND CARBIDOPA 2 TABLET(S): 145; 36.25 CAPSULE, EXTENDED RELEASE ORAL at 05:10

## 2025-01-04 RX ADMIN — FAMOTIDINE 20 MILLIGRAM(S): 20 TABLET, FILM COATED ORAL at 05:10

## 2025-01-04 RX ADMIN — SODIUM CHLORIDE 50 MILLILITER(S): 9 INJECTION, SOLUTION INTRAVENOUS at 08:00

## 2025-01-04 RX ADMIN — VANCOMYCIN HYDROCHLORIDE 166.67 MILLIGRAM(S): 5 INJECTION, POWDER, LYOPHILIZED, FOR SOLUTION INTRAVENOUS at 21:23

## 2025-01-04 RX ADMIN — FAMOTIDINE 20 MILLIGRAM(S): 20 TABLET, FILM COATED ORAL at 17:30

## 2025-01-04 RX ADMIN — AMPICILLIN SODIUM AND SULBACTAM SODIUM 200 GRAM(S): 100; 50 INJECTION, POWDER, FOR SOLUTION INTRAVENOUS at 11:46

## 2025-01-04 NOTE — PROGRESS NOTE ADULT - ASSESSMENT
Pt is a 89y Female a/w left facial swelling 2* parotitis - clinically stable, pt unable to provide history.    ·	cont IV abx  ·	warm compress if possible  ·	keep gentle IV hydration  ·	trend WBC - if increases, consider rpt CT to r/o abscess formation  ·	w/d with attng, will follow

## 2025-01-04 NOTE — PROGRESS NOTE ADULT - ASSESSMENT
89 female past medical history Parkinson's, HLD, HTN, atrial fibrillation coming with complaints of swollen jaw.  Patient brought in from nursing home due to the concern of swollen     #  MRSA Bacteremia  # Left Parotitis   -  CT Temporal Bones w/ IV Cont (01.01.25 @ 16:14) >Findings compatible with left parotitis   - BCx 1/1/2025: MRSA  -  TTE Echo Complete w/o Contrast w/ Doppler (01.03.25 @ 11:33) >Left ventricular ejection fraction, by visual estimation, is 55 to 60%. (Grade II diastolic dysfunction).Thickening and calcification of the anterior and posterior mitral   valve leaflets. Moderate tricuspid regurgitation.moderate pulmonary hypertension.  - ID eval: c/w vancomycin , unasyn  - evaluated by ENT  - Blood cultures  - ESR, CRP    # Multinodular thyroid gland  -CT neck and soft tissues Since 10/2019, mild increase in multinodular thyroid gland measuring up to 6 cm, previously 5 cm.   - Thyroid Stimulating Hormone, Serum: 0.55 uIU/mL (01.02.25 @ 06:27)  - outpt thyroid US     # Paroxysmal afib, now in SR  - c/w cardizem, eliquis    # Parkinson's disease   - c/w sinemet, entacapone    # Full code    # Pending: F/u blood cultures, ESR, CRP  # Disposition: [facility]

## 2025-01-04 NOTE — PROGRESS NOTE ADULT - SUBJECTIVE AND OBJECTIVE BOX
Patient is a 89y old  Female who presents with a chief complaint of left mastoid swelling (04 Jan 2025 10:29)    Patient was seen and examined.  no new events    PAST MEDICAL & SURGICAL HISTORY:  Parkinson disease  HTN (hypertension)  Rheumatoid arthritis  Hyperlipidemia  CRAO (central retinal artery occlusion) Left  Atrial fibrillation  History of hysterectomy  Status post placement of implantable loop recorder 01/2021  S/P hernia repair    Allergies  No Known Allergies    MEDICATIONS  (STANDING):  ALPRAZolam 0.25 milliGRAM(s) Oral at bedtime  ampicillin/sulbactam  IVPB 3 Gram(s) IV Intermittent every 6 hours  apixaban 5 milliGRAM(s) Oral every 12 hours  carbidopa/levodopa  25/100 2 Tablet(s) Oral four times a day  diltiazem    milliGRAM(s) Oral daily  entacapone 200 milliGRAM(s) Oral four times a day  famotidine    Tablet 20 milliGRAM(s) Oral two times a day  gabapentin 100 milliGRAM(s) Oral two times a day  lidocaine   4% Patch 1 Patch Transdermal every 24 hours  oxybutynin 5 milliGRAM(s) Oral daily  pantoprazole    Tablet 40 milliGRAM(s) Oral before breakfast  senna 2 Tablet(s) Oral at bedtime  vancomycin  IVPB 1250 milliGRAM(s) IV Intermittent every 24 hours    MEDICATIONS  (PRN):  acetaminophen     Tablet .. 650 milliGRAM(s) Oral every 6 hours PRN Mild Pain (1 - 3), Moderate Pain (4 - 6), Severe Pain (7 - 10)  bisacodyl 5 milliGRAM(s) Oral every 12 hours PRN Constipation  meclizine 25 milliGRAM(s) Oral three times a day PRN Dizziness  zolpidem 5 milliGRAM(s) Oral at bedtime PRN Insomnia    Vital Signs Last 24 Hrs  T(C): 36.5  T(F): 97.7  HR: 64  BP: 124/58  BP(mean): 78  RR: 18  SpO2: 95%    O/E:  Awake not in distress.  HEENT: atraumatic, EOMI.  Chest: clear.  CVS: SIS2 +, systolic  murmur+  P/A: Soft, BS+  CNS: awake  Ext: no edema feet.  All systems reviewed positive findings as above.                          10.4[L]  20.76[H] )-----------( 262      ( 03 Jan 2025 06:16 )             34.2[L]    01-03    144  |  107  |  31[H]  ----------------------------<  95  3.7   |  23  |  0.7    Ca    8.7      03 Jan 2025 06:16    TPro  5.7[L]  /  Alb  3.0[L]  /  TBili  0.3  /  DBili  x   /  AST  17  /  ALT  <5  /  AlkPhos  76  01-03            Urinalysis Basic - ( 03 Jan 2025 06:16 )    Color: x / Appearance: x / SG: x / pH: x  Gluc: 95 mg/dL / Ketone: x  / Bili: x / Urobili: x   Blood: x / Protein: x / Nitrite: x   Leuk Esterase: x / RBC: x / WBC x   Sq Epi: x / Non Sq Epi: x / Bacteria: x        Culture - Blood (collected 01 Jan 2025 15:52)  Source: .Blood BLOOD  Gram Stain (03 Jan 2025 00:12):    Growth in aerobic bottle: Gram Positive Cocci in Clusters    Growth in anaerobic bottle: Gram Positive Cocci in Clusters  Final Report (04 Jan 2025 07:37):    Growth in aerobic and anaerobic bottles: Methicillin Resistant    Staphylococcus aureus    Direct identification is available within approximately 3-5    hours either by Blood Panel Multiplexed PCR or Direct    MALDI-TOF. Details: [email or web address]  Organism: Blood Culture PCR  Methicillin resistant Staphylococcus aureus (04 Jan 2025 07:37)  Organism: Methicillin resistant Staphylococcus aureus (04 Jan 2025 07:37)      Method Type: ADAM      -  Clindamycin: S <=0.25      -  Daptomycin: S <=0.5      -  Erythromycin: S <=0.25      -  Gentamicin: S <=4 Should not be used as monotherapy      -  Linezolid: S 2      -  Oxacillin: R >2      -  Penicillin: R 1      -  Rifampin: S <=1 Should not be used as monotherapy      -  Tetracycline: S <=4      -  Trimethoprim/Sulfamethoxazole: S <=0.5/9.5      -  Vancomycin: S 0.5  Organism: Blood Culture PCR (04 Jan 2025 07:37)      Method Type: PCR      -  Methicillin resistant Staphylococcus aureus (MRSA): Detec    Culture - Blood (collected 01 Jan 2025 15:52)  Source: .Blood BLOOD  Gram Stain (04 Jan 2025 07:27):    Growth in anaerobic bottle: Gram Positive Cocci in Clusters  Preliminary Report (04 Jan 2025 07:27):    Growth in anaerobic bottle: Gram Positive Cocci in Clusters

## 2025-01-04 NOTE — PROGRESS NOTE ADULT - SUBJECTIVE AND OBJECTIVE BOX
ENT DAILY PROGRESS NOTE    Pt is a 89y Female a/w left facial swelling 2* parotitis - seen and examined at bedside. Pt stable, still with parotid/facial swelling, pt unable to provide history. When touching left face, pt tries to move hands away - presumably from discomfort.       REVIEW OF SYSTEMS   [x] Due to altered mental status/intubation, subjective information were not able to be obtained from patient. History was obtained, to the extent possible, from review of the chart and collateral sources of information.    Allergies    No Known Allergies    Intolerances      MEDICATIONS:  acetaminophen Tablet .. 650 milliGRAM(s) Oral every 6 hours PRN  ALPRAZolam 0.25 milliGRAM(s) Oral at bedtime  ampicillin/sulbactam  IVPB 3 Gram(s) IV Intermittent every 6 hours  apixaban 5 milliGRAM(s) Oral every 12 hours  bisacodyl 5 milliGRAM(s) Oral every 12 hours PRN  carbidopa/levodopa  25/100 2 Tablet(s) Oral four times a day  diltiazem  milliGRAM(s) Oral daily  entacapone 200 milliGRAM(s) Oral four times a day  famotidine  Tablet 20 milliGRAM(s) Oral two times a day  gabapentin 100 milliGRAM(s) Oral two times a day  lactated ringers. 1000 milliLiter(s) IV Continuous <Continuous>  lidocaine   4% Patch 1 Patch Transdermal every 24 hours  meclizine 25 milliGRAM(s) Oral three times a day PRN  oxybutynin 5 milliGRAM(s) Oral daily  pantoprazole    Tablet 40 milliGRAM(s) Oral before breakfast  senna 2 Tablet(s) Oral at bedtime  vancomycin  IVPB 1250 milliGRAM(s) IV Intermittent every 24 hours  zolpidem 5 milliGRAM(s) Oral at bedtime PRN      Vital Signs Last 24 Hrs  T(C): 36.5 (04 Jan 2025 08:56), Max: 36.7 (03 Jan 2025 16:06)  T(F): 97.7 (04 Jan 2025 08:56), Max: 98.1 (03 Jan 2025 16:06)  HR: 64 (04 Jan 2025 08:56) (55 - 66)  BP: 124/58 (04 Jan 2025 08:56) (121/56 - 151/63)  BP(mean): 78 (04 Jan 2025 00:18) (78 - 78)  RR: 18 (04 Jan 2025 08:56) (18 - 19)  SpO2: 95% (04 Jan 2025 08:56) (91% - 100%)    Parameters below as of 04 Jan 2025 08:56  Patient On (Oxygen Delivery Method): room air      01-03 @ 07:01 - 01-04 @ 07:00  --------------------------------------------------------  IN:  Total IN: 0 mL    OUT:    Voided (mL): 500 mL  Total OUT: 500 mL    Total NET: -500 mL      01-04 @ 07:01 - 01-04 @ 10:29  --------------------------------------------------------  IN:    Oral Fluid: 120 mL  Total IN: 120 mL    OUT:  Total OUT: 0 mL    Total NET: 120 mL        PHYSICAL EXAM:    GEN: NAD, awake and alert. No drooling or pooling of secretions. No stridor or stertor.   SKIN: Good color, non diaphoretic  HEENT: Oral mucosa pink and moist. Limited oral exam due to patient compliance. + left parotid edema, no overlying erythema or induration. gland palpable and firm. no drainage noted.   NECK: Trachea midline. Neck supple, no TTP to B/L lateral neck, no cervical LAD.  RESP: Non-labored breathing. No use of accessory muscles.  CARDIO: +S1/S2  ABDO: Soft, NT.  EXT: VILLAR x 4    LABS:  CBC-                        10.4   20.76 )-----------( 262      ( 03 Jan 2025 06:16 )             34.2     BMP/CMP-  03 Jan 2025 06:16    144    |  107    |  31     ----------------------------<  95     3.7     |  23     |  0.7      Ca    8.7        03 Jan 2025 06:16    TPro  5.7    /  Alb  3.0    /  TBili  0.3    /  DBili  x      /  AST  17     /  ALT  <5     /  AlkPhos  76     03 Jan 2025 06:16

## 2025-01-05 LAB
ALBUMIN SERPL ELPH-MCNC: 3 G/DL — LOW (ref 3.5–5.2)
ALP SERPL-CCNC: 59 U/L — SIGNIFICANT CHANGE UP (ref 30–115)
ALT FLD-CCNC: <5 U/L — SIGNIFICANT CHANGE UP (ref 0–41)
ANION GAP SERPL CALC-SCNC: 13 MMOL/L — SIGNIFICANT CHANGE UP (ref 7–14)
AST SERPL-CCNC: 11 U/L — SIGNIFICANT CHANGE UP (ref 0–41)
BASOPHILS # BLD AUTO: 0.03 K/UL — SIGNIFICANT CHANGE UP (ref 0–0.2)
BASOPHILS NFR BLD AUTO: 0.4 % — SIGNIFICANT CHANGE UP (ref 0–1)
BILIRUB SERPL-MCNC: 0.3 MG/DL — SIGNIFICANT CHANGE UP (ref 0.2–1.2)
BUN SERPL-MCNC: 20 MG/DL — SIGNIFICANT CHANGE UP (ref 10–20)
CALCIUM SERPL-MCNC: 8.4 MG/DL — SIGNIFICANT CHANGE UP (ref 8.4–10.5)
CHLORIDE SERPL-SCNC: 114 MMOL/L — HIGH (ref 98–110)
CO2 SERPL-SCNC: 22 MMOL/L — SIGNIFICANT CHANGE UP (ref 17–32)
CREAT SERPL-MCNC: 0.6 MG/DL — LOW (ref 0.7–1.5)
CULTURE RESULTS: ABNORMAL
EGFR: 84 ML/MIN/1.73M2 — SIGNIFICANT CHANGE UP
EOSINOPHIL # BLD AUTO: 0.13 K/UL — SIGNIFICANT CHANGE UP (ref 0–0.7)
EOSINOPHIL NFR BLD AUTO: 1.6 % — SIGNIFICANT CHANGE UP (ref 0–8)
GLUCOSE SERPL-MCNC: 92 MG/DL — SIGNIFICANT CHANGE UP (ref 70–99)
HCT VFR BLD CALC: 33.2 % — LOW (ref 37–47)
HGB BLD-MCNC: 10 G/DL — LOW (ref 12–16)
IMM GRANULOCYTES NFR BLD AUTO: 3.7 % — HIGH (ref 0.1–0.3)
LYMPHOCYTES # BLD AUTO: 0.36 K/UL — LOW (ref 1.2–3.4)
LYMPHOCYTES # BLD AUTO: 4.5 % — LOW (ref 20.5–51.1)
MCHC RBC-ENTMCNC: 26.9 PG — LOW (ref 27–31)
MCHC RBC-ENTMCNC: 30.1 G/DL — LOW (ref 32–37)
MCV RBC AUTO: 89.2 FL — SIGNIFICANT CHANGE UP (ref 81–99)
MONOCYTES # BLD AUTO: 0.84 K/UL — HIGH (ref 0.1–0.6)
MONOCYTES NFR BLD AUTO: 10.5 % — HIGH (ref 1.7–9.3)
MRSA PCR RESULT.: POSITIVE
NEUTROPHILS # BLD AUTO: 6.35 K/UL — SIGNIFICANT CHANGE UP (ref 1.4–6.5)
NEUTROPHILS NFR BLD AUTO: 79.3 % — HIGH (ref 42.2–75.2)
NRBC # BLD: 0 /100 WBCS — SIGNIFICANT CHANGE UP (ref 0–0)
PLATELET # BLD AUTO: 265 K/UL — SIGNIFICANT CHANGE UP (ref 130–400)
PMV BLD: 9.9 FL — SIGNIFICANT CHANGE UP (ref 7.4–10.4)
POTASSIUM SERPL-MCNC: 3.3 MMOL/L — LOW (ref 3.5–5)
POTASSIUM SERPL-SCNC: 3.3 MMOL/L — LOW (ref 3.5–5)
PROT SERPL-MCNC: 5 G/DL — LOW (ref 6–8)
RBC # BLD: 3.72 M/UL — LOW (ref 4.2–5.4)
RBC # FLD: 15.9 % — HIGH (ref 11.5–14.5)
SODIUM SERPL-SCNC: 149 MMOL/L — HIGH (ref 135–146)
SPECIMEN SOURCE: SIGNIFICANT CHANGE UP
VANCOMYCIN FLD-MCNC: 21.3 UG/ML — HIGH (ref 5–10)
WBC # BLD: 8.01 K/UL — SIGNIFICANT CHANGE UP (ref 4.8–10.8)
WBC # FLD AUTO: 8.01 K/UL — SIGNIFICANT CHANGE UP (ref 4.8–10.8)

## 2025-01-05 PROCEDURE — 99232 SBSQ HOSP IP/OBS MODERATE 35: CPT

## 2025-01-05 PROCEDURE — 99233 SBSQ HOSP IP/OBS HIGH 50: CPT

## 2025-01-05 RX ORDER — SODIUM CHLORIDE 9 MG/ML
1000 INJECTION, SOLUTION INTRAVENOUS
Refills: 0 | Status: DISCONTINUED | OUTPATIENT
Start: 2025-01-05 | End: 2025-01-07

## 2025-01-05 RX ORDER — POTASSIUM CHLORIDE 600 MG/1
20 TABLET, FILM COATED, EXTENDED RELEASE ORAL
Refills: 0 | Status: COMPLETED | OUTPATIENT
Start: 2025-01-05 | End: 2025-01-05

## 2025-01-05 RX ORDER — MUPIROCIN 2 %
1 OINTMENT (GRAM) TOPICAL
Refills: 0 | Status: COMPLETED | OUTPATIENT
Start: 2025-01-05 | End: 2025-01-10

## 2025-01-05 RX ADMIN — AMPICILLIN SODIUM AND SULBACTAM SODIUM 200 GRAM(S): 100; 50 INJECTION, POWDER, FOR SOLUTION INTRAVENOUS at 11:06

## 2025-01-05 RX ADMIN — AMPICILLIN SODIUM AND SULBACTAM SODIUM 200 GRAM(S): 100; 50 INJECTION, POWDER, FOR SOLUTION INTRAVENOUS at 17:03

## 2025-01-05 RX ADMIN — FAMOTIDINE 20 MILLIGRAM(S): 20 TABLET, FILM COATED ORAL at 17:04

## 2025-01-05 RX ADMIN — LEVODOPA AND CARBIDOPA 2 TABLET(S): 145; 36.25 CAPSULE, EXTENDED RELEASE ORAL at 11:05

## 2025-01-05 RX ADMIN — ENTACAPONE 200 MILLIGRAM(S): 200 TABLET, FILM COATED ORAL at 05:17

## 2025-01-05 RX ADMIN — VANCOMYCIN HYDROCHLORIDE 166.67 MILLIGRAM(S): 5 INJECTION, POWDER, LYOPHILIZED, FOR SOLUTION INTRAVENOUS at 21:29

## 2025-01-05 RX ADMIN — APIXABAN 5 MILLIGRAM(S): 5 TABLET, FILM COATED ORAL at 05:17

## 2025-01-05 RX ADMIN — GABAPENTIN 100 MILLIGRAM(S): 300 CAPSULE ORAL at 05:18

## 2025-01-05 RX ADMIN — ENTACAPONE 200 MILLIGRAM(S): 200 TABLET, FILM COATED ORAL at 11:05

## 2025-01-05 RX ADMIN — POTASSIUM CHLORIDE 50 MILLIEQUIVALENT(S): 600 TABLET, FILM COATED, EXTENDED RELEASE ORAL at 17:03

## 2025-01-05 RX ADMIN — GABAPENTIN 100 MILLIGRAM(S): 300 CAPSULE ORAL at 17:04

## 2025-01-05 RX ADMIN — AMPICILLIN SODIUM AND SULBACTAM SODIUM 200 GRAM(S): 100; 50 INJECTION, POWDER, FOR SOLUTION INTRAVENOUS at 05:15

## 2025-01-05 RX ADMIN — ENTACAPONE 200 MILLIGRAM(S): 200 TABLET, FILM COATED ORAL at 00:16

## 2025-01-05 RX ADMIN — SENNOSIDES 2 TABLET(S): 8.6 TABLET, FILM COATED ORAL at 21:27

## 2025-01-05 RX ADMIN — LEVODOPA AND CARBIDOPA 2 TABLET(S): 145; 36.25 CAPSULE, EXTENDED RELEASE ORAL at 05:16

## 2025-01-05 RX ADMIN — SODIUM CHLORIDE 75 MILLILITER(S): 9 INJECTION, SOLUTION INTRAVENOUS at 12:50

## 2025-01-05 RX ADMIN — ALPRAZOLAM 0.25 MILLIGRAM(S): 0.25 TABLET ORAL at 21:28

## 2025-01-05 RX ADMIN — APIXABAN 5 MILLIGRAM(S): 5 TABLET, FILM COATED ORAL at 17:04

## 2025-01-05 RX ADMIN — AMPICILLIN SODIUM AND SULBACTAM SODIUM 200 GRAM(S): 100; 50 INJECTION, POWDER, FOR SOLUTION INTRAVENOUS at 00:15

## 2025-01-05 RX ADMIN — ENTACAPONE 200 MILLIGRAM(S): 200 TABLET, FILM COATED ORAL at 17:03

## 2025-01-05 RX ADMIN — Medication 1 APPLICATION(S): at 17:03

## 2025-01-05 RX ADMIN — FAMOTIDINE 20 MILLIGRAM(S): 20 TABLET, FILM COATED ORAL at 05:16

## 2025-01-05 RX ADMIN — LEVODOPA AND CARBIDOPA 2 TABLET(S): 145; 36.25 CAPSULE, EXTENDED RELEASE ORAL at 00:16

## 2025-01-05 RX ADMIN — LEVODOPA AND CARBIDOPA 2 TABLET(S): 145; 36.25 CAPSULE, EXTENDED RELEASE ORAL at 17:04

## 2025-01-05 RX ADMIN — POTASSIUM CHLORIDE 50 MILLIEQUIVALENT(S): 600 TABLET, FILM COATED, EXTENDED RELEASE ORAL at 12:50

## 2025-01-05 RX ADMIN — OXYBUTYNIN CHLORIDE 5 MILLIGRAM(S): 5 TABLET ORAL at 11:06

## 2025-01-05 NOTE — PROGRESS NOTE ADULT - SUBJECTIVE AND OBJECTIVE BOX
Patient is a 89y old  Female who presents with a chief complaint of left mastoid swelling (04 Jan 2025 10:29)    Patient was seen and examined.  no new events    PAST MEDICAL & SURGICAL HISTORY:  Parkinson disease  HTN (hypertension)  Rheumatoid arthritis  Hyperlipidemia  CRAO (central retinal artery occlusion) Left  Atrial fibrillation  History of hysterectomy  Status post placement of implantable loop recorder 01/2021  S/P hernia repair    Allergies  No Known Allergies    MEDICATIONS  (STANDING):  ALPRAZolam 0.25 milliGRAM(s) Oral at bedtime  ampicillin/sulbactam  IVPB 3 Gram(s) IV Intermittent every 6 hours  apixaban 5 milliGRAM(s) Oral every 12 hours  carbidopa/levodopa  25/100 2 Tablet(s) Oral four times a day  diltiazem    milliGRAM(s) Oral daily  entacapone 200 milliGRAM(s) Oral four times a day  famotidine    Tablet 20 milliGRAM(s) Oral two times a day  gabapentin 100 milliGRAM(s) Oral two times a day  lidocaine   4% Patch 1 Patch Transdermal every 24 hours  oxybutynin 5 milliGRAM(s) Oral daily  pantoprazole    Tablet 40 milliGRAM(s) Oral before breakfast  senna 2 Tablet(s) Oral at bedtime  vancomycin  IVPB 1250 milliGRAM(s) IV Intermittent every 24 hours    MEDICATIONS  (PRN):  acetaminophen     Tablet .. 650 milliGRAM(s) Oral every 6 hours PRN Mild Pain (1 - 3), Moderate Pain (4 - 6), Severe Pain (7 - 10)  bisacodyl 5 milliGRAM(s) Oral every 12 hours PRN Constipation  meclizine 25 milliGRAM(s) Oral three times a day PRN Dizziness  zolpidem 5 milliGRAM(s) Oral at bedtime PRN Insomnia    T(C): 36.7 (01-05-25 @ 08:00), Max: 37 (01-04-25 @ 23:58)  HR: 57 (01-05-25 @ 08:00) (57 - 67)  BP: 143/68 (01-05-25 @ 08:00) (102/63 - 156/65)  RR: 18 (01-05-25 @ 08:00) (18 - 19)  SpO2: 92% (01-05-25 @ 08:00) (91% - 96%)    O/E:  Awake not in distress.  HEENT: atraumatic, EOMI.left mandibular swelling, +, erythema+  Chest: clear.  CVS: SIS2 +, systolic  murmur+  P/A: Soft, BS+  CNS: awake  Ext: no edema feet.  All systems reviewed positive findings as above.                           10.0[L]  8.01  )-----------( 265      ( 05 Jan 2025 06:17 )             33.2[L]  01-05    149[H]  |  114[H]  |  20  ----------------------------<  92  3.3[L]   |  22  |  0.6[L]    Ca    8.4      05 Jan 2025 06:17    TPro  5.0[L]  /  Alb  3.0[L]  /  TBili  0.3  /  DBili  x   /  AST  11  /  ALT  <5  /  AlkPhos  59  01-05

## 2025-01-05 NOTE — PROGRESS NOTE ADULT - ASSESSMENT
Assessment:     89-year-old female with a past medical history Parkinson's, HLD, HTN, atrial fibrillation coming with complaints of swollen jaw.  Patient brought in from nursing home due to the concern of swollen left cheek.     Plan:     #left Parotitis   - WBC 20.76 today   - lactate 1.4  - CT temporal and Neck soft tissues : Findings compatible with left parotitis N  - Per ENT: no acute interventions   - BCx 1/1/2025: MRSA   - ID following-> C/W Vancomycin & Unasyn   - Echo WNL     #multinodular thyroid gland  -CT neck and soft tissues Since 10/2019, mild increase in multinodular thyroid gland measuring up to 6 cm, previously 5 cm.   -TSH WNL   -outpt thyroid US     #Parkinson's  - C/W Sinemet 25/100 q6h    #atrial fibrillation on Eliquis   - C/W Eliquis 5mg BID   - C/W Cardizem   - Rate controlled       #GI ppx: PPI  #DVT ppx: on Eliquis 5 mg BID  #Activity: ATT   #Code: Full

## 2025-01-05 NOTE — PROGRESS NOTE ADULT - SUBJECTIVE AND OBJECTIVE BOX
ENT DAILY PROGRESS NOTE    Pt is a 89y Female left facial swelling 2* parotitis - seen and examined at bedside. Pt stable, still with parotid/facial swelling, states she still has pain, but overall improving.       REVIEW OF SYSTEMS   [x] A ten-point review of systems was otherwise negative except as noted.    Allergies    No Known Allergies    Intolerances    MEDICATIONS:  acetaminophen  Tablet .. 650 milliGRAM(s) Oral every 6 hours PRN  ALPRAZolam 0.25 milliGRAM(s) Oral at bedtime  ampicillin/sulbactam  IVPB 3 Gram(s) IV Intermittent every 6 hours  apixaban 5 milliGRAM(s) Oral every 12 hours  bisacodyl 5 milliGRAM(s) Oral every 12 hours PRN  carbidopa/levodopa  25/100 2 Tablet(s) Oral four times a day  diltiazem    milliGRAM(s) Oral daily  entacapone 200 milliGRAM(s) Oral four times a day  famotidine    Tablet 20 milliGRAM(s) Oral two times a day  gabapentin 100 milliGRAM(s) Oral two times a day  lidocaine   4% Patch 1 Patch Transdermal every 24 hours  meclizine 25 milliGRAM(s) Oral three times a day PRN  oxybutynin 5 milliGRAM(s) Oral daily  pantoprazole    Tablet 40 milliGRAM(s) Oral before breakfast  senna 2 Tablet(s) Oral at bedtime  vancomycin  IVPB 1250 milliGRAM(s) IV Intermittent every 24 hours  zolpidem 5 milliGRAM(s) Oral at bedtime PRN      Vital Signs Last 24 Hrs  T(C): 36.7 (05 Jan 2025 08:00), Max: 37 (04 Jan 2025 23:58)  T(F): 98 (05 Jan 2025 08:00), Max: 98.6 (04 Jan 2025 23:58)  HR: 57 (05 Jan 2025 08:00) (57 - 67)  BP: 143/68 (05 Jan 2025 08:00) (102/63 - 156/65)  BP(mean): 95 (04 Jan 2025 23:58) (95 - 95)  RR: 18 (05 Jan 2025 08:00) (18 - 19)  SpO2: 92% (05 Jan 2025 08:00) (91% - 96%)    Parameters below as of 05 Jan 2025 08:00  Patient On (Oxygen Delivery Method): room air      01-04 @ 07:01  -  01-05 @ 07:00  --------------------------------------------------------  IN:    Oral Fluid: 120 mL  Total IN: 120 mL    OUT:    Voided (mL): 500 mL  Total OUT: 500 mL    Total NET: -380 mL      01-05 @ 07:01  -  01-05 @ 11:04  --------------------------------------------------------  IN:    Oral Fluid: 300 mL  Total IN: 300 mL    OUT:  Total OUT: 0 mL    Total NET: 300 mL      PHYSICAL EXAM:    GEN: NAD, awake and alert. No drooling or pooling of secretions. No stridor or stertor. Good vocal quality, no hoarseness.   SKIN: Good color, non diaphoretic  HEENT: Oral mucosa pink and moist. No erythema or edema noted to buccal mucosa, tongue, FOM, uvula or posterior oropharynx. Uvula midline. + left parotid enlarged and firm, mild TTP noted more towards the tail. no overlying erythema or fluctuance.   NECK: Trachea midline. Neck supple, no TTP to B/L lateral neck, no cervical LAD.  RESP: Non-labored breathing. No use of accessory muscles.  CARDIO: +S1/S2  ABDO: Soft, NT.  EXT: VILLAR x 4    LABS:  CBC-                        10.0   8.01  )-----------( 265      ( 05 Jan 2025 06:17 )             33.2     BMP/CMP-  05 Jan 2025 06:17    149    |  114    |  20     ----------------------------<  92     3.3     |  22     |  0.6      Ca    8.4        05 Jan 2025 06:17    TPro  5.0    /  Alb  3.0    /  TBili  0.3    /  DBili  x      /  AST  11     /  ALT  <5     /  AlkPhos  59     05 Jan 2025 06:17    Coagulation Studies-    Endocrine Panel-  Calcium: 8.4 mg/dL (01-05 @ 06:17)              RADIOLOGY & ADDITIONAL STUDIES:

## 2025-01-05 NOTE — PROGRESS NOTE ADULT - SUBJECTIVE AND OBJECTIVE BOX
JON GARCÍA 89y Female  MRN#: [id number]     Hospital Day: 4d    SUBJECTIVE     No overnight events.                                             ----------------------------------------------------------  OBJECTIVE  PAST MEDICAL & SURGICAL HISTORY  Parkinson disease    HTN (hypertension)    Rheumatoid arthritis    Hyperlipidemia    CRAO (central retinal artery occlusion)  Left    Atrial fibrillation    History of hysterectomy    Status post placement of implantable loop recorder  01/2021    S/P hernia repair                                              -----------------------------------------------------------  ALLERGIES:  No Known Allergies                                            ------------------------------------------------------------    HOME MEDICATIONS  Home Medications:  acetaminophen 325 mg oral tablet: 2 tab(s) orally every 6 hours, As needed, Temp greater or equal to 38.5C (101.3F), Mild Pain (1 - 3) (30 Aug 2021 13:13)  ALPRAZolam 0.25 mg oral tablet: 1 tab(s) orally once a day (at bedtime) (01 Jan 2025 21:55)  Aspercreme with Lidocaine 4% topical film: Apply topically to affected area once a day (01 Jan 2025 21:54)  bisacodyl 5 mg oral tablet: 1 tab(s) orally once a day (01 Jan 2025 21:55)  carbidopa/levodopa/entacapone 50 mg-200 mg-200 mg oral tablet: 1 tab(s) orally 4 times a day (08 Aug 2021 18:37)  DilTIAZem (Eqv-Cardizem CD) 360 mg/24 hours oral capsule, extended release: 1 cap(s) orally once a day (01 Jan 2025 21:55)  dilTIAZem 360 mg/24 hours oral capsule, extended release: 1 cap(s) orally once a day (08 Aug 2021 18:37)  famotidine:  (08 Aug 2021 18:37)  famotidine 40 mg oral tablet: 1 tab(s) orally once a day (at bedtime) (01 Jan 2025 21:55)  gabapentin 100 mg oral capsule: 1 cap(s) orally 2 times a day (01 Jan 2025 21:54)  lactulose 20 g oral powder for reconstitution: 1 packet(s) orally once a day as needed for  constipation (01 Jan 2025 21:55)  magnesium hydroxide: 4 times a day as needed for  constipation (01 Jan 2025 21:55)  meclizine 25 mg oral tablet: 1 tab(s) orally 3 times a day, As needed, Dizziness (30 Aug 2021 13:13)  menthol: apply topically for muscle rub (01 Jan 2025 21:55)  Mycostatin 100,000 units/mL oral suspension: 4 unit(s) orally 2 times a day (01 Jan 2025 21:55)  ondansetron 8 mg oral tablet: 1 tab(s) orally 3 times a day as needed for  nausea (01 Jan 2025 21:55)  oxyBUTYnin 5 mg oral tablet: 1 tab(s) orally once a day (01 Jan 2025 21:55)  preiguard: apply topically (01 Jan 2025 21:55)  Protonix 40 mg oral delayed release tablet: 1 tab(s) orally once a day (08 Aug 2021 18:37)  Reglan 5 mg oral tablet: 1 tab(s) orally 3 times a day as needed for  nausea (01 Jan 2025 21:55)  senna (sennosides) 17 mg oral tablet: 2 tab(s) orally once a day (at bedtime) (01 Jan 2025 21:55)  sucralfate: 3 times a day (01 Jan 2025 21:54)  zaleplon 10 mg oral capsule: 1 cap(s) orally once a day (at bedtime) (01 Jan 2025 21:55)                           MEDICATIONS:  STANDING MEDICATIONS  ALPRAZolam 0.25 milliGRAM(s) Oral at bedtime  ampicillin/sulbactam  IVPB 3 Gram(s) IV Intermittent every 6 hours  apixaban 5 milliGRAM(s) Oral every 12 hours  carbidopa/levodopa  25/100 2 Tablet(s) Oral four times a day  diltiazem    milliGRAM(s) Oral daily  entacapone 200 milliGRAM(s) Oral four times a day  famotidine    Tablet 20 milliGRAM(s) Oral two times a day  gabapentin 100 milliGRAM(s) Oral two times a day  lidocaine   4% Patch 1 Patch Transdermal every 24 hours  oxybutynin 5 milliGRAM(s) Oral daily  pantoprazole    Tablet 40 milliGRAM(s) Oral before breakfast  senna 2 Tablet(s) Oral at bedtime  vancomycin  IVPB 1250 milliGRAM(s) IV Intermittent every 24 hours    PRN MEDICATIONS  acetaminophen     Tablet .. 650 milliGRAM(s) Oral every 6 hours PRN  bisacodyl 5 milliGRAM(s) Oral every 12 hours PRN  meclizine 25 milliGRAM(s) Oral three times a day PRN  zolpidem 5 milliGRAM(s) Oral at bedtime PRN                                            ------------------------------------------------------------  VITAL SIGNS: Last 24 Hours  T(C): 37 (04 Jan 2025 23:58), Max: 37 (04 Jan 2025 23:58)  T(F): 98.6 (04 Jan 2025 23:58), Max: 98.6 (04 Jan 2025 23:58)  HR: 63 (04 Jan 2025 23:58) (62 - 67)  BP: 156/65 (04 Jan 2025 23:58) (124/58 - 156/65)  BP(mean): 95 (04 Jan 2025 23:58) (95 - 95)  RR: 19 (04 Jan 2025 23:58) (18 - 19)  SpO2: 91% (04 Jan 2025 23:58) (91% - 96%)      01-03-25 @ 07:01  -  01-04-25 @ 07:00  --------------------------------------------------------  IN: 0 mL / OUT: 500 mL / NET: -500 mL    01-04-25 @ 07:01  -  01-05-25 @ 00:31  --------------------------------------------------------  IN: 120 mL / OUT: 1000 mL / NET: -880 mL                                             --------------------------------------------------------------  LABS:                        10.4   20.76 )-----------( 262      ( 03 Jan 2025 06:16 )             34.2     01-03    144  |  107  |  31[H]  ----------------------------<  95  3.7   |  23  |  0.7    Ca    8.7      03 Jan 2025 06:16    TPro  5.7[L]  /  Alb  3.0[L]  /  TBili  0.3  /  DBili  x   /  AST  17  /  ALT  <5  /  AlkPhos  76  01-03        Sedimentation Rate, Erythrocyte: 40 mm/Hr *H* (01-04-25 @ 11:28)

## 2025-01-05 NOTE — PROGRESS NOTE ADULT - ASSESSMENT
89 female past medical history Parkinson's, HLD, HTN, atrial fibrillation coming with complaints of swollen jaw.  Patient brought in from nursing home due to the concern of swollen     # MRSA Bacteremia  # Left Parotitis   -  CT Temporal Bones w/ IV Cont (01.01.25 @ 16:14) >Findings compatible with left parotitis   - BCx 1/1/2025: MRSA  - blood Culture Results: No growth at 24 hours (01.03.25 @ 17:32)  - MRSA PCR Result.: Positive: By: Real-Time PCR (Polymerase Reaction Method) (01.05.25 @ 07:10)  - Sedimentation Rate, Erythrocyte: 40 mm/Hr (01.04.25 @ 11:28)  - C-Reactive Protein: 57.5 mg/L (01.04.25 @ 11:28)  -  TTE Echo Complete w/o Contrast w/ Doppler (01.03.25 @ 11:33) >Left ventricular ejection fraction, by visual estimation, is 55 to 60%. (Grade II diastolic dysfunction).Thickening and calcification of the anterior and posterior mitral valve leaflets. Moderate tricuspid regurgitation. moderate pulmonary hypertension.  - ID eval: c/w vancomycin , unasyn  - evaluated by ENT  - mupirocin to b/l nares for 5 days    # Hypernatremia  # Hypokalemia  - replete k  - Start D5w  - monitor BMP    # Multinodular thyroid gland  -CT neck and soft tissues Since 10/2019, mild increase in multinodular thyroid gland measuring up to 6 cm, previously 5 cm.   - Thyroid Stimulating Hormone, Serum: 0.55 uIU/mL (01.02.25 @ 06:27)  - outpt thyroid US     # Paroxysmal afib, now in SR  - c/w cardizem, eliquis    # Parkinson's disease   - c/w sinemet, entacapone    # Full code    # Pending: monitor BMP, ID F/u in AM  # Disposition: [facility]

## 2025-01-05 NOTE — PROGRESS NOTE ADULT - ASSESSMENT
Pt is a 89y Female left facial swelling 2* parotitis - stable, leukocytosis improving.    ·	cont IV abx  ·	warm compress over area when possible  ·	hydration  ·	no acute surgical ENT intervention  ·	w/d with attng

## 2025-01-06 LAB
ANION GAP SERPL CALC-SCNC: 11 MMOL/L — SIGNIFICANT CHANGE UP (ref 7–14)
BUN SERPL-MCNC: 13 MG/DL — SIGNIFICANT CHANGE UP (ref 10–20)
CALCIUM SERPL-MCNC: 7.9 MG/DL — LOW (ref 8.4–10.5)
CHLORIDE SERPL-SCNC: 112 MMOL/L — HIGH (ref 98–110)
CO2 SERPL-SCNC: 25 MMOL/L — SIGNIFICANT CHANGE UP (ref 17–32)
CREAT SERPL-MCNC: 0.5 MG/DL — LOW (ref 0.7–1.5)
EGFR: 88 ML/MIN/1.73M2 — SIGNIFICANT CHANGE UP
GLUCOSE SERPL-MCNC: 88 MG/DL — SIGNIFICANT CHANGE UP (ref 70–99)
POTASSIUM SERPL-MCNC: 3.8 MMOL/L — SIGNIFICANT CHANGE UP (ref 3.5–5)
POTASSIUM SERPL-SCNC: 3.8 MMOL/L — SIGNIFICANT CHANGE UP (ref 3.5–5)
SODIUM SERPL-SCNC: 148 MMOL/L — HIGH (ref 135–146)

## 2025-01-06 PROCEDURE — 99233 SBSQ HOSP IP/OBS HIGH 50: CPT

## 2025-01-06 RX ADMIN — LEVODOPA AND CARBIDOPA 2 TABLET(S): 145; 36.25 CAPSULE, EXTENDED RELEASE ORAL at 23:47

## 2025-01-06 RX ADMIN — LEVODOPA AND CARBIDOPA 2 TABLET(S): 145; 36.25 CAPSULE, EXTENDED RELEASE ORAL at 06:12

## 2025-01-06 RX ADMIN — Medication 1 APPLICATION(S): at 17:40

## 2025-01-06 RX ADMIN — Medication 1 APPLICATION(S): at 06:13

## 2025-01-06 RX ADMIN — SENNOSIDES 2 TABLET(S): 8.6 TABLET, FILM COATED ORAL at 21:46

## 2025-01-06 RX ADMIN — FAMOTIDINE 20 MILLIGRAM(S): 20 TABLET, FILM COATED ORAL at 17:39

## 2025-01-06 RX ADMIN — AMPICILLIN SODIUM AND SULBACTAM SODIUM 200 GRAM(S): 100; 50 INJECTION, POWDER, FOR SOLUTION INTRAVENOUS at 01:07

## 2025-01-06 RX ADMIN — AMPICILLIN SODIUM AND SULBACTAM SODIUM 200 GRAM(S): 100; 50 INJECTION, POWDER, FOR SOLUTION INTRAVENOUS at 06:13

## 2025-01-06 RX ADMIN — GABAPENTIN 100 MILLIGRAM(S): 300 CAPSULE ORAL at 17:39

## 2025-01-06 RX ADMIN — ENTACAPONE 200 MILLIGRAM(S): 200 TABLET, FILM COATED ORAL at 17:39

## 2025-01-06 RX ADMIN — ENTACAPONE 200 MILLIGRAM(S): 200 TABLET, FILM COATED ORAL at 06:13

## 2025-01-06 RX ADMIN — ENTACAPONE 200 MILLIGRAM(S): 200 TABLET, FILM COATED ORAL at 11:36

## 2025-01-06 RX ADMIN — APIXABAN 5 MILLIGRAM(S): 5 TABLET, FILM COATED ORAL at 06:13

## 2025-01-06 RX ADMIN — ENTACAPONE 200 MILLIGRAM(S): 200 TABLET, FILM COATED ORAL at 00:00

## 2025-01-06 RX ADMIN — APIXABAN 5 MILLIGRAM(S): 5 TABLET, FILM COATED ORAL at 17:39

## 2025-01-06 RX ADMIN — ENTACAPONE 200 MILLIGRAM(S): 200 TABLET, FILM COATED ORAL at 23:47

## 2025-01-06 RX ADMIN — AMPICILLIN SODIUM AND SULBACTAM SODIUM 200 GRAM(S): 100; 50 INJECTION, POWDER, FOR SOLUTION INTRAVENOUS at 11:36

## 2025-01-06 RX ADMIN — GABAPENTIN 100 MILLIGRAM(S): 300 CAPSULE ORAL at 06:13

## 2025-01-06 RX ADMIN — VANCOMYCIN HYDROCHLORIDE 166.67 MILLIGRAM(S): 5 INJECTION, POWDER, LYOPHILIZED, FOR SOLUTION INTRAVENOUS at 21:45

## 2025-01-06 RX ADMIN — OXYBUTYNIN CHLORIDE 5 MILLIGRAM(S): 5 TABLET ORAL at 11:37

## 2025-01-06 RX ADMIN — AMPICILLIN SODIUM AND SULBACTAM SODIUM 200 GRAM(S): 100; 50 INJECTION, POWDER, FOR SOLUTION INTRAVENOUS at 17:40

## 2025-01-06 RX ADMIN — LEVODOPA AND CARBIDOPA 2 TABLET(S): 145; 36.25 CAPSULE, EXTENDED RELEASE ORAL at 17:39

## 2025-01-06 RX ADMIN — AMPICILLIN SODIUM AND SULBACTAM SODIUM 200 GRAM(S): 100; 50 INJECTION, POWDER, FOR SOLUTION INTRAVENOUS at 23:47

## 2025-01-06 RX ADMIN — LEVODOPA AND CARBIDOPA 2 TABLET(S): 145; 36.25 CAPSULE, EXTENDED RELEASE ORAL at 00:00

## 2025-01-06 RX ADMIN — FAMOTIDINE 20 MILLIGRAM(S): 20 TABLET, FILM COATED ORAL at 06:13

## 2025-01-06 RX ADMIN — LEVODOPA AND CARBIDOPA 2 TABLET(S): 145; 36.25 CAPSULE, EXTENDED RELEASE ORAL at 11:37

## 2025-01-06 RX ADMIN — ALPRAZOLAM 0.25 MILLIGRAM(S): 0.25 TABLET ORAL at 21:45

## 2025-01-06 NOTE — PROGRESS NOTE ADULT - ASSESSMENT
89-year-old female with a past medical history Parkinson's, HLD, HTN, atrial fibrillation coming with complaints of swollen jaw.  Patient brought in from nursing home due to the concern of swollen left cheek.     #left Parotitis   - WBC 8.01 today   - lactate 1.4 on admission   - CT temporal and Neck soft tissues : Findings compatible with left parotitis N  - Per ENT: no acute interventions   - BCx 1/1/2025: MRSA   - ID following-> C/W Vancomycin & Unasyn       -ID stating that JOI is recommended. If not performed, pt should should be discharged on 6 weeks of IV antibx's via PICC L  - Echo WNL   -Pt to be started on Ensure TID w/ calorie count given decreased PO intake since admission  -monitor BMP BID     #multinodular thyroid gland  -CT neck and soft tissues Since 10/2019, mild increase in multinodular thyroid gland measuring up to 6 cm, previously 5 cm.   -TSH WNL   -outpt thyroid US     #Parkinson's  - C/W Sinemet 25/100 q6h    #atrial fibrillation on Eliquis   - C/W Eliquis 5mg BID   - C/W Cardizem   - Rate controlled       #DVT ppx: on Eliquis 5 mg BID  #GI ppx: PPI  #Activity: ATT   #Code: Full      89-year-old female with a past medical history Parkinson's, HLD, HTN, atrial fibrillation coming with complaints of swollen jaw.  Patient brought in from nursing home due to the concern of swollen left cheek.     #left Parotitis   - WBC 8.01 today   - lactate 1.4 on admission   - CT temporal and Neck soft tissues : Findings compatible with left parotitis N  - Per ENT: no acute interventions   - BCx 1/1/2025: MRSA   - ID following-> C/W Vancomycin & Unasyn       -ID stating that JOI is recommended. If not performed, pt should should be discharged on 6 weeks of IV antibx's via PICC L  - Echo WNL     #Inc L periorbital edema  Medical team called by RN as pt was having worsening L orbital edema. No redness or discharge noted  -F.u CT orbits w/wo contrast to evaluate     #hypernatremia  -likely due to dec PO intake   -Pt to be started on Ensure TID w/ calorie count given decreased PO intake since admission  -c/w D5W at 75cc/hr   -monitor BMP BID     #multinodular thyroid gland  -CT neck and soft tissues Since 10/2019, mild increase in multinodular thyroid gland measuring up to 6 cm, previously 5 cm.   -TSH WNL   -outpt thyroid US     #Parkinson's  - C/W Sinemet 25/100 q6h  -c/w entacapone 200mg QID  -c/w britney 100mg BID      #atrial fibrillation on Eliquis   - C/W Eliquis 5mg BID   - C/W Cardizem   - Rate controlled     #anxiety  #insomnia  #dizziness  -c/w 0.25mg daily qhs   -c/w ambien 5mg qhs PRN  -c/w meclizine 25mg TID PRN for dizziness     #urinary retention   -c/w oxybutinin 5mg daily      #DVT ppx: on Eliquis 5 mg BID  #GI ppx: protonix  #Activity: ATT   #Code: Full   dispo: medical floor      89-year-old female with a past medical history Parkinson's, HLD, HTN, atrial fibrillation coming with complaints of swollen jaw.  Patient brought in from nursing home due to the concern of swollen left cheek.     #left Parotitis   - WBC 8.01 today   - lactate 1.4 on admission   - CT temporal and Neck soft tissues : Findings compatible with left parotitis N  - Per ENT: no acute interventions. Warm compresses    - BCx 1/1/2025: MRSA   - ID following-> C/W Vancomycin & Unasyn       -ID stating that JOI is recommended. If not performed, pt should should be discharged on 6 weeks of IV antibx's via PICC L  - Echo WNL     #Inc L periorbital edema  Medical team called by RN as pt was having worsening L orbital edema. No redness or discharge noted  -F.u CT orbits w/wo contrast to evaluate     #hypernatremia  -likely due to dec PO intake   -Pt to be started on Ensure TID w/ calorie count given decreased PO intake since admission  -c/w D5W at 75cc/hr   -monitor BMP BID     #multinodular thyroid gland  -CT neck and soft tissues Since 10/2019, mild increase in multinodular thyroid gland measuring up to 6 cm, previously 5 cm.   -TSH WNL   -outpt thyroid US     #Parkinson's  - C/W Sinemet 25/100 q6h  -c/w entacapone 200mg QID  -c/w britney 100mg BID      #atrial fibrillation on Eliquis   - C/W Eliquis 5mg BID   - C/W Cardizem   - Rate controlled     #anxiety  #insomnia  #dizziness  -c/w 0.25mg daily qhs   -c/w ambien 5mg qhs PRN  -c/w meclizine 25mg TID PRN for dizziness     #urinary retention   -c/w oxybutinin 5mg daily      #DVT ppx: on Eliquis 5 mg BID  #GI ppx: protonix  #Activity: ATT   #Code: Full   dispo: medical floor

## 2025-01-06 NOTE — PROGRESS NOTE ADULT - SUBJECTIVE AND OBJECTIVE BOX
RAQUEL JON  89y, Female  Allergy: No Known Allergies      LOS  5d    CHIEF COMPLAINT: left mastoid swelling (06 Jan 2025 13:40)      INTERVAL EVENTS/HPI  - T(F): , Max: 98.4 (01-06-25 @ 00:03) more edema of the L eye  - WBC Count: 8.01 (01-05-25 @ 06:17)     - Creatinine: 0.5 (01-06-25 @ 07:01)  Creatinine: 0.6 (01-05-25 @ 06:17)     -   -   -     ROS  cannot obtain secondary to patient's sedation and/or mental status    VITALS:  T(F): 98.3, Max: 98.4 (01-06-25 @ 00:03)  HR: 65  BP: 163/74  RR: 18Vital Signs Last 24 Hrs  T(C): 36.8 (06 Jan 2025 08:00), Max: 36.9 (06 Jan 2025 00:03)  T(F): 98.3 (06 Jan 2025 08:00), Max: 98.4 (06 Jan 2025 00:03)  HR: 65 (06 Jan 2025 08:00) (65 - 77)  BP: 163/74 (06 Jan 2025 08:00) (130/49 - 163/74)  BP(mean): 76 (06 Jan 2025 00:03) (76 - 76)  RR: 18 (06 Jan 2025 08:00) (18 - 19)  SpO2: 97% (06 Jan 2025 08:00) (95% - 97%)    Parameters below as of 06 Jan 2025 08:00  Patient On (Oxygen Delivery Method): nasal cannula  O2 Flow (L/min): 2      PHYSICAL EXAM:  Gen: chronically ill appearing   HEENT: Normocephalic, atraumatic, edema of the L eye- no erythema, overall decreased erythema/edema of the L neck   Neck: supple, no lymphadenopathy  CV: Regular rate & regular rhythm  Lungs: decreased BS at bases, no fremitus  Abdomen: Soft, BS present  Ext: Warm, well perfused  Neuro: non focal, not following commands  Skin: no rash, no erythema  Lines: no phlebitis     FH: Non-contributory  Social Hx: Non-contributory    TESTS & MEASUREMENTS:                        10.0   8.01  )-----------( 265      ( 05 Jan 2025 06:17 )             33.2     01-06    148[H]  |  112[H]  |  13  ----------------------------<  88  3.8   |  25  |  0.5[L]    Ca    7.9[L]      06 Jan 2025 07:01    TPro  5.0[L]  /  Alb  3.0[L]  /  TBili  0.3  /  DBili  x   /  AST  11  /  ALT  <5  /  AlkPhos  59  01-05      LIVER FUNCTIONS - ( 05 Jan 2025 06:17 )  Alb: 3.0 g/dL / Pro: 5.0 g/dL / ALK PHOS: 59 U/L / ALT: <5 U/L / AST: 11 U/L / GGT: x           Urinalysis Basic - ( 06 Jan 2025 07:01 )    Color: x / Appearance: x / SG: x / pH: x  Gluc: 88 mg/dL / Ketone: x  / Bili: x / Urobili: x   Blood: x / Protein: x / Nitrite: x   Leuk Esterase: x / RBC: x / WBC x   Sq Epi: x / Non Sq Epi: x / Bacteria: x        Culture - Blood (collected 01-04-25 @ 23:06)  Source: .Blood BLOOD  Preliminary Report (01-06-25 @ 01:01):    No growth at 24 hours    Culture - Blood (collected 01-04-25 @ 11:28)  Source: .Blood BLOOD  Preliminary Report (01-06-25 @ 01:01):    No growth at 24 hours    Culture - Blood (collected 01-03-25 @ 17:32)  Source: .Blood BLOOD  Preliminary Report (01-06-25 @ 01:01):    No growth at 48 Hours    Culture - Blood (collected 01-03-25 @ 17:32)  Source: .Blood BLOOD  Preliminary Report (01-06-25 @ 01:01):    No growth at 48 Hours    Culture - Blood (collected 01-01-25 @ 15:52)  Source: .Blood BLOOD  Gram Stain (01-03-25 @ 00:12):    Growth in aerobic bottle: Gram Positive Cocci in Clusters    Growth in anaerobic bottle: Gram Positive Cocci in Clusters  Final Report (01-04-25 @ 07:37):    Growth in aerobic and anaerobic bottles: Methicillin Resistant    Staphylococcus aureus    Direct identification is available within approximately 3-5    hours either by Blood Panel Multiplexed PCR or Direct    MALDI-TOF. Details: [email or web address]  Organism: Blood Culture PCR  Methicillin resistant Staphylococcus aureus (01-04-25 @ 07:37)  Organism: Methicillin resistant Staphylococcus aureus (01-04-25 @ 07:37)      Method Type: ADAM      -  Clindamycin: S <=0.25      -  Daptomycin: S <=0.5      -  Erythromycin: S <=0.25      -  Gentamicin: S <=4 Should not be used as monotherapy      -  Linezolid: S 2      -  Oxacillin: R >2      -  Penicillin: R 1      -  Rifampin: S <=1 Should not be used as monotherapy      -  Tetracycline: S <=4      -  Trimethoprim/Sulfamethoxazole: S <=0.5/9.5      -  Vancomycin: S 0.5  Organism: Blood Culture PCR (01-04-25 @ 07:37)      Method Type: PCR      -  Methicillin resistant Staphylococcus aureus (MRSA): Detec    Culture - Blood (collected 01-01-25 @ 15:52)  Source: .Blood BLOOD  Gram Stain (01-04-25 @ 07:27):    Growth in anaerobic bottle: Gram Positive Cocci in Clusters  Final Report (01-05-25 @ 15:42):    Growth in anaerobic bottle: Methicillin Resistant Staphylococcus aureus    See previous culture [id number]        Lactate, Blood: 1.4 mmol/L (01-01-25 @ 15:52)      INFECTIOUS DISEASES TESTING  MRSA PCR Result.: Positive (01-05-25 @ 07:10)      INFLAMMATORY MARKERS  Sedimentation Rate, Erythrocyte: 40 mm/Hr (01-04-25 @ 11:28)  C-Reactive Protein: 57.5 mg/L (01-04-25 @ 11:28)      RADIOLOGY & ADDITIONAL TESTS:  I have personally reviewed the last available Chest xray  CXR      CT      CARDIOLOGY TESTING  12 Lead ECG:   Ventricular Rate 75 BPM    Atrial Rate 129 BPM    QRS Duration 80 ms    Q-T Interval 352 ms    QTC Calculation(Bazett) 393 ms    R Axis 6 degrees    T Axis 1 degrees    Diagnosis Line Sinus rhythm with frequent Premature atrial complexes  Inferior infarct , age undetermined  Cannot rule out Anterior infarct , age undetermined  Abnormal ECG    Confirmed by Cruz Tinajero ([id number]) on 1/2/2025 12:42:03 PM (01-02-25 @ 09:47)  12 Lead ECG:   Ventricular Rate 81 BPM    Atrial Rate 81 BPM    P-R Interval 160 ms    QRS Duration 72 ms    Q-T Interval 364 ms    QTC Calculation(Bazett) 422 ms    P Axis 37 degrees    R Axis 1 degrees    T Axis 15 degrees    Diagnosis Line Normal sinus rhythm  Inferior infarct , age undetermined  Abnormal ECG    Confirmed by Cruz Tinajero ([id number]) on 1/2/2025 7:45:56 AM (01-01-25 @ 15:24)      MEDICATIONS  ALPRAZolam 0.25  ampicillin/sulbactam  IVPB 3  apixaban 5  carbidopa/levodopa  25/100 2  dextrose 5%. 1000  diltiazem     entacapone 200  famotidine    Tablet 20  gabapentin 100  lidocaine   4% Patch 1  mupirocin 2% Nasal 1  oxybutynin 5  pantoprazole    Tablet 40  senna 2  vancomycin  IVPB 1250      WEIGHT  Weight (kg): 66.224 (01-01-25 @ 15:41)  Creatinine: 0.5 mg/dL (01-06-25 @ 07:01)      ANTIBIOTICS:  ampicillin/sulbactam  IVPB 3 Gram(s) IV Intermittent every 6 hours  vancomycin  IVPB 1250 milliGRAM(s) IV Intermittent every 24 hours      All available historical records have been reviewed

## 2025-01-06 NOTE — PROGRESS NOTE ADULT - ASSESSMENT
ASSESSMENT  89y Female from [facility], with a past medical history of Parkinsons, HLD, HTN, a. fib, RA. Patient brought in from nursing home to ED for left swollen jaw, to rule out mastoiditis. Patient is not sure when this swelling started. Patient is a poor historian and the ED spoke to patients daughter on the phone who reported that she heard the patient had a questionable fall due to the swelling of her left jaw. Patient has a 3" x 3" hard mass to her left parotitis area. There is edema, erythema, and tenderness to the left face extending from the left parotitis area down to her left anterior neck. Patient has poor dentition.  Patient has very dry mucous membranes, not well-appearing. There is no evident trauma.    IMPRESSION  #MRSA bacteremia due to L parotitis  Tm 100.5  Admission WBC 18     1/4 BCX NGTD     1/3 BCX NGTD     1/1 2/4 BCX MRSA    TTE no vegetations   < from: CT Neck Soft Tissue w/ IV Cont (01.01.25 @ 16:14) >  Findings compatible with left parotitis. No drainable fluid collection.  Since 10/2019, mild increase in multinodular thyroid gland measuring up   to 6 cm, previously 5 cm. May consider nonemergent thyroid outpatient   thyroid ultrasound follow-up as clinically indicated.    Drug Dosing Weight  Height (cm): 170.2 (01 Jan 2025 15:41)  Weight (kg): 66.224 (01 Jan 2025 15:41)  BMI (kg/m2): 22.9 (01 Jan 2025 15:41)    Creatinine: 0.7 mg/dL (01.02.25 @ 06:27) 50 mL/min  Creatinine clearance for normal weight patient, using ideal body weight of 61 kg (135 lbs).      RECOMMENDATIONS  - Vanc dosing AUC/ADAM per clinical pharmacist   - Unasyn 3g q6h IV as often polymicrobial x 14 days, if D/C prior PO augmentin 875mg BID E/D 1/14   - Guidelines would recommend JOI as community acquired bacteremia , if not within GOC, plan for PICC x Vanc 6 weeks from cleared BCX E/D 2/13/25  - f/u CT (edema seems dependent, doubt worsening infection)  - Appreciate ENT consult  - Poor prognosis, GOC    If any questions, please text or call on Microsoft Teams  Please continue to update ID with any pertinent new clinical, laboratory or radiographic findings.

## 2025-01-06 NOTE — PROGRESS NOTE ADULT - SUBJECTIVE AND OBJECTIVE BOX
Patient is a 89y old  Female who presents with a chief complaint of left mastoid swelling (04 Jan 2025 10:29)    Patient was seen and examined.  Pt with left  periorbital swelling  decrease oral intake    PAST MEDICAL & SURGICAL HISTORY:  Parkinson disease  HTN (hypertension)  Rheumatoid arthritis  Hyperlipidemia  CRAO (central retinal artery occlusion) Left  Atrial fibrillation  History of hysterectomy  Status post placement of implantable loop recorder 01/2021  S/P hernia repair    Allergies  No Known Allergies    MEDICATIONS  (STANDING):  ALPRAZolam 0.25 milliGRAM(s) Oral at bedtime  ampicillin/sulbactam  IVPB 3 Gram(s) IV Intermittent every 6 hours  apixaban 5 milliGRAM(s) Oral every 12 hours  carbidopa/levodopa  25/100 2 Tablet(s) Oral four times a day  dextrose 5%. 1000 milliLiter(s) (75 mL/Hr) IV Continuous <Continuous>  diltiazem    milliGRAM(s) Oral daily  entacapone 200 milliGRAM(s) Oral four times a day  famotidine    Tablet 20 milliGRAM(s) Oral two times a day  gabapentin 100 milliGRAM(s) Oral two times a day  lidocaine   4% Patch 1 Patch Transdermal every 24 hours  mupirocin 2% Nasal 1 Application(s) Both Nostrils two times a day  oxybutynin 5 milliGRAM(s) Oral daily  pantoprazole    Tablet 40 milliGRAM(s) Oral before breakfast  senna 2 Tablet(s) Oral at bedtime  vancomycin  IVPB 1250 milliGRAM(s) IV Intermittent every 24 hours    MEDICATIONS  (PRN):  acetaminophen     Tablet .. 650 milliGRAM(s) Oral every 6 hours PRN Mild Pain (1 - 3), Moderate Pain (4 - 6), Severe Pain (7 - 10)  bisacodyl 5 milliGRAM(s) Oral every 12 hours PRN Constipation  meclizine 25 milliGRAM(s) Oral three times a day PRN Dizziness  zolpidem 5 milliGRAM(s) Oral at bedtime PRN Insomnia    T(C): 36.8 (01-06-25 @ 08:00), Max: 36.9 (01-06-25 @ 00:03)  HR: 65 (01-06-25 @ 08:00) (61 - 77)  BP: 163/74 (01-06-25 @ 08:00) (130/49 - 163/74)  RR: 18 (01-06-25 @ 08:00) (18 - 19)  SpO2: 97% (01-06-25 @ 08:00) (95% - 97%)    O/E:  Awake not in distress.  HEENT: atraumatic, EOMI.left mandibular swelling, +, erythema+, Periorbital swelling  Chest: clear.  CVS: SIS2 +, systolic  murmur+  P/A: Soft, BS+  CNS: awake  Ext: no edema feet.  All systems reviewed positive findings as above.                                        10.0[L]  8.01  )-----------( 265      ( 05 Jan 2025 06:17 )             33.2[L]  01-06    148[H]  |  112[H]  |  13  ----------------------------<  88  3.8   |  25  |  0.5[L]  01-05    149[H]  |  114[H]  |  20  ----------------------------<  92  3.3[L]   |  22  |  0.6[L]    Ca    7.9[L]      06 Jan 2025 07:01  Ca    8.4      05 Jan 2025 06:17    TPro  5.0[L]  /  Alb  3.0[L]  /  TBili  0.3  /  DBili  x   /  AST  11  /  ALT  <5  /  AlkPhos  59  01-05

## 2025-01-06 NOTE — PROGRESS NOTE ADULT - ASSESSMENT
Pt is a 89y Female left facial swelling 2* parotitis - now with periorbital edema, leukocytosis improving.     Plan:   - Obtain CT Neck w/ IVC for further evaluation   - Continue IV abx per ID- Unasyn   - Continue oral care, bacitracin to lips   - Continue hydration   - F/u AM labs   - spoke with RN   - Will d/w attending  Pt is a 89y Female left facial swelling 2* parotitis - now with periorbital edema, leukocytosis improving.     Plan:   - Continue IV abx per ID- Unasyn   - Continue oral care, bacitracin to lips   - Continue hydration   - F/u AM labs   - spoke with RN   - Will d/w attending

## 2025-01-06 NOTE — PROGRESS NOTE ADULT - SUBJECTIVE AND OBJECTIVE BOX
SUBJECTIVE/OVERNIGHT EVENTS  Today is hospital day 5d. This morning patient was seen and examined at bedside, resting comfortably in bed. No acute or major events overnight.      CODE STATUS: full code     FAMILY COMMUNICATION  Contact date:  Name of person contacted:  Relationship to patient:  Communication details:    MEDICATIONS  STANDING MEDICATIONS  ALPRAZolam 0.25 milliGRAM(s) Oral at bedtime  ampicillin/sulbactam  IVPB 3 Gram(s) IV Intermittent every 6 hours  apixaban 5 milliGRAM(s) Oral every 12 hours  carbidopa/levodopa  25/100 2 Tablet(s) Oral four times a day  dextrose 5%. 1000 milliLiter(s) IV Continuous <Continuous>  diltiazem    milliGRAM(s) Oral daily  entacapone 200 milliGRAM(s) Oral four times a day  famotidine    Tablet 20 milliGRAM(s) Oral two times a day  gabapentin 100 milliGRAM(s) Oral two times a day  lidocaine   4% Patch 1 Patch Transdermal every 24 hours  mupirocin 2% Nasal 1 Application(s) Both Nostrils two times a day  oxybutynin 5 milliGRAM(s) Oral daily  pantoprazole    Tablet 40 milliGRAM(s) Oral before breakfast  senna 2 Tablet(s) Oral at bedtime  vancomycin  IVPB 1250 milliGRAM(s) IV Intermittent every 24 hours    PRN MEDICATIONS  acetaminophen     Tablet .. 650 milliGRAM(s) Oral every 6 hours PRN  bisacodyl 5 milliGRAM(s) Oral every 12 hours PRN  meclizine 25 milliGRAM(s) Oral three times a day PRN  zolpidem 5 milliGRAM(s) Oral at bedtime PRN    VITALS  T(F): 98.3 (01-06-25 @ 08:00), Max: 98.4 (01-06-25 @ 00:03)  HR: 65 (01-06-25 @ 08:00) (61 - 77)  BP: 163/74 (01-06-25 @ 08:00) (130/49 - 163/74)  RR: 18 (01-06-25 @ 08:00) (18 - 19)  SpO2: 97% (01-06-25 @ 08:00) (95% - 97%)    PHYSICAL EXAM  GENERAL  (x  ) NAD, lying in bed comfortably     (  ) obtunded     (  ) lethargic     (  ) somnolent    HEAD  ( x ) Atraumatic     (  ) hematoma     (  ) laceration (specify location:       )   orange exudate on tongue- likely feeds     NECK  (  x) Supple     (  ) neck stiffness     (  ) nuchal rigidity     (  )  no JVD     (  ) JVD present ( -- cm)    HEART  Rate -->  (x  ) normal rate    (  ) bradycardic    (  ) tachycardic  Rhythm -->  ( x ) regular    (  ) regularly irregular    (  ) irregularly irregular  Murmurs -->  ( x ) normal s1/s2    (  ) systolic murmur    (  ) diastolic murmur    (  ) continuous murmur     (  ) S3 present    (  ) S4 present    LUNGS  (x  )Unlabored respirations     (  ) tachypnea  (  x) B/L air entry     (  ) decreased breath sounds in:  (location     )    ( x ) no adventitious sound     (  ) crackles     (  ) wheezing      (  ) rhonchi      (specify location:       )  (  ) chest wall tenderness (specify location:       )    ABDOMEN  ( x ) Soft     (  ) tense   |   (x  ) nondistended     (  ) distended   |   (  ) +BS     (  ) hypoactive bowel sounds     (  ) hyperactive bowel sounds  ( x ) nontender     (  ) RUQ tenderness     (  ) RLQ tenderness     (  ) LLQ tenderness     (  ) epigastric tenderness     (  ) diffuse tenderness  (  ) Splenomegaly      (  ) Hepatomegaly      (  ) Jaundice     (  ) ecchymosis     EXTREMITIES  (x  ) Normal     (  ) Rash     (  ) ecchymosis     (  ) varicose veins      (  ) pitting edema     (  ) non-pitting edema   (  ) ulceration     (  ) gangrene:     (location:     )    NERVOUS SYSTEM  (  ) A&Ox3     (  ) confused     ( x ) lethargic  CN II-XII:     (  ) Intact     (  ) focal deficits  (Specify:     )   Upper extremities:     (  ) strength X/5     (  ) focal deficit (specify:    )  Lower extremities:     (  ) strength  X/5    (  ) focal deficit (specify:    )    SKIN  ( x ) No rashes or lesions     (  ) maculopapular rash     (  ) pustules     (  ) vesicles     (  ) ulcer     (  ) ecchymosis     (specify location:     )    (  ) Indwelling Jacques Catheter   Date insterted:    Reason (  ) Critical illness     (  ) urinary retention    (  ) Accurate Ins/Outs Monitoring     (  ) CMO patient    (  ) Central Line  Date inserted:  Location: (  ) Right IJ   (  ) Left IJ   (  ) Right Fem   (  ) Left Fem    (  ) SPC  (  ) pigtail  (  ) PEG tube  (  ) colostomy  (  ) jejunostomy  (  ) U-Dall    LABS             10.0   8.01  )-----------( 265      ( 01-05-25 @ 06:17 )             33.2     148  |  112  |  13  -------------------------<  88   01-06-25 @ 07:01  3.8  |  25  |  0.5    Ca      7.9     01-06-25 @ 07:01    TPro  5.0  /  Alb  3.0  /  TBili  0.3  /  DBili  x   /  AST  11  /  ALT  <5  /  AlkPhos  59  /  GGT  x     01-05-25 @ 06:17        Urinalysis Basic - ( 06 Jan 2025 07:01 )    Color: x / Appearance: x / SG: x / pH: x  Gluc: 88 mg/dL / Ketone: x  / Bili: x / Urobili: x   Blood: x / Protein: x / Nitrite: x   Leuk Esterase: x / RBC: x / WBC x   Sq Epi: x / Non Sq Epi: x / Bacteria: x          Culture - Blood (collected 04 Jan 2025 23:06)  Source: .Blood BLOOD  Preliminary Report (06 Jan 2025 01:01):    No growth at 24 hours    Culture - Blood (collected 04 Jan 2025 11:28)  Source: .Blood BLOOD  Preliminary Report (06 Jan 2025 01:01):    No growth at 24 hours    Culture - Blood (collected 03 Jan 2025 17:32)  Source: .Blood BLOOD  Preliminary Report (06 Jan 2025 01:01):    No growth at 48 Hours    Culture - Blood (collected 03 Jan 2025 17:32)  Source: .Blood BLOOD  Preliminary Report (06 Jan 2025 01:01):    No growth at 48 Hours      IMAGING

## 2025-01-06 NOTE — PROGRESS NOTE ADULT - ASSESSMENT
89 female past medical history Parkinson's, HLD, HTN, atrial fibrillation coming with complaints of swollen jaw.  Patient brought in from nursing home due to the concern of swollen     # MRSA Bacteremia  # Left Parotitis   # left periorbital sweling  -  CT Temporal Bones w/ IV Cont (01.01.25 @ 16:14) >Findings compatible with left parotitis   - BCx 1/1/2025: MRSA  - blood Culture Results: No growth at 24 hours (01.03.25 @ 17:32)  - MRSA PCR Result.: Positive: By: Real-Time PCR (Polymerase Reaction Method) (01.05.25 @ 07:10)  - Sedimentation Rate, Erythrocyte: 40 mm/Hr (01.04.25 @ 11:28)  - C-Reactive Protein: 57.5 mg/L (01.04.25 @ 11:28)  -  TTE Echo Complete w/o Contrast w/ Doppler (01.03.25 @ 11:33) >Left ventricular ejection fraction, by visual estimation, is 55 to 60%. (Grade II diastolic dysfunction).Thickening and calcification of the anterior and posterior mitral valve leaflets. Moderate tricuspid regurgitation. moderate pulmonary hypertension.  - ID eval: c/w vancomycin , unasyn  - evaluated by ENT  - mupirocin to b/l nares for 5 days  - CT orbit  - warm compresses    # Hypernatremia sec to decrease oral inrake  # Hypokalemia- resolved  - c/w  D5w  - calorie count  - ensure pudding and enlive  - monitor BMP    # Multinodular thyroid gland  -CT neck and soft tissues Since 10/2019, mild increase in multinodular thyroid gland measuring up to 6 cm, previously 5 cm.   - Thyroid Stimulating Hormone, Serum: 0.55 uIU/mL (01.02.25 @ 06:27)  - outpt thyroid US     # Paroxysmal afib, now in SR  - c/w cardizem, eliquis    # Parkinson's disease   - c/w sinemet, entacapone    # Full code    # Pending: monitor BMP, CT orbit, monitor BP, calorie count  # Disposition: [facility]

## 2025-01-06 NOTE — PROGRESS NOTE ADULT - SUBJECTIVE AND OBJECTIVE BOX
ENT DAILY PROGRESS NOTE    Pt is a 89y Female left facial swelling 2* parotitis - seen and examined at bedside. Pt stable, still with parotid/facial swelling, Patient noted wit left periorbital swelling noted today, patient complaining of left facial swelling.       REVIEW OF SYSTEMS   [ ] Due to altered mental status/intubation, subjective information were not able to be obtained from patient. History was obtained, to the extent possible, from review of the chart and collateral sources of information.    Allergies    No Known Allergies    Intolerances        MEDICATIONS:  acetaminophen     Tablet .. 650 milliGRAM(s) Oral every 6 hours PRN  ALPRAZolam 0.25 milliGRAM(s) Oral at bedtime  ampicillin/sulbactam  IVPB 3 Gram(s) IV Intermittent every 6 hours  apixaban 5 milliGRAM(s) Oral every 12 hours  bisacodyl 5 milliGRAM(s) Oral every 12 hours PRN  carbidopa/levodopa  25/100 2 Tablet(s) Oral four times a day  dextrose 5%. 1000 milliLiter(s) IV Continuous <Continuous>  diltiazem    milliGRAM(s) Oral daily  entacapone 200 milliGRAM(s) Oral four times a day  famotidine    Tablet 20 milliGRAM(s) Oral two times a day  gabapentin 100 milliGRAM(s) Oral two times a day  lidocaine   4% Patch 1 Patch Transdermal every 24 hours  meclizine 25 milliGRAM(s) Oral three times a day PRN  mupirocin 2% Nasal 1 Application(s) Both Nostrils two times a day  oxybutynin 5 milliGRAM(s) Oral daily  pantoprazole    Tablet 40 milliGRAM(s) Oral before breakfast  senna 2 Tablet(s) Oral at bedtime  vancomycin  IVPB 1250 milliGRAM(s) IV Intermittent every 24 hours  zolpidem 5 milliGRAM(s) Oral at bedtime PRN      Vital Signs Last 24 Hrs  T(C): 36.8 (06 Jan 2025 08:00), Max: 36.9 (06 Jan 2025 00:03)  T(F): 98.3 (06 Jan 2025 08:00), Max: 98.4 (06 Jan 2025 00:03)  HR: 65 (06 Jan 2025 08:00) (61 - 77)  BP: 163/74 (06 Jan 2025 08:00) (130/49 - 163/74)  BP(mean): 76 (06 Jan 2025 00:03) (76 - 76)  RR: 18 (06 Jan 2025 08:00) (18 - 19)  SpO2: 97% (06 Jan 2025 08:00) (95% - 97%)    Parameters below as of 06 Jan 2025 08:00  Patient On (Oxygen Delivery Method): nasal cannula  O2 Flow (L/min): 2        01-05 @ 07:01  -  01-06 @ 07:00  --------------------------------------------------------  IN:    dextrose 5%: 975 mL    Oral Fluid: 540 mL  Total IN: 1515 mL    OUT:    Voided (mL): 500 mL  Total OUT: 500 mL    Total NET: 1015 mL          PHYSICAL EXAM:    GEN: NAD, somnolent. No drooling or pooling of secretions. No stridor or stertor. A&Ox0   SKIN: Good color, non diaphoretic  HEENT: NC/AT; +Left sided periorbital edema, soft, fluctant. Oral mucosa pink and moist. +LEFT  parotid enlarged and firm, mild TTP  NECK:  Trachea midline. Neck supple, no TTP to B/L lateral neck, no cervical LAD.  RESP: No dyspnea, non-labored breathing. No use of accessory muscles.  CARDIO: +S1/S2  ABDO: Soft, NT.  EXT: VILLAR x 4    LABS:  CBC-                        10.0   8.01  )-----------( 265      ( 05 Jan 2025 06:17 )             33.2     BMP/CMP-  06 Jan 2025 07:01    148    |  112    |  13     ----------------------------<  88     3.8     |  25     |  0.5      Ca    7.9        06 Jan 2025 07:01    TPro  5.0    /  Alb  3.0    /  TBili  0.3    /  DBili  x      /  AST  11     /  ALT  <5     /  AlkPhos  59     05 Jan 2025 06:17    Coagulation Studies-    Endocrine Panel-  Calcium: 7.9 mg/dL (01-06 @ 07:01)              RADIOLOGY & ADDITIONAL STUDIES:

## 2025-01-07 LAB
ANION GAP SERPL CALC-SCNC: 10 MMOL/L — SIGNIFICANT CHANGE UP (ref 7–14)
BUN SERPL-MCNC: 10 MG/DL — SIGNIFICANT CHANGE UP (ref 10–20)
CALCIUM SERPL-MCNC: 7.8 MG/DL — LOW (ref 8.4–10.5)
CHLORIDE SERPL-SCNC: 109 MMOL/L — SIGNIFICANT CHANGE UP (ref 98–110)
CO2 SERPL-SCNC: 26 MMOL/L — SIGNIFICANT CHANGE UP (ref 17–32)
CREAT SERPL-MCNC: 0.5 MG/DL — LOW (ref 0.7–1.5)
EGFR: 88 ML/MIN/1.73M2 — SIGNIFICANT CHANGE UP
GLUCOSE SERPL-MCNC: 96 MG/DL — SIGNIFICANT CHANGE UP (ref 70–99)
HCT VFR BLD CALC: 32.2 % — LOW (ref 37–47)
HGB BLD-MCNC: 9.6 G/DL — LOW (ref 12–16)
MCHC RBC-ENTMCNC: 27.1 PG — SIGNIFICANT CHANGE UP (ref 27–31)
MCHC RBC-ENTMCNC: 29.8 G/DL — LOW (ref 32–37)
MCV RBC AUTO: 91 FL — SIGNIFICANT CHANGE UP (ref 81–99)
NRBC # BLD: 0 /100 WBCS — SIGNIFICANT CHANGE UP (ref 0–0)
PLATELET # BLD AUTO: 271 K/UL — SIGNIFICANT CHANGE UP (ref 130–400)
PMV BLD: 10.3 FL — SIGNIFICANT CHANGE UP (ref 7.4–10.4)
POTASSIUM SERPL-MCNC: 3.8 MMOL/L — SIGNIFICANT CHANGE UP (ref 3.5–5)
POTASSIUM SERPL-SCNC: 3.8 MMOL/L — SIGNIFICANT CHANGE UP (ref 3.5–5)
RBC # BLD: 3.54 M/UL — LOW (ref 4.2–5.4)
RBC # FLD: 15.8 % — HIGH (ref 11.5–14.5)
SODIUM SERPL-SCNC: 145 MMOL/L — SIGNIFICANT CHANGE UP (ref 135–146)
WBC # BLD: 8.14 K/UL — SIGNIFICANT CHANGE UP (ref 4.8–10.8)
WBC # FLD AUTO: 8.14 K/UL — SIGNIFICANT CHANGE UP (ref 4.8–10.8)

## 2025-01-07 PROCEDURE — 70481 CT ORBIT/EAR/FOSSA W/DYE: CPT | Mod: 26

## 2025-01-07 PROCEDURE — 99222 1ST HOSP IP/OBS MODERATE 55: CPT

## 2025-01-07 PROCEDURE — 99233 SBSQ HOSP IP/OBS HIGH 50: CPT

## 2025-01-07 RX ORDER — SODIUM CHLORIDE 9 MG/ML
1000 INJECTION, SOLUTION INTRAVENOUS
Refills: 0 | Status: DISCONTINUED | OUTPATIENT
Start: 2025-01-07 | End: 2025-01-10

## 2025-01-07 RX ADMIN — AMPICILLIN SODIUM AND SULBACTAM SODIUM 200 GRAM(S): 100; 50 INJECTION, POWDER, FOR SOLUTION INTRAVENOUS at 17:40

## 2025-01-07 RX ADMIN — FAMOTIDINE 20 MILLIGRAM(S): 20 TABLET, FILM COATED ORAL at 17:46

## 2025-01-07 RX ADMIN — ALPRAZOLAM 0.25 MILLIGRAM(S): 0.25 TABLET ORAL at 21:37

## 2025-01-07 RX ADMIN — AMPICILLIN SODIUM AND SULBACTAM SODIUM 200 GRAM(S): 100; 50 INJECTION, POWDER, FOR SOLUTION INTRAVENOUS at 11:22

## 2025-01-07 RX ADMIN — OXYBUTYNIN CHLORIDE 5 MILLIGRAM(S): 5 TABLET ORAL at 11:29

## 2025-01-07 RX ADMIN — SENNOSIDES 2 TABLET(S): 8.6 TABLET, FILM COATED ORAL at 21:37

## 2025-01-07 RX ADMIN — ENTACAPONE 200 MILLIGRAM(S): 200 TABLET, FILM COATED ORAL at 11:21

## 2025-01-07 RX ADMIN — GABAPENTIN 100 MILLIGRAM(S): 300 CAPSULE ORAL at 17:41

## 2025-01-07 RX ADMIN — VANCOMYCIN HYDROCHLORIDE 166.67 MILLIGRAM(S): 5 INJECTION, POWDER, LYOPHILIZED, FOR SOLUTION INTRAVENOUS at 20:30

## 2025-01-07 RX ADMIN — AMPICILLIN SODIUM AND SULBACTAM SODIUM 200 GRAM(S): 100; 50 INJECTION, POWDER, FOR SOLUTION INTRAVENOUS at 06:17

## 2025-01-07 RX ADMIN — ENTACAPONE 200 MILLIGRAM(S): 200 TABLET, FILM COATED ORAL at 17:41

## 2025-01-07 RX ADMIN — LEVODOPA AND CARBIDOPA 2 TABLET(S): 145; 36.25 CAPSULE, EXTENDED RELEASE ORAL at 17:41

## 2025-01-07 RX ADMIN — DILTIAZEM HYDROCHLORIDE 360 MILLIGRAM(S): 300 CAPSULE, COATED, EXTENDED RELEASE ORAL at 06:18

## 2025-01-07 RX ADMIN — LEVODOPA AND CARBIDOPA 2 TABLET(S): 145; 36.25 CAPSULE, EXTENDED RELEASE ORAL at 06:17

## 2025-01-07 RX ADMIN — APIXABAN 5 MILLIGRAM(S): 5 TABLET, FILM COATED ORAL at 06:17

## 2025-01-07 RX ADMIN — PANTOPRAZOLE 40 MILLIGRAM(S): 40 TABLET, DELAYED RELEASE ORAL at 06:17

## 2025-01-07 RX ADMIN — Medication 1 APPLICATION(S): at 17:40

## 2025-01-07 RX ADMIN — GABAPENTIN 100 MILLIGRAM(S): 300 CAPSULE ORAL at 06:17

## 2025-01-07 RX ADMIN — LEVODOPA AND CARBIDOPA 2 TABLET(S): 145; 36.25 CAPSULE, EXTENDED RELEASE ORAL at 11:21

## 2025-01-07 RX ADMIN — Medication 1 APPLICATION(S): at 06:18

## 2025-01-07 RX ADMIN — SODIUM CHLORIDE 75 MILLILITER(S): 9 INJECTION, SOLUTION INTRAVENOUS at 11:22

## 2025-01-07 RX ADMIN — ENTACAPONE 200 MILLIGRAM(S): 200 TABLET, FILM COATED ORAL at 06:18

## 2025-01-07 RX ADMIN — APIXABAN 5 MILLIGRAM(S): 5 TABLET, FILM COATED ORAL at 17:41

## 2025-01-07 RX ADMIN — FAMOTIDINE 20 MILLIGRAM(S): 20 TABLET, FILM COATED ORAL at 06:18

## 2025-01-07 NOTE — PROGRESS NOTE ADULT - TIME BILLING
Direct patient care. discussed on rounds with housestaff
Direct patient care. discussed with Housestaff
Direct patient care. Discussed on  rounds with housestaff, reviewing chart with consult f/u, results.
Direct patient care. Discussed on rounds with housestaff

## 2025-01-07 NOTE — PROGRESS NOTE ADULT - ASSESSMENT
89-year-old female with a past medical history Parkinson's, HLD, HTN, atrial fibrillation coming with complaints of swollen jaw.    #left Parotitis   - WBC 18 on admission, now 8  - lactate 1.4 on admission   - CT temporal and Neck soft tissues 1/1 : Findings compatible with left parotitis N  - Per ENT: no acute interventions. Warm compresses    - BCx 1/1/2025: MRSA   - ID following-> C/W Vancomycin & Unasyn       -ID stating that JOI is recommended. If not performed, pt should should be discharged on 6 weeks of IV abx via PICC      - f/u cardio  - Echo 1/3 WNL     #Inc L periorbital edema  Medical team called by RN as pt was having worsening L orbital edema. Edema is improved on exam today.  -F/u CT orbits w/wo contrast to evaluate     #hypernatremia  -likely due to dec PO intake, now 145 1/7 AM.   -Pt to be started on Ensure TID w/ calorie count given decreased PO intake since admission  -c/w D5W at 75cc/hr   -monitor BMP BID.      #multinodular thyroid gland  -CT neck and soft tissues Since 10/2019, mild increase in multinodular thyroid gland measuring up to 6 cm, previously 5 cm.   -TSH WNL   -outpt thyroid US     #Parkinson's  - C/W Sinemet 25/100 q6h  -c/w entacapone 200mg QID  -c/w britney 100mg BID      #atrial fibrillation on Eliquis   - C/W Eliquis 5mg BID   - C/W Cardizem   - Rate controlled     #anxiety  #insomnia  #dizziness  -c/w 0.25mg daily qhs   -c/w ambien 5mg qhs PRN  -c/w meclizine 25mg TID PRN for dizziness     #urinary retention   -c/w oxybutinin 5mg daily      #DVT ppx: on Eliquis 5 mg BID  #GI ppx: protonix  #Activity: ATT   #Code: Full   dispo: medical floor    89-year-old female with a past medical history Parkinson's, HLD, HTN, atrial fibrillation coming with complaints of swollen jaw.    #left Parotitis   - WBC 18 on admission, now 8  - lactate 1.4 on admission   - CT temporal and Neck soft tissues 1/1 : Findings compatible with left parotitis N  - Per ENT: no acute interventions. Warm compresses    - BCx 1/1/2025: MRSA   - ID following-> C/W Vancomycin & Unasyn       -ID stating that JOI is recommended. If not performed, pt should should be discharged on 6 weeks of IV abx via PICC      - f/u cardio  - Echo 1/3 WNL     #Inc L periorbital edema  Medical team called by RN as pt was having worsening L orbital edema. Edema is improved on exam today.  -F/u CT orbits w/wo contrast to evaluate     #hypernatremia  -likely due to dec PO intake, now 145 1/7 AM.   -c/w Ensure TID w/ calorie count given decreased PO intake since admission  -c/w D5W at 75cc/hr   -monitor BMP BID.      #multinodular thyroid gland  -CT neck and soft tissues Since 10/2019, mild increase in multinodular thyroid gland measuring up to 6 cm, previously 5 cm.   -TSH WNL   -outpt thyroid US     #Parkinson's  - C/W Sinemet 25/100 q6h  -c/w entacapone 200mg QID  -c/w britney 100mg BID      #atrial fibrillation on Eliquis   - C/W Eliquis 5mg BID   - C/W Cardizem   - Rate controlled     #anxiety  #insomnia  #dizziness  -c/w 0.25mg daily qhs   -c/w ambien 5mg qhs PRN  -c/w meclizine 25mg TID PRN for dizziness     #urinary retention   -c/w oxybutinin 5mg daily      #DVT ppx: on Eliquis 5 mg BID  #GI ppx: protonix  #Activity: ATT   #Code: Full   dispo: medical floor

## 2025-01-07 NOTE — PROGRESS NOTE ADULT - ASSESSMENT
ASSESSMENT  89y Female from [facility], with a past medical history of Parkinsons, HLD, HTN, a. fib, RA. Patient brought in from nursing home to ED for left swollen jaw, to rule out mastoiditis. Patient is not sure when this swelling started. Patient is a poor historian and the ED spoke to patients daughter on the phone who reported that she heard the patient had a questionable fall due to the swelling of her left jaw. Patient has a 3" x 3" hard mass to her left parotitis area. There is edema, erythema, and tenderness to the left face extending from the left parotitis area down to her left anterior neck. Patient has poor dentition.  Patient has very dry mucous membranes, not well-appearing. There is no evident trauma.    IMPRESSION  #MRSA bacteremia due to L parotitis  Tm 100.5  Admission WBC 18   < from: CT Orbit w/ IV Cont (01.07.25 @ 13:16) >  Mild left periorbital soft tissue swelling which may reflect cellulitis.  Partially visualized left parotitis with associated inflammatory changes.    1/4 BCX NGTD     1/3 BCX NGTD     1/1 2/4 BCX MRSA    TTE no vegetations   < from: CT Neck Soft Tissue w/ IV Cont (01.01.25 @ 16:14) >  Findings compatible with left parotitis. No drainable fluid collection.  Since 10/2019, mild increase in multinodular thyroid gland measuring up   to 6 cm, previously 5 cm. May consider nonemergent thyroid outpatient   thyroid ultrasound follow-up as clinically indicated.    Drug Dosing Weight  Height (cm): 170.2 (01 Jan 2025 15:41)  Weight (kg): 66.224 (01 Jan 2025 15:41)  BMI (kg/m2): 22.9 (01 Jan 2025 15:41)    Creatinine: 0.7 mg/dL (01.02.25 @ 06:27) 50 mL/min  Creatinine clearance for normal weight patient, using ideal body weight of 61 kg (135 lbs).      RECOMMENDATIONS  - Vanc dosing AUC/ADAM per clinical pharmacist   - Unasyn 3g q6h IV as often polymicrobial x 14 days, if D/C prior PO augmentin 875mg BID E/D 1/14   - Guidelines would recommend JOI as community acquired bacteremia , if not within GOC, plan for PICC x Vanc 6 weeks from cleared BCX E/D 2/13/25  - Appreciate ENT consult  - Poor prognosis, GOC    If any questions, please text or call on [employer] Teams  Please continue to update ID with any pertinent new clinical, laboratory or radiographic findings.

## 2025-01-07 NOTE — CONSULT NOTE ADULT - SUBJECTIVE AND OBJECTIVE BOX
Date of Admission: 01-01-25    HISTORY OF PRESENT ILLNESS:    89 female past medical history Parkinson's, HLD, HTN, atrial fibrillation coming with complaints of swollen jaw.  Patient brought in from nursing home due to the concern of swollen Left check .Unable to obtain much history from patient as he is a poor historian. All the relative history from Ed Documentation . ED  spoke with daughter on the phone who reported that she heard the patient had a questionable fall due to the swelling of her left jaw.  On my exam patient is  AO x 1- 2  and is lethargic. poor oral dentition. Significant swelling and redness extend from behind the left ear down the left side of the neck, accompanied by tenderness over the left mastoid    on vitals in Ed :BP :108 /59 mm Hg  HR :79 /min , temp :100.1 Degrees F  on RA   on Labs WBC 18.18  e Neutrophil predom   on C T temporal and Neck soft tissues :   Findings compatible with left parotiti No acute pathology of the temporal bones.  Decreased bilateral mastoid effusions, larger on right.   No drainable fluid collection.    Since 10/2019, mild increase in multinodular thyroid gland measuring up   to 6 cm, previously 5 cm.     Rcvd cefepime , vanco , solumedrol , Toradol , 2L LR stat in Ed    admitted for further workup .   (01 Jan 2025 18:39)      PAST MEDICAL & SURGICAL HISTORY  Parkinson disease    HTN (hypertension)    Rheumatoid arthritis    Hyperlipidemia    CRAO (central retinal artery occlusion)  Left    Atrial fibrillation    History of hysterectomy    Status post placement of implantable loop recorder  01/2021    S/P hernia repair        FAMILY HISTORY:  FH: hypertension (Mother)      SOCIAL HISTORY:   - Non-smoker    REVIEW OF SYMPTOMS:  CONSTITUTIONAL: No fevers or chills.  NECK: Neck pain  RESPIRATORY: Shortness of breath  CARDIOVASCULAR: No chest pain, chest pressure or palpitations  GASTROINTESTINAL: No abdominal or epigastric pain  GENITOURINARY: No dysuria, frequency or hematuria  MUSCULOSKELETAL: No joint pain, no muscle pain  NEUROLOGICAL: Weakness  SKIN: No itching or rashes    VITALS:  T(C): 36.4 (01-07-25 @ 16:20), Max: 36.8 (01-07-25 @ 08:45)  HR: 68 (01-07-25 @ 16:20) (68 - 80)  BP: 127/65 (01-07-25 @ 16:20) (124/66 - 152/90)  RR: 18 (01-07-25 @ 16:20) (18 - 19)  SpO2: 95% (01-07-25 @ 16:20) (95% - 99%)  Wt(kg): --  Daily     Daily     PHYSICAL EXAM:  GEN: Not in acute distress  HEENT: EOMI. Parotid swelling. Poor dentition   LUNGS: Dec BS   CARDIOVASCULAR: RRR  ABD: Soft  EXT: 1+ PAULO  SKIN: Warm  NEURO: AAOx2    LABS:	 	                        9.6    8.14  )-----------( 271      ( 07 Jan 2025 06:58 )             32.2     01-07    145  |  109  |  10  ----------------------------<  96  3.8   |  26  |  0.5[L]    Ca    7.8[L]      07 Jan 2025 06:58              Intake and Output:    01-06-25 @ 07:01  -  01-07-25 @ 07:00  --------------------------------------------------------  IN: 0 mL / OUT: 400 mL / NET: -400 mL        CARDIAC MARKERS:      TELEMETRY EVENTS:    ECG:    RADIOLOGY:    OTHER:    Echocardiogram:  < from: TTE Echo Complete w/o Contrast w/ Doppler (01.03.25 @ 11:33) >  Summary:   1. Left ventricular ejection fraction, by visual estimation, is 55 to   60%.   2. Normal global left ventricular systolic function.   3. Spectral Doppler shows pseudonormal pattern of left ventricular   myocardial filling (Grade II diastolic dysfunction).   4. Mildly enlarged right ventricle.   5. Moderately reduced RV systolic function.   6. Mild to moderately enlarged left atrium.   7. Mildly enlarged right atrium.   8.Degenerative mitral valve.   9. Moderate mitral valve regurgitation.  10. Thickening and calcification of the anterior and posterior mitral   valve leaflets.  11. Moderate tricuspid regurgitation.  12. Sclerotic aortic valve with normal opening.  13.Estimated pulmonary artery systolic pressure is 50.3 mmHg assuming a   right atrial pressure of 3 mmHg, which is consistent with moderate   pulmonary hypertension.      < end of copied text >    Catheterization:    Stress Test: 	    Home Medications:  acetaminophen 325 mg oral tablet: 2 tab(s) orally every 6 hours, As needed, Temp greater or equal to 38.5C (101.3F), Mild Pain (1 - 3) (30 Aug 2021 13:13)  ALPRAZolam 0.25 mg oral tablet: 1 tab(s) orally once a day (at bedtime) (01 Jan 2025 21:55)  Aspercreme with Lidocaine 4% topical film: Apply topically to affected area once a day (01 Jan 2025 21:54)  bisacodyl 5 mg oral tablet: 1 tab(s) orally once a day (01 Jan 2025 21:55)  carbidopa/levodopa/entacapone 50 mg-200 mg-200 mg oral tablet: 1 tab(s) orally 4 times a day (08 Aug 2021 18:37)  DilTIAZem (Eqv-Cardizem CD) 360 mg/24 hours oral capsule, extended release: 1 cap(s) orally once a day (01 Jan 2025 21:55)  dilTIAZem 360 mg/24 hours oral capsule, extended release: 1 cap(s) orally once a day (08 Aug 2021 18:37)  famotidine:  (08 Aug 2021 18:37)  famotidine 40 mg oral tablet: 1 tab(s) orally once a day (at bedtime) (01 Jan 2025 21:55)  gabapentin 100 mg oral capsule: 1 cap(s) orally 2 times a day (01 Jan 2025 21:54)  lactulose 20 g oral powder for reconstitution: 1 packet(s) orally once a day as needed for  constipation (01 Jan 2025 21:55)  magnesium hydroxide: 4 times a day as needed for  constipation (01 Jan 2025 21:55)  meclizine 25 mg oral tablet: 1 tab(s) orally 3 times a day, As needed, Dizziness (30 Aug 2021 13:13)  menthol: apply topically for muscle rub (01 Jan 2025 21:55)  Mycostatin 100,000 units/mL oral suspension: 4 unit(s) orally 2 times a day (01 Jan 2025 21:55)  ondansetron 8 mg oral tablet: 1 tab(s) orally 3 times a day as needed for  nausea (01 Jan 2025 21:55)  oxyBUTYnin 5 mg oral tablet: 1 tab(s) orally once a day (01 Jan 2025 21:55)  preiguard: apply topically (01 Jan 2025 21:55)  Protonix 40 mg oral delayed release tablet: 1 tab(s) orally once a day (08 Aug 2021 18:37)  Reglan 5 mg oral tablet: 1 tab(s) orally 3 times a day as needed for  nausea (01 Jan 2025 21:55)  senna (sennosides) 17 mg oral tablet: 2 tab(s) orally once a day (at bedtime) (01 Jan 2025 21:55)  sucralfate: 3 times a day (01 Jan 2025 21:54)  zaleplon 10 mg oral capsule: 1 cap(s) orally once a day (at bedtime) (01 Jan 2025 21:55)    MEDICATIONS  (STANDING):  ALPRAZolam 0.25 milliGRAM(s) Oral at bedtime  ampicillin/sulbactam  IVPB 3 Gram(s) IV Intermittent every 6 hours  apixaban 5 milliGRAM(s) Oral every 12 hours  carbidopa/levodopa  25/100 2 Tablet(s) Oral four times a day  dextrose 5%. 1000 milliLiter(s) (75 mL/Hr) IV Continuous <Continuous>  diltiazem    milliGRAM(s) Oral daily  entacapone 200 milliGRAM(s) Oral four times a day  famotidine    Tablet 20 milliGRAM(s) Oral two times a day  gabapentin 100 milliGRAM(s) Oral two times a day  lidocaine   4% Patch 1 Patch Transdermal every 24 hours  mupirocin 2% Nasal 1 Application(s) Both Nostrils two times a day  oxybutynin 5 milliGRAM(s) Oral daily  pantoprazole    Tablet 40 milliGRAM(s) Oral before breakfast  senna 2 Tablet(s) Oral at bedtime  vancomycin  IVPB 1250 milliGRAM(s) IV Intermittent every 24 hours    MEDICATIONS  (PRN):  acetaminophen     Tablet .. 650 milliGRAM(s) Oral every 6 hours PRN Mild Pain (1 - 3), Moderate Pain (4 - 6), Severe Pain (7 - 10)  bisacodyl 5 milliGRAM(s) Oral every 12 hours PRN Constipation  meclizine 25 milliGRAM(s) Oral three times a day PRN Dizziness  zolpidem 5 milliGRAM(s) Oral at bedtime PRN Insomnia         Date of Admission: 01-01-25    HISTORY OF PRESENT ILLNESS:    89 female past medical history Parkinson's, HLD, HTN, atrial fibrillation coming with complaints of swollen jaw.  Patient brought in from nursing home due to the concern of swollen Left check .Unable to obtain much history from patient as he is a poor historian. All the relative history from Ed Documentation . ED  spoke with daughter on the phone who reported that she heard the patient had a questionable fall due to the swelling of her left jaw.  On my exam patient is  AO x 1- 2  and is lethargic. poor oral dentition. Significant swelling and redness extend from behind the left ear down the left side of the neck, accompanied by tenderness over the left mastoid    on vitals in Ed :BP :108 /59 mm Hg  HR :79 /min , temp :100.1 Degrees F  on RA   on Labs WBC 18.18  e Neutrophil predom   on C T temporal and Neck soft tissues :   Findings compatible with left parotiti No acute pathology of the temporal bones.  Decreased bilateral mastoid effusions, larger on right.   No drainable fluid collection.    Since 10/2019, mild increase in multinodular thyroid gland measuring up   to 6 cm, previously 5 cm.     Rcvd cefepime , vanco , solumedrol , Toradol , 2L LR stat in Ed    admitted for further workup .   (01 Jan 2025 18:39)      PAST MEDICAL & SURGICAL HISTORY  Parkinson disease    HTN (hypertension)    Rheumatoid arthritis    Hyperlipidemia    CRAO (central retinal artery occlusion)  Left    Atrial fibrillation    History of hysterectomy    Status post placement of implantable loop recorder  01/2021    S/P hernia repair      FH: hypertension (Mother)      SOCIAL HISTORY:   Non-smoker      VITALS:  T(C): 36.4 (01-07-25 @ 16:20), Max: 36.8 (01-07-25 @ 08:45)  HR: 68 (01-07-25 @ 16:20) (68 - 80)  BP: 127/65 (01-07-25 @ 16:20) (124/66 - 152/90)  RR: 18 (01-07-25 @ 16:20) (18 - 19)  SpO2: 95% (01-07-25 @ 16:20) (95% - 99%)      PHYSICAL EXAM:  GEN: Not in acute distress  HEENT: EOMI. Parotid swelling. Poor dentition .  LUNGS: Dec BS   CARDIOVASCULAR: RRR  ABD: Soft  EXT: 1+ PAULO  SKIN: Warm  NEURO: AAOx2      LABS:	 	                        9.6    8.14  )-----------( 271      ( 07 Jan 2025 06:58 )             32.2     01-07    145  |  109  |  10  ----------------------------<  96  3.8   |  26  |  0.5[L]    Ca    7.8[L]      07 Jan 2025 06:58        Intake and Output:    01-06-25 @ 07:01  -  01-07-25 @ 07:00  --------------------------------------------------------  IN: 0 mL / OUT: 400 mL / NET: -400 mL          < from: TTE Echo Complete w/o Contrast w/ Doppler (01.03.25 @ 11:33) >  Summary:   1. Left ventricular ejection fraction, by visual estimation, is 55 to   60%.   2. Normal global left ventricular systolic function.   3. Spectral Doppler shows pseudonormal pattern of left ventricular   myocardial filling (Grade II diastolic dysfunction).   4. Mildly enlarged right ventricle.   5. Moderately reduced RV systolic function.   6. Mild to moderately enlarged left atrium.   7. Mildly enlarged right atrium.   8.Degenerative mitral valve.   9. Moderate mitral valve regurgitation.  10. Thickening and calcification of the anterior and posterior mitral   valve leaflets.  11. Moderate tricuspid regurgitation.  12. Sclerotic aortic valve with normal opening.  13.Estimated pulmonary artery systolic pressure is 50.3 mmHg assuming a   right atrial pressure of 3 mmHg, which is consistent with moderate   pulmonary hypertension.    < end of copied text >        Home Medications:  acetaminophen 325 mg oral tablet: 2 tab(s) orally every 6 hours, As needed, Temp greater or equal to 38.5C (101.3F), Mild Pain (1 - 3) (30 Aug 2021 13:13)  ALPRAZolam 0.25 mg oral tablet: 1 tab(s) orally once a day (at bedtime) (01 Jan 2025 21:55)  Aspercreme with Lidocaine 4% topical film: Apply topically to affected area once a day (01 Jan 2025 21:54)  bisacodyl 5 mg oral tablet: 1 tab(s) orally once a day (01 Jan 2025 21:55)  carbidopa/levodopa/entacapone 50 mg-200 mg-200 mg oral tablet: 1 tab(s) orally 4 times a day (08 Aug 2021 18:37)  DilTIAZem (Eqv-Cardizem CD) 360 mg/24 hours oral capsule, extended release: 1 cap(s) orally once a day (01 Jan 2025 21:55)  dilTIAZem 360 mg/24 hours oral capsule, extended release: 1 cap(s) orally once a day (08 Aug 2021 18:37)  famotidine:  (08 Aug 2021 18:37)  famotidine 40 mg oral tablet: 1 tab(s) orally once a day (at bedtime) (01 Jan 2025 21:55)  gabapentin 100 mg oral capsule: 1 cap(s) orally 2 times a day (01 Jan 2025 21:54)  lactulose 20 g oral powder for reconstitution: 1 packet(s) orally once a day as needed for  constipation (01 Jan 2025 21:55)  magnesium hydroxide: 4 times a day as needed for  constipation (01 Jan 2025 21:55)  meclizine 25 mg oral tablet: 1 tab(s) orally 3 times a day, As needed, Dizziness (30 Aug 2021 13:13)  menthol: apply topically for muscle rub (01 Jan 2025 21:55)  Mycostatin 100,000 units/mL oral suspension: 4 unit(s) orally 2 times a day (01 Jan 2025 21:55)  ondansetron 8 mg oral tablet: 1 tab(s) orally 3 times a day as needed for  nausea (01 Jan 2025 21:55)  oxyBUTYnin 5 mg oral tablet: 1 tab(s) orally once a day (01 Jan 2025 21:55)  preiguard: apply topically (01 Jan 2025 21:55)  Protonix 40 mg oral delayed release tablet: 1 tab(s) orally once a day (08 Aug 2021 18:37)  Reglan 5 mg oral tablet: 1 tab(s) orally 3 times a day as needed for  nausea (01 Jan 2025 21:55)  senna (sennosides) 17 mg oral tablet: 2 tab(s) orally once a day (at bedtime) (01 Jan 2025 21:55)  sucralfate: 3 times a day (01 Jan 2025 21:54)  zaleplon 10 mg oral capsule: 1 cap(s) orally once a day (at bedtime) (01 Jan 2025 21:55)    MEDICATIONS  (STANDING):  ALPRAZolam 0.25 milliGRAM(s) Oral at bedtime  ampicillin/sulbactam  IVPB 3 Gram(s) IV Intermittent every 6 hours  apixaban 5 milliGRAM(s) Oral every 12 hours  carbidopa/levodopa  25/100 2 Tablet(s) Oral four times a day  dextrose 5%. 1000 milliLiter(s) (75 mL/Hr) IV Continuous <Continuous>  diltiazem    milliGRAM(s) Oral daily  entacapone 200 milliGRAM(s) Oral four times a day  famotidine    Tablet 20 milliGRAM(s) Oral two times a day  gabapentin 100 milliGRAM(s) Oral two times a day  lidocaine   4% Patch 1 Patch Transdermal every 24 hours  mupirocin 2% Nasal 1 Application(s) Both Nostrils two times a day  oxybutynin 5 milliGRAM(s) Oral daily  pantoprazole    Tablet 40 milliGRAM(s) Oral before breakfast  senna 2 Tablet(s) Oral at bedtime  vancomycin  IVPB 1250 milliGRAM(s) IV Intermittent every 24 hours    MEDICATIONS  (PRN):  acetaminophen     Tablet .. 650 milliGRAM(s) Oral every 6 hours PRN Mild Pain (1 - 3), Moderate Pain (4 - 6), Severe Pain (7 - 10)  bisacodyl 5 milliGRAM(s) Oral every 12 hours PRN Constipation  meclizine 25 milliGRAM(s) Oral three times a day PRN Dizziness  zolpidem 5 milliGRAM(s) Oral at bedtime PRN Insomnia

## 2025-01-07 NOTE — PROGRESS NOTE ADULT - SUBJECTIVE AND OBJECTIVE BOX
Patient is a 89y old  Female who presents with a chief complaint of left mastoid swelling (04 Jan 2025 10:29)    Patient was seen and examined.  As per staff decrease oral intake    PAST MEDICAL & SURGICAL HISTORY:  Parkinson disease  HTN (hypertension)  Rheumatoid arthritis  Hyperlipidemia  CRAO (central retinal artery occlusion) Left  Atrial fibrillation  History of hysterectomy  Status post placement of implantable loop recorder 01/2021  S/P hernia repair    Allergies  No Known Allergies    MEDICATIONS  (STANDING):  ALPRAZolam 0.25 milliGRAM(s) Oral at bedtime  ampicillin/sulbactam  IVPB 3 Gram(s) IV Intermittent every 6 hours  apixaban 5 milliGRAM(s) Oral every 12 hours  carbidopa/levodopa  25/100 2 Tablet(s) Oral four times a day  dextrose 5%. 1000 milliLiter(s) (75 mL/Hr) IV Continuous <Continuous>  diltiazem    milliGRAM(s) Oral daily  entacapone 200 milliGRAM(s) Oral four times a day  famotidine    Tablet 20 milliGRAM(s) Oral two times a day  gabapentin 100 milliGRAM(s) Oral two times a day  lidocaine   4% Patch 1 Patch Transdermal every 24 hours  mupirocin 2% Nasal 1 Application(s) Both Nostrils two times a day  oxybutynin 5 milliGRAM(s) Oral daily  pantoprazole    Tablet 40 milliGRAM(s) Oral before breakfast  senna 2 Tablet(s) Oral at bedtime  vancomycin  IVPB 1250 milliGRAM(s) IV Intermittent every 24 hours    MEDICATIONS  (PRN):  acetaminophen     Tablet .. 650 milliGRAM(s) Oral every 6 hours PRN Mild Pain (1 - 3), Moderate Pain (4 - 6), Severe Pain (7 - 10)  bisacodyl 5 milliGRAM(s) Oral every 12 hours PRN Constipation  meclizine 25 milliGRAM(s) Oral three times a day PRN Dizziness  zolpidem 5 milliGRAM(s) Oral at bedtime PRN Insomnia    T(C): 36.8 (01-07-25 @ 08:45), Max: 36.8 (01-07-25 @ 08:45)  HR: 78 (01-07-25 @ 08:45) (67 - 80)  BP: 152/90 (01-07-25 @ 08:45) (116/58 - 152/90)  RR: 18 (01-07-25 @ 08:45) (18 - 19)  SpO2: 99% (01-07-25 @ 08:45) (98% - 99%)    O/E:  Awake not in distress.  HEENT: atraumatic, EOMI.left mandibular swelling, +, erythema+  Chest: clear.  CVS: SIS2 +, systolic  murmur+  P/A: Soft, BS+  CNS: awake  Ext: no edema feet.  All systems reviewed positive findings as above.                                       9.6[L]  8.14  )-----------( 271      ( 07 Jan 2025 06:58 )             32.2[L]  01-07    145  |  109  |  10  ----------------------------<  96  3.8   |  26  |  0.5[L]  01-06    148[H]  |  112[H]  |  13  ----------------------------<  88  3.8   |  25  |  0.5[L]    Ca    7.8[L]      07 Jan 2025 06:58  Ca    7.9[L]      06 Jan 2025 07:01

## 2025-01-07 NOTE — PROGRESS NOTE ADULT - ASSESSMENT
89 female past medical history Parkinson's, HLD, HTN, atrial fibrillation coming with complaints of swollen jaw.  Patient brought in from nursing home due to the concern of swollen     # MRSA Bacteremia  # Left Parotitis   # left periorbital sweling  -  CT Temporal Bones w/ IV Cont (01.01.25 @ 16:14) >Findings compatible with left parotitis   - BCx 1/1/2025: MRSA  - blood Culture Results: No growth at 24 hours (01.03.25 @ 17:32)  - MRSA PCR Result.: Positive: By: Real-Time PCR (Polymerase Reaction Method) (01.05.25 @ 07:10)  - Sedimentation Rate, Erythrocyte: 40 mm/Hr (01.04.25 @ 11:28)  - C-Reactive Protein: 57.5 mg/L (01.04.25 @ 11:28)  -  TTE Echo Complete w/o Contrast w/ Doppler (01.03.25 @ 11:33) >Left ventricular ejection fraction, by visual estimation, is 55 to 60%. (Grade II diastolic dysfunction).Thickening and calcification of the anterior and posterior mitral valve leaflets. Moderate tricuspid regurgitation. moderate pulmonary hypertension.  - ID eval: c/w vancomycin , unasyn  - evaluated by ENT  - mupirocin to b/l nares for 5 days  - CT orbit  - warm compresses  - cardiology eval for JOI    # Hypernatremia sec to decrease oral intake- resolved  # Hypokalemia- resolved  - c/w  D5w  - calorie count  - ensure pudding and enlive  - monitor BMP    # Multinodular thyroid gland  -CT neck and soft tissues Since 10/2019, mild increase in multinodular thyroid gland measuring up to 6 cm, previously 5 cm.   - Thyroid Stimulating Hormone, Serum: 0.55 uIU/mL (01.02.25 @ 06:27)  - outpt thyroid US     # Paroxysmal afib, now in SR  - c/w cardizem, eliquis    # Parkinson's disease   - c/w sinemet, entacapone    # Full code    # Pending: monitor BMP, CT orbit, monitor BP, calorie count, cardiology eval fot JOI  # Disposition: [facility]

## 2025-01-07 NOTE — PROGRESS NOTE ADULT - SUBJECTIVE AND OBJECTIVE BOX
24H events:    Patient is a 89y old Female who presents with a chief complaint of left mastoid swelling (06 Jan 2025 15:32)    Primary diagnosis of L Parotitis.    Today is hospital day 6d. This morning patient was seen and examined at bedside, resting comfortably in bed. CT orbit was attempted 1/6 PM, but IV did not draw blood. Midline was placed overnight, and pt will be taken for CT 1/7. Pt is NPO since midnight for possible JOI.    Code Status:    PAST MEDICAL & SURGICAL HISTORY  Parkinson disease    HTN (hypertension)    Rheumatoid arthritis    Hyperlipidemia    CRAO (central retinal artery occlusion)  Left    Atrial fibrillation    History of hysterectomy    Status post placement of implantable loop recorder  01/2021    S/P hernia repair      SOCIAL HISTORY:  Social History:      ALLERGIES:  No Known Allergies    MEDICATIONS:  STANDING MEDICATIONS  ALPRAZolam 0.25 milliGRAM(s) Oral at bedtime  ampicillin/sulbactam  IVPB 3 Gram(s) IV Intermittent every 6 hours  apixaban 5 milliGRAM(s) Oral every 12 hours  carbidopa/levodopa  25/100 2 Tablet(s) Oral four times a day  dextrose 5%. 1000 milliLiter(s) IV Continuous <Continuous>  diltiazem    milliGRAM(s) Oral daily  entacapone 200 milliGRAM(s) Oral four times a day  famotidine    Tablet 20 milliGRAM(s) Oral two times a day  gabapentin 100 milliGRAM(s) Oral two times a day  lidocaine   4% Patch 1 Patch Transdermal every 24 hours  mupirocin 2% Nasal 1 Application(s) Both Nostrils two times a day  oxybutynin 5 milliGRAM(s) Oral daily  pantoprazole    Tablet 40 milliGRAM(s) Oral before breakfast  senna 2 Tablet(s) Oral at bedtime  vancomycin  IVPB 1250 milliGRAM(s) IV Intermittent every 24 hours    PRN MEDICATIONS  acetaminophen     Tablet .. 650 milliGRAM(s) Oral every 6 hours PRN  bisacodyl 5 milliGRAM(s) Oral every 12 hours PRN  meclizine 25 milliGRAM(s) Oral three times a day PRN  zolpidem 5 milliGRAM(s) Oral at bedtime PRN    VITALS:   T(F): 98.1  HR: 80  BP: 135/71  RR: 19  SpO2: 99%    PHYSICAL EXAM:  GENERAL: NAD, lying in bed comfortably  HEAD: atraumatic, normocephalic  NECK: supple  HEART: regular rhythm  LUNGS: unlabored respirations, b/l air entry   ABDOMEN: BS (+), soft, nontender, nondistended  NERVOUS SYSTEM: A&O x 3      LABS:                        9.6    8.14  )-----------( 271      ( 07 Jan 2025 06:58 )             32.2     01-07    145  |  109  |  10  ----------------------------<  96  3.8   |  26  |  0.5[L]    Ca    7.8[L]      07 Jan 2025 06:58    Sodium: 145 mmol/L (01.07.25 @ 06:58)   Sodium: 148 mmol/L (01.06.25 @ 07:01)   Sodium: 149 mmol/L (01.05.25 @ 06:17)     Potassium: 3.8 mmol/L (01.06.25 @ 07:01)   Potassium: 3.3 mmol/L (01.05.25 @ 06:17)     Urinalysis Basic - ( 07 Jan 2025 06:58 )    Color: x / Appearance: x / SG: x / pH: x  Gluc: 96 mg/dL / Ketone: x  / Bili: x / Urobili: x   Blood: x / Protein: x / Nitrite: x   Leuk Esterase: x / RBC: x / WBC x   Sq Epi: x / Non Sq Epi: x / Bacteria: x      Culture - Blood (collected 04 Jan 2025 23:06)  Source: .Blood BLOOD  Preliminary Report (07 Jan 2025 01:02):    No growth at 48 Hours    Culture - Blood (collected 04 Jan 2025 11:28)  Source: .Blood BLOOD  Preliminary Report (07 Jan 2025 01:02):    No growth at 48 Hours    MRSA PCR Result.: Positive: By: Real-Time PCR (Polymerase Reaction Method) (01.05.25 @ 07:10)     Hemoglobin: 9.6 g/dL (01.07.25 @ 06:58)   Hemoglobin: 10.0 g/dL (01.05.25 @ 06:17)   Hemoglobin: 10.4 g/dL (01.03.25 @ 06:16)   Hemoglobin: 12.3 g/dL (01.02.25 @ 06:27)   Hemoglobin: 12.9 g/dL (01.01.25 @ 15:52)       RADIOLOGY:    CT NECK SOFT TISSUE IC 1/1/25  IMPRESSION:  Findings compatible with left parotitis. No drainable fluid collection.    Since 10/2019, mild increase in multinodular thyroid gland measuring up   to 6 cm, previously 5 cm. May consider nonemergent thyroid outpatient   thyroid ultrasound follow-up as clinically indicated.    CT TEMPORAL BONES IC 1/1/25  IMPRESSION:    Findings compatible with left parotitis (please see CT soft tissue neck   for further evaluation).  No acute pathology of the temporal bones.  Decreased bilateral mastoid effusions, larger on right.    ECHO TTE WO CON COMP W DOPP 1/3/25    Left ventricular ejection fraction, by visual estimation, is 55 to 60%. Spectral Doppler shows pseudonormal pattern of left ventricular myocardial filling (Grade II diastolic dysfunction). Thickening and calcification of the anterior and posterior mitral valve leaflets.   Moderate mitral valve regurgitation is seen.  Moderate tricuspid regurgitation is visualized. Estimated pulmonary artery systolic pressure is 50.3 mmHg assuming a right atrial pressure of 3 mmHg, which is consistent with moderate pulmonary hypertension.                       24H events:    Patient is a 89y old Female who presents with a chief complaint of left mastoid swelling (06 Jan 2025 15:32)    Primary diagnosis of L Parotitis.    Today is hospital day 6d. This morning patient was seen and examined at bedside, resting comfortably in bed. CT orbit was attempted 1/6 PM, but IV did not draw blood. Midline was placed overnight, and pt will be taken for CT 1/7. Pt is NPO since midnight for possible JOI.    Code Status:    PAST MEDICAL & SURGICAL HISTORY  Parkinson disease    HTN (hypertension)    Rheumatoid arthritis    Hyperlipidemia    CRAO (central retinal artery occlusion)  Left    Atrial fibrillation    History of hysterectomy    Status post placement of implantable loop recorder  01/2021    S/P hernia repair      SOCIAL HISTORY:  Social History:      ALLERGIES:  No Known Allergies    MEDICATIONS:  STANDING MEDICATIONS  ALPRAZolam 0.25 milliGRAM(s) Oral at bedtime  ampicillin/sulbactam  IVPB 3 Gram(s) IV Intermittent every 6 hours  apixaban 5 milliGRAM(s) Oral every 12 hours  carbidopa/levodopa  25/100 2 Tablet(s) Oral four times a day  dextrose 5%. 1000 milliLiter(s) IV Continuous <Continuous>  diltiazem    milliGRAM(s) Oral daily  entacapone 200 milliGRAM(s) Oral four times a day  famotidine    Tablet 20 milliGRAM(s) Oral two times a day  gabapentin 100 milliGRAM(s) Oral two times a day  lidocaine   4% Patch 1 Patch Transdermal every 24 hours  mupirocin 2% Nasal 1 Application(s) Both Nostrils two times a day  oxybutynin 5 milliGRAM(s) Oral daily  pantoprazole    Tablet 40 milliGRAM(s) Oral before breakfast  senna 2 Tablet(s) Oral at bedtime  vancomycin  IVPB 1250 milliGRAM(s) IV Intermittent every 24 hours    PRN MEDICATIONS  acetaminophen     Tablet .. 650 milliGRAM(s) Oral every 6 hours PRN  bisacodyl 5 milliGRAM(s) Oral every 12 hours PRN  meclizine 25 milliGRAM(s) Oral three times a day PRN  zolpidem 5 milliGRAM(s) Oral at bedtime PRN    VITALS:   T(F): 98.1  HR: 80  BP: 135/71  RR: 19  SpO2: 99%    PHYSICAL EXAM:  GENERAL: NAD, lying in bed comfortably  HEENT: atraumatic, normocephalic. mild erythema & left mandibular swelling  HEART: regular rhythm  LUNGS: unlabored respirations, b/l air entry   ABDOMEN: BS (+), soft, nontender, nondistended  NERURO: awake      LABS:                        9.6    8.14  )-----------( 271      ( 07 Jan 2025 06:58 )             32.2     01-07    145  |  109  |  10  ----------------------------<  96  3.8   |  26  |  0.5[L]    Ca    7.8[L]      07 Jan 2025 06:58    Sodium: 145 mmol/L (01.07.25 @ 06:58)   Sodium: 148 mmol/L (01.06.25 @ 07:01)   Sodium: 149 mmol/L (01.05.25 @ 06:17)     Potassium: 3.8 mmol/L (01.06.25 @ 07:01)   Potassium: 3.3 mmol/L (01.05.25 @ 06:17)     Urinalysis Basic - ( 07 Jan 2025 06:58 )    Color: x / Appearance: x / SG: x / pH: x  Gluc: 96 mg/dL / Ketone: x  / Bili: x / Urobili: x   Blood: x / Protein: x / Nitrite: x   Leuk Esterase: x / RBC: x / WBC x   Sq Epi: x / Non Sq Epi: x / Bacteria: x      Culture - Blood (collected 04 Jan 2025 23:06)  Source: .Blood BLOOD  Preliminary Report (07 Jan 2025 01:02):    No growth at 48 Hours    Culture - Blood (collected 04 Jan 2025 11:28)  Source: .Blood BLOOD  Preliminary Report (07 Jan 2025 01:02):    No growth at 48 Hours    MRSA PCR Result.: Positive: By: Real-Time PCR (Polymerase Reaction Method) (01.05.25 @ 07:10)     Hemoglobin: 9.6 g/dL (01.07.25 @ 06:58)   Hemoglobin: 10.0 g/dL (01.05.25 @ 06:17)   Hemoglobin: 10.4 g/dL (01.03.25 @ 06:16)   Hemoglobin: 12.3 g/dL (01.02.25 @ 06:27)   Hemoglobin: 12.9 g/dL (01.01.25 @ 15:52)       RADIOLOGY:    CT NECK SOFT TISSUE IC 1/1/25  IMPRESSION:  Findings compatible with left parotitis. No drainable fluid collection.    Since 10/2019, mild increase in multinodular thyroid gland measuring up   to 6 cm, previously 5 cm. May consider nonemergent thyroid outpatient   thyroid ultrasound follow-up as clinically indicated.    CT TEMPORAL BONES IC 1/1/25  IMPRESSION:    Findings compatible with left parotitis (please see CT soft tissue neck   for further evaluation).  No acute pathology of the temporal bones.  Decreased bilateral mastoid effusions, larger on right.    ECHO TTE WO CON COMP W DOPP 1/3/25    Left ventricular ejection fraction, by visual estimation, is 55 to 60%. Spectral Doppler shows pseudonormal pattern of left ventricular myocardial filling (Grade II diastolic dysfunction). Thickening and calcification of the anterior and posterior mitral valve leaflets.   Moderate mitral valve regurgitation is seen.  Moderate tricuspid regurgitation is visualized. Estimated pulmonary artery systolic pressure is 50.3 mmHg assuming a right atrial pressure of 3 mmHg, which is consistent with moderate pulmonary hypertension.

## 2025-01-07 NOTE — PROGRESS NOTE ADULT - SUBJECTIVE AND OBJECTIVE BOX
TAVO GARCÍAE  89y, Female  Allergy: No Known Allergies      LOS  6d    CHIEF COMPLAINT: left mastoid swelling (07 Jan 2025 12:47)      INTERVAL EVENTS/HPI  - T(F): , Max: 98.2 (01-07-25 @ 08:45)  - WBC Count: 8.14 (01-07-25 @ 06:58)  WBC Count: 8.01 (01-05-25 @ 06:17)     - Creatinine: 0.5 (01-07-25 @ 06:58)  Creatinine: 0.5 (01-06-25 @ 07:01)     -   -   -     ROS  cannot obtain secondary to patient's sedation and/or mental status    VITALS:  T(F): 97.6, Max: 98.2 (01-07-25 @ 08:45)  HR: 68  BP: 127/65  RR: 18Vital Signs Last 24 Hrs  T(C): 36.4 (07 Jan 2025 16:20), Max: 36.8 (07 Jan 2025 08:45)  T(F): 97.6 (07 Jan 2025 16:20), Max: 98.2 (07 Jan 2025 08:45)  HR: 68 (07 Jan 2025 16:20) (68 - 80)  BP: 127/65 (07 Jan 2025 16:20) (124/66 - 152/90)  BP(mean): 86 (06 Jan 2025 23:20) (86 - 86)  RR: 18 (07 Jan 2025 16:20) (18 - 19)  SpO2: 95% (07 Jan 2025 16:20) (95% - 99%)    Parameters below as of 07 Jan 2025 16:20  Patient On (Oxygen Delivery Method): nasal cannula  O2 Flow (L/min): 2      PHYSICAL EXAM:  Gen: chronically ill appearing   HEENT: Normocephalic, atraumatic  Neck: supple, no lymphadenopathy, decreased edema erythema, L facial edema  CV: Regular rate & regular rhythm  Lungs: decreased BS at bases, no fremitus  Abdomen: Soft, BS present  Ext: Warm, well perfused  Neuro: non focal, not following commands  Skin: no rash, no erythema  Lines: no phlebitis     FH: Non-contributory  Social Hx: Non-contributory    TESTS & MEASUREMENTS:                        9.6    8.14  )-----------( 271      ( 07 Jan 2025 06:58 )             32.2     01-07    145  |  109  |  10  ----------------------------<  96  3.8   |  26  |  0.5[L]    Ca    7.8[L]      07 Jan 2025 06:58          Urinalysis Basic - ( 07 Jan 2025 06:58 )    Color: x / Appearance: x / SG: x / pH: x  Gluc: 96 mg/dL / Ketone: x  / Bili: x / Urobili: x   Blood: x / Protein: x / Nitrite: x   Leuk Esterase: x / RBC: x / WBC x   Sq Epi: x / Non Sq Epi: x / Bacteria: x        Culture - Blood (collected 01-04-25 @ 23:06)  Source: .Blood BLOOD  Preliminary Report (01-07-25 @ 01:02):    No growth at 48 Hours    Culture - Blood (collected 01-04-25 @ 11:28)  Source: .Blood BLOOD  Preliminary Report (01-07-25 @ 01:02):    No growth at 48 Hours    Culture - Blood (collected 01-03-25 @ 17:32)  Source: .Blood BLOOD  Preliminary Report (01-07-25 @ 01:01):    No growth at 72 Hours    Culture - Blood (collected 01-03-25 @ 17:32)  Source: .Blood BLOOD  Preliminary Report (01-07-25 @ 01:01):    No growth at 72 Hours    Culture - Blood (collected 01-01-25 @ 15:52)  Source: .Blood BLOOD  Gram Stain (01-03-25 @ 00:12):    Growth in aerobic bottle: Gram Positive Cocci in Clusters    Growth in anaerobic bottle: Gram Positive Cocci in Clusters  Final Report (01-04-25 @ 07:37):    Growth in aerobic and anaerobic bottles: Methicillin Resistant    Staphylococcus aureus    Direct identification is available within approximately 3-5    hours either by Blood Panel Multiplexed PCR or Direct    MALDI-TOF. Details: [email or web address]  Organism: Blood Culture PCR  Methicillin resistant Staphylococcus aureus (01-04-25 @ 07:37)  Organism: Methicillin resistant Staphylococcus aureus (01-04-25 @ 07:37)      Method Type: ADAM      -  Clindamycin: S <=0.25      -  Daptomycin: S <=0.5      -  Erythromycin: S <=0.25      -  Gentamicin: S <=4 Should not be used as monotherapy      -  Linezolid: S 2      -  Oxacillin: R >2      -  Penicillin: R 1      -  Rifampin: S <=1 Should not be used as monotherapy      -  Tetracycline: S <=4      -  Trimethoprim/Sulfamethoxazole: S <=0.5/9.5      -  Vancomycin: S 0.5  Organism: Blood Culture PCR (01-04-25 @ 07:37)      Method Type: PCR      -  Methicillin resistant Staphylococcus aureus (MRSA): Detec    Culture - Blood (collected 01-01-25 @ 15:52)  Source: .Blood BLOOD  Gram Stain (01-04-25 @ 07:27):    Growth in anaerobic bottle: Gram Positive Cocci in Clusters  Final Report (01-05-25 @ 15:42):    Growth in anaerobic bottle: Methicillin Resistant Staphylococcus aureus    See previous culture [id number]            INFECTIOUS DISEASES TESTING  MRSA PCR Result.: Positive (01-05-25 @ 07:10)      INFLAMMATORY MARKERS  Sedimentation Rate, Erythrocyte: 40 mm/Hr (01-04-25 @ 11:28)  C-Reactive Protein: 57.5 mg/L (01-04-25 @ 11:28)      RADIOLOGY & ADDITIONAL TESTS:  I have personally reviewed the last available Chest xray  CXR      CT      CARDIOLOGY TESTING  12 Lead ECG:   Ventricular Rate 75 BPM    Atrial Rate 129 BPM    QRS Duration 80 ms    Q-T Interval 352 ms    QTC Calculation(Bazett) 393 ms    R Axis 6 degrees    T Axis 1 degrees    Diagnosis Line Sinus rhythm with frequent Premature atrial complexes  Inferior infarct , age undetermined  Cannot rule out Anterior infarct , age undetermined  Abnormal ECG    Confirmed by Cruz Tinajero ([id number]) on 1/2/2025 12:42:03 PM (01-02-25 @ 09:47)  12 Lead ECG:   Ventricular Rate 81 BPM    Atrial Rate 81 BPM    P-R Interval 160 ms    QRS Duration 72 ms    Q-T Interval 364 ms    QTC Calculation(Bazett) 422 ms    P Axis 37 degrees    R Axis 1 degrees    T Axis 15 degrees    Diagnosis Line Normal sinus rhythm  Inferior infarct , age undetermined  Abnormal ECG    Confirmed by Cruz Tinajero ([id number]) on 1/2/2025 7:45:56 AM (01-01-25 @ 15:24)      MEDICATIONS  ALPRAZolam 0.25  ampicillin/sulbactam  IVPB 3  apixaban 5  carbidopa/levodopa  25/100 2  dextrose 5%. 1000  diltiazem     entacapone 200  famotidine    Tablet 20  gabapentin 100  lidocaine   4% Patch 1  mupirocin 2% Nasal 1  oxybutynin 5  pantoprazole    Tablet 40  senna 2  vancomycin  IVPB 1250      WEIGHT  Weight (kg): 66.224 (01-01-25 @ 15:41)  Creatinine: 0.5 mg/dL (01-07-25 @ 06:58)      ANTIBIOTICS:  ampicillin/sulbactam  IVPB 3 Gram(s) IV Intermittent every 6 hours  vancomycin  IVPB 1250 milliGRAM(s) IV Intermittent every 24 hours      All available historical records have been reviewed

## 2025-01-08 LAB
ANION GAP SERPL CALC-SCNC: 9 MMOL/L — SIGNIFICANT CHANGE UP (ref 7–14)
BUN SERPL-MCNC: 7 MG/DL — LOW (ref 10–20)
CALCIUM SERPL-MCNC: 7.6 MG/DL — LOW (ref 8.4–10.5)
CHLORIDE SERPL-SCNC: 104 MMOL/L — SIGNIFICANT CHANGE UP (ref 98–110)
CO2 SERPL-SCNC: 25 MMOL/L — SIGNIFICANT CHANGE UP (ref 17–32)
CREAT SERPL-MCNC: 0.5 MG/DL — LOW (ref 0.7–1.5)
EGFR: 88 ML/MIN/1.73M2 — SIGNIFICANT CHANGE UP
GLUCOSE BLDC GLUCOMTR-MCNC: 111 MG/DL — HIGH (ref 70–99)
GLUCOSE SERPL-MCNC: 100 MG/DL — HIGH (ref 70–99)
HCT VFR BLD CALC: 28.4 % — LOW (ref 37–47)
HCT VFR BLD CALC: 29.8 % — LOW (ref 37–47)
HGB BLD-MCNC: 8.6 G/DL — LOW (ref 12–16)
HGB BLD-MCNC: 9 G/DL — LOW (ref 12–16)
IRON SATN MFR SERPL: 10 % — LOW (ref 15–50)
IRON SATN MFR SERPL: 12 UG/DL — LOW (ref 35–150)
MCHC RBC-ENTMCNC: 26.9 PG — LOW (ref 27–31)
MCHC RBC-ENTMCNC: 27 PG — SIGNIFICANT CHANGE UP (ref 27–31)
MCHC RBC-ENTMCNC: 30.2 G/DL — LOW (ref 32–37)
MCHC RBC-ENTMCNC: 30.3 G/DL — LOW (ref 32–37)
MCV RBC AUTO: 89.2 FL — SIGNIFICANT CHANGE UP (ref 81–99)
MCV RBC AUTO: 89.3 FL — SIGNIFICANT CHANGE UP (ref 81–99)
NRBC # BLD: 0 /100 WBCS — SIGNIFICANT CHANGE UP (ref 0–0)
NRBC # BLD: 0 /100 WBCS — SIGNIFICANT CHANGE UP (ref 0–0)
PLATELET # BLD AUTO: 170 K/UL — SIGNIFICANT CHANGE UP (ref 130–400)
PLATELET # BLD AUTO: 260 K/UL — SIGNIFICANT CHANGE UP (ref 130–400)
PMV BLD: 10.4 FL — SIGNIFICANT CHANGE UP (ref 7.4–10.4)
PMV BLD: 11.1 FL — HIGH (ref 7.4–10.4)
POTASSIUM SERPL-MCNC: 3.8 MMOL/L — SIGNIFICANT CHANGE UP (ref 3.5–5)
POTASSIUM SERPL-SCNC: 3.8 MMOL/L — SIGNIFICANT CHANGE UP (ref 3.5–5)
RBC # BLD: 3.18 M/UL — LOW (ref 4.2–5.4)
RBC # BLD: 3.31 M/UL — LOW (ref 4.2–5.4)
RBC # BLD: 3.34 M/UL — LOW (ref 4.2–5.4)
RBC # FLD: 15.8 % — HIGH (ref 11.5–14.5)
RBC # FLD: 15.9 % — HIGH (ref 11.5–14.5)
RETICS #: 96 K/UL — SIGNIFICANT CHANGE UP (ref 25–125)
RETICS/RBC NFR: 2.9 % — HIGH (ref 0.5–1.5)
SODIUM SERPL-SCNC: 138 MMOL/L — SIGNIFICANT CHANGE UP (ref 135–146)
TIBC SERPL-MCNC: 122 UG/DL — LOW (ref 220–430)
TRANSFERRIN SERPL-MCNC: 108 MG/DL — LOW (ref 200–360)
UIBC SERPL-MCNC: 110 UG/DL — SIGNIFICANT CHANGE UP (ref 110–370)
VANCOMYCIN FLD-MCNC: 19.7 UG/ML — HIGH (ref 5–10)
WBC # BLD: 8.35 K/UL — SIGNIFICANT CHANGE UP (ref 4.8–10.8)
WBC # BLD: 9.2 K/UL — SIGNIFICANT CHANGE UP (ref 4.8–10.8)
WBC # FLD AUTO: 8.35 K/UL — SIGNIFICANT CHANGE UP (ref 4.8–10.8)
WBC # FLD AUTO: 9.2 K/UL — SIGNIFICANT CHANGE UP (ref 4.8–10.8)

## 2025-01-08 PROCEDURE — 99233 SBSQ HOSP IP/OBS HIGH 50: CPT

## 2025-01-08 RX ORDER — ALPRAZOLAM 0.25 MG/1
0.25 TABLET ORAL AT BEDTIME
Refills: 0 | Status: DISCONTINUED | OUTPATIENT
Start: 2025-01-09 | End: 2025-01-10

## 2025-01-08 RX ADMIN — LEVODOPA AND CARBIDOPA 2 TABLET(S): 145; 36.25 CAPSULE, EXTENDED RELEASE ORAL at 00:07

## 2025-01-08 RX ADMIN — AMPICILLIN SODIUM AND SULBACTAM SODIUM 200 GRAM(S): 100; 50 INJECTION, POWDER, FOR SOLUTION INTRAVENOUS at 11:14

## 2025-01-08 RX ADMIN — ALPRAZOLAM 0.25 MILLIGRAM(S): 0.25 TABLET ORAL at 21:37

## 2025-01-08 RX ADMIN — LEVODOPA AND CARBIDOPA 2 TABLET(S): 145; 36.25 CAPSULE, EXTENDED RELEASE ORAL at 11:10

## 2025-01-08 RX ADMIN — LEVODOPA AND CARBIDOPA 2 TABLET(S): 145; 36.25 CAPSULE, EXTENDED RELEASE ORAL at 17:32

## 2025-01-08 RX ADMIN — AMPICILLIN SODIUM AND SULBACTAM SODIUM 200 GRAM(S): 100; 50 INJECTION, POWDER, FOR SOLUTION INTRAVENOUS at 23:12

## 2025-01-08 RX ADMIN — DILTIAZEM HYDROCHLORIDE 360 MILLIGRAM(S): 300 CAPSULE, COATED, EXTENDED RELEASE ORAL at 06:29

## 2025-01-08 RX ADMIN — ENTACAPONE 200 MILLIGRAM(S): 200 TABLET, FILM COATED ORAL at 23:13

## 2025-01-08 RX ADMIN — ENTACAPONE 200 MILLIGRAM(S): 200 TABLET, FILM COATED ORAL at 11:10

## 2025-01-08 RX ADMIN — Medication 1 APPLICATION(S): at 06:29

## 2025-01-08 RX ADMIN — VANCOMYCIN HYDROCHLORIDE 166.67 MILLIGRAM(S): 5 INJECTION, POWDER, LYOPHILIZED, FOR SOLUTION INTRAVENOUS at 21:36

## 2025-01-08 RX ADMIN — APIXABAN 5 MILLIGRAM(S): 5 TABLET, FILM COATED ORAL at 06:28

## 2025-01-08 RX ADMIN — Medication 1 APPLICATION(S): at 17:36

## 2025-01-08 RX ADMIN — LEVODOPA AND CARBIDOPA 2 TABLET(S): 145; 36.25 CAPSULE, EXTENDED RELEASE ORAL at 06:28

## 2025-01-08 RX ADMIN — FAMOTIDINE 20 MILLIGRAM(S): 20 TABLET, FILM COATED ORAL at 17:33

## 2025-01-08 RX ADMIN — ENTACAPONE 200 MILLIGRAM(S): 200 TABLET, FILM COATED ORAL at 17:33

## 2025-01-08 RX ADMIN — OXYBUTYNIN CHLORIDE 5 MILLIGRAM(S): 5 TABLET ORAL at 11:11

## 2025-01-08 RX ADMIN — SENNOSIDES 2 TABLET(S): 8.6 TABLET, FILM COATED ORAL at 21:37

## 2025-01-08 RX ADMIN — AMPICILLIN SODIUM AND SULBACTAM SODIUM 200 GRAM(S): 100; 50 INJECTION, POWDER, FOR SOLUTION INTRAVENOUS at 00:06

## 2025-01-08 RX ADMIN — LEVODOPA AND CARBIDOPA 2 TABLET(S): 145; 36.25 CAPSULE, EXTENDED RELEASE ORAL at 23:13

## 2025-01-08 RX ADMIN — SODIUM CHLORIDE 75 MILLILITER(S): 9 INJECTION, SOLUTION INTRAVENOUS at 00:06

## 2025-01-08 RX ADMIN — PANTOPRAZOLE 40 MILLIGRAM(S): 40 TABLET, DELAYED RELEASE ORAL at 06:29

## 2025-01-08 RX ADMIN — AMPICILLIN SODIUM AND SULBACTAM SODIUM 200 GRAM(S): 100; 50 INJECTION, POWDER, FOR SOLUTION INTRAVENOUS at 04:32

## 2025-01-08 RX ADMIN — ENTACAPONE 200 MILLIGRAM(S): 200 TABLET, FILM COATED ORAL at 06:29

## 2025-01-08 RX ADMIN — ENTACAPONE 200 MILLIGRAM(S): 200 TABLET, FILM COATED ORAL at 00:07

## 2025-01-08 RX ADMIN — GABAPENTIN 100 MILLIGRAM(S): 300 CAPSULE ORAL at 17:33

## 2025-01-08 RX ADMIN — AMPICILLIN SODIUM AND SULBACTAM SODIUM 200 GRAM(S): 100; 50 INJECTION, POWDER, FOR SOLUTION INTRAVENOUS at 17:32

## 2025-01-08 RX ADMIN — GABAPENTIN 100 MILLIGRAM(S): 300 CAPSULE ORAL at 06:28

## 2025-01-08 RX ADMIN — FAMOTIDINE 20 MILLIGRAM(S): 20 TABLET, FILM COATED ORAL at 06:28

## 2025-01-08 NOTE — PROGRESS NOTE ADULT - SUBJECTIVE AND OBJECTIVE BOX
RAQUEL JON  89y, Female  Allergy: No Known Allergies      LOS  7d    CHIEF COMPLAINT: left mastoid swelling (07 Jan 2025 19:18)      INTERVAL EVENTS/HPI  - T(F): , Max: 98.6 (01-08-25 @ 00:00)  - WBC Count: 9.20 (01-08-25 @ 06:45)  WBC Count: 8.14 (01-07-25 @ 06:58)     - Creatinine: 0.5 (01-07-25 @ 06:58)     -   -   -     ROS  cannot obtain secondary to patient's sedation and/or mental status    VITALS:  T(F): 98.6, Max: 98.6 (01-08-25 @ 00:00)  HR: 63  BP: 110/64  RR: 18Vital Signs Last 24 Hrs  T(C): 37 (08 Jan 2025 00:00), Max: 37 (08 Jan 2025 00:00)  T(F): 98.6 (08 Jan 2025 00:00), Max: 98.6 (08 Jan 2025 00:00)  HR: 63 (08 Jan 2025 06:15) (63 - 78)  BP: 110/64 (08 Jan 2025 06:15) (110/64 - 152/90)  BP(mean): 79 (08 Jan 2025 06:15) (79 - 79)  RR: 18 (08 Jan 2025 06:15) (18 - 18)  SpO2: 97% (08 Jan 2025 06:15) (95% - 99%)    Parameters below as of 08 Jan 2025 06:15  Patient On (Oxygen Delivery Method): nasal cannula  O2 Flow (L/min): 2      PHYSICAL EXAM:  ***    FH: Non-contributory  Social Hx: Non-contributory    TESTS & MEASUREMENTS:                        8.6    9.20  )-----------( 260      ( 08 Jan 2025 06:45 )             28.4     01-07    145  |  109  |  10  ----------------------------<  96  3.8   |  26  |  0.5[L]    Ca    7.8[L]      07 Jan 2025 06:58          Urinalysis Basic - ( 07 Jan 2025 06:58 )    Color: x / Appearance: x / SG: x / pH: x  Gluc: 96 mg/dL / Ketone: x  / Bili: x / Urobili: x   Blood: x / Protein: x / Nitrite: x   Leuk Esterase: x / RBC: x / WBC x   Sq Epi: x / Non Sq Epi: x / Bacteria: x        Culture - Blood (collected 01-04-25 @ 23:06)  Source: .Blood BLOOD  Preliminary Report (01-08-25 @ 01:02):    No growth at 72 Hours    Culture - Blood (collected 01-04-25 @ 11:28)  Source: .Blood BLOOD  Preliminary Report (01-08-25 @ 01:01):    No growth at 72 Hours    Culture - Blood (collected 01-03-25 @ 17:32)  Source: .Blood BLOOD  Preliminary Report (01-08-25 @ 01:01):    No growth at 4 days    Culture - Blood (collected 01-03-25 @ 17:32)  Source: .Blood BLOOD  Preliminary Report (01-08-25 @ 01:01):    No growth at 4 days    Culture - Blood (collected 01-01-25 @ 15:52)  Source: .Blood BLOOD  Gram Stain (01-03-25 @ 00:12):    Growth in aerobic bottle: Gram Positive Cocci in Clusters    Growth in anaerobic bottle: Gram Positive Cocci in Clusters  Final Report (01-04-25 @ 07:37):    Growth in aerobic and anaerobic bottles: Methicillin Resistant    Staphylococcus aureus    Direct identification is available within approximately 3-5    hours either by Blood Panel Multiplexed PCR or Direct    MALDI-TOF. Details: [email or web address]  Organism: Blood Culture PCR  Methicillin resistant Staphylococcus aureus (01-04-25 @ 07:37)  Organism: Methicillin resistant Staphylococcus aureus (01-04-25 @ 07:37)      Method Type: ADAM      -  Clindamycin: S <=0.25      -  Daptomycin: S <=0.5      -  Erythromycin: S <=0.25      -  Gentamicin: S <=4 Should not be used as monotherapy      -  Linezolid: S 2      -  Oxacillin: R >2      -  Penicillin: R 1      -  Rifampin: S <=1 Should not be used as monotherapy      -  Tetracycline: S <=4      -  Trimethoprim/Sulfamethoxazole: S <=0.5/9.5      -  Vancomycin: S 0.5  Organism: Blood Culture PCR (01-04-25 @ 07:37)      Method Type: PCR      -  Methicillin resistant Staphylococcus aureus (MRSA): Detec    Culture - Blood (collected 01-01-25 @ 15:52)  Source: .Blood BLOOD  Gram Stain (01-04-25 @ 07:27):    Growth in anaerobic bottle: Gram Positive Cocci in Clusters  Final Report (01-05-25 @ 15:42):    Growth in anaerobic bottle: Methicillin Resistant Staphylococcus aureus    See previous culture [id number]            INFECTIOUS DISEASES TESTING  MRSA PCR Result.: Positive (01-05-25 @ 07:10)      INFLAMMATORY MARKERS  Sedimentation Rate, Erythrocyte: 40 mm/Hr (01-04-25 @ 11:28)  C-Reactive Protein: 57.5 mg/L (01-04-25 @ 11:28)      RADIOLOGY & ADDITIONAL TESTS:  I have personally reviewed the last available Chest xray  CXR      CT      CARDIOLOGY TESTING  12 Lead ECG:   Ventricular Rate 75 BPM    Atrial Rate 129 BPM    QRS Duration 80 ms    Q-T Interval 352 ms    QTC Calculation(Bazett) 393 ms    R Axis 6 degrees    T Axis 1 degrees    Diagnosis Line Sinus rhythm with frequent Premature atrial complexes  Inferior infarct , age undetermined  Cannot rule out Anterior infarct , age undetermined  Abnormal ECG    Confirmed by Cruz Tinajero ([id number]) on 1/2/2025 12:42:03 PM (01-02-25 @ 09:47)  12 Lead ECG:   Ventricular Rate 81 BPM    Atrial Rate 81 BPM    P-R Interval 160 ms    QRS Duration 72 ms    Q-T Interval 364 ms    QTC Calculation(Bazett) 422 ms    P Axis 37 degrees    R Axis 1 degrees    T Axis 15 degrees    Diagnosis Line Normal sinus rhythm  Inferior infarct , age undetermined  Abnormal ECG    Confirmed by Cruz Tinajero ([id number]) on 1/2/2025 7:45:56 AM (01-01-25 @ 15:24)      MEDICATIONS  ALPRAZolam 0.25  ampicillin/sulbactam  IVPB 3  apixaban 5  carbidopa/levodopa  25/100 2  dextrose 5%. 1000  diltiazem     entacapone 200  famotidine    Tablet 20  gabapentin 100  lidocaine   4% Patch 1  mupirocin 2% Nasal 1  oxybutynin 5  pantoprazole    Tablet 40  senna 2  vancomycin  IVPB 1250      WEIGHT  Weight (kg): 66.224 (01-01-25 @ 15:41)      ANTIBIOTICS:  ampicillin/sulbactam  IVPB 3 Gram(s) IV Intermittent every 6 hours  vancomycin  IVPB 1250 milliGRAM(s) IV Intermittent every 24 hours      All available historical records have been reviewed   RAQUEL JON  89y, Female  Allergy: No Known Allergies      LOS  7d    CHIEF COMPLAINT: left mastoid swelling (07 Jan 2025 19:18)      INTERVAL EVENTS/HPI  - T(F): , Max: 98.6 (01-08-25 @ 00:00)  - WBC Count: 9.20 (01-08-25 @ 06:45)  WBC Count: 8.14 (01-07-25 @ 06:58)     - Creatinine: 0.5 (01-07-25 @ 06:58)     -   -   -     ROS  cannot obtain secondary to patient's sedation and/or mental status    VITALS:  T(F): 98.6, Max: 98.6 (01-08-25 @ 00:00)  HR: 63  BP: 110/64  RR: 18Vital Signs Last 24 Hrs  T(C): 37 (08 Jan 2025 00:00), Max: 37 (08 Jan 2025 00:00)  T(F): 98.6 (08 Jan 2025 00:00), Max: 98.6 (08 Jan 2025 00:00)  HR: 63 (08 Jan 2025 06:15) (63 - 78)  BP: 110/64 (08 Jan 2025 06:15) (110/64 - 152/90)  BP(mean): 79 (08 Jan 2025 06:15) (79 - 79)  RR: 18 (08 Jan 2025 06:15) (18 - 18)  SpO2: 97% (08 Jan 2025 06:15) (95% - 99%)    Parameters below as of 08 Jan 2025 06:15  Patient On (Oxygen Delivery Method): nasal cannula  O2 Flow (L/min): 2      PHYSICAL EXAM:  Gen: chronically ill appearing   HEENT: Normocephalic, atraumatic overall decreased L eye edema, decreased erythema/edema neck   Neck: supple, no lymphadenopathy  CV: Regular rate & regular rhythm  Lungs: decreased BS at bases, no fremitus  Abdomen: Soft, BS present  Ext: Warm, well perfused  Neuro: non focal, not following commands  Skin: no rash, no erythema  Lines: no phlebitis     FH: Non-contributory  Social Hx: Non-contributory    TESTS & MEASUREMENTS:                        8.6    9.20  )-----------( 260      ( 08 Jan 2025 06:45 )             28.4     01-07    145  |  109  |  10  ----------------------------<  96  3.8   |  26  |  0.5[L]    Ca    7.8[L]      07 Jan 2025 06:58          Urinalysis Basic - ( 07 Jan 2025 06:58 )    Color: x / Appearance: x / SG: x / pH: x  Gluc: 96 mg/dL / Ketone: x  / Bili: x / Urobili: x   Blood: x / Protein: x / Nitrite: x   Leuk Esterase: x / RBC: x / WBC x   Sq Epi: x / Non Sq Epi: x / Bacteria: x        Culture - Blood (collected 01-04-25 @ 23:06)  Source: .Blood BLOOD  Preliminary Report (01-08-25 @ 01:02):    No growth at 72 Hours    Culture - Blood (collected 01-04-25 @ 11:28)  Source: .Blood BLOOD  Preliminary Report (01-08-25 @ 01:01):    No growth at 72 Hours    Culture - Blood (collected 01-03-25 @ 17:32)  Source: .Blood BLOOD  Preliminary Report (01-08-25 @ 01:01):    No growth at 4 days    Culture - Blood (collected 01-03-25 @ 17:32)  Source: .Blood BLOOD  Preliminary Report (01-08-25 @ 01:01):    No growth at 4 days    Culture - Blood (collected 01-01-25 @ 15:52)  Source: .Blood BLOOD  Gram Stain (01-03-25 @ 00:12):    Growth in aerobic bottle: Gram Positive Cocci in Clusters    Growth in anaerobic bottle: Gram Positive Cocci in Clusters  Final Report (01-04-25 @ 07:37):    Growth in aerobic and anaerobic bottles: Methicillin Resistant    Staphylococcus aureus    Direct identification is available within approximately 3-5    hours either by Blood Panel Multiplexed PCR or Direct    MALDI-TOF. Details: [email or web address]  Organism: Blood Culture PCR  Methicillin resistant Staphylococcus aureus (01-04-25 @ 07:37)  Organism: Methicillin resistant Staphylococcus aureus (01-04-25 @ 07:37)      Method Type: ADAM      -  Clindamycin: S <=0.25      -  Daptomycin: S <=0.5      -  Erythromycin: S <=0.25      -  Gentamicin: S <=4 Should not be used as monotherapy      -  Linezolid: S 2      -  Oxacillin: R >2      -  Penicillin: R 1      -  Rifampin: S <=1 Should not be used as monotherapy      -  Tetracycline: S <=4      -  Trimethoprim/Sulfamethoxazole: S <=0.5/9.5      -  Vancomycin: S 0.5  Organism: Blood Culture PCR (01-04-25 @ 07:37)      Method Type: PCR      -  Methicillin resistant Staphylococcus aureus (MRSA): Detec    Culture - Blood (collected 01-01-25 @ 15:52)  Source: .Blood BLOOD  Gram Stain (01-04-25 @ 07:27):    Growth in anaerobic bottle: Gram Positive Cocci in Clusters  Final Report (01-05-25 @ 15:42):    Growth in anaerobic bottle: Methicillin Resistant Staphylococcus aureus    See previous culture [id number]            INFECTIOUS DISEASES TESTING  MRSA PCR Result.: Positive (01-05-25 @ 07:10)      INFLAMMATORY MARKERS  Sedimentation Rate, Erythrocyte: 40 mm/Hr (01-04-25 @ 11:28)  C-Reactive Protein: 57.5 mg/L (01-04-25 @ 11:28)      RADIOLOGY & ADDITIONAL TESTS:  I have personally reviewed the last available Chest xray  CXR      CT      CARDIOLOGY TESTING  12 Lead ECG:   Ventricular Rate 75 BPM    Atrial Rate 129 BPM    QRS Duration 80 ms    Q-T Interval 352 ms    QTC Calculation(Bazett) 393 ms    R Axis 6 degrees    T Axis 1 degrees    Diagnosis Line Sinus rhythm with frequent Premature atrial complexes  Inferior infarct , age undetermined  Cannot rule out Anterior infarct , age undetermined  Abnormal ECG    Confirmed by Cruz Tinajero ([id number]) on 1/2/2025 12:42:03 PM (01-02-25 @ 09:47)  12 Lead ECG:   Ventricular Rate 81 BPM    Atrial Rate 81 BPM    P-R Interval 160 ms    QRS Duration 72 ms    Q-T Interval 364 ms    QTC Calculation(Bazett) 422 ms    P Axis 37 degrees    R Axis 1 degrees    T Axis 15 degrees    Diagnosis Line Normal sinus rhythm  Inferior infarct , age undetermined  Abnormal ECG    Confirmed by Cruz Tinajero ([id number]) on 1/2/2025 7:45:56 AM (01-01-25 @ 15:24)      MEDICATIONS  ALPRAZolam 0.25  ampicillin/sulbactam  IVPB 3  apixaban 5  carbidopa/levodopa  25/100 2  dextrose 5%. 1000  diltiazem     entacapone 200  famotidine    Tablet 20  gabapentin 100  lidocaine   4% Patch 1  mupirocin 2% Nasal 1  oxybutynin 5  pantoprazole    Tablet 40  senna 2  vancomycin  IVPB 1250      WEIGHT  Weight (kg): 66.224 (01-01-25 @ 15:41)      ANTIBIOTICS:  ampicillin/sulbactam  IVPB 3 Gram(s) IV Intermittent every 6 hours  vancomycin  IVPB 1250 milliGRAM(s) IV Intermittent every 24 hours      All available historical records have been reviewed

## 2025-01-08 NOTE — PHARMACOTHERAPY INTERVENTION NOTE - COMMENTS
Patient is currently on vancomycin 1000mg IV q24h for the treatment of MRSA bacteremia which predicts a subtherapeutic steady state AUC/ADAM of 368.60hr*mg/L (goal: 400-600) per [employer] vancomycin dosing software. Vancomycin level 1/3 6:16 was 12.5mg/L. Recommend increasing the dose to 1250mg IV q24h which predicts a steady state AUC/ADAM of 460.75hr*mg/L and obtaining a repeat level 1/5 with AM labs before the 3rd dose. 
As per policy, ordered a vancomycin level for 1/3 AM in order to assist with vancomycin pharmacokinetic monitoring.    Karie Mittal, PharmD, BCIDP  Clinical Pharmacy Specialist, Infectious Diseases  Tele-Antimicrobial Stewardship Program (Tele-ASP)  Tele-ASP Phone: [phone]  
Recommended to order vancomycin 1g IV q24h since the 1/1 blood culture grew MRSA. As per [employer] calculations, the steady-state vancomycin AUC/ADAM on this dose is 446 mg/L*h, which is within the therapeutic range of 400 - 600 mg/L*h.    Karie Mittal, PharmD, [facility]  Clinical Pharmacy Specialist, Infectious Diseases  Tele-Antimicrobial Stewardship Program (Tele-ASP)  Tele-ASP Phone: [phone]  
May continue vancomycin 1250 mg IV q24h. The vancomycin level today, 1/5 was 21.3 mg/L. As per PrecisePK calculations, the steady-state vancomycin AUC/ADAM is 493 mg/L*h, which is within the therapeutic range of 400 - 600 mg/L*h.    Karie Mittal, PharmD, [facility]  Clinical Pharmacy Specialist, Infectious Diseases  Tele-Antimicrobial Stewardship Program (Tele-ASP)  Tele-ASP Phone: [phone]  
Vancomycin level 1/8 06:45 was 19.7mg/L. Recommend continuing vancomycin 1250mg IV q24h which predicts a steady state AUC/ADAM of 485.79hr*mg/L (goal: 400-600) per [employer] vancomycin dosing software and repeating level 1/13 with AM labs.

## 2025-01-08 NOTE — PROGRESS NOTE ADULT - SUBJECTIVE AND OBJECTIVE BOX
HPI  Patient is a 89y old Female who presents with a chief complaint of left mastoid swelling (08 Jan 2025 09:55)    Currently admitted to medicine with the primary diagnosis of Parotitis       Today is hospital day 7d.     INTERVAL HPI / OVERNIGHT EVENTS:  Patient was seen and examined at bedside    Patient Feels okay  No new complaints  Denies any complains of chest pain or shortness of breath  Denies any abdominal pain/nausea/vomiting        PAST MEDICAL & SURGICAL HISTORY  Parkinson disease    HTN (hypertension)    Rheumatoid arthritis    Hyperlipidemia    CRAO (central retinal artery occlusion)  Left    Atrial fibrillation    History of hysterectomy    Status post placement of implantable loop recorder  01/2021    S/P hernia repair      ALLERGIES  No Known Allergies    MEDICATIONS  STANDING MEDICATIONS  ALPRAZolam 0.25 milliGRAM(s) Oral at bedtime  ampicillin/sulbactam  IVPB 3 Gram(s) IV Intermittent every 6 hours  carbidopa/levodopa  25/100 2 Tablet(s) Oral four times a day  dextrose 5%. 1000 milliLiter(s) IV Continuous <Continuous>  diltiazem    milliGRAM(s) Oral daily  entacapone 200 milliGRAM(s) Oral four times a day  famotidine    Tablet 20 milliGRAM(s) Oral two times a day  gabapentin 100 milliGRAM(s) Oral two times a day  lidocaine   4% Patch 1 Patch Transdermal every 24 hours  mupirocin 2% Nasal 1 Application(s) Both Nostrils two times a day  oxybutynin 5 milliGRAM(s) Oral daily  pantoprazole    Tablet 40 milliGRAM(s) Oral before breakfast  senna 2 Tablet(s) Oral at bedtime  vancomycin  IVPB 1250 milliGRAM(s) IV Intermittent every 24 hours    PRN MEDICATIONS  acetaminophen     Tablet .. 650 milliGRAM(s) Oral every 6 hours PRN  bisacodyl 5 milliGRAM(s) Oral every 12 hours PRN  meclizine 25 milliGRAM(s) Oral three times a day PRN  zolpidem 5 milliGRAM(s) Oral at bedtime PRN    VITALS:  T(F): 98.5  HR: 60  BP: 116/57  RR: 18  SpO2: 98%    PHYSICAL EXAM  GEN: no distress, comfortable  periorbital edema  PULM: BS heard b/l equal, No wheezing  CVS: S1S2 present, no rubs or gallops  ABD: Soft, non-distended, no guarding; non-tender  EXT: No lower extremity edema  NEURO: A&Ox2, able to wiggle toes; minimal LE movements    LABS                        9.0    8.35  )-----------( 170      ( 08 Jan 2025 11:31 )             29.8     01-08    138  |  104  |  7[L]  ----------------------------<  100[H]  3.8   |  25  |  0.5[L]    Ca    7.6[L]      08 Jan 2025 06:45        Urinalysis Basic - ( 08 Jan 2025 06:45 )    Color: x / Appearance: x / SG: x / pH: x  Gluc: 100 mg/dL / Ketone: x  / Bili: x / Urobili: x   Blood: x / Protein: x / Nitrite: x   Leuk Esterase: x / RBC: x / WBC x   Sq Epi: x / Non Sq Epi: x / Bacteria: x                RADIOLOGY

## 2025-01-08 NOTE — PROGRESS NOTE ADULT - ASSESSMENT
Assessment:   89-year-old female with a past medical history Parkinson's, HLD, HTN, atrial fibrillation coming with complaints of swollen jaw. Found to have MRSA bacteremia 2/2 L parotitis. Admitted for further workup.     #left Parotitis   - WBC 18 on admission, now 8  - lactate 1.4 on admission   - CT temporal and Neck soft tissues 1/1 : Findings compatible with left parotitis N  - Per ENT: no acute interventions. Warm compresses    - BCx 1/1/2025: MRSA   - ID following-> C/W Vancomycin & Unasyn   - per cardio: pt is poor candidate for JOI given poor dentition and parotitis.   - IR consulted for placement of PICC line, to be followed with 6 wks vanc.  - Echo 1/3 WNL     #downtrending Hb:  Per ED documentation, pt's daughter reported hearing that pt had questionable fall in nursing home prior to arrival d/t swelling of L jaw. Pt also on Eliquis for par afib.  Hb 12.9 on admission, rapidly downtrending since. 8.6 1/8 AM  - f/u CBC 11AM, iron studies, B12, folate  -hold Eliquis until Hb stabilizes  -CTAP if Hb continues to drop  - full body skin exam to r/o bruising    #Inc L periorbital edema  Medical team called by RN as pt was having worsening L orbital edema. Edema is improved on exam. CT orbit: Mild left periorbital soft tissue swelling which may reflect cellulitis.  -monitor    #hypernatremia- resolved  -likely due to dec PO intake  -c/w Ensure TID w/ calorie count given decreased PO intake since admission  -c/w D5W at 75cc/hr   -monitor BMP BID.      #multinodular thyroid gland  -CT neck and soft tissues Since 10/2019, mild increase in multinodular thyroid gland measuring up to 6 cm, previously 5 cm.   -TSH WNL   -outpt thyroid US     #Parkinson's  - C/W Sinemet 25/100 q6h  -c/w entacapone 200mg QID  -c/w britney 100mg BID      #atrial fibrillation on Eliquis   - C/W Eliquis 5mg BID   - C/W Cardizem   - Rate controlled     #anxiety  #insomnia  #dizziness  -c/w 0.25mg daily qhs   -c/w ambien 5mg qhs PRN  -c/w meclizine 25mg TID PRN for dizziness     #urinary retention   -c/w oxybutinin 5mg daily      #DVT ppx: on Eliquis 5 mg BID. Hold for now until Hb stabilizes  #GI ppx: protonix  #Activity: ATT   #Code: Full   dispo: medical floor

## 2025-01-08 NOTE — PROGRESS NOTE ADULT - SUBJECTIVE AND OBJECTIVE BOX
24H events:    Patient is a 89y old Female who presents with a chief complaint of left mastoid swelling (08 Jan 2025 08:12)    Primary diagnosis of MRSA bacteremia 2/2 L Parotitis.    Today is hospital day 7d. This morning patient was seen and examined at bedside, resting comfortably in bed.    No acute or major events overnight.    Code Status:    PAST MEDICAL & SURGICAL HISTORY  Parkinson disease    HTN (hypertension)    Rheumatoid arthritis    Hyperlipidemia    CRAO (central retinal artery occlusion)  Left    Atrial fibrillation    History of hysterectomy    Status post placement of implantable loop recorder  01/2021    S/P hernia repair      SOCIAL HISTORY:  Social History:      ALLERGIES:  No Known Allergies    MEDICATIONS:  STANDING MEDICATIONS  ALPRAZolam 0.25 milliGRAM(s) Oral at bedtime  ampicillin/sulbactam  IVPB 3 Gram(s) IV Intermittent every 6 hours  carbidopa/levodopa  25/100 2 Tablet(s) Oral four times a day  dextrose 5%. 1000 milliLiter(s) IV Continuous <Continuous>  diltiazem    milliGRAM(s) Oral daily  entacapone 200 milliGRAM(s) Oral four times a day  famotidine    Tablet 20 milliGRAM(s) Oral two times a day  gabapentin 100 milliGRAM(s) Oral two times a day  lidocaine   4% Patch 1 Patch Transdermal every 24 hours  mupirocin 2% Nasal 1 Application(s) Both Nostrils two times a day  oxybutynin 5 milliGRAM(s) Oral daily  pantoprazole    Tablet 40 milliGRAM(s) Oral before breakfast  senna 2 Tablet(s) Oral at bedtime  vancomycin  IVPB 1250 milliGRAM(s) IV Intermittent every 24 hours    PRN MEDICATIONS  acetaminophen     Tablet .. 650 milliGRAM(s) Oral every 6 hours PRN  bisacodyl 5 milliGRAM(s) Oral every 12 hours PRN  meclizine 25 milliGRAM(s) Oral three times a day PRN  zolpidem 5 milliGRAM(s) Oral at bedtime PRN    VITALS:   T(F): 98.8  HR: 74  BP: 98/63  RR: 19  SpO2: 97%    PHYSICAL EXAM:  GENERAL: NAD, lying in bed comfortably  HEAD: atraumatic, normocephalic  NECK: supple  HEART: regular rhythm  LUNGS: unlabored respirations, b/l air entry   ABDOMEN: BS (+), soft, nontender, nondistended  NERVOUS SYSTEM: A&O x 3      LABS:                        8.6    9.20  )-----------( 260      ( 08 Jan 2025 06:45 )             28.4     01-08    138  |  104  |  7[L]  ----------------------------<  100[H]  3.8   |  25  |  0.5[L]    Ca    7.6[L]      08 Jan 2025 06:45        Urinalysis Basic - ( 08 Jan 2025 06:45 )    Color: x / Appearance: x / SG: x / pH: x  Gluc: 100 mg/dL / Ketone: x  / Bili: x / Urobili: x   Blood: x / Protein: x / Nitrite: x   Leuk Esterase: x / RBC: x / WBC x   Sq Epi: x / Non Sq Epi: x / Bacteria: x    Sodium: 138 mmol/L (01.08.25 @ 06:45)   Sodium: 145 mmol/L (01.07.25 @ 06:58)   Sodium: 148 mmol/L (01.06.25 @ 07:01)   Sodium: 149 mmol/L (01.05.25 @ 06:17)     Potassium: 3.8 mmol/L (01.06.25 @ 07:01)   Potassium: 3.3 mmol/L (01.05.25 @ 06:17)     Hemoglobin: 8.6 g/dL (01.08.25 @ 06:45)   Hemoglobin: 9.6 g/dL (01.07.25 @ 06:58)   Hemoglobin: 10.0 g/dL (01.05.25 @ 06:17)   Hemoglobin: 10.4 g/dL (01.03.25 @ 06:16)   Hemoglobin: 12.3 g/dL (01.02.25 @ 06:27)   Hemoglobin: 12.9 g/dL (01.01.25 @ 15:52)       RADIOLOGY:    CT NECK SOFT TISSUE IC 1/1/25  IMPRESSION:  Findings compatible with left parotitis. No drainable fluid collection.    Since 10/2019, mild increase in multinodular thyroid gland measuring up   to 6 cm, previously 5 cm. May consider nonemergent thyroid outpatient   thyroid ultrasound follow-up as clinically indicated.    CT TEMPORAL BONES IC 1/1/25  IMPRESSION:    Findings compatible with left parotitis (please see CT soft tissue neck   for further evaluation).  No acute pathology of the temporal bones.  Decreased bilateral mastoid effusions, larger on right.    CT ORBITS IC 1/7/25  IMPRESSION:  Mild left periorbital soft tissue swelling which may reflect cellulitis.    Partially visualized left parotitis with associated inflammatory changes.    ECHO TTE WO CON COMP W DOPP 1/3/25    Left ventricular ejection fraction, by visual estimation, is 55 to 60%. Spectral Doppler shows pseudonormal pattern of left ventricular myocardial filling (Grade II diastolic dysfunction). Thickening and calcification of the anterior and posterior mitral valve leaflets.   Moderate mitral valve regurgitation is seen.  Moderate tricuspid regurgitation is visualized. Estimated pulmonary artery systolic pressure is 50.3 mmHg assuming a right atrial pressure of 3 mmHg, which is consistent with moderate pulmonary hypertension.

## 2025-01-08 NOTE — PROGRESS NOTE ADULT - ASSESSMENT
ASSESSMENT  89y Female from [facility], with a past medical history of Parkinsons, HLD, HTN, a. fib, RA. Patient brought in from nursing home to ED for left swollen jaw, to rule out mastoiditis. Patient is not sure when this swelling started. Patient is a poor historian and the ED spoke to patients daughter on the phone who reported that she heard the patient had a questionable fall due to the swelling of her left jaw. Patient has a 3" x 3" hard mass to her left parotitis area. There is edema, erythema, and tenderness to the left face extending from the left parotitis area down to her left anterior neck. Patient has poor dentition.  Patient has very dry mucous membranes, not well-appearing. There is no evident trauma.    IMPRESSION  #MRSA bacteremia due to L parotitis  Tm 100.5  Admission WBC 18   Cannot rule out IE, community acquired bacteremia   < from: CT Orbit w/ IV Cont (01.07.25 @ 13:16) >  Mild left periorbital soft tissue swelling which may reflect cellulitis.  Partially visualized left parotitis with associated inflammatory changes.    1/4 BCX NGTD     1/3 BCX NGTD     1/1 2/4 BCX MRSA    TTE no vegetations   < from: CT Neck Soft Tissue w/ IV Cont (01.01.25 @ 16:14) >  Findings compatible with left parotitis. No drainable fluid collection.  Since 10/2019, mild increase in multinodular thyroid gland measuring up   to 6 cm, previously 5 cm. May consider nonemergent thyroid outpatient   thyroid ultrasound follow-up as clinically indicated.    Drug Dosing Weight  Height (cm): 170.2 (01 Jan 2025 15:41)  Weight (kg): 66.224 (01 Jan 2025 15:41)  BMI (kg/m2): 22.9 (01 Jan 2025 15:41)    Creatinine: 0.7 mg/dL (01.02.25 @ 06:27) 50 mL/min  Creatinine clearance for normal weight patient, using ideal body weight of 61 kg (135 lbs).      RECOMMENDATIONS  - Vanc dosing AUC/ADAM per clinical pharmacist   - Unasyn 3g q6h IV as often polymicrobial x 14 days, if D/C prior PO augmentin 875mg BID E/D 1/14   - Pt is a poor candidate for JOI per Cards. If within GOC plan for PICC x IV Vanc 6 weeks from cleared BCX E/D 2/13/25  - Appreciate ENT consult  - Poor prognosis, GOC  - Please order weekly CBC, CMP, ESR/CRP, Vanc trough  - ID follow-up with Dr. Lauri Calabrese for Telehealth. We will call the patient between 8:00-4:30      [address]       [phone]       Fax [phone]     If any questions, please text or call on [employer] Teams  Please continue to update ID with any pertinent new clinical, laboratory or radiographic findings.

## 2025-01-08 NOTE — PROGRESS NOTE ADULT - ASSESSMENT
89 female past medical history Parkinson's, HLD, HTN, atrial fibrillation coming with complaints of swollen jaw.  Patient brought in from nursing home due to the concern of swollen     # MRSA Bacteremia  # Left Parotitis   # left periorbital sweling  -  CT Temporal Bones w/ IV Cont (01.01.25 @ 16:14) >Findings compatible with left parotitis   - BCx 1/1/2025: MRSA  - blood Culture Results: No growth at 24 hours (01.03.25 @ 17:32)  - MRSA PCR Result.: Positive: By: Real-Time PCR (Polymerase Reaction Method) (01.05.25 @ 07:10)  - Sedimentation Rate, Erythrocyte: 40 mm/Hr (01.04.25 @ 11:28)  - C-Reactive Protein: 57.5 mg/L (01.04.25 @ 11:28)  -  TTE Echo Complete w/o Contrast w/ Doppler (01.03.25 @ 11:33) >Left ventricular ejection fraction, by visual estimation, is 55 to 60%. (Grade II diastolic dysfunction).Thickening and calcification of the anterior and posterior mitral valve leaflets. Moderate tricuspid regurgitation. moderate pulmonary hypertension.  - ID eval: c/w vancomycin , unasyn  - evaluated by ENT  - mupirocin to b/l nares for 5 days  - CT orbit- Mild left periorbital soft tissue swelling which may reflect cellulitis.  Partially visualized left parotitis with associated inflammatory changes.  - warm compresses  - cardiology evaluated for JOI- not a cnadidate  ID recommended 6 weeks of antibiotics- PICC x IV Vanc 6 weeks from cleared BCX E/D 2/13/25    # anemia  progressively worseing  cbc today stable  hold eliquis; monitor cbc; active type and screen  no s/o bruises  check b12, folate, iron studies, RC    # Hypernatremia sec to decrease oral intake- resolved  # Hypokalemia- resolved  - c/w  D5w  - calorie count  - ensure pudding and enlive  - monitor BMP    # Multinodular thyroid gland  -CT neck and soft tissues Since 10/2019, mild increase in multinodular thyroid gland measuring up to 6 cm, previously 5 cm.   - Thyroid Stimulating Hormone, Serum: 0.55 uIU/mL (01.02.25 @ 06:27)  - outpt thyroid US     # Paroxysmal afib, now in SR  - c/w cardizem, eliquis    # Parkinson's disease   - c/w sinemet, entacapone    # Full code    # Pending: monitor CBC, IR consulted for PICC placement, calorie count, restartin eliquis  # Disposition: [facility]    Time spent 50 mnts

## 2025-01-09 ENCOUNTER — TRANSCRIPTION ENCOUNTER (OUTPATIENT)
Age: 89
End: 2025-01-09

## 2025-01-09 LAB
ANION GAP SERPL CALC-SCNC: 9 MMOL/L — SIGNIFICANT CHANGE UP (ref 7–14)
APTT BLD: 34.6 SEC — SIGNIFICANT CHANGE UP (ref 27–39.2)
BLD GP AB SCN SERPL QL: SIGNIFICANT CHANGE UP
BUN SERPL-MCNC: 7 MG/DL — LOW (ref 10–20)
CALCIUM SERPL-MCNC: 7.8 MG/DL — LOW (ref 8.4–10.5)
CHLORIDE SERPL-SCNC: 101 MMOL/L — SIGNIFICANT CHANGE UP (ref 98–110)
CO2 SERPL-SCNC: 28 MMOL/L — SIGNIFICANT CHANGE UP (ref 17–32)
CREAT SERPL-MCNC: 0.5 MG/DL — LOW (ref 0.7–1.5)
CULTURE RESULTS: SIGNIFICANT CHANGE UP
CULTURE RESULTS: SIGNIFICANT CHANGE UP
EGFR: 88 ML/MIN/1.73M2 — SIGNIFICANT CHANGE UP
FERRITIN SERPL-MCNC: 219 NG/ML — SIGNIFICANT CHANGE UP (ref 13–330)
FOLATE SERPL-MCNC: 7.8 NG/ML — SIGNIFICANT CHANGE UP
GLUCOSE SERPL-MCNC: 99 MG/DL — SIGNIFICANT CHANGE UP (ref 70–99)
HCT VFR BLD CALC: 29.4 % — LOW (ref 37–47)
HGB BLD-MCNC: 8.8 G/DL — LOW (ref 12–16)
INR BLD: 1.65 RATIO — HIGH (ref 0.65–1.3)
MCHC RBC-ENTMCNC: 26.6 PG — LOW (ref 27–31)
MCHC RBC-ENTMCNC: 29.9 G/DL — LOW (ref 32–37)
MCV RBC AUTO: 88.8 FL — SIGNIFICANT CHANGE UP (ref 81–99)
NRBC # BLD: 0 /100 WBCS — SIGNIFICANT CHANGE UP (ref 0–0)
PLATELET # BLD AUTO: 266 K/UL — SIGNIFICANT CHANGE UP (ref 130–400)
PMV BLD: 10.2 FL — SIGNIFICANT CHANGE UP (ref 7.4–10.4)
POTASSIUM SERPL-MCNC: 3.9 MMOL/L — SIGNIFICANT CHANGE UP (ref 3.5–5)
POTASSIUM SERPL-SCNC: 3.9 MMOL/L — SIGNIFICANT CHANGE UP (ref 3.5–5)
PROTHROM AB SERPL-ACNC: 19.7 SEC — HIGH (ref 9.95–12.87)
RBC # BLD: 3.31 M/UL — LOW (ref 4.2–5.4)
RBC # FLD: 15.8 % — HIGH (ref 11.5–14.5)
SODIUM SERPL-SCNC: 138 MMOL/L — SIGNIFICANT CHANGE UP (ref 135–146)
SPECIMEN SOURCE: SIGNIFICANT CHANGE UP
SPECIMEN SOURCE: SIGNIFICANT CHANGE UP
VIT B12 SERPL-MCNC: 1357 PG/ML — HIGH (ref 232–1245)
WBC # BLD: 7.74 K/UL — SIGNIFICANT CHANGE UP (ref 4.8–10.8)
WBC # FLD AUTO: 7.74 K/UL — SIGNIFICANT CHANGE UP (ref 4.8–10.8)

## 2025-01-09 PROCEDURE — 99232 SBSQ HOSP IP/OBS MODERATE 35: CPT

## 2025-01-09 PROCEDURE — 36573 INSJ PICC RS&I 5 YR+: CPT | Mod: RT

## 2025-01-09 PROCEDURE — 99497 ADVNCD CARE PLAN 30 MIN: CPT

## 2025-01-09 RX ORDER — METOCLOPRAMIDE 10 MG/1
1 TABLET ORAL
Refills: 0 | DISCHARGE

## 2025-01-09 RX ORDER — ZOLPIDEM TARTRATE 5 MG
5 TABLET ORAL AT BEDTIME
Refills: 0 | Status: DISCONTINUED | OUTPATIENT
Start: 2025-01-10 | End: 2025-01-10

## 2025-01-09 RX ORDER — APIXABAN 5 MG/1
5 TABLET, FILM COATED ORAL EVERY 12 HOURS
Refills: 0 | Status: DISCONTINUED | OUTPATIENT
Start: 2025-01-09 | End: 2025-01-10

## 2025-01-09 RX ORDER — ONDANSETRON 4 MG/1
1 TABLET ORAL
Refills: 0 | DISCHARGE

## 2025-01-09 RX ORDER — DILTIAZEM HYDROCHLORIDE 300 MG/1
1 CAPSULE, COATED, EXTENDED RELEASE ORAL
Refills: 0 | DISCHARGE

## 2025-01-09 RX ORDER — ENTACAPONE 200 MG/1
1 TABLET, FILM COATED ORAL
Qty: 0 | Refills: 0 | DISCHARGE
Start: 2025-01-09

## 2025-01-09 RX ORDER — FERROUS SULFATE 325(65) MG
1 TABLET ORAL
Qty: 0 | Refills: 0 | DISCHARGE
Start: 2025-01-09

## 2025-01-09 RX ORDER — FERROUS SULFATE 325(65) MG
325 TABLET ORAL DAILY
Refills: 0 | Status: DISCONTINUED | OUTPATIENT
Start: 2025-01-09 | End: 2025-01-10

## 2025-01-09 RX ORDER — VANCOMYCIN HYDROCHLORIDE 5 G/100ML
1.25 INJECTION, POWDER, LYOPHILIZED, FOR SOLUTION INTRAVENOUS
Qty: 0 | Refills: 0 | DISCHARGE
Start: 2025-01-09 | End: 2025-02-13

## 2025-01-09 RX ADMIN — AMPICILLIN SODIUM AND SULBACTAM SODIUM 200 GRAM(S): 100; 50 INJECTION, POWDER, FOR SOLUTION INTRAVENOUS at 18:46

## 2025-01-09 RX ADMIN — ENTACAPONE 200 MILLIGRAM(S): 200 TABLET, FILM COATED ORAL at 13:41

## 2025-01-09 RX ADMIN — LIDOCAINE 1 PATCH: 50 OINTMENT TOPICAL at 13:48

## 2025-01-09 RX ADMIN — FAMOTIDINE 20 MILLIGRAM(S): 20 TABLET, FILM COATED ORAL at 18:47

## 2025-01-09 RX ADMIN — Medication 1 APPLICATION(S): at 05:55

## 2025-01-09 RX ADMIN — LEVODOPA AND CARBIDOPA 2 TABLET(S): 145; 36.25 CAPSULE, EXTENDED RELEASE ORAL at 13:41

## 2025-01-09 RX ADMIN — AMPICILLIN SODIUM AND SULBACTAM SODIUM 200 GRAM(S): 100; 50 INJECTION, POWDER, FOR SOLUTION INTRAVENOUS at 13:40

## 2025-01-09 RX ADMIN — DILTIAZEM HYDROCHLORIDE 360 MILLIGRAM(S): 300 CAPSULE, COATED, EXTENDED RELEASE ORAL at 05:54

## 2025-01-09 RX ADMIN — AMPICILLIN SODIUM AND SULBACTAM SODIUM 200 GRAM(S): 100; 50 INJECTION, POWDER, FOR SOLUTION INTRAVENOUS at 04:56

## 2025-01-09 RX ADMIN — ALPRAZOLAM 0.25 MILLIGRAM(S): 0.25 TABLET ORAL at 21:41

## 2025-01-09 RX ADMIN — LIDOCAINE 1 PATCH: 50 OINTMENT TOPICAL at 20:20

## 2025-01-09 RX ADMIN — Medication 1 APPLICATION(S): at 18:47

## 2025-01-09 RX ADMIN — SENNOSIDES 2 TABLET(S): 8.6 TABLET, FILM COATED ORAL at 21:41

## 2025-01-09 RX ADMIN — APIXABAN 5 MILLIGRAM(S): 5 TABLET, FILM COATED ORAL at 18:49

## 2025-01-09 RX ADMIN — VANCOMYCIN HYDROCHLORIDE 166.67 MILLIGRAM(S): 5 INJECTION, POWDER, LYOPHILIZED, FOR SOLUTION INTRAVENOUS at 20:32

## 2025-01-09 RX ADMIN — LEVODOPA AND CARBIDOPA 2 TABLET(S): 145; 36.25 CAPSULE, EXTENDED RELEASE ORAL at 05:55

## 2025-01-09 RX ADMIN — GABAPENTIN 100 MILLIGRAM(S): 300 CAPSULE ORAL at 05:55

## 2025-01-09 RX ADMIN — GABAPENTIN 100 MILLIGRAM(S): 300 CAPSULE ORAL at 18:47

## 2025-01-09 RX ADMIN — Medication 325 MILLIGRAM(S): at 13:42

## 2025-01-09 RX ADMIN — LEVODOPA AND CARBIDOPA 2 TABLET(S): 145; 36.25 CAPSULE, EXTENDED RELEASE ORAL at 18:47

## 2025-01-09 RX ADMIN — PANTOPRAZOLE 40 MILLIGRAM(S): 40 TABLET, DELAYED RELEASE ORAL at 05:55

## 2025-01-09 RX ADMIN — ENTACAPONE 200 MILLIGRAM(S): 200 TABLET, FILM COATED ORAL at 05:55

## 2025-01-09 RX ADMIN — OXYBUTYNIN CHLORIDE 5 MILLIGRAM(S): 5 TABLET ORAL at 13:42

## 2025-01-09 RX ADMIN — ENTACAPONE 200 MILLIGRAM(S): 200 TABLET, FILM COATED ORAL at 18:47

## 2025-01-09 RX ADMIN — FAMOTIDINE 20 MILLIGRAM(S): 20 TABLET, FILM COATED ORAL at 05:55

## 2025-01-09 NOTE — DISCHARGE NOTE PROVIDER - NSDCMRMEDTOKEN_GEN_ALL_CORE_FT
acetaminophen 325 mg oral tablet: 2 tab(s) orally every 6 hours, As needed, Temp greater or equal to 38.5C (101.3F), Mild Pain (1 - 3)  ALPRAZolam 0.25 mg oral tablet: 1 tab(s) orally once a day (at bedtime)  Aspercreme with Lidocaine 4% topical film: Apply topically to affected area once a day  bisacodyl 5 mg oral tablet: 1 tab(s) orally once a day  carbidopa/levodopa/entacapone 50 mg-200 mg-200 mg oral tablet: 1 tab(s) orally 4 times a day  DilTIAZem (Eqv-Cardizem CD) 360 mg/24 hours oral capsule, extended release: 1 cap(s) orally once a day  dilTIAZem 360 mg/24 hours oral capsule, extended release: 1 cap(s) orally once a day  Eliquis 5 mg oral tablet: 1 tab(s) orally 2 times a day   famotidine:   famotidine 40 mg oral tablet: 1 tab(s) orally once a day (at bedtime)  gabapentin 100 mg oral capsule: 1 cap(s) orally 2 times a day  lactulose 20 g oral powder for reconstitution: 1 packet(s) orally once a day as needed for  constipation  magnesium hydroxide: 4 times a day as needed for  constipation  meclizine 25 mg oral tablet: 1 tab(s) orally 3 times a day, As needed, Dizziness  menthol: apply topically for muscle rub  Mycostatin 100,000 units/mL oral suspension: 4 unit(s) orally 2 times a day  ondansetron 8 mg oral tablet: 1 tab(s) orally 3 times a day as needed for  nausea  oxyBUTYnin 5 mg oral tablet: 1 tab(s) orally once a day  preiguard: apply topically  Protonix 40 mg oral delayed release tablet: 1 tab(s) orally once a day  Reglan 5 mg oral tablet: 1 tab(s) orally 3 times a day as needed for  nausea  senna (sennosides) 17 mg oral tablet: 2 tab(s) orally once a day (at bedtime)  sucralfate: 3 times a day  zaleplon 10 mg oral capsule: 1 cap(s) orally once a day (at bedtime)   acetaminophen 325 mg oral tablet: 2 tab(s) orally every 6 hours, As needed, Temp greater or equal to 38.5C (101.3F), Mild Pain (1 - 3)  ALPRAZolam 0.25 mg oral tablet: 1 tab(s) orally once a day (at bedtime)  amoxicillin-clavulanate 875 mg-125 mg oral tablet: 875 milligram(s) orally 2 times a day end date till 1/14/25  Aspercreme with Lidocaine 4% topical film: Apply topically to affected area once a day  bisacodyl 5 mg oral tablet: 1 tab(s) orally once a day  carbidopa/levodopa/entacapone 50 mg-200 mg-200 mg oral tablet: 1 tab(s) orally 4 times a day  dilTIAZem 360 mg/24 hours oral capsule, extended release: 1 cap(s) orally once a day  Eliquis 5 mg oral tablet: 1 tab(s) orally 2 times a day   entacapone 200 mg oral tablet: 1 tab(s) orally 4 times a day  famotidine 40 mg oral tablet: 1 tab(s) orally once a day (at bedtime)  ferrous sulfate 325 mg (65 mg elemental iron) oral tablet: 1 tab(s) orally once a day  gabapentin 100 mg oral capsule: 1 cap(s) orally 2 times a day  lactulose 20 g oral powder for reconstitution: 1 packet(s) orally once a day as needed for  constipation  magnesium hydroxide: 4 times a day as needed for  constipation  meclizine 25 mg oral tablet: 1 tab(s) orally 3 times a day, As needed, Dizziness  menthol: apply topically for muscle rub  oxyBUTYnin 5 mg oral tablet: 1 tab(s) orally once a day  preiguard: apply topically  Protonix 40 mg oral delayed release tablet: 1 tab(s) orally once a day  senna (sennosides) 17 mg oral tablet: 2 tab(s) orally once a day (at bedtime)  sucralfate: 3 times a day  vancomycin 1.25 g intravenous injection: 1.25 gram(s) intravenous every 24 hours  zaleplon 10 mg oral capsule: 1 cap(s) orally once a day (at bedtime)   acetaminophen 325 mg oral tablet: 2 tab(s) orally every 6 hours, As needed, Temp greater or equal to 38.5C (101.3F), Mild Pain (1 - 3)  ALPRAZolam 0.25 mg oral tablet: 1 tab(s) orally once a day (at bedtime)  amoxicillin-clavulanate 875 mg-125 mg oral tablet: 875 milligram(s) orally 2 times a day end date till 1/14/25  Aspercreme with Lidocaine 4% topical film: Apply topically to affected area once a day  bisacodyl 5 mg oral tablet: 1 tab(s) orally once a day  carbidopa/levodopa/entacapone 50 mg-200 mg-200 mg oral tablet: 1 tab(s) orally 4 times a day  dilTIAZem 360 mg/24 hours oral capsule, extended release: 1 cap(s) orally once a day  Eliquis 5 mg oral tablet: 1 tab(s) orally 2 times a day   entacapone 200 mg oral tablet: 1 tab(s) orally 4 times a day  famotidine 40 mg oral tablet: 1 tab(s) orally once a day (at bedtime)  ferrous sulfate 325 mg (65 mg elemental iron) oral tablet: 1 tab(s) orally once a day  gabapentin 100 mg oral capsule: 1 cap(s) orally 2 times a day  lactulose 20 g oral powder for reconstitution: 1 packet(s) orally once a day as needed for  constipation  magnesium hydroxide: 4 times a day as needed for  constipation  meclizine 25 mg oral tablet: 1 tab(s) orally 3 times a day, As needed, Dizziness  menthol: apply topically for muscle rub  oxyBUTYnin 5 mg oral tablet: 1 tab(s) orally once a day  preiguard: apply topically  Protonix 40 mg oral delayed release tablet: 1 tab(s) orally once a day  senna (sennosides) 17 mg oral tablet: 2 tab(s) orally once a day (at bedtime)  sucralfate: 3 times a day  vancomycin 1.25 g intravenous injection: 1.25 gram(s) intravenous every 24 hours for 6 weeks; till 2/13/25  zaleplon 10 mg oral capsule: 1 cap(s) orally once a day (at bedtime)

## 2025-01-09 NOTE — PROGRESS NOTE ADULT - ASSESSMENT
89-year-old female with a past medical history Parkinson's, HLD, HTN, atrial fibrillation coming with complaints of swollen jaw. Found to have MRSA bacteremia 2/2 L parotitis. Admitted for further workup.     #MRSA bacteremia 2/2 L Parotitis   - WBC 18 on admission, now 8  - lactate 1.4 on admission   - CT temporal and Neck soft tissues 1/1 : Findings compatible with left parotitis N  - Per ENT: no acute interventions. Warm compresses    - BCx 1/1/2025: MRSA, cultures have been clear since 1/3.  - ID following-> C/W Vancomycin & Unasyn   - per cardio: pt is poor candidate for JOI given poor dentition and parotitis. Echo 1/3 WNL   - NPO since 1/9 midnight for PICC line placement, to be followed with 6 wks vanc. f/u IR    #anemia:  Hb stable today at 8.8. likely iron deficiency, TSAT = 9.8%. no apparent bruising anywhere, apart from b/l bruises on arms  -start ferrous sulfate 325mg po daily  -f/u ferritin, B12, folate  -resume Eliquis after PICC line placement  -CTAP if Hb continues to drop, monitor    #decreased PO intake  - speak with family regarding possible NG tube vs PEG tube placement.      #L periorbital edema- resolved  Medical team called by RN as pt was having worsening L orbital edema. Edema is improved on exam. CT orbit: Mild left periorbital soft tissue swelling which may reflect cellulitis.  -monitor    #hypernatremia- resolved  -likely due to dec PO intake  -c/w Ensure TID w/ calorie count given decreased PO intake since admission  -c/w D5W at 75cc/hr   -monitor BMP BID.      #multinodular thyroid gland  -CT neck and soft tissues Since 10/2019, mild increase in multinodular thyroid gland measuring up to 6 cm, previously 5 cm.   -TSH WNL   -outpt thyroid US     #Parkinson's  - C/W Sinemet 25/100 q6h  -c/w entacapone 200mg QID  -c/w britney 100mg BID      #atrial fibrillation on Eliquis   - C/W Eliquis 5mg BID   - C/W Cardizem   - Rate controlled     #anxiety  #insomnia  #dizziness  -c/w 0.25mg daily qhs   -c/w ambien 5mg qhs PRN  -c/w meclizine 25mg TID PRN for dizziness     #urinary retention   -c/w oxybutinin 5mg daily      #DVT ppx: on Eliquis 5 mg BID. held d/t downtrending Hb, but has stabilized. can resume after PICC line placement.  #GI ppx: protonix  #Activity: ATT   #Code: Full   dispo: medical floor    89-year-old female with a past medical history Parkinson's, HLD, HTN, atrial fibrillation coming with complaints of swollen jaw. Found to have MRSA bacteremia 2/2 L parotitis. Admitted for further workup.     #MRSA bacteremia 2/2 L Parotitis   - WBC 18 on admission, now 8  - lactate 1.4 on admission   - CT temporal and Neck soft tissues 1/1 : Findings compatible with left parotitis N  - Per ENT: no acute interventions. Warm compresses    - BCx 1/1/2025: MRSA, cultures have been neg since 1/3.  - ID following-> C/W Vancomycin & Unasyn   - per cardio: pt is poor candidate for JOI given poor dentition and parotitis. Echo 1/3 WNL   - PICC line placement today with IR,  - to be followed with 6 wks vanc.    #Hgb drop  #anemia  Hb stable 1/9 at 8.8. likely iron deficiency, TSAT = 9.8%. no apparent bruising anywhere, apart from b/l bruises on arms  -start ferrous sulfate 325mg po daily  -f/u ferritin, B12, folate  -resume Eliquis after PICC line placement  -monitor CBC  -CTAP if Hb continues to drop    #decreased PO intake  - will speak with family regarding possible NG tube placement and further wishes about PEG tube if it does not resolve    #L periorbital edema- resolved  Medical team called by RN as pt was having worsening L orbital edema. Edema is improved on physica exam.   CT orbit: Mild left periorbital soft tissue swelling which may reflect cellulitis.  -ENT: no intervention indicated   -c/w warm compresses if edema recurs     #hypernatremia- resolved  -likely due to dec PO intake  -c/w Ensure TID w/ calorie count given decreased PO intake since admission  -c/w D5W at 75cc/hr     #multinodular thyroid gland  -CT neck and soft tissues Since 10/2019, mild increase in multinodular thyroid gland measuring up to 6 cm, previously 5 cm.   -TSH WNL   -outpt thyroid US     #Parkinson's  - C/W Sinemet 25/100 q6h  -c/w entacapone 200mg QID  -c/w britney 100mg BID      #atrial fibrillation on Eliquis   - C/W Eliquis 5mg BID   - C/W Cardizem   - Rate controlled     #anxiety  #insomnia  #dizziness  -c/w 0.25mg daily qhs   -c/w ambien 5mg qhs PRN  -c/w meclizine 25mg TID PRN for dizziness     #urinary retention   -c/w oxybutinin 5mg daily      #DVT ppx: on Eliquis 5 mg BID. to be  resumed after PICC line placement.  #GI ppx: protonix  #Activity: ATT   #Code: Full   dispo: medical floor

## 2025-01-09 NOTE — DIETITIAN INITIAL EVALUATION ADULT - ORAL INTAKE PTA/DIET HISTORY
Patient noted to be confused/disoriented; RN was unavailable during time of RD visit; wa unable to get in contact with NH; spoke with patient's daughter Jo ([phone]) who recommended speaking with her sister Veronica ([phone]) who provided some patient history. Daughter reports patient has poor appetite at home usually eating very little of each meal every day with her mostly drinking nutrition supplements (did not specify). Per SLP note recommendations on 1/3/25, pureed texture with thin liquids, 1:1 feed, consider nutrition support 2/2 concerns for feeding oral diet with resting tremors. NKFA or Sikhism/cultural food restrictions.     Daughter reports not knowing patient's UBW; reports not knowing if there has been significant weight changes within the past 12 months. Recommend attempting to obtain further history at f/u. Daughter was opposed to the option of a tube feed regimen.     Per calorie count note data, patient has been consuming <25% of meals in hospital. No symptoms of N/V/D/Constipation per flowsheet. Last BM was on 1/9/25.

## 2025-01-09 NOTE — DISCHARGE NOTE PROVIDER - HOSPITAL COURSE
89 female past medical history Parkinson's, HLD, HTN, atrial fibrillation coming with complaints of swollen jaw.  Patient brought in from nursing home due to the concern of swollen Left check .Unable to obtain much history from patient as he is a poor historian. All the relative history from Ed Documentation . ED  spoke with daughter on the phone who reported that she heard the patient had a questionable fall due to the swelling of her left jaw.  On my exam patient is  AO x 1- 2  and is lethargic. poor oral dentition. Significant swelling and redness extend from behind the left ear down the left side of the neck, accompanied by tenderness over the left mastoid    VS:  Ed :BP :108 /59 mm Hg, HR :79 /min , temp :100.1 Degrees F  Labs :WBC 18.18  Neutrophil predom   C T temporal and Neck soft tissues : Findings compatible with left parotitis No acute pathology of the temporal bones.  Decreased bilateral mastoid effusions, larger on right. No drainable fluid collection.  Since 10/2019, mild increase in multinodular thyroid gland measuring up to 6 cm, previously 5 cm.     Rcvd cefepime , vanco , solumedrol , Toradol , 2L LR stat in Ed    admitted for further workup .    Hospital course: Pt was evaluated by ENT who recommended IV antibx and warm compresses but no surgical intervention indicated.  BCx + for MRSA  Pt was started on vanc and unasyn per ID.   TTE obtained due to endocarditis risk: Left ventricular ejection fraction, by visual estimation, is 55 to 60%. (Grade II diastolic dysfunction).Thickening and calcification of the anterior and posterior mitral valve leaflets. Moderate tricuspid regurgitation. moderate pulmonary hypertension.  ID rec JOI for further evaluation. Cardiology states pt not a candidate for JOI given parotitis and poor dentition. Per ID, pt to be given  vanc via PICC for 6 weeks until 2/13/25.   s/p PICC by IR.    Pt noted to have L periorbital swelling which was noted. CT orbit was obtained which shows mild left periorbital soft tissue swelling which may reflect cellulitis. ENT recommended war compresses but no surgical intervention. Pt already receiving antibx.  Anemia noted on labs with transferrin sat- 9%- PO iron started   Poor PO intake was observed throughout the admission. Family notes this has been a chronic issue over the last number of months and refused NG tube placement at this time.    Thyroid nodules to be f/u OP    Pt cleared for discharge by Dr. Borden.    #MRSA bacteremia 2/2 L Parotitis   - WBC 18 on admission, now 8  - lactate 1.4 on admission   - CT temporal and Neck soft tissues 1/1 : Findings compatible with left parotitis N  - Per ENT: no acute interventions. Warm compresses    - BCx 1/1/2025: MRSA, cultures have been neg since 1/3.  - ID following-> C/W Vancomycin & Unasyn   - per cardio: pt is poor candidate for JOI given poor dentition and parotitis. Echo 1/3 WNL   - s/p PICC line placement 1/9 with IR  - to be followed with 6 wks vanc.    #Hgb drop  #anemia  Hb stable 1/9 at 8.8. likely iron deficiency, TSAT = 9.8%. no apparent bruising anywhere, apart from b/l bruises on arms  -start ferrous sulfate 325mg po daily  - Eliquis resumed after PICC line placement  -monitor CBC  -CTAP if Hb continues to drop    #decreased PO intake  - spoke to family regarding possible NG tube placement- refusing at this time    #L periorbital edema- resolved  Medical team called by RN as pt was having worsening L orbital edema. Edema is improved on physica exam.   CT orbit: Mild left periorbital soft tissue swelling which may reflect cellulitis.  -ENT: no intervention indicated   -c/w warm compresses if edema recurs     #hypernatremia- resolved  -likely due to dec PO intake  -c/w Ensure TID w/ calorie count given decreased PO intake since admission    #multinodular thyroid gland  -CT neck and soft tissues Since 10/2019, mild increase in multinodular thyroid gland measuring up to 6 cm, previously 5 cm.   -TSH WNL   -outpt thyroid US     #Parkinson's  - C/W Sinemet 25/100 q6h  -c/w entacapone 200mg QID  -c/w britney 100mg BID      #atrial fibrillation on Eliquis   - C/W Eliquis 5mg BID   - C/W Cardizem   - Rate controlled     #anxiety  #insomnia  #dizziness  -c/w 0.25mg daily qhs   -c/w ambien 5mg qhs PRN  -c/w meclizine 25mg TID PRN for dizziness     #urinary retention   -c/w oxybutinin 5mg daily             89 female past medical history Parkinson's, HLD, HTN, atrial fibrillation coming with complaints of swollen jaw.  Patient brought in from nursing home due to the concern of swollen Left check .Unable to obtain much history from patient as he is a poor historian. All the relative history from Ed Documentation . ED  spoke with daughter on the phone who reported that she heard the patient had a questionable fall due to the swelling of her left jaw.  On my exam patient is  AO x 1- 2  and is lethargic. poor oral dentition. Significant swelling and redness extend from behind the left ear down the left side of the neck, accompanied by tenderness over the left mastoid    VS:  Ed :BP :108 /59 mm Hg, HR :79 /min , temp :100.1 Degrees F  Labs :WBC 18.18  Neutrophil predom   C T temporal and Neck soft tissues : Findings compatible with left parotitis No acute pathology of the temporal bones.  Decreased bilateral mastoid effusions, larger on right. No drainable fluid collection.  Since 10/2019, mild increase in multinodular thyroid gland measuring up to 6 cm, previously 5 cm.     Rcvd cefepime , vanco , solumedrol , Toradol , 2L LR stat in Ed    admitted for further workup .    Hospital course: Pt was evaluated by ENT who recommended IV antibx and warm compresses but no surgical intervention indicated.  BCx + for MRSA  Pt was started on vanc and unasyn per ID.   TTE obtained due to endocarditis risk: Left ventricular ejection fraction, by visual estimation, is 55 to 60%. (Grade II diastolic dysfunction).Thickening and calcification of the anterior and posterior mitral valve leaflets. Moderate tricuspid regurgitation. moderate pulmonary hypertension.  ID rec JOI for further evaluation. Cardiology states pt not a candidate for JOI given parotitis and poor dentition. Per ID, pt to be given  vanc via PICC for 6 weeks until 2/13/25.   s/p PICC by IR.    Pt noted to have L periorbital swelling which was noted. CT orbit was obtained which shows mild left periorbital soft tissue swelling which may reflect cellulitis. ENT recommended war compresses but no surgical intervention. Pt already receiving antibx.  Anemia noted on labs with transferrin sat- 9%- PO iron started   Poor PO intake was observed throughout the admission. Family notes this has been a chronic issue over the last number of months and refused NG tube placement at this time.    Thyroid nodules to be f/u OP    Pt cleared for discharge by Dr. Borden.    # MRSA bacteremia 2/2 L Parotitis   - CT temporal and Neck soft tissues 1/1 : Findings compatible with left parotitis N  - Per ENT: no acute interventions. Warm compresses    - BCx 1/1/2025: MRSA, cultures have been neg since 1/3.  - ID following->treated with Vancomycin & Unasyn   - per cardio: pt is poor candidate for JOI given poor dentition and parotitis. Echo 1/3 WNL   - s/p PICC line placement 1/9 with IR  - to be followed with 6 wks vanc till 2/13; augmentin for 2 weeks    #Hgb drop  #anemia  Hb stable 1/9 at 8.8. likely iron deficiency, TSAT = 9.8%. no apparent bruising anywhere, apart from b/l bruises on arms  -start ferrous sulfate 325mg po daily  - Eliquis resumed after PICC line placement  - hg stable; no evidence of blood loss  - start on PO iron supplement  -CTAP if Hb continues to drop    #decreased PO intake  - spoke to family regarding possible NG tube placement- refusing at this time  continue diet as tolerated    #L periorbital edema- resolved  Medical team called by RN as pt was having worsening L orbital edema. Edema is improved on physica exam.   CT orbit: Mild left periorbital soft tissue swelling which may reflect cellulitis.  -ENT: no intervention indicated   -c/w warm compresses if edema recurs     #hypernatremia- resolved  -likely due to dec PO intake  -c/w Ensure TID w/ calorie count given decreased PO intake since admission    #multinodular thyroid gland  -CT neck and soft tissues Since 10/2019, mild increase in multinodular thyroid gland measuring up to 6 cm, previously 5 cm.   -TSH WNL   -outpt thyroid US     #Parkinson's  - C/W Sinemet 25/100 q6h  -c/w entacapone 200mg QID  -c/w britney 100mg BID    #atrial fibrillation on Eliquis   - C/W Eliquis 5mg BID   - C/W Cardizem   - Rate controlled     #anxiety  #insomnia  #dizziness  -c/w 0.25mg daily qhs   -c/w ambien 5mg qhs PRN  -c/w meclizine 25mg TID PRN for dizziness     #urinary retention   -c/w oxybutinin 5mg daily

## 2025-01-09 NOTE — DISCHARGE NOTE PROVIDER - NSDCCPCAREPLAN_GEN_ALL_CORE_FT
PRINCIPAL DISCHARGE DIAGNOSIS  Diagnosis: Parotitis  Assessment and Plan of Treatment: You have been diagnosed with parotitis, an infection of the parotid glands, and a new peripherally inserted central catheter (PICC) line was placed yesterday to administer antibiotics.   You will continue receiving antibiotics through the PICC line as prescribed. It is important to keep the line clean and dry to prevent infection. If you notice any redness, swelling, or discomfort at the site, or if the line becomes dislodged, please contact your healthcare provider immediately.   Please see Dr. Franklin, the ENT doctor, when you leave. You are scheduled for follow-up telehealth appointments with Dr. Ritter, your infectious disease specialist, on Tuesdays, which are crucial for monitoring your progress and adjusting your treatment as necessary. In addition to taking your antibiotics, make sure to rest, stay hydrated, and use warm compresses on the affected side of your face to alleviate discomfort, but avoid squeezing or massaging the area. To help prevent future occurrences, maintain good oral hygiene, stay hydrated, and manage any conditions that could lead to dry mouth or infections under the care of your doctor. It is important to return to the hospital immediately if you experience increased pain, swelling, or redness in the affected area, a fever higher than 101°F, difficulty swallowing or breathing, or severe headache, dizziness, or confusion, as these could indicate complications requiring urgent medical attention. If you have any concerns or questions, please contact your healthcare team.      SECONDARY DISCHARGE DIAGNOSES  Diagnosis: Sepsis  Assessment and Plan of Treatment:      PRINCIPAL DISCHARGE DIAGNOSIS  Diagnosis: Parotitis  Assessment and Plan of Treatment: You have been diagnosed with parotitis, an infection of the parotid glands, and a new peripherally inserted central catheter (PICC) line was placed yesterday to administer antibiotics.   You will continue receiving antibiotics through the PICC line as prescribed. It is important to keep the line clean and dry to prevent infection. If you notice any redness, swelling, or discomfort at the site, or if the line becomes dislodged, please contact your healthcare provider immediately.   Please see Dr. Franklin, the ENT doctor, when you leave. You are scheduled for follow-up telehealth appointments with Dr. Ritter, your infectious disease specialist, on Tuesdays, which are crucial for monitoring your progress and adjusting your treatment as necessary. In addition to taking your antibiotics, make sure to rest, stay hydrated, and use warm compresses on the affected side of your face to alleviate discomfort, but avoid squeezing or massaging the area. To help prevent future occurrences, maintain good oral hygiene, stay hydrated, and manage any conditions that could lead to dry mouth or infections under the care of your doctor. It is important to return to the hospital immediately if you experience increased pain, swelling, or redness in the affected area, a fever higher than 101°F, difficulty swallowing or breathing, or severe headache, dizziness, or confusion, as these could indicate complications requiring urgent medical attention. If you have any concerns or questions, please contact your healthcare team.      SECONDARY DISCHARGE DIAGNOSES  Diagnosis: Goiter  Assessment and Plan of Treatment: #multinodular thyroid gland  -CT neck and soft tissues Since 10/2019, mild increase in multinodular thyroid gland measuring up to 6 cm, previously 5 cm.   -TSH WNL   -outpt thyroid US

## 2025-01-09 NOTE — DIETITIAN INITIAL EVALUATION ADULT - NSFNSNUTRCHEWSWALLOWFT_GEN_A_CORE
Per SLP note recommendations on 1/3/25, pureed texture with thin liquids, 1:1 feed, consider nutrition support 2/2 concerns for feeding oral diet with resting tremors.

## 2025-01-09 NOTE — PROGRESS NOTE ADULT - ATTENDING COMMENTS
please refer to H&P
89 female past medical history Parkinson's, HLD, HTN, atrial fibrillation coming with complaints of swollen jaw.  Patient brought in from nursing home due to the concern of swollen     # MRSA Bacteremia due to Left Parotitis   # left periorbital swelling  -  CT Temporal Bones w/ IV Cont (01.01.25 @ 16:14) >Findings compatible with left parotitis   - BCx 1/1/2025: MRSA  - blood Culture Results: No growth at 24 hours (01.03.25 @ 17:32)  - MRSA PCR Result.: Positive: By: Real-Time PCR (Polymerase Reaction Method) (01.05.25 @ 07:10)  - Sedimentation Rate, Erythrocyte: 40 mm/Hr (01.04.25 @ 11:28)  - C-Reactive Protein: 57.5 mg/L (01.04.25 @ 11:28)  -  TTE Echo Complete w/o Contrast w/ Doppler (01.03.25 @ 11:33) >Left ventricular ejection fraction, by visual estimation, is 55 to 60%. (Grade II diastolic dysfunction).Thickening and calcification of the anterior and posterior mitral valve leaflets. Moderate tricuspid regurgitation. moderate pulmonary hypertension.  - ID eval: c/w vancomycin , unasyn  - evaluated by ENT  - mupirocin to b/l nares for 5 days  - CT orbit- Mild left periorbital soft tissue swelling which may reflect cellulitis.  Partially visualized left parotitis with associated inflammatory changes.  - warm compresses  - cardiology evaluated for JOI- not a cnadidate  ID recommended 6 weeks of antibiotics- PICC x IV Vanc 6 weeks from cleared BCX E/D 2/13/25  - s/p PICC placement on 1/9    # anemia- possibly due to infection   cbc today stable  monitor cbc; active type and screen  no s/o bruises  check b12, folate, iron studies, RC  transferrin sat- 9%; start on PO iron    # decreased PO intake  # Hypernatremia sec to decrease oral intake- resolved  # Hypokalemia- resolved  - calorie count  - ensure pudding and enlive  - monitor BMP  discussed with daughter in detail regarding PO intake. family declined options for tube placement. patient will be continued on diet as toelrated.     # Multinodular thyroid gland  -CT neck and soft tissues Since 10/2019, mild increase in multinodular thyroid gland measuring up to 6 cm, previously 5 cm.   - Thyroid Stimulating Hormone, Serum: 0.55 uIU/mL (01.02.25 @ 06:27)  - outpt thyroid US     # Paroxysmal afib, now in SR  - c/w cardizem, eliquis    # Parkinson's disease   - c/w sinemet, entacapone    # Full code    # Pending: monitor cbc; dispo plans  # Disposition: [facility]    Time spent 35 mnts

## 2025-01-09 NOTE — DISCHARGE NOTE PROVIDER - NSDCFUADDAPPT_GEN_ALL_CORE_FT
APPTS ARE READY TO BE MADE: [x ] YES    Best Family or Patient Contact (if needed):    Additional Information about above appointments (if needed):    1: ENT  2: Infectious disease- TUESDAYS BY TELEHEALTH  3: PCP  4: endocrinology     Other comments or requests:

## 2025-01-09 NOTE — DIETITIAN INITIAL EVALUATION ADULT - PERSON TAUGHT/METHOD
Discussed current diet order; explained rational for tube feed regimen/verbal instruction/daughter instructed

## 2025-01-09 NOTE — PROGRESS NOTE ADULT - CONVERSATION DETAILS
goals of care discussed with daughter Veronica- [phone]  she is unclear what to do now. I have explained the resuscitation measures. She would think about for the decision.  FULL CODE for now  Discussed decreased PO intake. Family aware and is an ongoing problem. Family declined any tube feeding plans. They want to continue with Po intake as tolerated. Discussed chance of Estefany, electrolyte imbalance, poor wound healing with daughter with decreased calorie intake

## 2025-01-09 NOTE — DISCHARGE NOTE PROVIDER - CARE PROVIDERS DIRECT ADDRESSES
[email or web address]

## 2025-01-09 NOTE — DIETITIAN INITIAL EVALUATION ADULT - PERTINENT MEDS FT
MEDICATIONS  (STANDING):  ALPRAZolam 0.25 milliGRAM(s) Oral at bedtime  ampicillin/sulbactam  IVPB 3 Gram(s) IV Intermittent every 6 hours  apixaban 5 milliGRAM(s) Oral every 12 hours  carbidopa/levodopa  25/100 2 Tablet(s) Oral four times a day  dextrose 5%. 1000 milliLiter(s) (75 mL/Hr) IV Continuous <Continuous>  diltiazem    milliGRAM(s) Oral daily  entacapone 200 milliGRAM(s) Oral four times a day  famotidine    Tablet 20 milliGRAM(s) Oral two times a day  ferrous    sulfate 325 milliGRAM(s) Oral daily  gabapentin 100 milliGRAM(s) Oral two times a day  lidocaine   4% Patch 1 Patch Transdermal every 24 hours  mupirocin 2% Nasal 1 Application(s) Both Nostrils two times a day  oxybutynin 5 milliGRAM(s) Oral daily  pantoprazole    Tablet 40 milliGRAM(s) Oral before breakfast  senna 2 Tablet(s) Oral at bedtime  vancomycin  IVPB 1250 milliGRAM(s) IV Intermittent every 24 hours    MEDICATIONS  (PRN):  acetaminophen     Tablet .. 650 milliGRAM(s) Oral every 6 hours PRN Mild Pain (1 - 3), Moderate Pain (4 - 6), Severe Pain (7 - 10)  bisacodyl 5 milliGRAM(s) Oral every 12 hours PRN Constipation  meclizine 25 milliGRAM(s) Oral three times a day PRN Dizziness  zolpidem 5 milliGRAM(s) Oral at bedtime PRN Insomnia

## 2025-01-09 NOTE — DIETITIAN INITIAL EVALUATION ADULT - NS FNS DIET ORDER
Diet, Pureed:   Supplement Feeding Modality:  Oral  Ensure Enlive Cans or Servings Per Day:  1       Frequency:  Three Times a day (01-08-25 @ 09:10) [Active]

## 2025-01-09 NOTE — CHART NOTE - NSCHARTNOTEFT_GEN_A_CORE
Calorie count data for 1/6/25-1/8/25 is as follows:    1/6/25  Breakfast- Patient did not eat anything    Lunch- not documented    Dinner not documented    Total for the day: 0kcal, 0g protein    1/7/25  Patient was NPO for the entire day    1/8/25  Breakfast- patient was NPO     Lunch-  Meat 25% 56kcal, 7g protein  Soup 25% 20kcal, 0g protein  Supplement 25% 87kcal, 5g protein    Dinner-  Meat 25% 60kcal, 5g protein  Vegetable 25% 20kcal, 0g protein  Soup 25% 20kcal, 0g protein    Total for the day: 263kcal, 17g protein     Average intake for 3 day period: 87kcal, 6g protein per day; patient is meeting <25% of estimated nutrient needs via PO intake    Dayday Figueroa RD via Teams

## 2025-01-09 NOTE — DISCHARGE NOTE PROVIDER - NSDCFUADDINST_GEN_ALL_CORE_FT
monitor cbc, cmp, ESR, CRP, vancomycin trough level weekly while on antibiotics  Follow up with Infectious disease

## 2025-01-09 NOTE — DIETITIAN INITIAL EVALUATION ADULT - OTHER INFO
Patient is a 89 female past medical history Parkinson's, HLD, HTN, atrial fibrillation coming with complaints of swollen jaw. Patient noted with anemia, hypernatremia (resolved), hypokalemia (resolved), elevated CRP.

## 2025-01-09 NOTE — PROGRESS NOTE ADULT - SUBJECTIVE AND OBJECTIVE BOX
24H events:    Patient is a 89y old Female who presents with a chief complaint of left mastoid swelling (08 Jan 2025 16:29)    Primary diagnosis of MRSA bacteremia 2/2 L Parotitis.    Today is hospital day 8d. This morning patient was seen and examined at bedside, resting in bed.    No acute or major events overnight.    Code Status:    PAST MEDICAL & SURGICAL HISTORY  Parkinson disease    HTN (hypertension)    Rheumatoid arthritis    Hyperlipidemia    CRAO (central retinal artery occlusion)  Left    Atrial fibrillation    History of hysterectomy    Status post placement of implantable loop recorder  01/2021    S/P hernia repair      SOCIAL HISTORY:  Social History:      ALLERGIES:  No Known Allergies    MEDICATIONS:  STANDING MEDICATIONS  ALPRAZolam 0.25 milliGRAM(s) Oral at bedtime  ampicillin/sulbactam  IVPB 3 Gram(s) IV Intermittent every 6 hours  carbidopa/levodopa  25/100 2 Tablet(s) Oral four times a day  dextrose 5%. 1000 milliLiter(s) IV Continuous <Continuous>  diltiazem    milliGRAM(s) Oral daily  entacapone 200 milliGRAM(s) Oral four times a day  famotidine    Tablet 20 milliGRAM(s) Oral two times a day  ferrous    sulfate 325 milliGRAM(s) Oral daily  gabapentin 100 milliGRAM(s) Oral two times a day  lidocaine   4% Patch 1 Patch Transdermal every 24 hours  mupirocin 2% Nasal 1 Application(s) Both Nostrils two times a day  oxybutynin 5 milliGRAM(s) Oral daily  pantoprazole    Tablet 40 milliGRAM(s) Oral before breakfast  senna 2 Tablet(s) Oral at bedtime  vancomycin  IVPB 1250 milliGRAM(s) IV Intermittent every 24 hours    PRN MEDICATIONS  acetaminophen     Tablet .. 650 milliGRAM(s) Oral every 6 hours PRN  bisacodyl 5 milliGRAM(s) Oral every 12 hours PRN  meclizine 25 milliGRAM(s) Oral three times a day PRN  zolpidem 5 milliGRAM(s) Oral at bedtime PRN    VITALS:   T(F): 98.2  HR: 65  BP: 108/57  RR: 18  SpO2: 97%    PHYSICAL EXAM:  GENERAL: NAD, lying in bed comfortably  HEAD: atraumatic, normocephalic  NECK: supple  HEART: regular rhythm  LUNGS: unlabored respirations, b/l air entry   ABDOMEN: BS (+), soft, nontender, nondistended  NERVOUS SYSTEM: A&O x 3      LABS:                        8.8    7.74  )-----------( 266      ( 09 Jan 2025 06:00 )             29.4     01-09    138  |  101  |  7[L]  ----------------------------<  99  3.9   |  28  |  0.5[L]    Ca    7.8[L]      09 Jan 2025 06:00      PT/INR - ( 09 Jan 2025 06:00 )   PT: 19.70 sec;   INR: 1.65 ratio         PTT - ( 09 Jan 2025 06:00 )  PTT:34.6 sec  Urinalysis Basic - ( 09 Jan 2025 06:00 )    Color: x / Appearance: x / SG: x / pH: x  Gluc: 99 mg/dL / Ketone: x  / Bili: x / Urobili: x   Blood: x / Protein: x / Nitrite: x   Leuk Esterase: x / RBC: x / WBC x   Sq Epi: x / Non Sq Epi: x / Bacteria: x    Sodium: 138 mmol/L (01.08.25 @ 06:45)   Sodium: 145 mmol/L (01.07.25 @ 06:58)   Sodium: 148 mmol/L (01.06.25 @ 07:01)   Sodium: 149 mmol/L (01.05.25 @ 06:17)     Potassium: 3.8 mmol/L (01.06.25 @ 07:01)   Potassium: 3.3 mmol/L (01.05.25 @ 06:17)     Hemoglobin: 8.8 g/dL (01.09.25 @ 06:00)   Hemoglobin: 9.0 g/dL (01.08.25 @ 11:31)   Hemoglobin: 8.6 g/dL (01.08.25 @ 06:45)   Hemoglobin: 9.6 g/dL (01.07.25 @ 06:58)   Hemoglobin: 10.0 g/dL (01.05.25 @ 06:17)   Hemoglobin: 10.4 g/dL (01.03.25 @ 06:16)   Hemoglobin: 12.3 g/dL (01.02.25 @ 06:27)   Hemoglobin: 12.9 g/dL (01.01.25 @ 15:52)     Iron with Total Binding Capacity in AM (01.08.25 @ 16:53)   Iron - Total Binding Capacity.: 122 ug/dL  % Saturation, Iron: 10 %  Iron Total: 12 ug/dL  Unsaturated Iron Binding Capacity: 110 ug/dL  Transferrin, Serum: 108 mg/dL        RADIOLOGY:    CT NECK SOFT TISSUE IC 1/1/25  IMPRESSION:  Findings compatible with left parotitis. No drainable fluid collection.    Since 10/2019, mild increase in multinodular thyroid gland measuring up   to 6 cm, previously 5 cm. May consider nonemergent thyroid outpatient   thyroid ultrasound follow-up as clinically indicated.    CT TEMPORAL BONES IC 1/1/25  IMPRESSION:    Findings compatible with left parotitis (please see CT soft tissue neck   for further evaluation).  No acute pathology of the temporal bones.  Decreased bilateral mastoid effusions, larger on right.    CT ORBITS IC 1/7/25  IMPRESSION:  Mild left periorbital soft tissue swelling which may reflect cellulitis.    Partially visualized left parotitis with associated inflammatory changes.    ECHO TTE WO CON COMP W DOPP 1/3/25    Left ventricular ejection fraction, by visual estimation, is 55 to 60%. Spectral Doppler shows pseudonormal pattern of left ventricular myocardial filling (Grade II diastolic dysfunction). Thickening and calcification of the anterior and posterior mitral valve leaflets.   Moderate mitral valve regurgitation is seen.  Moderate tricuspid regurgitation is visualized. Estimated pulmonary artery systolic pressure is 50.3 mmHg assuming a right atrial pressure of 3 mmHg, which is consistent with moderate pulmonary hypertension.     24H events:    Patient is a 89y old Female who presents with a chief complaint of left mastoid swelling (08 Jan 2025 16:29)    Primary diagnosis of MRSA bacteremia 2/2 L Parotitis.    Today is hospital day 8d. This morning patient was seen and examined at bedside, resting in bed.    No acute or major events overnight.    Code Status:    PAST MEDICAL & SURGICAL HISTORY  Parkinson disease    HTN (hypertension)    Rheumatoid arthritis    Hyperlipidemia    CRAO (central retinal artery occlusion)  Left    Atrial fibrillation    History of hysterectomy    Status post placement of implantable loop recorder  01/2021    S/P hernia repair      SOCIAL HISTORY:  Social History:      ALLERGIES:  No Known Allergies    MEDICATIONS:  STANDING MEDICATIONS  ALPRAZolam 0.25 milliGRAM(s) Oral at bedtime  ampicillin/sulbactam  IVPB 3 Gram(s) IV Intermittent every 6 hours  carbidopa/levodopa  25/100 2 Tablet(s) Oral four times a day  dextrose 5%. 1000 milliLiter(s) IV Continuous <Continuous>  diltiazem    milliGRAM(s) Oral daily  entacapone 200 milliGRAM(s) Oral four times a day  famotidine    Tablet 20 milliGRAM(s) Oral two times a day  ferrous    sulfate 325 milliGRAM(s) Oral daily  gabapentin 100 milliGRAM(s) Oral two times a day  lidocaine   4% Patch 1 Patch Transdermal every 24 hours  mupirocin 2% Nasal 1 Application(s) Both Nostrils two times a day  oxybutynin 5 milliGRAM(s) Oral daily  pantoprazole    Tablet 40 milliGRAM(s) Oral before breakfast  senna 2 Tablet(s) Oral at bedtime  vancomycin  IVPB 1250 milliGRAM(s) IV Intermittent every 24 hours    PRN MEDICATIONS  acetaminophen     Tablet .. 650 milliGRAM(s) Oral every 6 hours PRN  bisacodyl 5 milliGRAM(s) Oral every 12 hours PRN  meclizine 25 milliGRAM(s) Oral three times a day PRN  zolpidem 5 milliGRAM(s) Oral at bedtime PRN    VITALS:   T(F): 98.2  HR: 65  BP: 108/57  RR: 18  SpO2: 97%    PHYSICAL EXAM:  GENERAL: NAD, lying in bed comfortably  HEENT: atraumatic, normocephalic, mild left mandibular swelling  HEART: regular rhythm  LUNGS: unlabored respirations, b/l air entry   ABDOMEN: BS (+), soft, nontender, nondistended  NERURO: awake, AAOx1-2      LABS:                        8.8    7.74  )-----------( 266      ( 09 Jan 2025 06:00 )             29.4     01-09    138  |  101  |  7[L]  ----------------------------<  99  3.9   |  28  |  0.5[L]    Ca    7.8[L]      09 Jan 2025 06:00      PT/INR - ( 09 Jan 2025 06:00 )   PT: 19.70 sec;   INR: 1.65 ratio         PTT - ( 09 Jan 2025 06:00 )  PTT:34.6 sec  Urinalysis Basic - ( 09 Jan 2025 06:00 )    Color: x / Appearance: x / SG: x / pH: x  Gluc: 99 mg/dL / Ketone: x  / Bili: x / Urobili: x   Blood: x / Protein: x / Nitrite: x   Leuk Esterase: x / RBC: x / WBC x   Sq Epi: x / Non Sq Epi: x / Bacteria: x    Sodium: 138 mmol/L (01.08.25 @ 06:45)   Sodium: 145 mmol/L (01.07.25 @ 06:58)   Sodium: 148 mmol/L (01.06.25 @ 07:01)   Sodium: 149 mmol/L (01.05.25 @ 06:17)     Potassium: 3.8 mmol/L (01.06.25 @ 07:01)   Potassium: 3.3 mmol/L (01.05.25 @ 06:17)     Hemoglobin: 8.8 g/dL (01.09.25 @ 06:00)   Hemoglobin: 9.0 g/dL (01.08.25 @ 11:31)   Hemoglobin: 8.6 g/dL (01.08.25 @ 06:45)   Hemoglobin: 9.6 g/dL (01.07.25 @ 06:58)   Hemoglobin: 10.0 g/dL (01.05.25 @ 06:17)   Hemoglobin: 10.4 g/dL (01.03.25 @ 06:16)   Hemoglobin: 12.3 g/dL (01.02.25 @ 06:27)   Hemoglobin: 12.9 g/dL (01.01.25 @ 15:52)     Iron with Total Binding Capacity in AM (01.08.25 @ 16:53)   Iron - Total Binding Capacity.: 122 ug/dL  % Saturation, Iron: 10 %  Iron Total: 12 ug/dL  Unsaturated Iron Binding Capacity: 110 ug/dL  Transferrin, Serum: 108 mg/dL        RADIOLOGY:    CT NECK SOFT TISSUE IC 1/1/25  IMPRESSION:  Findings compatible with left parotitis. No drainable fluid collection.    Since 10/2019, mild increase in multinodular thyroid gland measuring up   to 6 cm, previously 5 cm. May consider nonemergent thyroid outpatient   thyroid ultrasound follow-up as clinically indicated.    CT TEMPORAL BONES IC 1/1/25  IMPRESSION:    Findings compatible with left parotitis (please see CT soft tissue neck   for further evaluation).  No acute pathology of the temporal bones.  Decreased bilateral mastoid effusions, larger on right.    CT ORBITS IC 1/7/25  IMPRESSION:  Mild left periorbital soft tissue swelling which may reflect cellulitis.    Partially visualized left parotitis with associated inflammatory changes.    ECHO TTE WO CON COMP W DOPP 1/3/25    Left ventricular ejection fraction, by visual estimation, is 55 to 60%. Spectral Doppler shows pseudonormal pattern of left ventricular myocardial filling (Grade II diastolic dysfunction). Thickening and calcification of the anterior and posterior mitral valve leaflets.   Moderate mitral valve regurgitation is seen.  Moderate tricuspid regurgitation is visualized. Estimated pulmonary artery systolic pressure is 50.3 mmHg assuming a right atrial pressure of 3 mmHg, which is consistent with moderate pulmonary hypertension.

## 2025-01-09 NOTE — DIETITIAN INITIAL EVALUATION ADULT - ADD RECOMMEND
Interventions:  -Continue with current diet order; pureed texture with thin liquids; Ensure Plus HP 3x/day (provides 350kcal, 20g protein each)  -If family decides to agree with initiating EN, recommend Jevity 1.2 bolus via PEG at 150mL bolus q6hrs (increase rate by 50mL q6hrs as tolerated) with goal rate of 296mL bolus q6hrs (at goal rate, provides 1186mL total volume, 1422kcal, 66g protein, 956mL free water); add 80mL free water flush pre/post feeds; add Prosource 2x/day (provides 90kcal, 15g protein each)  -Coordination of care    Monitoring:  -PO intake  -Diet/texture tolerance  -Weight  -Nutrition related labs  -Nutrition focused physical findings    High Nutrition Risk f/u

## 2025-01-09 NOTE — DISCHARGE NOTE PROVIDER - CARE PROVIDER_API CALL
Gilson Franklin  Otolaryngology  [address] [place] [address]  Phone: [phone]  Fax: [phone]  Follow Up Time: 1 week    Deshawn Tierney  [facility]  [address] [place] [address]  Phone: [phone]  Fax: [phone]  Established Patient  Follow Up Time: 1 week    Lauri Ritter  [address] [place] [address]  Phone: [phone]  Fax: [phone]  Follow Up Time: 1 week    Abdiel Mistry  Endocrinology/Metab/Diabetes  [address] [place] [address]  Phone: [phone]  Fax: [phone]  Follow Up Time: 2 weeks

## 2025-01-09 NOTE — DISCHARGE NOTE PROVIDER - PROVIDER TOKENS
PROVIDER:[TOKEN:[[id number]],FOLLOWUP:[1 week]],PROVIDER:[TOKEN:[[id number]],FOLLOWUP:[1 week],ESTABLISHEDPATIENT:[T]],FREE:[LAST:[Riya],FIRST:[Lauri],PHONE:[phone]],FAX:[phone]],ADDRESS:[[address] [place] [address]],FOLLOWUP:[1 week]],PROVIDER:[TOKEN:[[id number]],FOLLOWUP:[2 weeks]]

## 2025-01-09 NOTE — DIETITIAN INITIAL EVALUATION ADULT - PERTINENT LABORATORY DATA
01-09    138  |  101  |  7[L]  ----------------------------<  99  3.9   |  28  |  0.5[L]    Ca    7.8[L]      09 Jan 2025 06:00

## 2025-01-10 ENCOUNTER — TRANSCRIPTION ENCOUNTER (OUTPATIENT)
Age: 89
End: 2025-01-10

## 2025-01-10 VITALS
RESPIRATION RATE: 18 BRPM | HEART RATE: 70 BPM | TEMPERATURE: 98 F | SYSTOLIC BLOOD PRESSURE: 116 MMHG | OXYGEN SATURATION: 100 % | DIASTOLIC BLOOD PRESSURE: 56 MMHG

## 2025-01-10 LAB
CULTURE RESULTS: SIGNIFICANT CHANGE UP
CULTURE RESULTS: SIGNIFICANT CHANGE UP
HCT VFR BLD CALC: 28.1 % — LOW (ref 37–47)
HCT VFR BLD CALC: 29.2 % — LOW (ref 37–47)
HGB BLD-MCNC: 8.4 G/DL — LOW (ref 12–16)
HGB BLD-MCNC: 8.7 G/DL — LOW (ref 12–16)
MCHC RBC-ENTMCNC: 26.5 PG — LOW (ref 27–31)
MCHC RBC-ENTMCNC: 26.8 PG — LOW (ref 27–31)
MCHC RBC-ENTMCNC: 29.8 G/DL — LOW (ref 32–37)
MCHC RBC-ENTMCNC: 29.9 G/DL — LOW (ref 32–37)
MCV RBC AUTO: 89 FL — SIGNIFICANT CHANGE UP (ref 81–99)
MCV RBC AUTO: 89.8 FL — SIGNIFICANT CHANGE UP (ref 81–99)
NRBC # BLD: 0 /100 WBCS — SIGNIFICANT CHANGE UP (ref 0–0)
NRBC # BLD: 0 /100 WBCS — SIGNIFICANT CHANGE UP (ref 0–0)
PLATELET # BLD AUTO: 243 K/UL — SIGNIFICANT CHANGE UP (ref 130–400)
PLATELET # BLD AUTO: 271 K/UL — SIGNIFICANT CHANGE UP (ref 130–400)
PMV BLD: 10 FL — SIGNIFICANT CHANGE UP (ref 7.4–10.4)
PMV BLD: 9.8 FL — SIGNIFICANT CHANGE UP (ref 7.4–10.4)
RBC # BLD: 3.13 M/UL — LOW (ref 4.2–5.4)
RBC # BLD: 3.28 M/UL — LOW (ref 4.2–5.4)
RBC # FLD: 15.7 % — HIGH (ref 11.5–14.5)
RBC # FLD: 15.9 % — HIGH (ref 11.5–14.5)
SPECIMEN SOURCE: SIGNIFICANT CHANGE UP
SPECIMEN SOURCE: SIGNIFICANT CHANGE UP
WBC # BLD: 7.71 K/UL — SIGNIFICANT CHANGE UP (ref 4.8–10.8)
WBC # BLD: 8.93 K/UL — SIGNIFICANT CHANGE UP (ref 4.8–10.8)
WBC # FLD AUTO: 7.71 K/UL — SIGNIFICANT CHANGE UP (ref 4.8–10.8)
WBC # FLD AUTO: 8.93 K/UL — SIGNIFICANT CHANGE UP (ref 4.8–10.8)

## 2025-01-10 PROCEDURE — 99239 HOSP IP/OBS DSCHRG MGMT >30: CPT

## 2025-01-10 RX ORDER — AMOXICILLIN/POTASSIUM CLAV 875-125 MG
875 TABLET ORAL
Qty: 8 | Refills: 0
Start: 2025-01-10 | End: 2025-01-13

## 2025-01-10 RX ORDER — CHLORHEXIDINE GLUCONATE 1.2 MG/ML
1 RINSE ORAL
Refills: 0 | Status: DISCONTINUED | OUTPATIENT
Start: 2025-01-10 | End: 2025-01-10

## 2025-01-10 RX ADMIN — LEVODOPA AND CARBIDOPA 2 TABLET(S): 145; 36.25 CAPSULE, EXTENDED RELEASE ORAL at 06:04

## 2025-01-10 RX ADMIN — AMPICILLIN SODIUM AND SULBACTAM SODIUM 200 GRAM(S): 100; 50 INJECTION, POWDER, FOR SOLUTION INTRAVENOUS at 00:02

## 2025-01-10 RX ADMIN — AMPICILLIN SODIUM AND SULBACTAM SODIUM 200 GRAM(S): 100; 50 INJECTION, POWDER, FOR SOLUTION INTRAVENOUS at 13:34

## 2025-01-10 RX ADMIN — ENTACAPONE 200 MILLIGRAM(S): 200 TABLET, FILM COATED ORAL at 18:12

## 2025-01-10 RX ADMIN — Medication 1 APPLICATION(S): at 06:05

## 2025-01-10 RX ADMIN — APIXABAN 5 MILLIGRAM(S): 5 TABLET, FILM COATED ORAL at 18:11

## 2025-01-10 RX ADMIN — APIXABAN 5 MILLIGRAM(S): 5 TABLET, FILM COATED ORAL at 06:05

## 2025-01-10 RX ADMIN — LEVODOPA AND CARBIDOPA 2 TABLET(S): 145; 36.25 CAPSULE, EXTENDED RELEASE ORAL at 13:33

## 2025-01-10 RX ADMIN — FAMOTIDINE 20 MILLIGRAM(S): 20 TABLET, FILM COATED ORAL at 06:20

## 2025-01-10 RX ADMIN — GABAPENTIN 100 MILLIGRAM(S): 300 CAPSULE ORAL at 18:11

## 2025-01-10 RX ADMIN — ENTACAPONE 200 MILLIGRAM(S): 200 TABLET, FILM COATED ORAL at 00:02

## 2025-01-10 RX ADMIN — PANTOPRAZOLE 40 MILLIGRAM(S): 40 TABLET, DELAYED RELEASE ORAL at 06:05

## 2025-01-10 RX ADMIN — LEVODOPA AND CARBIDOPA 2 TABLET(S): 145; 36.25 CAPSULE, EXTENDED RELEASE ORAL at 18:13

## 2025-01-10 RX ADMIN — CHLORHEXIDINE GLUCONATE 1 APPLICATION(S): 1.2 RINSE ORAL at 13:36

## 2025-01-10 RX ADMIN — GABAPENTIN 100 MILLIGRAM(S): 300 CAPSULE ORAL at 06:04

## 2025-01-10 RX ADMIN — DILTIAZEM HYDROCHLORIDE 360 MILLIGRAM(S): 300 CAPSULE, COATED, EXTENDED RELEASE ORAL at 06:04

## 2025-01-10 RX ADMIN — Medication 325 MILLIGRAM(S): at 13:34

## 2025-01-10 RX ADMIN — LEVODOPA AND CARBIDOPA 2 TABLET(S): 145; 36.25 CAPSULE, EXTENDED RELEASE ORAL at 00:02

## 2025-01-10 RX ADMIN — OXYBUTYNIN CHLORIDE 5 MILLIGRAM(S): 5 TABLET ORAL at 13:34

## 2025-01-10 RX ADMIN — AMPICILLIN SODIUM AND SULBACTAM SODIUM 200 GRAM(S): 100; 50 INJECTION, POWDER, FOR SOLUTION INTRAVENOUS at 06:04

## 2025-01-10 RX ADMIN — ENTACAPONE 200 MILLIGRAM(S): 200 TABLET, FILM COATED ORAL at 13:34

## 2025-01-10 RX ADMIN — LIDOCAINE 1 PATCH: 50 OINTMENT TOPICAL at 01:41

## 2025-01-10 RX ADMIN — FAMOTIDINE 20 MILLIGRAM(S): 20 TABLET, FILM COATED ORAL at 18:11

## 2025-01-10 RX ADMIN — ENTACAPONE 200 MILLIGRAM(S): 200 TABLET, FILM COATED ORAL at 06:04

## 2025-01-10 NOTE — DISCHARGE NOTE NURSING/CASE MANAGEMENT/SOCIAL WORK - PATIENT PORTAL LINK FT
You can access the FollowMyHealth Patient Portal offered by [facility] by registering at the following website: [email or web address]. By joining [employer]’s FollowMyHealth portal, you will also be able to view your health information using other applications (apps) compatible with our system.

## 2025-01-10 NOTE — PROGRESS NOTE ADULT - SUBJECTIVE AND OBJECTIVE BOX
JON GARCÍA  89y, Female  Allergy: No Known Allergies      LOS  9d    CHIEF COMPLAINT: left mastoid swelling (10 Omer 2025 12:14)      INTERVAL EVENTS/HPI  - No acute events overnight  - T(F): , Max: 98.8 (01-10-25 @ 00:29)  - Denies any worsening symptoms  - Tolerating medication  - WBC Count: 8.93 (01-10-25 @ 11:06)  WBC Count: 7.71 (01-10-25 @ 05:34)     - Creatinine: 0.5 (01-09-25 @ 06:00)       ROS  General: Denies rigors, nightsweats  HEENT: Denies headache, rhinorrhea, sore throat, eye pain  CV: Denies CP, palpitations  PULM: Denies wheezing, hemoptysis  GI: Denies hematemesis, hematochezia, melena  : Denies discharge, hematuria  MSK: Denies arthralgias, myalgias  SKIN: Denies rash, lesions  NEURO: Denies paresthesias, weakness  PSYCH: Denies depression, anxiety    VITALS:  T(F): 98, Max: 98.8 (01-10-25 @ 00:29)  HR: 63  BP: 97/50  RR: 19Vital Signs Last 24 Hrs  T(C): 36.7 (10 Omer 2025 08:00), Max: 37.1 (10 Omer 2025 00:29)  T(F): 98 (10 Omer 2025 08:00), Max: 98.8 (10 Omer 2025 00:29)  HR: 63 (10 Omer 2025 08:00) (63 - 69)  BP: 97/50 (10 Omer 2025 08:00) (97/50 - 128/61)  BP(mean): --  RR: 19 (10 Omer 2025 08:00) (18 - 19)  SpO2: 96% (10 Omer 2025 08:00) (96% - 99%)    Parameters below as of 10 Omer 2025 08:00  Patient On (Oxygen Delivery Method): nasal cannula  O2 Flow (L/min): 3      PHYSICAL EXAM:  Gen: NAD, resting in bed  HEENT: Normocephalic, atraumatic decreased erythema/edema   Neck: supple, no lymphadenopathy  CV: Regular rate & regular rhythm  Lungs: decreased BS at bases, no fremitus  Abdomen: Soft, BS present  Ext: Warm, well perfused  Neuro: non focal, awake  Skin: no rash, no erythema  Lines: no phlebitis    FH: Non-contributory  Social Hx: Non-contributory    TESTS & MEASUREMENTS:                        8.7    8.93  )-----------( 271      ( 10 Omer 2025 11:06 )             29.2     01-09    138  |  101  |  7[L]  ----------------------------<  99  3.9   |  28  |  0.5[L]    Ca    7.8[L]      09 Jan 2025 06:00          Urinalysis Basic - ( 09 Jan 2025 06:00 )    Color: x / Appearance: x / SG: x / pH: x  Gluc: 99 mg/dL / Ketone: x  / Bili: x / Urobili: x   Blood: x / Protein: x / Nitrite: x   Leuk Esterase: x / RBC: x / WBC x   Sq Epi: x / Non Sq Epi: x / Bacteria: x        Culture - Blood (collected 01-04-25 @ 23:06)  Source: .Blood BLOOD  Final Report (01-10-25 @ 01:01):    No growth at 5 days    Culture - Blood (collected 01-04-25 @ 11:28)  Source: .Blood BLOOD  Final Report (01-10-25 @ 01:00):    No growth at 5 days    Culture - Blood (collected 01-03-25 @ 17:32)  Source: .Blood BLOOD  Final Report (01-09-25 @ 01:01):    No growth at 5 days    Culture - Blood (collected 01-03-25 @ 17:32)  Source: .Blood BLOOD  Final Report (01-09-25 @ 01:01):    No growth at 5 days    Culture - Blood (collected 01-01-25 @ 15:52)  Source: .Blood BLOOD  Gram Stain (01-03-25 @ 00:12):    Growth in aerobic bottle: Gram Positive Cocci in Clusters    Growth in anaerobic bottle: Gram Positive Cocci in Clusters  Final Report (01-04-25 @ 07:37):    Growth in aerobic and anaerobic bottles: Methicillin Resistant    Staphylococcus aureus    Direct identification is available within approximately 3-5    hours either by Blood Panel Multiplexed PCR or Direct    MALDI-TOF. Details: [email or web address]  Organism: Blood Culture PCR  Methicillin resistant Staphylococcus aureus (01-04-25 @ 07:37)  Organism: Methicillin resistant Staphylococcus aureus (01-04-25 @ 07:37)      Method Type: ADAM      -  Clindamycin: S <=0.25      -  Daptomycin: S <=0.5      -  Erythromycin: S <=0.25      -  Gentamicin: S <=4 Should not be used as monotherapy      -  Linezolid: S 2      -  Oxacillin: R >2      -  Penicillin: R 1      -  Rifampin: S <=1 Should not be used as monotherapy      -  Tetracycline: S <=4      -  Trimethoprim/Sulfamethoxazole: S <=0.5/9.5      -  Vancomycin: S 0.5  Organism: Blood Culture PCR (01-04-25 @ 07:37)      Method Type: PCR      -  Methicillin resistant Staphylococcus aureus (MRSA): Detec    Culture - Blood (collected 01-01-25 @ 15:52)  Source: .Blood BLOOD  Gram Stain (01-04-25 @ 07:27):    Growth in anaerobic bottle: Gram Positive Cocci in Clusters  Final Report (01-05-25 @ 15:42):    Growth in anaerobic bottle: Methicillin Resistant Staphylococcus aureus    See previous culture [id number]            INFECTIOUS DISEASES TESTING  Vancomycin Level, Random: 19.7 (01-08-25 @ 06:45)  MRSA PCR Result.: Positive (01-05-25 @ 07:10)  Vancomycin Level, Random: 21.3 (01-05-25 @ 06:17)  Vancomycin Level, Random: 12.5 (01-03-25 @ 06:16)      INFLAMMATORY MARKERS  Sedimentation Rate, Erythrocyte: 40 mm/Hr (01-04-25 @ 11:28)  C-Reactive Protein: 57.5 mg/L (01-04-25 @ 11:28)      RADIOLOGY & ADDITIONAL TESTS:  I have personally reviewed the last available Chest xray  CXR      CT      CARDIOLOGY TESTING  12 Lead ECG:   Ventricular Rate 75 BPM    Atrial Rate 129 BPM    QRS Duration 80 ms    Q-T Interval 352 ms    QTC Calculation(Bazett) 393 ms    R Axis 6 degrees    T Axis 1 degrees    Diagnosis Line Sinus rhythm with frequent Premature atrial complexes  Inferior infarct , age undetermined  Cannot rule out Anterior infarct , age undetermined  Abnormal ECG    Confirmed by Cruz Tinajero ([id number]) on 1/2/2025 12:42:03 PM (01-02-25 @ 09:47)  12 Lead ECG:   Ventricular Rate 81 BPM    Atrial Rate 81 BPM    P-R Interval 160 ms    QRS Duration 72 ms    Q-T Interval 364 ms    QTC Calculation(Bazett) 422 ms    P Axis 37 degrees    R Axis 1 degrees    T Axis 15 degrees    Diagnosis Line Normal sinus rhythm  Inferior infarct , age undetermined  Abnormal ECG    Confirmed by Cruz Tinajero ([id number]) on 1/2/2025 7:45:56 AM (01-01-25 @ 15:24)      MEDICATIONS  ALPRAZolam 0.25 Oral at bedtime  ampicillin/sulbactam  IVPB 3 IV Intermittent every 6 hours  apixaban 5 Oral every 12 hours  carbidopa/levodopa  25/100 2 Oral four times a day  chlorhexidine 2% Cloths 1 Topical <User Schedule>  dextrose 5%. 1000 IV Continuous <Continuous>  diltiazem    Oral daily  entacapone 200 Oral four times a day  famotidine    Tablet 20 Oral two times a day  ferrous    sulfate 325 Oral daily  gabapentin 100 Oral two times a day  lidocaine   4% Patch 1 Transdermal every 24 hours  oxybutynin 5 Oral daily  pantoprazole    Tablet 40 Oral before breakfast  senna 2 Oral at bedtime  vancomycin  IVPB 1250 IV Intermittent every 24 hours      WEIGHT  Weight (kg): 66.224 (01-01-25 @ 15:41)      ANTIBIOTICS:  ampicillin/sulbactam  IVPB 3 Gram(s) IV Intermittent every 6 hours  vancomycin  IVPB 1250 milliGRAM(s) IV Intermittent every 24 hours      All available historical records have been reviewed

## 2025-01-10 NOTE — PROGRESS NOTE ADULT - SUBJECTIVE AND OBJECTIVE BOX
HPI  Patient is a 89y old Female who presents with a chief complaint of Sialoadenitis     (09 Jan 2025 19:04)    Currently admitted to medicine with the primary diagnosis of Parotitis       Today is hospital day 9d.     INTERVAL HPI / OVERNIGHT EVENTS:  Patient was seen and examined at bedside  No new complaints  feeling little hungry          PAST MEDICAL & SURGICAL HISTORY  Parkinson disease    HTN (hypertension)    Rheumatoid arthritis    Hyperlipidemia    CRAO (central retinal artery occlusion)  Left    Atrial fibrillation    History of hysterectomy    Status post placement of implantable loop recorder  01/2021    S/P hernia repair      ALLERGIES  No Known Allergies    MEDICATIONS  STANDING MEDICATIONS  ALPRAZolam 0.25 milliGRAM(s) Oral at bedtime  ampicillin/sulbactam  IVPB 3 Gram(s) IV Intermittent every 6 hours  apixaban 5 milliGRAM(s) Oral every 12 hours  carbidopa/levodopa  25/100 2 Tablet(s) Oral four times a day  chlorhexidine 2% Cloths 1 Application(s) Topical <User Schedule>  dextrose 5%. 1000 milliLiter(s) IV Continuous <Continuous>  diltiazem    milliGRAM(s) Oral daily  entacapone 200 milliGRAM(s) Oral four times a day  famotidine    Tablet 20 milliGRAM(s) Oral two times a day  ferrous    sulfate 325 milliGRAM(s) Oral daily  gabapentin 100 milliGRAM(s) Oral two times a day  lidocaine   4% Patch 1 Patch Transdermal every 24 hours  oxybutynin 5 milliGRAM(s) Oral daily  pantoprazole    Tablet 40 milliGRAM(s) Oral before breakfast  senna 2 Tablet(s) Oral at bedtime  vancomycin  IVPB 1250 milliGRAM(s) IV Intermittent every 24 hours    PRN MEDICATIONS  acetaminophen     Tablet .. 650 milliGRAM(s) Oral every 6 hours PRN  bisacodyl 5 milliGRAM(s) Oral every 12 hours PRN  meclizine 25 milliGRAM(s) Oral three times a day PRN  zolpidem 5 milliGRAM(s) Oral at bedtime PRN    VITALS:  T(F): 98  HR: 63  BP: 97/50  RR: 19  SpO2: 96%    PHYSICAL EXAM  GEN: no distress, comfortable  PULM: BS heard b/l equal, No wheezing  CVS: S1S2 present, no rubs or gallops  ABD: Soft, non-distended, no guarding; non-tender  EXT: No lower extremity edema  NEURO: A&Ox2    LABS                        8.7    8.93  )-----------( 271      ( 10 Omer 2025 11:06 )             29.2     01-09    138  |  101  |  7[L]  ----------------------------<  99  3.9   |  28  |  0.5[L]    Ca    7.8[L]      09 Jan 2025 06:00      PT/INR - ( 09 Jan 2025 06:00 )   PT: 19.70 sec;   INR: 1.65 ratio         PTT - ( 09 Jan 2025 06:00 )  PTT:34.6 sec  Urinalysis Basic - ( 09 Jan 2025 06:00 )    Color: x / Appearance: x / SG: x / pH: x  Gluc: 99 mg/dL / Ketone: x  / Bili: x / Urobili: x   Blood: x / Protein: x / Nitrite: x   Leuk Esterase: x / RBC: x / WBC x   Sq Epi: x / Non Sq Epi: x / Bacteria: x                RADIOLOGY

## 2025-01-10 NOTE — DISCHARGE NOTE NURSING/CASE MANAGEMENT/SOCIAL WORK - NSDCVIVACCINE_GEN_ALL_CORE_FT
influenza, injectable, quadrivalent, preservative free; 01-Jan-2021 15:19; Shanta Correa (RN); [employer]; 33BN3 (Exp. Date: 30-Jun-2021); IntraMuscular; Deltoid Right.; 0.5 milliLiter(s); VIS (VIS Published: 15-Aug-2019, VIS Presented: 01-Jan-2021);

## 2025-01-10 NOTE — PROGRESS NOTE ADULT - NS ATTEST RISK PROBLEM GEN_ALL_CORE FT
- I discussed my recommendations with the primary team housestaff / Attending   - This patient is on drug therapy requiring intensive monitoring for toxicity. Vancomycin/ requires intensive drug monitoring due to concerns for possible adverse effects, including acute kidney injury, and will be monitored with basic metabolic panel, Vancomycin levels, to be done while on therapy
- Pt has a life threatening condition   - I discussed my recommendations with the primary team housestaff / Attending   - This patient is on drug therapy requiring intensive monitoring for toxicity. Vancomycin/ requires intensive drug monitoring due to concerns for possible adverse effects, including acute kidney injury, and will be monitored with basic metabolic panel, Vancomycin levels, to be done while on therapy

## 2025-01-10 NOTE — DISCHARGE NOTE NURSING/CASE MANAGEMENT/SOCIAL WORK - FINANCIAL ASSISTANCE
[facility] provides services at a reduced cost to those who are determined to be eligible through [facility]’s financial assistance program. Information regarding [facility]’s financial assistance program can be found by going to [email or web address] or by calling [phone].

## 2025-01-10 NOTE — PROGRESS NOTE ADULT - REASON FOR ADMISSION
left mastoid swelling
left mastoid swelling/parotitis
left mastoid swelling
Bexarotene Counseling:  I discussed with the patient the risks of bexarotene including but not limited to hair loss, dry lips/skin/eyes, liver abnormalities, hyperlipidemia, pancreatitis, depression/suicidal ideation, photosensitivity, drug rash/allergic reactions, hypothyroidism, anemia, leukopenia, infection, cataracts, and teratogenicity.  Patient understands that they will need regular blood tests to check lipid profile, liver function tests, white blood cell count, thyroid function tests and pregnancy test if applicable.

## 2025-01-10 NOTE — DISCHARGE NOTE NURSING/CASE MANAGEMENT/SOCIAL WORK - NSDCPEFALRISK_GEN_ALL_CORE
For information on Fall & Injury Prevention, visit: [email or web address] OR  [email or web address] OR  https://www.cdc.gov/steadi/patient.html

## 2025-01-10 NOTE — PROGRESS NOTE ADULT - ASSESSMENT
89 female past medical history Parkinson's, HLD, HTN, atrial fibrillation coming with complaints of swollen jaw.  Patient brought in from nursing home due to the concern of swollen     # MRSA Bacteremia due to Left Parotitis   # left periorbital swelling  -  CT Temporal Bones w/ IV Cont (01.01.25 @ 16:14) >Findings compatible with left parotitis   - BCx 1/1/2025: MRSA  - blood Culture Results: No growth at 24 hours (01.03.25 @ 17:32)  - MRSA PCR Result.: Positive: By: Real-Time PCR (Polymerase Reaction Method) (01.05.25 @ 07:10)  - Sedimentation Rate, Erythrocyte: 40 mm/Hr (01.04.25 @ 11:28)  - C-Reactive Protein: 57.5 mg/L (01.04.25 @ 11:28)  -  TTE Echo Complete w/o Contrast w/ Doppler (01.03.25 @ 11:33) >Left ventricular ejection fraction, by visual estimation, is 55 to 60%. (Grade II diastolic dysfunction).Thickening and calcification of the anterior and posterior mitral valve leaflets. Moderate tricuspid regurgitation. moderate pulmonary hypertension.  - ID eval: c/w vancomycin , unasyn  - evaluated by ENT  - mupirocin to b/l nares for 5 days  - CT orbit- Mild left periorbital soft tissue swelling which may reflect cellulitis.  Partially visualized left parotitis with associated inflammatory changes.  - warm compresses  - cardiology evaluated for JOI- not a candidate  ID recommended 6 weeks of antibiotics- PICC x IV Vanc 6 weeks from cleared BCX E/D 2/13/25; 14 days of augmentin  - s/p PICC placement on 1/9    # anemia- possibly due to infection   cbc today stable  monitor cbc; active type and screen  no s/o bruises  check b12, folate, iron studies, RC  transferrin sat- 9%; start on PO iron    # decreased PO intake  # Hypernatremia sec to decrease oral intake- resolved  # Hypokalemia- resolved  - calorie count  - ensure pudding and enlive  - monitor BMP  discussed with daughter in detail regarding PO intake. family declined options for tube placement. patient will be continued on diet as toelrated.     # Multinodular thyroid gland  -CT neck and soft tissues Since 10/2019, mild increase in multinodular thyroid gland measuring up to 6 cm, previously 5 cm.   - Thyroid Stimulating Hormone, Serum: 0.55 uIU/mL (01.02.25 @ 06:27)  - outpt thyroid US     # Paroxysmal afib, now in SR  - c/w cardizem, eliquis    # Parkinson's disease   - c/w sinemet, entacapone    # Full code    # Pending: monitor cbc; dispo plans  # Disposition: [facility]    discharge plan today to SNF continue IV antibiotics  follow up with ID

## 2025-01-10 NOTE — PROGRESS NOTE ADULT - PROVIDER SPECIALTY LIST ADULT
ENT
Hospitalist
Internal Medicine
ENT
Hospitalist
Hospitalist
Infectious Disease
Infectious Disease
Internal Medicine
Internal Medicine
ENT
ENT
Infectious Disease
Internal Medicine
Infectious Disease
Internal Medicine
Internal Medicine
Hospitalist
Infectious Disease
Hospitalist

## 2025-01-10 NOTE — PROGRESS NOTE ADULT - ASSESSMENT
ASSESSMENT  89y Female from [facility], with a past medical history of Parkinsons, HLD, HTN, a. fib, RA. Patient brought in from nursing home to ED for left swollen jaw, to rule out mastoiditis. Patient is not sure when this swelling started. Patient is a poor historian and the ED spoke to patients daughter on the phone who reported that she heard the patient had a questionable fall due to the swelling of her left jaw. Patient has a 3" x 3" hard mass to her left parotitis area. There is edema, erythema, and tenderness to the left face extending from the left parotitis area down to her left anterior neck. Patient has poor dentition.  Patient has very dry mucous membranes, not well-appearing. There is no evident trauma.    IMPRESSION  #MRSA bacteremia due to L parotitis  Tm 100.5  Admission WBC 18   Cannot rule out IE, community acquired bacteremia   < from: CT Orbit w/ IV Cont (01.07.25 @ 13:16) >  Mild left periorbital soft tissue swelling which may reflect cellulitis.  Partially visualized left parotitis with associated inflammatory changes.    1/4 BCX NGTD     1/3 BCX NGTD     1/1 2/4 BCX MRSA    TTE no vegetations   < from: CT Neck Soft Tissue w/ IV Cont (01.01.25 @ 16:14) >  Findings compatible with left parotitis. No drainable fluid collection.  Since 10/2019, mild increase in multinodular thyroid gland measuring up   to 6 cm, previously 5 cm. May consider nonemergent thyroid outpatient   thyroid ultrasound follow-up as clinically indicated.    Drug Dosing Weight  Height (cm): 170.2 (01 Jan 2025 15:41)  Weight (kg): 66.224 (01 Jan 2025 15:41)  BMI (kg/m2): 22.9 (01 Jan 2025 15:41)    Creatinine: 0.7 mg/dL (01.02.25 @ 06:27) 50 mL/min  Creatinine clearance for normal weight patient, using ideal body weight of 61 kg (135 lbs).      RECOMMENDATIONS  - Vanc dosing AUC/ADAM per clinical pharmacist   - Unasyn 3g q6h IV as often polymicrobial x 14 days, if D/C prior PO augmentin 875mg BID E/D 1/14   - Pt is a poor candidate for JOI per Cards. If within GOC plan for PICC x IV Vanc 6 weeks from cleared BCX E/D 2/13/25  - Appreciate ENT consult  - Poor prognosis, GOC  - Please order weekly CBC, CMP, ESR/CRP, Vanc trough  - ID follow-up with Dr. Lauri Calabrese for Telehealth. We will call the patient between 8:00-4:30      [address]       [phone]       Fax [phone]     If any questions, please text or call on [employer] Teams  Please continue to update ID with any pertinent new clinical, laboratory or radiographic findings.

## 2025-01-13 NOTE — CHART NOTE - NSCHARTNOTEFT_GEN_A_CORE
Phone number out of service. Other number was daughter, patient is currently admitted in nursing home, CT 1/13 (ENT and infectious disease referral).

## 2025-01-14 NOTE — CDI QUERY NOTE - NSCDIOTHERTXTBX_GEN_ALL_CORE_HH
Clinical documentation and/or evidence in the medical record indicates that this patient has sepsis.  In order to ensure accurate coding and accuracy of the clinical record, the documentation in this patient’s medical record requires additional clarification.      Please include more specific documentation as to the type/status of sepsis in your progress note and/or discharge summary.     Please clarify if the patient was found to have:         • Sepsis ruled in        • Sepsis ruled out after study                                                                                     • Other (specify)    Supporting documentation and/or clinical evidence:     1/1 ED Provider Note: ... Principal Discharge DX: Parotitis. Secondary Diagnosis: Sepsis ... SEPSIS – was this patient treated for sepsis? Yes. Presentation suggestive of: Bacterial Etiology Suspicion of sepsis at: 01-Jan-2025 16:49    1/1 H&P Adult: ...  brought in from nursing home due to the concern of swollen Left check ... Home Medications: cefpodoxime 200 mg oral tablet: 1 tab(s) orally every 12 hours ... Attestation Statements: ... left parotitis, no sepsis ... Chronic a fib. HTN. 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 L parotitis. Tm 100.5. Admission WBC 18     1/2 Consult Note Adult-Critical Care Attending: ... Sepsis POA. L parotitis    1/3 Progress Note Adult-Hospitalist Attending: ... Left parotitis with MRSA bacteremia. Sepsis resolved ... cont. iv unasyn and iv vanco dose -adjusted to 1250 plus warm compresses    1/5 Progress Note Adult-Hospitalist Attending: ... MRSA Bacteremia. Left Parotitis ... ID eval: c/w vancomycin , unasyn    1/10 Progress Note Adult-Internal Medicine Attending: ...  MRSA Bacteremia due to Left Parotitis     1/10 Discharge Note Provider: ... MRSA bacteremia 2/2 L Parotitis ...  ID following->treated with Vancomycin & Unasyn - per cardio: pt is poor candidate for JOI given poor dentition and parotitis. Echo 1/3 WNL - s/p PICC line placement 1/9 with IR- to be followed with 6 wks vanc till 2/13; augmentin for 2 weeks    Lab findings: 1/1 WBC-18.18    Nursing VS Flowsheet: 1/1 Temp 100.1, 100.5    Per MD orders: Admit to Inpatient Level of Care: Diagnosis: Parotitis, Sepsis    Per MAR: Vancomycin IVPB. Indication: Infection. Administered 1/1                 Cefepime IVPB. Indication: neck infection. Administered 1/1                 Vancomycin IVPB. Indication: MRSA Bacteremia. Administered 1/2                 Ampicillin IVPB. Indication: Per ID. Administered 1/2-1/10                 LR 2000ml IVF Bolus. Administered 1/1    For reference, please see the [facility] sepsis ED sepsis/severe sepsis management algorithm located on the [facility] Intranet      Thank you,  Cha Riley RN [facility] CCDS  [phone]
In order to ensure accurate coding and accuracy of the clinical record, the documentation in this patient’s medical record requires additional clarification.      The diagnosis of "cannot rule out IE" has been documented in the medical record:    Please review the documentation in the medical record and clarify the abbreviation of "IE" in your progress note.    • Infective endocarditis remains probable, likely, suspected / could not be excluded at the time of discharge.  • Other (specify)    Supporting documentation and/or clinical evidence:     1/3, 1/6 Progress Note Adult-Infectious Disease Attending: ... MRSA bacteremia due to L parotitis. Tm 100.5. Admission WBC 18 ... 1/1 2/4 BCX MRSA. TTE no vegetations ... Guidelines would recommend JOI as community acquired bacteremia , if not within GOC, plan for PICC x Vanc 6 weeks from cleared BCX E/D 2/13/25 1/8, 1/10 Progress Note Adult-Infectious Disease Attending: ... MRSA bacteremia due to L parotitis ... Tm 100.5. Admission WBC 18. Cannot rule out IE ... 1/1 2/4 BCX MRSA. TTE no vegetations ... Vanc dosing AUC/ADAM per clinical pharmacist - Unasyn 3g q6h IV as often polymicrobial x 14 days, if D/C prior PO augmentin 875mg BID E/D 1/14 - Pt is a poor candidate for JOI per Cards. If within GOC plan for PICC x IV Vanc 6 weeks from cleared BCX E/D 2/13/25        Thank you,  Cha Riley RN [facility] CCDS  [phone]

## 2025-01-23 DIAGNOSIS — D84.9 IMMUNODEFICIENCY, UNSPECIFIED: ICD-10-CM

## 2025-01-23 DIAGNOSIS — R33.9 RETENTION OF URINE, UNSPECIFIED: ICD-10-CM

## 2025-01-23 DIAGNOSIS — I27.20 PULMONARY HYPERTENSION, UNSPECIFIED: ICD-10-CM

## 2025-01-23 DIAGNOSIS — A41.02 SEPSIS DUE TO METHICILLIN RESISTANT STAPHYLOCOCCUS AUREUS: ICD-10-CM

## 2025-01-23 DIAGNOSIS — L03.213 PERIORBITAL CELLULITIS: ICD-10-CM

## 2025-01-23 DIAGNOSIS — E87.6 HYPOKALEMIA: ICD-10-CM

## 2025-01-23 DIAGNOSIS — E78.5 HYPERLIPIDEMIA, UNSPECIFIED: ICD-10-CM

## 2025-01-23 DIAGNOSIS — D50.9 IRON DEFICIENCY ANEMIA, UNSPECIFIED: ICD-10-CM

## 2025-01-23 DIAGNOSIS — E04.9 NONTOXIC GOITER, UNSPECIFIED: ICD-10-CM

## 2025-01-23 DIAGNOSIS — I48.20 CHRONIC ATRIAL FIBRILLATION, UNSPECIFIED: ICD-10-CM

## 2025-01-23 DIAGNOSIS — K11.20 SIALOADENITIS, UNSPECIFIED: ICD-10-CM

## 2025-01-23 DIAGNOSIS — Z79.01 LONG TERM (CURRENT) USE OF ANTICOAGULANTS: ICD-10-CM

## 2025-01-23 DIAGNOSIS — I07.1 RHEUMATIC TRICUSPID INSUFFICIENCY: ICD-10-CM

## 2025-01-23 DIAGNOSIS — G20.A1 PARKINSON'S DISEASE WITHOUT DYSKINESIA, WITHOUT MENTION OF FLUCTUATIONS: ICD-10-CM

## 2025-01-23 DIAGNOSIS — E87.0 HYPEROSMOLALITY AND HYPERNATREMIA: ICD-10-CM

## 2025-01-23 DIAGNOSIS — I33.0 ACUTE AND SUBACUTE INFECTIVE ENDOCARDITIS: ICD-10-CM

## 2025-01-23 DIAGNOSIS — M06.9 RHEUMATOID ARTHRITIS, UNSPECIFIED: ICD-10-CM

## 2025-01-26 NOTE — H&P ADULT - HISTORY OF PRESENT ILLNESS
88 year old female, past medical history afib, htn, hdl, parkinsons, who presents with dizziness. patient reports intermittent episodes of dizziness described as room spinning that began at 9pm last night, patient reports symptoms exacerbated with movement of head/standing up. patient reports worsening nausea, no vomiting. denies fever, chills, headache, vision changes, neck pain, chest pain, SOB, abd pain, numbness/weakness. Maybe/Moderate

## 2025-04-27 ENCOUNTER — INPATIENT (INPATIENT)
Facility: HOSPITAL | Age: 89
LOS: 8 days | Discharge: NURSING, REHAB OR LONG-TERM CARE | DRG: 154 | End: 2025-05-06
Attending: INTERNAL MEDICINE | Admitting: STUDENT IN AN ORGANIZED HEALTH CARE EDUCATION/TRAINING PROGRAM
Payer: MEDICARE

## 2025-04-27 VITALS
RESPIRATION RATE: 15 BRPM | HEART RATE: 80 BPM | TEMPERATURE: 98 F | OXYGEN SATURATION: 98 % | WEIGHT: 132.28 LBS | DIASTOLIC BLOOD PRESSURE: 52 MMHG | SYSTOLIC BLOOD PRESSURE: 92 MMHG

## 2025-04-27 DIAGNOSIS — A41.9 SEPSIS, UNSPECIFIED ORGANISM: ICD-10-CM

## 2025-04-27 DIAGNOSIS — Z95.818 PRESENCE OF OTHER CARDIAC IMPLANTS AND GRAFTS: Chronic | ICD-10-CM

## 2025-04-27 DIAGNOSIS — Z98.890 OTHER SPECIFIED POSTPROCEDURAL STATES: Chronic | ICD-10-CM

## 2025-04-27 DIAGNOSIS — Z90.710 ACQUIRED ABSENCE OF BOTH CERVIX AND UTERUS: Chronic | ICD-10-CM

## 2025-04-27 LAB
ALBUMIN SERPL ELPH-MCNC: 2.7 G/DL — LOW (ref 3.5–5.2)
ALP SERPL-CCNC: 125 U/L — HIGH (ref 30–115)
ALT FLD-CCNC: <5 U/L — SIGNIFICANT CHANGE UP (ref 0–41)
ANION GAP SERPL CALC-SCNC: 11 MMOL/L — SIGNIFICANT CHANGE UP (ref 7–14)
APTT BLD: 39.9 SEC — HIGH (ref 27–39.2)
AST SERPL-CCNC: 14 U/L — SIGNIFICANT CHANGE UP (ref 0–41)
BASOPHILS # BLD AUTO: 0.05 K/UL — SIGNIFICANT CHANGE UP (ref 0–0.2)
BASOPHILS NFR BLD AUTO: 0.3 % — SIGNIFICANT CHANGE UP (ref 0–1)
BILIRUB SERPL-MCNC: 0.6 MG/DL — SIGNIFICANT CHANGE UP (ref 0.2–1.2)
BUN SERPL-MCNC: 27 MG/DL — HIGH (ref 10–20)
CALCIUM SERPL-MCNC: 9.1 MG/DL — SIGNIFICANT CHANGE UP (ref 8.4–10.5)
CHLORIDE SERPL-SCNC: 111 MMOL/L — HIGH (ref 98–110)
CO2 SERPL-SCNC: 26 MMOL/L — SIGNIFICANT CHANGE UP (ref 17–32)
CREAT SERPL-MCNC: 1 MG/DL — SIGNIFICANT CHANGE UP (ref 0.7–1.5)
EGFR: 53 ML/MIN/1.73M2 — LOW
EGFR: 53 ML/MIN/1.73M2 — LOW
EOSINOPHIL # BLD AUTO: 0.04 K/UL — SIGNIFICANT CHANGE UP (ref 0–0.7)
EOSINOPHIL NFR BLD AUTO: 0.2 % — SIGNIFICANT CHANGE UP (ref 0–8)
GLUCOSE SERPL-MCNC: 99 MG/DL — SIGNIFICANT CHANGE UP (ref 70–99)
HCT VFR BLD CALC: 33.3 % — LOW (ref 37–47)
HGB BLD-MCNC: 10.3 G/DL — LOW (ref 12–16)
IMM GRANULOCYTES NFR BLD AUTO: 1.6 % — HIGH (ref 0.1–0.3)
INR BLD: 1.92 RATIO — HIGH (ref 0.65–1.3)
LACTATE SERPL-SCNC: 1.7 MMOL/L — SIGNIFICANT CHANGE UP (ref 0.7–2)
LYMPHOCYTES # BLD AUTO: 0.72 K/UL — LOW (ref 1.2–3.4)
LYMPHOCYTES # BLD AUTO: 3.7 % — LOW (ref 20.5–51.1)
MCHC RBC-ENTMCNC: 27.8 PG — SIGNIFICANT CHANGE UP (ref 27–31)
MCHC RBC-ENTMCNC: 30.9 G/DL — LOW (ref 32–37)
MCV RBC AUTO: 90 FL — SIGNIFICANT CHANGE UP (ref 81–99)
MONOCYTES # BLD AUTO: 1.15 K/UL — HIGH (ref 0.1–0.6)
MONOCYTES NFR BLD AUTO: 6 % — SIGNIFICANT CHANGE UP (ref 1.7–9.3)
NEUTROPHILS # BLD AUTO: 16.99 K/UL — HIGH (ref 1.4–6.5)
NEUTROPHILS NFR BLD AUTO: 88.2 % — HIGH (ref 42.2–75.2)
NRBC BLD AUTO-RTO: 0 /100 WBCS — SIGNIFICANT CHANGE UP (ref 0–0)
PLATELET # BLD AUTO: 308 K/UL — SIGNIFICANT CHANGE UP (ref 130–400)
PMV BLD: 10 FL — SIGNIFICANT CHANGE UP (ref 7.4–10.4)
POTASSIUM SERPL-MCNC: 3.6 MMOL/L — SIGNIFICANT CHANGE UP (ref 3.5–5)
POTASSIUM SERPL-SCNC: 3.6 MMOL/L — SIGNIFICANT CHANGE UP (ref 3.5–5)
PROT SERPL-MCNC: 5.7 G/DL — LOW (ref 6–8)
PROTHROM AB SERPL-ACNC: 23 SEC — HIGH (ref 9.95–12.87)
RBC # BLD: 3.7 M/UL — LOW (ref 4.2–5.4)
RBC # FLD: 16.1 % — HIGH (ref 11.5–14.5)
SODIUM SERPL-SCNC: 148 MMOL/L — HIGH (ref 135–146)
WBC # BLD: 19.25 K/UL — HIGH (ref 4.8–10.8)
WBC # FLD AUTO: 19.25 K/UL — HIGH (ref 4.8–10.8)

## 2025-04-27 PROCEDURE — 86140 C-REACTIVE PROTEIN: CPT

## 2025-04-27 PROCEDURE — 87040 BLOOD CULTURE FOR BACTERIA: CPT

## 2025-04-27 PROCEDURE — 36415 COLL VENOUS BLD VENIPUNCTURE: CPT

## 2025-04-27 PROCEDURE — 99285 EMERGENCY DEPT VISIT HI MDM: CPT

## 2025-04-27 PROCEDURE — 87641 MR-STAPH DNA AMP PROBE: CPT

## 2025-04-27 PROCEDURE — 85025 COMPLETE CBC W/AUTO DIFF WBC: CPT

## 2025-04-27 PROCEDURE — 86850 RBC ANTIBODY SCREEN: CPT

## 2025-04-27 PROCEDURE — 81001 URINALYSIS AUTO W/SCOPE: CPT

## 2025-04-27 PROCEDURE — 85652 RBC SED RATE AUTOMATED: CPT

## 2025-04-27 PROCEDURE — C1751: CPT

## 2025-04-27 PROCEDURE — 86900 BLOOD TYPING SEROLOGIC ABO: CPT

## 2025-04-27 PROCEDURE — 83521 IG LIGHT CHAINS FREE EACH: CPT

## 2025-04-27 PROCEDURE — 80048 BASIC METABOLIC PNL TOTAL CA: CPT

## 2025-04-27 PROCEDURE — 80202 ASSAY OF VANCOMYCIN: CPT

## 2025-04-27 PROCEDURE — 93010 ELECTROCARDIOGRAM REPORT: CPT

## 2025-04-27 PROCEDURE — 70481 CT ORBIT/EAR/FOSSA W/DYE: CPT | Mod: 26

## 2025-04-27 PROCEDURE — 82784 ASSAY IGA/IGD/IGG/IGM EACH: CPT

## 2025-04-27 PROCEDURE — 85027 COMPLETE CBC AUTOMATED: CPT

## 2025-04-27 PROCEDURE — 36573 INSJ PICC RS&I 5 YR+: CPT | Mod: RT

## 2025-04-27 PROCEDURE — 83735 ASSAY OF MAGNESIUM: CPT

## 2025-04-27 PROCEDURE — 87186 SC STD MICRODIL/AGAR DIL: CPT

## 2025-04-27 PROCEDURE — 93306 TTE W/DOPPLER COMPLETE: CPT

## 2025-04-27 PROCEDURE — 70491 CT SOFT TISSUE NECK W/DYE: CPT | Mod: 26

## 2025-04-27 PROCEDURE — 85610 PROTHROMBIN TIME: CPT

## 2025-04-27 PROCEDURE — 87086 URINE CULTURE/COLONY COUNT: CPT

## 2025-04-27 PROCEDURE — 87640 STAPH A DNA AMP PROBE: CPT

## 2025-04-27 PROCEDURE — 85730 THROMBOPLASTIN TIME PARTIAL: CPT

## 2025-04-27 PROCEDURE — 87077 CULTURE AEROBIC IDENTIFY: CPT

## 2025-04-27 PROCEDURE — 86901 BLOOD TYPING SEROLOGIC RH(D): CPT

## 2025-04-27 RX ORDER — BISACODYL 5 MG
5 TABLET, DELAYED RELEASE (ENTERIC COATED) ORAL DAILY
Refills: 0 | Status: DISCONTINUED | OUTPATIENT
Start: 2025-04-28 | End: 2025-05-06

## 2025-04-27 RX ORDER — ENTACAPONE 200 MG/1
400 TABLET, FILM COATED ORAL
Refills: 0 | Status: DISCONTINUED | OUTPATIENT
Start: 2025-04-27 | End: 2025-05-06

## 2025-04-27 RX ORDER — VANCOMYCIN HCL IN 5 % DEXTROSE 1.5G/250ML
750 PLASTIC BAG, INJECTION (ML) INTRAVENOUS EVERY 12 HOURS
Refills: 0 | Status: DISCONTINUED | OUTPATIENT
Start: 2025-04-27 | End: 2025-04-29

## 2025-04-27 RX ORDER — SENNA 187 MG
2 TABLET ORAL
Refills: 0 | DISCHARGE

## 2025-04-27 RX ORDER — MECLIZINE HCL 12.5 MG
25 TABLET ORAL THREE TIMES A DAY
Refills: 0 | Status: DISCONTINUED | OUTPATIENT
Start: 2025-04-27 | End: 2025-05-06

## 2025-04-27 RX ORDER — LIDOCAINE HYDROCHLORIDE 20 MG/ML
2 JELLY TOPICAL EVERY 24 HOURS
Refills: 0 | Status: DISCONTINUED | OUTPATIENT
Start: 2025-04-27 | End: 2025-05-06

## 2025-04-27 RX ORDER — CARBIDOPA, LEVODOPA AND ENTACAPONE 37.5; 200; 15 MG/1; MG/1; MG/1
1 TABLET, FILM COATED ORAL
Refills: 0 | Status: DISCONTINUED | OUTPATIENT
Start: 2025-04-27 | End: 2025-04-27

## 2025-04-27 RX ORDER — PIPERACILLIN-TAZO-DEXTROSE,ISO 2.25G/50ML
3.38 IV SOLUTION, PIGGYBACK PREMIX FROZEN(ML) INTRAVENOUS EVERY 8 HOURS
Refills: 0 | Status: DISCONTINUED | OUTPATIENT
Start: 2025-04-27 | End: 2025-04-28

## 2025-04-27 RX ORDER — CARBIDOPA/LEVODOPA 25MG-100MG
2 TABLET ORAL
Refills: 0 | Status: DISCONTINUED | OUTPATIENT
Start: 2025-04-27 | End: 2025-05-01

## 2025-04-27 RX ORDER — SODIUM CHLORIDE 9 G/1000ML
1000 INJECTION, SOLUTION INTRAVENOUS ONCE
Refills: 0 | Status: COMPLETED | OUTPATIENT
Start: 2025-04-27 | End: 2025-04-27

## 2025-04-27 RX ORDER — ALPRAZOLAM 0.5 MG
0.25 TABLET, EXTENDED RELEASE 24 HR ORAL AT BEDTIME
Refills: 0 | Status: DISCONTINUED | OUTPATIENT
Start: 2025-04-27 | End: 2025-05-01

## 2025-04-27 RX ORDER — DILTIAZEM HYDROCHLORIDE 120 MG/1
90 CAPSULE, EXTENDED RELEASE ORAL EVERY 6 HOURS
Refills: 0 | Status: DISCONTINUED | OUTPATIENT
Start: 2025-04-27 | End: 2025-05-06

## 2025-04-27 RX ORDER — SENNA 187 MG
2 TABLET ORAL AT BEDTIME
Refills: 0 | Status: DISCONTINUED | OUTPATIENT
Start: 2025-04-27 | End: 2025-05-06

## 2025-04-27 RX ORDER — FERROUS SULFATE 137(45) MG
325 TABLET, EXTENDED RELEASE ORAL DAILY
Refills: 0 | Status: DISCONTINUED | OUTPATIENT
Start: 2025-04-27 | End: 2025-05-06

## 2025-04-27 RX ORDER — OXYBUTYNIN CHLORIDE 5 MG/1
5 TABLET, FILM COATED, EXTENDED RELEASE ORAL DAILY
Refills: 0 | Status: DISCONTINUED | OUTPATIENT
Start: 2025-04-28 | End: 2025-05-06

## 2025-04-27 RX ORDER — SUCRALFATE 1 G
1 TABLET ORAL
Refills: 0 | Status: DISCONTINUED | OUTPATIENT
Start: 2025-04-27 | End: 2025-05-06

## 2025-04-27 RX ORDER — ONDANSETRON HCL/PF 4 MG/2 ML
4 VIAL (ML) INJECTION
Refills: 0 | DISCHARGE

## 2025-04-27 RX ORDER — ENTACAPONE 200 MG/1
200 TABLET, FILM COATED ORAL
Refills: 0 | Status: DISCONTINUED | OUTPATIENT
Start: 2025-04-27 | End: 2025-04-27

## 2025-04-27 RX ORDER — GABAPENTIN 400 MG/1
100 CAPSULE ORAL EVERY 12 HOURS
Refills: 0 | Status: DISCONTINUED | OUTPATIENT
Start: 2025-04-27 | End: 2025-05-06

## 2025-04-27 RX ORDER — SUCRALFATE 1 G
1 TABLET ORAL
Refills: 0 | DISCHARGE

## 2025-04-27 RX ORDER — DEXTROMETHORPHAN HBR, GUAIFENESIN 20; 200 MG/10ML; MG/10ML
5 SOLUTION ORAL
Refills: 0 | DISCHARGE

## 2025-04-27 RX ORDER — VANCOMYCIN HCL IN 5 % DEXTROSE 1.5G/250ML
1000 PLASTIC BAG, INJECTION (ML) INTRAVENOUS ONCE
Refills: 0 | Status: COMPLETED | OUTPATIENT
Start: 2025-04-27 | End: 2025-04-27

## 2025-04-27 RX ORDER — ZALEPLON 10 MG/1
10 CAPSULE ORAL AT BEDTIME
Refills: 0 | Status: DISCONTINUED | OUTPATIENT
Start: 2025-04-27 | End: 2025-04-27

## 2025-04-27 RX ORDER — APIXABAN 2.5 MG/1
2.5 TABLET, FILM COATED ORAL EVERY 12 HOURS
Refills: 0 | Status: DISCONTINUED | OUTPATIENT
Start: 2025-04-27 | End: 2025-05-06

## 2025-04-27 RX ORDER — AMPICILLIN SODIUM AND SULBACTAM SODIUM 1; .5 G/1; G/1
3 INJECTION, POWDER, FOR SOLUTION INTRAMUSCULAR; INTRAVENOUS ONCE
Refills: 0 | Status: COMPLETED | OUTPATIENT
Start: 2025-04-27 | End: 2025-04-27

## 2025-04-27 RX ADMIN — ENTACAPONE 400 MILLIGRAM(S): 200 TABLET, FILM COATED ORAL at 20:39

## 2025-04-27 RX ADMIN — APIXABAN 2.5 MILLIGRAM(S): 2.5 TABLET, FILM COATED ORAL at 20:39

## 2025-04-27 RX ADMIN — Medication 2 TABLET(S): at 21:01

## 2025-04-27 RX ADMIN — SODIUM CHLORIDE 1000 MILLILITER(S): 9 INJECTION, SOLUTION INTRAVENOUS at 16:40

## 2025-04-27 RX ADMIN — Medication 250 MILLIGRAM(S): at 21:13

## 2025-04-27 RX ADMIN — Medication 2 TABLET(S): at 20:40

## 2025-04-27 RX ADMIN — SODIUM CHLORIDE 1000 MILLILITER(S): 9 INJECTION, SOLUTION INTRAVENOUS at 14:30

## 2025-04-27 RX ADMIN — Medication 250 MILLIGRAM(S): at 15:00

## 2025-04-27 RX ADMIN — Medication 25 GRAM(S): at 22:15

## 2025-04-27 RX ADMIN — AMPICILLIN SODIUM AND SULBACTAM SODIUM 200 GRAM(S): 1; .5 INJECTION, POWDER, FOR SOLUTION INTRAMUSCULAR; INTRAVENOUS at 15:50

## 2025-04-27 RX ADMIN — LIDOCAINE HYDROCHLORIDE 2 PATCH: 20 JELLY TOPICAL at 20:38

## 2025-04-27 NOTE — H&P ADULT - NS ATTEND AMEND GEN_ALL_CORE FT
89-year-old female past medical history of Parkinson's, dementia, hypertension, hyperlipidemia, A-fib on Eliquis, bacteremia secondary to MRSA positive parotitis left side of face in January 2025 presents to the hospital [facility] for evaluation of right-sided facial swelling x 2 days. pt is AAOx2, demented, reports tenderness to the right face on palpation. Pt was hospitalized here in 1/2025 for left parotitis and MRSA bacteremia, s/p abx course x 6 weeks.   VS in ED, hypotensive with BP  92/52, HR 80, T 97.9 F, SpO2 98% on 2L via nasal cannula, she was placed on 6L NC for transient episode of hypoxemia, then titrated to 4L NC. Labs are significant for leukocytosis with WBC 19.25k (88% neutrophils). hypernatremia Na 148, Cr 1 (baseline 0.5 in Jan 2025), lactate 1.7.  CT neck soft tissue and temporal bones w/ IV with evidence of  right acute parotitis and new nodules in the right upper lung measuring up to 1.1 cm.    PHYSICAL EXAM:  GENERAL: NAD, AAO x 2, 89y F, fragile , demented elderly female  HEAD:  Atraumatic, Normocephalic  EYES: EOMI,  sclera white, conjunctiva non injected, purulence present  FACE/NECK: right submandibular and right buccal swelling extending to the right ear, erythema on the right face and neck,  tenderness to palpation, no drooling, poor dentition  CHEST/LUNG: Clear to auscultation bilaterally; No wheeze; no accessory muscle use  HEART: Regular rate and rhythm; No murmurs;   ABDOMEN: Soft, Nontender, Nondistended;   EXTREMITIES:  2+ Peripheral Pulses, No cyanosis or edema  NEUROLOGY: non-focal    #R sided parotitis  #Hx of L parotitis  #Hx of MRSA bacteremia  - Sepsis not POA.  - CT neck as above  - ENT consult appreciated: no surgical intervention, continue IV abx, warm compress, sialogogues, aggressive oral care, repeat CT scan if worsening leukocytosis.  - start Vancomycin and Zosyn  -  ID consult.  - F/u BCx.  - obtain MRSA PCR,   - ESR, CRP  - Bedside RN swallow eval and speech/swallow formal eval  - supplemental oxygen if needed  - monitor airway, respiratory status    #New incidental lung nodules  - CT reads: New cluster of nodules within the anterior segment of the upper lobe of the right lung measuring up to 1.1 cm.  - no hx of tobacco use  - F/u with ID if this could be related to infectious process. If not, consider pulm consult.    - will need to repeat CT chest in 3 months after resolution of infection.    #Atrial fibrillation, rate controlled  #HTN  - reduce Eliquis to 2.5mg BID  due to age and weight  - given soft BPs, will split diltiazem 360 Cd to 90mg Q6H with parameters. can consider converting back to long acting after BP remains stable     #Dementia  #Parkinson  - Continue Parkinson home meds.  - Continue zaleplon PRN, Xanax PRN.    #Constipation  - Continue laxative regimen, hold for diarrhea.    #GERD  - Continue pantoprazole, sucralfate.    #Bladder spasms  - Continue oxybutynin 5mg QD.    #DIET: Pureed, DASH  #DVTppx: Apixaban  #GIppx: Pantoprazole, sucralfate  #Activity: Amb w/ assistance  #CODE: DNR DNI trial of NIV  #Disposition: acute, med/surg

## 2025-04-27 NOTE — CONSULT NOTE ADULT - NS ATTEND AMEND GEN_ALL_CORE FT
Patient seen and examined at bedside.    I reviewed and interpreted CT images showing a right parotitis with no drainable abscess.    continue MRSA active abx.    Will follow.

## 2025-04-27 NOTE — ED PROVIDER NOTE - PHYSICAL EXAMINATION
CONST: NAD  EYES: EOMI, Sclera and conjunctiva clear.   ENT: No nasal discharge. Oropharynx poor dentition. No abscess. Uvula midline.   NECK: R cervical lymphadenopathy. normal ROM. supple   CARD: S1 S2; No jvd  RESP: Equal BS B/L, No wheezes, rhonchi or rales.   SKIN: R facial edema, erythema and warmth  NEURO: A&Ox1

## 2025-04-27 NOTE — PATIENT PROFILE ADULT - FALL HARM RISK - HARM RISK INTERVENTIONS

## 2025-04-27 NOTE — CONSULT NOTE ADULT - SUBJECTIVE AND OBJECTIVE BOX
ENT Consult Note:   Pt is a 89-year-old female past medical history of Parkinson's, dementia, hypertension, hyperlipidemia, A-fib on Eliquis, bacteremia secondary to MRSA positive parotitis left side of face in January 2025, presents for evaluation of right-sided facial swelling.  As per EMS patient noticed to have right-sided facial swelling for the past couple days at nursing home, no inciting relieving factors. ENT called for further evaluation of right parotitis. Unable to obtain a comprehensive history, review of systems, past medical history, and/or physical exam due to constraints imposed by the patient's clinical condition and/or mental status.    [ x ] Due to altered mental status/intubation, subjective information were not able to be obtained from patient. History was obtained, to the extent possible, from review of the chart and collateral sources of information.     PAST MEDICAL & SURGICAL HISTORY:  Parkinson disease  HTN (hypertension)  Rheumatoid arthritis  Hyperlipidemia  CRAO (central retinal artery occlusion) Left  Atrial fibrillation    History of hysterectomy  Status post placement of implantable loop recorder 01/2021  S/P hernia repair    Allergies: No Known Allergies     FAMILY HISTORY:  FH: hypertension (Mother)    Vital Signs Last 24 Hrs  T(C): 36.1 (27 Apr 2025 16:20), Max: 36.6 (27 Apr 2025 12:46)  T(F): 97 (27 Apr 2025 16:20), Max: 97.9 (27 Apr 2025 12:46)  HR: 78 (27 Apr 2025 16:20) (78 - 82)  BP: 110/68 (27 Apr 2025 16:20) (92/52 - 110/68)  RR: 18 (27 Apr 2025 16:20) (15 - 20)  SpO2: 99% (27 Apr 2025 16:20) (79% - 99%)    Parameters below as of 27 Apr 2025 16:20  Patient On (Oxygen Delivery Method): nasal cannula  O2 Flow (L/min): 6    PHYSICAL EXAM:  Gen: Cachectic, lethargic. No drooling or pooling of secretions.  Raspy vocal quality, no hoarseness  Skin: Good color, non diaphoretic  Head: AT, +right facial swelling   EENT: Nares bilaterally patent, no blood or discharge noted. Oral cavity diffusely dry, +black hairy tongue. Very poor dentition. Uuvula midline, no tenderness throughout FOM. Posterior oropharynx clear, no blood/discharge noted.  +R facial edema and erythema and tenderness from R TMJ down to R SMG to R SCM. No fluctuance noted throughout   Neck: Trachea midline, supple  Resp: Breathing easily via NC, no accessory muscle use, no stridor or stertor.    Cardio: +S1/S2  Abd: Soft, nontender, nondistended  Neuro: Awake and alert  Psych: Normal mood, normal affect  Ext: No peripheral edema/cyanosis, VILLAR x 4    LABS:                      10.3   19.25 )-----------( 308      ( 27 Apr 2025 14:55 )             33.3     04-27    148[H]  |  111[H]  |  27[H]  ----------------------------<  99  3.6   |  26  |  1.0    Ca    9.1      27 Apr 2025 14:55    TPro  5.7[L]  /  Alb  2.7[L]  /  TBili  0.6  /  DBili  x   /  AST  14  /  ALT  <5  /  AlkPhos  125[H]  04-27    PT/INR - ( 27 Apr 2025 14:55 )   PT: 23.00 sec;   INR: 1.92 ratio     PTT - ( 27 Apr 2025 14:55 )  PTT:39.9 sec    Urinalysis Basic - ( 27 Apr 2025 14:55 )  Color: x / Appearance: x / SG: x / pH: x  Gluc: 99 mg/dL / Ketone: x  / Bili: x / Urobili: x   Blood: x / Protein: x / Nitrite: x   Leuk Esterase: x / RBC: x / WBC x   Sq Epi: x / Non Sq Epi: x / Bacteria: x        IMAGING/ADDITIONAL STUDIES:     ACC: [id number] EXAM:  CT TEMPORAL BONES IC   ORDERED BY: TREY MORIN     ACC: [id number] EXAM:  CT NECK SOFT TISSUE IC   ORDERED BY: TREY MORIN     PROCEDURE DATE:  04/27/2025      INTERPRETATION:  HISTORY: Right facial swelling.    TECHNIQUE: Axial sections were obtained from the skull base to the   sternum after the administration of intravenous contrast. Additional   dedicated study of the temporal bones was also performed. Coronal and   sagittal reformatted images were subsequently obtained. 100 mL of   Omnipaque 350 was administered intravenously. 0 mL was discarded.    COMPARISON: 1/1/2025    FINDINGS:  Marked swelling/enlarged and hyperenhancing of the right parotid gland.   There are inflammatory changes of the adjacent softtissues extending   into the submandibular space as well as thickening of the platysma   muscle. No abscess.    Bilateral mastoid effusions.    The nasopharynx, oropharynx, and oral cavity are within normal limits.   The hypopharynx, larynx, and trachea are within normal limits.    The left parotid and bilateral submandibular glands are unremarkable.    The thyroid gland demonstrates multiple nodules and is enlarged with   substernal extension of the left thyroid lobe.    No adenopathy is identified in the neck.    The visualized intracranial structures are within normal limits. Orbital   contents are unremarkable. Paranasal sinuses are clear.    The visualized lung apices demonstrate scarring. There are new cluster of   nodules within the anterior segment of the right upper lobe measuring up   to 1.1 cm.    IMPRESSION:  Marked swelling/enlarged and hyperenhancing of the right parotid gland   compatible with acute parotitis. There are inflammatory changes of the   adjacent soft tissues extending into the submandibular space as well as   thickening of the platysma muscle. No abscess.    Bilateral mastoid effusions.    New cluster of nodules within the anterior segment of the upper lobe of   the right lung measuring up to 1.1 cm.    ---End of Report ---    GLENDY YBARRA MD; Attending Radiologist  This document has been electronically signed. Apr 27 2025  3:55PM

## 2025-04-27 NOTE — ED PROVIDER NOTE - CLINICAL SUMMARY MEDICAL DECISION MAKING FREE TEXT BOX
89-year-old female with history of HTN, HLD, A-fib on Eliquis, Parkinson's with dementia, history of left parotitis with bacteremia treated in January, presents with right-sided facial swelling and pain, patient states since yesterday. Denies fever and all other symptoms. On exam, afebrile, hemodynamically stable, saturating well on 2L NC O2, NAD, frail, cachectic, nontoxic appearing, sitting comfortably in bed, breathing comfortably, no WOB/tachypnea, hard of hearing, speaking without difficulty, right cheek and neck swelling, induration, erythema, no submandibular edema or tenderness, no lifting, no stridor, poor dentition, right canal cerumen impacted, no canal edema, EOMI grossly, anicteric, RRR, breathing comfortably on room air, AAO, CN's 3-12 grossly intact, VILLAR spontaneously, skin warm, well perfused. Exam consistent with likely recurrent parotitis. No evidence of Omar's angina. No current airway compromise, although patient at significant risk for this. CT consistent with parotitis, similar to prior. No drainable abscess. Seen by ENT and no acute intervention at this time, agrees with antibiotics. Patient nontoxic appearing, hemodynamically stable. Admitted to internal medicine for further monitoring, w/u, and care.

## 2025-04-27 NOTE — H&P ADULT - ASSESSMENT
89-year-old female past medical history of Parkinson's, dementia, hypertension, hyperlipidemia, A-fib on Eliquis, bacteremia secondary to MRSA positive parotitis left side of face in January 2025 presents for evaluation of right-sided facial swelling.    #R sided parotitis  #Hx of L parotitis  #Hx of MRSA bacteremia  - Sepsis not POA.  - Presents with R neck/face swelling.  - CT neck soft tissue and temporal bones w/ IV: Marked swelling/enlarged and hyperenhancing of the right parotid gland compatible with acute parotitis. There are inflammatory changes of the adjacent soft tissues extending into the submandibular space as well as thickening of the platysma muscle. No abscess. Bilateral mastoid effusions.  - ENT consult appreciated: no surgical intervention, continue IV abx, warm compress, sialogogues, aggressive oral care, repeat CT scan if worsening leukocytosis.  - Patient completed 6 weeks of abx during prior L parotitis.  - Start linezolid and Zosyn for now to cover for MRSA and pseudomonas.  - F/u ID consult.  - F/u BCx.  - Patient is not requiring oxygen based on trial of room air for 15 mins with SpO2 staying > 98%.  - Bedside RN swallow eval --> get speech/swallow formal eval if needed. Takes pureed diet at NH.    #New incidental lung nodules  - CT reads: New cluster of nodules within the anterior segment of the upper lobe of the right lung measuring up to 1.1 cm.  - F/u with ID if this could be related to infectious process.  - If not, consider pulm consult.  - May need repeat CT scan once infection is resolved to monitor.    #Atrial fibrillation  #HTN  - Given weight of 60kg and age of 89, will dose reduce Eliquis to 2.5mg BID (from home 5mg BID).  - Will also split diltiazem 360 Cd to 90mg Q6H given soft BPs with parameters. May need dose adjustment to avoid hypotension.    #Dementia  #Parkinson  - Continue Parkinson home meds.  - Continue zaleplon PRN, Xanax PRN.    #Constipation  - Continue laxative regimen, hold for diarrhea.    #GERD  - Continue pantoprazole, sucralfate.    #Bladder spasms  - Continue oxybutynin 5mg QD.    #DIET: Pureed, DASH  #DVTppx: Apixaban  #GIppx: Pantoprazole, sucralfate  #Activity: Amb w/ assistance  #CODE: DNR DNI trial of NIV  #Disposition: from [facility], will likely return when stable.

## 2025-04-27 NOTE — ED ADULT NURSE NOTE - NSFALLHARMRISKINTERV_ED_ALL_ED
Assistance OOB with selected safe patient handling equipment if applicable/Assistance with ambulation/Communicate risk of Fall with Harm to all staff, patient, and family/Monitor gait and stability/Monitor for mental status changes and reorient to person, place, and time, as needed/Provide visual cue: red socks, yellow wristband, yellow gown, etc/Reinforce activity limits and safety measures with patient and family/Toileting schedule using arm’s reach rule for commode and bathroom/Use of alarms - bed, stretcher, chair and/or video monitoring/Bed in lowest position, wheels locked, appropriate side rails in place/Call bell, personal items and telephone in reach/Instruct patient to call for assistance before getting out of bed/chair/stretcher/Non-slip footwear applied when patient is off stretcher/[place] to call system/Physically safe environment - no spills, clutter or unnecessary equipment/Purposeful Proactive Rounding/Room/bathroom lighting operational, light cord in reach

## 2025-04-27 NOTE — ED PROVIDER NOTE - OBJECTIVE STATEMENT
89-year-old female past medical history of Parkinson's, dementia, hypertension, hyperlipidemia, A-fib on Eliquis, bacteremia secondary to MRSA positive parotitis left side of face in January presents for evaluation of right-sided facial swelling.  As per EMS patient noticed to have right-sided facial swelling for the past couple days at nursing home, no inciting relieving factors. I am unable to obtain a comprehensive history, review of systems, past medical history, and/or physical exam due to constraints imposed by the patient's clinical condition and/or mental status.

## 2025-04-27 NOTE — ED ADULT TRIAGE NOTE - CHIEF COMPLAINT QUOTE
Pt bibems from [facility] for R sided facial swelling. Pt has hx of parkinsons, A&Ox1 baseline. Pt has hx of facial abscess. Pt on 2L NC at NH

## 2025-04-27 NOTE — H&P ADULT - HISTORY OF PRESENT ILLNESS
89-year-old female past medical history of Parkinson's, dementia, hypertension, hyperlipidemia, A-fib on Eliquis, bacteremia secondary to MRSA positive parotitis left side of face in January presents for evaluation of right-sided facial swelling.  As per EMS patient noticed to have right-sided facial swelling for the past couple days at nursing home, no inciting relieving factors.    Triage VS:  92/52, HR 80, T 97.9 F, SpO2 98% on 2L via nasal cannula.  Labs: WBC 19.25k (88% neutrophils), otherwise CBC stable. CMP with Na 148, Cr 1 (baseline 0.5 in Jan 2025), lactate 1.7.    CT neck soft tissue and temporal bones w/ IV: Marked swelling/enlarged and hyperenhancing of the right parotid gland compatible with acute parotitis. There are inflammatory changes of the adjacent soft tissues extending into the submandibular space as well as thickening of the platysma muscle. No abscess. Bilateral mastoid effusions. New cluster of nodules within the anterior segment of the upper lobe of the right lung measuring up to 1.1 cm.    ED interventions: Unasyn, vancomycin, 2L LR.  Seen by ENT who recommend IV abx, sialogogues and oral care. 89-year-old female past medical history of Parkinson's, dementia, hypertension, hyperlipidemia, A-fib on Eliquis, bacteremia secondary to MRSA positive parotitis left side of face in January 2025 presents for evaluation of right-sided facial swelling. As per NH paperwork, patient noticed to have right-sided facial swelling for the past couple days, no inciting relieving factors. Patient is A&Ox3, although weak and slow to respond. Reports pain at R neck/face at site of swelling. Denies pain elsewhere. Daughter Veronica at bedside also aware of same hx as stated.    Triage VS:  92/52, HR 80, T 97.9 F, SpO2 98% on 2L via nasal cannula.  Labs: WBC 19.25k (88% neutrophils), otherwise CBC stable. CMP with Na 148, Cr 1 (baseline 0.5 in Jan 2025), lactate 1.7.    CT neck soft tissue and temporal bones w/ IV: Marked swelling/enlarged and hyperenhancing of the right parotid gland compatible with acute parotitis. There are inflammatory changes of the adjacent soft tissues extending into the submandibular space as well as thickening of the platysma muscle. No abscess. Bilateral mastoid effusions. New cluster of nodules within the anterior segment of the upper lobe of the right lung measuring up to 1.1 cm.    ED interventions: Unasyn, vancomycin, 2L LR.  Seen by ENT who recommend IV abx, sialogogues and oral care.

## 2025-04-27 NOTE — ED ADULT NURSE NOTE - CHIEF COMPLAINT QUOTE
Pt bibems from [facility] for R sided facial swelling. Pt has hx of parkinsons, A&Ox1 baseline. Pt has hx of facial abscess. Pt on 2L NC at NH

## 2025-04-27 NOTE — ED PROVIDER NOTE - ATTENDING APP SHARED VISIT CONTRIBUTION OF CARE
89-year-old female with history of HTN, HLD, A-fib on Eliquis, Parkinson's with dementia, history of left parotitis with bacteremia treated in January, presents with right-sided facial swelling and pain, patient states since yesterday. Denies fever and all other symptoms. On exam, afebrile, hemodynamically stable, saturating well on 2L NC O2, NAD, frail, cachectic, nontoxic appearing, sitting comfortably in bed, breathing comfortably, no WOB/tachypnea, hard of hearing, speaking without difficulty, right cheek and neck swelling, induration, erythema, no submandibular edema or tenderness, no lifting, no stridor, poor dentition, right canal cerumen impacted, no canal edema, EOMI grossly, anicteric, RRR, breathing comfortably on room air, AAO, CN's 3-12 grossly intact, VILLAR spontaneously, skin warm, well perfused. Exam consistent with likely recurrent parotitis. No evidence of Omar's angina. No current airway compromise, although patient at significant risk for this. 89-year-old female with history of HTN, HLD, A-fib on Eliquis, Parkinson's with dementia, history of left parotitis with bacteremia treated in January, presents with right-sided facial swelling and pain, patient states since yesterday. Denies fever and all other symptoms. On exam, afebrile, hemodynamically stable, saturating well on 2L NC O2, NAD, frail, cachectic, nontoxic appearing, sitting comfortably in bed, breathing comfortably, no WOB/tachypnea, hard of hearing, speaking without difficulty, right cheek and neck swelling, induration, erythema, no submandibular edema or tenderness, no lifting, no stridor, poor dentition, right canal cerumen impacted, no canal edema, EOMI grossly, anicteric, RRR, breathing comfortably on room air, AAO, CN's 3-12 grossly intact, VILLAR spontaneously, skin warm, well perfused. Exam consistent with likely recurrent parotitis. No evidence of Omar's angina. No current airway compromise, although patient at significant risk for this. CT consistent with parotitis, similar to prior. No drainable abscess. Seen by ENT and no acute intervention at this time, agrees with antibiotics. Patient nontoxic appearing, hemodynamically stable. Admitted to internal medicine for further monitoring, w/u, and care.

## 2025-04-28 LAB
ANION GAP SERPL CALC-SCNC: 9 MMOL/L — SIGNIFICANT CHANGE UP (ref 7–14)
APPEARANCE UR: CLEAR — SIGNIFICANT CHANGE UP
BASOPHILS # BLD AUTO: 0.03 K/UL — SIGNIFICANT CHANGE UP (ref 0–0.2)
BASOPHILS NFR BLD AUTO: 0.2 % — SIGNIFICANT CHANGE UP (ref 0–1)
BILIRUB UR-MCNC: ABNORMAL
BUN SERPL-MCNC: 24 MG/DL — HIGH (ref 10–20)
CALCIUM SERPL-MCNC: 8.3 MG/DL — LOW (ref 8.4–10.5)
CHLORIDE SERPL-SCNC: 110 MMOL/L — SIGNIFICANT CHANGE UP (ref 98–110)
CO2 SERPL-SCNC: 25 MMOL/L — SIGNIFICANT CHANGE UP (ref 17–32)
COLOR SPEC: SIGNIFICANT CHANGE UP
CREAT SERPL-MCNC: 0.8 MG/DL — SIGNIFICANT CHANGE UP (ref 0.7–1.5)
CRP SERPL-MCNC: 294.4 MG/L — HIGH
DIFF PNL FLD: ABNORMAL
EGFR: 69 ML/MIN/1.73M2 — SIGNIFICANT CHANGE UP
EGFR: 69 ML/MIN/1.73M2 — SIGNIFICANT CHANGE UP
EOSINOPHIL # BLD AUTO: 0.04 K/UL — SIGNIFICANT CHANGE UP (ref 0–0.7)
EOSINOPHIL NFR BLD AUTO: 0.3 % — SIGNIFICANT CHANGE UP (ref 0–8)
ERYTHROCYTE [SEDIMENTATION RATE] IN BLOOD: 76 MM/HR — HIGH (ref 0–20)
GLUCOSE SERPL-MCNC: 101 MG/DL — HIGH (ref 70–99)
GLUCOSE UR QL: NEGATIVE MG/DL — SIGNIFICANT CHANGE UP
GRAM STN FLD: ABNORMAL
HCT VFR BLD CALC: 28 % — LOW (ref 37–47)
HGB BLD-MCNC: 8.9 G/DL — LOW (ref 12–16)
IMM GRANULOCYTES NFR BLD AUTO: 1.4 % — HIGH (ref 0.1–0.3)
KETONES UR-MCNC: ABNORMAL MG/DL
LEUKOCYTE ESTERASE UR-ACNC: ABNORMAL
LYMPHOCYTES # BLD AUTO: 0.49 K/UL — LOW (ref 1.2–3.4)
LYMPHOCYTES # BLD AUTO: 3.4 % — LOW (ref 20.5–51.1)
MCHC RBC-ENTMCNC: 27.9 PG — SIGNIFICANT CHANGE UP (ref 27–31)
MCHC RBC-ENTMCNC: 31.8 G/DL — LOW (ref 32–37)
MCV RBC AUTO: 87.8 FL — SIGNIFICANT CHANGE UP (ref 81–99)
METHOD TYPE: SIGNIFICANT CHANGE UP
MONOCYTES # BLD AUTO: 0.88 K/UL — HIGH (ref 0.1–0.6)
MONOCYTES NFR BLD AUTO: 6 % — SIGNIFICANT CHANGE UP (ref 1.7–9.3)
MRSA PCR RESULT.: POSITIVE
MRSA SPEC QL CULT: SIGNIFICANT CHANGE UP
NEUTROPHILS # BLD AUTO: 12.97 K/UL — HIGH (ref 1.4–6.5)
NEUTROPHILS NFR BLD AUTO: 88.7 % — HIGH (ref 42.2–75.2)
NITRITE UR-MCNC: POSITIVE
NRBC BLD AUTO-RTO: 0 /100 WBCS — SIGNIFICANT CHANGE UP (ref 0–0)
PH UR: 6.5 — SIGNIFICANT CHANGE UP (ref 5–8)
PLATELET # BLD AUTO: 249 K/UL — SIGNIFICANT CHANGE UP (ref 130–400)
PMV BLD: 10.1 FL — SIGNIFICANT CHANGE UP (ref 7.4–10.4)
POTASSIUM SERPL-MCNC: 3 MMOL/L — LOW (ref 3.5–5)
POTASSIUM SERPL-SCNC: 3 MMOL/L — LOW (ref 3.5–5)
PROT UR-MCNC: 100 MG/DL
RBC # BLD: 3.19 M/UL — LOW (ref 4.2–5.4)
RBC # FLD: 16.2 % — HIGH (ref 11.5–14.5)
SODIUM SERPL-SCNC: 144 MMOL/L — SIGNIFICANT CHANGE UP (ref 135–146)
SP GR SPEC: >=1.099 (ref 1–1.03)
SPECIMEN SOURCE: SIGNIFICANT CHANGE UP
UROBILINOGEN FLD QL: 0.2 MG/DL — SIGNIFICANT CHANGE UP (ref 0.2–1)
VANCOMYCIN TROUGH SERPL-MCNC: 6.9 UG/ML — SIGNIFICANT CHANGE UP (ref 5–10)
VANCOMYCIN TROUGH SERPL-MCNC: 9.9 UG/ML — SIGNIFICANT CHANGE UP (ref 5–10)
WBC # BLD: 14.62 K/UL — HIGH (ref 4.8–10.8)
WBC # FLD AUTO: 14.62 K/UL — HIGH (ref 4.8–10.8)

## 2025-04-28 PROCEDURE — 99223 1ST HOSP IP/OBS HIGH 75: CPT

## 2025-04-28 PROCEDURE — 99233 SBSQ HOSP IP/OBS HIGH 50: CPT

## 2025-04-28 RX ORDER — AMPICILLIN SODIUM AND SULBACTAM SODIUM 1; .5 G/1; G/1
3 INJECTION, POWDER, FOR SOLUTION INTRAMUSCULAR; INTRAVENOUS EVERY 6 HOURS
Refills: 0 | Status: DISCONTINUED | OUTPATIENT
Start: 2025-04-28 | End: 2025-04-29

## 2025-04-28 RX ORDER — SODIUM CHLORIDE 9 G/1000ML
1000 INJECTION, SOLUTION INTRAVENOUS
Refills: 0 | Status: DISCONTINUED | OUTPATIENT
Start: 2025-04-28 | End: 2025-04-29

## 2025-04-28 RX ADMIN — Medication 2 TABLET(S): at 17:26

## 2025-04-28 RX ADMIN — APIXABAN 2.5 MILLIGRAM(S): 2.5 TABLET, FILM COATED ORAL at 05:30

## 2025-04-28 RX ADMIN — SODIUM CHLORIDE 50 MILLILITER(S): 9 INJECTION, SOLUTION INTRAVENOUS at 17:38

## 2025-04-28 RX ADMIN — Medication 25 GRAM(S): at 05:35

## 2025-04-28 RX ADMIN — GABAPENTIN 100 MILLIGRAM(S): 400 CAPSULE ORAL at 17:25

## 2025-04-28 RX ADMIN — ENTACAPONE 400 MILLIGRAM(S): 200 TABLET, FILM COATED ORAL at 23:12

## 2025-04-28 RX ADMIN — OXYBUTYNIN CHLORIDE 5 MILLIGRAM(S): 5 TABLET, FILM COATED, EXTENDED RELEASE ORAL at 10:23

## 2025-04-28 RX ADMIN — Medication 50 MILLIEQUIVALENT(S): at 13:20

## 2025-04-28 RX ADMIN — GABAPENTIN 100 MILLIGRAM(S): 400 CAPSULE ORAL at 05:30

## 2025-04-28 RX ADMIN — APIXABAN 2.5 MILLIGRAM(S): 2.5 TABLET, FILM COATED ORAL at 17:26

## 2025-04-28 RX ADMIN — AMPICILLIN SODIUM AND SULBACTAM SODIUM 200 GRAM(S): 1; .5 INJECTION, POWDER, FOR SOLUTION INTRAMUSCULAR; INTRAVENOUS at 23:12

## 2025-04-28 RX ADMIN — Medication 15 MILLILITER(S): at 17:28

## 2025-04-28 RX ADMIN — AMPICILLIN SODIUM AND SULBACTAM SODIUM 200 GRAM(S): 1; .5 INJECTION, POWDER, FOR SOLUTION INTRAMUSCULAR; INTRAVENOUS at 17:26

## 2025-04-28 RX ADMIN — Medication 250 MILLIGRAM(S): at 10:23

## 2025-04-28 RX ADMIN — Medication 15 MILLILITER(S): at 05:38

## 2025-04-28 RX ADMIN — ENTACAPONE 400 MILLIGRAM(S): 200 TABLET, FILM COATED ORAL at 17:28

## 2025-04-28 RX ADMIN — Medication 325 MILLIGRAM(S): at 10:23

## 2025-04-28 RX ADMIN — Medication 2 TABLET(S): at 23:12

## 2025-04-28 RX ADMIN — Medication 50 MILLIEQUIVALENT(S): at 10:28

## 2025-04-28 RX ADMIN — ENTACAPONE 400 MILLIGRAM(S): 200 TABLET, FILM COATED ORAL at 05:29

## 2025-04-28 RX ADMIN — Medication 1 GRAM(S): at 10:23

## 2025-04-28 RX ADMIN — Medication 40 MILLIGRAM(S): at 06:30

## 2025-04-28 RX ADMIN — Medication 5 MILLIGRAM(S): at 10:26

## 2025-04-28 RX ADMIN — Medication 1 GRAM(S): at 05:30

## 2025-04-28 RX ADMIN — Medication 1 GRAM(S): at 17:28

## 2025-04-28 RX ADMIN — LIDOCAINE HYDROCHLORIDE 2 PATCH: 20 JELLY TOPICAL at 23:13

## 2025-04-28 RX ADMIN — Medication 2 TABLET(S): at 10:23

## 2025-04-28 RX ADMIN — ENTACAPONE 400 MILLIGRAM(S): 200 TABLET, FILM COATED ORAL at 10:26

## 2025-04-28 RX ADMIN — Medication 2 TABLET(S): at 05:29

## 2025-04-28 RX ADMIN — Medication 1 APPLICATION(S): at 05:40

## 2025-04-28 NOTE — CONSULT NOTE ADULT - SUBJECTIVE AND OBJECTIVE BOX
JON GARCÍA  89y, Female  Allergy: No Known Allergies      CHIEF COMPLAINT:   right parotitis (27 Apr 2025 17:07)      LOS  1d    HPI  HPI:  89-year-old female past medical history of Parkinson's, dementia, hypertension, hyperlipidemia, A-fib on Eliquis, bacteremia secondary to MRSA positive parotitis left side of face in January 2025 presents for evaluation of right-sided facial swelling. As per NH paperwork, patient noticed to have right-sided facial swelling for the past couple days, no inciting relieving factors. Patient is A&Ox3, although weak and slow to respond. Reports pain at R neck/face at site of swelling. Denies pain elsewhere. Daughter Veronica at bedside also aware of same hx as stated.    Triage VS:  92/52, HR 80, T 97.9 F, SpO2 98% on 2L via nasal cannula.  Labs: WBC 19.25k (88% neutrophils), otherwise CBC stable. CMP with Na 148, Cr 1 (baseline 0.5 in Jan 2025), lactate 1.7.    CT neck soft tissue and temporal bones w/ IV: Marked swelling/enlarged and hyperenhancing of the right parotid gland compatible with acute parotitis. There are inflammatory changes of the adjacent soft tissues extending into the submandibular space as well as thickening of the platysma muscle. No abscess. Bilateral mastoid effusions. New cluster of nodules within the anterior segment of the upper lobe of the right lung measuring up to 1.1 cm.    ED interventions: Unasyn, vancomycin, 2L LR.  Seen by ENT who recommend IV abx, sialogogues and oral care. (27 Apr 2025 18:23)      INFECTIOUS DISEASE HISTORY:  ID consulted for R parotitis  Admission WBC 19     ESR/CRP 76/294  < from: CT Neck Soft Tissue w/ IV Cont (04.27.25 @ 15:03) >  Marked swelling/enlarged and hyperenhancing of the right parotid gland compatible with acute parotitis. There are inflammatory changes of the   adjacent soft tissues extending into the submandibular space as well as thickening of the platysma muscle. No abscess.  Bilateral mastoid effusions.  New cluster of nodules within the anterior segment of the upper lobe of the right lung measuring up to 1.1 cm.  Last seen by ID 1/2025 for MRSA bacteremia due to L parotitis    1/4 BCX NGTD     1/3 BCX NGTD     1/1 2/4 BCX MRSA    TTE no vegetations   As Pt was a poor candidate for JOI, she was D/C with PICC x IV Vanc 6 weeks from cleared BCX E/D 2/13/25      Currently ordered for:  piperacillin/tazobactam IVPB.. 3.375 Gram(s) IV Intermittent every 8 hours  vancomycin  IVPB 750 milliGRAM(s) IV Intermittent every 12 hours      PMH  PAST MEDICAL & SURGICAL HISTORY:  Parkinson disease      HTN (hypertension)      Rheumatoid arthritis      Hyperlipidemia      CRAO (central retinal artery occlusion)  Left      Atrial fibrillation      History of hysterectomy      Status post placement of implantable loop recorder  01/2021      S/P hernia repair          FAMILY HISTORY  No pertinent family history in first degree relatives    FH: hypertension (Mother)        SOCIAL HISTORY  Social History:        ROS  unable to obtain history secondary to patient's mental status and/or sedation     VITALS:  T(F): 97.9, Max: 98.2 (04-27-25 @ 20:59)  HR: 67  BP: 92/63  RR: 18Vital Signs Last 24 Hrs  T(C): 36.6 (28 Apr 2025 07:43), Max: 36.8 (27 Apr 2025 20:59)  T(F): 97.9 (28 Apr 2025 07:43), Max: 98.2 (27 Apr 2025 20:59)  HR: 67 (28 Apr 2025 07:43) (67 - 82)  BP: 92/63 (28 Apr 2025 07:43) (92/63 - 110/68)  BP(mean): --  RR: 18 (28 Apr 2025 07:43) (18 - 20)  SpO2: 95% (28 Apr 2025 07:43) (79% - 100%)    Parameters below as of 28 Apr 2025 07:43  Patient On (Oxygen Delivery Method): room air        PHYSICAL EXAM:  Gen: chronically ill appearing   HEENT: Normocephalic, atraumatic  Neck: supple, R parotid induration/erythema extending down to chest   CV: Regular rate & regular rhythm  Lungs: decreased BS at bases, no fremitus  Abdomen: Soft, BS present  Ext: Warm, well perfused  Neuro: non focal  Skin: no rash, no erythema  Lines: no phlebitis     TESTS & MEASUREMENTS:                        8.9    14.62 )-----------( 249      ( 28 Apr 2025 08:07 )             28.0     04-28    144  |  110  |  24[H]  ----------------------------<  101[H]  3.0[L]   |  25  |  0.8    Ca    8.3[L]      28 Apr 2025 08:07    TPro  5.7[L]  /  Alb  2.7[L]  /  TBili  0.6  /  DBili  x   /  AST  14  /  ALT  <5  /  AlkPhos  125[H]  04-27      LIVER FUNCTIONS - ( 27 Apr 2025 14:55 )  Alb: 2.7 g/dL / Pro: 5.7 g/dL / ALK PHOS: 125 U/L / ALT: <5 U/L / AST: 14 U/L / GGT: x           Urinalysis Basic - ( 28 Apr 2025 08:07 )    Color: x / Appearance: x / SG: x / pH: x  Gluc: 101 mg/dL / Ketone: x  / Bili: x / Urobili: x   Blood: x / Protein: x / Nitrite: x   Leuk Esterase: x / RBC: x / WBC x   Sq Epi: x / Non Sq Epi: x / Bacteria: x        Urinalysis with Rflx Culture (collected 04-28-25 @ 07:50)    Culture - Blood (collected 01-04-25 @ 23:06)  Source: .Blood BLOOD  Final Report (01-10-25 @ 01:01):    No growth at 5 days    Culture - Blood (collected 01-04-25 @ 11:28)  Source: .Blood BLOOD  Final Report (01-10-25 @ 01:00):    No growth at 5 days    Culture - Blood (collected 01-03-25 @ 17:32)  Source: .Blood BLOOD  Final Report (01-09-25 @ 01:01):    No growth at 5 days    Culture - Blood (collected 01-03-25 @ 17:32)  Source: .Blood BLOOD  Final Report (01-09-25 @ 01:01):    No growth at 5 days    Culture - Blood (collected 01-01-25 @ 15:52)  Source: .Blood BLOOD  Gram Stain (01-03-25 @ 00:12):    Growth in aerobic bottle: Gram Positive Cocci in Clusters    Growth in anaerobic bottle: Gram Positive Cocci in Clusters  Final Report (01-04-25 @ 07:37):    Growth in aerobic and anaerobic bottles: Methicillin Resistant    Staphylococcus aureus    Direct identification is available within approximately 3-5    hours either by Blood Panel Multiplexed PCR or Direct    MALDI-TOF. Details: [email or web address]  Organism: Blood Culture PCR  Methicillin resistant Staphylococcus aureus (01-04-25 @ 07:37)  Organism: Methicillin resistant Staphylococcus aureus (01-04-25 @ 07:37)      -  Clindamycin: S <=0.25      -  Oxacillin: R >2      -  Gentamicin: S <=4 Should not be used as monotherapy      -  Daptomycin: S <=0.5      -  Linezolid: S 2      -  Vancomycin: S 0.5      -  Tetracycline: S <=4      Method Type: ADAM      -  Penicillin: R 1      -  Rifampin: S <=1 Should not be used as monotherapy      -  Erythromycin: S <=0.25      -  Trimethoprim/Sulfamethoxazole: S <=0.5/9.5  Organism: Blood Culture PCR (01-04-25 @ 07:37)      Method Type: PCR      -  Methicillin resistant Staphylococcus aureus (MRSA): Detec    Culture - Blood (collected 01-01-25 @ 15:52)  Source: .Blood BLOOD  Gram Stain (01-04-25 @ 07:27):    Growth in anaerobic bottle: Gram Positive Cocci in Clusters  Final Report (01-05-25 @ 15:42):    Growth in anaerobic bottle: Methicillin Resistant Staphylococcus aureus    See previous culture [id number]        Lactate, Blood: 1.7 mmol/L (04-27-25 @ 14:55)      INFECTIOUS DISEASES TESTING  MRSA PCR Result.: Positive (04-28-25 @ 07:50)  MRSA PCR Result.: Positive (01-05-25 @ 07:10)      INFLAMMATORY MARKERS  Sedimentation Rate, Erythrocyte: 76 mm/Hr (04-28-25 @ 00:15)      RADIOLOGY & ADDITIONAL TESTS:  I have personally reviewed the last Chest xray  CXR      CT      CARDIOLOGY TESTING  12 Lead ECG:   Ventricular Rate 82 BPM    Atrial Rate 82 BPM    P-R Interval 150 ms <TRUNCATED> (04-27-25 @ 13:48)       MEDICATIONS  apixaban 2.5 Oral every 12 hours  bisacodyl 5 Oral daily  carbidopa/levodopa  25/100 2 Oral four times a day  chlorhexidine 0.12% Liquid 15 Oral Mucosa two times a day  chlorhexidine 2% Cloths 1 Topical <User Schedule>  dextrose 5% + lactated ringers. 1000 IV Continuous <Continuous>  diltiazem    Tablet 90 Oral every 6 hours  entacapone 400 Oral four times a day  ferrous    sulfate 325 Oral daily  gabapentin 100 Oral every 12 hours  lidocaine   4% Patch 2 Transdermal every 24 hours  oxybutynin 5 Oral daily  pantoprazole    Tablet 40 Oral before breakfast  piperacillin/tazobactam IVPB.. 3.375 IV Intermittent every 8 hours  potassium chloride  20 mEq/100 mL IVPB 20 IV Intermittent every 2 hours  senna 2 Oral at bedtime  sucralfate 1 Oral <User Schedule>  vancomycin  IVPB 750 IV Intermittent every 12 hours      ANTIBIOTICS:  piperacillin/tazobactam IVPB.. 3.375 Gram(s) IV Intermittent every 8 hours  vancomycin  IVPB 750 milliGRAM(s) IV Intermittent every 12 hours      ALLERGIES:  No Known Allergies

## 2025-04-28 NOTE — PROGRESS NOTE ADULT - ASSESSMENT
89-year-old female past medical history of Parkinson's, dementia, hypertension, hyperlipidemia, A-fib on Eliquis, bacteremia secondary to MRSA positive parotitis left side of face in January 2025 presents for evaluation of right-sided facial swelling.    #R sided parotitis  #Hx of L parotitis  #Hx of MRSA bacteremia  - Sepsis not POA.  - Presents with R neck/face swelling.  - CT neck soft tissue and temporal bones w/ IV: Marked swelling/enlarged and hyperenhancing of the right parotid gland compatible with acute parotitis. There are inflammatory changes of the adjacent soft tissues extending into the submandibular space as well as thickening of the platysma muscle. No abscess. Bilateral mastoid effusions.  - ENT consult appreciated: no surgical intervention, continue IV abx, warm compress, sialogogues, aggressive oral care, repeat CT scan if worsening leukocytosis.  - Patient completed 6 weeks of abx during prior L parotitis.  - On linezolid and Zosyn for now to cover for MRSA and pseudomonas (keep on this regimen till ID, clarified with attending)  - Pending ID consult.  - F/u BCx, Ucx  - On minimal oxygen, wean down as tolerated    #New incidental lung nodules  - CT reads: New cluster of nodules within the anterior segment of the upper lobe of the right lung measuring up to 1.1 cm.  - F/u with ID if this could be related to infectious process.  - May need repeat CT scan once infection is resolved to monitor.    #Atrial fibrillation  #HTN  - Given weight of 60kg and age of 89, dosed Eliquis to 2.5mg BID (from home 5mg BID).  - split diltiazem 360 Cd to 90mg Q6H given soft BPs with parameters. May need dose adjustment to avoid hypotension.  - Start d5W at 50cc/hr    #Dementia  #Parkinson  - Continue Parkinson home meds.  - Continue zaleplon PRN, Xanax PRN.    #Constipation  - Continue laxative regimen, hold for diarrhea.    #GERD  - Continue pantoprazole, sucralfate.    #Bladder spasms  - Continue oxybutynin 5mg QD.    #DIET: Pureed, DASH  #DVT ppx: Apixaban  #GIppx: Pantoprazole, sucralfate  #Activity: Amb w/ assistance  #CODE: DNR DNI trial of NIV  #Disposition: from [facility], will likely return when stable.   89-year-old female past medical history of Parkinson's, dementia, hypertension, hyperlipidemia, A-fib on Eliquis, bacteremia secondary to MRSA positive parotitis left side of face in January 2025 presents for evaluation of right-sided facial swelling.    #R sided parotitis  #Hx of L parotitis  #Hx of MRSA bacteremia  - Sepsis not POA.  - Presents with R neck/face swelling.  - CT neck soft tissue and temporal bones w/ IV: Marked swelling/enlarged and hyperenhancing of the right parotid gland compatible with acute parotitis. There are inflammatory changes of the adjacent soft tissues extending into the submandibular space as well as thickening of the platysma muscle. No abscess. Bilateral mastoid effusions.  - ENT consult appreciated: no surgical intervention, continue IV abx, warm compress, sialogogues, aggressive oral care, repeat CT scan if worsening leukocytosis.  - Patient completed 6 weeks of abx during prior L parotitis.  - Abx per ID; vanc and unasyn, MRSA positive  - Pending ID consult.  - F/u BCx, Ucx  - On minimal oxygen, wean down as tolerated    #New incidental lung nodules  - CT reads: New cluster of nodules within the anterior segment of the upper lobe of the right lung measuring up to 1.1 cm.  - F/u with ID if this could be related to infectious process.  - May need repeat CT scan once infection is resolved to monitor.    #Atrial fibrillation  #HTN  - Given weight of 60kg and age of 89, dosed Eliquis to 2.5mg BID (from home 5mg BID).  - split diltiazem 360 Cd to 90mg Q6H given soft BPs with parameters. May need dose adjustment to avoid hypotension.  - Start d5W at 50cc/hr    #Dementia  #Parkinson  - Continue Parkinson home meds.  - Continue zaleplon PRN, Xanax PRN.    #Constipation  - Continue laxative regimen, hold for diarrhea.    #GERD  - Continue pantoprazole, sucralfate.    #Bladder spasms  - Continue oxybutynin 5mg QD.    #DIET: Pureed, DASH  #DVT ppx: Apixaban  #GIppx: Pantoprazole, sucralfate  #Activity: Amb w/ assistance  #CODE: DNR DNI trial of NIV  #Disposition: from [facility], will likely return when stable.

## 2025-04-28 NOTE — CONSULT NOTE ADULT - NS ATTEST RISK PROBLEM GEN_ALL_CORE FT
- I discussed my recommendations with the primary team racheltaeliz / Osmar Winters  - This patient is on drug therapy requiring intensive monitoring for toxicity. Vancomycin requires intensive drug monitoring due to concerns for possible adverse effects, including acute kidney injury, and will be monitored with basic metabolic panel, Vancomycin levels, to be done while on therapy

## 2025-04-29 ENCOUNTER — RESULT REVIEW (OUTPATIENT)
Age: 89
End: 2025-04-29

## 2025-04-29 LAB
ANION GAP SERPL CALC-SCNC: 14 MMOL/L — SIGNIFICANT CHANGE UP (ref 7–14)
ANION GAP SERPL CALC-SCNC: 8 MMOL/L — SIGNIFICANT CHANGE UP (ref 7–14)
APTT BLD: 35.7 SEC — SIGNIFICANT CHANGE UP (ref 27–39.2)
BASOPHILS # BLD AUTO: 0.04 K/UL — SIGNIFICANT CHANGE UP (ref 0–0.2)
BASOPHILS NFR BLD AUTO: 0.5 % — SIGNIFICANT CHANGE UP (ref 0–1)
BUN SERPL-MCNC: 14 MG/DL — SIGNIFICANT CHANGE UP (ref 10–20)
BUN SERPL-MCNC: 19 MG/DL — SIGNIFICANT CHANGE UP (ref 10–20)
CALCIUM SERPL-MCNC: 7.9 MG/DL — LOW (ref 8.4–10.5)
CALCIUM SERPL-MCNC: 8 MG/DL — LOW (ref 8.4–10.5)
CHLORIDE SERPL-SCNC: 106 MMOL/L — SIGNIFICANT CHANGE UP (ref 98–110)
CHLORIDE SERPL-SCNC: 106 MMOL/L — SIGNIFICANT CHANGE UP (ref 98–110)
CO2 SERPL-SCNC: 19 MMOL/L — SIGNIFICANT CHANGE UP (ref 17–32)
CO2 SERPL-SCNC: 27 MMOL/L — SIGNIFICANT CHANGE UP (ref 17–32)
CREAT SERPL-MCNC: 0.8 MG/DL — SIGNIFICANT CHANGE UP (ref 0.7–1.5)
CREAT SERPL-MCNC: 0.8 MG/DL — SIGNIFICANT CHANGE UP (ref 0.7–1.5)
EGFR: 69 ML/MIN/1.73M2 — SIGNIFICANT CHANGE UP
EOSINOPHIL # BLD AUTO: 0.02 K/UL — SIGNIFICANT CHANGE UP (ref 0–0.7)
EOSINOPHIL NFR BLD AUTO: 0.2 % — SIGNIFICANT CHANGE UP (ref 0–8)
GLUCOSE SERPL-MCNC: 90 MG/DL — SIGNIFICANT CHANGE UP (ref 70–99)
GLUCOSE SERPL-MCNC: 94 MG/DL — SIGNIFICANT CHANGE UP (ref 70–99)
GRAM STN FLD: ABNORMAL
HCT VFR BLD CALC: 25.2 % — LOW (ref 37–47)
HGB BLD-MCNC: 7.8 G/DL — LOW (ref 12–16)
IMM GRANULOCYTES NFR BLD AUTO: 0.9 % — HIGH (ref 0.1–0.3)
INR BLD: 1.56 RATIO — HIGH (ref 0.65–1.3)
LYMPHOCYTES # BLD AUTO: 0.43 K/UL — LOW (ref 1.2–3.4)
LYMPHOCYTES # BLD AUTO: 5 % — LOW (ref 20.5–51.1)
MAGNESIUM SERPL-MCNC: 1.8 MG/DL — SIGNIFICANT CHANGE UP (ref 1.8–2.4)
MCHC RBC-ENTMCNC: 27.5 PG — SIGNIFICANT CHANGE UP (ref 27–31)
MCHC RBC-ENTMCNC: 31 G/DL — LOW (ref 32–37)
MCV RBC AUTO: 88.7 FL — SIGNIFICANT CHANGE UP (ref 81–99)
MONOCYTES # BLD AUTO: 0.69 K/UL — HIGH (ref 0.1–0.6)
MONOCYTES NFR BLD AUTO: 8 % — SIGNIFICANT CHANGE UP (ref 1.7–9.3)
NEUTROPHILS # BLD AUTO: 7.35 K/UL — HIGH (ref 1.4–6.5)
NEUTROPHILS NFR BLD AUTO: 85.4 % — HIGH (ref 42.2–75.2)
NRBC BLD AUTO-RTO: 0 /100 WBCS — SIGNIFICANT CHANGE UP (ref 0–0)
PLATELET # BLD AUTO: 238 K/UL — SIGNIFICANT CHANGE UP (ref 130–400)
PMV BLD: 10 FL — SIGNIFICANT CHANGE UP (ref 7.4–10.4)
POTASSIUM SERPL-MCNC: 3.2 MMOL/L — LOW (ref 3.5–5)
POTASSIUM SERPL-MCNC: 4.5 MMOL/L — SIGNIFICANT CHANGE UP (ref 3.5–5)
POTASSIUM SERPL-SCNC: 3.2 MMOL/L — LOW (ref 3.5–5)
POTASSIUM SERPL-SCNC: 4.5 MMOL/L — SIGNIFICANT CHANGE UP (ref 3.5–5)
PROTHROM AB SERPL-ACNC: 18.6 SEC — HIGH (ref 9.95–12.87)
RBC # BLD: 2.84 M/UL — LOW (ref 4.2–5.4)
RBC # FLD: 15.8 % — HIGH (ref 11.5–14.5)
SODIUM SERPL-SCNC: 139 MMOL/L — SIGNIFICANT CHANGE UP (ref 135–146)
SODIUM SERPL-SCNC: 141 MMOL/L — SIGNIFICANT CHANGE UP (ref 135–146)
WBC # BLD: 8.61 K/UL — SIGNIFICANT CHANGE UP (ref 4.8–10.8)
WBC # FLD AUTO: 8.61 K/UL — SIGNIFICANT CHANGE UP (ref 4.8–10.8)

## 2025-04-29 PROCEDURE — 93306 TTE W/DOPPLER COMPLETE: CPT | Mod: 26

## 2025-04-29 PROCEDURE — 99233 SBSQ HOSP IP/OBS HIGH 50: CPT

## 2025-04-29 RX ORDER — VANCOMYCIN HCL IN 5 % DEXTROSE 1.5G/250ML
1000 PLASTIC BAG, INJECTION (ML) INTRAVENOUS EVERY 12 HOURS
Refills: 0 | Status: DISCONTINUED | OUTPATIENT
Start: 2025-04-29 | End: 2025-04-30

## 2025-04-29 RX ORDER — MUPIROCIN CALCIUM 20 MG/G
1 CREAM TOPICAL
Refills: 0 | Status: COMPLETED | OUTPATIENT
Start: 2025-04-29 | End: 2025-05-04

## 2025-04-29 RX ADMIN — GABAPENTIN 100 MILLIGRAM(S): 400 CAPSULE ORAL at 05:01

## 2025-04-29 RX ADMIN — GABAPENTIN 100 MILLIGRAM(S): 400 CAPSULE ORAL at 17:14

## 2025-04-29 RX ADMIN — Medication 1 APPLICATION(S): at 05:01

## 2025-04-29 RX ADMIN — OXYBUTYNIN CHLORIDE 5 MILLIGRAM(S): 5 TABLET, FILM COATED, EXTENDED RELEASE ORAL at 11:15

## 2025-04-29 RX ADMIN — Medication 15 MILLILITER(S): at 17:14

## 2025-04-29 RX ADMIN — AMPICILLIN SODIUM AND SULBACTAM SODIUM 200 GRAM(S): 1; .5 INJECTION, POWDER, FOR SOLUTION INTRAMUSCULAR; INTRAVENOUS at 05:02

## 2025-04-29 RX ADMIN — Medication 1 GRAM(S): at 05:01

## 2025-04-29 RX ADMIN — LIDOCAINE HYDROCHLORIDE 2 PATCH: 20 JELLY TOPICAL at 07:25

## 2025-04-29 RX ADMIN — Medication 2 TABLET(S): at 05:01

## 2025-04-29 RX ADMIN — Medication 325 MILLIGRAM(S): at 11:15

## 2025-04-29 RX ADMIN — Medication 40 MILLIGRAM(S): at 06:02

## 2025-04-29 RX ADMIN — Medication 2 TABLET(S): at 11:15

## 2025-04-29 RX ADMIN — APIXABAN 2.5 MILLIGRAM(S): 2.5 TABLET, FILM COATED ORAL at 05:01

## 2025-04-29 RX ADMIN — LIDOCAINE HYDROCHLORIDE 2 PATCH: 20 JELLY TOPICAL at 11:12

## 2025-04-29 RX ADMIN — ENTACAPONE 400 MILLIGRAM(S): 200 TABLET, FILM COATED ORAL at 11:16

## 2025-04-29 RX ADMIN — Medication 40 MILLIEQUIVALENT(S): at 11:15

## 2025-04-29 RX ADMIN — APIXABAN 2.5 MILLIGRAM(S): 2.5 TABLET, FILM COATED ORAL at 17:14

## 2025-04-29 RX ADMIN — Medication 1 GRAM(S): at 11:15

## 2025-04-29 RX ADMIN — DILTIAZEM HYDROCHLORIDE 90 MILLIGRAM(S): 120 CAPSULE, EXTENDED RELEASE ORAL at 11:15

## 2025-04-29 RX ADMIN — Medication 250 MILLIGRAM(S): at 01:00

## 2025-04-29 RX ADMIN — MUPIROCIN CALCIUM 1 APPLICATION(S): 20 CREAM TOPICAL at 17:14

## 2025-04-29 RX ADMIN — ENTACAPONE 400 MILLIGRAM(S): 200 TABLET, FILM COATED ORAL at 05:01

## 2025-04-29 RX ADMIN — Medication 250 MILLIGRAM(S): at 11:15

## 2025-04-29 RX ADMIN — ENTACAPONE 400 MILLIGRAM(S): 200 TABLET, FILM COATED ORAL at 17:15

## 2025-04-29 RX ADMIN — Medication 2 TABLET(S): at 17:14

## 2025-04-29 NOTE — DIETITIAN INITIAL EVALUATION ADULT - NSICDXPASTSURGICALHX_GEN_ALL_CORE_FT
Physical Therapy Daily Treatment    Visit Count: 4    Referred by: Laurita Padilla*; Next provider visit (if known/scheduled): prn  Medical Diagnosis (from order):       Diagnosis Information             Diagnosis      174.1 (ICD-9-CM) - C50.111, C50.112 (ICD-10-CM) - Bilateral malignant neoplasm of central portion of breast in female, unspecified estrogen receptor status (CMS/[facility])      457.1 (ICD-9-CM) - I89.0 (ICD-10-CM) - Lymphedema      780.96 (ICD-9-CM) - R52 (ICD-10-CM) - Pain                  Treatment Diagnosis:    right arm - lymphedema   Impairment Diagnosis Decreased mobility  Decreased ADL function  Pain       symptoms with increased pain/symptoms, impaired strength, impaired range of motion, impaired muscle length/flexibility, impaired tissue mobility     Chart reviewed at time of initial evaluation (relevant co-morbidities, allergies, tests and medications listed)     2/24/2014: Wire-localized non nipple sparing right breast central partial mastectomy, sentinel lymph node biopsy and subsequent right axillary lymph node dissection, level I, II, III.   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  Right axillary sentinel lymph node #1 was positive for macro metastasis and extracapsular extension.   Level I and level II axillary lymph node dissection showed metastatic carcinoma, primarily macro metastases, present in 6 of 9 lymph nodes and extracapsular extension present.   Axillary lymph node dissection of level III showed metastatic carcinoma (micrometastases) present in 1 of 2 lymph nodes.   Total of 8 lymph nodes were involved of 12 examined. AJCC STAGE: pT2 (2.2 cm) pN2a (8/12) cM0.IIIA    PROGNOSTIC MARKERS: Estrogen receptor positive, 100%, progesterone receptor positive, 25% <WI14-97; 1/17/14> HER-2 NEGATIVE (1+).     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    No chemotherapy   received radiation   on Arimidex and is tolerating it well.    H/O shoulder problems ( Dr. Gonzalez.)     Developed lymphedema several years ago and underwent treatment at [facility] in the past.  Case Discussed with [facility] Nurse Navigator   Recommended to MD that patient obtain a vaso pneumatic device for home use.   Script received and forwarded to [employer] to being set up process.     SUBJECTIVE     Patient states her arm edema is increased this date due to less compliance at home.   She understands a pump rep will be calling her soon to set up a time to discuss a compression pump for home use.   MD ok'd and script sent.   She still isn't very active out of habit and others doing thing for her.   Current Toghtness (0-10 scale): 4/10  Functional Change: Using the arm more for ADLs and is more active due to decreased heaviness and discomfort.     OBJECTIVE   Range of Motion: right shoulder     AROM: Flexion:  150/180, abduction: 150/180  PROM: Flexion:  160/180, abduction: 160/180      Strength: Arm and shoulder: 4/5     ROM restrictions are due to soft tissue restrictions at the chest and axilla.     Moderate to severe lymphedema present from fingers to axilla  Mild pitting  Mild to moderate tissue thickness     Lymphedema Measurements:  Lymphedema Circumference (cm): Upper Extremity  Date 6/30/2020           Landmarks   right                         15 cm prox 42.0                          5 cm prox 37.3                                       20 cm dist 36.4                         10 cm dist 27.0                         Metacarpo  phalangeal  22.9           3rd finger 7.1                         Key: prox = proximal; dist = distal; different=difference         Treatment    Manual Therapy:   Manual Therapy to increase myofascial / soft tissue length, mobility and pliability, increase PROM, AROM, function and decrease pain.    Vaso pneumatic compression x 40 minutes at 85 mm Hg    Manual Edema mobilization    Skilled input: verbal instruction/cues, tactile instruction/cues, posture correction, facilitation    Home Program:  * above=instructed home program    Lymph stim exercise     Writer verbally educated the patient and received verbal consent from the patient on hand placement, positioning of patient, and techniques to be performed today including clothing adjustments for techniques, therapist position for techniques, hand placement and palpation for techniques as described above and how they are pertinent to the patient's plan of care.       Suggestions for next session as indicated: progress per plan of care, progress self care, AROM and Compression pump    See  again this week, then decrease to 1x/ week next week.   Begin process of obtaining pump for home use.        ASSESSMENT   In process of home pum p set up. Continue PT until independent at home.   Pain after treatment (patient reported, 0-10 scale): 2  Result of above outlined education: Needs reinforcement    Procedures and total treatment time documented in Time Entry flowsheet.   PAST SURGICAL HISTORY:  History of hysterectomy     S/P hernia repair     Status post placement of implantable loop recorder 01/2021

## 2025-04-29 NOTE — PROGRESS NOTE ADULT - ASSESSMENT
ASSESSMENT  89-year-old female past medical history of Parkinson's, dementia, hypertension, hyperlipidemia, A-fib on Eliquis, bacteremia secondary to MRSA positive parotitis left side of face in January 2025 presents for evaluation of right-sided facial swelling.    IMPRESSION  #Methicillin resistant Staphylococcus aureus (MRSA) bacteremia due to R parotitis  Recurrent MRSA bacteremia , Recurrent parotitis; previously L    4/27 1/4 bottles MRSA    Admission WBC 19     ESR/CRP 76/294  < from: CT Neck Soft Tissue w/ IV Cont (04.27.25 @ 15:03) >  Marked swelling/enlarged and hyperenhancing of the right parotid gland compatible with acute parotitis. There are inflammatory changes of the   adjacent soft tissues extending into the submandibular space as well as thickening of the platysma muscle. No abscess.  Bilateral mastoid effusions.  New cluster of nodules within the anterior segment of the upper lobe of the right lung measuring up to 1.1 cm.  #History  1/2025 MRSA bacteremia due to L parotitis    1/4 BCX NGTD     1/3 BCX NGTD     1/1 2/4 BCX MRSA    TTE no vegetations   As Pt was a poor candidate for JOI, she was D/C with PICC x IV Vanc 6 weeks from cleared BCX E/D 2/13/25  #Immunodeficiency secondary to Senescence which could result in poor clinical outcomes  #Abx allergy: No Known Allergies    Creatinine: 0.8 (04-28-25 @ 08:07)    Height (cm): 170.2 (01-09-25 @ 19:04)  Weight (kg): 60 (04-27-25 @ 12:46)    RECOMMENDATIONS  - Vanc dosing AUC/ADAM per clinical pharmacist   - D/C unasyn  - Repeat BCX in AM  - TTE  - Appreciate ENT consult  - Check Immunoglobulin panel  - MRSA PCR + --> decolonization with mupirocin to nares BID and CHG daily washes   - Will need another prolonged course of IV antibiotics on D/C. JOI unlikely to   - Poor prognosis, GOC    If any questions, please send a message or call on [employer] Teams  Please continue to update ID with any pertinent new laboratory, radiographic findings, or change in clinical status

## 2025-04-29 NOTE — DIETITIAN INITIAL EVALUATION ADULT - FACTORS AFF FOOD INTAKE
It is recommended that she have both the flu vaccination and the COVID booster.  She can schedule with the immunization \"flu\" clinic or schedule office \"nurse\" visit for the immunizations.  She can do them at the same time or can do them several weeks apart.  Would recommend that she do them within the next couple weeks however.      Please notify patient.  Help her schedule appointment as \"nurse visit\" if she desires an appointment or give her info on \"Flu\" and vaccination clinics.           Corie Fernandez, NP     persistent lack of appetite

## 2025-04-29 NOTE — DIETITIAN INITIAL EVALUATION ADULT - NSFNSGIIOFT_GEN_A_CORE
Drug Dosing Weight  Height (cm): 170.2 (09 Jan 2025 19:04)  Weight (kg): 60 (27 Apr 2025 12:46)  BMI (kg/m2): 20.7 (27 Apr 2025 12:46)  BSA (m2): 1.7 (27 Apr 2025 12:46)

## 2025-04-29 NOTE — PROGRESS NOTE ADULT - ATTENDING COMMENTS
#R parotitis, c/b MRSA bacteremia  ct with R parotitis with involvement submandibular space  bcx +mrsa one set  no intervention per ent  vanco, unasyn  repeat bcx until clear  tte  appreciate id  diet as tolerated    #Progress Note Handoff  Pending (specify): iv abx, bcx, tte, id  Family discussion: d/w pt at bedside  Disposition: home vs. snf
#R parotitis  ct with R parotitis with involvement submandibular space  bcx  no intervention per ent  agree vanco, unasyn  id eval  diet as tolerated    #Progress Note Handoff  Pending (specify): iv abx, bcx, id eval  Family discussion:   Disposition: home vs. snf

## 2025-04-29 NOTE — DIETITIAN INITIAL EVALUATION ADULT - PERTINENT MEDS FT
MEDICATIONS  (STANDING):  apixaban 2.5 milliGRAM(s) Oral every 12 hours  bisacodyl 5 milliGRAM(s) Oral daily  carbidopa/levodopa  25/100 2 Tablet(s) Oral four times a day  chlorhexidine 0.12% Liquid 15 milliLiter(s) Oral Mucosa two times a day  chlorhexidine 2% Cloths 1 Application(s) Topical <User Schedule>  diltiazem    Tablet 90 milliGRAM(s) Oral every 6 hours  entacapone 400 milliGRAM(s) Oral four times a day  ferrous    sulfate 325 milliGRAM(s) Oral daily  gabapentin 100 milliGRAM(s) Oral every 12 hours  lidocaine   4% Patch 2 Patch Transdermal every 24 hours  mupirocin 2% Nasal 1 Application(s) Both Nostrils two times a day  oxybutynin 5 milliGRAM(s) Oral daily  pantoprazole    Tablet 40 milliGRAM(s) Oral before breakfast  senna 2 Tablet(s) Oral at bedtime  sucralfate 1 Gram(s) Oral <User Schedule>  vancomycin  IVPB 1000 milliGRAM(s) IV Intermittent every 12 hours    MEDICATIONS  (PRN):  ALPRAZolam 0.25 milliGRAM(s) Oral at bedtime PRN Anxiety  meclizine 25 milliGRAM(s) Oral three times a day PRN Dizziness  zolpidem 5 milliGRAM(s) Oral at bedtime PRN Insomnia

## 2025-04-29 NOTE — DIETITIAN INITIAL EVALUATION ADULT - EDUCATION DIETARY MODIFICATIONS
Encouraged increased po intake and hydration. Described the benefits of the oral supplement./(1) partially meets; needs review/practice/verbalization

## 2025-04-29 NOTE — DIETITIAN INITIAL EVALUATION ADULT - ORAL INTAKE PTA/DIET HISTORY
Pt reports moderate appetite, consumes 3 meals/day. Per [facility] documents pt dx with mild PCM, on pureed diet texture with thin liquids, receives TwoCal 60ml TID, Fe supplements. Usual body wt per NH documents 68.1 kg (note date referenced), current body wt 60 kg, a 12% wt loss in an unknown time span.

## 2025-04-29 NOTE — DIETITIAN INITIAL EVALUATION ADULT - OTHER INFO
89 y/o female presents for evaluation of right-sided facial swelling, admitted for sepsis.   #R sided parotitis  - CT neck soft tissue and temporal bones w/ IV: Marked swelling/enlarged and hyperenhancing of the right parotid gland compatible with acute parotitis. There are inflammatory changes of the adjacent soft tissues extending into the submandibular space as well as thickening of the platysma muscle. No abscess. Bilateral mastoid effusions.   MRSA positive  #New incidental lung nodules  - CT reads: New cluster of nodules within the anterior segment of the upper lobe of the right lung measuring up to 1.1 cm.  #Dementia  #Parkinson  #Constipation  - Continue laxative regimen, hold for diarrhea.

## 2025-04-29 NOTE — PROGRESS NOTE ADULT - ASSESSMENT
89-year-old female past medical history of Parkinson's, dementia, hypertension, hyperlipidemia, A-fib on Eliquis, bacteremia secondary to MRSA positive parotitis left side of face in January 2025 presents for evaluation of right-sided facial swelling.    #R sided parotitis  #Hx of L parotitis  #Hx of MRSA bacteremia  - Sepsis not POA.  - Presents with R neck/face swelling.  - CT neck soft tissue and temporal bones w/ IV: Marked swelling/enlarged and hyperenhancing of the right parotid gland compatible with acute parotitis. There are inflammatory changes of the adjacent soft tissues extending into the submandibular space as well as thickening of the platysma muscle. No abscess. Bilateral mastoid effusions.  - ENT consult appreciated: no surgical intervention, continue IV abx, warm compress, sialogogues, aggressive oral care, repeat CT scan if worsening leukocytosis.  - Patient completed 6 weeks of abx during prior L parotitis.  - Abx per ID; vanc and unasyn, MRSA positive, maybe needs just the vanc  - BCx noted for MRSA, daily Bcx until negative, ECHO obtain, ID follow up, follow up Ucx  - On minimal oxygen, wean down as tolerated    #New incidental lung nodules  - CT reads: New cluster of nodules within the anterior segment of the upper lobe of the right lung measuring up to 1.1 cm.  - F/u with ID if this could be related to infectious process.  - May need repeat CT scan once infection is resolved to monitor.    #Atrial fibrillation  #HTN  - Given weight of 60kg and age of 89, dosed Eliquis to 2.5mg BID (from home 5mg BID).  - split diltiazem 360 Cd to 90mg Q6H given soft BPs with parameters. May need dose adjustment to avoid hypotension.  - sp d5W at 50cc/hr for soft blood pressure    #Dementia  #Parkinson  - Continue Parkinson home meds.  - Continue zaleplon PRN, Xanax PRN.    #Constipation  - Continue laxative regimen, hold for diarrhea.    #GERD  - Continue pantoprazole, sucralfate.    #Bladder spasms  - Continue oxybutynin 5mg QD.    #DIET: Pureed, DASH  #DVT ppx: Apixaban  #GIppx: Pantoprazole, sucralfate  #Activity: Amb w/ assistance  #CODE: DNR DNI trial of NIV  #Disposition: from [facility], will likely return when stable.

## 2025-04-29 NOTE — PROGRESS NOTE ADULT - ASSESSMENT
89y/o F a/w MRSA parotitis.    - No acute ENT intervention at this time   - Continue IV Abx as per ID  - Continue sialogogues   - Continue warm compresses to area  - D/w attending .

## 2025-04-29 NOTE — DIETITIAN INITIAL EVALUATION ADULT - ADD RECOMMEND
High Risk     Interventions: Coordination of care, meals, and nutritional supplements  Monitoring/Evaluation: Weights, labs, PO intake, nutrition-focused physical findings, tolerance of nutritional supplements.  1. Continue current diet  2. Add nutritional supplement - Ensure Plus HP 2x/day (350 kcal, 20 g protien per serving)   3. Encourage PO intake & assist PRN

## 2025-04-29 NOTE — DIETITIAN INITIAL EVALUATION ADULT - PERTINENT LABORATORY DATA
04-29    141  |  106  |  19  ----------------------------<  94  3.2[L]   |  27  |  0.8    Ca    8.0[L]      29 Apr 2025 07:33  Mg     1.8     04-29

## 2025-04-30 DIAGNOSIS — F03.90 UNSPECIFIED DEMENTIA, UNSPECIFIED SEVERITY, WITHOUT BEHAVIORAL DISTURBANCE, PSYCHOTIC DISTURBANCE, MOOD DISTURBANCE, AND ANXIETY: ICD-10-CM

## 2025-04-30 DIAGNOSIS — Z71.89 OTHER SPECIFIED COUNSELING: ICD-10-CM

## 2025-04-30 DIAGNOSIS — R78.81 BACTEREMIA: ICD-10-CM

## 2025-04-30 DIAGNOSIS — Z51.5 ENCOUNTER FOR PALLIATIVE CARE: ICD-10-CM

## 2025-04-30 DIAGNOSIS — K11.20 SIALOADENITIS, UNSPECIFIED: ICD-10-CM

## 2025-04-30 LAB
-  CLINDAMYCIN: SIGNIFICANT CHANGE UP
-  DAPTOMYCIN: SIGNIFICANT CHANGE UP
-  ERYTHROMYCIN: SIGNIFICANT CHANGE UP
-  GENTAMICIN: SIGNIFICANT CHANGE UP
-  LINEZOLID: SIGNIFICANT CHANGE UP
-  OXACILLIN: SIGNIFICANT CHANGE UP
-  PENICILLIN: SIGNIFICANT CHANGE UP
-  RIFAMPIN: SIGNIFICANT CHANGE UP
-  TETRACYCLINE: SIGNIFICANT CHANGE UP
-  TRIMETHOPRIM/SULFAMETHOXAZOLE: SIGNIFICANT CHANGE UP
-  VANCOMYCIN: SIGNIFICANT CHANGE UP
ANION GAP SERPL CALC-SCNC: 9 MMOL/L — SIGNIFICANT CHANGE UP (ref 7–14)
BUN SERPL-MCNC: 13 MG/DL — SIGNIFICANT CHANGE UP (ref 10–20)
CALCIUM SERPL-MCNC: 7.8 MG/DL — LOW (ref 8.4–10.5)
CHLORIDE SERPL-SCNC: 109 MMOL/L — SIGNIFICANT CHANGE UP (ref 98–110)
CO2 SERPL-SCNC: 22 MMOL/L — SIGNIFICANT CHANGE UP (ref 17–32)
CREAT SERPL-MCNC: 0.7 MG/DL — SIGNIFICANT CHANGE UP (ref 0.7–1.5)
CULTURE RESULTS: ABNORMAL
CULTURE RESULTS: ABNORMAL
EGFR: 81 ML/MIN/1.73M2 — SIGNIFICANT CHANGE UP
EGFR: 81 ML/MIN/1.73M2 — SIGNIFICANT CHANGE UP
GLUCOSE SERPL-MCNC: 87 MG/DL — SIGNIFICANT CHANGE UP (ref 70–99)
GRAM STN FLD: ABNORMAL
HCT VFR BLD CALC: 26.6 % — LOW (ref 37–47)
HGB BLD-MCNC: 8.3 G/DL — LOW (ref 12–16)
IGA FLD-MCNC: 193 MG/DL — SIGNIFICANT CHANGE UP (ref 84–499)
IGG FLD-MCNC: 812 MG/DL — SIGNIFICANT CHANGE UP (ref 610–1660)
IGM SERPL-MCNC: 75 MG/DL — SIGNIFICANT CHANGE UP (ref 35–242)
KAPPA LC SER QL IFE: 4.32 MG/DL — HIGH (ref 0.33–1.94)
KAPPA/LAMBDA FREE LIGHT CHAIN RATIO, SERUM: 1.09 RATIO — SIGNIFICANT CHANGE UP (ref 0.26–1.65)
LAMBDA LC SER QL IFE: 3.95 MG/DL — HIGH (ref 0.57–2.63)
MCHC RBC-ENTMCNC: 27.8 PG — SIGNIFICANT CHANGE UP (ref 27–31)
MCHC RBC-ENTMCNC: 31.2 G/DL — LOW (ref 32–37)
MCV RBC AUTO: 89 FL — SIGNIFICANT CHANGE UP (ref 81–99)
METHOD TYPE: SIGNIFICANT CHANGE UP
NRBC BLD AUTO-RTO: 0 /100 WBCS — SIGNIFICANT CHANGE UP (ref 0–0)
ORGANISM # SPEC MICROSCOPIC CNT: ABNORMAL
ORGANISM # SPEC MICROSCOPIC CNT: ABNORMAL
ORGANISM # SPEC MICROSCOPIC CNT: SIGNIFICANT CHANGE UP
PLATELET # BLD AUTO: 216 K/UL — SIGNIFICANT CHANGE UP (ref 130–400)
PMV BLD: 10.3 FL — SIGNIFICANT CHANGE UP (ref 7.4–10.4)
POTASSIUM SERPL-MCNC: 4 MMOL/L — SIGNIFICANT CHANGE UP (ref 3.5–5)
POTASSIUM SERPL-SCNC: 4 MMOL/L — SIGNIFICANT CHANGE UP (ref 3.5–5)
RBC # BLD: 2.99 M/UL — LOW (ref 4.2–5.4)
RBC # FLD: 15.3 % — HIGH (ref 11.5–14.5)
SODIUM SERPL-SCNC: 140 MMOL/L — SIGNIFICANT CHANGE UP (ref 135–146)
SPECIMEN SOURCE: SIGNIFICANT CHANGE UP
SPECIMEN SOURCE: SIGNIFICANT CHANGE UP
VANCOMYCIN FLD-MCNC: 23.1 UG/ML — HIGH (ref 5–10)
WBC # BLD: 5.33 K/UL — SIGNIFICANT CHANGE UP (ref 4.8–10.8)
WBC # FLD AUTO: 5.33 K/UL — SIGNIFICANT CHANGE UP (ref 4.8–10.8)

## 2025-04-30 PROCEDURE — 99232 SBSQ HOSP IP/OBS MODERATE 35: CPT

## 2025-04-30 PROCEDURE — 99497 ADVNCD CARE PLAN 30 MIN: CPT | Mod: 25

## 2025-04-30 PROCEDURE — 99222 1ST HOSP IP/OBS MODERATE 55: CPT

## 2025-04-30 RX ORDER — VANCOMYCIN HCL IN 5 % DEXTROSE 1.5G/250ML
1250 PLASTIC BAG, INJECTION (ML) INTRAVENOUS EVERY 24 HOURS
Refills: 0 | Status: DISCONTINUED | OUTPATIENT
Start: 2025-05-01 | End: 2025-05-01

## 2025-04-30 RX ADMIN — OXYBUTYNIN CHLORIDE 5 MILLIGRAM(S): 5 TABLET, FILM COATED, EXTENDED RELEASE ORAL at 11:32

## 2025-04-30 RX ADMIN — LIDOCAINE HYDROCHLORIDE 2 PATCH: 20 JELLY TOPICAL at 21:21

## 2025-04-30 RX ADMIN — Medication 2 TABLET(S): at 00:11

## 2025-04-30 RX ADMIN — ENTACAPONE 400 MILLIGRAM(S): 200 TABLET, FILM COATED ORAL at 23:21

## 2025-04-30 RX ADMIN — MUPIROCIN CALCIUM 1 APPLICATION(S): 20 CREAM TOPICAL at 06:02

## 2025-04-30 RX ADMIN — Medication 15 MILLILITER(S): at 17:28

## 2025-04-30 RX ADMIN — DILTIAZEM HYDROCHLORIDE 90 MILLIGRAM(S): 120 CAPSULE, EXTENDED RELEASE ORAL at 11:32

## 2025-04-30 RX ADMIN — APIXABAN 2.5 MILLIGRAM(S): 2.5 TABLET, FILM COATED ORAL at 06:02

## 2025-04-30 RX ADMIN — Medication 325 MILLIGRAM(S): at 11:32

## 2025-04-30 RX ADMIN — APIXABAN 2.5 MILLIGRAM(S): 2.5 TABLET, FILM COATED ORAL at 17:18

## 2025-04-30 RX ADMIN — GABAPENTIN 100 MILLIGRAM(S): 400 CAPSULE ORAL at 06:01

## 2025-04-30 RX ADMIN — Medication 1 GRAM(S): at 11:32

## 2025-04-30 RX ADMIN — MUPIROCIN CALCIUM 1 APPLICATION(S): 20 CREAM TOPICAL at 17:18

## 2025-04-30 RX ADMIN — Medication 250 MILLIGRAM(S): at 00:11

## 2025-04-30 RX ADMIN — Medication 2 TABLET(S): at 17:17

## 2025-04-30 RX ADMIN — ENTACAPONE 400 MILLIGRAM(S): 200 TABLET, FILM COATED ORAL at 11:34

## 2025-04-30 RX ADMIN — ENTACAPONE 400 MILLIGRAM(S): 200 TABLET, FILM COATED ORAL at 17:18

## 2025-04-30 RX ADMIN — Medication 2 TABLET(S): at 06:01

## 2025-04-30 RX ADMIN — Medication 2 TABLET(S): at 11:32

## 2025-04-30 RX ADMIN — Medication 2 TABLET(S): at 21:21

## 2025-04-30 RX ADMIN — LIDOCAINE HYDROCHLORIDE 2 PATCH: 20 JELLY TOPICAL at 13:17

## 2025-04-30 RX ADMIN — ENTACAPONE 400 MILLIGRAM(S): 200 TABLET, FILM COATED ORAL at 00:11

## 2025-04-30 RX ADMIN — LIDOCAINE HYDROCHLORIDE 2 PATCH: 20 JELLY TOPICAL at 08:05

## 2025-04-30 RX ADMIN — Medication 15 MILLILITER(S): at 07:37

## 2025-04-30 RX ADMIN — Medication 2 TABLET(S): at 23:20

## 2025-04-30 RX ADMIN — Medication 1 APPLICATION(S): at 06:03

## 2025-04-30 RX ADMIN — DILTIAZEM HYDROCHLORIDE 90 MILLIGRAM(S): 120 CAPSULE, EXTENDED RELEASE ORAL at 23:20

## 2025-04-30 RX ADMIN — Medication 40 MILLIGRAM(S): at 06:02

## 2025-04-30 RX ADMIN — Medication 1 GRAM(S): at 17:28

## 2025-04-30 RX ADMIN — Medication 1 GRAM(S): at 06:04

## 2025-04-30 RX ADMIN — GABAPENTIN 100 MILLIGRAM(S): 400 CAPSULE ORAL at 17:18

## 2025-04-30 RX ADMIN — ENTACAPONE 400 MILLIGRAM(S): 200 TABLET, FILM COATED ORAL at 06:01

## 2025-04-30 RX ADMIN — LIDOCAINE HYDROCHLORIDE 2 PATCH: 20 JELLY TOPICAL at 00:11

## 2025-04-30 RX ADMIN — Medication 5 MILLIGRAM(S): at 11:32

## 2025-04-30 NOTE — PROGRESS NOTE ADULT - ASSESSMENT
89-year-old female past medical history of Parkinson's, dementia, hypertension, hyperlipidemia, A-fib on Eliquis, bacteremia secondary to MRSA positive parotitis left side of face in January 2025 presents for evaluation of right-sided facial swelling.    #R sided parotitis Hx of L parotitis  found to have  MRSA bacteremia  vancomycin   daily blood culture until negative   #Drug monitoring of high risk medication , potential for drug drug reaction , requiring close monitoring Vancomycin Level, Trough: 9.9: adjustment per pharmacy protocol , and monitor kidney function    #New incidental lung nodules  Repeat CT scan once infection is resolved to monitor.    #Paroxysmal atrial fibrillation  on apixaban     #HTN  BP: 112/64 (30 Apr 2025 08:01) (95/57 - 112/64)  controlled     #Dementia    #Parkinson   Parkinson home meds.  zaleplon PRN, Xanax PRN.    #Constipation  controlled , on laxatives    #GERD   pantoprazole, sucralfate.    #Bladder spasms   oxybutynin 5mg QD.    Pending: ID for abx plan , likely picc       Disposition: Home Discharged

## 2025-04-30 NOTE — PROGRESS NOTE ADULT - ASSESSMENT
ASSESSMENT  89-year-old female past medical history of Parkinson's, dementia, hypertension, hyperlipidemia, A-fib on Eliquis, bacteremia secondary to MRSA positive parotitis left side of face in January 2025 presents for evaluation of right-sided facial swelling.    IMPRESSION  #Methicillin resistant Staphylococcus aureus (MRSA) bacteremia due to R parotitis  Recurrent MRSA bacteremia , Recurrent parotitis; previously L    4/27 1/4 bottles MRSA    Admission WBC 19     ESR/CRP 76/294  < from: CT Neck Soft Tissue w/ IV Cont (04.27.25 @ 15:03) >  Marked swelling/enlarged and hyperenhancing of the right parotid gland compatible with acute parotitis. There are inflammatory changes of the   adjacent soft tissues extending into the submandibular space as well as thickening of the platysma muscle. No abscess.  Bilateral mastoid effusions.  New cluster of nodules within the anterior segment of the upper lobe of the right lung measuring up to 1.1 cm.  #History  1/2025 MRSA bacteremia due to L parotitis    1/4 BCX NGTD     1/3 BCX NGTD     1/1 2/4 BCX MRSA    TTE no vegetations   As Pt was a poor candidate for JOI, she was D/C with PICC x IV Vanc 6 weeks from cleared BCX E/D 2/13/25  #Immunodeficiency secondary to Senescence which could result in poor clinical outcomes  #Abx allergy: No Known Allergies    Creatinine: 0.8 (04-28-25 @ 08:07)    Height (cm): 170.2 (01-09-25 @ 19:04)  Weight (kg): 60 (04-27-25 @ 12:46)    RECOMMENDATIONS  - Vanc dosing AUC/ADAM per clinical pharmacist   - Repeat BCX  - TTE no vegetations   - Appreciate ENT consult  - MRSA PCR + --> decolonization with mupirocin to nares BID and CHG daily washes   - Will need another prolonged course of IV antibiotics on D/C. JOI unlikely to   - Poor prognosis, GOC    If any questions, please send a message or call on [employer] Teams  Please continue to update ID with any pertinent new laboratory, radiographic findings, or change in clinical status

## 2025-04-30 NOTE — CONSULT NOTE ADULT - SUBJECTIVE AND OBJECTIVE BOX
CC:  R sided facial swelling    HPI:  89-year-old female past medical history of Parkinson's, dementia, hypertension, hyperlipidemia, A-fib on Eliquis, bacteremia secondary to MRSA positive parotitis left side of face in January 2025 presents for evaluation of right-sided facial swelling. As per NH paperwork, patient noticed to have right-sided facial swelling for the past couple days, no inciting relieving factors. Patient is A&Ox3, although weak and slow to respond. Reports pain at R neck/face at site of swelling. Denies pain elsewhere. Daughter Veronica at bedside also aware of same hx as stated.    (27 Apr 2025 18:23)    PERTINENT PM/SXH:   Parkinson disease    Arthritis    HTN (hypertension)    Rheumatoid arthritis    Hyperlipidemia, mild    Hyperlipidemia    CRAO (central retinal artery occlusion)    Atrial fibrillation      History of hysterectomy    Status post placement of implantable loop recorder    S/P hernia repair      FAMILY HISTORY:  FH: hypertension (Mother)      None pertinent    ITEMS NOT CHECKED ARE NOT PRESENT    SOCIAL HISTORY:   Significant other/partner[ ]  Children[ ]  Restoration/Spirituality:  Substance hx:  [ ]   Tobacco hx:  [ ]   Alcohol hx: [ ]   Living Situation: [ x]Home  [ ]Long term care  [ ]Rehab [ ]Other  Home Services: [ ] HHA [ ] Visting RN [ ] Hospice  Occupation:  Home Opioid hx:  [ ] Y [ ] N [x ] I-Stop Reference No:    Reference #: [id number]      Patient Demographic Information (PDI)       PDI	First Name	Last Name	Birth Date	Gender	Street Address	[facility]	Zip Code  VICTORIA Baeza	08/26/1932	Female	[address]	[address]    Prescription Information      PDI Filter:    PDI	Current Rx	Drug Type	Rx Written	Rx Dispensed	Drug	Quantity	Days Supply	Prescriber Name	Prescriber DUDLEY #	Payment Method	Dispenser  A	Y	B	04/14/2025	04/14/2025	alprazolam 0.25 mg tablet	30	30	Deshawn Tierney DO	[id number]	Insurance	[employer]  A	Y		04/08/2025	04/08/2025	zaleplon 10 mg capsule	30	30	Deshawn Tierney DO	[id number]	Insurance	[employer]  A	N	B	02/27/2025	02/27/2025	alprazolam 0.25 mg tablet	30	10	Deshawn Tierney DO	[id number]	Insurance	[employer]  A	N		02/12/2025	02/12/2025	zaleplon 10 mg capsule	30	30	Lazzapili Deshawn Sahni DO	[id number]	Insurance	[employer]  A	N	B	02/04/2025	02/04/2025	alprazolam 0.25 mg tablet	30	30	Lazzapili, Deshawn Sahni DO	[id number]	Insurance	[employer]  A	N		01/15/2025	01/15/2025	zaleplon 10 mg capsule	30	30	LazzapiliDeshawn DO	[id number]	Insurance	[employer]  A	N	B	12/28/2024	12/28/2024	alprazolam 0.25 mg tablet	30	30	Shad Corrales)	[id number]	Insurance	[employer]  A	N		12/02/2024	12/02/2024	zaleplon 10 mg capsule	30	30	LazzapiliDeshawn DO	[id number]	Insurance	[employer]  A	N	B	11/22/2024	11/22/2024	alprazolam 0.25 mg tablet	30	10	GarlandjassDeshawn alejandre DO	[id number]	Insurance	[employer]  A	N		11/05/2024	11/05/2024	zaleplon 10 mg capsule	30	30	LazzapiliDeshawn DO	[id number]	Insurance	[employer]  A	N		10/27/2024	10/27/2024	zaleplon 10 mg capsule	10	10	Shahzad Saleh MD	[id number]	Insurance	[employer]  A	N		10/19/2024	10/19/2024	zaleplon 10 mg capsule	5	5	Shad Stafford DO	[id number]	Insurance	[employer]  A	N	B	10/03/2024	10/03/2024	alprazolam 0.25 mg tablet	28	14	LazzaDeshawn alejandre DO	[id number]	Insurance	[employer]  A	N		08/23/2024	08/23/2024	zaleplon 10 mg capsule	30	30	LazzaDeshawn alejandre DO	[id number]	Insurance	[employer]  A	N		07/25/2024	07/25/2024	zaleplon 10 mg capsule	30	30	LazzapiliDeshawn DO	[id number]	Insurance	[employer]  A	N	B	07/10/2024	07/10/2024	alprazolam 0.25 mg tablet	28	14	Lazzara, Deshawn Bora DO	[id number]	Insurance	[employer]  A	N		07/08/2024	07/08/2024	zaleplon 5 mg capsule	30	30	Deshawn Tierney DO	[id number]	Insurance	[employer]  A	N		05/23/2024	05/23/2024	zaleplon 10 mg capsule	30	30	Deshawn Tierney DO	[id number]	Insurance	[employer]   ADVANCE DIRECTIVES:     [ ] Full Code [ x] DNR  MOLST  [ ]  Living Will  [ ]   DECISION MAKER(s):  [ ] Health Care Proxy(s)  [ x] Surrogate(s)  [ ] Guardian           Name(s): Phone Number(s):  Children      BASELINE (I)ADL(s) (prior to admission):    [place]: [ ]Total  [ ] Moderate [ ]Dependent  Palliative Performance Status Version 2:         %    http://[facility].org/files/news/palliative_performance_scale_ppsv2.pdf    Allergies    No Known Allergies    Intolerances    MEDICATIONS  (STANDING):  apixaban 2.5 milliGRAM(s) Oral every 12 hours  bisacodyl 5 milliGRAM(s) Oral daily  carbidopa/levodopa  25/100 2 Tablet(s) Oral four times a day  chlorhexidine 0.12% Liquid 15 milliLiter(s) Oral Mucosa two times a day  chlorhexidine 2% Cloths 1 Application(s) Topical <User Schedule>  diltiazem    Tablet 90 milliGRAM(s) Oral every 6 hours  entacapone 400 milliGRAM(s) Oral four times a day  ferrous    sulfate 325 milliGRAM(s) Oral daily  gabapentin 100 milliGRAM(s) Oral every 12 hours  lidocaine   4% Patch 2 Patch Transdermal every 24 hours  mupirocin 2% Nasal 1 Application(s) Both Nostrils two times a day  oxybutynin 5 milliGRAM(s) Oral daily  pantoprazole    Tablet 40 milliGRAM(s) Oral before breakfast  senna 2 Tablet(s) Oral at bedtime  sucralfate 1 Gram(s) Oral <User Schedule>  vancomycin  IVPB 1000 milliGRAM(s) IV Intermittent every 12 hours    MEDICATIONS  (PRN):  ALPRAZolam 0.25 milliGRAM(s) Oral at bedtime PRN Anxiety  meclizine 25 milliGRAM(s) Oral three times a day PRN Dizziness  zolpidem 5 milliGRAM(s) Oral at bedtime PRN Insomnia    PRESENT SYMPTOMS: [ ]Unable to obtain due to poor mentation   Source if other than patient:  [ ]Family   [ ]Team     Pain: [ ]yes [ ]no  ALL PAIN ASSESSMENT COMPONENTS WERE ASKED ABOUT UNLESS OTHERWISE NOTED  QOL impact -   Location -                    Aggravating factors -  Quality -  Radiation -  Timing-  Severity (0-10 scale):  Minimal acceptable level (0-10 scale):     CPOT:    https://www.[facility].org/getattachment/[email or web address]/Critical-Care-Pain-Observation-Tool-(CPOT)    PAIN AD Score:   http://geriatrictoolkit.[facility]/cog/painad.pdf (press ctrl +  left click to view)    Dyspnea:                           [ ]None[ ]Mild [ ]Moderate [ ]Severe     Respiratory Distress Observation Scale (RDOS):   A score of 0 to 2 signifies little or no respiratory distress, 3 signifies mild distress, scores 4 to 6 indicate moderate distress, and scores greater than 7 signify severe distress  https://www.[facility].ca/sites/default/files/PDFS/335652-murromyebpm-xrnpbhix-wmwrysmtwzk-bnlse.pdf    Anxiety:                             [ ]None[ ]Mild [ ]Moderate [ ]Severe   Fatigue:                             [ ]None[ ]Mild [ ]Moderate [ ]Severe   Nausea:                             [ ]None[ ]Mild [ ]Moderate [ ]Severe   Loss of appetite:              [ ]None[ ]Mild [ ]Moderate [ ]Severe   Constipation:                    [ ]None[ ]Mild [ ]Moderate [ ]Severe    Other Symptoms:  [ ]All other review of systems negative     Palliative Performance Status Version 2:         %    http://npcrc.org/files/news/palliative_performance_scale_ppsv2.pdf    PHYSICAL EXAM:  Vital Signs Last 24 Hrs  T(C): 36.9 (30 Apr 2025 08:01), Max: 37.2 (29 Apr 2025 23:43)  T(F): 98.5 (30 Apr 2025 08:01), Max: 98.9 (29 Apr 2025 23:43)  HR: 74 (30 Apr 2025 08:01) (74 - 90)  BP: 112/64 (30 Apr 2025 08:01) (95/57 - 112/64)  BP(mean): 69 (29 Apr 2025 23:43) (65 - 69)  RR: 18 (30 Apr 2025 08:01) (18 - 18)  SpO2: 94% (30 Apr 2025 08:01) (94% - 100%)    Parameters below as of 30 Apr 2025 08:01  Patient On (Oxygen Delivery Method): room air     I&O's Summary    29 Apr 2025 07:01  -  30 Apr 2025 07:00  --------------------------------------------------------  IN: 0 mL / OUT: 500 mL / NET: -500 mL        GENERAL:  [ ] No acute distress [ ]Lethargic  [ ]Unarousable  [ ]Verbal  [ ]Non-Verbal [ ]Cachexia    BEHAVIORAL/PSYCH:  [ ]Alert and Oriented x  [ ] Anxiety [ ] Delirium [ ] Agitation [ ] Calm   EYES: [ ] No scleral icterus [ ] Scleral icterus [ ] Closed  ENMT:  [ ]Dry mouth  [ ]No external oral lesions [ ] No external ear or nose lesions  CARDIOVASCULAR:  [ ]Regular [ ]Irregular [ ]Tachy [ ]Not Tachy  [ ][place] [ ] Edema [ ] No edema  PULMONARY:  [ ]Tachypnea  [ ]Audible excessive secretions [ ] No labored breathing [ ] labored breathing  GASTROINTESTINAL: [ ]Soft  [ ]Distended  [ ]Not distended [ ]Non tender [ ]Tender  MUSCULOSKELETAL: [ ]No clubbing [ ] clubbing  [ ] No cyanosis [ ] cyanosis  NEUROLOGIC: [ ]No focal deficits  [ ]Follows commands  [ ]Does not follow commands  [ ]Cognitive impairment  [ ]Dysphagia  [ ]Dysarthria  [ ]Paresis   SKIN: [ ] Jaundiced [ ] Non-jaundiced [ ]Rash [ ]No Rash [ ] Warm [ ] Dry  MISC/LINES: [ ] ET tube [ ] Trach [ ]NGT/OGT [ ]PEG [ ]Jacques    LABS: reviewed by me                        7.8    8.61  )-----------( 238      ( 29 Apr 2025 07:33 )             25.2   04-29    139  |  106  |  14  ----------------------------<  90  4.5   |  19  |  0.8    Ca    7.9[L]      29 Apr 2025 20:53  Mg     1.8     04-29    PT/INR - ( 29 Apr 2025 07:33 )   PT: 18.60 sec;   INR: 1.56 ratio         PTT - ( 29 Apr 2025 07:33 )  PTT:35.7 sec    Urinalysis Basic - ( 29 Apr 2025 20:53 )    Color: x / Appearance: x / SG: x / pH: x  Gluc: 90 mg/dL / Ketone: x  / Bili: x / Urobili: x   Blood: x / Protein: x / Nitrite: x   Leuk Esterase: x / RBC: x / WBC x   Sq Epi: x / Non Sq Epi: x / Bacteria: x      RADIOLOGY & ADDITIONAL STUDIES: reviewed by me    EKG: reviewed by me      PROTEIN CALORIE MALNUTRITION PRESENT: [ ]mild [ ]moderate [ ]severe [ ]underweight [ ]morbid obesity  https://www.andeal.org/vault/2440/web/files/ONC/Table_Clinical%20Characteristics%20to%20Document%20Malnutrition-White%20JV%20et%20al%202012.pdf    Height (cm): 162.6 (04-30-25 @ 04:14), 170.2 (01-09-25 @ 19:04)  Weight (kg): 57.8 (04-30-25 @ 04:14), 66.224 (01-01-25 @ 15:41)  BMI (kg/m2): 21.9 (04-30-25 @ 04:14), 22.9 (01-09-25 @ 19:04)  [ ]PPSV2 < or = to 30% [ ]significant weight loss  [ ]poor nutritional intake  [ ]anasarca      [ ]Artificial Nutrition      Palliative Care Spiritual/Emotional Screening Tool Question  Severity (0-4):                    OR                    [ x] Unable to determine. Will assess at later time if appropriate.  Score of 2 or greater indicates recommendation of Chaplaincy and/or SW referral  Chaplaincy Referral: [ ] Yes [ ] Refused [ ] Following     Caregiver [place]:  [ ] Yes [ ] No    OR    [x ] Unable to determine. Will assess at later time if appropriate.  Social Work Referral [ ]  Patient and Family Centered Care Referral [ ]    Anticipatory Grief Present: [ ] Yes [ ] No    OR     [ x] Unable to determine. Will assess at later time if appropriate.  Social Work Referral [ ]  Patient and Family Centered Care Referral [ ]    Patient discussed with primary medical team MD  Palliative care education provided to patient and/or family   CC:  R sided facial swelling    HPI:  89-year-old female past medical history of Parkinson's, dementia, hypertension, hyperlipidemia, A-fib on Eliquis, bacteremia secondary to MRSA positive parotitis left side of face in January 2025 presents for evaluation of right-sided facial swelling. As per NH paperwork, patient noticed to have right-sided facial swelling for the past couple days, no inciting relieving factors. Patient is A&Ox3, although weak and slow to respond. Reports pain at R neck/face at site of swelling. Denies pain elsewhere. Daughter Veronica at bedside also aware of same hx as stated.    (27 Apr 2025 18:23)    PERTINENT PM/SXH:   Parkinson disease    Arthritis    HTN (hypertension)    Rheumatoid arthritis    Hyperlipidemia, mild    Hyperlipidemia    CRAO (central retinal artery occlusion)    Atrial fibrillation      History of hysterectomy    Status post placement of implantable loop recorder    S/P hernia repair      FAMILY HISTORY:  FH: hypertension (Mother)      None pertinent    ITEMS NOT CHECKED ARE NOT PRESENT    SOCIAL HISTORY:   Significant other/partner[ ]  Children[ ]  Anglican/Spirituality:  Substance hx:  [ ]   Tobacco hx:  [ ]   Alcohol hx: [ ]   Living Situation: [ x]Home  [ ]Long term care  [ ]Rehab [ ]Other  Home Services: [ ] HHA [ ] Visting RN [ ] Hospice  Occupation:  Home Opioid hx:  [ ] Y [ ] N [x ] I-Stop Reference No:    Reference #: [id number]      Patient Demographic Information (PDI)       PDI	First Name	Last Name	Birth Date	Gender	Street Address	[facility]	Zip Code  VICTORIA Baeza	08/26/1932	Female	[address]	[address]    Prescription Information      PDI Filter:    PDI	Current Rx	Drug Type	Rx Written	Rx Dispensed	Drug	Quantity	Days Supply	Prescriber Name	Prescriber DUDLEY #	Payment Method	Dispenser  A	Y	B	04/14/2025	04/14/2025	alprazolam 0.25 mg tablet	30	30	Deshawn Tierney DO	[id number]	Insurance	[employer]  A	Y		04/08/2025	04/08/2025	zaleplon 10 mg capsule	30	30	Deshawn Tierney DO	[id number]	Insurance	[employer]  A	N	B	02/27/2025	02/27/2025	alprazolam 0.25 mg tablet	30	10	Deshawn Tierney DO	[id number]	Insurance	[employer]  A	N		02/12/2025	02/12/2025	zaleplon 10 mg capsule	30	30	Lazzapili Deshawn Sahni DO	[id number]	Insurance	[employer]  A	N	B	02/04/2025	02/04/2025	alprazolam 0.25 mg tablet	30	30	Lazzapili, Deshawn Sahni DO	[id number]	Insurance	[employer]  A	N		01/15/2025	01/15/2025	zaleplon 10 mg capsule	30	30	LazzapiliDeshawn DO	[id number]	Insurance	[employer]  A	N	B	12/28/2024	12/28/2024	alprazolam 0.25 mg tablet	30	30	Shad Corrales)	[id number]	Insurance	[employer]  A	N		12/02/2024	12/02/2024	zaleplon 10 mg capsule	30	30	LazzapiliDeshawn DO	[id number]	Insurance	[employer]  A	N	B	11/22/2024	11/22/2024	alprazolam 0.25 mg tablet	30	10	GarlandjassDeshawn alejandre DO	[id number]	Insurance	[employer]  A	N		11/05/2024	11/05/2024	zaleplon 10 mg capsule	30	30	LazzapiliDeshawn DO	[id number]	Insurance	[employer]  A	N		10/27/2024	10/27/2024	zaleplon 10 mg capsule	10	10	Shahzad Saleh MD	[id number]	Insurance	[employer]  A	N		10/19/2024	10/19/2024	zaleplon 10 mg capsule	5	5	Shad Stafford DO	[id number]	Insurance	[employer]  A	N	B	10/03/2024	10/03/2024	alprazolam 0.25 mg tablet	28	14	LazzaDeshawn alejandre DO	[id number]	Insurance	[employer]  A	N		08/23/2024	08/23/2024	zaleplon 10 mg capsule	30	30	LazzaDeshawn alejandre DO	[id number]	Insurance	[employer]  A	N		07/25/2024	07/25/2024	zaleplon 10 mg capsule	30	30	LazzapiliDeshawn DO	[id number]	Insurance	[employer]  A	N	B	07/10/2024	07/10/2024	alprazolam 0.25 mg tablet	28	14	Lazzara, Deshawn Bora DO	[id number]	Insurance	[employer]  A	N		07/08/2024	07/08/2024	zaleplon 5 mg capsule	30	30	Deshawn Tierney 	[id number]	Insurance	[employer]  A	N		05/23/2024	05/23/2024	zaleplon 10 mg capsule	30	30	Deshawn Tierney 	[id number]	Insurance	[employer]     ADVANCE DIRECTIVES:     [ ] Full Code [ x] DNR  MOLST  [ ]  Living Will  [ ]   DECISION MAKER(s):  [ ] Health Care Proxy(s)  [ x] Surrogate(s)  [ ] Guardian           Name(s): Phone Number(s):  Seth Martin and Jo      BASELINE (I)ADL(s) (prior to admission):    [place]: [ ]Total  [ ] Moderate [ ]Dependent  Palliative Performance Status Version 2:         %    http://[facility].org/files/news/palliative_performance_scale_ppsv2.pdf    Allergies    No Known Allergies    Intolerances    MEDICATIONS  (STANDING):  apixaban 2.5 milliGRAM(s) Oral every 12 hours  bisacodyl 5 milliGRAM(s) Oral daily  carbidopa/levodopa  25/100 2 Tablet(s) Oral four times a day  chlorhexidine 0.12% Liquid 15 milliLiter(s) Oral Mucosa two times a day  chlorhexidine 2% Cloths 1 Application(s) Topical <User Schedule>  diltiazem    Tablet 90 milliGRAM(s) Oral every 6 hours  entacapone 400 milliGRAM(s) Oral four times a day  ferrous    sulfate 325 milliGRAM(s) Oral daily  gabapentin 100 milliGRAM(s) Oral every 12 hours  lidocaine   4% Patch 2 Patch Transdermal every 24 hours  mupirocin 2% Nasal 1 Application(s) Both Nostrils two times a day  oxybutynin 5 milliGRAM(s) Oral daily  pantoprazole    Tablet 40 milliGRAM(s) Oral before breakfast  senna 2 Tablet(s) Oral at bedtime  sucralfate 1 Gram(s) Oral <User Schedule>  vancomycin  IVPB 1000 milliGRAM(s) IV Intermittent every 12 hours    MEDICATIONS  (PRN):  ALPRAZolam 0.25 milliGRAM(s) Oral at bedtime PRN Anxiety  meclizine 25 milliGRAM(s) Oral three times a day PRN Dizziness  zolpidem 5 milliGRAM(s) Oral at bedtime PRN Insomnia    PRESENT SYMPTOMS: [ ]Unable to obtain due to poor mentation   Source if other than patient:  [ ]Family   [ ]Team     Pain: [x ]yes [ ]no  ALL PAIN ASSESSMENT COMPONENTS WERE ASKED ABOUT UNLESS OTHERWISE NOTED  QOL impact - not answered  Location -   R sided face             Aggravating factors - not answered  Quality - not answered  Radiation - not answered  Timing- not answered  Severity (0-10 scale): not answered  Minimal acceptable level (0-10 scale):  not answered    CPOT:    https://www.[facility].org/getattachment/[email or web address]/Critical-Care-Pain-Observation-Tool-(CPOT)    PAIN AD Score:   http://geriatrictoolkit.[facility]/cog/painad.pdf (press ctrl +  left click to view)    Dyspnea:                           [ x]None[ ]Mild [ ]Moderate [ ]Severe     Respiratory Distress Observation Scale (RDOS):   A score of 0 to 2 signifies little or no respiratory distress, 3 signifies mild distress, scores 4 to 6 indicate moderate distress, and scores greater than 7 signify severe distress  https://www.[facility].ca/sites/default/files/PDFS/326748-zlzlvleidig-gsflqtjp-btrueoduxze-nrfem.pdf    Anxiety:                             [ ]None[ ]Mild [ ]Moderate [ ]Severe   Fatigue:                             [ ]None[ ]Mild [ ]Moderate [ ]Severe   Nausea:                             [ ]None[ ]Mild [ ]Moderate [ ]Severe   Loss of appetite:              [ ]None[ ]Mild [ ]Moderate [ ]Severe   Constipation:                    [ ]None[ ]Mild [ ]Moderate [ ]Severe    Other Symptoms:  [x ]All other review of systems negative     Palliative Performance Status Version 2:         30%    http://npcrc.org/files/news/palliative_performance_scale_ppsv2.pdf    PHYSICAL EXAM:  Vital Signs Last 24 Hrs  T(C): 36.9 (30 Apr 2025 08:01), Max: 37.2 (29 Apr 2025 23:43)  T(F): 98.5 (30 Apr 2025 08:01), Max: 98.9 (29 Apr 2025 23:43)  HR: 74 (30 Apr 2025 08:01) (74 - 90)  BP: 112/64 (30 Apr 2025 08:01) (95/57 - 112/64)  BP(mean): 69 (29 Apr 2025 23:43) (65 - 69)  RR: 18 (30 Apr 2025 08:01) (18 - 18)  SpO2: 94% (30 Apr 2025 08:01) (94% - 100%)    Parameters below as of 30 Apr 2025 08:01  Patient On (Oxygen Delivery Method): room air     I&O's Summary    29 Apr 2025 07:01  -  30 Apr 2025 07:00  --------------------------------------------------------  IN: 0 mL / OUT: 500 mL / NET: -500 mL        GENERAL:  [ ] No acute distress [ ]Lethargic  [ ]Unarousable  [x ]Verbal  [ ]Non-Verbal [ ]Cachexia    BEHAVIORAL/PSYCH:  [ ]Alert and Oriented x  [ ] Anxiety [ ] Delirium [ ] Agitation [x ] Calm   EYES: [ ] No scleral icterus [ ] Scleral icterus [ ] Closed  ENMT:  [ ]Dry mouth  [x ]No external oral lesions [ ] No external ear or nose lesions  CARDIOVASCULAR:  [ ]Regular [ ]Irregular [ ]Tachy [ ]Not Tachy  [ ][place] [ ] Edema [ ] No edema  PULMONARY:  [ ]Tachypnea  [ ]Audible excessive secretions [x ] No labored breathing [ ] labored breathing  GASTROINTESTINAL: [ ]Soft  [ ]Distended  [ ]Not distended [ ]Non tender [ ]Tender  MUSCULOSKELETAL: [ ]No clubbing [ ] clubbing  [ ] No cyanosis [ ] cyanosis  NEUROLOGIC: [ ]No focal deficits  [ ]Follows commands  [ ]Does not follow commands  [ x]Cognitive impairment  [ ]Dysphagia  [ ]Dysarthria  [ ]Paresis   SKIN: [ ] Jaundiced [x ] Non-jaundiced [ ]Rash [ ]No Rash [ ] Warm [ ] Dry  MISC/LINES: [ ] ET tube [ ] Trach [ ]NGT/OGT [ ]PEG [ ]Jacques    LABS: reviewed by me                        7.8    8.61  )-----------( 238      ( 29 Apr 2025 07:33 )             25.2   04-29    139  |  106  |  14  ----------------------------<  90  4.5   |  19  |  0.8    Ca    7.9[L]      29 Apr 2025 20:53  Mg     1.8     04-29    PT/INR - ( 29 Apr 2025 07:33 )   PT: 18.60 sec;   INR: 1.56 ratio         PTT - ( 29 Apr 2025 07:33 )  PTT:35.7 sec    Urinalysis Basic - ( 29 Apr 2025 20:53 )    Color: x / Appearance: x / SG: x / pH: x  Gluc: 90 mg/dL / Ketone: x  / Bili: x / Urobili: x   Blood: x / Protein: x / Nitrite: x   Leuk Esterase: x / RBC: x / WBC x   Sq Epi: x / Non Sq Epi: x / Bacteria: x      RADIOLOGY & ADDITIONAL STUDIES: reviewed by me    < from: CT Temporal Bones w/ IV Cont (04.27.25 @ 15:07) >  Marked swelling/enlarged and hyperenhancing of the right parotid gland   compatible with acute parotitis. There are inflammatory changes of the   adjacent soft tissues extending into the submandibular space as well as   thickening of the platysma muscle. No abscess.    Bilateral mastoid effusions.    New cluster of nodules within the anterior segment of the upper lobe of   the right lung measuring up to 1.1 cm.    < end of copied text >      EKG: reviewed by me    < from: 12 Lead ECG (04.27.25 @ 13:48) >  Ventricular Rate 82 BPM    Atrial Rate 82 BPM    P-R Interval 150 ms    QRS Duration 78 ms    Q-T Interval 340 ms    QTC Calculation(Bazett) 397 ms    P Axis 6 degrees    R Axis -11 degrees    T Axis 265 degrees    Diagnosis Line Normal sinus rhythm  Voltage criteria for left ventricular hypertrophy  Inferior infarct , age undetermined  Anterior infarct , age undetermined  Abnormal ECG    < end of copied text >      PROTEIN CALORIE MALNUTRITION PRESENT: [ ]mild [ ]moderate [ ]severe [ ]underweight [ ]morbid obesity  https://www.andeal.org/vault/3450/web/files/ONC/Table_Clinical%20Characteristics%20to%20Document%20Malnutrition-White%20JV%20et%20al%202012.pdf    Height (cm): 162.6 (04-30-25 @ 04:14), 170.2 (01-09-25 @ 19:04)  Weight (kg): 57.8 (04-30-25 @ 04:14), 66.224 (01-01-25 @ 15:41)  BMI (kg/m2): 21.9 (04-30-25 @ 04:14), 22.9 (01-09-25 @ 19:04)  [ ]PPSV2 < or = to 30% [ ]significant weight loss  [ ]poor nutritional intake  [ ]anasarca      [ ]Artificial Nutrition      Palliative Care Spiritual/Emotional Screening Tool Question  Severity (0-4):                    OR                    [ x] Unable to determine. Will assess at later time if appropriate.  Score of 2 or greater indicates recommendation of Chaplaincy and/or SW referral  Chaplaincy Referral: [ ] Yes [ ] Refused [ ] Following     Caregiver [place]:  [ ] Yes [ ] No    OR    [x ] Unable to determine. Will assess at later time if appropriate.  Social Work Referral [ ]  Patient and Family Centered Care Referral [ ]    Anticipatory Grief Present: [ ] Yes [ ] No    OR     [ x] Unable to determine. Will assess at later time if appropriate.  Social Work Referral [ ]  Patient and Family Centered Care Referral [ ]    Patient discussed with primary medical team MD  Palliative care education provided to patient and/or family

## 2025-04-30 NOTE — CONSULT NOTE ADULT - CONVERSATION DETAILS
Spoke with patient at bedside. She was zble to say she was in the hospital because of mouth pain, but otherwise did not have capacity or insight into her condition.  Called and spoke with patient's daughter Veronica as no family was present at the hospital.  Palliative care introduce. SHe was able to provide a medical history and hospital course.  We discussed the patient and she stated the patient is largely bedbound and eats, but doesn't seem to eat much.  We discussed GOC and code status.  Veronica confirmed DNR/DNI.  She noted that if the patient wasn't eating enough and artificial nutrition was required, the patient would not want a PEG.  We discussed hospice should the patient clinically decline or her dementia worsen. All questions answered.

## 2025-04-30 NOTE — PROGRESS NOTE ADULT - ASSESSMENT
89-year-old female past medical history of Parkinson's, dementia, hypertension, hyperlipidemia, A-fib on Eliquis, bacteremia secondary to MRSA positive parotitis left side of face in January 2025 presents for evaluation of right-sided facial swelling.    #R sided parotitis Hx of L parotitis  found to have  MRSA bacteremia  vancomycin   daily blood culture until negative   #Drug monitoring of high risk medication , potential for drug drug reaction , requiring close monitoring Vancomycin Level, Trough: 9.9: adjustment per pharmacy protocol , and monitor kidney function    #New incidental lung nodules  Repeat CT scan once infection is resolved to monitor.    #Paroxysmal atrial fibrillation  on apixaban     #HTN  BP: 112/64 (30 Apr 2025 08:01) (95/57 - 112/64)  controlled     #Dementia    #Parkinson   Parkinson home meds.  zaleplon PRN, Xanax PRN.    #Constipation  controlled , on laxatives    #GERD   pantoprazole, sucralfate.    #Bladder spasms   oxybutynin 5mg QD.    PROGRESS NOTE HANDOFF    Pending: id for abx plan , likely picc     Family discussion: patient verbalized understanding and agreeable to plan of care     Disposition: Home

## 2025-05-01 LAB
ANION GAP SERPL CALC-SCNC: 11 MMOL/L — SIGNIFICANT CHANGE UP (ref 7–14)
BUN SERPL-MCNC: 12 MG/DL — SIGNIFICANT CHANGE UP (ref 10–20)
CALCIUM SERPL-MCNC: 8 MG/DL — LOW (ref 8.4–10.5)
CHLORIDE SERPL-SCNC: 106 MMOL/L — SIGNIFICANT CHANGE UP (ref 98–110)
CO2 SERPL-SCNC: 23 MMOL/L — SIGNIFICANT CHANGE UP (ref 17–32)
CREAT SERPL-MCNC: 0.8 MG/DL — SIGNIFICANT CHANGE UP (ref 0.7–1.5)
EGFR: 69 ML/MIN/1.73M2 — SIGNIFICANT CHANGE UP
EGFR: 69 ML/MIN/1.73M2 — SIGNIFICANT CHANGE UP
GLUCOSE SERPL-MCNC: 80 MG/DL — SIGNIFICANT CHANGE UP (ref 70–99)
POTASSIUM SERPL-MCNC: 3.5 MMOL/L — SIGNIFICANT CHANGE UP (ref 3.5–5)
POTASSIUM SERPL-SCNC: 3.5 MMOL/L — SIGNIFICANT CHANGE UP (ref 3.5–5)
SODIUM SERPL-SCNC: 140 MMOL/L — SIGNIFICANT CHANGE UP (ref 135–146)
VANCOMYCIN FLD-MCNC: 27.7 UG/ML — HIGH (ref 5–10)

## 2025-05-01 PROCEDURE — 99233 SBSQ HOSP IP/OBS HIGH 50: CPT

## 2025-05-01 PROCEDURE — 99222 1ST HOSP IP/OBS MODERATE 55: CPT

## 2025-05-01 RX ORDER — VANCOMYCIN HCL IN 5 % DEXTROSE 1.5G/250ML
1000 PLASTIC BAG, INJECTION (ML) INTRAVENOUS EVERY 24 HOURS
Refills: 0 | Status: DISCONTINUED | OUTPATIENT
Start: 2025-05-02 | End: 2025-05-04

## 2025-05-01 RX ORDER — CARBIDOPA/LEVODOPA 25MG-100MG
1 TABLET ORAL
Refills: 0 | Status: DISCONTINUED | OUTPATIENT
Start: 2025-05-01 | End: 2025-05-01

## 2025-05-01 RX ORDER — CARBIDOPA/LEVODOPA 25MG-100MG
1 TABLET ORAL
Refills: 0 | Status: DISCONTINUED | OUTPATIENT
Start: 2025-05-01 | End: 2025-05-06

## 2025-05-01 RX ORDER — AMANTADINE HCL 100 MG
100 CAPSULE ORAL
Refills: 0 | Status: DISCONTINUED | OUTPATIENT
Start: 2025-05-01 | End: 2025-05-06

## 2025-05-01 RX ADMIN — MUPIROCIN CALCIUM 1 APPLICATION(S): 20 CREAM TOPICAL at 05:16

## 2025-05-01 RX ADMIN — Medication 5 MILLIGRAM(S): at 11:41

## 2025-05-01 RX ADMIN — Medication 40 MILLIGRAM(S): at 05:16

## 2025-05-01 RX ADMIN — ENTACAPONE 400 MILLIGRAM(S): 200 TABLET, FILM COATED ORAL at 17:53

## 2025-05-01 RX ADMIN — Medication 1 APPLICATION(S): at 05:16

## 2025-05-01 RX ADMIN — Medication 1 TABLET(S): at 21:18

## 2025-05-01 RX ADMIN — LIDOCAINE HYDROCHLORIDE 2 PATCH: 20 JELLY TOPICAL at 21:18

## 2025-05-01 RX ADMIN — APIXABAN 2.5 MILLIGRAM(S): 2.5 TABLET, FILM COATED ORAL at 17:53

## 2025-05-01 RX ADMIN — LIDOCAINE HYDROCHLORIDE 2 PATCH: 20 JELLY TOPICAL at 09:25

## 2025-05-01 RX ADMIN — Medication 2 TABLET(S): at 11:42

## 2025-05-01 RX ADMIN — Medication 0.25 MILLIGRAM(S): at 13:58

## 2025-05-01 RX ADMIN — OXYBUTYNIN CHLORIDE 5 MILLIGRAM(S): 5 TABLET, FILM COATED, EXTENDED RELEASE ORAL at 11:42

## 2025-05-01 RX ADMIN — Medication 1 GRAM(S): at 17:52

## 2025-05-01 RX ADMIN — LIDOCAINE HYDROCHLORIDE 2 PATCH: 20 JELLY TOPICAL at 07:00

## 2025-05-01 RX ADMIN — Medication 15 MILLILITER(S): at 17:52

## 2025-05-01 RX ADMIN — Medication 1 GRAM(S): at 05:55

## 2025-05-01 RX ADMIN — Medication 325 MILLIGRAM(S): at 11:42

## 2025-05-01 RX ADMIN — Medication 15 MILLILITER(S): at 05:22

## 2025-05-01 RX ADMIN — ENTACAPONE 400 MILLIGRAM(S): 200 TABLET, FILM COATED ORAL at 11:42

## 2025-05-01 RX ADMIN — Medication 2 TABLET(S): at 21:18

## 2025-05-01 RX ADMIN — APIXABAN 2.5 MILLIGRAM(S): 2.5 TABLET, FILM COATED ORAL at 05:16

## 2025-05-01 RX ADMIN — GABAPENTIN 100 MILLIGRAM(S): 400 CAPSULE ORAL at 17:57

## 2025-05-01 RX ADMIN — Medication 100 MILLIGRAM(S): at 17:52

## 2025-05-01 RX ADMIN — Medication 1 TABLET(S): at 17:53

## 2025-05-01 RX ADMIN — GABAPENTIN 100 MILLIGRAM(S): 400 CAPSULE ORAL at 05:17

## 2025-05-01 RX ADMIN — DILTIAZEM HYDROCHLORIDE 90 MILLIGRAM(S): 120 CAPSULE, EXTENDED RELEASE ORAL at 05:17

## 2025-05-01 RX ADMIN — Medication 2 TABLET(S): at 05:16

## 2025-05-01 RX ADMIN — Medication 166.67 MILLIGRAM(S): at 05:58

## 2025-05-01 RX ADMIN — ENTACAPONE 400 MILLIGRAM(S): 200 TABLET, FILM COATED ORAL at 05:17

## 2025-05-01 RX ADMIN — DILTIAZEM HYDROCHLORIDE 90 MILLIGRAM(S): 120 CAPSULE, EXTENDED RELEASE ORAL at 11:42

## 2025-05-01 RX ADMIN — MUPIROCIN CALCIUM 1 APPLICATION(S): 20 CREAM TOPICAL at 17:52

## 2025-05-01 RX ADMIN — DILTIAZEM HYDROCHLORIDE 90 MILLIGRAM(S): 120 CAPSULE, EXTENDED RELEASE ORAL at 17:52

## 2025-05-01 RX ADMIN — Medication 1 GRAM(S): at 11:42

## 2025-05-01 NOTE — CONSULT NOTE ADULT - SUBJECTIVE AND OBJECTIVE BOX
NEUROLOGY CONSULT    HPI:  89-year-old female past medical history of Parkinson's, dementia, hypertension, hyperlipidemia, A-fib on Eliquis, bacteremia secondary to MRSA positive parotitis left side of face in January 2025 presents for evaluation of right-sided facial swelling. Patient was being treated for acute parotitis and MRSA bacteremia on the medicine floor. Neurology was consulted on 5/1 for worsening dyskinesia. Patient has a long standing history of parkinsons ds and has been on sinemet 25/100 QID, is LTC resident at [facility] for about 4 years, and is bedbound. Patient was seen and examined at bedside, she was resting in bed but was found to have excessive dyskinetic movements.       Triage VS:  92/52, HR 80, T 97.9 F, SpO2 98% on 2L via nasal cannula.  Labs: WBC 19.25k (88% neutrophils), otherwise CBC stable. CMP with Na 148, Cr 1 (baseline 0.5 in Jan 2025), lactate 1.7.    CT neck soft tissue and temporal bones w/ IV: Marked swelling/enlarged and hyperenhancing of the right parotid gland compatible with acute parotitis. There are inflammatory changes of the adjacent soft tissues extending into the submandibular space as well as thickening of the platysma muscle. No abscess. Bilateral mastoid effusions. New cluster of nodules within the anterior segment of the upper lobe of the right lung measuring up to 1.1 cm.    ED interventions: Unasyn, vancomycin, 2L LR.  Seen by ENT who recommend IV abx, sialogogues and oral care. (27 Apr 2025 18:23)     MEDICATIONS  Home Medications:  acetaminophen 325 mg oral tablet: 2 tab(s) orally every 6 hours as needed for  mild pain (27 Apr 2025 19:14)  ALPRAZolam 0.25 mg oral tablet: 1 tab(s) orally once a day (at bedtime) (27 Apr 2025 19:23)  Aspercreme with Lidocaine 4% topical film: Apply topically to affected area once a day L and R knee daily, 12 hours on 12 hours off (27 Apr 2025 19:18)  bisacodyl 5 mg oral tablet: 1 tab(s) orally once a day (27 Apr 2025 19:23)  carbidopa/levodopa/entacapone 50 mg-200 mg-200 mg oral tablet: 1 tab(s) orally 4 times a day (27 Apr 2025 19:23)  Cough Formula-DM 10 mg-100 mg/5 mL oral liquid: 5 milliliter(s) orally every 6 hours as needed for  cough (27 Apr 2025 19:23)  dilTIAZem 360 mg/24 hours oral capsule, extended release: 1 cap(s) orally once a day (27 Apr 2025 19:23)  entacapone 200 mg oral tablet: 1 tab(s) orally 4 times a day (27 Apr 2025 19:23)  ferrous sulfate 325 mg (65 mg elemental iron) oral tablet: 1 tab(s) orally once a day (27 Apr 2025 19:23)  gabapentin 100 mg oral capsule: 1 cap(s) orally 2 times a day (27 Apr 2025 19:23)  lactulose 20 g oral powder for reconstitution: 1 packet(s) orally once a day as needed for  constipation (27 Apr 2025 19:23)  meclizine 25 mg oral tablet: 1 tab(s) orally 3 times a day, As needed, Dizziness (27 Apr 2025 19:23)  menthol: as needed to neck area for pain (27 Apr 2025 19:18)  ondansetron 2 mg/mL injectable solution: 4 milligram(s) intravenously once a day as needed for  nausea (27 Apr 2025 19:23)  oxyBUTYnin 5 mg oral tablet: 1 tab(s) orally once a day (27 Apr 2025 19:23)  preiguard: apply topically (27 Apr 2025 19:23)  Protonix 40 mg oral delayed release tablet: 1 tab(s) orally once a day (27 Apr 2025 19:23)  senna (sennosides) 17 mg oral tablet: 2 tab(s) orally once a day (at bedtime) (27 Apr 2025 19:23)  senna (sennosides) 8.6 mg oral tablet: 2 tab(s) orally once a day as needed for  constipation (27 Apr 2025 19:23)  sucralfate 1 g oral tablet: 1 tab(s) orally 3 times a day (27 Apr 2025 19:20)  zaleplon 10 mg oral capsule: 1 cap(s) orally once a day (at bedtime) as needed for  insomnia (27 Apr 2025 19:23)    MEDICATIONS  (STANDING):  amantadine 100 milliGRAM(s) Oral two times a day  apixaban 2.5 milliGRAM(s) Oral every 12 hours  bisacodyl 5 milliGRAM(s) Oral daily  carbidopa/levodopa  25/100 1 Tablet(s) Oral <User Schedule>  chlorhexidine 0.12% Liquid 15 milliLiter(s) Oral Mucosa two times a day  chlorhexidine 2% Cloths 1 Application(s) Topical <User Schedule>  diltiazem    Tablet 90 milliGRAM(s) Oral every 6 hours  entacapone 400 milliGRAM(s) Oral four times a day  ferrous    sulfate 325 milliGRAM(s) Oral daily  gabapentin 100 milliGRAM(s) Oral every 12 hours  lidocaine   4% Patch 2 Patch Transdermal every 24 hours  mupirocin 2% Nasal 1 Application(s) Both Nostrils two times a day  oxybutynin 5 milliGRAM(s) Oral daily  pantoprazole    Tablet 40 milliGRAM(s) Oral before breakfast  senna 2 Tablet(s) Oral at bedtime  sucralfate 1 Gram(s) Oral <User Schedule>  vancomycin  IVPB 1250 milliGRAM(s) IV Intermittent every 24 hours    MEDICATIONS  (PRN):  ALPRAZolam 0.25 milliGRAM(s) Oral at bedtime PRN Anxiety  meclizine 25 milliGRAM(s) Oral three times a day PRN Dizziness  zolpidem 5 milliGRAM(s) Oral at bedtime PRN Insomnia      FAMILY HISTORY:  FH: hypertension (Mother)      SOCIAL HISTORY: negative for tobacco, alcohol, or ilicit drug use.    Allergies    No Known Allergies    Intolerances        GEN: bed bound    NEURO:   MENTAL STATUS: AAOx1 (self)  LANG/SPEECH: able to follow one step command   CRANIAL NERVES:  II: Pupils equal and reactive, no RAPD  III, IV, VI: no gaze preference or deviation  VII: no facial asymmetry  MOTOR: 5/5 in both upper and pain limited lower extremities strength testing; minimal rigidity R>L UE   REFLEXES: 1/4 throughout, bilateral plantars mute  COORD: no tremor but severe dyskinesia noticed    LABS:                        8.3    5.33  )-----------( 216      ( 30 Apr 2025 12:09 )             26.6     04-30    140  |  109  |  13  ----------------------------<  87  4.0   |  22  |  0.7    Ca    7.8[L]      30 Apr 2025 12:09      Hemoglobin A1C:   Vitamin B12     CAPILLARY BLOOD GLUCOSE          Urinalysis Basic - ( 30 Apr 2025 12:09 )    Color: x / Appearance: x / SG: x / pH: x  Gluc: 87 mg/dL / Ketone: x  / Bili: x / Urobili: x   Blood: x / Protein: x / Nitrite: x   Leuk Esterase: x / RBC: x / WBC x   Sq Epi: x / Non Sq Epi: x / Bacteria: x            Microbiology:      RADIOLOGY, EKG AND ADDITIONAL TESTS: Reviewed.

## 2025-05-01 NOTE — PROGRESS NOTE ADULT - ASSESSMENT
ASSESSMENT  89-year-old female past medical history of Parkinson's, dementia, hypertension, hyperlipidemia, A-fib on Eliquis, bacteremia secondary to MRSA positive parotitis left side of face in January 2025 presents for evaluation of right-sided facial swelling.    IMPRESSION  #Methicillin resistant Staphylococcus aureus (MRSA) bacteremia due to R parotitis  Recurrent MRSA bacteremia , Recurrent parotitis; previously L    4/27 1/4 bottles MRSA    Admission WBC 19     ESR/CRP 76/294  < from: CT Neck Soft Tissue w/ IV Cont (04.27.25 @ 15:03) >  Marked swelling/enlarged and hyperenhancing of the right parotid gland compatible with acute parotitis. There are inflammatory changes of the   adjacent soft tissues extending into the submandibular space as well as thickening of the platysma muscle. No abscess.  Bilateral mastoid effusions.  New cluster of nodules within the anterior segment of the upper lobe of the right lung measuring up to 1.1 cm.  #Suspect asymptomatic bacteriuria     UCX   10,000 - 49,000 CFU/mL Enterococcus faecalis (vancomycin resistant). UA without significant pyuria   #History  1/2025 MRSA bacteremia due to L parotitis    1/4 BCX NGTD     1/3 BCX NGTD     1/1 2/4 BCX MRSA    TTE no vegetations   As Pt was a poor candidate for JOI, she was D/C with PICC x IV Vanc 6 weeks from cleared BCX E/D 2/13/25  #Immunodeficiency secondary to Senescence which could result in poor clinical outcomes  #Abx allergy: No Known Allergies    Creatinine: 0.8 (04-28-25 @ 08:07)    Height (cm): 170.2 (01-09-25 @ 19:04)  Weight (kg): 60 (04-27-25 @ 12:46)    RECOMMENDATIONS  - Vanc dosing AUC/ADAM per clinical pharmacist   - Repeat BCX pending  - Aware of UCX, suspect asymptomatic bacteriuria , UA without significant pyuria   - TTE no vegetations   - Appreciate ENT consult  - MRSA PCR + --> decolonization with mupirocin to nares BID and CHG daily washes   - Will need another prolonged course of IV antibiotics on D/C. JOI unlikely to   - Poor prognosis, GOC    If any questions, please send a message or call on [employer] Teams  Please continue to update ID with any pertinent new laboratory, radiographic findings, or change in clinical status

## 2025-05-01 NOTE — CONSULT NOTE ADULT - ASSESSMENT
89-year-old female past medical history of Parkinson's, dementia, hypertension, hyperlipidemia, A-fib on Eliquis, bacteremia secondary to MRSA positive parotitis left side of face in January 2025 presents for evaluation of right-sided facial swelling.  Neurology was consulted on 5/1 for worsening dyskinesia. Patient has a long standing history of parkinsons ds and has been on sinemet 25/100 QID, is LTC resident at [facility] for about 4 years, and is bedbound. Given worsening dyskinesia we can decrease sinemet dosing and add amantadine while monitoring her progress and also have the patient follow up with outpatient neurology clinic.     Recs:   - dec sinemet to 25/100 1 tab at 6am, 10am, 2pm, 6pm and 10pm  - start amantadine 100mg BID   - monitor for symptoms  - follow up with outpatient neurologist
ASSESSMENT  89-year-old female past medical history of Parkinson's, dementia, hypertension, hyperlipidemia, A-fib on Eliquis, bacteremia secondary to MRSA positive parotitis left side of face in January 2025 presents for evaluation of right-sided facial swelling.    IMPRESSION  #R parotitis  Admission WBC 19     ESR/CRP 76/294  < from: CT Neck Soft Tissue w/ IV Cont (04.27.25 @ 15:03) >  Marked swelling/enlarged and hyperenhancing of the right parotid gland compatible with acute parotitis. There are inflammatory changes of the   adjacent soft tissues extending into the submandibular space as well as thickening of the platysma muscle. No abscess.  Bilateral mastoid effusions.  New cluster of nodules within the anterior segment of the upper lobe of the right lung measuring up to 1.1 cm.  #History  1/2025 MRSA bacteremia due to L parotitis    1/4 BCX NGTD     1/3 BCX NGTD     1/1 2/4 BCX MRSA    TTE no vegetations   As Pt was a poor candidate for JOI, she was D/C with PICC x IV Vanc 6 weeks from cleared BCX E/D 2/13/25  #Immunodeficiency secondary to Senescence which could result in poor clinical outcomes  #Abx allergy: No Known Allergies    Creatinine: 0.8 (04-28-25 @ 08:07)    Height (cm): 170.2 (01-09-25 @ 19:04)  Weight (kg): 60 (04-27-25 @ 12:46)    RECOMMENDATIONS  - Vanc dosing AUC/ADAM per clinical pharmacist   - D/C zosyn given risk of JOHN on this combo  - Unasyn 3 g IV every 6 hours  - Appreciate ENT consult  - f/u BCX  - MRSA nares    If any questions, please send a message or call on [email or web address] Teams  Please continue to update ID with any pertinent new laboratory, radiographic findings, or change in clinical status  
Pt is a 89-year-old female past medical history of Parkinson's, dementia, hypertension, hyperlipidemia, A-fib on Eliquis, bacteremia secondary to MRSA positive parotitis left side of face in January 2025, presents for evaluation of right-sided facial swelling.  As per EMS patient noticed to have right-sided facial swelling for the past couple days at nursing home, no inciting relieving factors. ENT called for further evaluation of right parotitis. Unable to obtain a comprehensive history, review of systems, past medical history, and/or physical exam due to constraints imposed by the patient's clinical condition and/or mental status.    Plan:   - No acute surgical intervention indicated at this time, no abscess or drainable collection seen on CT neck/Temporal bone  - Continue IV abx; Consider ID consult   - Trend wbc, if worsening leukocytosis consider reimaging  - Monitor VS  - Continue pain control prn  - Recommend warm compresses to area with glandular massage   - Recommend sialogogues (lemon in water)   - Aggressive oral care  - Will discuss with attending 
89-year-old female past medical history of Parkinson's, dementia, hypertension, hyperlipidemia, A-fib on Eliquis, bacteremia secondary to MRSA positive parotitis left side of face in January 2025 presents for evaluation of right-sided facial swelling. Patient with hospital course notable for R sided parotitis and MRSA bacteremia. Palliative care consulted for GOC.    Spoke with patient at bedside. She was zble to say she was in the hospital because of mouth pain, but otherwise did not have capacity or insight into her condition.  Called and spoke with patient's daughter Veronica as no family was present at the hospital.  Palliative care introduce. SHe was able to provide a medical history and hospital course.  We discussed the patient and she stated the patient is largely bedbound and eats, but doesn't seem to eat much.  We discussed GOC and code status.  Veronica confirmed DNR/DNI.  She noted that if the patient wasn't eating enough and artificial nutrition was required, the patient would not want a PEG.  We discussed hospice should the patient clinically decline or her dementia worsen. All questions answered.     Plan  -DNR/DNI  -ongoing medical management  -patient's daughter noted that she would not want PEG if needed  -continue IV antibiotics  -rest of care per primary team  -will sign off    Discussed with primary team    MEDD (morphine equivalent daily dose):    Education about palliative care provided to patient/family.      Please call [phone] with questions or concerns 24/7.

## 2025-05-01 NOTE — CONSULT NOTE ADULT - ATTENDING COMMENTS
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  Add amantadine and f/u with her neurologist as outpt for further dose adjustment.

## 2025-05-01 NOTE — PROGRESS NOTE ADULT - ASSESSMENT
89-year-old female past medical history of Parkinson's, dementia, hypertension, hyperlipidemia, A-fib on Eliquis, bacteremia secondary to MRSA positive parotitis left side of face in January 2025 presents for evaluation of right-sided facial swelling.    #R sided parotitis Hx of L parotitis  found to have  MRSA bacteremia  vancomycin   daily blood culture until negative     #Drug monitoring of high risk medication , potential for drug drug reaction , requiring close monitoring Vancomycin Level, Random: 23.1: adjustment per pharmacy protocol , and monitor kidney function    #New incidental lung nodules  Repeat CT scan once infection is resolved to monitor.    #Paroxysmal atrial fibrillation  on apixaban     #HTN  BP: 124/60 (01 May 2025 05:11) (97/51 - 124/62)  controlled     #Dementia    #Parkinson   Parkinson home meds.  zaleplon PRN, Xanax PRN.    #Constipation  controlled , on laxatives    #GERD   pantoprazole, sucralfate.    #Bladder spasms   oxybutynin 5mg QD.    #Abnormal movements neuro eval     PROGRESS NOTE HANDOFF    Pending: id for abx plan , likely picc , neuro eval      Family discussion: patient verbalized understanding and agreeable to plan of care     Disposition: Home

## 2025-05-01 NOTE — PROGRESS NOTE ADULT - ASSESSMENT
89-year-old female past medical history of Parkinson's, dementia, hypertension, hyperlipidemia, A-fib on Eliquis, bacteremia secondary to MRSA positive parotitis left side of face in January 2025 presents for evaluation of right-sided facial swelling.    #R sided parotitis Hx of L parotitis  found to have  MRSA bacteremia  vancomycin   daily blood culture until negative     #Drug monitoring of high risk medication , potential for drug drug reaction , requiring close monitoring Vancomycin Level, Random: 23.1: adjustment per pharmacy protocol , and monitor kidney function    #New incidental lung nodules  Repeat CT scan once infection is resolved to monitor.    #Paroxysmal atrial fibrillation  on apixaban     #HTN  BP: 124/60 (01 May 2025 05:11) (97/51 - 124/62)  controlled     #Dementia    #Parkinson   Parkinson home meds.  zaleplon PRN, Xanax PRN.    #Constipation  controlled , on laxatives    #GERD   pantoprazole, sucralfate.    #Bladder spasms   oxybutynin 5mg QD.    #Abnormal movements neuro eval     PROGRESS NOTE HANDOFF    Pending: id for abx plan , likely picc , neuro eval      Disposition: Home

## 2025-05-02 LAB
ANION GAP SERPL CALC-SCNC: 7 MMOL/L — SIGNIFICANT CHANGE UP (ref 7–14)
BUN SERPL-MCNC: 11 MG/DL — SIGNIFICANT CHANGE UP (ref 10–20)
CALCIUM SERPL-MCNC: 7.9 MG/DL — LOW (ref 8.4–10.5)
CHLORIDE SERPL-SCNC: 111 MMOL/L — HIGH (ref 98–110)
CO2 SERPL-SCNC: 24 MMOL/L — SIGNIFICANT CHANGE UP (ref 17–32)
CREAT SERPL-MCNC: 0.8 MG/DL — SIGNIFICANT CHANGE UP (ref 0.7–1.5)
EGFR: 69 ML/MIN/1.73M2 — SIGNIFICANT CHANGE UP
EGFR: 69 ML/MIN/1.73M2 — SIGNIFICANT CHANGE UP
GLUCOSE SERPL-MCNC: 88 MG/DL — SIGNIFICANT CHANGE UP (ref 70–99)
POTASSIUM SERPL-MCNC: 4 MMOL/L — SIGNIFICANT CHANGE UP (ref 3.5–5)
POTASSIUM SERPL-SCNC: 4 MMOL/L — SIGNIFICANT CHANGE UP (ref 3.5–5)
SODIUM SERPL-SCNC: 142 MMOL/L — SIGNIFICANT CHANGE UP (ref 135–146)

## 2025-05-02 PROCEDURE — 99222 1ST HOSP IP/OBS MODERATE 55: CPT

## 2025-05-02 PROCEDURE — 99232 SBSQ HOSP IP/OBS MODERATE 35: CPT

## 2025-05-02 RX ADMIN — Medication 1 TABLET(S): at 11:15

## 2025-05-02 RX ADMIN — Medication 1 TABLET(S): at 17:36

## 2025-05-02 RX ADMIN — Medication 40 MILLIGRAM(S): at 05:34

## 2025-05-02 RX ADMIN — Medication 1 TABLET(S): at 05:48

## 2025-05-02 RX ADMIN — Medication 1 GRAM(S): at 17:35

## 2025-05-02 RX ADMIN — Medication 1 GRAM(S): at 11:24

## 2025-05-02 RX ADMIN — Medication 100 MILLIGRAM(S): at 05:34

## 2025-05-02 RX ADMIN — APIXABAN 2.5 MILLIGRAM(S): 2.5 TABLET, FILM COATED ORAL at 17:35

## 2025-05-02 RX ADMIN — ENTACAPONE 400 MILLIGRAM(S): 200 TABLET, FILM COATED ORAL at 11:15

## 2025-05-02 RX ADMIN — GABAPENTIN 100 MILLIGRAM(S): 400 CAPSULE ORAL at 05:34

## 2025-05-02 RX ADMIN — APIXABAN 2.5 MILLIGRAM(S): 2.5 TABLET, FILM COATED ORAL at 05:35

## 2025-05-02 RX ADMIN — MUPIROCIN CALCIUM 1 APPLICATION(S): 20 CREAM TOPICAL at 05:34

## 2025-05-02 RX ADMIN — ENTACAPONE 400 MILLIGRAM(S): 200 TABLET, FILM COATED ORAL at 05:34

## 2025-05-02 RX ADMIN — GABAPENTIN 100 MILLIGRAM(S): 400 CAPSULE ORAL at 17:34

## 2025-05-02 RX ADMIN — Medication 1 GRAM(S): at 05:48

## 2025-05-02 RX ADMIN — ENTACAPONE 400 MILLIGRAM(S): 200 TABLET, FILM COATED ORAL at 17:35

## 2025-05-02 RX ADMIN — Medication 1 TABLET(S): at 14:45

## 2025-05-02 RX ADMIN — Medication 1 TABLET(S): at 21:38

## 2025-05-02 RX ADMIN — Medication 15 MILLILITER(S): at 05:35

## 2025-05-02 RX ADMIN — Medication 100 MILLIGRAM(S): at 17:35

## 2025-05-02 RX ADMIN — MUPIROCIN CALCIUM 1 APPLICATION(S): 20 CREAM TOPICAL at 17:35

## 2025-05-02 RX ADMIN — LIDOCAINE HYDROCHLORIDE 2 PATCH: 20 JELLY TOPICAL at 21:34

## 2025-05-02 RX ADMIN — Medication 2 TABLET(S): at 21:34

## 2025-05-02 RX ADMIN — ENTACAPONE 400 MILLIGRAM(S): 200 TABLET, FILM COATED ORAL at 01:43

## 2025-05-02 RX ADMIN — OXYBUTYNIN CHLORIDE 5 MILLIGRAM(S): 5 TABLET, FILM COATED, EXTENDED RELEASE ORAL at 11:15

## 2025-05-02 RX ADMIN — Medication 325 MILLIGRAM(S): at 11:14

## 2025-05-02 RX ADMIN — Medication 250 MILLIGRAM(S): at 11:15

## 2025-05-02 RX ADMIN — Medication 1 APPLICATION(S): at 05:35

## 2025-05-02 RX ADMIN — Medication 15 MILLILITER(S): at 17:37

## 2025-05-02 NOTE — DIETITIAN NUTRITION RISK NOTIFICATION - PHYSICAL ASSESSMENT CALF
severe Demonstrate a substantial reduction in both hyperactive pacing on the unit and social intrusiveness Exhibit a substantial reduction in elated/angry acting out, and pressured speech that prevents mutual conversation

## 2025-05-02 NOTE — DIETITIAN NUTRITION RISK NOTIFICATION - TREATMENT: THE FOLLOWING DIET HAS BEEN RECOMMENDED
Diet, Pureed:   DASH/TLC {Sodium & Cholesterol Restricted} (DASH)  Supplement Feeding Modality:  Oral  Ensure Plus High Protein Cans or Servings Per Day:  1       Frequency:  Two Times a day (04-29-25 @ 22:09) [Active]

## 2025-05-02 NOTE — PROGRESS NOTE ADULT - ASSESSMENT
Pt is a 89y/o F a/w MRSA parotitis. Pt seen and examined at bedside w/ Dr. Grijalva     Plan:  - Continue IV abx per ID recs - planned for PICC on Monday   - aggressive oral hygiene   - continue warm compresses   - continue sialogogues   - No acute ENT intervention at this time   - seen and examined at bedside w/ Dr. Grijalva

## 2025-05-02 NOTE — PROGRESS NOTE ADULT - ASSESSMENT
89-year-old female past medical history of Parkinson's, dementia, hypertension, hyperlipidemia, A-fib on Eliquis, bacteremia secondary to MRSA positive parotitis left side of face in January 2025 presents for evaluation of right-sided facial swelling.    #R sided parotitis Hx of L parotitis  found to have  MRSA bacteremia  vancomycin   latest blood culture negative     #Drug monitoring of high risk medication , potential for drug drug reaction , requiring close monitoring Vancomycin Level, Random: 23.1: adjustment per pharmacy protocol , and monitor kidney function    #New incidental lung nodules  Repeat CT scan once infection is resolved to monitor.    #Paroxysmal atrial fibrillation  on apixaban     #HTN  BP: 92/51 (02 May 2025 08:00) (92/51 - 141/49)  controlled     #Dementia    #Parkinson   Parkinson home meds.  zaleplon PRN, Xanax PRN.    #Constipation  controlled , on laxatives    #GERD   pantoprazole, sucralfate.    #Bladder spasms   oxybutynin 5mg QD.    #Dyskinesia neuro eval appreciated, sinemet decreased , and amantadine started     PROGRESS NOTE HANDOFF    Pending: picc line placement on Monday, id for abx  duration     Disposition: Home

## 2025-05-02 NOTE — PROGRESS NOTE ADULT - ASSESSMENT
89-year-old female past medical history of Parkinson's, dementia, hypertension, hyperlipidemia, A-fib on Eliquis, bacteremia secondary to MRSA positive parotitis left side of face in January 2025 presents for evaluation of right-sided facial swelling.    #R sided parotitis Hx of L parotitis  found to have  MRSA bacteremia  vancomycin   latest blood culture negative     #Drug monitoring of high risk medication , potential for drug drug reaction , requiring close monitoring Vancomycin Level, Random: 23.1: adjustment per pharmacy protocol , and monitor kidney function    #New incidental lung nodules  Repeat CT scan once infection is resolved to monitor.    #Paroxysmal atrial fibrillation  on apixaban     #HTN  BP: 92/51 (02 May 2025 08:00) (92/51 - 141/49)  controlled     #Dementia    #Parkinson   Parkinson home meds.  zaleplon PRN, Xanax PRN.    #Constipation  controlled , on laxatives    #GERD   pantoprazole, sucralfate.    #Bladder spasms   oxybutynin 5mg QD.    #Dyskinesia neuro eval appreciated, sinemet decreased , and amantadine started     PROGRESS NOTE HANDOFF    Pending: picc line placement  , id for abx  duration     Family discussion: patient verbalized understanding and agreeable to plan of care     Disposition: Home

## 2025-05-03 LAB
ANION GAP SERPL CALC-SCNC: 8 MMOL/L — SIGNIFICANT CHANGE UP (ref 7–14)
BUN SERPL-MCNC: 10 MG/DL — SIGNIFICANT CHANGE UP (ref 10–20)
CALCIUM SERPL-MCNC: 7.9 MG/DL — LOW (ref 8.4–10.5)
CHLORIDE SERPL-SCNC: 105 MMOL/L — SIGNIFICANT CHANGE UP (ref 98–110)
CO2 SERPL-SCNC: 22 MMOL/L — SIGNIFICANT CHANGE UP (ref 17–32)
CREAT SERPL-MCNC: 0.7 MG/DL — SIGNIFICANT CHANGE UP (ref 0.7–1.5)
EGFR: 81 ML/MIN/1.73M2 — SIGNIFICANT CHANGE UP
EGFR: 81 ML/MIN/1.73M2 — SIGNIFICANT CHANGE UP
GLUCOSE SERPL-MCNC: 81 MG/DL — SIGNIFICANT CHANGE UP (ref 70–99)
POTASSIUM SERPL-MCNC: 3.5 MMOL/L — SIGNIFICANT CHANGE UP (ref 3.5–5)
POTASSIUM SERPL-SCNC: 3.5 MMOL/L — SIGNIFICANT CHANGE UP (ref 3.5–5)
SODIUM SERPL-SCNC: 135 MMOL/L — SIGNIFICANT CHANGE UP (ref 135–146)

## 2025-05-03 PROCEDURE — 99232 SBSQ HOSP IP/OBS MODERATE 35: CPT

## 2025-05-03 RX ADMIN — Medication 1 TABLET(S): at 15:13

## 2025-05-03 RX ADMIN — DILTIAZEM HYDROCHLORIDE 90 MILLIGRAM(S): 120 CAPSULE, EXTENDED RELEASE ORAL at 12:23

## 2025-05-03 RX ADMIN — Medication 2 TABLET(S): at 21:52

## 2025-05-03 RX ADMIN — Medication 1 GRAM(S): at 05:54

## 2025-05-03 RX ADMIN — Medication 1 TABLET(S): at 05:34

## 2025-05-03 RX ADMIN — Medication 15 MILLILITER(S): at 05:50

## 2025-05-03 RX ADMIN — Medication 1 GRAM(S): at 12:28

## 2025-05-03 RX ADMIN — OXYBUTYNIN CHLORIDE 5 MILLIGRAM(S): 5 TABLET, FILM COATED, EXTENDED RELEASE ORAL at 12:22

## 2025-05-03 RX ADMIN — ENTACAPONE 400 MILLIGRAM(S): 200 TABLET, FILM COATED ORAL at 05:30

## 2025-05-03 RX ADMIN — GABAPENTIN 100 MILLIGRAM(S): 400 CAPSULE ORAL at 18:06

## 2025-05-03 RX ADMIN — Medication 1 TABLET(S): at 10:53

## 2025-05-03 RX ADMIN — ENTACAPONE 400 MILLIGRAM(S): 200 TABLET, FILM COATED ORAL at 12:23

## 2025-05-03 RX ADMIN — Medication 1 TABLET(S): at 21:52

## 2025-05-03 RX ADMIN — Medication 100 MILLIGRAM(S): at 05:30

## 2025-05-03 RX ADMIN — DILTIAZEM HYDROCHLORIDE 90 MILLIGRAM(S): 120 CAPSULE, EXTENDED RELEASE ORAL at 00:16

## 2025-05-03 RX ADMIN — ENTACAPONE 400 MILLIGRAM(S): 200 TABLET, FILM COATED ORAL at 00:16

## 2025-05-03 RX ADMIN — Medication 15 MILLILITER(S): at 18:25

## 2025-05-03 RX ADMIN — DILTIAZEM HYDROCHLORIDE 90 MILLIGRAM(S): 120 CAPSULE, EXTENDED RELEASE ORAL at 18:06

## 2025-05-03 RX ADMIN — ENTACAPONE 400 MILLIGRAM(S): 200 TABLET, FILM COATED ORAL at 18:06

## 2025-05-03 RX ADMIN — Medication 325 MILLIGRAM(S): at 12:22

## 2025-05-03 RX ADMIN — Medication 1 GRAM(S): at 18:06

## 2025-05-03 RX ADMIN — APIXABAN 2.5 MILLIGRAM(S): 2.5 TABLET, FILM COATED ORAL at 05:31

## 2025-05-03 RX ADMIN — Medication 250 MILLIGRAM(S): at 12:24

## 2025-05-03 RX ADMIN — MUPIROCIN CALCIUM 1 APPLICATION(S): 20 CREAM TOPICAL at 05:50

## 2025-05-03 RX ADMIN — DILTIAZEM HYDROCHLORIDE 90 MILLIGRAM(S): 120 CAPSULE, EXTENDED RELEASE ORAL at 05:30

## 2025-05-03 RX ADMIN — MUPIROCIN CALCIUM 1 APPLICATION(S): 20 CREAM TOPICAL at 18:10

## 2025-05-03 RX ADMIN — LIDOCAINE HYDROCHLORIDE 2 PATCH: 20 JELLY TOPICAL at 21:51

## 2025-05-03 RX ADMIN — Medication 40 MILLIGRAM(S): at 05:30

## 2025-05-03 RX ADMIN — APIXABAN 2.5 MILLIGRAM(S): 2.5 TABLET, FILM COATED ORAL at 18:07

## 2025-05-03 RX ADMIN — Medication 1 TABLET(S): at 18:06

## 2025-05-03 RX ADMIN — GABAPENTIN 100 MILLIGRAM(S): 400 CAPSULE ORAL at 05:30

## 2025-05-03 RX ADMIN — Medication 100 MILLIGRAM(S): at 18:07

## 2025-05-03 RX ADMIN — Medication 1 APPLICATION(S): at 05:50

## 2025-05-03 NOTE — PROGRESS NOTE ADULT - ASSESSMENT
89-year-old female past medical history of Parkinson's, dementia, hypertension, hyperlipidemia, A-fib on Eliquis, bacteremia secondary to MRSA positive parotitis left side of face in January 2025 presents for evaluation of right-sided facial swelling.    #R sided parotitis Hx of L parotitis  found to have  MRSA bacteremia  vancomycin   latest blood culture negative     #Drug monitoring of high risk medication , potential for drug drug reaction , requiring close monitoring Vancomycin Level, Random: 23.1: adjustment per pharmacy protocol , and monitor kidney function    #New incidental lung nodules  Repeat CT scan once infection is resolved to monitor.    #Paroxysmal atrial fibrillation  on apixaban     #HTN  BP: 92/51 (02 May 2025 08:00) (92/51 - 141/49)  controlled     #Dementia    #Parkinson   Parkinson home meds.  zaleplon PRN, Xanax PRN.    #Constipation  controlled , on laxatives    #GERD   pantoprazole, sucralfate.    #Bladder spasms   oxybutynin 5mg QD.    #Dyskinesia neuro eval appreciated, sinemet decreased , and amantadine started     PROGRESS NOTE HANDOFF    Pending: picc line placement on Monday , id for abx  duration (likely 6 weeks)    Family discussion: patient verbalized understanding and agreeable to plan of care     Disposition: Home

## 2025-05-03 NOTE — PROGRESS NOTE ADULT - TIME BILLING
Patient seen at bedside time spent evaluating and treating the patient's acute illness as well as time spent reviewing labs, radiology, discussing with patient and/or patient's family, and discussing the case with a multidisciplinary team.
time spent on review of labs, imaging studies, old records, obtaining history, personally examining patient, counselling and communicating with patient/ family, entering orders for medications/tests/etc, discussions with other health care providers, documentation in electronic health records, independent interpretation of labs, imaging/procedure results and care coordination.
time spent on review of labs, imaging studies, old records, obtaining history, personally examining patient, counselling and communicating with patient/ family, entering orders for medications/tests/etc, discussions with other health care providers, documentation in electronic health records, independent interpretation of labs, imaging/procedure results and care coordination.

## 2025-05-04 LAB — VANCOMYCIN TROUGH SERPL-MCNC: 20.1 UG/ML — HIGH (ref 5–10)

## 2025-05-04 PROCEDURE — 99233 SBSQ HOSP IP/OBS HIGH 50: CPT

## 2025-05-04 RX ORDER — VANCOMYCIN HCL IN 5 % DEXTROSE 1.5G/250ML
750 PLASTIC BAG, INJECTION (ML) INTRAVENOUS EVERY 24 HOURS
Refills: 0 | Status: DISCONTINUED | OUTPATIENT
Start: 2025-05-05 | End: 2025-05-06

## 2025-05-04 RX ADMIN — Medication 250 MILLIGRAM(S): at 11:10

## 2025-05-04 RX ADMIN — Medication 1 GRAM(S): at 17:09

## 2025-05-04 RX ADMIN — Medication 40 MILLIGRAM(S): at 06:06

## 2025-05-04 RX ADMIN — MUPIROCIN CALCIUM 1 APPLICATION(S): 20 CREAM TOPICAL at 05:38

## 2025-05-04 RX ADMIN — Medication 1 GRAM(S): at 06:06

## 2025-05-04 RX ADMIN — APIXABAN 2.5 MILLIGRAM(S): 2.5 TABLET, FILM COATED ORAL at 17:09

## 2025-05-04 RX ADMIN — Medication 1 GRAM(S): at 11:06

## 2025-05-04 RX ADMIN — GABAPENTIN 100 MILLIGRAM(S): 400 CAPSULE ORAL at 05:39

## 2025-05-04 RX ADMIN — Medication 1 TABLET(S): at 13:09

## 2025-05-04 RX ADMIN — Medication 1 TABLET(S): at 10:08

## 2025-05-04 RX ADMIN — DILTIAZEM HYDROCHLORIDE 90 MILLIGRAM(S): 120 CAPSULE, EXTENDED RELEASE ORAL at 05:39

## 2025-05-04 RX ADMIN — ENTACAPONE 400 MILLIGRAM(S): 200 TABLET, FILM COATED ORAL at 17:08

## 2025-05-04 RX ADMIN — LIDOCAINE HYDROCHLORIDE 2 PATCH: 20 JELLY TOPICAL at 21:09

## 2025-05-04 RX ADMIN — Medication 5 MILLIGRAM(S): at 11:05

## 2025-05-04 RX ADMIN — GABAPENTIN 100 MILLIGRAM(S): 400 CAPSULE ORAL at 17:08

## 2025-05-04 RX ADMIN — Medication 15 MILLILITER(S): at 06:00

## 2025-05-04 RX ADMIN — ENTACAPONE 400 MILLIGRAM(S): 200 TABLET, FILM COATED ORAL at 00:20

## 2025-05-04 RX ADMIN — DILTIAZEM HYDROCHLORIDE 90 MILLIGRAM(S): 120 CAPSULE, EXTENDED RELEASE ORAL at 00:04

## 2025-05-04 RX ADMIN — Medication 100 MILLIGRAM(S): at 05:39

## 2025-05-04 RX ADMIN — ENTACAPONE 400 MILLIGRAM(S): 200 TABLET, FILM COATED ORAL at 05:38

## 2025-05-04 RX ADMIN — Medication 1 TABLET(S): at 21:08

## 2025-05-04 RX ADMIN — Medication 325 MILLIGRAM(S): at 11:06

## 2025-05-04 RX ADMIN — Medication 100 MILLIGRAM(S): at 17:08

## 2025-05-04 RX ADMIN — APIXABAN 2.5 MILLIGRAM(S): 2.5 TABLET, FILM COATED ORAL at 05:38

## 2025-05-04 RX ADMIN — Medication 1 TABLET(S): at 17:09

## 2025-05-04 RX ADMIN — Medication 2 TABLET(S): at 21:07

## 2025-05-04 RX ADMIN — Medication 1 TABLET(S): at 06:01

## 2025-05-04 RX ADMIN — OXYBUTYNIN CHLORIDE 5 MILLIGRAM(S): 5 TABLET, FILM COATED, EXTENDED RELEASE ORAL at 11:06

## 2025-05-04 RX ADMIN — Medication 15 MILLILITER(S): at 17:10

## 2025-05-04 RX ADMIN — ENTACAPONE 400 MILLIGRAM(S): 200 TABLET, FILM COATED ORAL at 11:06

## 2025-05-04 RX ADMIN — Medication 1 APPLICATION(S): at 06:00

## 2025-05-04 NOTE — PROGRESS NOTE ADULT - ASSESSMENT
89-year-old female past medical history of Parkinson's, dementia, hypertension, hyperlipidemia, A-fib on Eliquis, bacteremia secondary to MRSA positive parotitis left side of face in January 2025 presents for evaluation of right-sided facial swelling.    #R sided parotitis Hx of L parotitis  found to have  MRSA bacteremia  vancomycin   latest blood culture negative     #Drug monitoring of high risk medication , potential for drug drug reaction , requiring close monitoring Vancomycin Level, Random: 23.1: adjustment per pharmacy protocol , and monitor kidney function    #New incidental lung nodules  Repeat CT scan once infection is resolved to monitor.    #Paroxysmal atrial fibrillation  on apixaban     #HTN  BP: 92/51 (02 May 2025 08:00) (92/51 - 141/49)  controlled     #Dementia    #Parkinson   Parkinson home meds.  zaleplon PRN, Xanax PRN.    #Constipation  controlled , on laxatives    #GERD   pantoprazole, sucralfate.    #Bladder spasms   oxybutynin 5mg QD.    #Dyskinesia neuro eval appreciated, sinemet decreased , and amantadine started     PROGRESS NOTE HANDOFF    Pending: picc line placement on Monday , id for abx  duration (likely 6 weeks)    Disposition: Home

## 2025-05-04 NOTE — PROGRESS NOTE ADULT - ASSESSMENT
89-year-old female past medical history of Parkinson's, dementia, hypertension, hyperlipidemia, A-fib on Eliquis, bacteremia secondary to MRSA positive parotitis left side of face in January 2025 presents for evaluation of right-sided facial swelling.    #R sided parotitis Hx of L parotitis  found to have  MRSA bacteremia  vancomycin   latest 2 blood culture negative     #Drug monitoring of high risk medication , potential for drug drug reaction , requiring close monitoring Vancomycin Level, Random: 20.1: adjustment per pharmacy protocol , and monitor kidney function    #New incidental lung nodules  Repeat CT scan once infection is resolved to monitor.    #Paroxysmal atrial fibrillation  on apixaban     #HTN  BP: 97/58 (04 May 2025 08:00) (97/58 - 103/61)  controlled     #Dementia    #Parkinson   Parkinson home meds.  zaleplon PRN, Xanax PRN.    #Constipation  controlled , on laxatives    #GERD   pantoprazole, sucralfate.    #Bladder spasms   oxybutynin 5mg QD.    #Dyskinesia neuro eval appreciated, sinemet decreased , and amantadine started     PROGRESS NOTE HANDOFF    Pending: picc line placement  ,     Family discussion: patient verbalized understanding and agreeable to plan of care     Disposition: Home

## 2025-05-04 NOTE — PHARMACOTHERAPY INTERVENTION NOTE - NSPHARMCOMMASP
ASP - Lab/ test recommended
ASP - Lab/ test recommended
ASP - Therapy recommended/ Alternative therapy
ASP - Therapy recommended/ Alternative therapy
ASP - Lab/ test recommended
ASP - Lab/ test recommended

## 2025-05-04 NOTE — PHARMACOTHERAPY INTERVENTION NOTE - COMMENTS
Patient on vancomycin 1000mg IV q12h at 1000/2200 for treatment of MRSA bacteremia secondary to parotitis per Dr. Ritter. Vancomycin level on 4/30 at 1209, about 12 hours after last vancomycin dose, was 23.1 mg/L. Per goAct Bayesian vancomycin dosing software, current vancomycin regimen predicts a supratherapeutic steady-state AUC/ADAM of 750.74 mg/L*hr (goal 400-600). A reduced vancomycin regimen of 1250mg IV q24h predicts a therapeutic steady-state AUC/ADAM of 469.21 mg/L*hr. Recommended decreasing vancomycin dose to 1250mg IV q24h starting on 5/1 at 0600, and obtaining a repeat vancomycin level on 5/1 at 1100, prior to second dose of new regimen (does not need to be a true trough as goAct will adjust for levels collected early).   
As per policy, ordered a vancomycin level for 5/4 AM to assist with further dosing recommendations.    Jayson Ibrahim, PharmD, [facility]  Clinical Pharmacy Specialist, Infectious Diseases  Tele-Antimicrobial Stewardship Program (Tele-ASP)  Tele-ASP Phone: [phone] 
Patient on vancomycin 750mg IV q12h at 1000/2200 for treatment of MRSA bacteremia secondary to parotitis per Dr. Ritter. Vancomycin level on 4/28 at 21:15 was 9.9mg/L. Per PrecisePK Bayesian vancomycin dosing software, current vancomycin regimen predicts a subtherapeutic steady-state AUC/ADAM of 360.87 mg/L*hr (goal 400-600). An increased vancomycin regimen of 1000mg IV q12h predicts a therapeutic steady-state AUC/ADAM of 481.16 mg/L*hr. Recommended increasing vancomycin dose to 1000mg IV q12h starting at 2200 this evening, and obtaining a vancomycin level on 4/30 with AM labs, prior to second dose of new regimen given advanced age (does not need to be a true trough as PrecisePK will adjust for levels collected early). 
As per policy, ordered a vancomycin level for 5/7 AM.    Jayson Ibrahim, PharmD, [facility]  Clinical Pharmacy Specialist, Infectious Diseases  Tele-Antimicrobial Stewardship Program (Tele-ASP)  Tele-ASP Phone: [phone] 
Recommended obtaining a BMP along with vancomycin level today at 1100 to reassess renal function. 
Patient is receiving vancomycin 750 mg IV q12h for the treatment of parotitis per Dr. Ritter. Her vancomycin level on 4/28 8:07 was 6.9 mg/L. Per PrecisePK, her current vancomycin 750 mg IV q12h predicts a therapeutic AUC/ADAM of 489.48 mg/L*hr (goal 400-600). Recommended to continue vancomycin 750 mg IV q12h and obtain a repeat vancomycin level on 4/30 with AM labs.
Patient on vancomycin 1000mg IV q12h at 1000/2200 for treatment of MRSA bacteremia per Dr. Ritter. Vancomycin level from this morning was not collected. Given patient's advanced age, recommended holding this morning's vancomycin dose until vancomycin level is collected. 
Recommended vancomycin 750mg IV q24h, ordered as per ID consult recommendations, in the setting of a vancomycin level of 20mcg/mL. According to PrecisePK, a vancomycin regimen of 750mg IV q12h predicts a future steady-state vancomycin AUC/ADAM of 450mg*hr/mL.    Jayson Ibrahim, PharmD, [facility]  Clinical Pharmacy Specialist, Infectious Diseases  Tele-Antimicrobial Stewardship Program (Tele-ASP)  Tele-ASP Phone: [phone] 
Recommended vancomycin 1g IV q24h, in the setting of a vancomycin level of 27.7mcg/mL (not true trough, collected 12 hrs post dose). According to PrecisePK, a vancomycin regimen of 1g IV q24h predicts a future steady-state vancomycin AUC/ADAM of 470mg*hr/mL.    Jayson Ibrahim, PharmD, [facility]  Clinical Pharmacy Specialist, Infectious Diseases  Tele-Antimicrobial Stewardship Program (Tele-ASP)  Tele-ASP Phone: [phone]

## 2025-05-05 ENCOUNTER — TRANSCRIPTION ENCOUNTER (OUTPATIENT)
Age: 89
End: 2025-05-05

## 2025-05-05 PROCEDURE — 99233 SBSQ HOSP IP/OBS HIGH 50: CPT

## 2025-05-05 PROCEDURE — 36573 INSJ PICC RS&I 5 YR+: CPT | Mod: RT

## 2025-05-05 RX ORDER — AMANTADINE HCL 100 MG
1 CAPSULE ORAL
Qty: 0 | Refills: 0 | DISCHARGE
Start: 2025-05-05

## 2025-05-05 RX ORDER — VANCOMYCIN HCL IN 5 % DEXTROSE 1.5G/250ML
750 PLASTIC BAG, INJECTION (ML) INTRAVENOUS
Qty: 0 | Refills: 0 | DISCHARGE
Start: 2025-05-05

## 2025-05-05 RX ORDER — CARBIDOPA/LEVODOPA 25MG-100MG
1 TABLET ORAL
Qty: 0 | Refills: 0 | DISCHARGE
Start: 2025-05-05

## 2025-05-05 RX ADMIN — Medication 1 GRAM(S): at 17:28

## 2025-05-05 RX ADMIN — Medication 1 GRAM(S): at 05:24

## 2025-05-05 RX ADMIN — Medication 1 TABLET(S): at 05:24

## 2025-05-05 RX ADMIN — Medication 1 APPLICATION(S): at 05:25

## 2025-05-05 RX ADMIN — Medication 1 TABLET(S): at 17:28

## 2025-05-05 RX ADMIN — ENTACAPONE 400 MILLIGRAM(S): 200 TABLET, FILM COATED ORAL at 05:24

## 2025-05-05 RX ADMIN — Medication 1 TABLET(S): at 21:04

## 2025-05-05 RX ADMIN — ENTACAPONE 400 MILLIGRAM(S): 200 TABLET, FILM COATED ORAL at 17:28

## 2025-05-05 RX ADMIN — APIXABAN 2.5 MILLIGRAM(S): 2.5 TABLET, FILM COATED ORAL at 05:24

## 2025-05-05 RX ADMIN — DILTIAZEM HYDROCHLORIDE 90 MILLIGRAM(S): 120 CAPSULE, EXTENDED RELEASE ORAL at 05:24

## 2025-05-05 RX ADMIN — Medication 1 GRAM(S): at 11:46

## 2025-05-05 RX ADMIN — OXYBUTYNIN CHLORIDE 5 MILLIGRAM(S): 5 TABLET, FILM COATED, EXTENDED RELEASE ORAL at 11:46

## 2025-05-05 RX ADMIN — Medication 5 MILLIGRAM(S): at 11:46

## 2025-05-05 RX ADMIN — Medication 15 MILLILITER(S): at 17:29

## 2025-05-05 RX ADMIN — Medication 250 MILLIGRAM(S): at 17:29

## 2025-05-05 RX ADMIN — ENTACAPONE 400 MILLIGRAM(S): 200 TABLET, FILM COATED ORAL at 11:47

## 2025-05-05 RX ADMIN — Medication 1 TABLET(S): at 10:58

## 2025-05-05 RX ADMIN — Medication 100 MILLIGRAM(S): at 05:24

## 2025-05-05 RX ADMIN — Medication 100 MILLIGRAM(S): at 17:28

## 2025-05-05 RX ADMIN — Medication 1 TABLET(S): at 14:07

## 2025-05-05 RX ADMIN — Medication 40 MILLIGRAM(S): at 05:24

## 2025-05-05 RX ADMIN — Medication 15 MILLILITER(S): at 06:13

## 2025-05-05 RX ADMIN — GABAPENTIN 100 MILLIGRAM(S): 400 CAPSULE ORAL at 05:23

## 2025-05-05 RX ADMIN — Medication 325 MILLIGRAM(S): at 11:46

## 2025-05-05 RX ADMIN — APIXABAN 2.5 MILLIGRAM(S): 2.5 TABLET, FILM COATED ORAL at 17:28

## 2025-05-05 RX ADMIN — ENTACAPONE 400 MILLIGRAM(S): 200 TABLET, FILM COATED ORAL at 00:27

## 2025-05-05 RX ADMIN — GABAPENTIN 100 MILLIGRAM(S): 400 CAPSULE ORAL at 17:28

## 2025-05-05 NOTE — DISCHARGE NOTE PROVIDER - HOSPITAL COURSE
History of Present Illness:   89-year-old female past medical history of Parkinson's, dementia, hypertension, hyperlipidemia, A-fib on Eliquis, bacteremia secondary to MRSA positive parotitis left side of face in January 2025 presents for evaluation of right-sided facial swelling. As per NH paperwork, patient noticed to have right-sided facial swelling for the past couple days, no inciting relieving factors. Patient is A&Ox3, although weak and slow to respond. Reports pain at R neck/face at site of swelling. Denies pain elsewhere. Daughter Veronica at bedside also aware of same hx as stated.    Triage VS:  92/52, HR 80, T 97.9 F, SpO2 98% on 2L via nasal cannula.  Labs: WBC 19.25k (88% neutrophils), otherwise CBC stable. CMP with Na 148, Cr 1 (baseline 0.5 in Jan 2025), lactate 1.7.    CT neck soft tissue and temporal bones w/ IV: Marked swelling/enlarged and hyperenhancing of the right parotid gland compatible with acute parotitis. There are inflammatory changes of the adjacent soft tissues extending into the submandibular space as well as thickening of the platysma muscle. No abscess. Bilateral mastoid effusions. New cluster of nodules within the anterior segment of the upper lobe of the right lung measuring up to 1.1 cm.    ED interventions: Unasyn, vancomycin, 2L LR.  Seen by ENT who recommend IV abx, sialogogues and oral care.  Assessment    89-year-old female past medical history of Parkinson's, dementia, hypertension, hyperlipidemia, A-fib on Eliquis, bacteremia secondary to MRSA positive parotitis left side of face in January 2025 presents for evaluation of right-sided facial swelling found to have right sided parotitis.    #R sided parotitis   -found to have MRSA bacteremia  -currently receiving vancomycin  -s/p PICC line  -as per ID continue IV vancomycin via PICC for 6 weeks from 1st negative blood culture (until 6/10/25)    #New incidental lung nodules  -Repeat CT scan once infection is resolved to monitor  -Can be done outpatient    #Paroxysmal atrial fibrillation  -cw apixaban     #HTN  -wellcontrolled    #Dementia  #Parkinson  #Dyskinesia   -As per neuro sinemet was decreased , and amantadine started   -cw Parkinson home meds.  -cw zaleplon PRN, Xanax PRN.    #Constipation  -controlled , on laxatives    #GERD   -pantoprazole, sucralfate.    #Bladder spasms   -oxybutynin 5mg QD.    Discussion of plan of care, discharge diagnosis, and medication reconciliation was discussed with Dr. Branham on 5/5/2025 and discharge was approved.

## 2025-05-05 NOTE — DISCHARGE NOTE PROVIDER - PROVIDER TOKENS
PROVIDER:[TOKEN:[[id number]],FOLLOWUP:[1-3 days]],PROVIDER:[TOKEN:[[id number]],FOLLOWUP:[2 weeks]]

## 2025-05-05 NOTE — DISCHARGE NOTE PROVIDER - CARE PROVIDERS DIRECT ADDRESSES
[email or web address]

## 2025-05-05 NOTE — CHART NOTE - NSCHARTNOTEFT_GEN_A_CORE
Called by medicine team to clarify duration of antibiotics.    Patient is currently hemodynamically stable. Blood cultures have been negative since 4/30.     Plan to continue IV vancomycin via PICC for 6 weeks from 1st negative blood culture (until 6/10/25)    Case discussed with Dr. Mari Ritter
Pt's initial assessment completed 4/29. This note is to provide clarity on nutrition diagnoses. RD dx pt with Severe Protein Calorie Malnutrition in the context of chronic illness. Nutrition Risk Notification submitted. Pt being followed at high nutrition risk.       RD to remain available,    Yadira Renee RDN   [phone] or via Teams
PICC line is done without complications. conditonal dc order is noted. But RN and me not sure if antibiotics are set up. Unable to reach CM. Will hold dc for now.
Per CM pt is cleared for dc. Instructed RN to dc pt

## 2025-05-05 NOTE — DISCHARGE NOTE PROVIDER - NSDCCPCAREPLAN_GEN_ALL_CORE_FT
PRINCIPAL DISCHARGE DIAGNOSIS  Diagnosis: Parotitis  Assessment and Plan of Treatment: You came to the hospital for right sided facial swelling. Imaging was indicative of acute parotitis. You were found to have a bacteria called MRSA. You were then treated with IV antibiotics. You will need a prolonged course of antibiotics so we had the interventional radiology team place an IV. You will be discharged with IV antibiotics for 6 weeks end date 6/10/2025

## 2025-05-05 NOTE — CONSULT NOTE ADULT - SUBJECTIVE AND OBJECTIVE BOX
INTERVENTIONAL RADIOLOGY CONSULT:     Procedure Requested: PICC line for IV ABX    HPI:  89-year-old female past medical history of Parkinson's, dementia, hypertension, hyperlipidemia, A-fib on Eliquis, bacteremia secondary to MRSA positive parotitis left side of face in January 2025 presents for evaluation of right-sided facial swelling. As per NH paperwork, patient noticed to have right-sided facial swelling for the past couple days, no inciting relieving factors. Patient is A&Ox3, although weak and slow to respond. Reports pain at R neck/face at site of swelling. Denies pain elsewhere. Daughter Veronica at bedside also aware of same hx as stated.    Triage VS:  92/52, HR 80, T 97.9 F, SpO2 98% on 2L via nasal cannula.  Labs: WBC 19.25k (88% neutrophils), otherwise CBC stable. CMP with Na 148, Cr 1 (baseline 0.5 in Jan 2025), lactate 1.7.    CT neck soft tissue and temporal bones w/ IV: Marked swelling/enlarged and hyperenhancing of the right parotid gland compatible with acute parotitis. There are inflammatory changes of the adjacent soft tissues extending into the submandibular space as well as thickening of the platysma muscle. No abscess. Bilateral mastoid effusions. New cluster of nodules within the anterior segment of the upper lobe of the right lung measuring up to 1.1 cm.    ED interventions: Unasyn, vancomycin, 2L LR.  Seen by ENT who recommend IV abx, sialogogues and oral care. (27 Apr 2025 18:23)      PAST MEDICAL & SURGICAL HISTORY:  Parkinson disease      HTN (hypertension)      Rheumatoid arthritis      Hyperlipidemia      CRAO (central retinal artery occlusion)  Left      Atrial fibrillation    History of hysterectomy      Status post placement of implantable loop recorder  01/2021      S/P hernia repair        MEDICATIONS  (STANDING):  amantadine 100 milliGRAM(s) Oral two times a day  apixaban 2.5 milliGRAM(s) Oral every 12 hours  bisacodyl 5 milliGRAM(s) Oral daily  carbidopa/levodopa  25/100 1 Tablet(s) Oral <User Schedule>  chlorhexidine 0.12% Liquid 15 milliLiter(s) Oral Mucosa two times a day  chlorhexidine 2% Cloths 1 Application(s) Topical <User Schedule>  diltiazem    Tablet 90 milliGRAM(s) Oral every 6 hours  entacapone 400 milliGRAM(s) Oral four times a day  ferrous    sulfate 325 milliGRAM(s) Oral daily  gabapentin 100 milliGRAM(s) Oral every 12 hours  lidocaine   4% Patch 2 Patch Transdermal every 24 hours  oxybutynin 5 milliGRAM(s) Oral daily  pantoprazole    Tablet 40 milliGRAM(s) Oral before breakfast  senna 2 Tablet(s) Oral at bedtime  sucralfate 1 Gram(s) Oral <User Schedule>  vancomycin  IVPB 750 milliGRAM(s) IV Intermittent every 24 hours    MEDICATIONS  (PRN):  meclizine 25 milliGRAM(s) Oral three times a day PRN Dizziness      Allergies    No Known Allergies    Intolerances        FAMILY HISTORY:  FH: hypertension (Mother)      Physical Exam:   Vital Signs Last 24 Hrs  T(C): 36.4 (05 May 2025 08:15), Max: 36.9 (05 May 2025 00:00)  T(F): 97.6 (05 May 2025 08:15), Max: 98.4 (05 May 2025 00:00)  HR: 80 (05 May 2025 08:15) (75 - 87)  BP: 106/55 (05 May 2025 08:15) (94/56 - 116/66)  BP(mean): --  RR: 17 (05 May 2025 08:15) (17 - 18)  SpO2: 100% (05 May 2025 08:15) (97% - 100%)    Parameters below as of 05 May 2025 08:15  Patient On (Oxygen Delivery Method): room air          Radiology & Additional Studies:     Radiology imaging reviewed.       ASSESSMENT AND PLAN:  -Patient is scheduled for an add on PICC insertion today 5/5/25  -ID antibiotics specified. Spoke with primary team.       Please call Interventional Radiology with questions or concerns:   - M-F [phone]: [phone]   - All other hours: [phone]   3 (mild pain)

## 2025-05-05 NOTE — DISCHARGE NOTE NURSING/CASE MANAGEMENT/SOCIAL WORK - NSDCPEFALRISK_GEN_ALL_CORE
For information on Fall & Injury Prevention, visit: [email or web address] OR  [email or web address] OR  https://www.cdc.gov/steadi/patient.html

## 2025-05-05 NOTE — DISCHARGE NOTE NURSING/CASE MANAGEMENT/SOCIAL WORK - PATIENT PORTAL LINK FT
You can access the FollowMyHealth Patient Portal offered by [facility] by registering at the following website: [email or web address]. By joining [facility]’s FollowMyHealth portal, you will also be able to view your health information using other applications (apps) compatible with our system.

## 2025-05-05 NOTE — PROGRESS NOTE ADULT - ASSESSMENT
· Assessment    89-year-old female past medical history of Parkinson's, dementia, hypertension, hyperlipidemia, A-fib on Eliquis, bacteremia secondary to MRSA positive parotitis left side of face in January 2025 presents for evaluation of right-sided facial swelling found to have right sided parotitis.    #R sided parotitis   -found to have MRSA bacteremia  -currently receiving vancomycin  -Plan is to get PICC line for extended course of abx will need ID eval   -IR states that they will need need ID note to state specifics on abx coverage and duration to proceed with PICC    #New incidental lung nodules  -Repeat CT scan once infection is resolved to monitor  -Can be done outpatient    #Paroxysmal atrial fibrillation  -cw apixaban     #HTN  -wellcontrolled    #Dementia  #Parkinson  #Dyskinesia   -As per neuro sinemet was decreased , and amantadine started   -cw Parkinson home meds.  -cw zaleplon PRN, Xanax PRN.    #Constipation  -controlled , on laxatives    #GERD   -pantoprazole, sucralfate.    #Bladder spasms   -oxybutynin 5mg QD.    General:  - DVT proph- eliquis 2.5  - GI proph- protonix PO daily  - Diet- pureed  - Activity- ambulate with assistance   - Code Status- DNR/DNI w/ trial NIV

## 2025-05-05 NOTE — DISCHARGE NOTE PROVIDER - CARE PROVIDER_API CALL
Lauri Ritter.  Infectious Disease  [address] [place] [address]  Phone: [phone]  Fax: [phone]  Follow Up Time: 1-3 days    Deshawn Tierney  [address] [address]  Phone: [phone]  Fax: [phone]  Follow Up Time: 2 weeks

## 2025-05-05 NOTE — PROGRESS NOTE ADULT - ASSESSMENT
89-year-old female past medical history of Parkinson's, dementia, hypertension, hyperlipidemia, A-fib on Eliquis, bacteremia secondary to MRSA positive parotitis left side of face in January 2025 presents for evaluation of right-sided facial swelling.    #R sided parotitis Hx of L parotitis  found to have  MRSA bacteremia  vancomycin   latest 2 blood culture negative     #Drug monitoring of high risk medication , potential for drug drug reaction , requiring close monitoring Vancomycin Level, Random: 20.1: adjustment per pharmacy protocol , and monitor kidney function    #New incidental lung nodules  Repeat CT scan once infection is resolved to monitor.    #Paroxysmal atrial fibrillation  on apixaban     #HTN  BP: 106/55 (05 May 2025 08:15) (94/56 - 116/66)  controlled     #Dementia    #Parkinson   Parkinson home meds.  zaleplon PRN, Xanax PRN.    #Constipation  controlled , on laxatives    #GERD   pantoprazole, sucralfate.    #Bladder spasms   oxybutynin 5mg QD.    #Dyskinesia resolved on  sinemet  and amantadine      PROGRESS NOTE HANDOFF    Pending: picc line placement  ,     Family discussion: patient verbalized understanding and agreeable to plan of care     Disposition: Home

## 2025-05-05 NOTE — DISCHARGE NOTE NURSING/CASE MANAGEMENT/SOCIAL WORK - FINANCIAL ASSISTANCE
[facility] provides services at a reduced cost to those who are determined to be eligible through [facility]’s financial assistance program. Information regarding [facility]’s financial assistance program can be found by going to [email or web address] or by calling [phone].

## 2025-05-05 NOTE — DISCHARGE NOTE PROVIDER - NSDCMRMEDTOKEN_GEN_ALL_CORE_FT
acetaminophen 325 mg oral tablet: 2 tab(s) orally every 6 hours as needed for  mild pain  ALPRAZolam 0.25 mg oral tablet: 1 tab(s) orally once a day (at bedtime)  amantadine 100 mg oral capsule: 1 cap(s) orally 2 times a day  Aspercreme with Lidocaine 4% topical film: Apply topically to affected area once a day L and R knee daily, 12 hours on 12 hours off  bisacodyl 5 mg oral tablet: 1 tab(s) orally once a day  carbidopa-levodopa 25 mg-100 mg oral tablet: 1 tab(s) orally 5 times a day  Cough Formula-DM 10 mg-100 mg/5 mL oral liquid: 5 milliliter(s) orally every 6 hours as needed for  cough  dilTIAZem 360 mg/24 hours oral capsule, extended release: 1 cap(s) orally once a day  Eliquis 5 mg oral tablet: 1 tab(s) orally 2 times a day   entacapone 200 mg oral tablet: 1 tab(s) orally 4 times a day  ferrous sulfate 325 mg (65 mg elemental iron) oral tablet: 1 tab(s) orally once a day  gabapentin 100 mg oral capsule: 1 cap(s) orally 2 times a day  lactulose 20 g oral powder for reconstitution: 1 packet(s) orally once a day as needed for  constipation  meclizine 25 mg oral tablet: 1 tab(s) orally 3 times a day, As needed, Dizziness  menthol: as needed to neck area for pain  ondansetron 2 mg/mL injectable solution: 4 milligram(s) intravenously once a day as needed for  nausea  oxyBUTYnin 5 mg oral tablet: 1 tab(s) orally once a day  preiguard: apply topically  Protonix 40 mg oral delayed release tablet: 1 tab(s) orally once a day  senna (sennosides) 17 mg oral tablet: 2 tab(s) orally once a day (at bedtime)  senna (sennosides) 8.6 mg oral tablet: 2 tab(s) orally once a day as needed for  constipation  sucralfate 1 g oral tablet: 1 tab(s) orally 3 times a day  vancomycin 750 mg intravenous injection: 750 milligram(s) intravenous every 24 hours  zaleplon 10 mg oral capsule: 1 cap(s) orally once a day (at bedtime) as needed for  insomnia

## 2025-05-05 NOTE — DISCHARGE NOTE NURSING/CASE MANAGEMENT/SOCIAL WORK - NSDCVIVACCINE_GEN_ALL_CORE_FT
influenza, injectable, quadrivalent, preservative free; 01-Jan-2021 15:19; Shanta Correa (RN); VeedMe; 33BN3 (Exp. Date: 30-Jun-2021); IntraMuscular; Deltoid Right.; 0.5 milliLiter(s); VIS (VIS Published: 15-Aug-2019, VIS Presented: 01-Jan-2021);

## 2025-05-06 VITALS
TEMPERATURE: 98 F | OXYGEN SATURATION: 98 % | DIASTOLIC BLOOD PRESSURE: 56 MMHG | RESPIRATION RATE: 18 BRPM | SYSTOLIC BLOOD PRESSURE: 104 MMHG | HEART RATE: 80 BPM

## 2025-05-06 LAB
CULTURE RESULTS: SIGNIFICANT CHANGE UP
SPECIMEN SOURCE: SIGNIFICANT CHANGE UP

## 2025-05-06 PROCEDURE — 99232 SBSQ HOSP IP/OBS MODERATE 35: CPT

## 2025-05-06 RX ORDER — ACETAMINOPHEN 500 MG/5ML
650 LIQUID (ML) ORAL EVERY 6 HOURS
Refills: 0 | Status: DISCONTINUED | OUTPATIENT
Start: 2025-05-06 | End: 2025-05-06

## 2025-05-06 RX ADMIN — GABAPENTIN 100 MILLIGRAM(S): 400 CAPSULE ORAL at 05:04

## 2025-05-06 RX ADMIN — Medication 325 MILLIGRAM(S): at 11:20

## 2025-05-06 RX ADMIN — Medication 1 TABLET(S): at 12:23

## 2025-05-06 RX ADMIN — ENTACAPONE 400 MILLIGRAM(S): 200 TABLET, FILM COATED ORAL at 11:23

## 2025-05-06 RX ADMIN — ENTACAPONE 400 MILLIGRAM(S): 200 TABLET, FILM COATED ORAL at 05:02

## 2025-05-06 RX ADMIN — Medication 1 GRAM(S): at 05:11

## 2025-05-06 RX ADMIN — Medication 15 MILLILITER(S): at 05:03

## 2025-05-06 RX ADMIN — Medication 1 APPLICATION(S): at 05:02

## 2025-05-06 RX ADMIN — DILTIAZEM HYDROCHLORIDE 90 MILLIGRAM(S): 120 CAPSULE, EXTENDED RELEASE ORAL at 00:26

## 2025-05-06 RX ADMIN — OXYBUTYNIN CHLORIDE 5 MILLIGRAM(S): 5 TABLET, FILM COATED, EXTENDED RELEASE ORAL at 11:20

## 2025-05-06 RX ADMIN — DILTIAZEM HYDROCHLORIDE 90 MILLIGRAM(S): 120 CAPSULE, EXTENDED RELEASE ORAL at 11:23

## 2025-05-06 RX ADMIN — APIXABAN 2.5 MILLIGRAM(S): 2.5 TABLET, FILM COATED ORAL at 05:01

## 2025-05-06 RX ADMIN — DILTIAZEM HYDROCHLORIDE 90 MILLIGRAM(S): 120 CAPSULE, EXTENDED RELEASE ORAL at 05:01

## 2025-05-06 RX ADMIN — Medication 5 MILLIGRAM(S): at 11:20

## 2025-05-06 RX ADMIN — ENTACAPONE 400 MILLIGRAM(S): 200 TABLET, FILM COATED ORAL at 00:27

## 2025-05-06 RX ADMIN — Medication 100 MILLIGRAM(S): at 05:01

## 2025-05-06 RX ADMIN — Medication 40 MILLIGRAM(S): at 05:02

## 2025-05-06 RX ADMIN — Medication 1 GRAM(S): at 12:23

## 2025-05-06 RX ADMIN — Medication 650 MILLIGRAM(S): at 03:09

## 2025-05-06 RX ADMIN — Medication 1 TABLET(S): at 05:02

## 2025-05-06 NOTE — PROGRESS NOTE ADULT - SUBJECTIVE AND OBJECTIVE BOX
JON GARCÍA  89y  [facility] 4B 019 A      Patient is a 89y old  Female who presents with a chief complaint of right parotitis (29 Apr 2025 21:44)      My note supersedes the resident's note      INTERVAL HPI/OVERNIGHT EVENTS:        REVIEW OF SYSTEMS:  CONSTITUTIONAL: No fever, weight loss, or fatigue  EYES: No eye pain, visual disturbances, or discharge  ENMT:  No difficulty hearing, tinnitus, vertigo; No sinus or throat pain  NECK: No pain or stiffness  BREASTS: No pain, masses, or nipple discharge  RESPIRATORY: No cough, wheezing, chills or hemoptysis; No shortness of breath  CARDIOVASCULAR: No chest pain, palpitations, dizziness, or leg swelling  GASTROINTESTINAL: No abdominal or epigastric pain. No nausea, vomiting, or hematemesis; No diarrhea or constipation. No melena or hematochezia.  GENITOURINARY: No dysuria, frequency, hematuria, or incontinence  NEUROLOGICAL: No headaches, memory loss, loss of strength, numbness, or tremors  SKIN: No itching, burning, rashes, or lesions   LYMPH NODES: No enlarged glands  ENDOCRINE: No heat or cold intolerance; No hair loss  MUSCULOSKELETAL: No joint pain or swelling; No muscle, back, or extremity pain  PSYCHIATRIC: No depression, anxiety, mood swings, or difficulty sleeping  HEME/LYMPH: No easy bruising, or bleeding gums  ALLERY AND IMMUNOLOGIC: No hives or eczema  FAMILY HISTORY:  FH: hypertension (Mother)      T(C): 36.4 (05-05-25 @ 08:15), Max: 36.9 (05-05-25 @ 00:00)  HR: 80 (05-05-25 @ 08:15) (69 - 87)  BP: 106/55 (05-05-25 @ 08:15) (94/56 - 116/66)  RR: 17 (05-05-25 @ 08:15) (17 - 18)  SpO2: 100% (05-05-25 @ 08:15) (97% - 100%)  Wt(kg): --Vital Signs Last 24 Hrs  T(C): 36.4 (05 May 2025 08:15), Max: 36.9 (05 May 2025 00:00)  T(F): 97.6 (05 May 2025 08:15), Max: 98.4 (05 May 2025 00:00)  HR: 80 (05 May 2025 08:15) (69 - 87)  BP: 106/55 (05 May 2025 08:15) (94/56 - 116/66)  BP(mean): --  RR: 17 (05 May 2025 08:15) (17 - 18)  SpO2: 100% (05 May 2025 08:15) (97% - 100%)    Parameters below as of 05 May 2025 08:15  Patient On (Oxygen Delivery Method): room air        PHYSICAL EXAM:  GENERAL: NAD,   HEAD:  Atraumatic, Normocephalic  EYES: EOMI, PERRLA, conjunctiva and sclera clear  ENMT: No tonsillar erythema, exudates, or enlargement; Moist mucous membranes, Good dentition, No lesions  NECK: Supple, No JVD, Normal thyroid  PULM: Clear to auscultation bilaterally  CARDIAC: Regular rate and rhythm; No murmurs, rubs, or gallops  GI: Soft, Nontender, Nondistended; Bowel sounds present  EXTREMITIES:  2+ Peripheral Pulses, No clubbing, cyanosis, or edema  LYMPH: No lymphadenopathy noted  SKIN: No rashes or lesions    Consultant(s) Notes Reviewed:  [x ] YES  [ ] NO  Care Discussed with Consultants/Other Providers [ x] YES  [ ] NO    LABS:                    Culture - Blood (collected 03 May 2025 08:12)  Source: Blood None  Preliminary Report (04 May 2025 17:01):    No growth at 24 hours      amantadine 100 milliGRAM(s) Oral two times a day  apixaban 2.5 milliGRAM(s) Oral every 12 hours  bisacodyl 5 milliGRAM(s) Oral daily  carbidopa/levodopa  25/100 1 Tablet(s) Oral <User Schedule>  chlorhexidine 0.12% Liquid 15 milliLiter(s) Oral Mucosa two times a day  chlorhexidine 2% Cloths 1 Application(s) Topical <User Schedule>  diltiazem    Tablet 90 milliGRAM(s) Oral every 6 hours  entacapone 400 milliGRAM(s) Oral four times a day  ferrous    sulfate 325 milliGRAM(s) Oral daily  gabapentin 100 milliGRAM(s) Oral every 12 hours  lidocaine   4% Patch 2 Patch Transdermal every 24 hours  meclizine 25 milliGRAM(s) Oral three times a day PRN  oxybutynin 5 milliGRAM(s) Oral daily  pantoprazole    Tablet 40 milliGRAM(s) Oral before breakfast  senna 2 Tablet(s) Oral at bedtime  sucralfate 1 Gram(s) Oral <User Schedule>  vancomycin  IVPB 750 milliGRAM(s) IV Intermittent every 24 hours      HEALTH ISSUES - PROBLEM Dx:  Parotiditis    MRSA bacteremia    Dementia    Advance care planning    Palliative care by specialist            Case Discussed with House Staff   50 minutes spent on total time on encounter , this excludes teaching , including discussing with cm , ir   Spectra [phone]  
ENT: Pt is a 89y/o F a/w MRSA parotitis. Pt seen and examined at bedside. Unable to obtain hx 2/2 AMS.    REVIEW OF SYSTEMS   [ x ] Due to altered mental status/intubation, subjective information were not able to be obtained from patient. History was obtained, to the extent possible, from review of the chart and collateral sources of information.    Allergies  No Known Allergies    Vital Signs Last 24 Hrs  T(C): 36.6 (29 Apr 2025 07:42), Max: 36.6 (28 Apr 2025 16:21)  T(F): 97.8 (29 Apr 2025 07:42), Max: 97.8 (28 Apr 2025 16:21)  HR: 78 (29 Apr 2025 11:19) (78 - 86)  BP: 100/61 (29 Apr 2025 11:19) (100/61 - 107/63)  RR: 18 (29 Apr 2025 07:42) (18 - 20)  SpO2: 97% (29 Apr 2025 07:42) (97% - 100%)    PHYSICAL EXAM:  GEN: NAD  SKIN: Good color, non diaphoretic  HEENT: right sided parotid area swelling and tenderness. +submandibular swelling and tenderness as well. Poor dentition.  NECK: Trachea midline  RESP: Non-labored breathing. No use of accessory muscles.  CARDIO: +S1/S2  ABDO: Soft, NT.  EXT: VILLAR x 4    LABS:             7.8    8.61  )-----------( 238      ( 29 Apr 2025 07:33 )             25.2     141    |  106    |  19     ----------------------------<  94     3.2     |  27     |  0.8 
SUBJECTIVE/OVERNIGHT EVENTS  Today is hospital day 7d.    MEDICATIONS  STANDING MEDICATIONS  amantadine 100 milliGRAM(s) Oral two times a day  apixaban 2.5 milliGRAM(s) Oral every 12 hours  bisacodyl 5 milliGRAM(s) Oral daily  carbidopa/levodopa  25/100 1 Tablet(s) Oral <User Schedule>  chlorhexidine 0.12% Liquid 15 milliLiter(s) Oral Mucosa two times a day  chlorhexidine 2% Cloths 1 Application(s) Topical <User Schedule>  diltiazem    Tablet 90 milliGRAM(s) Oral every 6 hours  entacapone 400 milliGRAM(s) Oral four times a day  ferrous    sulfate 325 milliGRAM(s) Oral daily  gabapentin 100 milliGRAM(s) Oral every 12 hours  lidocaine   4% Patch 2 Patch Transdermal every 24 hours  mupirocin 2% Nasal 1 Application(s) Both Nostrils two times a day  oxybutynin 5 milliGRAM(s) Oral daily  pantoprazole    Tablet 40 milliGRAM(s) Oral before breakfast  senna 2 Tablet(s) Oral at bedtime  sucralfate 1 Gram(s) Oral <User Schedule>  vancomycin  IVPB 1000 milliGRAM(s) IV Intermittent every 24 hours    PRN MEDICATIONS  ALPRAZolam 0.25 milliGRAM(s) Oral at bedtime PRN  meclizine 25 milliGRAM(s) Oral three times a day PRN  zolpidem 5 milliGRAM(s) Oral at bedtime PRN    VITALS  T(F): 98 (05-03-25 @ 23:57), Max: 98.6 (05-03-25 @ 07:52)  HR: 86 (05-03-25 @ 23:57) (76 - 86)  BP: 103/61 (05-03-25 @ 23:57) (97/58 - 106/53)  RR: 18 (05-03-25 @ 23:57) (17 - 18)  SpO2: 99% (05-03-25 @ 23:57) (99% - 99%)    PHYSICAL EXAM  Resting comfortably in bed    LABS    135  |  105  |  10  -------------------------<  81   05-03-25 @ 08:12  3.5  |  22  |  0.7    Ca      7.9     05-03-25 @ 08:12          Urinalysis Basic - ( 03 May 2025 08:12 )    Color: x / Appearance: x / SG: x / pH: x  Gluc: 81 mg/dL / Ketone: x  / Bili: x / Urobili: x   Blood: x / Protein: x / Nitrite: x   Leuk Esterase: x / RBC: x / WBC x   Sq Epi: x / Non Sq Epi: x / Bacteria: x          Culture - Blood (collected 02 May 2025 08:30)  Source: Blood None  Preliminary Report (03 May 2025 14:01):    No growth at 24 hours
  JON GARCÍA  89y  [facility] 4B 019 A      Patient is a 89y old  Female who presents with a chief complaint of right parotitis (29 Apr 2025 21:44)      My note supersedes the resident's note      INTERVAL HPI/OVERNIGHT EVENTS:  no acute events overnight       REVIEW OF SYSTEMS:  CONSTITUTIONAL: No fever, weight loss, or fatigue  EYES: No eye pain, visual disturbances, or discharge  ENMT:  No difficulty hearing, tinnitus, vertigo; No sinus or throat pain  NECK: No pain or stiffness  BREASTS: No pain, masses, or nipple discharge  RESPIRATORY: No cough, wheezing, chills or hemoptysis; No shortness of breath  CARDIOVASCULAR: No chest pain, palpitations, dizziness, or leg swelling  GASTROINTESTINAL: No abdominal or epigastric pain. No nausea, vomiting, or hematemesis; No diarrhea or constipation. No melena or hematochezia.  GENITOURINARY: No dysuria, frequency, hematuria, or incontinence  NEUROLOGICAL: No headaches, memory loss, loss of strength, numbness, or tremors  SKIN: No itching, burning, rashes, or lesions   LYMPH NODES: No enlarged glands  ENDOCRINE: No heat or cold intolerance; No hair loss  MUSCULOSKELETAL: No joint pain or swelling; No muscle, back, or extremity pain  PSYCHIATRIC: No depression, anxiety, mood swings, or difficulty sleeping  HEME/LYMPH: No easy bruising, or bleeding gums  ALLERY AND IMMUNOLOGIC: No hives or eczema  FAMILY HISTORY:  FH: hypertension (Mother)      T(C): 36.4 (05-02-25 @ 08:00), Max: 37.2 (05-01-25 @ 15:13)  HR: 70 (05-02-25 @ 08:00) (70 - 81)  BP: 92/51 (05-02-25 @ 08:00) (92/51 - 141/49)  RR: 18 (05-02-25 @ 08:00) (16 - 18)  SpO2: 96% (05-02-25 @ 08:00) (94% - 98%)  Wt(kg): --Vital Signs Last 24 Hrs  T(C): 36.4 (02 May 2025 08:00), Max: 37.2 (01 May 2025 15:13)  T(F): 97.6 (02 May 2025 08:00), Max: 99 (01 May 2025 15:13)  HR: 70 (02 May 2025 08:00) (70 - 81)  BP: 92/51 (02 May 2025 08:00) (92/51 - 141/49)  BP(mean): 73 (02 May 2025 05:11) (67 - 73)  RR: 18 (02 May 2025 08:00) (16 - 18)  SpO2: 96% (02 May 2025 08:00) (94% - 98%)    Parameters below as of 02 May 2025 08:00  Patient On (Oxygen Delivery Method): room air        PHYSICAL EXAM:  GENERAL: NAD,   HEAD:  Atraumatic, Normocephalic  EYES: EOMI, PERRLA, conjunctiva and sclera clear  ENMT: No tonsillar erythema, exudates, or enlargement; Moist mucous membranes, Good dentition, No lesions  NECK: Supple, No JVD, Normal thyroid  PULM: Clear to auscultation bilaterally  CARDIAC: Regular rate and rhythm; No murmurs, rubs, or gallops  GI: Soft, Nontender, Nondistended; Bowel sounds present  EXTREMITIES:  2+ Peripheral Pulses, No clubbing, cyanosis, or edema  LYMPH: No lymphadenopathy noted  SKIN: No rashes or lesions    Consultant(s) Notes Reviewed:  [x ] YES  [ ] NO  Care Discussed with Consultants/Other Providers [ x] YES  [ ] NO    LABS:                            8.3    5.33  )-----------( 216      ( 30 Apr 2025 12:09 )             26.6   05-02    142  |  111[H]  |  11  ----------------------------<  88  4.0   |  24  |  0.8    Ca    7.9[L]      02 May 2025 08:30              Culture - Blood (collected 30 Apr 2025 19:19)  Source: Blood Blood  Preliminary Report (02 May 2025 02:02):    No growth at 24 hours      ALPRAZolam 0.25 milliGRAM(s) Oral at bedtime PRN  amantadine 100 milliGRAM(s) Oral two times a day  apixaban 2.5 milliGRAM(s) Oral every 12 hours  bisacodyl 5 milliGRAM(s) Oral daily  carbidopa/levodopa  25/100 1 Tablet(s) Oral <User Schedule>  chlorhexidine 0.12% Liquid 15 milliLiter(s) Oral Mucosa two times a day  chlorhexidine 2% Cloths 1 Application(s) Topical <User Schedule>  diltiazem    Tablet 90 milliGRAM(s) Oral every 6 hours  entacapone 400 milliGRAM(s) Oral four times a day  ferrous    sulfate 325 milliGRAM(s) Oral daily  gabapentin 100 milliGRAM(s) Oral every 12 hours  lidocaine   4% Patch 2 Patch Transdermal every 24 hours  meclizine 25 milliGRAM(s) Oral three times a day PRN  mupirocin 2% Nasal 1 Application(s) Both Nostrils two times a day  oxybutynin 5 milliGRAM(s) Oral daily  pantoprazole    Tablet 40 milliGRAM(s) Oral before breakfast  senna 2 Tablet(s) Oral at bedtime  sucralfate 1 Gram(s) Oral <User Schedule>  vancomycin  IVPB 1000 milliGRAM(s) IV Intermittent every 24 hours  zolpidem 5 milliGRAM(s) Oral at bedtime PRN      HEALTH ISSUES - PROBLEM Dx:  Parotiditis    MRSA bacteremia    Dementia    Advance care planning    Palliative care by specialist            Case Discussed with House Staff   50 minutes spent on total time on encounter , this excludes teaching   Spectra [phone]  
  JON GARCÍA  89y  [facility] 4B 019 A      Patient is a 89y old  Female who presents with a chief complaint of right parotitis (29 Apr 2025 21:44)      My note supersedes the resident's note      INTERVAL HPI/OVERNIGHT EVENTS:  no events overnight       REVIEW OF SYSTEMS:  CONSTITUTIONAL: No fever, weight loss, or fatigue  EYES: No eye pain, visual disturbances, or discharge  ENMT:  No difficulty hearing, tinnitus, vertigo; No sinus or throat pain  NECK: No pain or stiffness  BREASTS: No pain, masses, or nipple discharge  RESPIRATORY: No cough, wheezing, chills or hemoptysis; No shortness of breath  CARDIOVASCULAR: No chest pain, palpitations, dizziness, or leg swelling  GASTROINTESTINAL: No abdominal or epigastric pain. No nausea, vomiting, or hematemesis; No diarrhea or constipation. No melena or hematochezia.  GENITOURINARY: No dysuria, frequency, hematuria, or incontinence  NEUROLOGICAL: No headaches, memory loss, loss of strength, numbness, or tremors  SKIN: No itching, burning, rashes, or lesions   LYMPH NODES: No enlarged glands  ENDOCRINE: No heat or cold intolerance; No hair loss  MUSCULOSKELETAL: No joint pain or swelling; No muscle, back, or extremity pain  PSYCHIATRIC: No depression, anxiety, mood swings, or difficulty sleeping  HEME/LYMPH: No easy bruising, or bleeding gums  ALLERY AND IMMUNOLOGIC: No hives or eczema  FAMILY HISTORY:  FH: hypertension (Mother)      T(C): 37.2 (05-06-25 @ 00:00), Max: 37.2 (05-06-25 @ 00:00)  HR: 83 (05-06-25 @ 00:00) (80 - 83)  BP: 95/54 (05-06-25 @ 00:00) (95/54 - 106/55)  RR: 18 (05-06-25 @ 00:00) (17 - 18)  SpO2: 91% (05-06-25 @ 00:00) (91% - 100%)  Wt(kg): --Vital Signs Last 24 Hrs  T(C): 37.2 (06 May 2025 00:00), Max: 37.2 (06 May 2025 00:00)  T(F): 98.9 (06 May 2025 00:00), Max: 98.9 (06 May 2025 00:00)  HR: 83 (06 May 2025 00:00) (80 - 83)  BP: 95/54 (06 May 2025 00:00) (95/54 - 106/55)  BP(mean): --  RR: 18 (06 May 2025 00:00) (17 - 18)  SpO2: 91% (06 May 2025 00:00) (91% - 100%)    Parameters below as of 06 May 2025 00:00  Patient On (Oxygen Delivery Method): room air        PHYSICAL EXAM:  GENERAL: NAD,   HEAD:  Atraumatic, Normocephalic  EYES: EOMI, PERRLA, conjunctiva and sclera clear  ENMT: No tonsillar erythema, exudates, or enlargement; Moist mucous membranes, Good dentition, No lesions  NECK: Supple, No JVD, Normal thyroid  NERVOUS SYSTEM:  Alert    PULM: Clear to auscultation bilaterally  CARDIAC: Regular rate and rhythm; No murmurs, rubs, or gallops  GI: Soft, Nontender, Nondistended; Bowel sounds present  EXTREMITIES:  2+ Peripheral Pulses, No clubbing, cyanosis, or edema  LYMPH: No lymphadenopathy noted  SKIN: No rashes or lesions    Consultant(s) Notes Reviewed:  [x ] YES  [ ] NO  Care Discussed with Consultants/Other Providers [ x] YES  [ ] NO    LABS:                    Culture - Blood (collected 03 May 2025 08:12)  Source: Blood None  Preliminary Report (05 May 2025 17:01):    No growth at 48 Hours      acetaminophen     Tablet .. 650 milliGRAM(s) Oral every 6 hours PRN  amantadine 100 milliGRAM(s) Oral two times a day  apixaban 2.5 milliGRAM(s) Oral every 12 hours  bisacodyl 5 milliGRAM(s) Oral daily  carbidopa/levodopa  25/100 1 Tablet(s) Oral <User Schedule>  chlorhexidine 0.12% Liquid 15 milliLiter(s) Oral Mucosa two times a day  chlorhexidine 2% Cloths 1 Application(s) Topical <User Schedule>  diltiazem    Tablet 90 milliGRAM(s) Oral every 6 hours  entacapone 400 milliGRAM(s) Oral four times a day  ferrous    sulfate 325 milliGRAM(s) Oral daily  gabapentin 100 milliGRAM(s) Oral every 12 hours  lidocaine   4% Patch 2 Patch Transdermal every 24 hours  meclizine 25 milliGRAM(s) Oral three times a day PRN  oxybutynin 5 milliGRAM(s) Oral daily  pantoprazole    Tablet 40 milliGRAM(s) Oral before breakfast  senna 2 Tablet(s) Oral at bedtime  sucralfate 1 Gram(s) Oral <User Schedule>  vancomycin  IVPB 750 milliGRAM(s) IV Intermittent every 24 hours      HEALTH ISSUES - PROBLEM Dx:          Case Discussed with House Staff   Spectra [phone]  
  JON GARCÍA  89y  [facility] 4B 019 A      Patient is a 89y old  Female who presents with a chief complaint of right parotitis (29 Apr 2025 21:44)      My note supersedes the resident's note      INTERVAL HPI/OVERNIGHT EVENTS:    no acute events overnight     REVIEW OF SYSTEMS:  CONSTITUTIONAL: No fever, weight loss, or fatigue  EYES: No eye pain, visual disturbances, or discharge  ENMT:  No difficulty hearing, tinnitus, vertigo; No sinus or throat pain  NECK: No pain or stiffness  BREASTS: No pain, masses, or nipple discharge  RESPIRATORY: No cough, wheezing, chills or hemoptysis; No shortness of breath  CARDIOVASCULAR: No chest pain, palpitations, dizziness, or leg swelling  GASTROINTESTINAL: No abdominal or epigastric pain. No nausea, vomiting, or hematemesis; No diarrhea or constipation. No melena or hematochezia.  GENITOURINARY: No dysuria, frequency, hematuria, or incontinence  NEUROLOGICAL: No headaches, memory loss, loss of strength, numbness, or tremors  SKIN: No itching, burning, rashes, or lesions   LYMPH NODES: No enlarged glands  ENDOCRINE: No heat or cold intolerance; No hair loss  MUSCULOSKELETAL: No joint pain or swelling; No muscle, back, or extremity pain  PSYCHIATRIC: No depression, anxiety, mood swings, or difficulty sleeping  HEME/LYMPH: No easy bruising, or bleeding gums  ALLERY AND IMMUNOLOGIC: No hives or eczema  FAMILY HISTORY:  FH: hypertension (Mother)      T(C): 36.5 (05-04-25 @ 08:00), Max: 37.1 (05-04-25 @ 05:30)  HR: 81 (05-04-25 @ 08:00) (75 - 86)  BP: 97/58 (05-04-25 @ 08:00) (97/58 - 103/61)  RR: 18 (05-04-25 @ 08:00) (18 - 18)  SpO2: 98% (05-04-25 @ 08:00) (98% - 99%)  Wt(kg): --Vital Signs Last 24 Hrs  T(C): 36.5 (04 May 2025 08:00), Max: 37.1 (04 May 2025 05:30)  T(F): 97.7 (04 May 2025 08:00), Max: 98.8 (04 May 2025 05:30)  HR: 81 (04 May 2025 08:00) (75 - 86)  BP: 97/58 (04 May 2025 08:00) (97/58 - 103/61)  BP(mean): --  RR: 18 (04 May 2025 08:00) (18 - 18)  SpO2: 98% (04 May 2025 08:00) (98% - 99%)        PHYSICAL EXAM:  GENERAL: NAD,   HEAD:  Atraumatic, Normocephalic  EYES: EOMI, PERRLA, conjunctiva and sclera clear  ENMT: No tonsillar erythema, exudates, or enlargement; Moist mucous membranes, Good dentition, No lesions  NECK: Supple, No JVD, Normal thyroid  NERVOUS SYSTEM:  Alert   PULM: Clear to auscultation bilaterally  CARDIAC: Regular rate and rhythm; No murmurs, rubs, or gallops  GI: Soft, Nontender, Nondistended; Bowel sounds present  EXTREMITIES:  2+ Peripheral Pulses, No clubbing, cyanosis, or edema  LYMPH: No lymphadenopathy noted  SKIN: No rashes or lesions    Consultant(s) Notes Reviewed:  [x ] YES  [ ] NO  Care Discussed with Consultants/Other Providers [ x] YES  [ ] NO    LABS:      05-03    135  |  105  |  10  ----------------------------<  81  3.5   |  22  |  0.7    Ca    7.9[L]      03 May 2025 08:12              Culture - Blood (collected 02 May 2025 08:30)  Source: Blood None  Preliminary Report (03 May 2025 14:01):    No growth at 24 hours      ALPRAZolam 0.25 milliGRAM(s) Oral at bedtime PRN  amantadine 100 milliGRAM(s) Oral two times a day  apixaban 2.5 milliGRAM(s) Oral every 12 hours  bisacodyl 5 milliGRAM(s) Oral daily  carbidopa/levodopa  25/100 1 Tablet(s) Oral <User Schedule>  chlorhexidine 0.12% Liquid 15 milliLiter(s) Oral Mucosa two times a day  chlorhexidine 2% Cloths 1 Application(s) Topical <User Schedule>  diltiazem    Tablet 90 milliGRAM(s) Oral every 6 hours  entacapone 400 milliGRAM(s) Oral four times a day  ferrous    sulfate 325 milliGRAM(s) Oral daily  gabapentin 100 milliGRAM(s) Oral every 12 hours  lidocaine   4% Patch 2 Patch Transdermal every 24 hours  meclizine 25 milliGRAM(s) Oral three times a day PRN  oxybutynin 5 milliGRAM(s) Oral daily  pantoprazole    Tablet 40 milliGRAM(s) Oral before breakfast  senna 2 Tablet(s) Oral at bedtime  sucralfate 1 Gram(s) Oral <User Schedule>  vancomycin  IVPB 1000 milliGRAM(s) IV Intermittent every 24 hours  zolpidem 5 milliGRAM(s) Oral at bedtime PRN      HEALTH ISSUES - PROBLEM Dx:  Parotiditis    MRSA bacteremia    Dementia    Advance care planning    Palliative care by specialist            Case Discussed with House Staff   50 minutes spent on total time on encounter , this excludes teaching , speaking with CM   Spectra [phone]  
  JON GARCÍA  89y  [facility] 4B 019 A      Patient is a 89y old  Female who presents with a chief complaint of right parotitis (29 Apr 2025 21:44)      My note supersedes the resident's note      INTERVAL HPI/OVERNIGHT EVENTS:    no acute events overnight     REVIEW OF SYSTEMS:  ROS neg   FAMILY HISTORY:  FH: hypertension (Mother)      T(C): 36.4 (05-01-25 @ 05:11), Max: 37 (04-30-25 @ 15:09)  HR: 76 (05-01-25 @ 05:11) (70 - 92)  BP: 124/60 (05-01-25 @ 05:11) (97/51 - 124/62)  RR: 18 (05-01-25 @ 05:11) (18 - 19)  SpO2: 96% (05-01-25 @ 05:11) (92% - 96%)  Wt(kg): --Vital Signs Last 24 Hrs  T(C): 36.4 (01 May 2025 05:11), Max: 37 (30 Apr 2025 15:09)  T(F): 97.6 (01 May 2025 05:11), Max: 98.6 (30 Apr 2025 15:09)  HR: 76 (01 May 2025 05:11) (70 - 92)  BP: 124/60 (01 May 2025 05:11) (97/51 - 124/62)  BP(mean): 76 (01 May 2025 00:01) (76 - 76)  RR: 18 (01 May 2025 05:11) (18 - 19)  SpO2: 96% (01 May 2025 05:11) (92% - 96%)    Parameters below as of 01 May 2025 05:11  Patient On (Oxygen Delivery Method): room air        PHYSICAL EXAM:  GENERAL: NAD,   HEAD:  Atraumatic, Normocephalic  EYES: EOMI, PERRLA, conjunctiva and sclera clear  ENMT: No tonsillar erythema, exudates, or enlargement; Moist mucous membranes, Good dentition, No lesions  NECK: Supple, No JVD, Normal thyroid  NERVOUS SYSTEM:  Alert  , dyskinesia of upper extremities   PULM: Clear to auscultation bilaterally  CARDIAC: Regular rate and rhythm; No murmurs, rubs, or gallops  GI: Soft, Nontender, Nondistended; Bowel sounds present  EXTREMITIES:  2+ Peripheral Pulses, No clubbing, cyanosis, or edema  LYMPH: No lymphadenopathy noted  SKIN: No rashes or lesions    Consultant(s) Notes Reviewed:  [x ] YES  [ ] NO  Care Discussed with Consultants/Other Providers [ x] YES  [ ] NO    LABS:                            8.3    5.33  )-----------( 216      ( 30 Apr 2025 12:09 )             26.6   04-30    140  |  109  |  13  ----------------------------<  87  4.0   |  22  |  0.7    Ca    7.8[L]      30 Apr 2025 12:09              ALPRAZolam 0.25 milliGRAM(s) Oral at bedtime PRN  apixaban 2.5 milliGRAM(s) Oral every 12 hours  bisacodyl 5 milliGRAM(s) Oral daily  carbidopa/levodopa  25/100 2 Tablet(s) Oral four times a day  chlorhexidine 0.12% Liquid 15 milliLiter(s) Oral Mucosa two times a day  chlorhexidine 2% Cloths 1 Application(s) Topical <User Schedule>  diltiazem    Tablet 90 milliGRAM(s) Oral every 6 hours  entacapone 400 milliGRAM(s) Oral four times a day  ferrous    sulfate 325 milliGRAM(s) Oral daily  gabapentin 100 milliGRAM(s) Oral every 12 hours  lidocaine   4% Patch 2 Patch Transdermal every 24 hours  meclizine 25 milliGRAM(s) Oral three times a day PRN  mupirocin 2% Nasal 1 Application(s) Both Nostrils two times a day  oxybutynin 5 milliGRAM(s) Oral daily  pantoprazole    Tablet 40 milliGRAM(s) Oral before breakfast  senna 2 Tablet(s) Oral at bedtime  sucralfate 1 Gram(s) Oral <User Schedule>  vancomycin  IVPB 1250 milliGRAM(s) IV Intermittent every 24 hours  zolpidem 5 milliGRAM(s) Oral at bedtime PRN      HEALTH ISSUES - PROBLEM Dx:  Parotiditis    MRSA bacteremia    Dementia    Advance care planning    Palliative care by specialist            Case Discussed with House Staff   50 minutes spent on total time on encounter , this excludes teaching   Spectra [phone]  
24H events:    Patient is a 89y old Female who presents with a chief complaint of right parotitis (27 Apr 2025 17:07)    Primary diagnosis of Sepsis    Today is 2d of hospitalization. This morning patient was seen and examined at bedside. Noted bactremia, Bcx until negative, ECHO to ro veg. ID follow up     PAST MEDICAL & SURGICAL HISTORY  Parkinson disease    HTN (hypertension)    Rheumatoid arthritis    Hyperlipidemia    CRAO (central retinal artery occlusion)  Left    Atrial fibrillation    History of hysterectomy    Status post placement of implantable loop recorder  01/2021    S/P hernia repair      SOCIAL HISTORY:  Social History:      ALLERGIES:  No Known Allergies    MEDICATIONS:  STANDING MEDICATIONS  ampicillin/sulbactam  IVPB 3 Gram(s) IV Intermittent every 6 hours  apixaban 2.5 milliGRAM(s) Oral every 12 hours  bisacodyl 5 milliGRAM(s) Oral daily  carbidopa/levodopa  25/100 2 Tablet(s) Oral four times a day  chlorhexidine 0.12% Liquid 15 milliLiter(s) Oral Mucosa two times a day  chlorhexidine 2% Cloths 1 Application(s) Topical <User Schedule>  diltiazem    Tablet 90 milliGRAM(s) Oral every 6 hours  entacapone 400 milliGRAM(s) Oral four times a day  ferrous    sulfate 325 milliGRAM(s) Oral daily  gabapentin 100 milliGRAM(s) Oral every 12 hours  lidocaine   4% Patch 2 Patch Transdermal every 24 hours  mupirocin 2% Nasal 1 Application(s) Both Nostrils two times a day  oxybutynin 5 milliGRAM(s) Oral daily  pantoprazole    Tablet 40 milliGRAM(s) Oral before breakfast  senna 2 Tablet(s) Oral at bedtime  sucralfate 1 Gram(s) Oral <User Schedule>  vancomycin  IVPB 750 milliGRAM(s) IV Intermittent every 12 hours    PRN MEDICATIONS  ALPRAZolam 0.25 milliGRAM(s) Oral at bedtime PRN  meclizine 25 milliGRAM(s) Oral three times a day PRN  zolpidem 5 milliGRAM(s) Oral at bedtime PRN    VITALS:   T(F): 97.8  HR: 79  BP: 104/63  RR: 18  SpO2: 97%    PHYSICAL EXAM:  Constitutional	Elderly, weak appearing female  Eyes	EOMI  EENT: Nares bilaterally patent, no blood or discharge noted. Oral cavity diffusely dry, +black hairy tongue. Very poor dentition. Uuvula midline, no tenderness throughout FOM. Posterior oropharynx clear, no blood/discharge noted.  +R facial edema and erythema and tenderness from R TMJ down to R SMG to R SCM. No fluctuance noted throughout   Respiratory	clear to auscultation bilaterally; no wheezes; no respiratory distress; no use of accessory muscles  Cardiovascular	S1 S2 present; no pedal edema  Gastrointestinal	soft; nontender; nondistended; normal active bowel sounds  Neurological	sensation intact; responds to verbal commands  Skin	warm and dry  Psychiatric alert and oriented x3; slow to respond to questions        LABS:                        7.8    8.61  )-----------( 238      ( 29 Apr 2025 07:33 )             25.2     04-29    141  |  106  |  19  ----------------------------<  94  3.2[L]   |  27  |  0.8    Ca    8.0[L]      29 Apr 2025 07:33  Mg     1.8     04-29    TPro  5.7[L]  /  Alb  2.7[L]  /  TBili  0.6  /  DBili  x   /  AST  14  /  ALT  <5  /  AlkPhos  125[H]  04-27    PT/INR - ( 29 Apr 2025 07:33 )   PT: 18.60 sec;   INR: 1.56 ratio         PTT - ( 29 Apr 2025 07:33 )  PTT:35.7 sec  Urinalysis Basic - ( 29 Apr 2025 07:33 )    Color: x / Appearance: x / SG: x / pH: x  Gluc: 94 mg/dL / Ketone: x  / Bili: x / Urobili: x   Blood: x / Protein: x / Nitrite: x   Leuk Esterase: x / RBC: x / WBC x   Sq Epi: x / Non Sq Epi: x / Bacteria: x            Urinalysis with Rflx Culture (collected 28 Apr 2025 07:50)    Culture - Blood (collected 27 Apr 2025 14:55)  Source: Blood Blood-Peripheral  Preliminary Report (29 Apr 2025 01:02):    No growth at 24 hours    Culture - Blood (collected 27 Apr 2025 14:55)  Source: Blood Blood-Peripheral  Gram Stain (28 Apr 2025 20:02):    Growth in aerobic bottle: Gram Positive Cocci in Clusters  Preliminary Report (28 Apr 2025 20:03):    Growth in aerobic bottle: Gram Positive Cocci in Clusters    Direct identification is available within approximately 3-5    hours either by Blood Panel Multiplexed PCR or Direct    MALDI-TOF. Details: [email or web address]  Organism: Blood Culture PCR (28 Apr 2025 21:42)  Organism: Blood Culture PCR (28 Apr 2025 21:42)                  
PROCEDURE: Peripherally Inserted Central Catheter (PICC) placement    Procedural Personnel  Attending physician(s): Regan Orozco  Fellow physician(s): None  Resident physician(s): None  Advanced practice provider(s): None    Pre-procedure diagnosis: Sepsis requiring IV antibiotics  Post-procedure diagnosis: Same  Indication: Administration of intravenous medications  Additional clinical history: None    Complications: No immediate complications.    IMPRESSION:    Insertion of right-sided single-lumen power-injectable PICC, with tip in the expected location of the cavoatrial junction.    Plan:     The catheter may be used immediately.  _______________________________________________________________    PROCEDURE SUMMARY:  - Venous access with ultrasound guidance  - PICC insertion with fluoroscopic guidance  - Additional procedure(s): None    PROCEDURE DETAILS:    Pre-procedure  Consent: Informed consent for the procedure including risks, benefits and alternatives was obtained and time-out was performed prior to the procedure.  Preparation (MIPS): The site was prepared and draped using all elements of maximal sterile barrier technique including sterile gloves, sterile gown, cap, mask, large sterile sheet, sterile ultrasound probe cover, hand hygiene and cutaneous antisepsis with 2% chlorhexidine.   Medical reason for site preparation exception (MIPS): Not applicable    Anesthesia/sedation  None    Access  Local anesthesia was administered. The vessel was sonographically evaluated and determined to be patent. Real time ultrasound was used to visualize needle entry into the vessel and a permanent image was stored.  Vein accessed: Brachial vein  Access technique: Micropuncture set with 21 gauge needle    Catheter placement  The catheter was trimmed to appropriate length and placed into the vein under fluoroscopic guidance via a peel-away sheath. Catheter tip location was fluoroscopically verified and a permanent image was stored. A sterile dressing was applied.  Catheter placed: Bard  Catheter size (Bengali): 5  Catheter intravascular length (cm): 38   Catheter flush: Normal saline  Catheter securement technique: Non-absorbable suture    Additional Details  Additional description of procedure: None  Equipment details: None  Specimens removed: None  Estimated blood loss (mL): Less than 10  
SUBJECTIVE/OVERNIGHT EVENTS  Today is hospital day 3d. This morning patient was seen and examined at bedside, resting in bed. No acute or major events overnight.    MEDICATIONS  STANDING MEDICATIONS  apixaban 2.5 milliGRAM(s) Oral every 12 hours  bisacodyl 5 milliGRAM(s) Oral daily  carbidopa/levodopa  25/100 2 Tablet(s) Oral four times a day  chlorhexidine 0.12% Liquid 15 milliLiter(s) Oral Mucosa two times a day  chlorhexidine 2% Cloths 1 Application(s) Topical <User Schedule>  diltiazem    Tablet 90 milliGRAM(s) Oral every 6 hours  entacapone 400 milliGRAM(s) Oral four times a day  ferrous    sulfate 325 milliGRAM(s) Oral daily  gabapentin 100 milliGRAM(s) Oral every 12 hours  lidocaine   4% Patch 2 Patch Transdermal every 24 hours  mupirocin 2% Nasal 1 Application(s) Both Nostrils two times a day  oxybutynin 5 milliGRAM(s) Oral daily  pantoprazole    Tablet 40 milliGRAM(s) Oral before breakfast  senna 2 Tablet(s) Oral at bedtime  sucralfate 1 Gram(s) Oral <User Schedule>  vancomycin  IVPB 1000 milliGRAM(s) IV Intermittent every 12 hours    PRN MEDICATIONS  ALPRAZolam 0.25 milliGRAM(s) Oral at bedtime PRN  meclizine 25 milliGRAM(s) Oral three times a day PRN  zolpidem 5 milliGRAM(s) Oral at bedtime PRN    VITALS  T(F): 98.5 (04-30-25 @ 08:01), Max: 98.9 (04-29-25 @ 23:43)  HR: 86 (04-30-25 @ 11:42) (74 - 90)  BP: 124/62 (04-30-25 @ 11:42) (95/57 - 124/62)  RR: 18 (04-30-25 @ 08:01) (18 - 18)  SpO2: 94% (04-30-25 @ 08:01) (94% - 100%)    PHYSICAL EXAM  A&ox2  Follows commands and speaks with me coherently  Lung and heart exam wnl  Soft, non-tender belly  No pitting edema of LE    LABS             7.8    8.61  )-----------( 238      ( 04-29-25 @ 07:33 )             25.2     139  |  106  |  14  -------------------------<  90   04-29-25 @ 20:53  4.5  |  19  |  0.8    Ca      7.9     04-29-25 @ 20:53  Mg     1.8     04-29-25 @ 07:33      PT/INR - ( 04-29-25 @ 07:33 )   PT: 18.60 sec[H];   INR: 1.56 ratio[H]  PTT - ( 04-29-25 @ 07:33 )  PTT:35.7 sec      Urinalysis Basic - ( 29 Apr 2025 20:53 )    Color: x / Appearance: x / SG: x / pH: x  Gluc: 90 mg/dL / Ketone: x  / Bili: x / Urobili: x   Blood: x / Protein: x / Nitrite: x   Leuk Esterase: x / RBC: x / WBC x   Sq Epi: x / Non Sq Epi: x / Bacteria: x          Culture - Urine (collected 28 Apr 2025 07:50)  Source: Catheterized None  Preliminary Report (30 Apr 2025 06:30):    Culture positive, 10,000 - 49,000 CFU/mL . Identification to follow.    Urinalysis with Rflx Culture (collected 28 Apr 2025 07:50)    Culture - Blood (collected 27 Apr 2025 14:55)  Source: Blood Blood-Peripheral  Gram Stain (29 Apr 2025 15:04):    Growth in anaerobic bottle: Gram Positive Cocci in Clusters  Final Report (30 Apr 2025 10:51):    Growth in anaerobic bottle: Methicillin Resistant Staphylococcus aureus    See previous culture [id number]    Culture - Blood (collected 27 Apr 2025 14:55)  Source: Blood Blood-Peripheral  Gram Stain (30 Apr 2025 02:28):    Growth in aerobic bottle: Gram Positive Cocci in Clusters    Growth in anaerobic bottle: Gram Positive Cocci in Clusters  Preliminary Report (30 Apr 2025 09:09):    Growth in aerobic bottle: Methicillin Resistant Staphylococcus aureus    Growth in anaerobic bottle: Gram Positive Cocci in Clusters    Direct identification is available within approximately 3-5    hours either by Blood Panel Multiplexed PCR or Direct    MALDI-TOF. Details: [email or web address]  Organism: Blood Culture PCR  Methicillin resistant Staphylococcus aureus (30 Apr 2025 09:09)  Organism: Methicillin resistant Staphylococcus aureus (30 Apr 2025 09:09)  Organism: Blood Culture PCR (28 Apr 2025 21:42)
SUBJECTIVE/OVERNIGHT EVENTS  Today is hospital day 4d. This morning patient was seen and examined at bedside, resting comfortably in bed. No acute or major events overnight.    MEDICATIONS  STANDING MEDICATIONS  apixaban 2.5 milliGRAM(s) Oral every 12 hours  bisacodyl 5 milliGRAM(s) Oral daily  carbidopa/levodopa  25/100 2 Tablet(s) Oral four times a day  chlorhexidine 0.12% Liquid 15 milliLiter(s) Oral Mucosa two times a day  chlorhexidine 2% Cloths 1 Application(s) Topical <User Schedule>  diltiazem    Tablet 90 milliGRAM(s) Oral every 6 hours  entacapone 400 milliGRAM(s) Oral four times a day  ferrous    sulfate 325 milliGRAM(s) Oral daily  gabapentin 100 milliGRAM(s) Oral every 12 hours  lidocaine   4% Patch 2 Patch Transdermal every 24 hours  mupirocin 2% Nasal 1 Application(s) Both Nostrils two times a day  oxybutynin 5 milliGRAM(s) Oral daily  pantoprazole    Tablet 40 milliGRAM(s) Oral before breakfast  senna 2 Tablet(s) Oral at bedtime  sucralfate 1 Gram(s) Oral <User Schedule>  vancomycin  IVPB 1250 milliGRAM(s) IV Intermittent every 24 hours    PRN MEDICATIONS  ALPRAZolam 0.25 milliGRAM(s) Oral at bedtime PRN  meclizine 25 milliGRAM(s) Oral three times a day PRN  zolpidem 5 milliGRAM(s) Oral at bedtime PRN    VITALS  T(F): 97.6 (05-01-25 @ 05:11), Max: 98.6 (04-30-25 @ 15:09)  HR: 75 (05-01-25 @ 11:30) (70 - 92)  BP: 141/49 (05-01-25 @ 11:30) (97/51 - 141/49)  RR: 18 (05-01-25 @ 11:30) (18 - 19)  SpO2: 94% (05-01-25 @ 11:30) (92% - 96%)    PHYSICAL EXAM  Confused  Chorea vs athetosis  Soft, non-tender belly  No pitting edema of lower extremities    LABS             8.3    5.33  )-----------( 216      ( 04-30-25 @ 12:09 )             26.6     140  |  109  |  13  -------------------------<  87   04-30-25 @ 12:09  4.0  |  22  |  0.7    Ca      7.8     04-30-25 @ 12:09          Urinalysis Basic - ( 30 Apr 2025 12:09 )    Color: x / Appearance: x / SG: x / pH: x  Gluc: 87 mg/dL / Ketone: x  / Bili: x / Urobili: x   Blood: x / Protein: x / Nitrite: x   Leuk Esterase: x / RBC: x / WBC x   Sq Epi: x / Non Sq Epi: x / Bacteria: x    
SUBJECTIVE/OVERNIGHT EVENTS  Today is hospital day 4d. This morning patient was seen and examined at bedside, resting in bed. No acute or major events overnight.    MEDICATIONS  STANDING MEDICATIONS  apixaban 2.5 milliGRAM(s) Oral every 12 hours  bisacodyl 5 milliGRAM(s) Oral daily  carbidopa/levodopa  25/100 2 Tablet(s) Oral four times a day  chlorhexidine 0.12% Liquid 15 milliLiter(s) Oral Mucosa two times a day  chlorhexidine 2% Cloths 1 Application(s) Topical <User Schedule>  diltiazem    Tablet 90 milliGRAM(s) Oral every 6 hours  entacapone 400 milliGRAM(s) Oral four times a day  ferrous    sulfate 325 milliGRAM(s) Oral daily  gabapentin 100 milliGRAM(s) Oral every 12 hours  lidocaine   4% Patch 2 Patch Transdermal every 24 hours  mupirocin 2% Nasal 1 Application(s) Both Nostrils two times a day  oxybutynin 5 milliGRAM(s) Oral daily  pantoprazole    Tablet 40 milliGRAM(s) Oral before breakfast  senna 2 Tablet(s) Oral at bedtime  sucralfate 1 Gram(s) Oral <User Schedule>  vancomycin  IVPB 1250 milliGRAM(s) IV Intermittent every 24 hours    PRN MEDICATIONS  ALPRAZolam 0.25 milliGRAM(s) Oral at bedtime PRN  meclizine 25 milliGRAM(s) Oral three times a day PRN  zolpidem 5 milliGRAM(s) Oral at bedtime PRN    VITALS  T(F): 97.6 (05-01-25 @ 05:11), Max: 98.6 (04-30-25 @ 15:09)  HR: 76 (05-01-25 @ 05:11) (70 - 92)  BP: 124/60 (05-01-25 @ 05:11) (97/51 - 124/62)  RR: 18 (05-01-25 @ 05:11) (18 - 19)  SpO2: 96% (05-01-25 @ 05:11) (92% - 96%)    PHYSICAL EXAM  More confused than yesterday  Writhing movement of bilateral upper extremities, hands, and head. New today, not noted yesterday.    LABS             8.3    5.33  )-----------( 216      ( 04-30-25 @ 12:09 )             26.6     140  |  109  |  13  -------------------------<  87   04-30-25 @ 12:09  4.0  |  22  |  0.7    Ca      7.8     04-30-25 @ 12:09          Urinalysis Basic - ( 30 Apr 2025 12:09 )    Color: x / Appearance: x / SG: x / pH: x  Gluc: 87 mg/dL / Ketone: x  / Bili: x / Urobili: x   Blood: x / Protein: x / Nitrite: x   Leuk Esterase: x / RBC: x / WBC x   Sq Epi: x / Non Sq Epi: x / Bacteria: x
SUBJECTIVE/OVERNIGHT EVENTS  Today is hospital day 5d. This morning patient was seen and examined at bedside, resting comfortably in bed. No acute or major events overnight.    MEDICATIONS  STANDING MEDICATIONS  amantadine 100 milliGRAM(s) Oral two times a day  apixaban 2.5 milliGRAM(s) Oral every 12 hours  bisacodyl 5 milliGRAM(s) Oral daily  carbidopa/levodopa  25/100 1 Tablet(s) Oral <User Schedule>  chlorhexidine 0.12% Liquid 15 milliLiter(s) Oral Mucosa two times a day  chlorhexidine 2% Cloths 1 Application(s) Topical <User Schedule>  diltiazem    Tablet 90 milliGRAM(s) Oral every 6 hours  entacapone 400 milliGRAM(s) Oral four times a day  ferrous    sulfate 325 milliGRAM(s) Oral daily  gabapentin 100 milliGRAM(s) Oral every 12 hours  lidocaine   4% Patch 2 Patch Transdermal every 24 hours  mupirocin 2% Nasal 1 Application(s) Both Nostrils two times a day  oxybutynin 5 milliGRAM(s) Oral daily  pantoprazole    Tablet 40 milliGRAM(s) Oral before breakfast  senna 2 Tablet(s) Oral at bedtime  sucralfate 1 Gram(s) Oral <User Schedule>  vancomycin  IVPB 1000 milliGRAM(s) IV Intermittent every 24 hours    PRN MEDICATIONS  ALPRAZolam 0.25 milliGRAM(s) Oral at bedtime PRN  meclizine 25 milliGRAM(s) Oral three times a day PRN  zolpidem 5 milliGRAM(s) Oral at bedtime PRN    VITALS  T(F): 97.6 (05-02-25 @ 08:00), Max: 98.1 (05-02-25 @ 05:11)  HR: 69 (05-02-25 @ 11:00) (69 - 77)  BP: 98/62 (05-02-25 @ 11:00) (92/51 - 108/55)  RR: 18 (05-02-25 @ 08:00) (16 - 18)  SpO2: 96% (05-02-25 @ 08:00) (95% - 98%)    PHYSICAL EXAM  Looks MUCH better today than yesterday  More alert. More oriented. almost no dyskinesia on exam today except a little in left hand  LABS    142  |  111  |  11  -------------------------<  88   05-02-25 @ 08:30  4.0  |  24  |  0.8    Ca      7.9     05-02-25 @ 08:30          Urinalysis Basic - ( 02 May 2025 08:30 )    Color: x / Appearance: x / SG: x / pH: x  Gluc: 88 mg/dL / Ketone: x  / Bili: x / Urobili: x   Blood: x / Protein: x / Nitrite: x   Leuk Esterase: x / RBC: x / WBC x   Sq Epi: x / Non Sq Epi: x / Bacteria: x          Culture - Blood (collected 30 Apr 2025 19:19)  Source: Blood Blood  Preliminary Report (02 May 2025 02:02):    No growth at 24 hours  
TAVO GARCÍAE  89y, Female  Allergy: No Known Allergies      LOS  3d    CHIEF COMPLAINT: right parotitis (29 Apr 2025 21:44)      INTERVAL EVENTS/HPI  - T(F): , Max: 98.9 (04-29-25 @ 23:43)  - WBC Count: 5.33 (04-30-25 @ 12:09)  WBC Count: 8.61 (04-29-25 @ 07:33)     - Creatinine: 0.7 (04-30-25 @ 12:09)  Creatinine: 0.8 (04-29-25 @ 20:53)     -   -   -     ROS  cannot obtain secondary to patient's sedation and/or mental status    VITALS:  T(F): 98.6, Max: 98.9 (04-29-25 @ 23:43)  HR: 70  BP: 124/62  RR: 18Vital Signs Last 24 Hrs  T(C): 37 (30 Apr 2025 15:09), Max: 37.2 (29 Apr 2025 23:43)  T(F): 98.6 (30 Apr 2025 15:09), Max: 98.9 (29 Apr 2025 23:43)  HR: 70 (30 Apr 2025 15:09) (70 - 90)  BP: 124/62 (30 Apr 2025 11:42) (99/61 - 124/62)  BP(mean): 69 (29 Apr 2025 23:43) (69 - 69)  RR: 18 (30 Apr 2025 15:09) (18 - 18)  SpO2: 96% (30 Apr 2025 15:09) (94% - 96%)    Parameters below as of 30 Apr 2025 15:09  Patient On (Oxygen Delivery Method): room air        PHYSICAL EXAM:  Gen: chronically ill appearing   HEENT: Normocephalic, atraumatic  Neck: supple, no lymphadenopathy. decreased erythema/edema R neck   CV: Regular rate & regular rhythm  Lungs: decreased BS at bases, no fremitus  Abdomen: Soft, BS present  Ext: Warm, well perfused  Neuro: non focal, not following commands  Skin: no rash, no erythema  Lines: no phlebitis     FH: Non-contributory  Social Hx: Non-contributory    TESTS & MEASUREMENTS:                        8.3    5.33  )-----------( 216      ( 30 Apr 2025 12:09 )             26.6     04-30    140  |  109  |  13  ----------------------------<  87  4.0   |  22  |  0.7    Ca    7.8[L]      30 Apr 2025 12:09  Mg     1.8     04-29          Urinalysis Basic - ( 30 Apr 2025 12:09 )    Color: x / Appearance: x / SG: x / pH: x  Gluc: 87 mg/dL / Ketone: x  / Bili: x / Urobili: x   Blood: x / Protein: x / Nitrite: x   Leuk Esterase: x / RBC: x / WBC x   Sq Epi: x / Non Sq Epi: x / Bacteria: x        Urinalysis with Rflx Culture (collected 04-28-25 @ 07:50)    Culture - Urine (collected 04-28-25 @ 07:50)  Source: Catheterized None  Preliminary Report (04-30-25 @ 06:30):    Culture positive, 10,000 - 49,000 CFU/mL . Identification to follow.    Culture - Blood (collected 04-27-25 @ 14:55)  Source: Blood Blood-Peripheral  Gram Stain (04-29-25 @ 15:04):    Growth in anaerobic bottle: Gram Positive Cocci in Clusters  Final Report (04-30-25 @ 10:51):    Growth in anaerobic bottle: Methicillin Resistant Staphylococcus aureus    See previous culture [id number]    Culture - Blood (collected 04-27-25 @ 14:55)  Source: Blood Blood-Peripheral  Gram Stain (04-30-25 @ 02:28):    Growth in aerobic bottle: Gram Positive Cocci in Clusters    Growth in anaerobic bottle: Gram Positive Cocci in Clusters  Preliminary Report (04-30-25 @ 09:09):    Growth in aerobic bottle: Methicillin Resistant Staphylococcus aureus    Growth in anaerobic bottle: Gram Positive Cocci in Clusters    Direct identification is available within approximately 3-5    hours either by Blood Panel Multiplexed PCR or Direct    MALDI-TOF. Details: [email or web address]  Organism: Blood Culture PCR  Methicillin resistant Staphylococcus aureus (04-30-25 @ 09:09)  Organism: Methicillin resistant Staphylococcus aureus (04-30-25 @ 09:09)      -  Clindamycin: S <=0.25      -  Oxacillin: R >2      -  Gentamicin: S <=4 Should not be used as monotherapy      -  Daptomycin: S 1      -  Linezolid: S 2      -  Vancomycin: S 1      -  Tetracycline: S <=4      Method Type: ADAM      -  Penicillin: R >2      -  Rifampin: S <=1 Should not be used as monotherapy      -  Erythromycin: R >4      -  Trimethoprim/Sulfamethoxazole: S <=0.5/9.5  Organism: Blood Culture PCR (04-28-25 @ 21:42)      Method Type: PCR      -  Methicillin resistant Staphylococcus aureus (MRSA): Detec        Lactate, Blood: 1.7 mmol/L (04-27-25 @ 14:55)      INFECTIOUS DISEASES TESTING  MRSA PCR Result.: Positive (04-28-25 @ 07:50)  MRSA PCR Result.: Positive (01-05-25 @ 07:10)      INFLAMMATORY MARKERS  Sedimentation Rate, Erythrocyte: 76 mm/Hr (04-28-25 @ 00:15)  C-Reactive Protein: 294.4 mg/L (04-28-25 @ 00:15)  Sedimentation Rate, Erythrocyte: 40 mm/Hr (01-04-25 @ 11:28)  C-Reactive Protein: 57.5 mg/L (01-04-25 @ 11:28)      RADIOLOGY & ADDITIONAL TESTS:  I have personally reviewed the last available Chest xray  CXR      CT      CARDIOLOGY TESTING  12 Lead ECG:   Ventricular Rate 82 BPM    Atrial Rate 82 BPM    P-R Interval 150 ms    QRS Duration 78 ms    Q-T Interval 340 ms    QTC Calculation(Bazett) 397 ms    P Axis 6 degrees    R Axis -11 degrees    T Axis 265 degrees    Diagnosis Line Normal sinus rhythm  Voltage criteria for left ventricular hypertrophy  Inferior infarct , age undetermined  Anterior infarct , age undetermined  Abnormal ECG    Confirmed by STEPHEN RODRIGUEZ, [facility] ([id number]) on 4/27/2025 11:43:42 PM (04-27-25 @ 13:48)      MEDICATIONS  apixaban 2.5  bisacodyl 5  carbidopa/levodopa  25/100 2  chlorhexidine 0.12% Liquid 15  chlorhexidine 2% Cloths 1  diltiazem    Tablet 90  entacapone 400  ferrous    sulfate 325  gabapentin 100  lidocaine   4% Patch 2  mupirocin 2% Nasal 1  oxybutynin 5  pantoprazole    Tablet 40  senna 2  sucralfate 1      WEIGHT  Weight (kg): 57.8 (04-30-25 @ 04:14)  Creatinine: 0.7 mg/dL (04-30-25 @ 12:09)  Creatinine: 0.8 mg/dL (04-29-25 @ 20:53)      ANTIBIOTICS:      All available historical records have been reviewed  
  JON GARCÍA  89y  [facility] [address]      Patient is a 89y old  Female who presents with a chief complaint of right parotitis (29 Apr 2025 21:44)      My note supersedes the resident's note      INTERVAL HPI/OVERNIGHT EVENTS:    no acute events overnight     REVIEW OF SYSTEMS:  ROS neg   FAMILY HISTORY:  FH: hypertension (Mother)      T(C): 36.9 (04-30-25 @ 08:01), Max: 37.2 (04-29-25 @ 23:43)  HR: 74 (04-30-25 @ 08:01) (74 - 90)  BP: 112/64 (04-30-25 @ 08:01) (95/57 - 112/64)  RR: 18 (04-30-25 @ 08:01) (18 - 18)  SpO2: 94% (04-30-25 @ 08:01) (94% - 100%)  Wt(kg): --Vital Signs Last 24 Hrs  T(C): 36.9 (30 Apr 2025 08:01), Max: 37.2 (29 Apr 2025 23:43)  T(F): 98.5 (30 Apr 2025 08:01), Max: 98.9 (29 Apr 2025 23:43)  HR: 74 (30 Apr 2025 08:01) (74 - 90)  BP: 112/64 (30 Apr 2025 08:01) (95/57 - 112/64)  BP(mean): 69 (29 Apr 2025 23:43) (65 - 69)  RR: 18 (30 Apr 2025 08:01) (18 - 18)  SpO2: 94% (30 Apr 2025 08:01) (94% - 100%)    Parameters below as of 30 Apr 2025 08:01  Patient On (Oxygen Delivery Method): room air        PHYSICAL EXAM:  GENERAL: NAD,   HEAD:  Atraumatic, Normocephalic  EYES: EOMI, PERRLA, conjunctiva and sclera clear  ENMT: No tonsillar erythema, exudates, or enlargement; Moist mucous membranes, Good dentition, No lesions  NECK: Supple, No JVD, Normal thyroid  NERVOUS SYSTEM:  Alert  PULM: Clear to auscultation bilaterally  CARDIAC: Regular rate and rhythm; No murmurs, rubs, or gallops  GI: Soft, Nontender, Nondistended; Bowel sounds present  EXTREMITIES:  2+ Peripheral Pulses, No clubbing, cyanosis, or edema  LYMPH: No lymphadenopathy noted  SKIN: No rashes or lesions    Consultant(s) Notes Reviewed:  [x ] YES  [ ] NO  Care Discussed with Consultants/Other Providers [ x] YES  [ ] NO    LABS:                            7.8    8.61  )-----------( 238      ( 29 Apr 2025 07:33 )             25.2   04-29    139  |  106  |  14  ----------------------------<  90  4.5   |  19  |  0.8    Ca    7.9[L]      29 Apr 2025 20:53  Mg     1.8     04-29              Urinalysis with Rflx Culture (collected 28 Apr 2025 07:50)    Culture - Urine (collected 28 Apr 2025 07:50)  Source: Catheterized None  Preliminary Report (30 Apr 2025 06:30):    Culture positive, 10,000 - 49,000 CFU/mL . Identification to follow.    Culture - Blood (collected 27 Apr 2025 14:55)  Source: Blood Blood-Peripheral  Gram Stain (29 Apr 2025 15:04):    Growth in anaerobic bottle: Gram Positive Cocci in Clusters  Final Report (30 Apr 2025 10:51):    Growth in anaerobic bottle: Methicillin Resistant Staphylococcus aureus    See previous culture [id number]    Culture - Blood (collected 27 Apr 2025 14:55)  Source: Blood Blood-Peripheral  Gram Stain (30 Apr 2025 02:28):    Growth in aerobic bottle: Gram Positive Cocci in Clusters    Growth in anaerobic bottle: Gram Positive Cocci in Clusters  Preliminary Report (30 Apr 2025 09:09):    Growth in aerobic bottle: Methicillin Resistant Staphylococcus aureus    Growth in anaerobic bottle: Gram Positive Cocci in Clusters    Direct identification is available within approximately 3-5    hours either by Blood Panel Multiplexed PCR or Direct    MALDI-TOF. Details: [email or web address]  Organism: Blood Culture PCR  Methicillin resistant Staphylococcus aureus (30 Apr 2025 09:09)  Organism: Methicillin resistant Staphylococcus aureus (30 Apr 2025 09:09)  Organism: Blood Culture PCR (28 Apr 2025 21:42)      ALPRAZolam 0.25 milliGRAM(s) Oral at bedtime PRN  apixaban 2.5 milliGRAM(s) Oral every 12 hours  bisacodyl 5 milliGRAM(s) Oral daily  carbidopa/levodopa  25/100 2 Tablet(s) Oral four times a day  chlorhexidine 0.12% Liquid 15 milliLiter(s) Oral Mucosa two times a day  chlorhexidine 2% Cloths 1 Application(s) Topical <User Schedule>  diltiazem    Tablet 90 milliGRAM(s) Oral every 6 hours  entacapone 400 milliGRAM(s) Oral four times a day  ferrous    sulfate 325 milliGRAM(s) Oral daily  gabapentin 100 milliGRAM(s) Oral every 12 hours  lidocaine   4% Patch 2 Patch Transdermal every 24 hours  meclizine 25 milliGRAM(s) Oral three times a day PRN  mupirocin 2% Nasal 1 Application(s) Both Nostrils two times a day  oxybutynin 5 milliGRAM(s) Oral daily  pantoprazole    Tablet 40 milliGRAM(s) Oral before breakfast  senna 2 Tablet(s) Oral at bedtime  sucralfate 1 Gram(s) Oral <User Schedule>  vancomycin  IVPB 1000 milliGRAM(s) IV Intermittent every 12 hours  zolpidem 5 milliGRAM(s) Oral at bedtime PRN      HEALTH ISSUES - PROBLEM Dx:          Case Discussed with House Staff     Spectra [phone]  
24H events:    Patient is a 89y old Female who presents with a chief complaint of right parotitis (27 Apr 2025 17:07)    Primary diagnosis of Sepsis    Today is 1d of hospitalization. This morning patient was seen and examined at bedside,     Start d5W, pending ID consult. on oxygen      PAST MEDICAL & SURGICAL HISTORY  Parkinson disease    HTN (hypertension)    Rheumatoid arthritis    Hyperlipidemia    CRAO (central retinal artery occlusion)  Left    Atrial fibrillation    History of hysterectomy    Status post placement of implantable loop recorder  01/2021    S/P hernia repair      SOCIAL HISTORY:  Social History:      ALLERGIES:  No Known Allergies    MEDICATIONS:  STANDING MEDICATIONS  apixaban 2.5 milliGRAM(s) Oral every 12 hours  bisacodyl 5 milliGRAM(s) Oral daily  carbidopa/levodopa  25/100 2 Tablet(s) Oral four times a day  chlorhexidine 0.12% Liquid 15 milliLiter(s) Oral Mucosa two times a day  chlorhexidine 2% Cloths 1 Application(s) Topical <User Schedule>  dextrose 5% + lactated ringers. 1000 milliLiter(s) IV Continuous <Continuous>  diltiazem    Tablet 90 milliGRAM(s) Oral every 6 hours  entacapone 400 milliGRAM(s) Oral four times a day  ferrous    sulfate 325 milliGRAM(s) Oral daily  gabapentin 100 milliGRAM(s) Oral every 12 hours  lidocaine   4% Patch 2 Patch Transdermal every 24 hours  oxybutynin 5 milliGRAM(s) Oral daily  pantoprazole    Tablet 40 milliGRAM(s) Oral before breakfast  piperacillin/tazobactam IVPB.. 3.375 Gram(s) IV Intermittent every 8 hours  senna 2 Tablet(s) Oral at bedtime  sucralfate 1 Gram(s) Oral <User Schedule>  vancomycin  IVPB 750 milliGRAM(s) IV Intermittent every 12 hours    PRN MEDICATIONS  ALPRAZolam 0.25 milliGRAM(s) Oral at bedtime PRN  meclizine 25 milliGRAM(s) Oral three times a day PRN  zolpidem 5 milliGRAM(s) Oral at bedtime PRN    VITALS:   T(F): 97.9  HR: 67  BP: 92/63  RR: 18  SpO2: 95%    PHYSICAL EXAM:  Constitutional	Elderly, weak appearing female  Eyes	EOMI  EENT: Nares bilaterally patent, no blood or discharge noted. Oral cavity diffusely dry, +black hairy tongue. Very poor dentition. Uuvula midline, no tenderness throughout FOM. Posterior oropharynx clear, no blood/discharge noted.  +R facial edema and erythema and tenderness from R TMJ down to R SMG to R SCM. No fluctuance noted throughout   Respiratory	clear to auscultation bilaterally; no wheezes; no respiratory distress; no use of accessory muscles  Cardiovascular	S1 S2 present; no pedal edema  Gastrointestinal	soft; nontender; nondistended; normal active bowel sounds  Neurological	sensation intact; responds to verbal commands  Skin	warm and dry  Psychiatric alert and oriented x3; slow to respond to questions      LABS:                        8.9    14.62 )-----------( 249      ( 28 Apr 2025 08:07 )             28.0     04-28    144  |  110  |  24[H]  ----------------------------<  101[H]  3.0[L]   |  25  |  0.8    Ca    8.3[L]      28 Apr 2025 08:07    TPro  5.7[L]  /  Alb  2.7[L]  /  TBili  0.6  /  DBili  x   /  AST  14  /  ALT  <5  /  AlkPhos  125[H]  04-27    PT/INR - ( 27 Apr 2025 14:55 )   PT: 23.00 sec;   INR: 1.92 ratio         PTT - ( 27 Apr 2025 14:55 )  PTT:39.9 sec  Urinalysis Basic - ( 28 Apr 2025 08:07 )    Color: x / Appearance: x / SG: x / pH: x  Gluc: 101 mg/dL / Ketone: x  / Bili: x / Urobili: x   Blood: x / Protein: x / Nitrite: x   Leuk Esterase: x / RBC: x / WBC x   Sq Epi: x / Non Sq Epi: x / Bacteria: x        Sedimentation Rate, Erythrocyte: 76 mm/Hr *H* (04-28-25 @ 00:15)  Lactate, Blood: 1.7 mmol/L (04-27-25 @ 14:55)      Urinalysis with Rflx Culture (collected 28 Apr 2025 07:50)                  
ENT DAILY PROGRESS NOTE    ENT: Pt is a 89y/o F a/w MRSA parotitis. Pt seen and examined at bedside w/ Dr. Grijalva     REVIEW OF SYSTEMS   [ ] Due to altered mental status/intubation, subjective information were not able to be obtained from patient. History was obtained, to the extent possible, from review of the chart and collateral sources of information.    Allergies    No Known Allergies    Intolerances        MEDICATIONS:  ALPRAZolam 0.25 milliGRAM(s) Oral at bedtime PRN  amantadine 100 milliGRAM(s) Oral two times a day  apixaban 2.5 milliGRAM(s) Oral every 12 hours  bisacodyl 5 milliGRAM(s) Oral daily  carbidopa/levodopa  25/100 1 Tablet(s) Oral <User Schedule>  chlorhexidine 0.12% Liquid 15 milliLiter(s) Oral Mucosa two times a day  chlorhexidine 2% Cloths 1 Application(s) Topical <User Schedule>  diltiazem    Tablet 90 milliGRAM(s) Oral every 6 hours  entacapone 400 milliGRAM(s) Oral four times a day  ferrous    sulfate 325 milliGRAM(s) Oral daily  gabapentin 100 milliGRAM(s) Oral every 12 hours  lidocaine   4% Patch 2 Patch Transdermal every 24 hours  meclizine 25 milliGRAM(s) Oral three times a day PRN  mupirocin 2% Nasal 1 Application(s) Both Nostrils two times a day  oxybutynin 5 milliGRAM(s) Oral daily  pantoprazole    Tablet 40 milliGRAM(s) Oral before breakfast  senna 2 Tablet(s) Oral at bedtime  sucralfate 1 Gram(s) Oral <User Schedule>  vancomycin  IVPB 1000 milliGRAM(s) IV Intermittent every 24 hours  zolpidem 5 milliGRAM(s) Oral at bedtime PRN      Vital Signs Last 24 Hrs  T(C): 36.9 (02 May 2025 15:23), Max: 36.9 (02 May 2025 15:23)  T(F): 98.5 (02 May 2025 15:23), Max: 98.5 (02 May 2025 15:23)  HR: 66 (02 May 2025 15:23) (66 - 77)  BP: 101/57 (02 May 2025 15:23) (92/51 - 108/55)  BP(mean): 73 (02 May 2025 05:11) (67 - 73)  RR: 18 (02 May 2025 15:23) (16 - 18)  SpO2: 97% (02 May 2025 15:23) (95% - 98%)    Parameters below as of 02 May 2025 15:23  Patient On (Oxygen Delivery Method): room air          05-01 @ 07:01  -  05-02 @ 07:00  --------------------------------------------------------  IN:  Total IN: 0 mL    OUT:    Voided (mL): 200 mL  Total OUT: 200 mL    Total NET: -200 mL      05-02 @ 07:01  -  05-02 @ 16:52  --------------------------------------------------------  IN:    Oral Fluid: 150 mL  Total IN: 150 mL    OUT:  Total OUT: 0 mL    Total NET: 150 mL        PHYSICAL EXAM:  GEN: NAD  SKIN: Good color, non diaphoretic  HEENT: right sided parotid area swelling and tenderness. +submandibular swelling and tenderness as well. Poor dentition.  NECK: Trachea midline  RESP: Non-labored breathing. No use of accessory muscles.  CARDIO: +S1/S2  ABDO: Soft, NT.  EXT: VILLAR x 4      LABS:  CBC-    BMP/CMP-  02 May 2025 08:30    142    |  111    |  11     ----------------------------<  88     4.0     |  24     |  0.8      Ca    7.9        02 May 2025 08:30      Coagulation Studies-    Endocrine Panel-  Calcium: 7.9 mg/dL (05-02 @ 08:30)              RADIOLOGY & ADDITIONAL STUDIES:  
SUBJECTIVE/OVERNIGHT EVENTS  Today is hospital day 8d. This morning patient was seen and examined at bedside, resting comfortably in bed. No acute or major events overnight.      Sepsis    Intubate    DNI: Trial NIV    No pertinent family history in first degree relatives    FH: hypertension (Mother)    Handoff    MEWS Score    Parkinson disease    Arthritis    HTN (hypertension)    Rheumatoid arthritis    Hyperlipidemia, mild    Hyperlipidemia    CRAO (central retinal artery occlusion)    Atrial fibrillation    Sepsis    Parotiditis    MRSA bacteremia    Dementia    Advance care planning    Palliative care by specialist    History of hysterectomy    Status post placement of implantable loop recorder    S/P hernia repair    FACIAL SWELLING    90+    Parotitis    SysAdmin_VstLnk        MEDICATIONS  STANDING MEDICATIONS  amantadine 100 milliGRAM(s) Oral two times a day  apixaban 2.5 milliGRAM(s) Oral every 12 hours  bisacodyl 5 milliGRAM(s) Oral daily  carbidopa/levodopa  25/100 1 Tablet(s) Oral <User Schedule>  chlorhexidine 0.12% Liquid 15 milliLiter(s) Oral Mucosa two times a day  chlorhexidine 2% Cloths 1 Application(s) Topical <User Schedule>  diltiazem    Tablet 90 milliGRAM(s) Oral every 6 hours  entacapone 400 milliGRAM(s) Oral four times a day  ferrous    sulfate 325 milliGRAM(s) Oral daily  gabapentin 100 milliGRAM(s) Oral every 12 hours  lidocaine   4% Patch 2 Patch Transdermal every 24 hours  oxybutynin 5 milliGRAM(s) Oral daily  pantoprazole    Tablet 40 milliGRAM(s) Oral before breakfast  senna 2 Tablet(s) Oral at bedtime  sucralfate 1 Gram(s) Oral <User Schedule>  vancomycin  IVPB 750 milliGRAM(s) IV Intermittent every 24 hours    PRN MEDICATIONS  meclizine 25 milliGRAM(s) Oral three times a day PRN    VITALS  T(F): 97.6 (05-05-25 @ 08:15), Max: 98.4 (05-05-25 @ 00:00)  HR: 80 (05-05-25 @ 08:15) (75 - 87)  BP: 106/55 (05-05-25 @ 08:15) (94/56 - 116/66)  RR: 17 (05-05-25 @ 08:15) (17 - 18)  SpO2: 100% (05-05-25 @ 08:15) (97% - 100%)    PHYSICAL EXAM  GENERAL  (x  ) NAD, lying in bed comfortably     (  ) obtunded     (  ) lethargic     (  ) somnolent    HEAD  (  ) Atraumatic     (  ) hematoma     (  ) laceration (specify location:       )     NECK  (  ) Supple     (  ) neck stiffness     (  ) nuchal rigidity     (  )  no JVD     (  ) JVD present ( -- cm)    HEART  Rate -->  ( x ) normal rate    (  ) bradycardic    (  ) tachycardic  Rhythm -->  (  ) regular    (  ) regularly irregular    (  ) irregularly irregular  Murmurs -->  (  ) normal s1/s2    (  ) systolic murmur    (  ) diastolic murmur    (  ) continuous murmur     (  ) S3 present    (  ) S4 present    LUNGS  (x )Unlabored respirations     (  ) tachypnea  (  ) B/L air entry     (  ) decreased breath sounds in:  (location     )    (  ) no adventitious sound     (  ) crackles     (  ) wheezing      (  ) rhonchi      (specify location:       )  (  ) chest wall tenderness (specify location:       )    ABDOMEN  ( x ) Soft     (  ) tense   |   ( x ) nondistended     (  ) distended   |   (  ) +BS     (  ) hypoactive bowel sounds     (  ) hyperactive bowel sounds  ( x ) nontender     (  ) RUQ tenderness     (  ) RLQ tenderness     (  ) LLQ tenderness     (  ) epigastric tenderness     (  ) diffuse tenderness  (  ) Splenomegaly      (  ) Hepatomegaly      (  ) Jaundice     (  ) ecchymosis     EXTREMITIES  ( x ) Normal     (  ) Rash     (  ) ecchymosis     (  ) varicose veins      (  ) pitting edema     (  ) non-pitting edema   (  ) ulceration     (  ) gangrene:     (location:     )    NERVOUS SYSTEM  ( x ) A&Ox3     (  ) confused     (  ) lethargic  CN II-XII:     (  ) Intact     (  ) focal deficits  (Specify:     )   Upper extremities:     (  ) strength X/5     (  ) focal deficit (specify:    )  Lower extremities:     (  ) strength  X/5    (  ) focal deficit (specify:    )      LABS                    Culture - Blood (collected 03 May 2025 08:12)  Source: Blood None  Preliminary Report (04 May 2025 17:01):    No growth at 24 hours      IMAGING      
JON GARCÍA  89y, Female  Allergy: No Known Allergies      LOS  9d    CHIEF COMPLAINT: right parotitis (29 Apr 2025 21:44)      INTERVAL EVENTS/HPI  - T(F): , Max: 98.9 (05-06-25 @ 00:00)  -    -    -   -   -     ROS  cannot obtain secondary to patient's sedation and/or mental status    VITALS:  T(F): 97.9, Max: 98.9 (05-06-25 @ 00:00)  HR: 80  BP: 104/56  RR: 18Vital Signs Last 24 Hrs  T(C): 36.6 (06 May 2025 07:30), Max: 37.2 (06 May 2025 00:00)  T(F): 97.9 (06 May 2025 07:30), Max: 98.9 (06 May 2025 00:00)  HR: 80 (06 May 2025 07:30) (80 - 83)  BP: 104/56 (06 May 2025 07:30) (95/54 - 104/56)  BP(mean): --  RR: 18 (06 May 2025 07:30) (18 - 18)  SpO2: 98% (06 May 2025 07:30) (91% - 98%)    Parameters below as of 06 May 2025 07:30  Patient On (Oxygen Delivery Method): room air        PHYSICAL EXAM:  Gen: chronically ill appearing , Elderly F   HEENT: Normocephalic, atraumatic  Neck: supple, no lymphadenopathy decreased erythema/edema   CV: Regular rate & regular rhythm  Lungs: decreased BS at bases, no fremitus  Abdomen: Soft, BS present  Ext: Warm, well perfused  Neuro: non focal,   Skin: no rash, no erythema  Lines: no phlebitis     FH: Non-contributory  Social Hx: Non-contributory    TESTS & MEASUREMENTS:                  Culture - Blood (collected 05-03-25 @ 08:12)  Source: Blood None  Preliminary Report (05-06-25 @ 17:01):    No growth at 72 Hours    Culture - Blood (collected 05-02-25 @ 08:30)  Source: Blood None  Preliminary Report (05-06-25 @ 14:01):    No growth at 4 days    Culture - Blood (collected 04-30-25 @ 19:19)  Source: Blood Blood  Final Report (05-06-25 @ 02:00):    No growth at 5 days    Urinalysis with Rflx Culture (collected 04-28-25 @ 07:50)    Culture - Urine (collected 04-28-25 @ 07:50)  Source: Catheterized None  Final Report (05-01-25 @ 06:02):    10,000 - 49,000 CFU/mL Enterococcus faecalis (vancomycin resistant)    <10,000 CFU/ml Normal Urogenital mame present  Organism: Enterococcus faecalis (vancomycin resistant) (05-01-25 @ 06:02)  Organism: Enterococcus faecalis (vancomycin resistant) (05-01-25 @ 06:02)      -  Levofloxacin: R >4      -  Nitrofurantoin: S <=32 Should not be used to treat pyelonephritis.      -  Daptomycin: S <=0.5      -  Linezolid: S <=1      -  Vancomycin: R >16      -  Ciprofloxacin: R >2      -  Ampicillin: S <=2 Predicts results to ampicillin/sulbactam, amoxacillin-clavulanate and  piperacillin-tazobactam.      -  Tetracycline: R >8      Method Type: ADAM    Culture - Blood (collected 04-27-25 @ 14:55)  Source: Blood Blood-Peripheral  Gram Stain (04-29-25 @ 15:04):    Growth in anaerobic bottle: Gram Positive Cocci in Clusters  Final Report (04-30-25 @ 10:51):    Growth in anaerobic bottle: Methicillin Resistant Staphylococcus aureus    See previous culture [id number]    Culture - Blood (collected 04-27-25 @ 14:55)  Source: Blood Blood-Peripheral  Gram Stain (04-30-25 @ 02:28):    Growth in aerobic bottle: Gram Positive Cocci in Clusters    Growth in anaerobic bottle: Gram Positive Cocci in Clusters  Final Report (04-30-25 @ 19:29):    Growth in aerobic and anaerobic bottles: Methicillin Resistant    Staphylococcus aureus    Direct identification is available within approximately 3-5    hours either by Blood Panel Multiplexed PCR or Direct    MALDI-TOF. Details: [email or web address]  Organism: Blood Culture PCR  Methicillin resistant Staphylococcus aureus (04-30-25 @ 19:29)  Organism: Methicillin resistant Staphylococcus aureus (04-30-25 @ 19:29)      -  Clindamycin: S <=0.25      -  Oxacillin: R >2      -  Gentamicin: S <=4 Should not be used as monotherapy      -  Daptomycin: S 1      -  Linezolid: S 2      -  Vancomycin: S 1      -  Tetracycline: S <=4      Method Type: ADAM      -  Penicillin: R >2      -  Rifampin: S <=1 Should not be used as monotherapy      -  Erythromycin: R >4      -  Trimethoprim/Sulfamethoxazole: S <=0.5/9.5  Organism: Blood Culture PCR (04-30-25 @ 19:29)      Method Type: PCR      -  Methicillin resistant Staphylococcus aureus (MRSA): Detec            INFECTIOUS DISEASES TESTING  MRSA PCR Result.: Positive (04-28-25 @ 07:50)  MRSA PCR Result.: Positive (01-05-25 @ 07:10)      INFLAMMATORY MARKERS  Sedimentation Rate, Erythrocyte: 76 mm/Hr (04-28-25 @ 00:15)  C-Reactive Protein: 294.4 mg/L (04-28-25 @ 00:15)  Sedimentation Rate, Erythrocyte: 40 mm/Hr (01-04-25 @ 11:28)  C-Reactive Protein: 57.5 mg/L (01-04-25 @ 11:28)      RADIOLOGY & ADDITIONAL TESTS:  I have personally reviewed the last available Chest xray  CXR      CT      CARDIOLOGY TESTING  12 Lead ECG:   Ventricular Rate 82 BPM    Atrial Rate 82 BPM    P-R Interval 150 ms    QRS Duration 78 ms    Q-T Interval 340 ms    QTC Calculation(Bazett) 397 ms    P Axis 6 degrees    R Axis -11 degrees    T Axis 265 degrees    Diagnosis Line Normal sinus rhythm  Voltage criteria for left ventricular hypertrophy  Inferior infarct , age undetermined  Anterior infarct , age undetermined  Abnormal ECG    Confirmed by STEPHEN RODRIGUEZ, JORGE ([id number]) on 4/27/2025 11:43:42 PM (04-27-25 @ 13:48)      MEDICATIONS  amantadine 100  apixaban 2.5  bisacodyl 5  carbidopa/levodopa  25/100 1  chlorhexidine 0.12% Liquid 15  chlorhexidine 2% Cloths 1  diltiazem    Tablet 90  entacapone 400  ferrous    sulfate 325  gabapentin 100  lidocaine   4% Patch 2  oxybutynin 5  pantoprazole    Tablet 40  senna 2  sucralfate 1  vancomycin  IVPB 750      WEIGHT  Weight (kg): 57.8 (04-30-25 @ 04:14)      ANTIBIOTICS:  vancomycin  IVPB 750 milliGRAM(s) IV Intermittent every 24 hours      All available historical records have been reviewed  
JON GARCÍA 89y Female  MRN#: [id number]   CODE STATUS: DNR/DNI    Hospital Day: 6d    Pt is currently admitted with the primary diagnosis of right-sided parotitis    SUBJECTIVE  Hospital Course    Overnight events: No acute events overnight. Patient has no subjective complaints.     Subjective complaints: None    Present Today:   - Jacques:  No [ X ], Yes [   ] : Indication:     - Type of IV Access:       .. CVC/Piccline:  No [ X ], Yes [   ] : Indication:       .. Midline: No [ X ], Yes [   ] : Indication:                                             ----------------------------------------------------------  OBJECTIVE  PAST MEDICAL & SURGICAL HISTORY  Parkinson disease    HTN (hypertension)    Rheumatoid arthritis    Hyperlipidemia    CRAO (central retinal artery occlusion)  Left    Atrial fibrillation    History of hysterectomy    Status post placement of implantable loop recorder  01/2021    S/P hernia repair                                              -----------------------------------------------------------  ALLERGIES:  No Known Allergies                                            ------------------------------------------------------------    HOME MEDICATIONS  Home Medications:  acetaminophen 325 mg oral tablet: 2 tab(s) orally every 6 hours as needed for  mild pain (27 Apr 2025 19:14)  ALPRAZolam 0.25 mg oral tablet: 1 tab(s) orally once a day (at bedtime) (27 Apr 2025 19:23)  Aspercreme with Lidocaine 4% topical film: Apply topically to affected area once a day L and R knee daily, 12 hours on 12 hours off (27 Apr 2025 19:18)  bisacodyl 5 mg oral tablet: 1 tab(s) orally once a day (27 Apr 2025 19:23)  carbidopa/levodopa/entacapone 50 mg-200 mg-200 mg oral tablet: 1 tab(s) orally 4 times a day (27 Apr 2025 19:23)  Cough Formula-DM 10 mg-100 mg/5 mL oral liquid: 5 milliliter(s) orally every 6 hours as needed for  cough (27 Apr 2025 19:23)  dilTIAZem 360 mg/24 hours oral capsule, extended release: 1 cap(s) orally once a day (27 Apr 2025 19:23)  entacapone 200 mg oral tablet: 1 tab(s) orally 4 times a day (27 Apr 2025 19:23)  ferrous sulfate 325 mg (65 mg elemental iron) oral tablet: 1 tab(s) orally once a day (27 Apr 2025 19:23)  gabapentin 100 mg oral capsule: 1 cap(s) orally 2 times a day (27 Apr 2025 19:23)  lactulose 20 g oral powder for reconstitution: 1 packet(s) orally once a day as needed for  constipation (27 Apr 2025 19:23)  meclizine 25 mg oral tablet: 1 tab(s) orally 3 times a day, As needed, Dizziness (27 Apr 2025 19:23)  menthol: as needed to neck area for pain (27 Apr 2025 19:18)  ondansetron 2 mg/mL injectable solution: 4 milligram(s) intravenously once a day as needed for  nausea (27 Apr 2025 19:23)  oxyBUTYnin 5 mg oral tablet: 1 tab(s) orally once a day (27 Apr 2025 19:23)  preiguard: apply topically (27 Apr 2025 19:23)  Protonix 40 mg oral delayed release tablet: 1 tab(s) orally once a day (27 Apr 2025 19:23)  senna (sennosides) 17 mg oral tablet: 2 tab(s) orally once a day (at bedtime) (27 Apr 2025 19:23)  senna (sennosides) 8.6 mg oral tablet: 2 tab(s) orally once a day as needed for  constipation (27 Apr 2025 19:23)  sucralfate 1 g oral tablet: 1 tab(s) orally 3 times a day (27 Apr 2025 19:20)  zaleplon 10 mg oral capsule: 1 cap(s) orally once a day (at bedtime) as needed for  insomnia (27 Apr 2025 19:23)                           MEDICATIONS:  STANDING MEDICATIONS  amantadine 100 milliGRAM(s) Oral two times a day  apixaban 2.5 milliGRAM(s) Oral every 12 hours  bisacodyl 5 milliGRAM(s) Oral daily  carbidopa/levodopa  25/100 1 Tablet(s) Oral <User Schedule>  chlorhexidine 0.12% Liquid 15 milliLiter(s) Oral Mucosa two times a day  chlorhexidine 2% Cloths 1 Application(s) Topical <User Schedule>  diltiazem    Tablet 90 milliGRAM(s) Oral every 6 hours  entacapone 400 milliGRAM(s) Oral four times a day  ferrous    sulfate 325 milliGRAM(s) Oral daily  gabapentin 100 milliGRAM(s) Oral every 12 hours  lidocaine   4% Patch 2 Patch Transdermal every 24 hours  mupirocin 2% Nasal 1 Application(s) Both Nostrils two times a day  oxybutynin 5 milliGRAM(s) Oral daily  pantoprazole    Tablet 40 milliGRAM(s) Oral before breakfast  senna 2 Tablet(s) Oral at bedtime  sucralfate 1 Gram(s) Oral <User Schedule>  vancomycin  IVPB 1000 milliGRAM(s) IV Intermittent every 24 hours    PRN MEDICATIONS  ALPRAZolam 0.25 milliGRAM(s) Oral at bedtime PRN  meclizine 25 milliGRAM(s) Oral three times a day PRN  zolpidem 5 milliGRAM(s) Oral at bedtime PRN                                            ------------------------------------------------------------  VITAL SIGNS: Last 24 Hours  T(C): 37 (03 May 2025 07:52), Max: 37 (03 May 2025 07:52)  T(F): 98.6 (03 May 2025 07:52), Max: 98.6 (03 May 2025 07:52)  HR: 85 (03 May 2025 07:52) (64 - 85)  BP: 106/53 (03 May 2025 07:52) (101/57 - 106/53)  BP(mean): 75 (02 May 2025 23:10) (75 - 75)  RR: 17 (03 May 2025 07:52) (17 - 18)  SpO2: 99% (03 May 2025 07:52) (95% - 99%)      05-02-25 @ 07:01  -  05-03-25 @ 07:00  --------------------------------------------------------  IN: 150 mL / OUT: 200 mL / NET: -50 mL                                             --------------------------------------------------------------  LABS:    05-03    135  |  105  |  10  ----------------------------<  81  3.5   |  22  |  0.7    Ca    7.9[L]      03 May 2025 08:12        Urinalysis Basic - ( 03 May 2025 08:12 )    Color: x / Appearance: x / SG: x / pH: x  Gluc: 81 mg/dL / Ketone: x  / Bili: x / Urobili: x   Blood: x / Protein: x / Nitrite: x   Leuk Esterase: x / RBC: x / WBC x   Sq Epi: x / Non Sq Epi: x / Bacteria: x              Culture - Blood (collected 30 Apr 2025 19:19)  Source: Blood Blood  Preliminary Report (03 May 2025 02:02):    No growth at 48 Hours                                                    -------------------------------------------------------------  RADIOLOGY:                                            --------------------------------------------------------------    PHYSICAL EXAM:  General: Well appearing, not in acute distress  HEENT: No cervical LAD, or JVD  LUNGS: CTAB, no rales, on RA  HEART: irregular rhythm, regular rate, 2/5 systolic murmur  ABDOMEN: NBS, soft, nontender to palpation  EXT: no peripheral pitting edema b/l  NEURO: AAOx3  SKIN: No skin rash, open wounds                                           --------------------------------------------------------------        
RAQUEL JON  89y, Female  Allergy: No Known Allergies      LOS  4d    CHIEF COMPLAINT: right parotitis (29 Apr 2025 21:44)      INTERVAL EVENTS/HPI  - T(F): , Max: 98 (05-01-25 @ 00:01)  - WBC Count: 5.33 (04-30-25 @ 12:09)  WBC Count: 8.61 (04-29-25 @ 07:33)     - Creatinine: 0.7 (04-30-25 @ 12:09)  Creatinine: 0.8 (04-29-25 @ 20:53)     -   -   -     ROS  cannot obtain secondary to patient's sedation and/or mental status    VITALS:  T(F): 97.6, Max: 98 (05-01-25 @ 00:01)  HR: 75  BP: 141/49  RR: 18Vital Signs Last 24 Hrs  T(C): 36.4 (01 May 2025 05:11), Max: 36.7 (01 May 2025 00:01)  T(F): 97.6 (01 May 2025 05:11), Max: 98 (01 May 2025 00:01)  HR: 75 (01 May 2025 11:30) (75 - 92)  BP: 141/49 (01 May 2025 11:30) (97/51 - 141/49)  BP(mean): 76 (01 May 2025 00:01) (76 - 76)  RR: 18 (01 May 2025 11:30) (18 - 19)  SpO2: 94% (01 May 2025 11:30) (92% - 96%)    Parameters below as of 01 May 2025 11:30  Patient On (Oxygen Delivery Method): room air        PHYSICAL EXAM:  Gen: chronically ill appearing   HEENT: Normocephalic, atraumatic  Neck: supple, no lymphadenopathy decreased erythema/edema   CV: Regular rate & regular rhythm  Lungs: decreased BS at bases, no fremitus  Abdomen: Soft, BS present  Ext: Warm, well perfused  Neuro: non focal, not following commands  Skin: no rash, no erythema  Lines: no phlebitis     FH: Non-contributory  Social Hx: Non-contributory    TESTS & MEASUREMENTS:                        8.3    5.33  )-----------( 216      ( 30 Apr 2025 12:09 )             26.6     04-30    140  |  109  |  13  ----------------------------<  87  4.0   |  22  |  0.7    Ca    7.8[L]      30 Apr 2025 12:09          Urinalysis Basic - ( 30 Apr 2025 12:09 )    Color: x / Appearance: x / SG: x / pH: x  Gluc: 87 mg/dL / Ketone: x  / Bili: x / Urobili: x   Blood: x / Protein: x / Nitrite: x   Leuk Esterase: x / RBC: x / WBC x   Sq Epi: x / Non Sq Epi: x / Bacteria: x        Urinalysis with Rflx Culture (collected 04-28-25 @ 07:50)    Culture - Urine (collected 04-28-25 @ 07:50)  Source: Catheterized None  Final Report (05-01-25 @ 06:02):    10,000 - 49,000 CFU/mL Enterococcus faecalis (vancomycin resistant)    <10,000 CFU/ml Normal Urogenital mame present  Organism: Enterococcus faecalis (vancomycin resistant) (05-01-25 @ 06:02)  Organism: Enterococcus faecalis (vancomycin resistant) (05-01-25 @ 06:02)      -  Levofloxacin: R >4      -  Nitrofurantoin: S <=32 Should not be used to treat pyelonephritis.      -  Daptomycin: S <=0.5      -  Linezolid: S <=1      -  Vancomycin: R >16      -  Ciprofloxacin: R >2      -  Ampicillin: S <=2 Predicts results to ampicillin/sulbactam, amoxacillin-clavulanate and  piperacillin-tazobactam.      -  Tetracycline: R >8      Method Type: ADAM    Culture - Blood (collected 04-27-25 @ 14:55)  Source: Blood Blood-Peripheral  Gram Stain (04-29-25 @ 15:04):    Growth in anaerobic bottle: Gram Positive Cocci in Clusters  Final Report (04-30-25 @ 10:51):    Growth in anaerobic bottle: Methicillin Resistant Staphylococcus aureus    See previous culture [id number]    Culture - Blood (collected 04-27-25 @ 14:55)  Source: Blood Blood-Peripheral  Gram Stain (04-30-25 @ 02:28):    Growth in aerobic bottle: Gram Positive Cocci in Clusters    Growth in anaerobic bottle: Gram Positive Cocci in Clusters  Final Report (04-30-25 @ 19:29):    Growth in aerobic and anaerobic bottles: Methicillin Resistant    Staphylococcus aureus    Direct identification is available within approximately 3-5    hours either by Blood Panel Multiplexed PCR or Direct    MALDI-TOF. Details: [email or web address]  Organism: Blood Culture PCR  Methicillin resistant Staphylococcus aureus (04-30-25 @ 19:29)  Organism: Methicillin resistant Staphylococcus aureus (04-30-25 @ 19:29)      -  Clindamycin: S <=0.25      -  Oxacillin: R >2      -  Gentamicin: S <=4 Should not be used as monotherapy      -  Daptomycin: S 1      -  Linezolid: S 2      -  Vancomycin: S 1      -  Tetracycline: S <=4      Method Type: ADAM      -  Penicillin: R >2      -  Rifampin: S <=1 Should not be used as monotherapy      -  Erythromycin: R >4      -  Trimethoprim/Sulfamethoxazole: S <=0.5/9.5  Organism: Blood Culture PCR (04-30-25 @ 19:29)      Method Type: PCR      -  Methicillin resistant Staphylococcus aureus (MRSA): Detec        Lactate, Blood: 1.7 mmol/L (04-27-25 @ 14:55)      INFECTIOUS DISEASES TESTING  MRSA PCR Result.: Positive (04-28-25 @ 07:50)  MRSA PCR Result.: Positive (01-05-25 @ 07:10)      INFLAMMATORY MARKERS  Sedimentation Rate, Erythrocyte: 76 mm/Hr (04-28-25 @ 00:15)  C-Reactive Protein: 294.4 mg/L (04-28-25 @ 00:15)  Sedimentation Rate, Erythrocyte: 40 mm/Hr (01-04-25 @ 11:28)  C-Reactive Protein: 57.5 mg/L (01-04-25 @ 11:28)      RADIOLOGY & ADDITIONAL TESTS:  I have personally reviewed the last available Chest xray  CXR      CT      CARDIOLOGY TESTING  12 Lead ECG:   Ventricular Rate 82 BPM    Atrial Rate 82 BPM    P-R Interval 150 ms    QRS Duration 78 ms    Q-T Interval 340 ms    QTC Calculation(Bazett) 397 ms    P Axis 6 degrees    R Axis -11 degrees    T Axis 265 degrees    Diagnosis Line Normal sinus rhythm  Voltage criteria for left ventricular hypertrophy  Inferior infarct , age undetermined  Anterior infarct , age undetermined  Abnormal ECG    Confirmed by STEPHEN RODRIGUEZ, JORGE ([id number]) on 4/27/2025 11:43:42 PM (04-27-25 @ 13:48)      MEDICATIONS  apixaban 2.5  bisacodyl 5  carbidopa/levodopa  25/100 1  chlorhexidine 0.12% Liquid 15  chlorhexidine 2% Cloths 1  diltiazem    Tablet 90  entacapone 400  ferrous    sulfate 325  gabapentin 100  lidocaine   4% Patch 2  mupirocin 2% Nasal 1  oxybutynin 5  pantoprazole    Tablet 40  senna 2  sucralfate 1  vancomycin  IVPB 1250      WEIGHT  Weight (kg): 57.8 (04-30-25 @ 04:14)      ANTIBIOTICS:  vancomycin  IVPB 1250 milliGRAM(s) IV Intermittent every 24 hours      All available historical records have been reviewed  
RAQUEL JON  89y, Female  Allergy: No Known Allergies      LOS  2d    CHIEF COMPLAINT: right parotitis (27 Apr 2025 17:07)      INTERVAL EVENTS/HPI  - T(F): , Max: 97.8 (04-28-25 @ 16:21), bcx mrsa   - WBC Count: 8.61 (04-29-25 @ 07:33)  WBC Count: 14.62 (04-28-25 @ 08:07)     - Creatinine: 0.8 (04-29-25 @ 07:33)  Creatinine: 0.8 (04-28-25 @ 08:07)     -   -   -     ROS  cannot obtain secondary to patient's sedation and/or mental status    VITALS:  T(F): 97.8, Max: 97.8 (04-28-25 @ 16:21)  HR: 79  BP: 104/63  RR: 18Vital Signs Last 24 Hrs  T(C): 36.6 (29 Apr 2025 07:42), Max: 36.6 (28 Apr 2025 16:21)  T(F): 97.8 (29 Apr 2025 07:42), Max: 97.8 (28 Apr 2025 16:21)  HR: 79 (29 Apr 2025 07:42) (79 - 86)  BP: 104/63 (29 Apr 2025 07:42) (104/63 - 107/63)  BP(mean): --  RR: 18 (29 Apr 2025 07:42) (18 - 20)  SpO2: 97% (29 Apr 2025 07:42) (97% - 100%)    Parameters below as of 29 Apr 2025 07:42  Patient On (Oxygen Delivery Method): room air        PHYSICAL EXAM:  Gen: chronically ill appearing Elderly F  HEENT: Normocephalic, atraumatic  Neck: supple, R parotid/neck edema/erythema  CV: Regular rate & regular rhythm  Lungs: decreased BS at bases, no fremitus  Abdomen: Soft, BS present  Ext: Warm, well perfused  Neuro: non focal, not following commands  Skin: no rash, no erythema  Lines: no phlebitis     FH: Non-contributory  Social Hx: Non-contributory    TESTS & MEASUREMENTS:                        7.8    8.61  )-----------( 238      ( 29 Apr 2025 07:33 )             25.2     04-29    141  |  106  |  19  ----------------------------<  94  3.2[L]   |  27  |  0.8    Ca    8.0[L]      29 Apr 2025 07:33  Mg     1.8     04-29    TPro  5.7[L]  /  Alb  2.7[L]  /  TBili  0.6  /  DBili  x   /  AST  14  /  ALT  <5  /  AlkPhos  125[H]  04-27      LIVER FUNCTIONS - ( 27 Apr 2025 14:55 )  Alb: 2.7 g/dL / Pro: 5.7 g/dL / ALK PHOS: 125 U/L / ALT: <5 U/L / AST: 14 U/L / GGT: x           Urinalysis Basic - ( 29 Apr 2025 07:33 )    Color: x / Appearance: x / SG: x / pH: x  Gluc: 94 mg/dL / Ketone: x  / Bili: x / Urobili: x   Blood: x / Protein: x / Nitrite: x   Leuk Esterase: x / RBC: x / WBC x   Sq Epi: x / Non Sq Epi: x / Bacteria: x        Urinalysis with Rflx Culture (collected 04-28-25 @ 07:50)    Culture - Blood (collected 04-27-25 @ 14:55)  Source: Blood Blood-Peripheral  Preliminary Report (04-29-25 @ 01:02):    No growth at 24 hours    Culture - Blood (collected 04-27-25 @ 14:55)  Source: Blood Blood-Peripheral  Gram Stain (04-28-25 @ 20:02):    Growth in aerobic bottle: Gram Positive Cocci in Clusters  Preliminary Report (04-28-25 @ 20:03):    Growth in aerobic bottle: Gram Positive Cocci in Clusters    Direct identification is available within approximately 3-5    hours either by Blood Panel Multiplexed PCR or Direct    MALDI-TOF. Details: [email or web address]  Organism: Blood Culture PCR (04-28-25 @ 21:42)  Organism: Blood Culture PCR (04-28-25 @ 21:42)      Method Type: PCR      -  Methicillin resistant Staphylococcus aureus (MRSA): Detec        Lactate, Blood: 1.7 mmol/L (04-27-25 @ 14:55)      INFECTIOUS DISEASES TESTING  MRSA PCR Result.: Positive (04-28-25 @ 07:50)  MRSA PCR Result.: Positive (01-05-25 @ 07:10)      INFLAMMATORY MARKERS  Sedimentation Rate, Erythrocyte: 76 mm/Hr (04-28-25 @ 00:15)  C-Reactive Protein: 294.4 mg/L (04-28-25 @ 00:15)  Sedimentation Rate, Erythrocyte: 40 mm/Hr (01-04-25 @ 11:28)  C-Reactive Protein: 57.5 mg/L (01-04-25 @ 11:28)      RADIOLOGY & ADDITIONAL TESTS:  I have personally reviewed the last available Chest xray  CXR      CT      CARDIOLOGY TESTING  12 Lead ECG:   Ventricular Rate 82 BPM    Atrial Rate 82 BPM    P-R Interval 150 ms    QRS Duration 78 ms    Q-T Interval 340 ms    QTC Calculation(Bazett) 397 ms    P Axis 6 degrees    R Axis -11 degrees    T Axis 265 degrees    Diagnosis Line Normal sinus rhythm  Voltage criteria for left ventricular hypertrophy  Inferior infarct , age undetermined  Anterior infarct , age undetermined  Abnormal ECG    Confirmed by STEPHEN RODRIGUEZ, JORGE ([id number]) on 4/27/2025 11:43:42 PM (04-27-25 @ 13:48)      MEDICATIONS  apixaban 2.5  bisacodyl 5  carbidopa/levodopa  25/100 2  chlorhexidine 0.12% Liquid 15  chlorhexidine 2% Cloths 1  diltiazem    Tablet 90  entacapone 400  ferrous    sulfate 325  gabapentin 100  lidocaine   4% Patch 2  mupirocin 2% Nasal 1  oxybutynin 5  pantoprazole    Tablet 40  potassium chloride   Powder 40  senna 2  sucralfate 1  vancomycin  IVPB 750      WEIGHT  Weight (kg): 60 (04-27-25 @ 12:46)  Creatinine: 0.8 mg/dL (04-29-25 @ 07:33)      ANTIBIOTICS:  vancomycin  IVPB 750 milliGRAM(s) IV Intermittent every 12 hours      All available historical records have been reviewed

## 2025-05-06 NOTE — PROGRESS NOTE ADULT - NS ATTEST RISK PROBLEM GEN_ALL_CORE FT
- I discussed my recommendations with the primary team trixie / Tatianna  - This patient is on drug therapy requiring intensive monitoring for toxicity. Vancomycin requires intensive drug monitoring due to concerns for possible adverse effects, including acute kidney injury, and will be monitored with basic metabolic panel, Vancomycin levels, to be done while on therapy
- I discussed my recommendations with the primary team housestaff /   - This patient is on drug therapy requiring intensive monitoring for toxicity. Vancomycin requires intensive drug monitoring due to concerns for possible adverse effects, including acute kidney injury, and will be monitored with basic metabolic panel, Vancomycin levels, to be done while on therapy
- I discussed my recommendations with the primary team racheltaeliz / Osmar Winters  - This patient is on drug therapy requiring intensive monitoring for toxicity. Vancomycin requires intensive drug monitoring due to concerns for possible adverse effects, including acute kidney injury, and will be monitored with basic metabolic panel, Vancomycin levels, to be done while on therapy
- I discussed my recommendations with the primary team trixie / Tatianna  - This patient is on drug therapy requiring intensive monitoring for toxicity. Vancomycin requires intensive drug monitoring due to concerns for possible adverse effects, including acute kidney injury, and will be monitored with basic metabolic panel, Vancomycin levels, to be done while on therapy

## 2025-05-06 NOTE — PROGRESS NOTE ADULT - ASSESSMENT
ASSESSMENT  89-year-old female past medical history of Parkinson's, dementia, hypertension, hyperlipidemia, A-fib on Eliquis, bacteremia secondary to MRSA positive parotitis left side of face in January 2025 presents for evaluation of right-sided facial swelling.    IMPRESSION  #Methicillin resistant Staphylococcus aureus (MRSA) bacteremia due to R parotitis  Recurrent MRSA bacteremia , Recurrent parotitis; previously L    5/3 BCX NGTD , 5/2 BCX NGTD , 4/30 BCX NGTD     4/27 1/4 bottles MRSA    Admission WBC 19     ESR/CRP 76/294  < from: CT Neck Soft Tissue w/ IV Cont (04.27.25 @ 15:03) >  Marked swelling/enlarged and hyperenhancing of the right parotid gland compatible with acute parotitis. There are inflammatory changes of the   adjacent soft tissues extending into the submandibular space as well as thickening of the platysma muscle. No abscess.  Bilateral mastoid effusions.  New cluster of nodules within the anterior segment of the upper lobe of the right lung measuring up to 1.1 cm.  #Suspect asymptomatic bacteriuria     UCX   10,000 - 49,000 CFU/mL Enterococcus faecalis (vancomycin resistant). UA without significant pyuria   #History  1/2025 MRSA bacteremia due to L parotitis    1/4 BCX NGTD     1/3 BCX NGTD     1/1 2/4 BCX MRSA    TTE no vegetations   As Pt was a poor candidate for JOI, she was D/C with PICC x IV Vanc 6 weeks from cleared BCX E/D 2/13/25  #Immunodeficiency secondary to Senescence which could result in poor clinical outcomes  #Abx allergy: No Known Allergies    Creatinine: 0.8 (04-28-25 @ 08:07)    Height (cm): 170.2 (01-09-25 @ 19:04)  Weight (kg): 60 (04-27-25 @ 12:46)    RECOMMENDATIONS  - Vanc dosing AUC/ADAM per clinical pharmacist x 6 weeks from cleared BCX via PICC 6/10/25  - TTE no vegetations , family declining JOI  - Appreciate ENT consult- needs workup of recurrent parotitis   - MRSA PCR + --> decolonization with mupirocin to nares BID and CHG daily washes - continue as outpatient   - Poor prognosis, GOC  - Please order weekly CBC, CMP, ESR/CRP, Vanc trough  - ID follow-up with Dr. Lauri Calabrese for Telehealth. We will call the patient between 8:00-4:30      [address] [facility]       [phone]       Fax [phone]     If any questions, please send a message or call on [employer] Teams  Please continue to update ID with any pertinent new laboratory, radiographic findings, or change in clinical status

## 2025-05-06 NOTE — PROGRESS NOTE ADULT - ASSESSMENT
89-year-old female past medical history of Parkinson's, dementia, hypertension, hyperlipidemia, A-fib on Eliquis, bacteremia secondary to MRSA positive parotitis left side of face in January 2025 presents for evaluation of right-sided facial swelling.    #R sided parotitis Hx of L parotitis  found to have  MRSA bacteremia  vancomycin   latest 2 blood culture negative   PICC line placed    #New incidental lung nodules  Repeat CT scan once infection is resolved to monitor.    #Paroxysmal atrial fibrillation  on apixaban     #HTN  BP: 95/54 (06 May 2025 00:00) (95/54 - 106/55)  controlled     #Dementia    #Parkinson   Parkinson home meds.  zaleplon PRN, Xanax PRN.    #Constipation  controlled , on laxatives    #GERD   pantoprazole, sucralfate.    #Bladder spasms   oxybutynin 5mg QD.    #Dyskinesia resolved on  sinemet  and amantadine      PROGRESS NOTE HANDOFF    Pending: DC today      Family discussion: patient verbalized understanding and agreeable to plan of care     Disposition: Home

## 2025-05-06 NOTE — PROGRESS NOTE ADULT - PROVIDER SPECIALTY LIST ADULT
Hospitalist
Infectious Disease
Infectious Disease
Internal Medicine
Intervent Radiology
ENT
ENT
Internal Medicine
Hospitalist
Infectious Disease
Internal Medicine
Internal Medicine
Hospitalist
Internal Medicine
Infectious Disease

## 2025-05-06 NOTE — PROGRESS NOTE ADULT - NUTRITIONAL ASSESSMENT
This patient has been assessed with a concern for Malnutrition and has been determined to have a diagnosis/diagnoses of Severe protein-calorie malnutrition.    This patient is being managed with:   Diet Pureed-  DASH/TLC {Sodium & Cholesterol Restricted} (DASH)  Supplement Feeding Modality:  Oral  Ensure Plus High Protein Cans or Servings Per Day:  1       Frequency:  Two Times a day  Entered: Apr 29 2025 10:08PM  

## 2025-05-07 LAB
CULTURE RESULTS: SIGNIFICANT CHANGE UP
SPECIMEN SOURCE: SIGNIFICANT CHANGE UP

## 2025-05-07 NOTE — CDI QUERY NOTE - NSCDIOTHERTXTBX_GEN_ALL_CORE_HH
Clinical documentation and/or evidence in the medical record indicates that this patient has functional status limitations.  In order to ensure accurate coding and accuracy of the clinical record, the documentation in this patient’s medical record requires additional clarification.      Please include more specific documentation regarding the patient’s functional status in your progress note and/or discharge summary.    Please clarify if the patient was found to have one of the following:      •	Functional Quadriplegia  •	Complete Immobility due to frailty   •	Complete Immobility due to severe physical disability  •	Other (specify)    Supporting documentation and/or clinical evidence:     5/1 Consult Note Adult-Neurology Resident/Attending: ...  is LTC resident at [facility] for about 4 years, and is bedbound    5/6 Discharge Note Provider: ...  past medical history of Parkinson's, dementia ... presents for evaluation of right-sided facial swelling found to have right sided parotitis.    5/6 Nursing Functional Assessment: Basic Mobility: Total assistance, Daily Activity: Total assistance    5/6 Nursing Naldo Assessment: Activity-bedfast, Mobility-very limited    5/6 Nursing Nutrition Assessment: Total feed  	  For reference, please see the [facility] Provider Functional Quadriplegia Clinical Paper located on the [facility] Intranet - Clinical documentation improvement resources.    Thank you,   Cha Riley RN [facility] CCDS  [phone]        Thank you,  Cha Riley RN [facility]  [phone]

## 2025-05-08 LAB
CULTURE RESULTS: SIGNIFICANT CHANGE UP
SPECIMEN SOURCE: SIGNIFICANT CHANGE UP

## 2025-05-16 DIAGNOSIS — D84.81 IMMUNODEFICIENCY DUE TO CONDITIONS CLASSIFIED ELSEWHERE: ICD-10-CM

## 2025-05-16 DIAGNOSIS — Z86.19 PERSONAL HISTORY OF OTHER INFECTIOUS AND PARASITIC DISEASES: ICD-10-CM

## 2025-05-16 DIAGNOSIS — Z79.899 OTHER LONG TERM (CURRENT) DRUG THERAPY: ICD-10-CM

## 2025-05-16 DIAGNOSIS — B95.62 METHICILLIN RESISTANT STAPHYLOCOCCUS AUREUS INFECTION AS THE CAUSE OF DISEASES CLASSIFIED ELSEWHERE: ICD-10-CM

## 2025-05-16 DIAGNOSIS — Z87.19 PERSONAL HISTORY OF OTHER DISEASES OF THE DIGESTIVE SYSTEM: ICD-10-CM

## 2025-05-16 DIAGNOSIS — E78.5 HYPERLIPIDEMIA, UNSPECIFIED: ICD-10-CM

## 2025-05-16 DIAGNOSIS — E43 UNSPECIFIED SEVERE PROTEIN-CALORIE MALNUTRITION: ICD-10-CM

## 2025-05-16 DIAGNOSIS — G20.B1 PARKINSON'S DISEASE WITH DYSKINESIA, WITHOUT MENTION OF FLUCTUATIONS: ICD-10-CM

## 2025-05-16 DIAGNOSIS — K11.20 SIALOADENITIS, UNSPECIFIED: ICD-10-CM

## 2025-05-16 DIAGNOSIS — Z66 DO NOT RESUSCITATE: ICD-10-CM

## 2025-05-16 DIAGNOSIS — K21.9 GASTRO-ESOPHAGEAL REFLUX DISEASE WITHOUT ESOPHAGITIS: ICD-10-CM

## 2025-05-16 DIAGNOSIS — R91.8 OTHER NONSPECIFIC ABNORMAL FINDING OF LUNG FIELD: ICD-10-CM

## 2025-05-16 DIAGNOSIS — R78.81 BACTEREMIA: ICD-10-CM

## 2025-05-16 DIAGNOSIS — K59.00 CONSTIPATION, UNSPECIFIED: ICD-10-CM

## 2025-05-16 DIAGNOSIS — Z95.818 PRESENCE OF OTHER CARDIAC IMPLANTS AND GRAFTS: ICD-10-CM

## 2025-05-16 DIAGNOSIS — Z79.01 LONG TERM (CURRENT) USE OF ANTICOAGULANTS: ICD-10-CM

## 2025-05-16 DIAGNOSIS — F02.80 DEMENTIA IN OTHER DISEASES CLASSIFIED ELSEWHERE, UNSPECIFIED SEVERITY, WITHOUT BEHAVIORAL DISTURBANCE, PSYCHOTIC DISTURBANCE, MOOD DISTURBANCE, AND ANXIETY: ICD-10-CM

## 2025-05-16 DIAGNOSIS — Z90.710 ACQUIRED ABSENCE OF BOTH CERVIX AND UTERUS: ICD-10-CM

## 2025-05-16 DIAGNOSIS — N32.89 OTHER SPECIFIED DISORDERS OF BLADDER: ICD-10-CM

## 2025-05-16 DIAGNOSIS — I48.0 PAROXYSMAL ATRIAL FIBRILLATION: ICD-10-CM

## 2025-05-16 DIAGNOSIS — R53.2 FUNCTIONAL QUADRIPLEGIA: ICD-10-CM

## 2025-05-16 DIAGNOSIS — I10 ESSENTIAL (PRIMARY) HYPERTENSION: ICD-10-CM
